# Patient Record
Sex: FEMALE | Race: WHITE | NOT HISPANIC OR LATINO | Employment: OTHER | URBAN - NONMETROPOLITAN AREA
[De-identification: names, ages, dates, MRNs, and addresses within clinical notes are randomized per-mention and may not be internally consistent; named-entity substitution may affect disease eponyms.]

---

## 2018-09-12 ENCOUNTER — TRANSCRIBE ORDERS (OUTPATIENT)
Dept: CARDIOLOGY | Facility: CLINIC | Age: 83
End: 2018-09-12

## 2018-09-12 ENCOUNTER — TRANSCRIBE ORDERS (OUTPATIENT)
Dept: CARDIAC SURGERY | Facility: CLINIC | Age: 83
End: 2018-09-12

## 2018-09-12 DIAGNOSIS — I49.9 CARDIAC ARRHYTHMIA, UNSPECIFIED CARDIAC ARRHYTHMIA TYPE: Primary | ICD-10-CM

## 2018-12-28 ENCOUNTER — APPOINTMENT (OUTPATIENT)
Dept: CT IMAGING | Facility: HOSPITAL | Age: 83
End: 2018-12-28

## 2018-12-28 ENCOUNTER — APPOINTMENT (OUTPATIENT)
Dept: GENERAL RADIOLOGY | Facility: HOSPITAL | Age: 83
End: 2018-12-28

## 2018-12-28 ENCOUNTER — HOSPITAL ENCOUNTER (INPATIENT)
Facility: HOSPITAL | Age: 83
LOS: 6 days | Discharge: NURSING, REHAB OR LONG-TERM CARE | End: 2019-01-03
Attending: EMERGENCY MEDICINE | Admitting: FAMILY MEDICINE

## 2018-12-28 DIAGNOSIS — R13.12 OROPHARYNGEAL DYSPHAGIA: ICD-10-CM

## 2018-12-28 DIAGNOSIS — G93.40 ENCEPHALOPATHY ACUTE: Primary | ICD-10-CM

## 2018-12-28 DIAGNOSIS — D72.829 LEUKOCYTOSIS, UNSPECIFIED TYPE: ICD-10-CM

## 2018-12-28 DIAGNOSIS — E87.1 HYPONATREMIA: ICD-10-CM

## 2018-12-28 DIAGNOSIS — Z74.09 IMPAIRED MOBILITY AND ACTIVITIES OF DAILY LIVING: ICD-10-CM

## 2018-12-28 DIAGNOSIS — Z78.9 IMPAIRED MOBILITY AND ACTIVITIES OF DAILY LIVING: ICD-10-CM

## 2018-12-28 DIAGNOSIS — Z74.09 IMPAIRED PHYSICAL MOBILITY: ICD-10-CM

## 2018-12-28 LAB
ALBUMIN SERPL-MCNC: 3.6 G/DL (ref 3.4–4.8)
ALBUMIN/GLOB SERPL: 1.4 G/DL (ref 1.1–1.8)
ALP SERPL-CCNC: 93 U/L (ref 38–126)
ALT SERPL W P-5'-P-CCNC: 13 U/L (ref 9–52)
ANION GAP SERPL CALCULATED.3IONS-SCNC: 11 MMOL/L (ref 5–15)
AST SERPL-CCNC: 18 U/L (ref 14–36)
BASOPHILS # BLD AUTO: 0.01 10*3/MM3 (ref 0–0.2)
BASOPHILS NFR BLD AUTO: 0.1 % (ref 0–2)
BILIRUB SERPL-MCNC: 0.5 MG/DL (ref 0.2–1.3)
BILIRUB UR QL STRIP: NEGATIVE
BUN BLD-MCNC: 10 MG/DL (ref 7–21)
BUN/CREAT SERPL: 9.3 (ref 7–25)
CALCIUM SPEC-SCNC: 8.8 MG/DL (ref 8.4–10.2)
CHLORIDE SERPL-SCNC: 90 MMOL/L (ref 95–110)
CK SERPL-CCNC: 70 U/L (ref 30–135)
CLARITY UR: CLEAR
CO2 SERPL-SCNC: 23 MMOL/L (ref 22–31)
COLOR UR: YELLOW
CREAT BLD-MCNC: 1.07 MG/DL (ref 0.5–1)
D-LACTATE SERPL-SCNC: 1 MMOL/L (ref 0.5–2)
DEPRECATED RDW RBC AUTO: 39.4 FL (ref 36.4–46.3)
EOSINOPHIL # BLD AUTO: 0 10*3/MM3 (ref 0–0.7)
EOSINOPHIL NFR BLD AUTO: 0 % (ref 0–7)
ERYTHROCYTE [DISTWIDTH] IN BLOOD BY AUTOMATED COUNT: 12.2 % (ref 11.5–14.5)
GFR SERPL CREATININE-BSD FRML MDRD: 49 ML/MIN/1.73 (ref 39–90)
GLOBULIN UR ELPH-MCNC: 2.6 GM/DL (ref 2.3–3.5)
GLUCOSE BLD-MCNC: 140 MG/DL (ref 60–100)
GLUCOSE UR STRIP-MCNC: NEGATIVE MG/DL
HCT VFR BLD AUTO: 30.8 % (ref 35–45)
HGB BLD-MCNC: 11.2 G/DL (ref 12–15.5)
HGB UR QL STRIP.AUTO: NEGATIVE
IMM GRANULOCYTES # BLD AUTO: 0.06 10*3/MM3 (ref 0–0.02)
IMM GRANULOCYTES NFR BLD AUTO: 0.4 % (ref 0–0.5)
KETONES UR QL STRIP: NEGATIVE
LEUKOCYTE ESTERASE UR QL STRIP.AUTO: NEGATIVE
LYMPHOCYTES # BLD AUTO: 0.78 10*3/MM3 (ref 0.6–4.2)
LYMPHOCYTES NFR BLD AUTO: 5.1 % (ref 10–50)
MAGNESIUM SERPL-MCNC: 1.9 MG/DL (ref 1.6–2.3)
MCH RBC QN AUTO: 31.6 PG (ref 26.5–34)
MCHC RBC AUTO-ENTMCNC: 36.4 G/DL (ref 31.4–36)
MCV RBC AUTO: 87 FL (ref 80–98)
MONOCYTES # BLD AUTO: 0.85 10*3/MM3 (ref 0–0.9)
MONOCYTES NFR BLD AUTO: 5.5 % (ref 0–12)
NEUTROPHILS # BLD AUTO: 13.64 10*3/MM3 (ref 2–8.6)
NEUTROPHILS NFR BLD AUTO: 88.9 % (ref 37–80)
NITRITE UR QL STRIP: NEGATIVE
NRBC BLD AUTO-RTO: 0 /100 WBC (ref 0–0)
NT-PROBNP SERPL-MCNC: 1570 PG/ML (ref 0–1800)
PH UR STRIP.AUTO: 7.5 [PH] (ref 5–9)
PLATELET # BLD AUTO: 270 10*3/MM3 (ref 150–450)
PMV BLD AUTO: 8.7 FL (ref 8–12)
POTASSIUM BLD-SCNC: 4.8 MMOL/L (ref 3.5–5.1)
PROT SERPL-MCNC: 6.2 G/DL (ref 6.3–8.6)
PROT UR QL STRIP: NEGATIVE
RBC # BLD AUTO: 3.54 10*6/MM3 (ref 3.77–5.16)
SODIUM BLD-SCNC: 124 MMOL/L (ref 137–145)
SP GR UR STRIP: 1.01 (ref 1–1.03)
TROPONIN I SERPL-MCNC: 0.04 NG/ML
TSH SERPL DL<=0.05 MIU/L-ACNC: 1.9 MIU/ML (ref 0.46–4.68)
UROBILINOGEN UR QL STRIP: NORMAL
WBC NRBC COR # BLD: 15.34 10*3/MM3 (ref 3.2–9.8)

## 2018-12-28 PROCEDURE — G0378 HOSPITAL OBSERVATION PER HR: HCPCS

## 2018-12-28 PROCEDURE — 84484 ASSAY OF TROPONIN QUANT: CPT | Performed by: EMERGENCY MEDICINE

## 2018-12-28 PROCEDURE — 71045 X-RAY EXAM CHEST 1 VIEW: CPT

## 2018-12-28 PROCEDURE — 81003 URINALYSIS AUTO W/O SCOPE: CPT | Performed by: EMERGENCY MEDICINE

## 2018-12-28 PROCEDURE — 72125 CT NECK SPINE W/O DYE: CPT

## 2018-12-28 PROCEDURE — 93010 ELECTROCARDIOGRAM REPORT: CPT | Performed by: INTERNAL MEDICINE

## 2018-12-28 PROCEDURE — 83880 ASSAY OF NATRIURETIC PEPTIDE: CPT | Performed by: EMERGENCY MEDICINE

## 2018-12-28 PROCEDURE — 83735 ASSAY OF MAGNESIUM: CPT | Performed by: EMERGENCY MEDICINE

## 2018-12-28 PROCEDURE — 70450 CT HEAD/BRAIN W/O DYE: CPT

## 2018-12-28 PROCEDURE — 99285 EMERGENCY DEPT VISIT HI MDM: CPT

## 2018-12-28 PROCEDURE — 82746 ASSAY OF FOLIC ACID SERUM: CPT | Performed by: PHYSICIAN ASSISTANT

## 2018-12-28 PROCEDURE — 36415 COLL VENOUS BLD VENIPUNCTURE: CPT | Performed by: EMERGENCY MEDICINE

## 2018-12-28 PROCEDURE — 83540 ASSAY OF IRON: CPT | Performed by: PHYSICIAN ASSISTANT

## 2018-12-28 PROCEDURE — 84443 ASSAY THYROID STIM HORMONE: CPT | Performed by: EMERGENCY MEDICINE

## 2018-12-28 PROCEDURE — 83550 IRON BINDING TEST: CPT | Performed by: PHYSICIAN ASSISTANT

## 2018-12-28 PROCEDURE — 82607 VITAMIN B-12: CPT | Performed by: PHYSICIAN ASSISTANT

## 2018-12-28 PROCEDURE — 87040 BLOOD CULTURE FOR BACTERIA: CPT | Performed by: EMERGENCY MEDICINE

## 2018-12-28 PROCEDURE — 80053 COMPREHEN METABOLIC PANEL: CPT | Performed by: EMERGENCY MEDICINE

## 2018-12-28 PROCEDURE — 93005 ELECTROCARDIOGRAM TRACING: CPT | Performed by: EMERGENCY MEDICINE

## 2018-12-28 PROCEDURE — 83605 ASSAY OF LACTIC ACID: CPT | Performed by: EMERGENCY MEDICINE

## 2018-12-28 PROCEDURE — 25010000002 LORAZEPAM PER 2 MG: Performed by: EMERGENCY MEDICINE

## 2018-12-28 PROCEDURE — 82728 ASSAY OF FERRITIN: CPT | Performed by: PHYSICIAN ASSISTANT

## 2018-12-28 PROCEDURE — 85025 COMPLETE CBC W/AUTO DIFF WBC: CPT | Performed by: EMERGENCY MEDICINE

## 2018-12-28 PROCEDURE — 82550 ASSAY OF CK (CPK): CPT | Performed by: EMERGENCY MEDICINE

## 2018-12-28 RX ORDER — ACETAMINOPHEN 325 MG/1
650 TABLET ORAL EVERY 4 HOURS PRN
Status: DISCONTINUED | OUTPATIENT
Start: 2018-12-28 | End: 2019-01-03 | Stop reason: HOSPADM

## 2018-12-28 RX ORDER — SODIUM CHLORIDE 0.9 % (FLUSH) 0.9 %
3-10 SYRINGE (ML) INJECTION AS NEEDED
Status: DISCONTINUED | OUTPATIENT
Start: 2018-12-28 | End: 2019-01-03 | Stop reason: HOSPADM

## 2018-12-28 RX ORDER — HYDRALAZINE HYDROCHLORIDE 20 MG/ML
20 INJECTION INTRAMUSCULAR; INTRAVENOUS EVERY 6 HOURS PRN
Status: DISCONTINUED | OUTPATIENT
Start: 2018-12-28 | End: 2019-01-03 | Stop reason: HOSPADM

## 2018-12-28 RX ORDER — CHOLECALCIFEROL (VITAMIN D3) 125 MCG
5 CAPSULE ORAL NIGHTLY
COMMUNITY
End: 2023-02-23 | Stop reason: SDUPTHER

## 2018-12-28 RX ORDER — SODIUM CHLORIDE 9 MG/ML
INJECTION, SOLUTION INTRAVENOUS
Status: DISCONTINUED
Start: 2018-12-28 | End: 2019-01-03 | Stop reason: HOSPADM

## 2018-12-28 RX ORDER — SODIUM CHLORIDE 0.9 % (FLUSH) 0.9 %
3 SYRINGE (ML) INJECTION EVERY 12 HOURS SCHEDULED
Status: DISCONTINUED | OUTPATIENT
Start: 2018-12-28 | End: 2019-01-03 | Stop reason: HOSPADM

## 2018-12-28 RX ORDER — HALOPERIDOL 5 MG/ML
2 INJECTION INTRAMUSCULAR EVERY 6 HOURS PRN
Status: DISCONTINUED | OUTPATIENT
Start: 2018-12-28 | End: 2019-01-03 | Stop reason: HOSPADM

## 2018-12-28 RX ORDER — SODIUM CHLORIDE 9 MG/ML
75 INJECTION, SOLUTION INTRAVENOUS CONTINUOUS
Status: DISCONTINUED | OUTPATIENT
Start: 2018-12-28 | End: 2018-12-30

## 2018-12-28 RX ORDER — ALBUTEROL SULFATE 2.5 MG/3ML
2.5 SOLUTION RESPIRATORY (INHALATION) EVERY 4 HOURS PRN
Status: DISCONTINUED | OUTPATIENT
Start: 2018-12-28 | End: 2019-01-03 | Stop reason: HOSPADM

## 2018-12-28 RX ORDER — LORAZEPAM 2 MG/ML
1 INJECTION INTRAMUSCULAR ONCE
Status: COMPLETED | OUTPATIENT
Start: 2018-12-28 | End: 2018-12-28

## 2018-12-28 RX ORDER — SODIUM CHLORIDE 9 MG/ML
125 INJECTION, SOLUTION INTRAVENOUS CONTINUOUS
Status: DISCONTINUED | OUTPATIENT
Start: 2018-12-28 | End: 2018-12-28

## 2018-12-28 RX ADMIN — SODIUM CHLORIDE 125 ML/HR: 900 INJECTION, SOLUTION INTRAVENOUS at 14:21

## 2018-12-28 RX ADMIN — LORAZEPAM 1 MG: 2 INJECTION, SOLUTION INTRAMUSCULAR; INTRAVENOUS at 13:16

## 2018-12-28 RX ADMIN — SODIUM CHLORIDE 500 ML: 9 INJECTION, SOLUTION INTRAVENOUS at 13:13

## 2018-12-29 LAB
ALBUMIN SERPL-MCNC: 2.5 G/DL (ref 3.4–4.8)
ALBUMIN/GLOB SERPL: 1 G/DL (ref 1.1–1.8)
ALP SERPL-CCNC: 70 U/L (ref 38–126)
ALT SERPL W P-5'-P-CCNC: 16 U/L (ref 9–52)
ANION GAP SERPL CALCULATED.3IONS-SCNC: 5 MMOL/L (ref 5–15)
AST SERPL-CCNC: 16 U/L (ref 14–36)
BASOPHILS # BLD AUTO: 0.01 10*3/MM3 (ref 0–0.2)
BASOPHILS NFR BLD AUTO: 0.2 % (ref 0–2)
BILIRUB SERPL-MCNC: 0.6 MG/DL (ref 0.2–1.3)
BUN BLD-MCNC: 9 MG/DL (ref 7–21)
BUN/CREAT SERPL: 8.7 (ref 7–25)
CALCIUM SPEC-SCNC: 8.2 MG/DL (ref 8.4–10.2)
CHLORIDE SERPL-SCNC: 100 MMOL/L (ref 95–110)
CO2 SERPL-SCNC: 23 MMOL/L (ref 22–31)
CREAT BLD-MCNC: 1.03 MG/DL (ref 0.5–1)
DEPRECATED RDW RBC AUTO: 42.3 FL (ref 36.4–46.3)
EOSINOPHIL # BLD AUTO: 0.03 10*3/MM3 (ref 0–0.7)
EOSINOPHIL NFR BLD AUTO: 0.5 % (ref 0–7)
ERYTHROCYTE [DISTWIDTH] IN BLOOD BY AUTOMATED COUNT: 12.8 % (ref 11.5–14.5)
FERRITIN SERPL-MCNC: 162 NG/ML (ref 11.1–264)
FOLATE SERPL-MCNC: 19.5 NG/ML (ref 2.76–21)
GFR SERPL CREATININE-BSD FRML MDRD: 51 ML/MIN/1.73 (ref 39–90)
GLOBULIN UR ELPH-MCNC: 2.5 GM/DL (ref 2.3–3.5)
GLUCOSE BLD-MCNC: 92 MG/DL (ref 60–100)
HCT VFR BLD AUTO: 27.3 % (ref 35–45)
HGB BLD-MCNC: 9.5 G/DL (ref 12–15.5)
IMM GRANULOCYTES # BLD AUTO: 0.02 10*3/MM3 (ref 0–0.02)
IMM GRANULOCYTES NFR BLD AUTO: 0.3 % (ref 0–0.5)
IRON 24H UR-MRATE: 24 MCG/DL (ref 37–170)
IRON SATN MFR SERPL: 9 % (ref 15–50)
LYMPHOCYTES # BLD AUTO: 1.29 10*3/MM3 (ref 0.6–4.2)
LYMPHOCYTES NFR BLD AUTO: 19.5 % (ref 10–50)
MCH RBC QN AUTO: 31.4 PG (ref 26.5–34)
MCHC RBC AUTO-ENTMCNC: 34.8 G/DL (ref 31.4–36)
MCV RBC AUTO: 90.1 FL (ref 80–98)
MONOCYTES # BLD AUTO: 0.51 10*3/MM3 (ref 0–0.9)
MONOCYTES NFR BLD AUTO: 7.7 % (ref 0–12)
NEUTROPHILS # BLD AUTO: 4.75 10*3/MM3 (ref 2–8.6)
NEUTROPHILS NFR BLD AUTO: 71.8 % (ref 37–80)
PLATELET # BLD AUTO: 243 10*3/MM3 (ref 150–450)
PMV BLD AUTO: 8.4 FL (ref 8–12)
POTASSIUM BLD-SCNC: 4 MMOL/L (ref 3.5–5.1)
PROT SERPL-MCNC: 5 G/DL (ref 6.3–8.6)
RBC # BLD AUTO: 3.03 10*6/MM3 (ref 3.77–5.16)
SODIUM BLD-SCNC: 128 MMOL/L (ref 137–145)
TIBC SERPL-MCNC: 263 MCG/DL (ref 265–497)
VIT B12 BLD-MCNC: 679 PG/ML (ref 239–931)
WBC NRBC COR # BLD: 6.61 10*3/MM3 (ref 3.2–9.8)

## 2018-12-29 PROCEDURE — G8996 SWALLOW CURRENT STATUS: HCPCS | Performed by: SPEECH-LANGUAGE PATHOLOGIST

## 2018-12-29 PROCEDURE — G8978 MOBILITY CURRENT STATUS: HCPCS | Performed by: PHYSICAL THERAPIST

## 2018-12-29 PROCEDURE — G8997 SWALLOW GOAL STATUS: HCPCS | Performed by: SPEECH-LANGUAGE PATHOLOGIST

## 2018-12-29 PROCEDURE — 92610 EVALUATE SWALLOWING FUNCTION: CPT | Performed by: SPEECH-LANGUAGE PATHOLOGIST

## 2018-12-29 PROCEDURE — G8979 MOBILITY GOAL STATUS: HCPCS | Performed by: PHYSICAL THERAPIST

## 2018-12-29 PROCEDURE — 97162 PT EVAL MOD COMPLEX 30 MIN: CPT | Performed by: PHYSICAL THERAPIST

## 2018-12-29 PROCEDURE — 80053 COMPREHEN METABOLIC PANEL: CPT | Performed by: PHYSICIAN ASSISTANT

## 2018-12-29 PROCEDURE — G8998 SWALLOW D/C STATUS: HCPCS | Performed by: SPEECH-LANGUAGE PATHOLOGIST

## 2018-12-29 PROCEDURE — 85025 COMPLETE CBC W/AUTO DIFF WBC: CPT | Performed by: PHYSICIAN ASSISTANT

## 2018-12-29 PROCEDURE — 97530 THERAPEUTIC ACTIVITIES: CPT | Performed by: PHYSICAL THERAPIST

## 2018-12-29 PROCEDURE — 99233 SBSQ HOSP IP/OBS HIGH 50: CPT | Performed by: PSYCHIATRY & NEUROLOGY

## 2018-12-29 RX ADMIN — SODIUM CHLORIDE, PRESERVATIVE FREE 10 ML: 5 INJECTION INTRAVENOUS at 08:17

## 2018-12-29 RX ADMIN — SODIUM CHLORIDE 75 ML/HR: 9 INJECTION, SOLUTION INTRAVENOUS at 15:22

## 2018-12-29 RX ADMIN — Medication: at 13:59

## 2018-12-30 LAB
25(OH)D3 SERPL-MCNC: 17.4 NG/ML (ref 30–100)
ANION GAP SERPL CALCULATED.3IONS-SCNC: 8 MMOL/L (ref 5–15)
BASOPHILS # BLD AUTO: 0.02 10*3/MM3 (ref 0–0.2)
BASOPHILS NFR BLD AUTO: 0.4 % (ref 0–2)
BUN BLD-MCNC: 9 MG/DL (ref 7–21)
BUN/CREAT SERPL: 9.2 (ref 7–25)
CALCIUM SPEC-SCNC: 8.2 MG/DL (ref 8.4–10.2)
CHLORIDE SERPL-SCNC: 100 MMOL/L (ref 95–110)
CO2 SERPL-SCNC: 21 MMOL/L (ref 22–31)
CREAT BLD-MCNC: 0.98 MG/DL (ref 0.5–1)
DEPRECATED RDW RBC AUTO: 40.1 FL (ref 36.4–46.3)
EOSINOPHIL # BLD AUTO: 0.16 10*3/MM3 (ref 0–0.7)
EOSINOPHIL NFR BLD AUTO: 3 % (ref 0–7)
ERYTHROCYTE [DISTWIDTH] IN BLOOD BY AUTOMATED COUNT: 12.4 % (ref 11.5–14.5)
GFR SERPL CREATININE-BSD FRML MDRD: 54 ML/MIN/1.73 (ref 39–90)
GLUCOSE BLD-MCNC: 86 MG/DL (ref 60–100)
HCT VFR BLD AUTO: 27.6 % (ref 35–45)
HGB BLD-MCNC: 9.9 G/DL (ref 12–15.5)
IMM GRANULOCYTES # BLD AUTO: 0.01 10*3/MM3 (ref 0–0.02)
IMM GRANULOCYTES NFR BLD AUTO: 0.2 % (ref 0–0.5)
LYMPHOCYTES # BLD AUTO: 1.08 10*3/MM3 (ref 0.6–4.2)
LYMPHOCYTES NFR BLD AUTO: 20.1 % (ref 10–50)
MCH RBC QN AUTO: 31.7 PG (ref 26.5–34)
MCHC RBC AUTO-ENTMCNC: 35.9 G/DL (ref 31.4–36)
MCV RBC AUTO: 88.5 FL (ref 80–98)
MONOCYTES # BLD AUTO: 0.42 10*3/MM3 (ref 0–0.9)
MONOCYTES NFR BLD AUTO: 7.8 % (ref 0–12)
NEUTROPHILS # BLD AUTO: 3.67 10*3/MM3 (ref 2–8.6)
NEUTROPHILS NFR BLD AUTO: 68.5 % (ref 37–80)
PLATELET # BLD AUTO: 253 10*3/MM3 (ref 150–450)
PMV BLD AUTO: 8.9 FL (ref 8–12)
POTASSIUM BLD-SCNC: 3.7 MMOL/L (ref 3.5–5.1)
RBC # BLD AUTO: 3.12 10*6/MM3 (ref 3.77–5.16)
SODIUM BLD-SCNC: 129 MMOL/L (ref 137–145)
WBC NRBC COR # BLD: 5.36 10*3/MM3 (ref 3.2–9.8)

## 2018-12-30 PROCEDURE — 86593 SYPHILIS TEST NON-TREP QUANT: CPT | Performed by: PSYCHIATRY & NEUROLOGY

## 2018-12-30 PROCEDURE — G8987 SELF CARE CURRENT STATUS: HCPCS

## 2018-12-30 PROCEDURE — 83930 ASSAY OF BLOOD OSMOLALITY: CPT | Performed by: PHYSICIAN ASSISTANT

## 2018-12-30 PROCEDURE — 36415 COLL VENOUS BLD VENIPUNCTURE: CPT | Performed by: PSYCHIATRY & NEUROLOGY

## 2018-12-30 PROCEDURE — 99232 SBSQ HOSP IP/OBS MODERATE 35: CPT | Performed by: PSYCHIATRY & NEUROLOGY

## 2018-12-30 PROCEDURE — 82306 VITAMIN D 25 HYDROXY: CPT | Performed by: PSYCHIATRY & NEUROLOGY

## 2018-12-30 PROCEDURE — 85025 COMPLETE CBC W/AUTO DIFF WBC: CPT | Performed by: PHYSICIAN ASSISTANT

## 2018-12-30 PROCEDURE — 80048 BASIC METABOLIC PNL TOTAL CA: CPT | Performed by: PHYSICIAN ASSISTANT

## 2018-12-30 PROCEDURE — G8988 SELF CARE GOAL STATUS: HCPCS

## 2018-12-30 PROCEDURE — 97166 OT EVAL MOD COMPLEX 45 MIN: CPT

## 2018-12-30 PROCEDURE — 86780 TREPONEMA PALLIDUM: CPT | Performed by: PSYCHIATRY & NEUROLOGY

## 2018-12-30 PROCEDURE — 86592 SYPHILIS TEST NON-TREP QUAL: CPT | Performed by: PSYCHIATRY & NEUROLOGY

## 2018-12-30 PROCEDURE — G0432 EIA HIV-1/HIV-2 SCREEN: HCPCS | Performed by: PSYCHIATRY & NEUROLOGY

## 2018-12-30 RX ORDER — FERROUS SULFATE TAB EC 324 MG (65 MG FE EQUIVALENT) 324 (65 FE) MG
324 TABLET DELAYED RESPONSE ORAL 2 TIMES DAILY WITH MEALS
Status: DISCONTINUED | OUTPATIENT
Start: 2018-12-30 | End: 2019-01-03 | Stop reason: HOSPADM

## 2018-12-30 RX ORDER — MELATONIN
5000
Status: DISCONTINUED | OUTPATIENT
Start: 2018-12-31 | End: 2019-01-03 | Stop reason: HOSPADM

## 2018-12-30 RX ADMIN — Medication: at 08:19

## 2018-12-30 RX ADMIN — SODIUM CHLORIDE, PRESERVATIVE FREE 10 ML: 5 INJECTION INTRAVENOUS at 08:19

## 2018-12-30 RX ADMIN — SODIUM CHLORIDE 75 ML/HR: 9 INJECTION, SOLUTION INTRAVENOUS at 04:25

## 2018-12-30 RX ADMIN — SODIUM CHLORIDE, PRESERVATIVE FREE 3 ML: 5 INJECTION INTRAVENOUS at 19:43

## 2018-12-30 RX ADMIN — FERROUS SULFATE TAB EC 324 MG (65 MG FE EQUIVALENT) 324 MG: 324 (65 FE) TABLET DELAYED RESPONSE at 17:14

## 2018-12-31 LAB
ALBUMIN SERPL-MCNC: 2.8 G/DL (ref 3.4–4.8)
ALBUMIN/GLOB SERPL: 1.3 G/DL (ref 1.1–1.8)
ALP SERPL-CCNC: 72 U/L (ref 38–126)
ALT SERPL W P-5'-P-CCNC: 15 U/L (ref 9–52)
ANION GAP SERPL CALCULATED.3IONS-SCNC: 8 MMOL/L (ref 5–15)
AST SERPL-CCNC: 23 U/L (ref 14–36)
BASOPHILS # BLD AUTO: 0.02 10*3/MM3 (ref 0–0.2)
BASOPHILS NFR BLD AUTO: 0.4 % (ref 0–2)
BILIRUB SERPL-MCNC: 0.2 MG/DL (ref 0.2–1.3)
BUN BLD-MCNC: 8 MG/DL (ref 7–21)
BUN/CREAT SERPL: 7.6 (ref 7–25)
CALCIUM SPEC-SCNC: 8.3 MG/DL (ref 8.4–10.2)
CHLORIDE SERPL-SCNC: 96 MMOL/L (ref 95–110)
CO2 SERPL-SCNC: 22 MMOL/L (ref 22–31)
CREAT BLD-MCNC: 1.05 MG/DL (ref 0.5–1)
DEPRECATED RDW RBC AUTO: 40.5 FL (ref 36.4–46.3)
EOSINOPHIL # BLD AUTO: 0.17 10*3/MM3 (ref 0–0.7)
EOSINOPHIL NFR BLD AUTO: 3.3 % (ref 0–7)
ERYTHROCYTE [DISTWIDTH] IN BLOOD BY AUTOMATED COUNT: 12.5 % (ref 11.5–14.5)
GFR SERPL CREATININE-BSD FRML MDRD: 50 ML/MIN/1.73 (ref 39–90)
GLOBULIN UR ELPH-MCNC: 2.2 GM/DL (ref 2.3–3.5)
GLUCOSE BLD-MCNC: 79 MG/DL (ref 60–100)
HCT VFR BLD AUTO: 26.4 % (ref 35–45)
HGB BLD-MCNC: 9.5 G/DL (ref 12–15.5)
IMM GRANULOCYTES # BLD AUTO: 0.02 10*3/MM3 (ref 0–0.02)
IMM GRANULOCYTES NFR BLD AUTO: 0.4 % (ref 0–0.5)
LYMPHOCYTES # BLD AUTO: 1.14 10*3/MM3 (ref 0.6–4.2)
LYMPHOCYTES NFR BLD AUTO: 21.9 % (ref 10–50)
MCH RBC QN AUTO: 31.5 PG (ref 26.5–34)
MCHC RBC AUTO-ENTMCNC: 36 G/DL (ref 31.4–36)
MCV RBC AUTO: 87.4 FL (ref 80–98)
MONOCYTES # BLD AUTO: 0.42 10*3/MM3 (ref 0–0.9)
MONOCYTES NFR BLD AUTO: 8.1 % (ref 0–12)
NEUTROPHILS # BLD AUTO: 3.43 10*3/MM3 (ref 2–8.6)
NEUTROPHILS NFR BLD AUTO: 65.9 % (ref 37–80)
OSMOLALITY SERPL: 271 MOSM/KG (ref 280–290)
OSMOLALITY UR: 190 MOSM/KG (ref 38–1400)
PLATELET # BLD AUTO: 241 10*3/MM3 (ref 150–450)
PMV BLD AUTO: 9 FL (ref 8–12)
POTASSIUM BLD-SCNC: 3.8 MMOL/L (ref 3.5–5.1)
PROT SERPL-MCNC: 5 G/DL (ref 6.3–8.6)
RBC # BLD AUTO: 3.02 10*6/MM3 (ref 3.77–5.16)
SODIUM BLD-SCNC: 122 MMOL/L (ref 137–145)
SODIUM BLD-SCNC: 126 MMOL/L (ref 137–145)
SODIUM UR-SCNC: 62 MMOL/L (ref 30–90)
WBC NRBC COR # BLD: 5.2 10*3/MM3 (ref 3.2–9.8)

## 2018-12-31 PROCEDURE — 97530 THERAPEUTIC ACTIVITIES: CPT

## 2018-12-31 PROCEDURE — 97535 SELF CARE MNGMENT TRAINING: CPT

## 2018-12-31 PROCEDURE — 85025 COMPLETE CBC W/AUTO DIFF WBC: CPT | Performed by: PHYSICIAN ASSISTANT

## 2018-12-31 PROCEDURE — 80053 COMPREHEN METABOLIC PANEL: CPT | Performed by: PHYSICIAN ASSISTANT

## 2018-12-31 PROCEDURE — 84295 ASSAY OF SERUM SODIUM: CPT | Performed by: INTERNAL MEDICINE

## 2018-12-31 PROCEDURE — 83935 ASSAY OF URINE OSMOLALITY: CPT | Performed by: PHYSICIAN ASSISTANT

## 2018-12-31 PROCEDURE — 84300 ASSAY OF URINE SODIUM: CPT | Performed by: PHYSICIAN ASSISTANT

## 2018-12-31 RX ORDER — SODIUM CHLORIDE 1000 MG
2 TABLET, SOLUBLE MISCELLANEOUS
Status: DISCONTINUED | OUTPATIENT
Start: 2018-12-31 | End: 2018-12-31

## 2018-12-31 RX ORDER — QUETIAPINE FUMARATE 25 MG/1
12.5 TABLET, FILM COATED ORAL NIGHTLY
Status: DISCONTINUED | OUTPATIENT
Start: 2018-12-31 | End: 2019-01-03 | Stop reason: HOSPADM

## 2018-12-31 RX ADMIN — Medication 15 MG: at 23:44

## 2018-12-31 RX ADMIN — FERROUS SULFATE TAB EC 324 MG (65 MG FE EQUIVALENT) 324 MG: 324 (65 FE) TABLET DELAYED RESPONSE at 11:18

## 2018-12-31 RX ADMIN — SODIUM CHLORIDE, PRESERVATIVE FREE 3 ML: 5 INJECTION INTRAVENOUS at 11:19

## 2018-12-31 RX ADMIN — Medication 12.5 MG: at 20:36

## 2018-12-31 RX ADMIN — FERROUS SULFATE TAB EC 324 MG (65 MG FE EQUIVALENT) 324 MG: 324 (65 FE) TABLET DELAYED RESPONSE at 17:24

## 2018-12-31 RX ADMIN — VITAMIN D, TAB 1000IU (100/BT) 5000 UNITS: 25 TAB at 17:24

## 2018-12-31 RX ADMIN — SODIUM CHLORIDE TAB 1 GM 2 G: 1 TAB at 17:24

## 2019-01-01 LAB
ALBUMIN SERPL-MCNC: 2.8 G/DL (ref 3.4–4.8)
ALBUMIN/GLOB SERPL: 1.1 G/DL (ref 1.1–1.8)
ALP SERPL-CCNC: 77 U/L (ref 38–126)
ALT SERPL W P-5'-P-CCNC: 19 U/L (ref 9–52)
ANION GAP SERPL CALCULATED.3IONS-SCNC: 9 MMOL/L (ref 5–15)
AST SERPL-CCNC: 21 U/L (ref 14–36)
BASOPHILS # BLD AUTO: 0.02 10*3/MM3 (ref 0–0.2)
BASOPHILS NFR BLD AUTO: 0.5 % (ref 0–2)
BILIRUB SERPL-MCNC: 0.2 MG/DL (ref 0.2–1.3)
BUN BLD-MCNC: 9 MG/DL (ref 7–21)
BUN/CREAT SERPL: 8 (ref 7–25)
CALCIUM SPEC-SCNC: 8.5 MG/DL (ref 8.4–10.2)
CHLORIDE SERPL-SCNC: 101 MMOL/L (ref 95–110)
CO2 SERPL-SCNC: 23 MMOL/L (ref 22–31)
CORTIS AM PEAK SERPL-MCNC: 10.1 MCG/DL (ref 4.46–22.7)
CREAT BLD-MCNC: 1.13 MG/DL (ref 0.5–1)
DEPRECATED RDW RBC AUTO: 40.8 FL (ref 36.4–46.3)
EOSINOPHIL # BLD AUTO: 0.16 10*3/MM3 (ref 0–0.7)
EOSINOPHIL NFR BLD AUTO: 4.1 % (ref 0–7)
ERYTHROCYTE [DISTWIDTH] IN BLOOD BY AUTOMATED COUNT: 12.6 % (ref 11.5–14.5)
GFR SERPL CREATININE-BSD FRML MDRD: 46 ML/MIN/1.73 (ref 39–90)
GLOBULIN UR ELPH-MCNC: 2.5 GM/DL (ref 2.3–3.5)
GLUCOSE BLD-MCNC: 90 MG/DL (ref 60–100)
HCT VFR BLD AUTO: 27.7 % (ref 35–45)
HGB BLD-MCNC: 10 G/DL (ref 12–15.5)
IMM GRANULOCYTES # BLD AUTO: 0.01 10*3/MM3 (ref 0–0.02)
IMM GRANULOCYTES NFR BLD AUTO: 0.3 % (ref 0–0.5)
LYMPHOCYTES # BLD AUTO: 0.99 10*3/MM3 (ref 0.6–4.2)
LYMPHOCYTES NFR BLD AUTO: 25.3 % (ref 10–50)
MCH RBC QN AUTO: 31.9 PG (ref 26.5–34)
MCHC RBC AUTO-ENTMCNC: 36.1 G/DL (ref 31.4–36)
MCV RBC AUTO: 88.5 FL (ref 80–98)
MONOCYTES # BLD AUTO: 0.39 10*3/MM3 (ref 0–0.9)
MONOCYTES NFR BLD AUTO: 9.9 % (ref 0–12)
NEUTROPHILS # BLD AUTO: 2.35 10*3/MM3 (ref 2–8.6)
NEUTROPHILS NFR BLD AUTO: 59.9 % (ref 37–80)
PLATELET # BLD AUTO: 249 10*3/MM3 (ref 150–450)
PMV BLD AUTO: 9 FL (ref 8–12)
POTASSIUM BLD-SCNC: 3.8 MMOL/L (ref 3.5–5.1)
PROT SERPL-MCNC: 5.3 G/DL (ref 6.3–8.6)
RBC # BLD AUTO: 3.13 10*6/MM3 (ref 3.77–5.16)
SODIUM BLD-SCNC: 132 MMOL/L (ref 137–145)
SODIUM BLD-SCNC: 133 MMOL/L (ref 137–145)
URATE SERPL-MCNC: 3.4 MG/DL (ref 2.5–8.5)
WBC NRBC COR # BLD: 3.92 10*3/MM3 (ref 3.2–9.8)

## 2019-01-01 PROCEDURE — 84295 ASSAY OF SERUM SODIUM: CPT | Performed by: INTERNAL MEDICINE

## 2019-01-01 PROCEDURE — 82533 TOTAL CORTISOL: CPT | Performed by: NURSE PRACTITIONER

## 2019-01-01 PROCEDURE — 80053 COMPREHEN METABOLIC PANEL: CPT | Performed by: PHYSICIAN ASSISTANT

## 2019-01-01 PROCEDURE — 85025 COMPLETE CBC W/AUTO DIFF WBC: CPT | Performed by: PHYSICIAN ASSISTANT

## 2019-01-01 PROCEDURE — 84550 ASSAY OF BLOOD/URIC ACID: CPT | Performed by: INTERNAL MEDICINE

## 2019-01-01 PROCEDURE — 97110 THERAPEUTIC EXERCISES: CPT

## 2019-01-01 RX ORDER — LACTULOSE 10 G/15ML
10 SOLUTION ORAL DAILY PRN
Status: DISCONTINUED | OUTPATIENT
Start: 2019-01-01 | End: 2019-01-03 | Stop reason: HOSPADM

## 2019-01-01 RX ORDER — AMLODIPINE BESYLATE 5 MG/1
5 TABLET ORAL
Status: DISCONTINUED | OUTPATIENT
Start: 2019-01-01 | End: 2019-01-03 | Stop reason: HOSPADM

## 2019-01-01 RX ORDER — SODIUM CHLORIDE 1000 MG
2 TABLET, SOLUBLE MISCELLANEOUS
Status: DISCONTINUED | OUTPATIENT
Start: 2019-01-01 | End: 2019-01-03

## 2019-01-01 RX ORDER — DOCUSATE SODIUM 100 MG/1
100 CAPSULE, LIQUID FILLED ORAL 2 TIMES DAILY
Status: DISCONTINUED | OUTPATIENT
Start: 2019-01-01 | End: 2019-01-03 | Stop reason: HOSPADM

## 2019-01-01 RX ADMIN — VITAMIN D, TAB 1000IU (100/BT) 5000 UNITS: 25 TAB at 17:45

## 2019-01-01 RX ADMIN — POLYETHYLENE GLYCOL 3350 17 G: 17 POWDER, FOR SOLUTION ORAL at 10:27

## 2019-01-01 RX ADMIN — FERROUS SULFATE TAB EC 324 MG (65 MG FE EQUIVALENT) 324 MG: 324 (65 FE) TABLET DELAYED RESPONSE at 17:45

## 2019-01-01 RX ADMIN — FERROUS SULFATE TAB EC 324 MG (65 MG FE EQUIVALENT) 324 MG: 324 (65 FE) TABLET DELAYED RESPONSE at 08:24

## 2019-01-01 RX ADMIN — DOCUSATE SODIUM 100 MG: 100 CAPSULE, LIQUID FILLED ORAL at 10:27

## 2019-01-01 RX ADMIN — SODIUM CHLORIDE, PRESERVATIVE FREE 3 ML: 5 INJECTION INTRAVENOUS at 20:59

## 2019-01-01 RX ADMIN — DOCUSATE SODIUM 100 MG: 100 CAPSULE, LIQUID FILLED ORAL at 20:58

## 2019-01-01 RX ADMIN — SODIUM CHLORIDE TAB 1 GM 2 G: 1 TAB at 17:45

## 2019-01-01 RX ADMIN — SODIUM CHLORIDE, PRESERVATIVE FREE 3 ML: 5 INJECTION INTRAVENOUS at 08:25

## 2019-01-01 RX ADMIN — SODIUM CHLORIDE TAB 1 GM 2 G: 1 TAB at 10:27

## 2019-01-01 RX ADMIN — AMLODIPINE BESYLATE 5 MG: 5 TABLET ORAL at 10:27

## 2019-01-01 NOTE — PLAN OF CARE
Problem: Patient Care Overview  Goal: Plan of Care Review  Outcome: Ongoing (interventions implemented as appropriate)   01/01/19 0031   Coping/Psychosocial   Plan of Care Reviewed With patient   Plan of Care Review   Progress no change       Problem: Fall Risk (Adult)  Goal: Absence of Fall  Outcome: Ongoing (interventions implemented as appropriate)   01/01/19 0031   Fall Risk (Adult)   Absence of Fall achieves outcome       Problem: Skin Injury Risk (Adult)  Goal: Skin Health and Integrity  Outcome: Ongoing (interventions implemented as appropriate)   01/01/19 0031   Skin Injury Risk (Adult)   Skin Health and Integrity achieves outcome       Problem: Confusion, Chronic (Adult)  Goal: Cognitive/Functional Impairments Minimized  Outcome: Ongoing (interventions implemented as appropriate)   01/01/19 0031   Confusion, Chronic (Adult)   Cognitive/Functional Impairments Minimized making progress toward outcome     Goal: Free from Harm/Injuries  Outcome: Ongoing (interventions implemented as appropriate)   01/01/19 0031   Confusion, Chronic (Adult)   Free from Harm/Injuries making progress toward outcome

## 2019-01-01 NOTE — PLAN OF CARE
Problem: Patient Care Overview  Goal: Plan of Care Review  Outcome: Ongoing (interventions implemented as appropriate)   01/01/19 2093   Coping/Psychosocial   Plan of Care Reviewed With patient   OTHER   Outcome Summary Pt agreeable to PT tx with max encouragement, but declining getting OOB or EOB. Pt able to complete BLE therex in supine. No goals met this date. Pt would benefit from further PT upon d/c and SNF placement

## 2019-01-01 NOTE — PROGRESS NOTES
"[facility]  [address] [place] [address]. [address]  T - [phone]  F - [phone]     Progress Note          PATIENT  DEMOGRAPHICS   PATIENT NAME: Marlene Horne                      PHYSICIAN: Sagar Perkins MD  : 1931  MRN: [id number]   LOS: 4 days    Patient Care Team:  Provider, No Known as PCP - General  Subjective   SUBJECTIVE   Appears some better per family more alert         Objective   OBJECTIVE   Vital Signs  Temp:  [96.7 °F (35.9 °C)-98 °F (36.7 °C)] 98 °F (36.7 °C)  Heart Rate:  [57-73] 57  Resp:  [18] 18  BP: (132-170)/(65-88) 158/88    Flowsheet Rows      First Filed Value   Admission Height  157.5 cm (62\") Documented at 2018 1730   Admission Weight  68.7 kg (151 lb 7 oz) Documented at 2018 0952           I/O last 3 completed shifts:  In: 120 [P.O.:120]  Out: 1000 [Urine:1000]    PHYSICAL EXAM    Physical Exam   Constitutional: She appears well-developed.   HENT:   Head: Normocephalic.   Eyes: Pupils are equal, round, and reactive to light.   Cardiovascular: Normal rate, regular rhythm and normal heart sounds.   Pulmonary/Chest: Effort normal and breath sounds normal.   Abdominal: Soft. Bowel sounds are normal.   Neurological: She is alert. She exhibits normal muscle tone.       RESULTS   Results Review:    Results from last 7 days   Lab Units  18   2139  18   0556  18   0542  18   0557  18   1245   SODIUM mmol/L  122*  126*  129*  128*  124*   POTASSIUM mmol/L   --   3.8  3.7  4.0  4.8   CHLORIDE mmol/L   --   96  100  100  90*   CO2 mmol/L   --   22.0  21.0*  23.0  23.0   BUN mg/dL   --   8  9  9  10   CREATININE mg/dL   --   1.05*  0.98  1.03*  1.07*   CALCIUM mg/dL   --   8.3*  8.2*  8.2*  8.8   BILIRUBIN mg/dL   --   0.2   --   0.6  0.5   ALK PHOS U/L   --   72   --   70  93   ALT (SGPT) U/L   --   15   --   16  13   AST (SGOT) U/L   --   23   --   16  18   GLUCOSE mg/dL   --   79  86  92  140*       Estimated " Creatinine Clearance: 33.2 mL/min (A) (by C-G formula based on SCr of 1.05 mg/dL (H)).    Results from last 7 days   Lab Units  12/28/18   1245   MAGNESIUM mg/dL  1.9             Results from last 7 days   Lab Units  01/01/19   0820  12/31/18   0556  12/30/18   0542  12/29/18   0557  12/28/18   1245   WBC 10*3/mm3  3.92  5.20  5.36  6.61  15.34*   HEMOGLOBIN g/dL  10.0*  9.5*  9.9*  9.5*  11.2*   PLATELETS 10*3/mm3  249  241  253  243  270               Imaging Results (last 24 hours)     ** No results found for the last 24 hours. **           MEDICATIONS      cholecalciferol 5,000 Units Oral Daily With Dinner   docusate sodium 100 mg Oral BID   ferrous sulfate 324 mg Oral BID With Meals   polyethylene glycol 17 g Oral Daily   QUEtiapine 12.5 mg Oral Nightly   sodium chloride 3 mL Intravenous Q12H          Assessment/Plan   ASSESSMENT / PLAN      Encephalopathy acute    1.  Hyponatremia- originally thought to be hypovolemic type, but then drop with normal saline after initial improvement. Urine na is 62 and urine osmolality not extremely high (?low solute intake in elderly). tsh is normal. Cortisol pending. Check uric acid. cxr no mass. No recent wt loss. Check lab and may need salt tab back (doubt siadh on the basis of urine studies)     2.  Dementia- new diagnosis, plan for skilled nursing facility discharge     3.  CKD3- renal function stable with creatinine at 0.9-1.0    4. High bp add norvasc                This document has been electronically signed by Sagar Perkins MD on January 1, 2019 9:07 AM

## 2019-01-01 NOTE — THERAPY TREATMENT NOTE
Acute Care - Physical Therapy Treatment Note  [facility]     Patient Name: Marlene Horen  : 1931  MRN: [id number]  Today's Date: 2019  Onset of Illness/Injury or Date of Surgery: 18  Date of Referral to PT: 18  Referring Physician: LNO Thorne    Admit Date: 2018    Visit Dx:    ICD-10-CM ICD-9-CM   1. Encephalopathy acute G93.40 348.30   2. Hyponatremia E87.1 276.1   3. Leukocytosis, unspecified type D72.829 288.60   4. Oropharyngeal dysphagia R13.12 787.22   5. Impaired physical mobility Z74.09 781.99   6. Impaired mobility and activities of daily living Z74.09 799.89     Patient Active Problem List   Diagnosis   • Encephalopathy acute       Therapy Treatment    Rehabilitation Treatment Summary     Row Name 19 1343             Treatment Time/Intention    Discipline  physical therapist  -KW      Document Type  therapy note (daily note)  -KW      Subjective Information  complains of;fatigue  -KW      Mode of Treatment  physical therapy;individual therapy  -KW      Patient/Family Observations  daughter present initially but left during tx  -KW      Care Plan Review  care plan/treatment goals reviewed;patient/other agree to care plan  -KW      Patient Effort  adequate  -KW      Comment  Pt requiring max encouragement to participate this date   -KW      Recorded by [KW] Keeley Thakkar, PT 19 1544      Row Name 19 1343             Vital Signs    Pre Systolic BP Rehab  183  -KW      Pre Treatment Diastolic BP  77  -KW      Post Systolic BP Rehab  168  -KW      Post Treatment Diastolic BP  73  -KW      Pretreatment Heart Rate (beats/min)  67  -KW      Posttreatment Heart Rate (beats/min)  70  -KW      Pre SpO2 (%)  97  -KW      O2 Delivery Pre Treatment  room air  -KW      Post SpO2 (%)  96  -KW      O2 Delivery Post Treatment  room air  -KW      Pre Patient Position  Supine  -KW      Intra Patient Position  Supine  -KW      Post Patient Position  Supine   -KW      Recorded by [KW] Keeley Thakkar, PT 01/01/19 1544      Row Name 01/01/19 1343             Cognitive Assessment/Intervention- PT/OT    Affect/Mental Status (Cognitive)  unable/difficult to assess  -KW      Orientation Status (Cognition)  oriented to;person;place  -KW      Follows Commands (Cognition)  follows one step commands;increased processing time needed;delayed response/completion  -KW      Memory Deficit (Cognitive)  mild deficit  -KW      Safety Deficit (Cognitive)  awareness of need for assistance;insight into deficits/self awareness  -KW      Personal Safety Interventions  muscle strengthening facilitated;supervised activity  -KW      Recorded by [KW] Keeley Thakkar, PT 01/01/19 1544      Row Name 01/01/19 1343             Bed Mobility Assessment/Treatment    Comment (Bed Mobility)  Pt declining this date  -KW      Recorded by [KW] Keeley Thakkar, PT 01/01/19 1544      Row Name 01/01/19 1343             Therapeutic Exercise    Lower Extremity (Therapeutic Exercise)  gluteal sets;heel slides, bilateral;quad sets, bilateral;SLR (straight leg raise), bilateral  -KW      Lower Extremity Range of Motion (Therapeutic Exercise)  hip abduction/adduction, bilateral;ankle dorsiflexion/plantar flexion, bilateral  -KW      Exercise Type (Therapeutic Exercise)  AROM (active range of motion)  -KW      Position (Therapeutic Exercise)  supine  -KW      Sets/Reps (Therapeutic Exercise)  1x20  -KW      Expected Outcome (Therapeutic Exercise)  improve functional stability;improve performance, gait skills;improve performance, transfer skills  -KW      Recorded by [KW] Keeley Thakkar, PT 01/01/19 1544      Row Name 01/01/19 1343             Positioning and Restraints    Pre-Treatment Position  in bed  -KW      Post Treatment Position  bed  -KW      In Bed  side lying right;call light within reach;encouraged to call for assist;with family/caregiver;side rails up x2  -KW      Recorded by [KW] Keeley Thakkar, PT 01/01/19 1544       Row Name 01/01/19 1343             Pain Scale: Numbers Pre/Post-Treatment    Pain Scale: Numbers, Pretreatment  0/10 - no pain  -KW      Pain Scale: Numbers, Post-Treatment  0/10 - no pain  -KW      Recorded by [KW] Keeley Thakkar, PT 01/01/19 1544      Row Name 01/01/19 1343             Plan of Care Review    Plan of Care Reviewed With  patient  -KW      Recorded by [KW] Keeley Thakkar, PT 01/01/19 1544      Row Name 01/01/19 1343             Outcome Summary/Treatment Plan (PT)    Daily Summary of Progress (PT)  progress towards functional goals is fair  -KW      Plan for Continued Treatment (PT)  cont PT POC  -KW      Anticipated Discharge Disposition (PT)  skilled nursing facility  -KW      Recorded by [KW] Keeley Thakkar, PT 01/01/19 1544        User Key  (r) = Recorded By, (t) = Taken By, (c) = Cosigned By    Initials Name Effective Dates Discipline    KW Keeley Thakkar, PT 07/23/18 -  PT               Rehab Goal Summary     Row Name 01/01/19 1343             Physical Therapy Goals    Bed Mobility Goal Selection (PT)  bed mobility, PT goal 1  -KW      Transfer Goal Selection (PT)  transfer, PT goal 1  -KW      Gait Training Goal Selection (PT)  gait training, PT goal 1  -KW      Stairs Goal Selection (PT)  stairs, PT goal 1  -KW         Bed Mobility Goal 1 (PT)    Activity/Assistive Device (Bed Mobility Goal 1, PT)  sit to supine;supine to sit HOB down and no rails  -KW      [place] Level/Cues Needed (Bed Mobility Goal 1, PT)  independent  -KW      Time Frame (Bed Mobility Goal 1, PT)  3 days  -KW      Progress/Outcomes (Bed Mobility Goal 1, PT)  goal not met;goal ongoing  -KW         Transfer Goal 1 (PT)    Activity/Assistive Device (Transfer Goal 1, PT)  sit-to-stand/stand-to-sit;bed-to-chair/chair-to-bed;walker, rolling  -KW      [place] Level/Cues Needed (Transfer Goal 1, PT)  conditional independence  -KW      Time Frame (Transfer Goal 1, PT)  2 - 3 days  -KW      Progress/Outcome (Transfer Goal  1, PT)  goal not met;goal ongoing  -KW         Gait Training Goal 1 (PT)    Activity/Assistive Device (Gait Training Goal 1, PT)  gait (walking locomotion);assistive device use;decrease fall risk;improve balance and speed;walker, rolling  -KW      [place] Level (Gait Training Goal 1, PT)  conditional independence  -KW      Distance (Gait Goal 1, PT)  400 feet  -KW      Time Frame (Gait Training Goal 1, PT)  2 - 3 days  -KW      Progress/Outcome (Gait Training Goal 1, PT)  goal not met;goal ongoing  -KW         Stairs Goal 1 (PT)    Activity/Assistive Device (Stairs Goal 1, PT)  ascending stairs;descending stairs;decrease fall risk  -KW      [place] Level/Cues Needed (Stairs Goal 1, PT)  contact guard assist  -KW      Number of Stairs (Stairs Goal 1, PT)  1  -KW      Time Frame (Stairs Goal 1, PT)  3 days  -KW      Progress/Outcome (Stairs Goal 1, PT)  goal not met;goal ongoing  -KW        User Key  (r) = Recorded By, (t) = Taken By, (c) = Cosigned By    Initials Name Provider Type Discipline    Keeley Booker, PT Physical Therapist PT          Physical Therapy Education     Title: PT OT SLP Therapies (In Progress)     Topic: Physical Therapy (In Progress)     Point: Mobility training (In Progress)     Learning Progress Summary           Patient Acceptance, E,D, NR by DANIELLE at 12/29/2018  1:06 PM                   Point: Precautions (In Progress)     Learning Progress Summary           Patient Acceptance, E,D, NR by DANIELLE at 12/29/2018  1:06 PM                               User Key     Initials Effective Dates Name Provider Type Discipline    DANIELLE 04/06/17 -  Sarah Jaramillo, PT Physical Therapist PT                PT Recommendation and Plan  Anticipated Discharge Disposition (PT): skilled nursing facility  Outcome Summary/Treatment Plan (PT)  Daily Summary of Progress (PT): progress towards functional goals is fair  Barriers to Overall Progress (PT): confusion  Plan for Continued Treatment (PT): cont PT  POC  Anticipated Discharge Disposition (PT): skilled nursing facility  Plan of Care Reviewed With: patient  Outcome Summary: Pt agreeable to PT tx with max encouragement, but declining getting  OOB or EOB. Pt able to complete BLE therex in supine. No goals met this date. Pt would benefit from further PT upon d/c and SNF placement   Outcome Measures     Row Name 01/01/19 1343 12/31/18 1051 12/31/18 0840       How much help from another person do you currently need...    Turning from your back to your side while in flat bed without using bedrails?  3  -KW  3  -KW  --    Moving from lying on back to sitting on the side of a flat bed without bedrails?  3  -KW  3  -KW  --    Moving to and from a bed to a chair (including a wheelchair)?  3  -KW  3  -KW  --    Standing up from a chair using your arms (e.g., wheelchair, bedside chair)?  3  -KW  3  -KW  --    Climbing 3-5 steps with a railing?  3  -KW  3  -KW  --    To walk in hospital room?  3  -KW  3  -KW  --    AM-PAC 6 Clicks Score  18  -KW  18  -KW  --       How much help from another is currently needed...    Putting on and taking off regular lower body clothing?  --  --  3  -LW    Bathing (including washing, rinsing, and drying)  --  --  3  -LW    Toileting (which includes using toilet bed pan or urinal)  --  --  3  -LW    Putting on and taking off regular upper body clothing  --  --  3  -LW    Taking care of personal grooming (such as brushing teeth)  --  --  3  -LW    Eating meals  --  --  4  -LW    Score  --  --  19  -LW       Functional Assessment    Outcome Measure Options  AM-PAC 6 Clicks Basic Mobility (PT)  -KW  AM-PAC 6 Clicks Basic Mobility (PT)  -KW  --    Row Name 12/30/18 0833             How much help from another is currently needed...    Putting on and taking off regular lower body clothing?  3  -MH      Bathing (including washing, rinsing, and drying)  3  -MH      Toileting (which includes using toilet bed pan or urinal)  3  -MH      Putting on and  taking off regular upper body clothing  3  -MH      Taking care of personal grooming (such as brushing teeth)  3  -MH      Eating meals  4  -MH      Score  19  -MH         Functional Assessment    Outcome Measure Options  AM-PAC 6 Clicks Daily Activity (OT)  -MH        User Key  (r) = Recorded By, (t) = Taken By, (c) = Cosigned By    Initials Name Provider Type    Thi Patiño, CARROLL/L Occupational Therapy Assistant    Keeley Booker, PT Physical Therapist    Jin Leiva Occupational Therapist         Time Calculation:   PT Charges     Row Name 01/01/19 1546             Time Calculation    Start Time  1343  -KW      Stop Time  1406  -KW      Time Calculation (min)  23 min  -KW      PT Received On  01/01/19  -KW         Time Calculation- PT    Total Timed Code Minutes- PT  23 minute(s)  -KW         Timed Charges    31647 - PT Therapeutic Exercise Minutes  23  -KW        User Key  (r) = Recorded By, (t) = Taken By, (c) = Cosigned By    Initials Name Provider Type    Keeley Booker, PT Physical Therapist        Therapy Suggested Charges     Code   Minutes Charges    98597 (CPT®) Hc Pt Neuromusc Re Education Ea 15 Min      51282 (CPT®) Hc Pt Ther Proc Ea 15 Min 23 2    12508 (CPT®) Hc Gait Training Ea 15 Min      55214 (CPT®) Hc Pt Therapeutic Act Ea 15 Min      84933 (CPT®) Hc Pt Manual Therapy Ea 15 Min      70623 (CPT®) Hc Pt Iontophoresis Ea 15 Min      51750 (CPT®) Hc Pt Elec Stim Ea-Per 15 Min      89455 (CPT®) Hc Pt Ultrasound Ea 15 Min      19752 (CPT®) Hc Pt Self Care/Mgmt/Train Ea 15 Min      88999 (CPT®) Hc Pt Prosthetic (S) Train Initial Encounter, Each 15 Min      74817 (CPT®) Hc Pt Orthotic(S)/Prosthetic(S) Encounter, Each 15 Min      50336 (CPT®) Hc Orthotic(S) Mgmt/Train Initial Encounter, Each 15min      Total  23 2        Therapy Charges for Today     Code Description Service Date Service Provider Modifiers Qty    70903653844 HC PT THERAPEUTIC ACT EA 15 MIN 12/31/2018 Bijan  Keeley, PT GP 2    44266133727 HC PT THER PROC EA 15 MIN 1/1/2019 Keeley Thakkar, PT GP 2          PT G-Codes  Outcome Measure Options: AM-PAC 6 Clicks Basic Mobility (PT)  AM-PAC 6 Clicks Score: 18  Score: 19  Functional Limitation: Mobility: Walking and moving around  Mobility: Walking and Moving Around Current Status (): At least 20 percent but less than 40 percent impaired, limited or restricted  Mobility: Walking and Moving Around Goal Status (): At least 1 percent but less than 20 percent impaired, limited or restricted    Keeley Thakkar, PT  1/1/2019

## 2019-01-01 NOTE — PROGRESS NOTES
"    [facility]  INPATIENT PROGRESS NOTE    Length of Stay: 4  Date of Admission: 12/28/2018  Primary Care Physician: Provider, No Known    Subjective   Please note that all previous progress notes, lab findings, radiograph findings, medication changes, and physical exam findings have been noted and updated as appropriate.    1/1/2019:  Sodium decreased yesterday, patient more confused yesterday.  Nephrology has administered SAMSCA. Patient more alert and back to baseline now.  Sodium much improved.  Nephrology consult much appreciated.    12/31/2018:  Though sodium increased daily with IV fluids, sodium has now decreased with the discontinuation of IV fluids.  To optimize patient condition have asked Dr. Perkins of nephrology to see, consult much appreciated.  Patient awaiting skilled nursing facility placement.  She has no complaints and is in no apparent distress.    12/30/2018: No complaints.  Patient was evaluated by psychiatry who deemed the patient unable to make her own decisions.  He did formally diagnose the patient with dementia.  Sodium continues to improve daily.  Again, sodium is not the source of patient's altered mental status.  No behavioral issues or agitation, no aggression.    Chief Complaint/HPI:  This 87-year-old  female was admitted hospitalist services secondary to altered mental status.  The patient demonstrated an extremely mild acute kidney injury and a mild hyponatremia, which is chronic, this is not felt to be part of the patient's altered mental status.  No urinary tract infection, no pneumonia, no other source of infection.  After 24 hours and gentle fluid resuscitation, her hyponatremia and renal function have improved.  Patient family reports that they suspected dementia and that her symptoms have been worsening over time.  No behavioral issues, however patient remains confused and repeats \"what?\"  Over and over.  Have " asked psychiatry to see, they have agreed, consult much appreciated.  Speech therapy to also see as well as physical therapy and occupational therapy.  Patient family has requested possible skilled nursing facility placement.    Hemoglobin has decreased, however this may be secondary to effect of IV fluids/dilution.  Will perform anemia labs.    Review of Systems   Unable to perform ROS: Dementia      Objective    Temp:  [96.7 °F (35.9 °C)-98 °F (36.7 °C)] 98 °F (36.7 °C)  Heart Rate:  [57-73] 57  Resp:  [18] 18  BP: (132-170)/(65-88) 158/88    Physical Exam   Constitutional: No distress.   HENT:   Head: Normocephalic and atraumatic.   Cardiovascular: Normal rate and regular rhythm.   Pulmonary/Chest: Effort normal and breath sounds normal. No stridor. No respiratory distress.   Abdominal: Soft. Bowel sounds are normal. She exhibits no distension. There is no tenderness.   Musculoskeletal: She exhibits no edema.   Neurological: She is alert.   Skin: Skin is warm and dry. She is not diaphoretic.     Results Review:  I have reviewed the labs, radiology results, and diagnostic studies.    Laboratory Data:   Results from last 7 days   Lab Units  01/01/19   0820  12/31/18   2139  12/31/18   0556  12/30/18   0542  12/29/18   0557   SODIUM mmol/L  133*  122*  126*  129*  128*   POTASSIUM mmol/L  3.8   --   3.8  3.7  4.0   CHLORIDE mmol/L  101   --   96  100  100   CO2 mmol/L  23.0   --   22.0  21.0*  23.0   BUN mg/dL  9   --   8  9  9   CREATININE mg/dL  1.13*   --   1.05*  0.98  1.03*   GLUCOSE mg/dL  90   --   79  86  92   CALCIUM mg/dL  8.5   --   8.3*  8.2*  8.2*   BILIRUBIN mg/dL  0.2   --   0.2   --   0.6   ALK PHOS U/L  77   --   72   --   70   ALT (SGPT) U/L  19   --   15   --   16   AST (SGOT) U/L  21   --   23   --   16   ANION GAP mmol/L  9.0   --   8.0  8.0  5.0     Estimated Creatinine Clearance: 30.8 mL/min (A) (by C-G formula based on SCr of 1.13 mg/dL (H)).  Results from last 7 days   Lab Units  12/28/18    1245   MAGNESIUM mg/dL  1.9     Results from last 7 days   Lab Units  01/01/19   0820   URIC ACID mg/dL  3.4     Results from last 7 days   Lab Units  01/01/19   0820  12/31/18   0556  12/30/18   0542  12/29/18   0557  12/28/18   1245   WBC 10*3/mm3  3.92  5.20  5.36  6.61  15.34*   HEMOGLOBIN g/dL  10.0*  9.5*  9.9*  9.5*  11.2*   HEMATOCRIT %  27.7*  26.4*  27.6*  27.3*  30.8*   PLATELETS 10*3/mm3  249  241  253  243  270           Culture Data:   No results found for: BLOODCX  No results found for: URINECX  No results found for: RESPCX  No results found for: WOUNDCX  No results found for: STOOLCX  No components found for: BODYFLD    Radiology Data:   Imaging Results (last 24 hours)     ** No results found for the last 24 hours. **          I have reviewed the patient's current medications.     Assessment/Plan     Active Hospital Problems    Diagnosis   • Encephalopathy acute   Suspected dementia  Hyponatremia, chronic, improved, not suspected symptomatic  Anemia, likely iron deficiency         Plan:  Nephrology consult appreciated, monitor hemoglobin and hematocrit (now improved), continue PT/OT, SNF placement, bowel regimen to address constipation.                   This document has been electronically signed by LON Nix on January 1, 2019 10:52 AM

## 2019-01-02 LAB
ANION GAP SERPL CALCULATED.3IONS-SCNC: 6 MMOL/L (ref 5–15)
BACTERIA SPEC AEROBE CULT: NORMAL
BACTERIA SPEC AEROBE CULT: NORMAL
BUN BLD-MCNC: 13 MG/DL (ref 7–21)
BUN/CREAT SERPL: 11.9 (ref 7–25)
CALCIUM SPEC-SCNC: 8.3 MG/DL (ref 8.4–10.2)
CHLORIDE SERPL-SCNC: 103 MMOL/L (ref 95–110)
CO2 SERPL-SCNC: 24 MMOL/L (ref 22–31)
CREAT BLD-MCNC: 1.09 MG/DL (ref 0.5–1)
GFR SERPL CREATININE-BSD FRML MDRD: 47 ML/MIN/1.73 (ref 39–90)
GLUCOSE BLD-MCNC: 90 MG/DL (ref 60–100)
HIV1+2 AB SER QL: NEGATIVE
POTASSIUM BLD-SCNC: 3.9 MMOL/L (ref 3.5–5.1)
SODIUM BLD-SCNC: 133 MMOL/L (ref 137–145)

## 2019-01-02 PROCEDURE — 97535 SELF CARE MNGMENT TRAINING: CPT

## 2019-01-02 PROCEDURE — 97110 THERAPEUTIC EXERCISES: CPT

## 2019-01-02 PROCEDURE — 97116 GAIT TRAINING THERAPY: CPT

## 2019-01-02 PROCEDURE — 97530 THERAPEUTIC ACTIVITIES: CPT

## 2019-01-02 PROCEDURE — 80048 BASIC METABOLIC PNL TOTAL CA: CPT | Performed by: INTERNAL MEDICINE

## 2019-01-02 RX ADMIN — SODIUM CHLORIDE, PRESERVATIVE FREE 3 ML: 5 INJECTION INTRAVENOUS at 22:00

## 2019-01-02 RX ADMIN — SODIUM CHLORIDE, PRESERVATIVE FREE 3 ML: 5 INJECTION INTRAVENOUS at 08:03

## 2019-01-02 RX ADMIN — Medication 12.5 MG: at 22:00

## 2019-01-02 RX ADMIN — SODIUM CHLORIDE TAB 1 GM 2 G: 1 TAB at 11:24

## 2019-01-02 RX ADMIN — POLYETHYLENE GLYCOL 3350 17 G: 17 POWDER, FOR SOLUTION ORAL at 08:03

## 2019-01-02 RX ADMIN — FERROUS SULFATE TAB EC 324 MG (65 MG FE EQUIVALENT) 324 MG: 324 (65 FE) TABLET DELAYED RESPONSE at 07:58

## 2019-01-02 RX ADMIN — AMLODIPINE BESYLATE 5 MG: 5 TABLET ORAL at 07:58

## 2019-01-02 RX ADMIN — FERROUS SULFATE TAB EC 324 MG (65 MG FE EQUIVALENT) 324 MG: 324 (65 FE) TABLET DELAYED RESPONSE at 17:24

## 2019-01-02 RX ADMIN — SODIUM CHLORIDE TAB 1 GM 2 G: 1 TAB at 07:58

## 2019-01-02 RX ADMIN — VITAMIN D, TAB 1000IU (100/BT) 5000 UNITS: 25 TAB at 17:24

## 2019-01-02 RX ADMIN — SODIUM CHLORIDE TAB 1 GM 2 G: 1 TAB at 17:24

## 2019-01-02 RX ADMIN — DOCUSATE SODIUM 100 MG: 100 CAPSULE, LIQUID FILLED ORAL at 22:00

## 2019-01-02 RX ADMIN — DOCUSATE SODIUM 100 MG: 100 CAPSULE, LIQUID FILLED ORAL at 08:03

## 2019-01-02 NOTE — PLAN OF CARE
Problem: Patient Care Overview  Goal: Plan of Care Review  Outcome: Ongoing (interventions implemented as appropriate)   01/02/19 1144   Coping/Psychosocial   Plan of Care Reviewed With patient;caregiver;daughter   Plan of Care Review   Progress improving   OTHER   Outcome Summary Pt participated well this date in OT tx completing functional transfer in room from bed to sink with supervision/CGA at most with RW use and gait belt. Pt was able to complete 75 feet of functional mobility with CGA. Pt does demonstrate some impulsivity with transfers and mobility at this time. One goal was met this date however pt will remain appropriate for skilled OT services to address decreased endurance, strength, safety, and independence with ADL/IADL's.

## 2019-01-02 NOTE — CONSULTS
Adult Nutrition  Assessment    Patient Name:  Marlene Horne  YOB: 1931  MRN: [id number]  Admit Date:  12/28/2018    Assessment Date:  1/2/2019    Comments:  Pt eating % at meals and family reports good intake of magic cups as well. No gi distress. Labs noted. Awaiting snf placement. RD will monitor.     Reason for Assessment     Row Name 01/02/19 1334          Reason for Assessment    Reason For Assessment  follow-up protocol         Nutrition/Diet History     Row Name 01/02/19 1334          Nutrition/Diet History    Typical Food/Fluid Intake  Family in room reports pt did not eat as well as she has been at lunch today, but overall good intake and good acceptance of supplement. No prefs/requests.                  Nutrition Prescription Ordered     Row Name 01/02/19 1334          Nutrition Prescription PO    Current PO Diet  Soft Texture     Texture  Ground     Supplement  Magic Cup     Supplement Frequency  2 times a day     Common Modifiers  Fluid Restriction     Fluid Restriction mL per Day  1500 mL         Evaluation of Received Nutrient/Fluid Intake     Row Name 01/02/19 1335          PO Evaluation    Number of Meals  4     % PO Intake  %               Electronically signed by:  Staci Boo RD  01/02/19 1:36 PM

## 2019-01-02 NOTE — THERAPY TREATMENT NOTE
Acute Care - Physical Therapy Treatment Note  [facility]     Patient Name: Marlene Horne  : 1931  MRN: [id number]  Today's Date: 2019  Onset of Illness/Injury or Date of Surgery: 18  Date of Referral to PT: 18  Referring Physician: LON Thorne    Admit Date: 2018    Visit Dx:    ICD-10-CM ICD-9-CM   1. Encephalopathy acute G93.40 348.30   2. Hyponatremia E87.1 276.1   3. Leukocytosis, unspecified type D72.829 288.60   4. Oropharyngeal dysphagia R13.12 787.22   5. Impaired physical mobility Z74.09 781.99   6. Impaired mobility and activities of daily living Z74.09 799.89     Patient Active Problem List   Diagnosis   • Encephalopathy acute       Therapy Treatment    Rehabilitation Treatment Summary     Row Name 19 1437 19 1038          Treatment Time/Intention    Discipline  physical therapy assistant  -CA  occupational therapy assistant  -BL     Document Type  therapy note (daily note)  -CA  therapy note (daily note)  -BL     Subjective Information  no complaints  -CA  no complaints  -BL     Mode of Treatment  individual therapy;physical therapy  -CA  occupational therapy;individual therapy  -BL     Patient/Family Observations  --  Daughter present  -BL     Therapy Frequency (OT Eval)  --  other (see comments) 5-7 x a week  -BL     Patient Effort  --  good  -BL     Comment  [place]  (Significant)   -CA  [place]  (Significant)   -BL     Existing Precautions/Restrictions  fall  -CA  fall  -BL     Recorded by [CA] Alek Suazo, CHRISSY 19 1438 [BL] Rama Weber, CARROLL/L 19 1046     Row Name 19 1437 19 1038          Vital Signs    Pre Systolic BP Rehab  --  144  -BL     Pre Treatment Diastolic BP  --  59  -BL     Pretreatment Heart Rate (beats/min)  --  69  -BL     Posttreatment Heart Rate (beats/min)  --  73  -BL2     Pre SpO2 (%)  --  98  -BL     O2 Delivery Pre Treatment  --  room air  -BL     Post SpO2 (%)  --  97  -BL2     O2 Delivery Post  Treatment  --  room air  -BL2     Pre Patient Position  Supine  -CA  --     Intra Patient Position  Standing  -CA2  --     Post Patient Position  Supine  -CA2  --     Recorded by [CA] Alek Suazo, PTA 01/02/19 1438  [CA2] Alek Suazo, PTA 01/02/19 1546 [BL] Rama Weber HODGES/L 01/02/19 1046  [BL2] Rama Weber HODGES/L 01/02/19 1142     Row Name 01/02/19 1437 01/02/19 1038          Cognitive Assessment/Intervention- PT/OT    Affect/Mental Status (Cognitive)  unable/difficult to assess  -CA  --     Orientation Status (Cognition)  oriented x 3  -CA  oriented x 3  -BL     Follows Commands (Cognition)  follows one step commands  -CA  follows one step commands;75-90% accuracy  -BL     Cognitive Function (Cognitive)  safety deficit;memory deficit  -CA  safety deficit;memory deficit  -BL     Memory Deficit (Cognitive)  mild deficit  -CA  --     Personal Safety Interventions  fall prevention program maintained;gait belt;nonskid shoes/slippers when out of bed  -CA  --     Recorded by [CA] Alek Suazo, PTA 01/02/19 1438 [BL] Rama Weber HODGES/L 01/02/19 1046     Row Name 01/02/19 1038             Safety Issues, Functional Mobility    Safety Issues Affecting Function (Mobility)  at risk behavior observed;awareness of need for assistance;impulsivity;insight into deficits/self awareness;positioning of assistive device;safety precaution awareness;safety precautions follow-through/compliance  -BL      Impairments Affecting Function (Mobility)  balance;endurance/activity tolerance;strength;cognition  -BL      Recorded by [BL] Rama Weber HODGES/L 01/02/19 1142      Row Name 01/02/19 1437 01/02/19 1038          Bed Mobility Assessment/Treatment    Bed Mobility Assessment/Treatment  supine-sit;sit-supine  -CA  supine-sit;sit-supine  -BL     Supine-Sit [place] (Bed Mobility)  conditional independence  -CA  conditional independence  -BL     Sit-Supine [place] (Bed Mobility)  conditional independence  -CA   conditional independence  -BL     Assistive Device (Bed Mobility)  bed rails;head of bed elevated  -CA  bed rails;head of bed elevated  -BL     Recorded by [CA] Alek Suazo, PTA 01/02/19 1546 [BL] Rama Weber COTA/L 01/02/19 1142     Row Name 01/02/19 1038             Functional Mobility    Functional Mobility- Ind. Level  contact guard assist;verbal cues required  -BL      Functional Mobility- Device  rolling walker  -BL      Functional Mobility-Distance (Feet)  75  -BL      Functional Mobility- Comment  Pt very impulsive and does demonstrate fast pace functional mobility speed requiring vc's to slow down.  -BL      Recorded by [BL] Rama Weber COTA/L 01/02/19 1142      Row Name 01/02/19 1437 01/02/19 1038          Transfer Assessment/Treatment    Transfer Assessment/Treatment  sit-stand transfer;stand-sit transfer  -CA  sit-stand transfer;stand-sit transfer  -BL     Recorded by [CA] Alek Suazo, PTA 01/02/19 1546 [BL] Rama Weber HODGES/L 01/02/19 1142     Row Name 01/02/19 1437 01/02/19 1038          Sit-Stand Transfer    Sit-Stand [place] (Transfers)  supervision;conditional independence  -CA  supervision;verbal cues  -BL     Assistive Device (Sit-Stand Transfers)  walker, front-wheeled  -CA  walker, front-wheeled  -BL     Recorded by [CA] Alek Suazo, PTA 01/02/19 1546 [BL] Rama Weber COTA/L 01/02/19 1142     Row Name 01/02/19 1437 01/02/19 1038          Stand-Sit Transfer    Stand-Sit [place] (Transfers)  supervision;conditional independence  -CA  supervision;verbal cues  -BL     Assistive Device (Stand-Sit Transfers)  walker, front-wheeled  -CA  walker, front-wheeled  -BL     Recorded by [CA] Alek Suazo, PTA 01/02/19 1546 [BL] Rama Weber HODGES/L 01/02/19 1142     Row Name 01/02/19 1437             Gait/Stairs Assessment/Training    Gait/Stairs Assessment/Training  gait/ambulation assistive device  -CA      [place] Level (Gait)  stand by assist  -CA      Assistive  Device (Gait)  walker, front-wheeled  -CA      Distance in Feet (Gait)  400'  -CA      Pattern (Gait)  step-through  -CA      Recorded by [CA] Alek Suazo, PTA 01/02/19 1546      Row Name 01/02/19 1038             ADL Assessment/Intervention    BADL Assessment/Intervention  grooming  -BL      Recorded by [BL] Rama Weber COTA/L 01/02/19 1142      Row Name 01/02/19 1038             Grooming Assessment/Training    [place] Level (Grooming)  grooming skills;oral care regimen;supervision;verbal cues;set up  -BL      Grooming Position  sink side;supported standing  -BL      Comment (Grooming)  x5 minutes with no LOB  -BL      Recorded by [BL] Rama Weber COTA/L 01/02/19 1142      Row Name 01/02/19 1038             BADL Safety/Performance    Impairments, BADL Safety/Performance  endurance/activity tolerance;strength;cognition  -BL      Recorded by [BL] Rama Weber COTA/L 01/02/19 1142      Row Name 01/02/19 1038             Motor Skills Assessment/Interventions    Additional Documentation  Balance (Group);Therapeutic Exercise (Group)  -BL      Recorded by [BL] Rama Weber COTA/L 01/02/19 1142      Row Name 01/02/19 1437 01/02/19 1038          Therapeutic Exercise    Therapeutic Exercise  seated, lower extremities  -CA  seated, upper extremities  -BL     Additional Documentation  --  Therapeutic Exercise (Row)  -BL     Recorded by [CA] Alek Suazo, PTA 01/02/19 1546 [BL] Rama Weber HODGES/L 01/02/19 1142     Row Name 01/02/19 1038             Upper Extremity Seated Therapeutic Exercise    Performed, Seated Upper Extremity (Therapeutic Exercise)  shoulder abduction/adduction;shoulder horizontal abduction/adduction;elbow flexion/extension  -BL      Device, Seated Upper Extremity (Therapeutic Exercise)  elastic bands/tubing  -BL      Exercise Type, Seated Upper Extremity (Therapeutic Exercise)  AROM (active range of motion);resistive exercise  -BL      Expected Outcomes, Seated Upper Extremity  (Therapeutic Exercise)  improve functional tolerance, self-care activity;improve performance, BADLs;improve performance, IADLs  -BL      Restrictions, Seated Upper Extremity (Therapeutic Exercise)  [place]   -BL      Sets/Reps Detail, Seated Upper Extremity (Therapeutic Exercise)  3 sets of 10 reps with red theraband this date in seated position  -BL      Recorded by [BL] Rama Weber COTA/L 01/02/19 1142      Row Name 01/02/19 1437             Lower Extremity Seated Therapeutic Exercise    Performed, Seated Lower Extremity (Therapeutic Exercise)  LAQ (long arc quad), knee extension;hip flexion/extension  -CA      Exercise Type, Seated Lower Extremity (Therapeutic Exercise)  AROM (active range of motion)  -CA      Sets/Reps Detail, Seated Lower Extremity (Therapeutic Exercise)  1x20  -CA      Recorded by [CA] Alek Suazo PTA 01/02/19 1546      Row Name 01/02/19 1038             Balance    Balance  dynamic standing balance  -BL      Recorded by [BL] Rama Weber COTA/L 01/02/19 1142      Row Name 01/02/19 1038             Dynamic Standing Balance    Level of [place], Reaches Outside Midline (Standing, Dynamic Balance)  supervision  -BL      Time Able to Maintain Position, Reaches Outside Midline (Standing, Dynamic Balance)  4 to 5 minutes  -BL      Comment, Reaches Outside Midline (Standing, Dynamic Balance)  no LOB this date at sinkside  -BL      Recorded by [BL] Rama Weber COTA/L 01/02/19 1142      Row Name 01/02/19 1437 01/02/19 1038          Positioning and Restraints    Pre-Treatment Position  in bed  -CA  in bed  -BL     Post Treatment Position  bed  -CA  bed  -BL     In Bed  supine;call light within reach;encouraged to call for assist;with family/caregiver  -CA  sitting EOB;call light within reach;encouraged to call for assist;exit alarm on;with family/caregiver;side rails up x2  -BL     Recorded by [CA] Alek Suazo PTA 01/02/19 1546 [BL] Rama Weber COTA/L 01/02/19 1142     Row Name  01/02/19 1038             Pain Assessment    Additional Documentation  Pain Scale: Numbers Pre/Post-Treatment (Group)  -BL      Recorded by [BL] Rama Weber COTA/L 01/02/19 1142      Row Name 01/02/19 1437 01/02/19 1038          Pain Scale: Numbers Pre/Post-Treatment    Pain Scale: Numbers, Pretreatment  0/10 - no pain  -CA  0/10 - no pain  -BL     Pain Scale: Numbers, Post-Treatment  0/10 - no pain  -CA  0/10 - no pain  -BL     Recorded by [CA] Alek Suazo, PTA 01/02/19 1546 [BL] Rama Weber COTA/L 01/02/19 1142     Row Name 01/02/19 1038             Outcome Summary/Treatment Plan (OT)    Daily Summary of Progress (OT)  progress toward functional goals is good  -BL      Plan for Continued Treatment (OT)  cont current POC  -BL      Anticipated Discharge Disposition (OT)  skilled nursing facility  -BL      Recorded by [BL] Rama Weber COTA/L 01/02/19 1142        User Key  (r) = Recorded By, (t) = Taken By, (c) = Cosigned By    Initials Name Effective Dates Discipline    CA Alek Suazo, PTA 03/07/18 -  PT    BL Rama Weber HODGES/L 10/17/16 -  OT               Rehab Goal Summary     Row Name 01/02/19 1437 01/02/19 1038          Physical Therapy Goals    Bed Mobility Goal Selection (PT)  bed mobility, PT goal 1  -CA  --     Transfer Goal Selection (PT)  transfer, PT goal 1  -CA  --     Gait Training Goal Selection (PT)  gait training, PT goal 1  -CA  --     Stairs Goal Selection (PT)  stairs, PT goal 1  -CA  --        Bed Mobility Goal 1 (PT)    Activity/Assistive Device (Bed Mobility Goal 1, PT)  sit to supine;supine to sit HOB down and no rails  -CA  --     [place] Level/Cues Needed (Bed Mobility Goal 1, PT)  independent  -CA  --     Time Frame (Bed Mobility Goal 1, PT)  3 days  -CA  --     Progress/Outcomes (Bed Mobility Goal 1, PT)  goal not met;goal ongoing  -CA  --        Transfer Goal 1 (PT)    Activity/Assistive Device (Transfer Goal 1, PT)   sit-to-stand/stand-to-sit;bed-to-chair/chair-to-bed;walker, rolling  -CA  --     [place] Level/Cues Needed (Transfer Goal 1, PT)  conditional independence  -CA  --     Time Frame (Transfer Goal 1, PT)  2 - 3 days  -CA  --     Progress/Outcome (Transfer Goal 1, PT)  goal not met;goal ongoing  -CA  --        Gait Training Goal 1 (PT)    Activity/Assistive Device (Gait Training Goal 1, PT)  gait (walking locomotion);assistive device use;decrease fall risk;improve balance and speed;walker, rolling  -CA  --     [place] Level (Gait Training Goal 1, PT)  conditional independence  -CA  --     Distance (Gait Goal 1, PT)  400 feet  -CA  --     Time Frame (Gait Training Goal 1, PT)  2 - 3 days  -CA  --     Progress/Outcome (Gait Training Goal 1, PT)  goal not met;goal ongoing  -CA  --        Stairs Goal 1 (PT)    Activity/Assistive Device (Stairs Goal 1, PT)  ascending stairs;descending stairs;decrease fall risk  -CA  --     [place] Level/Cues Needed (Stairs Goal 1, PT)  contact guard assist  -CA  --     Number of Stairs (Stairs Goal 1, PT)  1  -CA  --     Time Frame (Stairs Goal 1, PT)  3 days  -CA  --     Progress/Outcome (Stairs Goal 1, PT)  goal not met;goal ongoing  -CA  --        Occupational Therapy Goals    Transfer Goal Selection (OT)  --  transfer, OT goal 1  -BL     Bathing Goal Selection (OT)  --  bathing, OT goal 1  -BL     Dressing Goal Selection (OT)  --  dressing, OT goal 1  -BL     Toileting Goal Selection (OT)  --  toileting, OT goal 1  -BL     Strength Goal Selection (OT)  --  strength, OT goal 1  -BL        Transfer Goal 1 (OT)    Activity/Assistive Device (Transfer Goal 1, OT)  --  sit-to-stand/stand-to-sit;bed-to-chair/chair-to-bed;toilet  -BL     [place] Level/Cues Needed (Transfer Goal 1, OT)  --  supervision required  -BL     Time Frame (Transfer Goal 1, OT)  --  long term goal (LTG);by discharge  -BL     Progress/Outcome (Transfer Goal 1, OT)  --  goal met  (Significant)   -BL         Bathing Goal 1 (OT)    Activity/Assistive Device (Bathing Goal 1, OT)  --  bathing skills, all  -BL     [place] Level/Cues Needed (Bathing Goal 1, OT)  --  supervision required  -BL     Time Frame (Bathing Goal 1, OT)  --  long term goal (LTG);by discharge  -BL     Progress/Outcomes (Bathing Goal 1, OT)  --  goal not met  -BL        Dressing Goal 1 (OT)    Activity/Assistive Device (Dressing Goal 1, OT)  --  dressing skills, all  -BL     [place]/Cues Needed (Dressing Goal 1, OT)  --  independent  -BL     Time Frame (Dressing Goal 1, OT)  --  long term goal (LTG);by discharge  -BL     Progress/Outcome (Dressing Goal 1, OT)  --  goal not met  -BL        Toileting Goal 1 (OT)    Activity/Device (Toileting Goal 1, OT)  --  toileting skills, all  -BL     [place] Level/Cues Needed (Toileting Goal 1, OT)  --  independent  -BL     Time Frame (Toileting Goal 1, OT)  --  long term goal (LTG);by discharge  -BL     Progress/Outcome (Toileting Goal 1, OT)  --  goal not met  -BL        Strength Goal 1 (OT)    Strength Goal 1 (OT)  --  Pt will increase BUE strength by 1/2 grade in order to increase independence in ADLs and functional transfers   -BL     Time Frame (Strength Goal 1, OT)  --  long term goal (LTG);by discharge  -BL     Progress/Outcome (Strength Goal 1, OT)  --  goal not met  -BL       User Key  (r) = Recorded By, (t) = Taken By, (c) = Cosigned By    Initials Name Provider Type Discipline    Alek Sim, PTA Physical Therapy Assistant PT    BL Rama Weber, HODGES/L Occupational Therapy Assistant OT          Physical Therapy Education     Title: PT OT SLP Therapies (In Progress)     Topic: Physical Therapy (In Progress)     Point: Mobility training (In Progress)     Learning Progress Summary           Patient Acceptance, E,D, NR by CB at 12/29/2018  1:06 PM                   Point: Precautions (In Progress)     Learning Progress Summary           Patient Acceptance, E,D, NR by CB at 12/29/2018   1:06 PM                               User Key     Initials Effective Dates Name Provider Type Discipline     04/06/17 -  Sarah Jaramillo, PT Physical Therapist PT                PT Recommendation and Plan     Plan of Care Reviewed With: patient  Progress: improving  Outcome Summary: pt. continues to show improved mobility. pt. had good bed mobility and ambulated with good balance using RW.  pt. met no new goals this tx. but progressing well   Outcome Measures     Row Name 01/02/19 1437 01/02/19 1038 01/01/19 1343       How much help from another person do you currently need...    Turning from your back to your side while in flat bed without using bedrails?  3  -CA  --  3  -KW    Moving from lying on back to sitting on the side of a flat bed without bedrails?  3  -CA  --  3  -KW    Moving to and from a bed to a chair (including a wheelchair)?  3  -CA  --  3  -KW    Standing up from a chair using your arms (e.g., wheelchair, bedside chair)?  3  -CA  --  3  -KW    Climbing 3-5 steps with a railing?  3  -CA  --  3  -KW    To walk in hospital room?  3  -CA  --  3  -KW    AM-PAC 6 Clicks Score  18  -CA  --  18  -KW       How much help from another is currently needed...    Putting on and taking off regular lower body clothing?  --  3  -BL  --    Bathing (including washing, rinsing, and drying)  --  3  -BL  --    Toileting (which includes using toilet bed pan or urinal)  --  3  -BL  --    Putting on and taking off regular upper body clothing  --  3  -BL  --    Taking care of personal grooming (such as brushing teeth)  --  3  -BL  --    Eating meals  --  4  -BL  --    Score  --  19  -BL  --       Functional Assessment    Outcome Measure Options  AM-PAC 6 Clicks Basic Mobility (PT)  -CA  AM-PAC 6 Clicks Daily Activity (OT)  -BL  AM-PAC 6 Clicks Basic Mobility (PT)  -KW    Row Name 12/31/18 1051 12/31/18 0840          How much help from another person do you currently need...    Turning from your back to your side while  in flat bed without using bedrails?  3  -KW  --     Moving from lying on back to sitting on the side of a flat bed without bedrails?  3  -KW  --     Moving to and from a bed to a chair (including a wheelchair)?  3  -KW  --     Standing up from a chair using your arms (e.g., wheelchair, bedside chair)?  3  -KW  --     Climbing 3-5 steps with a railing?  3  -KW  --     To walk in hospital room?  3  -KW  --     AM-PAC 6 Clicks Score  18  -KW  --        How much help from another is currently needed...    Putting on and taking off regular lower body clothing?  --  3  -LW     Bathing (including washing, rinsing, and drying)  --  3  -LW     Toileting (which includes using toilet bed pan or urinal)  --  3  -LW     Putting on and taking off regular upper body clothing  --  3  -LW     Taking care of personal grooming (such as brushing teeth)  --  3  -LW     Eating meals  --  4  -LW     Score  --  19  -LW        Functional Assessment    Outcome Measure Options  AM-PAC 6 Clicks Basic Mobility (PT)  -KW  --       User Key  (r) = Recorded By, (t) = Taken By, (c) = Cosigned By    Initials Name Provider Type    Alek Sim PTA Physical Therapy Assistant    Rama Cherry, HODGES/L Occupational Therapy Assistant    Thi Patiño, HODGES/L Occupational Therapy Assistant    Keeley Booker, PT Physical Therapist         Time Calculation:   PT Charges     Row Name 01/02/19 1548             Time Calculation    Start Time  1437  -CA      Stop Time  1500  -CA      Time Calculation (min)  23 min  -CA      PT Received On  01/02/19  -CA         Time Calculation- PT    Total Timed Code Minutes- PT  23 minute(s)  -CA        User Key  (r) = Recorded By, (t) = Taken By, (c) = Cosigned By    Initials Name Provider Type    Alek Sim PTA Physical Therapy Assistant        Therapy Suggested Charges     Code   Minutes Charges    69158 (CPT®) Hc Pt Neuromusc Re Education Ea 15 Min      34035 (CPT®) Hc Pt Ther Proc Ea 15 Min 23 2     26056 (CPT®) Hc Gait Training Ea 15 Min      63136 (CPT®) Hc Pt Therapeutic Act Ea 15 Min      64356 (CPT®) Hc Pt Manual Therapy Ea 15 Min      78473 (CPT®) Hc Pt Iontophoresis Ea 15 Min      84376 (CPT®) Hc Pt Elec Stim Ea-Per 15 Min      38485 (CPT®) Hc Pt Ultrasound Ea 15 Min      23433 (CPT®) Hc Pt Self Care/Mgmt/Train Ea 15 Min      09266 (CPT®) Hc Pt Prosthetic (S) Train Initial Encounter, Each 15 Min      38907 (CPT®) Hc Pt Orthotic(S)/Prosthetic(S) Encounter, Each 15 Min      90964 (CPT®) Hc Orthotic(S) Mgmt/Train Initial Encounter, Each 15min      Total  23 2        Therapy Charges for Today     Code Description Service Date Service Provider Modifiers Qty    35590377827 HC PT THERAPEUTIC ACT EA 15 MIN 1/2/2019 Alek Suazo, PTA GP 1    29766138363 HC GAIT TRAINING EA 15 MIN 1/2/2019 Alek Suazo, PTA GP 1          PT G-Codes  Outcome Measure Options: AM-PAC 6 Clicks Basic Mobility (PT)  AM-PAC 6 Clicks Score: 18  Score: 19  Functional Limitation: Mobility: Walking and moving around  Mobility: Walking and Moving Around Current Status (): At least 20 percent but less than 40 percent impaired, limited or restricted  Mobility: Walking and Moving Around Goal Status (): At least 1 percent but less than 20 percent impaired, limited or restricted    Alek Suazo PTA  1/2/2019

## 2019-01-02 NOTE — THERAPY TREATMENT NOTE
Acute Care - Occupational Therapy Treatment Note  [facility]     Patient Name: Marlene Horne  : 1931  MRN: [id number]  Today's Date: 2019  Onset of Illness/Injury or Date of Surgery: 18  Date of Referral to OT: 18  Referring Physician: LON Thorne    Admit Date: 2018       ICD-10-CM ICD-9-CM   1. Encephalopathy acute G93.40 348.30   2. Hyponatremia E87.1 276.1   3. Leukocytosis, unspecified type D72.829 288.60   4. Oropharyngeal dysphagia R13.12 787.22   5. Impaired physical mobility Z74.09 781.99   6. Impaired mobility and activities of daily living Z74.09 799.89     Patient Active Problem List   Diagnosis   • Encephalopathy acute     Past Medical History:   Diagnosis Date   • Dementia    • Stroke (CMS/HCC)      Past Surgical History:   Procedure Laterality Date   • CATARACT EXTRACTION         Therapy Treatment    Rehabilitation Treatment Summary     Row Name 19 1038             Treatment Time/Intention    Discipline  occupational therapy assistant  -BL      Document Type  therapy note (daily note)  -BL      Subjective Information  no complaints  -BL      Mode of Treatment  occupational therapy;individual therapy  -BL      Patient/Family Observations  Daughter present  -BL      Therapy Frequency (OT Eval)  other (see comments) 5-7 x a week  -BL      Patient Effort  good  -BL      Comment  [place]  (Significant)   -BL      Existing Precautions/Restrictions  fall  -BL      Recorded by [BL] Rama Weber COTA/L 19 1046      Row Name 19 1038             Vital Signs    Pre Systolic BP Rehab  144  -BL      Pre Treatment Diastolic BP  59  -BL      Pretreatment Heart Rate (beats/min)  69  -BL      Posttreatment Heart Rate (beats/min)  73  -BL2      Pre SpO2 (%)  98  -BL      O2 Delivery Pre Treatment  room air  -BL      Post SpO2 (%)  97  -BL2      O2 Delivery Post Treatment  room air  -BL2      Recorded by [BL] Rama Weber COTA/L 19 1046  [BL2]  Rama Weber COTA/L 01/02/19 1142      Row Name 01/02/19 1038             Cognitive Assessment/Intervention- PT/OT    Orientation Status (Cognition)  oriented x 3  -BL      Follows Commands (Cognition)  follows one step commands;75-90% accuracy  -BL      Cognitive Function (Cognitive)  safety deficit;memory deficit  -BL      Recorded by [BL] Rama Weber COTA/L 01/02/19 1046      Row Name 01/02/19 1038             Safety Issues, Functional Mobility    Safety Issues Affecting Function (Mobility)  at risk behavior observed;awareness of need for assistance;impulsivity;insight into deficits/self awareness;positioning of assistive device;safety precaution awareness;safety precautions follow-through/compliance  -BL      Impairments Affecting Function (Mobility)  balance;endurance/activity tolerance;strength;cognition  -BL      Recorded by [BL] Rama Weber COTA/L 01/02/19 1142      Row Name 01/02/19 1038             Bed Mobility Assessment/Treatment    Bed Mobility Assessment/Treatment  supine-sit;sit-supine  -BL      Supine-Sit [place] (Bed Mobility)  conditional independence  -BL      Sit-Supine [place] (Bed Mobility)  conditional independence  -BL      Assistive Device (Bed Mobility)  bed rails;head of bed elevated  -BL      Recorded by [BL] Rama Weber COTA/L 01/02/19 1142      Row Name 01/02/19 1038             Functional Mobility    Functional Mobility- Ind. Level  contact guard assist;verbal cues required  -BL      Functional Mobility- Device  rolling walker  -BL      Functional Mobility-Distance (Feet)  75  -BL      Functional Mobility- Comment  Pt very impulsive and does demonstrate fast pace functional mobility speed requiring vc's to slow down.  -BL      Recorded by [BL] Rama Weber COTA/L 01/02/19 1142      Row Name 01/02/19 1038             Transfer Assessment/Treatment    Transfer Assessment/Treatment  sit-stand transfer;stand-sit transfer  -BL      Recorded by [BL] Rama Weber,  HODGES/L 01/02/19 1142      Row Name 01/02/19 1038             Sit-Stand Transfer    Sit-Stand [place] (Transfers)  supervision;verbal cues  -BL      Assistive Device (Sit-Stand Transfers)  walker, front-wheeled  -BL      Recorded by [BL] Rama Weber COTA/L 01/02/19 1142      Row Name 01/02/19 1038             Stand-Sit Transfer    Stand-Sit [place] (Transfers)  supervision;verbal cues  -BL      Assistive Device (Stand-Sit Transfers)  walker, front-wheeled  -BL      Recorded by [BL] Rama Weber COTA/L 01/02/19 1142      Row Name 01/02/19 1038             ADL Assessment/Intervention    BADL Assessment/Intervention  grooming  -BL      Recorded by [BL] Rama Weber COTA/L 01/02/19 1142      Row Name 01/02/19 1038             Grooming Assessment/Training    [place] Level (Grooming)  grooming skills;oral care regimen;supervision;verbal cues;set up  -BL      Grooming Position  sink side;supported standing  -BL      Comment (Grooming)  x5 minutes with no LOB  -BL      Recorded by [BL] Rama Weber COTA/L 01/02/19 1142      Row Name 01/02/19 1038             BADL Safety/Performance    Impairments, BADL Safety/Performance  endurance/activity tolerance;strength;cognition  -BL      Recorded by [BL] Rama Weber COTA/L 01/02/19 1142      Row Name 01/02/19 1038             Motor Skills Assessment/Interventions    Additional Documentation  Balance (Group);Therapeutic Exercise (Group)  -BL      Recorded by [BL] Rama Weber COTA/L 01/02/19 1142      Row Name 01/02/19 1038             Therapeutic Exercise    Therapeutic Exercise  seated, upper extremities  -BL      Additional Documentation  Therapeutic Exercise (Row)  -BL      Recorded by [BL] Rama Weber COTA/L 01/02/19 1142      Row Name 01/02/19 1038             Upper Extremity Seated Therapeutic Exercise    Performed, Seated Upper Extremity (Therapeutic Exercise)  shoulder abduction/adduction;shoulder horizontal abduction/adduction;elbow  flexion/extension  -BL      Device, Seated Upper Extremity (Therapeutic Exercise)  elastic bands/tubing  -BL      Exercise Type, Seated Upper Extremity (Therapeutic Exercise)  AROM (active range of motion);resistive exercise  -BL      Expected Outcomes, Seated Upper Extremity (Therapeutic Exercise)  improve functional tolerance, self-care activity;improve performance, BADLs;improve performance, IADLs  -BL      Restrictions, Seated Upper Extremity (Therapeutic Exercise)  [place]   -BL      Sets/Reps Detail, Seated Upper Extremity (Therapeutic Exercise)  3 sets of 10 reps with red theraband this date in seated position  -BL      Recorded by [BL] Rama Weber COTA/L 01/02/19 1142      Row Name 01/02/19 1038             Balance    Balance  dynamic standing balance  -BL      Recorded by [BL] Rama Weber COTA/L 01/02/19 1142      Row Name 01/02/19 1038             Dynamic Standing Balance    Level of [place], Reaches Outside Midline (Standing, Dynamic Balance)  supervision  -BL      Time Able to Maintain Position, Reaches Outside Midline (Standing, Dynamic Balance)  4 to 5 minutes  -BL      Comment, Reaches Outside Midline (Standing, Dynamic Balance)  no LOB this date at sinkside  -BL      Recorded by [BL] Rama Weber COTA/L 01/02/19 1142      Row Name 01/02/19 1038             Positioning and Restraints    Pre-Treatment Position  in bed  -BL      Post Treatment Position  bed  -BL      In Bed  sitting EOB;call light within reach;encouraged to call for assist;exit alarm on;with family/caregiver;side rails up x2  -BL      Recorded by [BL] Rama Weber COTA/L 01/02/19 1142      Row Name 01/02/19 1038             Pain Assessment    Additional Documentation  Pain Scale: Numbers Pre/Post-Treatment (Group)  -BL      Recorded by [BL] Rama Weber COTA/L 01/02/19 1142      Row Name 01/02/19 1038             Pain Scale: Numbers Pre/Post-Treatment    Pain Scale: Numbers, Pretreatment  0/10 - no pain  -BL      Pain  Scale: Numbers, Post-Treatment  0/10 - no pain  -BL      Recorded by [BL] Rama Weber JUAN ALBERTO HODGES/L 01/02/19 1142      Row Name 01/02/19 1038             Outcome Summary/Treatment Plan (OT)    Daily Summary of Progress (OT)  progress toward functional goals is good  -BL      Plan for Continued Treatment (OT)  cont current POC  -BL      Anticipated Discharge Disposition (OT)  skilled nursing facility  -BL      Recorded by [BL] Rama Weber JUAN ALBERTO HODGES/L 01/02/19 1142        User Key  (r) = Recorded By, (t) = Taken By, (c) = Cosigned By    Initials Name Effective Dates Discipline    BL Rama Weber JUAN ALBERTO HODGES/L 10/17/16 -  OT           Rehab Goal Summary     Row Name 01/02/19 1038             Occupational Therapy Goals    Transfer Goal Selection (OT)  transfer, OT goal 1  -BL      Bathing Goal Selection (OT)  bathing, OT goal 1  -BL      Dressing Goal Selection (OT)  dressing, OT goal 1  -BL      Toileting Goal Selection (OT)  toileting, OT goal 1  -BL      Strength Goal Selection (OT)  strength, OT goal 1  -BL         Transfer Goal 1 (OT)    Activity/Assistive Device (Transfer Goal 1, OT)  sit-to-stand/stand-to-sit;bed-to-chair/chair-to-bed;toilet  -BL      [place] Level/Cues Needed (Transfer Goal 1, OT)  supervision required  -BL      Time Frame (Transfer Goal 1, OT)  long term goal (LTG);by discharge  -BL      Progress/Outcome (Transfer Goal 1, OT)  goal met  (Significant)   -BL         Bathing Goal 1 (OT)    Activity/Assistive Device (Bathing Goal 1, OT)  bathing skills, all  -BL      [place] Level/Cues Needed (Bathing Goal 1, OT)  supervision required  -BL      Time Frame (Bathing Goal 1, OT)  long term goal (LTG);by discharge  -BL      Progress/Outcomes (Bathing Goal 1, OT)  goal not met  -BL         Dressing Goal 1 (OT)    Activity/Assistive Device (Dressing Goal 1, OT)  dressing skills, all  -BL      [place]/Cues Needed (Dressing Goal 1, OT)  independent  -BL      Time Frame (Dressing Goal 1, OT)  long  term goal (LTG);by discharge  -BL      Progress/Outcome (Dressing Goal 1, OT)  goal not met  -BL         Toileting Goal 1 (OT)    Activity/Device (Toileting Goal 1, OT)  toileting skills, all  -BL      [place] Level/Cues Needed (Toileting Goal 1, OT)  independent  -BL      Time Frame (Toileting Goal 1, OT)  long term goal (LTG);by discharge  -BL      Progress/Outcome (Toileting Goal 1, OT)  goal not met  -BL         Strength Goal 1 (OT)    Strength Goal 1 (OT)  Pt will increase BUE strength by 1/2 grade in order to increase independence in ADLs and functional transfers   -BL      Time Frame (Strength Goal 1, OT)  long term goal (LTG);by discharge  -BL      Progress/Outcome (Strength Goal 1, OT)  goal not met  -BL        User Key  (r) = Recorded By, (t) = Taken By, (c) = Cosigned By    Initials Name Provider Type Discipline     Rama Weber COTA/L Occupational Therapy Assistant OT        Occupational Therapy Education     Title: PT OT SLP Therapies (In Progress)     Topic: Occupational Therapy (Done)     Point: ADL training (Done)     Description: Instruct learner(s) on proper safety adaptation and remediation techniques during self care or transfers.   Instruct in proper use of assistive devices.    Learning Progress Summary           Patient Acceptance, E, VU,NR by  at 1/2/2019 11:44 AM    Acceptance, E,TB, VU by  at 12/31/2018  2:05 PM    Acceptance, E, VU,NR by  at 12/30/2018 11:24 AM    Comment:  OT role, OT POC, ADL training   Family Acceptance, E, VU,NR by  at 1/2/2019 11:44 AM    Acceptance, E, VU,NR by  at 12/30/2018 11:24 AM    Comment:  OT role, OT POC, ADL training                   Point: Home exercise program (Done)     Description: Instruct learner(s) on appropriate technique for monitoring, assisting and/or progressing therapeutic exercises/activities.    Learning Progress Summary           Patient Acceptance, E, VU,NR by  at 1/2/2019 11:44 AM    Acceptance, E,TB, VU by  at  12/31/2018  2:05 PM   Family Acceptance, E, VU,NR by  at 1/2/2019 11:44 AM                   Point: Precautions (Done)     Description: Instruct learner(s) on prescribed precautions during self-care and functional transfers.    Learning Progress Summary           Patient Acceptance, E, VU,NR by  at 1/2/2019 11:44 AM    Acceptance, E,TB, VU by  at 12/31/2018  2:05 PM   Family Acceptance, E, VU,NR by  at 1/2/2019 11:44 AM                   Point: Body mechanics (Done)     Description: Instruct learner(s) on proper positioning and spine alignment during self-care, functional mobility activities and/or exercises.    Learning Progress Summary           Patient Acceptance, E, VU,NR by  at 1/2/2019 11:44 AM    Acceptance, E,TB, VU by  at 12/31/2018  2:05 PM   Family Acceptance, E, VU,NR by  at 1/2/2019 11:44 AM                               User Key     Initials Effective Dates Name Provider Type Discipline     10/17/16 -  Rama Weber COTA/L Occupational Therapy Assistant OT     03/07/18 -  Thi Carranza COTA/L Occupational Therapy Assistant OT     10/12/18 -  Jin Corrales Occupational Therapist OT                OT Recommendation and Plan  Outcome Summary/Treatment Plan (OT)  Daily Summary of Progress (OT): progress toward functional goals is good  Plan for Continued Treatment (OT): cont current POC  Anticipated Discharge Disposition (OT): skilled nursing facility  Therapy Frequency (OT Eval): other (see comments)(5-7 x a week)  Daily Summary of Progress (OT): progress toward functional goals is good  Plan of Care Review  Plan of Care Reviewed With: patient, caregiver, daughter  Plan of Care Reviewed With: patient, caregiver, daughter  Outcome Summary: Pt participated well this date in OT tx  completing functional transfer in room from bed to sink with supervision/CGA at most with RW use and gait belt. Pt was able to complete 75 feet of functional mobility with CGA. Pt does demonstrate  some impulsivity with transfers and mobility at this time. One goal was met this date however pt will remain appropriate for skilled OT services to address decreased endurance, strength, safety, and independence with ADL/IADL's.  Outcome Measures     Row Name 01/02/19 1038 01/01/19 1343 12/31/18 1051       How much help from another person do you currently need...    Turning from your back to your side while in flat bed without using bedrails?  --  3  -KW  3  -KW    Moving from lying on back to sitting on the side of a flat bed without bedrails?  --  3  -KW  3  -KW    Moving to and from a bed to a chair (including a wheelchair)?  --  3  -KW  3  -KW    Standing up from a chair using your arms (e.g., wheelchair, bedside chair)?  --  3  -KW  3  -KW    Climbing 3-5 steps with a railing?  --  3  -KW  3  -KW    To walk in hospital room?  --  3  -KW  3  -KW    AM-PAC 6 Clicks Score  --  18  -KW  18  -KW       How much help from another is currently needed...    Putting on and taking off regular lower body clothing?  3  -BL  --  --    Bathing (including washing, rinsing, and drying)  3  -BL  --  --    Toileting (which includes using toilet bed pan or urinal)  3  -BL  --  --    Putting on and taking off regular upper body clothing  3  -BL  --  --    Taking care of personal grooming (such as brushing teeth)  3  -BL  --  --    Eating meals  4  -BL  --  --    Score  19  -BL  --  --       Functional Assessment    Outcome Measure Options  AM-PAC 6 Clicks Daily Activity (OT)  -BL  AM-PAC 6 Clicks Basic Mobility (PT)  -KW  AM-PAC 6 Clicks Basic Mobility (PT)  -KW    Row Name 12/31/18 0840             How much help from another is currently needed...    Putting on and taking off regular lower body clothing?  3  -LW      Bathing (including washing, rinsing, and drying)  3  -LW      Toileting (which includes using toilet bed pan or urinal)  3  -LW      Putting on and taking off regular upper body clothing  3  -LW      Taking care of  personal grooming (such as brushing teeth)  3  -LW      Eating meals  4  -LW      Score  19  -LW        User Key  (r) = Recorded By, (t) = Taken By, (c) = Cosigned By    Initials Name Provider Type     Rama Weber HODGES/L Occupational Therapy Assistant    Thi Patiño HODGES/L Occupational Therapy Assistant    KW Keeley Thakkar, PT Physical Therapist           Time Calculation:   Time Calculation- OT     Row Name 01/02/19 1146             Time Calculation- OT    OT Start Time  1038  -BL      OT Stop Time  1108  -BL      OT Time Calculation (min)  30 min  -BL      Total Timed Code Minutes- OT  30 minute(s)  -BL      OT Received On  01/02/19  -        User Key  (r) = Recorded By, (t) = Taken By, (c) = Cosigned By    Initials Name Provider Type     Rama Weber HODGES/L Occupational Therapy Assistant           Therapy Suggested Charges     Code   Minutes Charges    None           Therapy Charges for Today     Code Description Service Date Service Provider Modifiers Qty    31487826870 HC OT SELF CARE/MGMT/TRAIN EA 15 MIN 1/2/2019 Rama Weber HODGES/L GO 1    62483062911 HC OT THER PROC EA 15 MIN 1/2/2019 Rama Weber HODGES/L GO 1          OT G-codes  OT Professional Judgement Used?: Yes  OT Functional Scales Options: AM-PAC 6 Clicks Daily Activity (OT)  Score: 19  Functional Limitation: Self care  Self Care Current Status (): At least 40 percent but less than 60 percent impaired, limited or restricted  Self Care Goal Status (): At least 20 percent but less than 40 percent impaired, limited or restricted    CARROLL Soria/JUAN ALBERTO  1/2/2019

## 2019-01-02 NOTE — PROGRESS NOTES
[facility] Medicine Services  INPATIENT PROGRESS NOTE    Length of Stay: 5  Date of Admission: 12/28/2018  Primary Care Physician: Provider, No Known    Subjective   Please note that all previous progress notes, lab findings, radiograph findings, medication changes, and physical exam findings have been noted and updated as appropriate.    PLEASE NOTE THAT ALL ALTERED MENTAL STATUS HAS RESOLVED.  PATIENT IS BACK TO BASELINE AND IS COOPERATIVE, PLEASANT, AND WITH NO BEHAVIORAL ISSUES.  ALL POSSIBLE DELIRIUM HAS RESOLVED COMPLETELY AND PATIENT IS BACK TO HER NORMAL BASELINE STATE OF MENTATION.    1/2/2019:  Patient's sodium is 133 today.  She is alert and oriented and pleasant and cooperative.  Still awaiting nursing home placement.  Has had normal bowel movement.  Constipation resolved.     1/1/2019:  Sodium decreased yesterday, patient more confused yesterday.  Nephrology has administered SAMSCA. Patient more alert and back to baseline now.  Sodium much improved.  Nephrology consult much appreciated.    12/31/2018:  Though sodium increased daily with IV fluids, sodium has now decreased with the discontinuation of IV fluids.  To optimize patient condition have asked Dr. Perkins of nephrology to see, consult much appreciated.  Patient awaiting skilled nursing facility placement.  She has no complaints and is in no apparent distress.    12/30/2018: No complaints.  Patient was evaluated by psychiatry who deemed the patient unable to make her own decisions.  He did formally diagnose the patient with dementia.  Sodium continues to improve daily.  Again, sodium is not the source of patient's altered mental status.  No behavioral issues or agitation, no aggression.    Chief Complaint/HPI:  This 87-year-old  female was admitted hospitalist services secondary to altered mental status.  The patient demonstrated an extremely mild acute kidney injury and a mild hyponatremia, which  "is chronic, this is not felt to be part of the patient's altered mental status.  No urinary tract infection, no pneumonia, no other source of infection.  After 24 hours and gentle fluid resuscitation, her hyponatremia and renal function have improved.  Patient family reports that they suspected dementia and that her symptoms have been worsening over time.  No behavioral issues, however patient remains confused and repeats \"what?\"  Over and over.  Have asked psychiatry to see, they have agreed, consult much appreciated.  Speech therapy to also see as well as physical therapy and occupational therapy.  Patient family has requested possible skilled nursing facility placement.    Hemoglobin has decreased, however this may be secondary to effect of IV fluids/dilution.  Will perform anemia labs.    Review of Systems   Unable to perform ROS: Dementia      Objective    Temp:  [97.1 °F (36.2 °C)-98.8 °F (37.1 °C)] 98.1 °F (36.7 °C)  Heart Rate:  [60-78] 75  Resp:  [16-18] 16  BP: (140-154)/(68-82) 140/76    Physical Exam   Constitutional: No distress.   HENT:   Head: Normocephalic and atraumatic.   Cardiovascular: Normal rate and regular rhythm.   Pulmonary/Chest: Effort normal and breath sounds normal. No stridor. No respiratory distress.   Abdominal: Soft. Bowel sounds are normal. She exhibits no distension. There is no tenderness.   Musculoskeletal: She exhibits no edema.   Neurological: She is alert.   Skin: Skin is warm and dry. She is not diaphoretic.     Results Review:  I have reviewed the labs, radiology results, and diagnostic studies.    Laboratory Data:   Results from last 7 days   Lab Units  01/02/19   0559  01/01/19   1554  01/01/19   0820   12/31/18   0556   12/29/18   0557   SODIUM mmol/L  133*  132*  133*   < >  126*   < >  128*   POTASSIUM mmol/L  3.9   --   3.8   --   3.8   < >  4.0   CHLORIDE mmol/L  103   --   101   --   96   < >  100   CO2 mmol/L  24.0   --   23.0   --   22.0   < >  23.0   BUN mg/dL  13 "   --   9   --   8   < >  9   CREATININE mg/dL  1.09*   --   1.13*   --   1.05*   < >  1.03*   GLUCOSE mg/dL  90   --   90   --   79   < >  92   CALCIUM mg/dL  8.3*   --   8.5   --   8.3*   < >  8.2*   BILIRUBIN mg/dL   --    --   0.2   --   0.2   --   0.6   ALK PHOS U/L   --    --   77   --   72   --   70   ALT (SGPT) U/L   --    --   19   --   15   --   16   AST (SGOT) U/L   --    --   21   --   23   --   16   ANION GAP mmol/L  6.0   --   9.0   --   8.0   < >  5.0    < > = values in this interval not displayed.     Estimated Creatinine Clearance: 31.7 mL/min (A) (by C-G formula based on SCr of 1.09 mg/dL (H)).  Results from last 7 days   Lab Units  12/28/18   1245   MAGNESIUM mg/dL  1.9     Results from last 7 days   Lab Units  01/01/19   0820   URIC ACID mg/dL  3.4     Results from last 7 days   Lab Units  01/01/19   0820  12/31/18   0556  12/30/18   0542  12/29/18   0557  12/28/18   1245   WBC 10*3/mm3  3.92  5.20  5.36  6.61  15.34*   HEMOGLOBIN g/dL  10.0*  9.5*  9.9*  9.5*  11.2*   HEMATOCRIT %  27.7*  26.4*  27.6*  27.3*  30.8*   PLATELETS 10*3/mm3  249  241  253  243  270           Culture Data:   No results found for: BLOODCX  No results found for: URINECX  No results found for: RESPCX  No results found for: WOUNDCX  No results found for: STOOLCX  No components found for: BODYFLD    Radiology Data:   Imaging Results (last 24 hours)     ** No results found for the last 24 hours. **          I have reviewed the patient's current medications.     Assessment/Plan     Active Hospital Problems    Diagnosis   • Encephalopathy acute   Suspected dementia  Hyponatremia, chronic, improved, not suspected symptomatic  Anemia, likely iron deficiency         Plan:  Per nephrology, continue SNF placement.              This document has been electronically signed by LON Nix on January 2, 2019 11:11 AM

## 2019-01-02 NOTE — PLAN OF CARE
Problem: Patient Care Overview  Goal: Plan of Care Review  Outcome: Ongoing (interventions implemented as appropriate)   01/02/19 9001   Coping/Psychosocial   Plan of Care Reviewed With patient   Plan of Care Review   Progress improving   OTHER   Outcome Summary pt. continues to show improved mobility. pt. had good bed mobility and ambulated with good balance using RW. pt. met no new goals this tx. but progressing well

## 2019-01-02 NOTE — PROGRESS NOTES
"[facility]  [address] [place] [address]. [id number]  T - [phone]  F - [phone]     Progress Note          PATIENT  DEMOGRAPHICS   PATIENT NAME: Marlene Horne                      PHYSICIAN: Sagar Perkins MD  : 1931  MRN: [id number]   LOS: 5 days    Patient Care Team:  Provider, No Known as PCP - General  Subjective   SUBJECTIVE   Appears some better per family more alert and taking part in therapy         Objective   OBJECTIVE   Vital Signs  Temp:  [97.1 °F (36.2 °C)-98.8 °F (37.1 °C)] 98.1 °F (36.7 °C)  Heart Rate:  [60-78] 75  Resp:  [16-18] 16  BP: (140-154)/(68-82) 140/76    Flowsheet Rows      First Filed Value   Admission Height  157.5 cm (62\") Documented at 2018 1730   Admission Weight  68.7 kg (151 lb 7 oz) Documented at 2018 0952           I/O last 3 completed shifts:  In: 720 [P.O.:720]  Out: 400 [Urine:400]    PHYSICAL EXAM    Physical Exam   Constitutional: She appears well-developed.   HENT:   Head: Normocephalic.   Eyes: Pupils are equal, round, and reactive to light.   Cardiovascular: Normal rate, regular rhythm and normal heart sounds.   Pulmonary/Chest: Effort normal and breath sounds normal.   Abdominal: Soft. Bowel sounds are normal.   Neurological: She is alert. She exhibits normal muscle tone.       RESULTS   Results Review:    Results from last 7 days   Lab Units  19   0559  19   1554  19   0820   18   0556   18   0557   SODIUM mmol/L  133*  132*  133*   < >  126*   < >  128*   POTASSIUM mmol/L  3.9   --   3.8   --   3.8   < >  4.0   CHLORIDE mmol/L  103   --   101   --   96   < >  100   CO2 mmol/L  24.0   --   23.0   --   22.0   < >  23.0   BUN mg/dL  13   --   9   --   8   < >  9   CREATININE mg/dL  1.09*   --   1.13*   --   1.05*   < >  1.03*   CALCIUM mg/dL  8.3*   --   8.5   --   8.3*   < >  8.2*   BILIRUBIN mg/dL   --    --   0.2   --   0.2   --   0.6   ALK PHOS U/L   --    --   77   --   72   --  "  70   ALT (SGPT) U/L   --    --   19   --   15   --   16   AST (SGOT) U/L   --    --   21   --   23   --   16   GLUCOSE mg/dL  90   --   90   --   79   < >  92    < > = values in this interval not displayed.       Estimated Creatinine Clearance: 31.7 mL/min (A) (by C-G formula based on SCr of 1.09 mg/dL (H)).    Results from last 7 days   Lab Units  12/28/18   1245   MAGNESIUM mg/dL  1.9       Results from last 7 days   Lab Units  01/01/19   0820   URIC ACID mg/dL  3.4       Results from last 7 days   Lab Units  01/01/19   0820  12/31/18   0556  12/30/18   0542  12/29/18   0557  12/28/18   1245   WBC 10*3/mm3  3.92  5.20  5.36  6.61  15.34*   HEMOGLOBIN g/dL  10.0*  9.5*  9.9*  9.5*  11.2*   PLATELETS 10*3/mm3  249  241  253  243  270               Imaging Results (last 24 hours)     ** No results found for the last 24 hours. **           MEDICATIONS      amLODIPine 5 mg Oral Q24H   cholecalciferol 5,000 Units Oral Daily With Dinner   docusate sodium 100 mg Oral BID   ferrous sulfate 324 mg Oral BID With Meals   polyethylene glycol 17 g Oral Daily   QUEtiapine 12.5 mg Oral Nightly   sodium chloride 3 mL Intravenous Q12H   sodium chloride 2 g Oral TID With Meals          Assessment/Plan   ASSESSMENT / PLAN      Encephalopathy acute    1.  Hyponatremia- originally thought to be hypovolemic type, but then drop with normal saline after initial improvement. Urine na is 62 and urine osmolality not extremely high (?low solute intake in elderly). tsh is normal. Cortisol normal.uric acid 3.4 (some labs are consistent with siadh low uric acid and drop in na with saline, but urine studies are more consistent with low solute intake)     cxr no mass. No recent wt loss. Keep salt tab 2 gm tid and then wean in out pt setting. Will sign off here pls call for questions.      2.  Dementia- new diagnosis, plan for skilled nursing facility on discharge     3.  CKD3- renal function stable with creatinine at 0.9-1.0    4. High bp better  with norvasc                This document has been electronically signed by Sagar Perkins MD on January 2, 2019 10:50 AM

## 2019-01-03 VITALS
BODY MASS INDEX: 25.28 KG/M2 | HEART RATE: 71 BPM | HEIGHT: 62 IN | DIASTOLIC BLOOD PRESSURE: 70 MMHG | WEIGHT: 137.4 LBS | OXYGEN SATURATION: 99 % | TEMPERATURE: 98.9 F | RESPIRATION RATE: 18 BRPM | SYSTOLIC BLOOD PRESSURE: 112 MMHG

## 2019-01-03 LAB
ALBUMIN SERPL-MCNC: 2.7 G/DL (ref 3.4–4.8)
ALBUMIN/GLOB SERPL: 1.2 G/DL (ref 1.1–1.8)
ALP SERPL-CCNC: 70 U/L (ref 38–126)
ALT SERPL W P-5'-P-CCNC: 18 U/L (ref 9–52)
ANION GAP SERPL CALCULATED.3IONS-SCNC: 5 MMOL/L (ref 5–15)
AST SERPL-CCNC: 17 U/L (ref 14–36)
BASOPHILS # BLD AUTO: 0.03 10*3/MM3 (ref 0–0.2)
BASOPHILS NFR BLD AUTO: 0.7 % (ref 0–2)
BILIRUB SERPL-MCNC: 0.2 MG/DL (ref 0.2–1.3)
BUN BLD-MCNC: 12 MG/DL (ref 7–21)
BUN/CREAT SERPL: 11.7 (ref 7–25)
CALCIUM SPEC-SCNC: 8.5 MG/DL (ref 8.4–10.2)
CHLORIDE SERPL-SCNC: 100 MMOL/L (ref 95–110)
CO2 SERPL-SCNC: 24 MMOL/L (ref 22–31)
CREAT BLD-MCNC: 1.03 MG/DL (ref 0.5–1)
DEPRECATED RDW RBC AUTO: 41.3 FL (ref 36.4–46.3)
EOSINOPHIL # BLD AUTO: 0.15 10*3/MM3 (ref 0–0.7)
EOSINOPHIL NFR BLD AUTO: 3.5 % (ref 0–7)
ERYTHROCYTE [DISTWIDTH] IN BLOOD BY AUTOMATED COUNT: 12.7 % (ref 11.5–14.5)
GFR SERPL CREATININE-BSD FRML MDRD: 51 ML/MIN/1.73 (ref 39–90)
GLOBULIN UR ELPH-MCNC: 2.2 GM/DL (ref 2.3–3.5)
GLUCOSE BLD-MCNC: 83 MG/DL (ref 60–100)
HCT VFR BLD AUTO: 27.1 % (ref 35–45)
HGB BLD-MCNC: 9.6 G/DL (ref 12–15.5)
IMM GRANULOCYTES # BLD AUTO: 0.02 10*3/MM3 (ref 0–0.02)
IMM GRANULOCYTES NFR BLD AUTO: 0.5 % (ref 0–0.5)
LYMPHOCYTES # BLD AUTO: 1.48 10*3/MM3 (ref 0.6–4.2)
LYMPHOCYTES NFR BLD AUTO: 34.1 % (ref 10–50)
MCH RBC QN AUTO: 31.5 PG (ref 26.5–34)
MCHC RBC AUTO-ENTMCNC: 35.4 G/DL (ref 31.4–36)
MCV RBC AUTO: 88.9 FL (ref 80–98)
MONOCYTES # BLD AUTO: 0.34 10*3/MM3 (ref 0–0.9)
MONOCYTES NFR BLD AUTO: 7.8 % (ref 0–12)
NEUTROPHILS # BLD AUTO: 2.32 10*3/MM3 (ref 2–8.6)
NEUTROPHILS NFR BLD AUTO: 53.4 % (ref 37–80)
PLATELET # BLD AUTO: 232 10*3/MM3 (ref 150–450)
PMV BLD AUTO: 8.8 FL (ref 8–12)
POTASSIUM BLD-SCNC: 3.7 MMOL/L (ref 3.5–5.1)
PROT SERPL-MCNC: 4.9 G/DL (ref 6.3–8.6)
RBC # BLD AUTO: 3.05 10*6/MM3 (ref 3.77–5.16)
SODIUM BLD-SCNC: 129 MMOL/L (ref 137–145)
WBC NRBC COR # BLD: 4.34 10*3/MM3 (ref 3.2–9.8)

## 2019-01-03 PROCEDURE — 85025 COMPLETE CBC W/AUTO DIFF WBC: CPT | Performed by: PHYSICIAN ASSISTANT

## 2019-01-03 PROCEDURE — 97535 SELF CARE MNGMENT TRAINING: CPT

## 2019-01-03 PROCEDURE — 80053 COMPREHEN METABOLIC PANEL: CPT | Performed by: PHYSICIAN ASSISTANT

## 2019-01-03 PROCEDURE — 97116 GAIT TRAINING THERAPY: CPT

## 2019-01-03 PROCEDURE — 97110 THERAPEUTIC EXERCISES: CPT

## 2019-01-03 PROCEDURE — 97530 THERAPEUTIC ACTIVITIES: CPT

## 2019-01-03 RX ORDER — QUETIAPINE FUMARATE 25 MG/1
12.5 TABLET, FILM COATED ORAL NIGHTLY
Start: 2019-01-03 | End: 2019-01-07 | Stop reason: SINTOL

## 2019-01-03 RX ORDER — PSEUDOEPHEDRINE HCL 30 MG
100 TABLET ORAL 2 TIMES DAILY
Start: 2019-01-03 | End: 2019-01-07

## 2019-01-03 RX ORDER — SODIUM CHLORIDE 1000 MG
2 TABLET, SOLUBLE MISCELLANEOUS
Status: DISCONTINUED | OUTPATIENT
Start: 2019-01-04 | End: 2019-01-03 | Stop reason: HOSPADM

## 2019-01-03 RX ORDER — AMLODIPINE BESYLATE 5 MG/1
5 TABLET ORAL
Start: 2019-01-04 | End: 2022-02-22

## 2019-01-03 RX ORDER — ACETAMINOPHEN 325 MG/1
650 TABLET ORAL EVERY 4 HOURS PRN
Start: 2019-01-03

## 2019-01-03 RX ORDER — FERROUS SULFATE TAB EC 324 MG (65 MG FE EQUIVALENT) 324 (65 FE) MG
324 TABLET DELAYED RESPONSE ORAL 2 TIMES DAILY WITH MEALS
Qty: 30 TABLET
Start: 2019-01-03 | End: 2019-01-07

## 2019-01-03 RX ORDER — SODIUM CHLORIDE 1000 MG
2 TABLET, SOLUBLE MISCELLANEOUS
Start: 2019-01-04 | End: 2019-02-26

## 2019-01-03 RX ORDER — LACTULOSE 10 G/15ML
10 SOLUTION ORAL DAILY PRN
Start: 2019-01-03 | End: 2019-01-07

## 2019-01-03 RX ORDER — ALBUTEROL SULFATE 2.5 MG/3ML
2.5 SOLUTION RESPIRATORY (INHALATION) EVERY 4 HOURS PRN
Refills: 12
Start: 2019-01-03

## 2019-01-03 RX ADMIN — Medication 15 MG: at 11:07

## 2019-01-03 RX ADMIN — FERROUS SULFATE TAB EC 324 MG (65 MG FE EQUIVALENT) 324 MG: 324 (65 FE) TABLET DELAYED RESPONSE at 11:07

## 2019-01-03 RX ADMIN — AMLODIPINE BESYLATE 5 MG: 5 TABLET ORAL at 11:07

## 2019-01-03 NOTE — THERAPY DISCHARGE NOTE
Acute Care - Occupational Therapy Discharge Summary  [facility]     Patient Name: Marlene Horne  : 1931  MRN: [id number]    Today's Date: 1/3/2019  Onset of Illness/Injury or Date of Surgery: 18    Date of Referral to OT: 18  Referring Physician: LON Thorne      Admit Date: 2018        OT Recommendation and Plan    Visit Dx:    ICD-10-CM ICD-9-CM   1. Encephalopathy acute G93.40 348.30   2. Hyponatremia E87.1 276.1   3. Leukocytosis, unspecified type D72.829 288.60   4. Oropharyngeal dysphagia R13.12 787.22   5. Impaired physical mobility Z74.09 781.99   6. Impaired mobility and activities of daily living Z74.09 799.89         Time Calculation- OT     Row Name 19 1417             Time Calculation- OT    OT Start Time  0930  -LM      OT Stop Time  1000  -LM      OT Time Calculation (min)  30 min  -LM      Total Timed Code Minutes- OT  30 minute(s)  -LM      OT Received On  19  -LM        User Key  (r) = Recorded By, (t) = Taken By, (c) = Cosigned By    Initials Name Provider Type    LM Dominique Shook, CARROLL/L Occupational Therapy Assistant            Rehab Goal Summary     Row Name 19 1600 19 1508 19 1340       Physical Therapy Goals    Bed Mobility Goal Selection (PT)  bed mobility, PT goal 1  -CZ  --  bed mobility, PT goal 1  -CA    Transfer Goal Selection (PT)  transfer, PT goal 1  -CZ  --  transfer, PT goal 1  -CA    Gait Training Goal Selection (PT)  gait training, PT goal 1  -CZ  --  gait training, PT goal 1  -CA    Stairs Goal Selection (PT)  stairs, PT goal 1  -CZ  --  stairs, PT goal 1  -CA       Bed Mobility Goal 1 (PT)    Activity/Assistive Device (Bed Mobility Goal 1, PT)  sit to supine;supine to sit HOB down and no rails  -CZ  --  sit to supine;supine to sit HOB down and no rails  -CA    [place] Level/Cues Needed (Bed Mobility Goal 1, PT)  independent  -CZ  --  independent  -CA    Time Frame (Bed Mobility Goal 1, PT)  3  days  -CZ  --  3 days  -CA    Progress/Outcomes (Bed Mobility Goal 1, PT)  goal not met;goal ongoing  -CZ  --  goal not met;goal ongoing  -CA       Transfer Goal 1 (PT)    Activity/Assistive Device (Transfer Goal 1, PT)  sit-to-stand/stand-to-sit;bed-to-chair/chair-to-bed;walker, rolling  -CZ  --  sit-to-stand/stand-to-sit;bed-to-chair/chair-to-bed;walker, rolling  -CA    [place] Level/Cues Needed (Transfer Goal 1, PT)  conditional independence  -CZ  --  conditional independence  -CA    Time Frame (Transfer Goal 1, PT)  2 - 3 days  -CZ  --  2 - 3 days  -CA    Progress/Outcome (Transfer Goal 1, PT)  goal not met;goal ongoing  -CZ  --  goal not met;goal ongoing  -CA       Gait Training Goal 1 (PT)    Activity/Assistive Device (Gait Training Goal 1, PT)  gait (walking locomotion);assistive device use;decrease fall risk;improve balance and speed;walker, rolling  -CZ  --  gait (walking locomotion);assistive device use;decrease fall risk;improve balance and speed;walker, rolling  -CA    [place] Level (Gait Training Goal 1, PT)  conditional independence  -CZ  --  conditional independence  -CA    Distance (Gait Goal 1, PT)  400 feet  -CZ  --  400 feet  -CA    Time Frame (Gait Training Goal 1, PT)  2 - 3 days  -CZ  --  2 - 3 days  -CA    Progress/Outcome (Gait Training Goal 1, PT)  goal not met;goal ongoing  -CZ  --  goal not met;goal ongoing  -CA       Stairs Goal 1 (PT)    Activity/Assistive Device (Stairs Goal 1, PT)  ascending stairs;descending stairs;decrease fall risk  -CZ  --  ascending stairs;descending stairs;decrease fall risk  -CA    [place] Level/Cues Needed (Stairs Goal 1, PT)  contact guard assist  -CZ  --  contact guard assist  -CA    Number of Stairs (Stairs Goal 1, PT)  1  -CZ  --  1  -CA    Time Frame (Stairs Goal 1, PT)  3 days  -CZ  --  3 days  -CA    Progress/Outcome (Stairs Goal 1, PT)  goal not met;goal ongoing  -CZ  --  goal not met;goal ongoing  -CA       Occupational Therapy Goals     Transfer Goal Selection (OT)  --  transfer, OT goal 1  -LM  --    Bathing Goal Selection (OT)  --  bathing, OT goal 1  -LM  --    Dressing Goal Selection (OT)  --  dressing, OT goal 1  -LM  --    Toileting Goal Selection (OT)  --  toileting, OT goal 1  -LM  --    Strength Goal Selection (OT)  --  strength, OT goal 1  -LM  --       Transfer Goal 1 (OT)    Activity/Assistive Device (Transfer Goal 1, OT)  --  sit-to-stand/stand-to-sit;bed-to-chair/chair-to-bed;toilet  -LM  --    [place] Level/Cues Needed (Transfer Goal 1, OT)  --  supervision required  -LM  --    Time Frame (Transfer Goal 1, OT)  --  long term goal (LTG);by discharge  -LM  --    Progress/Outcome (Transfer Goal 1, OT)  --  goal met  -LM  --       Bathing Goal 1 (OT)    Activity/Assistive Device (Bathing Goal 1, OT)  --  bathing skills, all  -LM  --    [place] Level/Cues Needed (Bathing Goal 1, OT)  --  supervision required  -LM  --    Time Frame (Bathing Goal 1, OT)  --  long term goal (LTG);by discharge  -LM  --    Progress/Outcomes (Bathing Goal 1, OT)  --  goal not met  -LM  --       Dressing Goal 1 (OT)    Activity/Assistive Device (Dressing Goal 1, OT)  --  dressing skills, all  -LM  --    [place]/Cues Needed (Dressing Goal 1, OT)  --  independent  -LM  --    Time Frame (Dressing Goal 1, OT)  --  long term goal (LTG);by discharge  -LM  --    Progress/Outcome (Dressing Goal 1, OT)  --  goal not met  -LM  --       Toileting Goal 1 (OT)    Activity/Device (Toileting Goal 1, OT)  --  toileting skills, all  -LM  --    [place] Level/Cues Needed (Toileting Goal 1, OT)  --  independent  -LM  --    Time Frame (Toileting Goal 1, OT)  --  long term goal (LTG);by discharge  -LM  --    Progress/Outcome (Toileting Goal 1, OT)  --  goal not met  -LM  --       Strength Goal 1 (OT)    Strength Goal 1 (OT)  --  Pt will increase BUE strength by 1/2 grade in order to increase independence in ADLs and functional transfers   -LM  --    Time Frame  (Strength Goal 1, OT)  --  long term goal (LTG);by discharge  -LM  --    Progress/Outcome (Strength Goal 1, OT)  --  goal not met  -LM  --      User Key  (r) = Recorded By, (t) = Taken By, (c) = Cosigned By    Initials Name Provider Type Discipline    CA Alek Suazo, PTA Physical Therapy Assistant PT    LM Dominique Shook, HODGES/L Occupational Therapy Assistant OT    CZ Jw Martinez, PT Physical Therapist PT          Outcome Measures     Row Name 01/03/19 1340 01/02/19 1437 01/02/19 1038       How much help from another person do you currently need...    Turning from your back to your side while in flat bed without using bedrails?  3  -CA  3  -CA  --    Moving from lying on back to sitting on the side of a flat bed without bedrails?  3  -CA  3  -CA  --    Moving to and from a bed to a chair (including a wheelchair)?  3  -CA  3  -CA  --    Standing up from a chair using your arms (e.g., wheelchair, bedside chair)?  3  -CA  3  -CA  --    Climbing 3-5 steps with a railing?  3  -CA  3  -CA  --    To walk in hospital room?  3  -CA  3  -CA  --    AM-PAC 6 Clicks Score  18  -CA  18  -CA  --       How much help from another is currently needed...    Putting on and taking off regular lower body clothing?  --  --  3  -BL    Bathing (including washing, rinsing, and drying)  --  --  3  -BL    Toileting (which includes using toilet bed pan or urinal)  --  --  3  -BL    Putting on and taking off regular upper body clothing  --  --  3  -BL    Taking care of personal grooming (such as brushing teeth)  --  --  3  -BL    Eating meals  --  --  4  -BL    Score  --  --  19  -BL       Functional Assessment    Outcome Measure Options  AM-PAC 6 Clicks Basic Mobility (PT)  -CA  AM-PAC 6 Clicks Basic Mobility (PT)  -CA  AM-PAC 6 Clicks Daily Activity (OT)  -BL    Row Name 01/01/19 1343             How much help from another person do you currently need...    Turning from your back to your side while in flat bed without using  bedrails?  3  -KW      Moving from lying on back to sitting on the side of a flat bed without bedrails?  3  -KW      Moving to and from a bed to a chair (including a wheelchair)?  3  -KW      Standing up from a chair using your arms (e.g., wheelchair, bedside chair)?  3  -KW      Climbing 3-5 steps with a railing?  3  -KW      To walk in hospital room?  3  -KW      AM-PAC 6 Clicks Score  18  -KW         Functional Assessment    Outcome Measure Options  AM-PAC 6 Clicks Basic Mobility (PT)  -KW        User Key  (r) = Recorded By, (t) = Taken By, (c) = Cosigned By    Initials Name Provider Type    CA Alek Suazo, PTA Physical Therapy Assistant    Rama Cherry, CARROLL/L Occupational Therapy Assistant    KW Keeley Thakkar, [place] Physical Therapist          Therapy Suggested Charges     Code   Minutes Charges    None                 OT Discharge Summary  Anticipated Discharge Disposition (OT): skilled nursing facility  Reason for Discharge: Discharge from facility  Outcomes Achieved: Patient able to partially acheive established goals  Discharge Destination: SNF      Pili Avila OTR/L  1/3/2019

## 2019-01-03 NOTE — NURSING NOTE
We attempted to make after hour appointment with Dr. Perkins's office for the pt being discharged. I called the [facility] and let her know that if she does not hear from them she should call them; I provided her with their number. I also Called report to [facility] and spoke with Pili on the east side.

## 2019-01-03 NOTE — PLAN OF CARE
Problem: Patient Care Overview  Goal: Plan of Care Review  Outcome: Ongoing (interventions implemented as appropriate)   01/03/19 1505   Coping/Psychosocial   Plan of Care Reviewed With patient   Plan of Care Review   Progress improving   OTHER   Outcome Summary perf fair with Ot pt family stated pt tf to snf

## 2019-01-03 NOTE — THERAPY TREATMENT NOTE
Acute Care - Physical Therapy Treatment Note  [facility]     Patient Name: Marlene Horne  : 1931  MRN: [id number]  Today's Date: 1/3/2019  Onset of Illness/Injury or Date of Surgery: 18  Date of Referral to PT: 18  Referring Physician: LON Thorne    Admit Date: 2018    Visit Dx:    ICD-10-CM ICD-9-CM   1. Encephalopathy acute G93.40 348.30   2. Hyponatremia E87.1 276.1   3. Leukocytosis, unspecified type D72.829 288.60   4. Oropharyngeal dysphagia R13.12 787.22   5. Impaired physical mobility Z74.09 781.99   6. Impaired mobility and activities of daily living Z74.09 799.89     Patient Active Problem List   Diagnosis   • Encephalopathy acute       Therapy Treatment    Rehabilitation Treatment Summary     Row Name 19 1340 19 0930          Treatment Time/Intention    Discipline  physical therapy assistant  -CA  occupational therapy assistant  (Pended)   -LM     Document Type  therapy note (daily note)  -CA  therapy note (daily note)  (Pended)   -LM     Subjective Information  no complaints  -CA  no complaints  (Pended)   -LM     Mode of Treatment  individual therapy;physical therapy  -CA  individual therapy;occupational therapy  (Pended)   -LM     Care Plan Review, Other Participant(s)  daughter  -CA  --     Patient Effort  good  -CA  --     Comment  [place]   (Significant)   -CA  --     Recorded by [CA] Alek Suazo, PTA 19 1453 [LM] Dominique Shook COTA/L 19 1447     Row Name 19 1340             Vital Signs    Pre Systolic BP Rehab  136  -CA      Pre Treatment Diastolic BP  65  -CA      Pre SpO2 (%)  95  -CA      O2 Delivery Pre Treatment  room air  -CA      Pre Patient Position  Supine  -CA      Intra Patient Position  Standing  -CA      Post Patient Position  Supine  -CA      Recorded by [CA] Alek Suazo, CHRISSY 19 1453      Row Name 19 1340 19 0930          Cognitive Assessment/Intervention- PT/OT    Affect/Mental Status  (Cognitive)  unable/difficult to assess  -CA  --     Orientation Status (Cognition)  oriented x 3  -CA  oriented x 3  (Pended)   -LM     Follows Commands (Cognition)  follows one step commands  -CA  follows one step commands  (Pended)   -LM     Cognitive Function (Cognitive)  safety deficit  -CA  safety deficit  (Pended)   -LM     Memory Deficit (Cognitive)  mild deficit  -CA  --     Safety Deficit (Cognitive)  safety precautions awareness  -CA  --     Personal Safety Interventions  fall prevention program maintained;gait belt;muscle strengthening facilitated;nonskid shoes/slippers when out of bed  -CA  --     Recorded by [CA] Alek Suazo, PTA 01/03/19 1453 [LM] Dominique Shook, HODGES/L 01/03/19 1447     Row Name 01/03/19 1340             Safety Issues, Functional Mobility    Safety Issues Affecting Function (Mobility)  insight into deficits/self awareness;safety precaution awareness  -CA      Recorded by [CA] Alek Suazo, PTA 01/03/19 1453      Row Name 01/03/19 1340             Bed Mobility Assessment/Treatment    Bed Mobility Assessment/Treatment  supine-sit;sit-supine  -CA      Supine-Sit [place] (Bed Mobility)  conditional independence  -CA      Sit-Supine [place] (Bed Mobility)  conditional independence  -CA      Assistive Device (Bed Mobility)  head of bed elevated  -CA      Recorded by [CA] Alek Suazo, PTA 01/03/19 1453      Row Name 01/03/19 1340             Transfer Assessment/Treatment    Transfer Assessment/Treatment  sit-stand transfer;stand-sit transfer  -CA      Recorded by [CA] Alek Suazo, PTA 01/03/19 1453      Row Name 01/03/19 1340 01/03/19 0930          Sit-Stand Transfer    Sit-Stand [place] (Transfers)  supervision  -CA  supervision  (Pended)   -LM     Assistive Device (Sit-Stand Transfers)  walker, front-wheeled  -CA  walker, front-wheeled  (Pended)   -LM     Recorded by [CA] Alek Suazo, PTA 01/03/19 1453 [LM] Dominique Shook HODGES/L 01/03/19 1447     Row Name  01/03/19 1340 01/03/19 0930          Stand-Sit Transfer    Stand-Sit [place] (Transfers)  supervision  -CA  supervision  (Pended)   -LM     Assistive Device (Stand-Sit Transfers)  walker, front-wheeled  -CA  walker, front-wheeled  (Pended)   -LM     Recorded by [CA] Alek Suazo, PTA 01/03/19 1453 [LM] Dominique Shook HODGES/L 01/03/19 1447     Row Name 01/03/19 0930             Toilet Transfer    Type (Toilet Transfer)  sit-stand;stand-sit  (Pended)   -LM      [place] Level (Toilet Transfer)  supervision  (Pended)   -LM      Assistive Device (Toilet Transfer)  walker, front-wheeled  (Pended)   -LM      Recorded by [LM] Dominique Shook COTA/L 01/03/19 1447      Row Name 01/03/19 1340             Gait/Stairs Assessment/Training    Gait/Stairs Assessment/Training  gait/ambulation assistive device  -CA      [place] Level (Gait)  stand by assist  -CA      Assistive Device (Gait)  walker, front-wheeled  -CA      Distance in Feet (Gait)  375 and 150'  -CA      Pattern (Gait)  step-through  -CA      Recorded by [CA] Alek Suazo PTA 01/03/19 3143      Row Name 01/03/19 0930             Therapeutic Exercise    Therapeutic Exercise  --  -CA,LM      Recorded by [CA,LM] Alek Suazo, PTA (r) Dominique Shook HODGES/L (t) 01/03/19 1453      Row Name 01/03/19 1340             Lower Extremity Seated Therapeutic Exercise    Performed, Seated Lower Extremity (Therapeutic Exercise)  hip flexion/extension;LAQ (long arc quad), knee extension;ankle dorsiflexion/plantarflexion  -CA      Exercise Type, Seated Lower Extremity (Therapeutic Exercise)  AROM (active range of motion)  -CA      Sets/Reps Detail, Seated Lower Extremity (Therapeutic Exercise)  1x2-  -CA      Recorded by [CA] Alek Suazo, CHRISSY 01/03/19 1453      Row Name 01/03/19 1340 01/03/19 0930          Positioning and Restraints    Pre-Treatment Position  in bed  -CA  sitting in chair/recliner  (Pended)   -LM     Post Treatment Position  bed  -CA   wheelchair  (Pended)   -LM     In Bed  supine;call light within reach;encouraged to call for assist;with family/caregiver  -CA  --     Recorded by [CA] Alek Suazo, PTA 01/03/19 1453 [LM] Dominique Shook, HODGES/L 01/03/19 1447     Row Name 01/03/19 1340             Pain Scale: Numbers Pre/Post-Treatment    Pain Scale: Numbers, Pretreatment  0/10 - no pain  -CA      Pain Scale: Numbers, Post-Treatment  0/10 - no pain  -CA      Recorded by [CA] Alek Suazo, PTA 01/03/19 1453        User Key  (r) = Recorded By, (t) = Taken By, (c) = Cosigned By    Initials Name Effective Dates Discipline    CA Alek Suazo, PTA 03/07/18 -  PT    LM Dominique Shook, HODGES/L 03/07/18 -  OT               Rehab Goal Summary     Row Name 01/03/19 1342             Physical Therapy Goals    Bed Mobility Goal Selection (PT)  bed mobility, PT goal 1  -CA      Transfer Goal Selection (PT)  transfer, PT goal 1  -CA      Gait Training Goal Selection (PT)  gait training, PT goal 1  -CA      Stairs Goal Selection (PT)  stairs, PT goal 1  -CA         Bed Mobility Goal 1 (PT)    Activity/Assistive Device (Bed Mobility Goal 1, PT)  sit to supine;supine to sit HOB down and no rails  -CA      [place] Level/Cues Needed (Bed Mobility Goal 1, PT)  independent  -CA      Time Frame (Bed Mobility Goal 1, PT)  3 days  -CA      Progress/Outcomes (Bed Mobility Goal 1, PT)  goal not met;goal ongoing  -CA         Transfer Goal 1 (PT)    Activity/Assistive Device (Transfer Goal 1, PT)  sit-to-stand/stand-to-sit;bed-to-chair/chair-to-bed;walker, rolling  -CA      [place] Level/Cues Needed (Transfer Goal 1, PT)  conditional independence  -CA      Time Frame (Transfer Goal 1, PT)  2 - 3 days  -CA      Progress/Outcome (Transfer Goal 1, PT)  goal not met;goal ongoing  -CA         Gait Training Goal 1 (PT)    Activity/Assistive Device (Gait Training Goal 1, PT)  gait (walking locomotion);assistive device use;decrease fall risk;improve balance and  speed;walker, rolling  -CA      [place] Level (Gait Training Goal 1, PT)  conditional independence  -CA      Distance (Gait Goal 1, PT)  400 feet  -CA      Time Frame (Gait Training Goal 1, PT)  2 - 3 days  -CA      Progress/Outcome (Gait Training Goal 1, PT)  goal not met;goal ongoing  -CA         Stairs Goal 1 (PT)    Activity/Assistive Device (Stairs Goal 1, PT)  ascending stairs;descending stairs;decrease fall risk  -CA      [place] Level/Cues Needed (Stairs Goal 1, PT)  contact guard assist  -CA      Number of Stairs (Stairs Goal 1, PT)  1  -CA      Time Frame (Stairs Goal 1, PT)  3 days  -CA      Progress/Outcome (Stairs Goal 1, PT)  goal not met;goal ongoing  -CA        User Key  (r) = Recorded By, (t) = Taken By, (c) = Cosigned By    Initials Name Provider Type Discipline    CA Alek Suazo, PTA Physical Therapy Assistant PT          Physical Therapy Education     Title: PT OT SLP Therapies (In Progress)     Topic: Physical Therapy (In Progress)     Point: Mobility training (In Progress)     Learning Progress Summary           Patient Acceptance, E,D, NR by  at 12/29/2018  1:06 PM                   Point: Precautions (In Progress)     Learning Progress Summary           Patient Acceptance, E,D, NR by  at 12/29/2018  1:06 PM                               User Key     Initials Effective Dates Name Provider Type Discipline     04/06/17 -  Sarah Jaramillo, PT Physical Therapist PT                PT Recommendation and Plan     Plan of Care Reviewed With: patient  Progress: improving  Outcome Summary: pt. continued to show increased mobility and pt. showed good endurance with gait and overall strenght continues to improve  Outcome Measures     Row Name 01/03/19 1340 01/02/19 1437 01/02/19 1038       How much help from another person do you currently need...    Turning from your back to your side while in flat bed without using bedrails?  3  -CA  3  -CA  --    Moving from lying on back to sitting  on the side of a flat bed without bedrails?  3  -CA  3  -CA  --    Moving to and from a bed to a chair (including a wheelchair)?  3  -CA  3  -CA  --    Standing up from a chair using your arms (e.g., wheelchair, bedside chair)?  3  -CA  3  -CA  --    Climbing 3-5 steps with a railing?  3  -CA  3  -CA  --    To walk in hospital room?  3  -CA  3  -CA  --    AM-PAC 6 Clicks Score  18  -CA  18  -CA  --       How much help from another is currently needed...    Putting on and taking off regular lower body clothing?  --  --  3  -BL    Bathing (including washing, rinsing, and drying)  --  --  3  -BL    Toileting (which includes using toilet bed pan or urinal)  --  --  3  -BL    Putting on and taking off regular upper body clothing  --  --  3  -BL    Taking care of personal grooming (such as brushing teeth)  --  --  3  -BL    Eating meals  --  --  4  -BL    Score  --  --  19  -BL       Functional Assessment    Outcome Measure Options  AM-PAC 6 Clicks Basic Mobility (PT)  -CA  AM-PAC 6 Clicks Basic Mobility (PT)  -CA  AM-PAC 6 Clicks Daily Activity (OT)  -BL    Row Name 01/01/19 1343             How much help from another person do you currently need...    Turning from your back to your side while in flat bed without using bedrails?  3  -KW      Moving from lying on back to sitting on the side of a flat bed without bedrails?  3  -KW      Moving to and from a bed to a chair (including a wheelchair)?  3  -KW      Standing up from a chair using your arms (e.g., wheelchair, bedside chair)?  3  -KW      Climbing 3-5 steps with a railing?  3  -KW      To walk in hospital room?  3  -KW      AM-PAC 6 Clicks Score  18  -KW         Functional Assessment    Outcome Measure Options  AM-PAC 6 Clicks Basic Mobility (PT)  -KW        User Key  (r) = Recorded By, (t) = Taken By, (c) = Cosigned By    Initials Name Provider Type    CA Alek Suazo, PTA Physical Therapy Assistant    BL Rama Weber, CARROLL/L Occupational Therapy Assistant     Keeley Booker, PT Physical Therapist         Time Calculation:   PT Charges     Row Name 01/03/19 1455             Time Calculation    Start Time  1340  -CA      Stop Time  1415  -CA      Time Calculation (min)  35 min  -CA      PT Received On  01/03/19  -CA         Time Calculation- PT    Total Timed Code Minutes- PT  35 minute(s)  -CA        User Key  (r) = Recorded By, (t) = Taken By, (c) = Cosigned By    Initials Name Provider Type    CA Alek Suazo, CHRISSY Physical Therapy Assistant        Therapy Suggested Charges     Code   Minutes Charges    06038 (CPT®) Hc Pt Neuromusc Re Education Ea 15 Min      66461 (CPT®) Hc Pt Ther Proc Ea 15 Min 23 2    40318 (CPT®) Hc Gait Training Ea 15 Min      55454 (CPT®) Hc Pt Therapeutic Act Ea 15 Min      36848 (CPT®) Hc Pt Manual Therapy Ea 15 Min      06091 (CPT®) Hc Pt Iontophoresis Ea 15 Min      58441 (CPT®) Hc Pt Elec Stim Ea-Per 15 Min      40913 (CPT®) Hc Pt Ultrasound Ea 15 Min      27211 (CPT®) Hc Pt Self Care/Mgmt/Train Ea 15 Min      43593 (CPT®) Hc Pt Prosthetic (S) Train Initial Encounter, Each 15 Min      30412 (CPT®) Hc Pt Orthotic(S)/Prosthetic(S) Encounter, Each 15 Min      11788 (CPT®) Hc Orthotic(S) Mgmt/Train Initial Encounter, Each 15min      Total  23 2        Therapy Charges for Today     Code Description Service Date Service Provider Modifiers Qty    25781789832 HC PT THERAPEUTIC ACT EA 15 MIN 1/2/2019 Alek Suazo, PTA GP 1    49144246040 HC GAIT TRAINING EA 15 MIN 1/2/2019 Alek Suazo, PTA GP 1    43538221593 HC PT THER PROC EA 15 MIN 1/3/2019 Alek Suazo, PTA GP 1    80101967028 HC GAIT TRAINING EA 15 MIN 1/3/2019 Alek Suazo, PTA GP 1          PT G-Codes  Outcome Measure Options: AM-PAC 6 Clicks Basic Mobility (PT)  AM-PAC 6 Clicks Score: 18  Score: 19  Functional Limitation: Mobility: Walking and moving around  Mobility: Walking and Moving Around Current Status (): At least 20 percent but less than 40 percent impaired, limited or  restricted  Mobility: Walking and Moving Around Goal Status (): At least 1 percent but less than 20 percent impaired, limited or restricted    Alek Suazo, PTA  1/3/2019

## 2019-01-03 NOTE — THERAPY DISCHARGE NOTE
Acute Care - Physical Therapy Discharge Summary  [facility]       Patient Name: Marlene Horne  : 1931  MRN: [id number]    Today's Date: 1/3/2019  Onset of Illness/Injury or Date of Surgery: 18    Date of Referral to PT: 18  Referring Physician: LON Thorne      Admit Date: 2018      PT Recommendation and Plan    Visit Dx:    ICD-10-CM ICD-9-CM   1. Encephalopathy acute G93.40 348.30   2. Hyponatremia E87.1 276.1   3. Leukocytosis, unspecified type D72.829 288.60   4. Oropharyngeal dysphagia R13.12 787.22   5. Impaired physical mobility Z74.09 781.99   6. Impaired mobility and activities of daily living Z74.09 799.89       Outcome Measures     Row Name 19 1340 19 1437 19 1038       How much help from another person do you currently need...    Turning from your back to your side while in flat bed without using bedrails?  3  -CA  3  -CA  --    Moving from lying on back to sitting on the side of a flat bed without bedrails?  3  -CA  3  -CA  --    Moving to and from a bed to a chair (including a wheelchair)?  3  -CA  3  -CA  --    Standing up from a chair using your arms (e.g., wheelchair, bedside chair)?  3  -CA  3  -CA  --    Climbing 3-5 steps with a railing?  3  -CA  3  -CA  --    To walk in hospital room?  3  -CA  3  -CA  --    AM-PAC 6 Clicks Score  18  -CA  18  -CA  --       How much help from another is currently needed...    Putting on and taking off regular lower body clothing?  --  --  3  -BL    Bathing (including washing, rinsing, and drying)  --  --  3  -BL    Toileting (which includes using toilet bed pan or urinal)  --  --  3  -BL    Putting on and taking off regular upper body clothing  --  --  3  -BL    Taking care of personal grooming (such as brushing teeth)  --  --  3  -BL    Eating meals  --  --  4  -BL    Score  --  --  19  -BL       Functional Assessment    Outcome Measure Options  AM-PAC 6 Clicks Basic Mobility (PT)  -CA  AM-PAC 6  Clicks Basic Mobility (PT)  -CA  AM-PAC 6 Clicks Daily Activity (OT)  -    Row Name 01/01/19 1343             How much help from another person do you currently need...    Turning from your back to your side while in flat bed without using bedrails?  3  -KW      Moving from lying on back to sitting on the side of a flat bed without bedrails?  3  -KW      Moving to and from a bed to a chair (including a wheelchair)?  3  -KW      Standing up from a chair using your arms (e.g., wheelchair, bedside chair)?  3  -KW      Climbing 3-5 steps with a railing?  3  -KW      To walk in hospital room?  3  -KW      AM-PAC 6 Clicks Score  18  -KW         Functional Assessment    Outcome Measure Options  AM-PAC 6 Clicks Basic Mobility (PT)  -        User Key  (r) = Recorded By, (t) = Taken By, (c) = Cosigned By    Initials Name Provider Type    Alek Sim, CHRISSY Physical Therapy Assistant     Rama Weber, CARROLL/L Occupational Therapy Assistant    KW Keeley Thakkar, PT Physical Therapist          PT Charges     Row Name 01/03/19 1455             Time Calculation    Start Time  1340  -CA      Stop Time  1415  -CA      Time Calculation (min)  35 min  -CA      PT Received On  01/03/19  -CA         Time Calculation- PT    Total Timed Code Minutes- PT  35 minute(s)  -CA        User Key  (r) = Recorded By, (t) = Taken By, (c) = Cosigned By    Initials Name Provider Type    Alek Sim, CHRISSY Physical Therapy Assistant        Therapy Suggested Charges     Code   Minutes Charges    65359 (CPT®) Hc Pt Neuromusc Re Education Ea 15 Min      40007 (CPT®) Hc Pt Ther Proc Ea 15 Min 23 2    06480 (CPT®) Hc Gait Training Ea 15 Min      88711 (CPT®) Hc Pt Therapeutic Act Ea 15 Min      05051 (CPT®) Hc Pt Manual Therapy Ea 15 Min      26784 (CPT®) Hc Pt Iontophoresis Ea 15 Min      03017 (CPT®) Hc Pt Elec Stim Ea-Per 15 Min      64361 (CPT®) Hc Pt Ultrasound Ea 15 Min      76407 (CPT®) Hc Pt Self Care/Mgmt/Train Ea 15 Min      14044  (CPT®) Hc Pt Prosthetic (S) Train Initial Encounter, Each 15 Min      54223 (CPT®) Hc Pt Orthotic(S)/Prosthetic(S) Encounter, Each 15 Min      56579 (CPT®) Hc Orthotic(S) Mgmt/Train Initial Encounter, Each 15min      Total  23 2          Rehab Goal Summary     Row Name 01/03/19 1600 01/03/19 1508 01/03/19 1340       Physical Therapy Goals    Bed Mobility Goal Selection (PT)  bed mobility, PT goal 1  -CZ  --  bed mobility, PT goal 1  -CA    Transfer Goal Selection (PT)  transfer, PT goal 1  -CZ  --  transfer, PT goal 1  -CA    Gait Training Goal Selection (PT)  gait training, PT goal 1  -CZ  --  gait training, PT goal 1  -CA    Stairs Goal Selection (PT)  stairs, PT goal 1  -CZ  --  stairs, PT goal 1  -CA       Bed Mobility Goal 1 (PT)    Activity/Assistive Device (Bed Mobility Goal 1, PT)  sit to supine;supine to sit HOB down and no rails  -CZ  --  sit to supine;supine to sit HOB down and no rails  -CA    [place] Level/Cues Needed (Bed Mobility Goal 1, PT)  independent  -CZ  --  independent  -CA    Time Frame (Bed Mobility Goal 1, PT)  3 days  -CZ  --  3 days  -CA    Progress/Outcomes (Bed Mobility Goal 1, PT)  goal not met;goal ongoing  -CZ  --  goal not met;goal ongoing  -CA       Transfer Goal 1 (PT)    Activity/Assistive Device (Transfer Goal 1, PT)  sit-to-stand/stand-to-sit;bed-to-chair/chair-to-bed;walker, rolling  -CZ  --  sit-to-stand/stand-to-sit;bed-to-chair/chair-to-bed;walker, rolling  -CA    [place] Level/Cues Needed (Transfer Goal 1, PT)  conditional independence  -CZ  --  conditional independence  -CA    Time Frame (Transfer Goal 1, PT)  2 - 3 days  -CZ  --  2 - 3 days  -CA    Progress/Outcome (Transfer Goal 1, PT)  goal not met;goal ongoing  -CZ  --  goal not met;goal ongoing  -CA       Gait Training Goal 1 (PT)    Activity/Assistive Device (Gait Training Goal 1, PT)  gait (walking locomotion);assistive device use;decrease fall risk;improve balance and speed;walker, rolling  -CZ  --  gait  (walking locomotion);assistive device use;decrease fall risk;improve balance and speed;walker, rolling  -CA    [place] Level (Gait Training Goal 1, PT)  conditional independence  -CZ  --  conditional independence  -CA    Distance (Gait Goal 1, PT)  400 feet  -CZ  --  400 feet  -CA    Time Frame (Gait Training Goal 1, PT)  2 - 3 days  -CZ  --  2 - 3 days  -CA    Progress/Outcome (Gait Training Goal 1, PT)  goal not met;goal ongoing  -CZ  --  goal not met;goal ongoing  -CA       Stairs Goal 1 (PT)    Activity/Assistive Device (Stairs Goal 1, PT)  ascending stairs;descending stairs;decrease fall risk  -CZ  --  ascending stairs;descending stairs;decrease fall risk  -CA    [place] Level/Cues Needed (Stairs Goal 1, PT)  contact guard assist  -CZ  --  contact guard assist  -CA    Number of Stairs (Stairs Goal 1, PT)  1  -CZ  --  1  -CA    Time Frame (Stairs Goal 1, PT)  3 days  -CZ  --  3 days  -CA    Progress/Outcome (Stairs Goal 1, PT)  goal not met;goal ongoing  -CZ  --  goal not met;goal ongoing  -CA       Occupational Therapy Goals    Transfer Goal Selection (OT)  --  transfer, OT goal 1  -LM  --    Bathing Goal Selection (OT)  --  bathing, OT goal 1  -LM  --    Dressing Goal Selection (OT)  --  dressing, OT goal 1  -LM  --    Toileting Goal Selection (OT)  --  toileting, OT goal 1  -LM  --    Strength Goal Selection (OT)  --  strength, OT goal 1  -LM  --       Transfer Goal 1 (OT)    Activity/Assistive Device (Transfer Goal 1, OT)  --  sit-to-stand/stand-to-sit;bed-to-chair/chair-to-bed;toilet  -LM  --    [place] Level/Cues Needed (Transfer Goal 1, OT)  --  supervision required  -LM  --    Time Frame (Transfer Goal 1, OT)  --  long term goal (LTG);by discharge  -LM  --    Progress/Outcome (Transfer Goal 1, OT)  --  goal met  -LM  --       Bathing Goal 1 (OT)    Activity/Assistive Device (Bathing Goal 1, OT)  --  bathing skills, all  -LM  --    [place] Level/Cues Needed (Bathing Goal 1, OT)  --   supervision required  -LM  --    Time Frame (Bathing Goal 1, OT)  --  long term goal (LTG);by discharge  -LM  --    Progress/Outcomes (Bathing Goal 1, OT)  --  goal not met  -LM  --       Dressing Goal 1 (OT)    Activity/Assistive Device (Dressing Goal 1, OT)  --  dressing skills, all  -LM  --    [place]/Cues Needed (Dressing Goal 1, OT)  --  independent  -LM  --    Time Frame (Dressing Goal 1, OT)  --  long term goal (LTG);by discharge  -LM  --    Progress/Outcome (Dressing Goal 1, OT)  --  goal not met  -LM  --       Toileting Goal 1 (OT)    Activity/Device (Toileting Goal 1, OT)  --  toileting skills, all  -LM  --    [place] Level/Cues Needed (Toileting Goal 1, OT)  --  independent  -LM  --    Time Frame (Toileting Goal 1, OT)  --  long term goal (LTG);by discharge  -LM  --    Progress/Outcome (Toileting Goal 1, OT)  --  goal not met  -LM  --       Strength Goal 1 (OT)    Strength Goal 1 (OT)  --  Pt will increase BUE strength by 1/2 grade in order to increase independence in ADLs and functional transfers   -LM  --    Time Frame (Strength Goal 1, OT)  --  long term goal (LTG);by discharge  -LM  --    Progress/Outcome (Strength Goal 1, OT)  --  goal not met  -LM  --      User Key  (r) = Recorded By, (t) = Taken By, (c) = Cosigned By    Initials Name Provider Type Discipline    CA Alek Suazo, PTA Physical Therapy Assistant PT    LM Dominique Shook, HODGES/L Occupational Therapy Assistant OT    CZ Jw Martinez, PT Physical Therapist PT              PT Discharge Summary  Anticipated Discharge Disposition (PT): skilled nursing facility  Reason for Discharge: Per MD order, Discharge from facility  Outcomes Achieved: Unable to make functional progress toward goals at this time  Discharge Destination: SNF      Jw Martinez, PT   1/3/2019

## 2019-01-03 NOTE — PLAN OF CARE
Problem: Patient Care Overview  Goal: Plan of Care Review  Outcome: Ongoing (interventions implemented as appropriate)   01/03/19 3430   Coping/Psychosocial   Plan of Care Reviewed With patient   Plan of Care Review   Progress improving   OTHER   Outcome Summary pt. continued to show increased mobility and pt. showed good endurance with gait and overall strenght continues to improve

## 2019-01-03 NOTE — PLAN OF CARE
Problem: Patient Care Overview  Goal: Plan of Care Review  Outcome: Ongoing (interventions implemented as appropriate)    Goal: Individualization and Mutuality  Outcome: Ongoing (interventions implemented as appropriate)    Goal: Interprofessional Rounds/Family Conf  Outcome: Ongoing (interventions implemented as appropriate)      Problem: Fall Risk (Adult)  Goal: Absence of Fall  Outcome: Ongoing (interventions implemented as appropriate)      Problem: Skin Injury Risk (Adult)  Goal: Skin Health and Integrity  Outcome: Ongoing (interventions implemented as appropriate)      Problem: Confusion, Chronic (Adult)  Goal: Cognitive/Functional Impairments Minimized  Outcome: Ongoing (interventions implemented as appropriate)

## 2019-01-03 NOTE — DISCHARGE SUMMARY
[facility]  DISCHARGE SUMMARY       Date of Admission: 12/28/2018  Date of Discharge:  1/3/2019  Primary Care Physician: Provider, No Known    Presenting Problem/History of Present Illness:  Hyponatremia [E87.1]  Encephalopathy acute [G93.40]  Leukocytosis, unspecified type [D72.829]       Final Discharge Diagnoses:  Active Hospital Problems    Diagnosis   • Encephalopathy acute       Consults:   Consults     Date and Time Order Name Status Description    12/31/2018 1033 Inpatient Nephrology Consult Completed     12/29/2018 1129 Inpatient Psychiatrist Consult Completed             Chief Complaint on Day of Discharge: none    Hospital Course:  The patient is a 87 y.o. female who presented to [facility] admitted to hospitalist services because her family found her confused.  Patient daughter and son-in-law report that the patient was in her normal state of health last night, she went to bed, and when she failed to awake at her normal time, they went to check on her.  They found the patient sitting in the floor with her pants down, seemingly confused.  Patient family reports that they suspect she has dementia, but lacks a primary care provider for appropriate diagnosis.  Patient also has a history of hyponatremia which has also caused increased confusion the past.  Sodium is mildly depressed at 124.  Patient also demonstrates a mild acute kidney injury with a creatinine of 1.07.  GFR is intact at 49.  Patient also demonstrates a mild leukocytosis of 15.34.  Chest x-ray within normal limits, urinalysis within normal limits with no leuk esterase or nitrites, no bacteria.  CT of the head demonstrated no acute intracranial process.  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  This is an incidental finding and not likely related to today's episode.  Emergency  "department reports a nonfocal neuro exam, neuro exam was unable to be performed by hospitalist staff at this time secondary to the fact the patient has received Ativan and is currently sleeping deeply.  Nephrology was consulted for her hyponatremia and she was treated with salt tablet and samsca.  Her hyponatremia slowly improved and her mental status was at baseline.  She was then transferred to NH.      .      Condition on Discharge:  stable    Physical Exam on Discharge:  /70 (BP Location: Left arm, Patient Position: Sitting)   Pulse 71   Temp 98.9 °F (37.2 °C) (Temporal)   Resp 18   Ht 157.5 cm (62\")   Wt 62.3 kg (137 lb 6.4 oz)   SpO2 99%   BMI 25.13 kg/m²   Physical Exam   Constitutional: She appears well-developed and well-nourished. No distress.   HENT:   Head: Normocephalic and atraumatic.   Cardiovascular: Normal rate.   Pulmonary/Chest: Effort normal. No respiratory distress. She has no wheezes.   Abdominal: Soft. She exhibits no distension.   Musculoskeletal: Normal range of motion. She exhibits no edema.   Neurological: She is alert. No cranial nerve deficit.   Skin: Skin is warm and dry. She is not diaphoretic.   Psychiatric: She has a normal mood and affect. Her behavior is normal. Judgment and thought content normal.   Vitals reviewed.        Discharge Disposition:  Skilled Nursing Facility (DC - External)    Discharge Medications:     Discharge Medications      New Medications      Instructions Start Date   acetaminophen 325 MG tablet  Commonly known as:  TYLENOL   650 mg, Oral, Every 4 Hours PRN      albuterol (2.5 MG/3ML) 0.083% nebulizer solution  Commonly known as:  PROVENTIL   2.5 mg, Nebulization, Every 4 Hours PRN      amLODIPine 5 MG tablet  Commonly known as:  NORVASC   5 mg, Oral, Every 24 Hours Scheduled      docusate sodium 100 MG capsule   100 mg, Oral, 2 Times Daily      ferrous sulfate 324 (65 Fe) MG tablet delayed-release EC tablet   324 mg, Oral, 2 Times Daily With " Meals      lactulose 10 GM/15ML solution  Commonly known as:  CHRONULAC   10 g, Oral, Daily PRN      polyethylene glycol pack packet  Commonly known as:  MIRALAX   17 g, Oral, Daily      QUEtiapine 25 MG tablet  Commonly known as:  SEROquel   12.5 mg, Oral, Nightly      sodium chloride 1 g tablet   2 g, Oral, 3 Times Daily With Meals         Continue These Medications      Instructions Start Date   melatonin 5 MG tablet tablet   5 mg, Oral, Nightly             Discharge Diet:   Diet Instructions     Diet: Cardiac      Discharge Diet:  Cardiac          Activity at Discharge:   Activity Instructions     Activity as Tolerated            Discharge Care Plan/Instructions: transfer to nursing home.  Continue with salt tablets    Follow-up Appointments:   No future appointments.    Test Results Pending at Discharge:    Order Current Status    RPR In process          Dimitrios Camara MD  01/03/19  3:41 PM

## 2019-01-03 NOTE — PROGRESS NOTES
"[facility]  [address] [place] [address]. [address]  T - [phone]  F - [phone]     Progress Note          PATIENT  DEMOGRAPHICS   PATIENT NAME: Marlene Horne                      PHYSICIAN: CALEB aT  : 1931  MRN: [id number]   LOS: 6 days    Patient Care Team:  Provider, No Known as PCP - General  Subjective   SUBJECTIVE   Doing better today.       Objective   OBJECTIVE   Vital Signs  Temp:  [96.5 °F (35.8 °C)-98.9 °F (37.2 °C)] 98.9 °F (37.2 °C)  Heart Rate:  [60-76] 71  Resp:  [18] 18  BP: (134-169)/(67-76) 169/76    Flowsheet Rows      First Filed Value   Admission Height  157.5 cm (62\") Documented at 2018 1730   Admission Weight  68.7 kg (151 lb 7 oz) Documented at 2018 0952           I/O last 3 completed shifts:  In: 960 [P.O.:960]  Out: -     PHYSICAL EXAM    Physical Exam   Constitutional: She appears well-developed.   HENT:   Head: Normocephalic.   Eyes: Pupils are equal, round, and reactive to light.   Cardiovascular: Normal rate, regular rhythm and normal heart sounds.   Pulmonary/Chest: Effort normal and breath sounds normal.   Abdominal: Soft. Bowel sounds are normal.   Neurological: She is alert. She exhibits normal muscle tone.       RESULTS   Results Review:    Results from last 7 days   Lab Units  19   0533  19   0559  19   1554  19   0820   18   0556   SODIUM mmol/L  129*  133*  132*  133*   < >  126*   POTASSIUM mmol/L  3.7  3.9   --   3.8   --   3.8   CHLORIDE mmol/L  100  103   --   101   --   96   CO2 mmol/L  24.0  24.0   --   23.0   --   22.0   BUN mg/dL  12  13   --   9   --   8   CREATININE mg/dL  1.03*  1.09*   --   1.13*   --   1.05*   CALCIUM mg/dL  8.5  8.3*   --   8.5   --   8.3*   BILIRUBIN mg/dL  0.2   --    --   0.2   --   0.2   ALK PHOS U/L  70   --    --   77   --   72   ALT (SGPT) U/L  18   --    --   19   --   15   AST (SGOT) U/L  17   --    --   21   --   23   GLUCOSE mg/dL  83  " 90   --   90   --   79    < > = values in this interval not displayed.       Estimated Creatinine Clearance: 33.4 mL/min (A) (by C-G formula based on SCr of 1.03 mg/dL (H)).    Results from last 7 days   Lab Units  12/28/18   1245   MAGNESIUM mg/dL  1.9       Results from last 7 days   Lab Units  01/01/19   0820   URIC ACID mg/dL  3.4       Results from last 7 days   Lab Units  01/03/19   0533  01/01/19   0820  12/31/18   0556  12/30/18   0542  12/29/18   0557   WBC 10*3/mm3  4.34  3.92  5.20  5.36  6.61   HEMOGLOBIN g/dL  9.6*  10.0*  9.5*  9.9*  9.5*   PLATELETS 10*3/mm3  232  249  241  253  243               Imaging Results (last 24 hours)     ** No results found for the last 24 hours. **           MEDICATIONS      amLODIPine 5 mg Oral Q24H   cholecalciferol 5,000 Units Oral Daily With Dinner   docusate sodium 100 mg Oral BID   ferrous sulfate 324 mg Oral BID With Meals   polyethylene glycol 17 g Oral Daily   QUEtiapine 12.5 mg Oral Nightly   sodium chloride 3 mL Intravenous Q12H   sodium chloride 2 g Oral TID With Meals          Assessment/Plan   ASSESSMENT / PLAN      Encephalopathy acute    1.  Hyponatremia- originally thought to be hypovolemic type, but then drop with normal saline after initial improvement. Urine na is 62 and urine osmolality not extremely high (?low solute intake in elderly). tsh is normal. Cortisol normal.uric acid 3.4 (some labs are consistent with siadh low uric acid and drop in na with saline, but urine studies are more consistent with low solute intake)     cxr no mass. No recent wt loss. Keep salt tab 2 gm tid and then wean in out pt setting.     - Na dropped, hold salt tabs today and give tolvaptan x1. Resume salt tabs 2 gm TID tomorrow and will f/u in office.     2.  Dementia- new diagnosis, plan for skilled nursing facility on discharge     3.  CKD3- renal function stable with creatinine at 0.9-1.0    4.  HTN- better with norvasc           This document has been electronically  signed by CALEB Ta on January 3, 2019 9:55 AM

## 2019-01-03 NOTE — THERAPY TREATMENT NOTE
Inpatient Rehabilitation - Occupational Therapy Treatment Note  [facility]     Patient Name: Marlene Horne  : 1931  MRN: [id number]  Today's Date: 1/3/2019  Onset of Illness/Injury or Date of Surgery: 18  Date of Referral to OT: 18  Referring Physician: LON Thorne    Admit Date: 2018       ICD-10-CM ICD-9-CM   1. Encephalopathy acute G93.40 348.30   2. Hyponatremia E87.1 276.1   3. Leukocytosis, unspecified type D72.829 288.60   4. Oropharyngeal dysphagia R13.12 787.22   5. Impaired physical mobility Z74.09 781.99   6. Impaired mobility and activities of daily living Z74.09 799.89     Patient Active Problem List   Diagnosis   • Encephalopathy acute     Past Medical History:   Diagnosis Date   • Dementia    • Stroke (CMS/HCC)      Past Surgical History:   Procedure Laterality Date   • CATARACT EXTRACTION         Therapy Treatment    Rehabilitation Treatment Summary     Row Name 19 1340 19 0930          Treatment Time/Intention    Discipline  physical therapy assistant  -CA  occupational therapy assistant  -LM     Document Type  therapy note (daily note)  -CA  therapy note (daily note)  -LM     Subjective Information  no complaints  -CA  no complaints  -LM     Mode of Treatment  individual therapy;physical therapy  -CA  individual therapy;occupational therapy denied adl stated daughter would help  ed on calling for ass  -LM     Care Plan Review, Other Participant(s)  daughter  -CA  --     Patient Effort  good  -CA  --     Comment  [place]   (Significant)   -CA  --     Recorded by [CA] Alek Suazo, CHRISSY 19 1453 [LM] Dominique Shook, HODGES/L 19 1504     Row Name 19 1340             Vital Signs    Pre Systolic BP Rehab  136  -CA      Pre Treatment Diastolic BP  65  -CA      Pre SpO2 (%)  95  -CA      O2 Delivery Pre Treatment  room air  -CA      Pre Patient Position  Supine  -CA      Intra Patient Position  Standing  -CA      Post Patient Position   Supine  -CA      Recorded by [CA] Alek Suazo, PTA 01/03/19 1453      Row Name 01/03/19 1340 01/03/19 0930          Cognitive Assessment/Intervention- PT/OT    Affect/Mental Status (Cognitive)  unable/difficult to assess  -CA  --     Orientation Status (Cognition)  oriented x 3  -CA  oriented x 3  -LM     Follows Commands (Cognition)  follows one step commands  -CA  follows one step commands  -LM     Cognitive Function (Cognitive)  safety deficit  -CA  safety deficit  -LM     Memory Deficit (Cognitive)  mild deficit  -CA  --     Safety Deficit (Cognitive)  safety precautions awareness  -CA  --     Personal Safety Interventions  fall prevention program maintained;gait belt;muscle strengthening facilitated;nonskid shoes/slippers when out of bed  -CA  --     Recorded by [CA] Alek Suazo, PTA 01/03/19 1453 [LM] Dominique Shook COTA/L 01/03/19 1504     Row Name 01/03/19 1340             Safety Issues, Functional Mobility    Safety Issues Affecting Function (Mobility)  insight into deficits/self awareness;safety precaution awareness  -CA      Recorded by [CA] Alek Suazo, PTA 01/03/19 1453      Row Name 01/03/19 1340             Bed Mobility Assessment/Treatment    Bed Mobility Assessment/Treatment  supine-sit;sit-supine  -CA      Supine-Sit [place] (Bed Mobility)  conditional independence  -CA      Sit-Supine [place] (Bed Mobility)  conditional independence  -CA      Assistive Device (Bed Mobility)  head of bed elevated  -CA      Recorded by [CA] Alek Suazo, PTA 01/03/19 1453      Row Name 01/03/19 1340             Transfer Assessment/Treatment    Transfer Assessment/Treatment  sit-stand transfer;stand-sit transfer  -CA      Recorded by [CA] Alek Suazo, PTA 01/03/19 1453      Row Name 01/03/19 1340 01/03/19 0930          Sit-Stand Transfer    Sit-Stand [place] (Transfers)  supervision  -CA  supervision  -LM     Assistive Device (Sit-Stand Transfers)  walker, front-wheeled  -CA  walker,  front-wheeled  -LM     Recorded by [CA] Alek Suazo, PTA 01/03/19 1453 [LM] Dominique Shook HODGES/L 01/03/19 1504     Row Name 01/03/19 1340 01/03/19 0930          Stand-Sit Transfer    Stand-Sit [place] (Transfers)  supervision  -CA  supervision  -LM     Assistive Device (Stand-Sit Transfers)  walker, front-wheeled  -CA  walker, front-wheeled  -LM     Recorded by [CA] Alek Suazo, PTA 01/03/19 1453 [LM] Dominique Shook HODGES/L 01/03/19 1504     Row Name 01/03/19 0930             Toilet Transfer    Type (Toilet Transfer)  sit-stand;stand-sit  -LM      [place] Level (Toilet Transfer)  supervision  -LM      Assistive Device (Toilet Transfer)  walker, front-wheeled  -LM      Recorded by [LM] Dominique Shook HODGES/L 01/03/19 1504      Row Name 01/03/19 1340             Gait/Stairs Assessment/Training    Gait/Stairs Assessment/Training  gait/ambulation assistive device  -CA      [place] Level (Gait)  stand by assist  -CA      Assistive Device (Gait)  walker, front-wheeled  -CA      Distance in Feet (Gait)  375 and 150'  -CA      Pattern (Gait)  step-through  -CA      Recorded by [CA] Alek Suazo, PTA 01/03/19 1453      Row Name 01/03/19 0930             Grooming Assessment/Training    [place] Level (Grooming)  grooming skills  -LM      Recorded by [LM] Dominique Shook HODGES/L 01/03/19 1504      Row Name 01/03/19 0930             Therapeutic Exercise    Therapeutic Exercise  --  -CA,LM      Recorded by [CA,LM] Alek Suazo, PTA (r) Dominique Shook HODGES/L (t) 01/03/19 1453      Row Name 01/03/19 1340             Lower Extremity Seated Therapeutic Exercise    Performed, Seated Lower Extremity (Therapeutic Exercise)  hip flexion/extension;LAQ (long arc quad), knee extension;ankle dorsiflexion/plantarflexion  -CA      Exercise Type, Seated Lower Extremity (Therapeutic Exercise)  AROM (active range of motion)  -CA      Sets/Reps Detail, Seated Lower Extremity (Therapeutic Exercise)   1x2-  -CA      Recorded by [CA] Alek Suazo, PTA 01/03/19 1453      Row Name 01/03/19 0930             Dynamic Standing Balance    Level of [place], Reaches Outside Midline (Standing, Dynamic Balance)  supervision  -LM      Time Able to Maintain Position, Reaches Outside Midline (Standing, Dynamic Balance)  4 to 5 minutes  -LM      Comment, Reaches Outside Midline (Standing, Dynamic Balance)  pt perf wt shifiting and funct act well  -LM      Recorded by [LM] Dominique Shook COTA/L 01/03/19 1504      Row Name 01/03/19 1340 01/03/19 0930          Positioning and Restraints    Pre-Treatment Position  in bed  -CA  sitting in chair/recliner  -LM     Post Treatment Position  bed  -CA  wheelchair  -LM     In Bed  supine;call light within reach;encouraged to call for assist;with family/caregiver  -CA  --     Recorded by [CA] Alek Suazo, PTA 01/03/19 1453 [LM] Dominique Shook HODGES/L 01/03/19 1504     Row Name 01/03/19 1340 01/03/19 0930          Pain Scale: Numbers Pre/Post-Treatment    Pain Scale: Numbers, Pretreatment  0/10 - no pain  -CA  0/10 - no pain  -LM     Pain Scale: Numbers, Post-Treatment  0/10 - no pain  -CA  0/10 - no pain  -LM     Recorded by [CA] Alek Suazo, PTA 01/03/19 1453 [LM] Dominique Shook HODGES/L 01/03/19 1504     Row Name 01/03/19 0930             Outcome Summary/Treatment Plan (OT)    Daily Summary of Progress (OT)  progress toward functional goals as expected  -LM      Recorded by [LM] Dominique Shook HODGES/L 01/03/19 1504        User Key  (r) = Recorded By, (t) = Taken By, (c) = Cosigned By    Initials Name Effective Dates Discipline    CA Alek Suazo, PTA 03/07/18 -  PT    LM Dominique Shook HODGES/L 03/07/18 -  OT           Rehab Goal Summary     Row Name 01/03/19 1508 01/03/19 1340          Physical Therapy Goals    Bed Mobility Goal Selection (PT)  --  bed mobility, PT goal 1  -CA     Transfer Goal Selection (PT)  --  transfer, PT goal 1  -CA     Gait Training  Goal Selection (PT)  --  gait training, PT goal 1  -CA     Stairs Goal Selection (PT)  --  stairs, PT goal 1  -CA        Bed Mobility Goal 1 (PT)    Activity/Assistive Device (Bed Mobility Goal 1, PT)  --  sit to supine;supine to sit HOB down and no rails  -CA     Falls Level/Cues Needed (Bed Mobility Goal 1, PT)  --  independent  -CA     Time Frame (Bed Mobility Goal 1, PT)  --  3 days  -CA     Progress/Outcomes (Bed Mobility Goal 1, PT)  --  goal not met;goal ongoing  -CA        Transfer Goal 1 (PT)    Activity/Assistive Device (Transfer Goal 1, PT)  --  sit-to-stand/stand-to-sit;bed-to-chair/chair-to-bed;walker, rolling  -CA     [place] Level/Cues Needed (Transfer Goal 1, PT)  --  conditional independence  -CA     Time Frame (Transfer Goal 1, PT)  --  2 - 3 days  -CA     Progress/Outcome (Transfer Goal 1, PT)  --  goal not met;goal ongoing  -CA        Gait Training Goal 1 (PT)    Activity/Assistive Device (Gait Training Goal 1, PT)  --  gait (walking locomotion);assistive device use;decrease fall risk;improve balance and speed;walker, rolling  -CA     Falls Level (Gait Training Goal 1, PT)  --  conditional independence  -CA     Distance (Gait Goal 1, PT)  --  400 feet  -CA     Time Frame (Gait Training Goal 1, PT)  --  2 - 3 days  -CA     Progress/Outcome (Gait Training Goal 1, PT)  --  goal not met;goal ongoing  -CA        Stairs Goal 1 (PT)    Activity/Assistive Device (Stairs Goal 1, PT)  --  ascending stairs;descending stairs;decrease fall risk  -CA     [place] Level/Cues Needed (Stairs Goal 1, PT)  --  contact guard assist  -CA     Number of Stairs (Stairs Goal 1, PT)  --  1  -CA     Time Frame (Stairs Goal 1, PT)  --  3 days  -CA     Progress/Outcome (Stairs Goal 1, PT)  --  goal not met;goal ongoing  -CA        Occupational Therapy Goals    Transfer Goal Selection (OT)  transfer, OT goal 1  -LM  --     Bathing Goal Selection (OT)  bathing, OT goal 1  -LM  --     Dressing Goal  Selection (OT)  dressing, OT goal 1  -LM  --     Toileting Goal Selection (OT)  toileting, OT goal 1  -LM  --     Strength Goal Selection (OT)  strength, OT goal 1  -LM  --        Transfer Goal 1 (OT)    Activity/Assistive Device (Transfer Goal 1, OT)  sit-to-stand/stand-to-sit;bed-to-chair/chair-to-bed;toilet  -LM  --     [place] Level/Cues Needed (Transfer Goal 1, OT)  supervision required  -LM  --     Time Frame (Transfer Goal 1, OT)  long term goal (LTG);by discharge  -LM  --     Progress/Outcome (Transfer Goal 1, OT)  goal met  -LM  --        Bathing Goal 1 (OT)    Activity/Assistive Device (Bathing Goal 1, OT)  bathing skills, all  -LM  --     [place] Level/Cues Needed (Bathing Goal 1, OT)  supervision required  -LM  --     Time Frame (Bathing Goal 1, OT)  long term goal (LTG);by discharge  -LM  --     Progress/Outcomes (Bathing Goal 1, OT)  goal not met  -LM  --        Dressing Goal 1 (OT)    Activity/Assistive Device (Dressing Goal 1, OT)  dressing skills, all  -LM  --     [place]/Cues Needed (Dressing Goal 1, OT)  independent  -LM  --     Time Frame (Dressing Goal 1, OT)  long term goal (LTG);by discharge  -LM  --     Progress/Outcome (Dressing Goal 1, OT)  goal not met  -LM  --        Toileting Goal 1 (OT)    Activity/Device (Toileting Goal 1, OT)  toileting skills, all  -LM  --     [place] Level/Cues Needed (Toileting Goal 1, OT)  independent  -LM  --     Time Frame (Toileting Goal 1, OT)  long term goal (LTG);by discharge  -LM  --     Progress/Outcome (Toileting Goal 1, OT)  goal not met  -LM  --        Strength Goal 1 (OT)    Strength Goal 1 (OT)  Pt will increase BUE strength by 1/2 grade in order to increase independence in ADLs and functional transfers   -LM  --     Time Frame (Strength Goal 1, OT)  long term goal (LTG);by discharge  -LM  --     Progress/Outcome (Strength Goal 1, OT)  goal not met  -LM  --       User Key  (r) = Recorded By, (t) = Taken By, (c) = Cosigned By     Initials Name Provider Type Discipline    CA Alek Suazo, PTA Physical Therapy Assistant PT    Dominique Dawson, HODGES/L Occupational Therapy Assistant OT        Occupational Therapy Education     Title: PT OT SLP Therapies (In Progress)     Topic: Occupational Therapy (Done)     Point: ADL training (Done)     Description: Instruct learner(s) on proper safety adaptation and remediation techniques during self care or transfers.   Instruct in proper use of assistive devices.    Learning Progress Summary           Patient Acceptance, E, VU by  at 1/3/2019  3:10 PM    Acceptance, E, VU,NR by  at 1/2/2019 11:44 AM    Acceptance, E,TB, VU by  at 12/31/2018  2:05 PM    Acceptance, E, VU,NR by  at 12/30/2018 11:24 AM    Comment:  OT role, OT POC, ADL training   Family Acceptance, E, VU,NR by  at 1/2/2019 11:44 AM    Acceptance, E, VU,NR by  at 12/30/2018 11:24 AM    Comment:  OT role, OT POC, ADL training                   Point: Home exercise program (Done)     Description: Instruct learner(s) on appropriate technique for monitoring, assisting and/or progressing therapeutic exercises/activities.    Learning Progress Summary           Patient Acceptance, E, VU by LM at 1/3/2019  3:10 PM    Acceptance, E, VU,NR by  at 1/2/2019 11:44 AM    Acceptance, E,TB, VU by  at 12/31/2018  2:05 PM   Family Acceptance, E, VU,NR by  at 1/2/2019 11:44 AM                   Point: Precautions (Done)     Description: Instruct learner(s) on prescribed precautions during self-care and functional transfers.    Learning Progress Summary           Patient Acceptance, E, VU by LM at 1/3/2019  3:10 PM    Acceptance, E, VU,NR by  at 1/2/2019 11:44 AM    Acceptance, E,TB, VU by  at 12/31/2018  2:05 PM   Family Acceptance, E, VU,NR by  at 1/2/2019 11:44 AM                   Point: Body mechanics (Done)     Description: Instruct learner(s) on proper positioning and spine alignment during self-care, functional mobility  activities and/or exercises.    Learning Progress Summary           Patient Acceptance, E, VU by LM at 1/3/2019  3:10 PM    Acceptance, E, VU,NR by  at 1/2/2019 11:44 AM    Acceptance, E,TB, VU by LW at 12/31/2018  2:05 PM   Family Acceptance, E, VU,NR by  at 1/2/2019 11:44 AM                               User Key     Initials Effective Dates Name Provider Type Discipline     10/17/16 -  Rama Weber COTA/L Occupational Therapy Assistant OT    LM 03/07/18 -  Dominique Shook COTA/L Occupational Therapy Assistant OT    LW 03/07/18 -  Thi Carranza COTA/L Occupational Therapy Assistant OT     10/12/18 -  Jin Corrales Occupational Therapist OT                OT Recommendation and Plan  Outcome Summary/Treatment Plan (OT)  Daily Summary of Progress (OT): progress toward functional goals as expected  Daily Summary of Progress (OT): progress toward functional goals as expected  Plan of Care Review  Plan of Care Reviewed With: patient  Plan of Care Reviewed With: patient  Outcome Summary: perf fair with Ot pt family stated pt tf to snf   Outcome Measures     Row Name 01/03/19 1340 01/02/19 1437 01/02/19 1038       How much help from another person do you currently need...    Turning from your back to your side while in flat bed without using bedrails?  3  -CA  3  -CA  --    Moving from lying on back to sitting on the side of a flat bed without bedrails?  3  -CA  3  -CA  --    Moving to and from a bed to a chair (including a wheelchair)?  3  -CA  3  -CA  --    Standing up from a chair using your arms (e.g., wheelchair, bedside chair)?  3  -CA  3  -CA  --    Climbing 3-5 steps with a railing?  3  -CA  3  -CA  --    To walk in hospital room?  3  -CA  3  -CA  --    AM-PAC 6 Clicks Score  18  -CA  18  -CA  --       How much help from another is currently needed...    Putting on and taking off regular lower body clothing?  --  --  3  -BL    Bathing (including washing, rinsing, and drying)  --  --  3   -BL    Toileting (which includes using toilet bed pan or urinal)  --  --  3  -BL    Putting on and taking off regular upper body clothing  --  --  3  -BL    Taking care of personal grooming (such as brushing teeth)  --  --  3  -BL    Eating meals  --  --  4  -BL    Score  --  --  19  -BL       Functional Assessment    Outcome Measure Options  AM-PAC 6 Clicks Basic Mobility (PT)  -CA  AM-PAC 6 Clicks Basic Mobility (PT)  -CA  AM-PAC 6 Clicks Daily Activity (OT)  -    Row Name 01/01/19 1343             How much help from another person do you currently need...    Turning from your back to your side while in flat bed without using bedrails?  3  -KW      Moving from lying on back to sitting on the side of a flat bed without bedrails?  3  -KW      Moving to and from a bed to a chair (including a wheelchair)?  3  -KW      Standing up from a chair using your arms (e.g., wheelchair, bedside chair)?  3  -KW      Climbing 3-5 steps with a railing?  3  -KW      To walk in hospital room?  3  -KW      AM-PAC 6 Clicks Score  18  -KW         Functional Assessment    Outcome Measure Options  AM-PAC 6 Clicks Basic Mobility (PT)  -KW        User Key  (r) = Recorded By, (t) = Taken By, (c) = Cosigned By    Initials Name Provider Type    CA Alek Suazo, PTA Physical Therapy Assistant    BL Rama Weber, HODGES/L Occupational Therapy Assistant    KW Keeley Thakkar, PT Physical Therapist           Time Calculation:   Time Calculation- OT     Row Name 01/03/19 1417             Time Calculation- OT    OT Start Time  0930  -LM      OT Stop Time  1000  -LM      OT Time Calculation (min)  30 min  -LM      Total Timed Code Minutes- OT  30 minute(s)  -LM      OT Received On  01/03/19  -LM        User Key  (r) = Recorded By, (t) = Taken By, (c) = Cosigned By    Initials Name Provider Type    LM Dominique Shook, HODGES/L Occupational Therapy Assistant           Therapy Suggested Charges     Code   Minutes Charges    None           Therapy  Charges for Today     Code Description Service Date Service Provider Modifiers Qty    95125426647 HC OT SELF CARE/MGMT/TRAIN EA 15 MIN 1/3/2019 Dominique Shook COTA/L GO 1    71418310489 HC OT THERAPEUTIC ACT EA 15 MIN 1/3/2019 Dominique Shook COTA/L GO 1          OT G-codes  OT Professional Judgement Used?: Yes  OT Functional Scales Options: AM-PAC 6 Clicks Daily Activity (OT)  Score: 19  Functional Limitation: Self care  Self Care Current Status (): At least 40 percent but less than 60 percent impaired, limited or restricted  Self Care Goal Status (): At least 20 percent but less than 40 percent impaired, limited or restricted    CARROLL Dukes/JUAN ALBERTO  1/3/2019

## 2019-01-04 ENCOUNTER — NURSING HOME (OUTPATIENT)
Dept: FAMILY MEDICINE CLINIC | Facility: CLINIC | Age: 84
End: 2019-01-04

## 2019-01-04 DIAGNOSIS — E55.9 HYPOVITAMINOSIS D: ICD-10-CM

## 2019-01-04 DIAGNOSIS — E87.1 HYPONATREMIA: ICD-10-CM

## 2019-01-04 DIAGNOSIS — D50.8 IRON DEFICIENCY ANEMIA SECONDARY TO INADEQUATE DIETARY IRON INTAKE: ICD-10-CM

## 2019-01-04 DIAGNOSIS — E07.9 THYROID MASS: ICD-10-CM

## 2019-01-04 DIAGNOSIS — F03.90 MAJOR NEUROCOGNITIVE DISORDER (HCC): Primary | ICD-10-CM

## 2019-01-04 DIAGNOSIS — N18.30 STAGE 3 CHRONIC KIDNEY DISEASE (HCC): ICD-10-CM

## 2019-01-04 DIAGNOSIS — K59.09 OTHER CONSTIPATION: ICD-10-CM

## 2019-01-04 PROCEDURE — 99304 1ST NF CARE SF/LOW MDM 25: CPT | Performed by: STUDENT IN AN ORGANIZED HEALTH CARE EDUCATION/TRAINING PROGRAM

## 2019-01-06 LAB
RPR SER QL: ABNORMAL
RPR SER-TITR: ABNORMAL {TITER}

## 2019-01-07 VITALS
HEART RATE: 70 BPM | RESPIRATION RATE: 18 BRPM | TEMPERATURE: 97.5 F | DIASTOLIC BLOOD PRESSURE: 88 MMHG | BODY MASS INDEX: 23.29 KG/M2 | WEIGHT: 139.8 LBS | SYSTOLIC BLOOD PRESSURE: 134 MMHG | HEIGHT: 65 IN | OXYGEN SATURATION: 96 %

## 2019-01-07 PROBLEM — E55.9 HYPOVITAMINOSIS D: Status: ACTIVE | Noted: 2019-01-07

## 2019-01-07 PROBLEM — N18.30 STAGE 3 CHRONIC KIDNEY DISEASE (HCC): Status: ACTIVE | Noted: 2019-01-07

## 2019-01-07 PROBLEM — D50.8 IRON DEFICIENCY ANEMIA SECONDARY TO INADEQUATE DIETARY IRON INTAKE: Status: ACTIVE | Noted: 2019-01-07

## 2019-01-07 PROBLEM — K59.09 OTHER CONSTIPATION: Status: ACTIVE | Noted: 2019-01-07

## 2019-01-07 PROBLEM — E07.9 THYROID MASS: Status: ACTIVE | Noted: 2019-01-07

## 2019-01-07 PROBLEM — F03.90 DEMENTIA: Status: ACTIVE | Noted: 2019-01-07

## 2019-01-07 PROBLEM — E87.1 HYPONATREMIA: Status: ACTIVE | Noted: 2019-01-07

## 2019-01-07 RX ORDER — ERGOCALCIFEROL 1.25 MG/1
50000 CAPSULE ORAL WEEKLY
Qty: 30 CAPSULE | Refills: 0
Start: 2019-01-07 | End: 2019-03-26

## 2019-01-07 RX ORDER — FERROUS SULFATE TAB EC 324 MG (65 MG FE EQUIVALENT) 324 (65 FE) MG
324 TABLET DELAYED RESPONSE ORAL
Qty: 30 TABLET
Start: 2019-01-07

## 2019-01-07 RX ORDER — SENNA AND DOCUSATE SODIUM 50; 8.6 MG/1; MG/1
1 TABLET, FILM COATED ORAL DAILY PRN
Qty: 30 TABLET | Refills: 0
Start: 2019-01-07 | End: 2019-05-10

## 2019-01-07 NOTE — PROGRESS NOTES
[facility] HOME HISTORY AND PHYSICAL    NAME: Marlene Horne  : 1931  MRN: [id number]    DATE & TIME SEEN: 2018  at  9:45 am    PCP: Chuckie Nguyen MD    CODE STATUS: DNR    NURSING HOME: [facility]    Chief Complaint: Major Neurocognitive Disorder and Hyponatremia    HPI: Marlene Horne is a 87 y.o. [place] female with current medical history of Major Neurocognitive Disorder, Chronic Kidney Disease (CKD) Stage 3 (Cr 0.9 - 1.0), Iron Deficiency Anemia, Hypovitaminosis D, and Hyponatremia, is a new resident at [facility] and seen for her annual H&P.  She is a new resident to the nursing home after a 7 day hospitalization for hyponatremia. Patient was hospitalized after being found by family after in her room sitting down on the floor with her pants down and confused on the morning of admission. She has has moments of acute confusion in the past in the setting of hyponatremia and while the family suspects dementia, she lacks primary care and formal diagnosis.    Hyponatremia has steadily been improving evidence by up trending sodium, last measured by . Major neurocognitive disorder was diagnosed by psychiatry while inpatient and thought to be due to both underlying Alzheimer's Dementia and vascular dementia. Mando Cognitive Assessment (MOCA) score while hospitalized was 4/30 indicating severe dementia. Patient's daughter, who is also her POA - Maureen Hernandez, says she has noticed cognitive decline for the last several years. Her mother moved in with her after a stroke 5 years ago and since then she has noticed cognitive decline that has accelerated in the last 6 months. The patient is able to recognize her daughter, but does not recognize her son who is admittedly around less often in her care. Daughter reports severe shortcomings with regards to short-term memory including not remembering meals. She also has poor appetite and denies hunger requiring  her to be monitored and under scheduled meals. She also has a history of constipation going 1-3 times per week or less. Patients ADLs include being able to feed herself, dress herself, groom herself, and wipe herself after defecating; however, she is unable to bath her self, work, or perform homemaking activities. IADLs include her able to minimally clean by dusting the house and prepare rudimentary meals such as sandwiches; however she is unable to manage finances, move within the community, shop, be responsible for taking medications, or use a telephone. She ambulates with a walker. Hypovitaminosis D and iron deficiency anemia were identified while hospitalized and are currently stable and continue to be treated. CKD is stable from most recent labs.       CONCURRENT MEDICAL HISTORY:  Past Medical History:   Diagnosis Date   • CKD (chronic kidney disease) stage 3, GFR 30-59 ml/min (CMS/HCC)    • Constipation    • Dementia    • Hyponatremia    • Hypovitaminosis D    • Iron deficiency anemia    • Major neurocognitive disorder    • Stroke (CMS/[facility])        PAST SURGICAL HISTORY:  Past Surgical History:   Procedure Laterality Date   • CATARACT EXTRACTION         FAMILY HISTORY:  Family History   Problem Relation Age of Onset   • No Known Problems Mother    • No Known Problems Father    • Cancer Sister    • Cancer Brother         SOCIAL HISTORY:  Social History     Socioeconomic History   • Marital status:      Spouse name: Not on file   • Number of children: 2   • Years of education: Not on file   • Highest education level: Bachelor's degree (e.g., BA, AB, BS)   Social Needs   • Financial resource strain: Not very hard   • Food insecurity - worry: Never true   • Food insecurity - inability: Never true   • Transportation needs - medical: No   • Transportation needs - non-medical: No   Occupational History   • Occupation: retired nurse   Tobacco Use   • Smoking status: Never Smoker   • Smokeless tobacco: Never  Used   Substance and Sexual Activity   • Alcohol use: No     Frequency: Never   • Drug use: No   • Sexual activity: Not Currently   Other Topics Concern   • Not on file   Social History Narrative   • Not on file       CURRENT MEDS:    Current Outpatient Medications:   •  acetaminophen (TYLENOL) 325 MG tablet, Take 2 tablets by mouth Every 4 (Four) Hours As Needed for Mild Pain ., Disp: , Rfl:   •  albuterol (PROVENTIL) (2.5 MG/3ML) 0.083% nebulizer solution, Take 2.5 mg by nebulization Every 4 (Four) Hours As Needed for Shortness of Air., Disp: , Rfl: 12  •  amLODIPine (NORVASC) 5 MG tablet, Take 1 tablet by mouth Daily., Disp: , Rfl:   •  ferrous sulfate 324 (65 Fe) MG tablet delayed-release EC tablet, Take 1 tablet by mouth Daily With Breakfast., Disp: 30 tablet, Rfl:   •  melatonin 5 MG tablet tablet, Take 5 mg by mouth Every Night., Disp: , Rfl:   •  polyethylene glycol (MIRALAX) pack packet, Take up to three times daily as needed for severe diarrhea, Disp: , Rfl:   •  sennosides-docusate sodium (SENOKOT-S) 8.6-50 MG tablet, Take 1 tablet by mouth Daily As Needed for Constipation., Disp: 30 tablet, Rfl: 0  •  sodium chloride 1 g tablet, Take 2 tablets by mouth 3 (Three) Times a Day With Meals., Disp: , Rfl:   •  vitamin D (ERGOCALCIFEROL) 58929 units capsule capsule, Take 1 capsule by mouth 1 (One) Time Per Week for 12 doses., Disp: 30 capsule, Rfl: 0    ALLERGIES:  Patient has no known allergies.    Review of Systems:  Review of Systems   Constitutional: Negative for appetite change, chills, diaphoresis, fatigue and fever.   HENT: Negative for ear pain, postnasal drip, rhinorrhea, sore throat and trouble swallowing.    Eyes: Negative for pain and visual disturbance.   Respiratory: Negative for cough, chest tightness and shortness of breath.    Cardiovascular: Negative for chest pain, palpitations and leg swelling.   Gastrointestinal: Negative for abdominal pain, blood in stool, constipation, diarrhea, nausea  "and vomiting.   Endocrine: Negative for polydipsia, polyphagia and polyuria.   Genitourinary: Negative for dysuria, flank pain, frequency and urgency.   Musculoskeletal: Negative for arthralgias, back pain and myalgias.   Skin: Negative for pallor and rash.   Neurological: Negative for dizziness, syncope, weakness and numbness.   Psychiatric/Behavioral: Negative for confusion and dysphoric mood.       /88   Pulse 70   Temp 97.5 °F (36.4 °C)   Resp 18   Ht 165.1 cm (65\")   Wt 63.4 kg (139 lb 12.8 oz)   SpO2 96%   BMI 23.26 kg/m²   Physical Exam   Constitutional: She appears well-developed and well-nourished. No distress.   HENT:   Head: Normocephalic and atraumatic.   Right Ear: External ear normal.   Left Ear: External ear normal.   Nose: Nose normal.   Mouth/Throat: Oropharynx is clear and moist.   Eyes: Conjunctivae are normal. Pupils are equal, round, and reactive to light.   Neck: Normal range of motion. No thyromegaly present.   Cardiovascular: Normal rate, regular rhythm, normal heart sounds and intact distal pulses.   Pulmonary/Chest: Effort normal and breath sounds normal. She has no wheezes.   Abdominal: Soft. Bowel sounds are normal. She exhibits no distension. There is no tenderness. There is no guarding.   Musculoskeletal: Normal range of motion. She exhibits no edema or tenderness.   Lymphadenopathy:     She has no cervical adenopathy.   Neurological: She is alert.   Oreinted to Self. Not oriented to time, date, year, city, place   Skin: Skin is warm and dry. Capillary refill takes less than 2 seconds. She is not diaphoretic.   Redness overlying kyphosis of thoracic spine - blanchable        DIAGNOSTIC DATA:     Comprehensive Metabolic Profile:  Sodium 137 mmol/L   Potassium 4.0 mmol.L   Chloride 102 mmol/L   Carbon Dioxide 26.7 mmol/L   Anion Gap 12 mmol/L   Osmolality-calculated 266 mosm/kg   Calcium 8.9 mg/dL   Calcium/Albumin ratio 2.70    Glucose 90 mg/dL   BUN 16 mg/dL   Creatinine " 1.2 mg/dL   BUN/Creatinine 13    Non-African American eGFR 42 mL/min/1.73m2   Protein, Total 5.5 g/dL   Albumin, Serum 3.3 g/dL   Globulin 2.2 g/dL   Albumin/Globulin ratio 1.5    Alkaline Phosphatase 94 U/L   ALT 10 U/L   AST 14 U/L   Bilirubin Total 0.3 mg/dL     Complete Blood Count/Auto Differential  White Blood Cells 5.9 K/UL   Neutrophils, ABS 3.20 K/UL   Neutrophils, % 53.5 %   Lymphocytes, Abs 2.10 K/UL   Lymphocytes, % 36.1 %   Monocytes, Abs 0.40 k/UL   Monocytes, % 6.3 %   Eosinophils, Abs 0.20 K/UL   Eosinophils, % 3.0 %   Basophils, Abs 0.10 K/UL   Basophils, % 1.1 %   Red Blood Cells 3.36 M/UL   Hemoglobin 10.8 g/dl   Hematocrit 31.5 %   MCV 93.7 FL   MCH 32.2 PG   MCHC 34.3 g/dl   RDW 13.3 %   Platelet count 277 K/UL   Differential Type AUTO      Iron Profile  Iron 24 mcg/dl   TIBC 263 mcg/dl   Iron Saturation 9 %     Ferritin 162 ng/mL    Vitamin D, 25 (OH), Total 15.4 ng/mL    Vitamin B12 330 pg/mL    Folate 13.51 ng/mL     CT cervical spine without contrast  IMPRESSION:  CONCLUSION:  No cervical fracture.  Multilevel degenerative disc disease, facet arthropathy and  spondylotic changes.  2.7 cm mass arising posteriorly from the right lobe of the  thyroid.  Consider nonemergent thyroid ultrasound    CT Head without Contrast  IMPRESSION:  CONCLUSION:  No intracranial injury or acute intracranial process.  Minimal cerebral atrophy.  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  Minimal to moderate small vessel disease.    ASSESSMENT/PLAN: 87 y.o. female with:  Problem List Items Addressed This Visit     Major neurocognitive disorder - Primary    Iron deficiency anemia secondary to inadequate dietary iron intake    Relevant Medications    ferrous sulfate 324 (65 Fe) MG tablet delayed-release EC tablet    Hyponatremia    Stage 3 chronic kidney disease (CMS/HCC)    Hypovitaminosis D    Other constipation    Thyroid mass        1. Major Neurocognitive Disorder: Stable. Components of Alzheimer's  Dementia and vascular dementia as found by hospital head CT. New MOCA 6/30 marginally improved from hospitalization and resolution of hyponatremia. Offered daughter/POA Aricept 5 mg nightly as recommended by psychiatrist during hospitalization, however this was refused due to concerns of side effects. Will perform SLUMS in 6-12 weeks to monitor for changes and/or trends per psychiatry recommendations  2. Iron Deficiency Anemia: Supplement iron once daily  3. Hyponatremia: Improved and stable. Continue salt tablets three times daily and follow up with Nephrology outpatient appointment 01/21/2019  4. CKD stage 3: stable.  5. Hypovitaminosis D: supplement with 50,000 units of vitamin D weekly for 12 weeks.  6. MIralaax three times daily for severe constipation and Docusate with sennosides daily for constipation as needed.  7. Ultrasound of thyroid      Dr. Bell  is the attending on record for this patient, She is aware of the patient's status and agrees with the above.      Chuckie Nguyen M.D. PGY1  [facility]  [address] [place] [address]  Office: [phone]    This document has been electronically signed by Chuckie Nguyen MD on January 7, 2019 8:29 PM

## 2019-01-08 ENCOUNTER — DOCUMENTATION (OUTPATIENT)
Dept: PSYCHIATRY | Facility: HOSPITAL | Age: 84
End: 2019-01-08

## 2019-01-08 NOTE — PROGRESS NOTES
NURSING HOME HISTORY AND PHYSICAL    NAME: Marlene Horne  : 1931  MRN: [id number]    DATE & TIME SEEN: 2019 11:00  PCP: Chuckie Nguyen MD    CODE STATUS: DNR    NURSING HOME: [facility]    Chief Complaint: Initial NH H and P    HPI: Marlene Horne is a 87 y.o. female who is being admitted for Major Neurocognitive disorder. She was recently hospitalized for hyponatremia which has corrected with salt tabs and samsca while in the hospital.  Her POA, Maureen Hernandez, is at bedside providing most of the history. She has been showing a steady decline in her cognition over the last few months to the point where she is unable to care for her at home without assistance.  She reports she is adjusting well to the new environment. She is currently feeding herself lunch.      CONCURRENT MEDICAL HISTORY:  Past Medical History:   Diagnosis Date   • CKD (chronic kidney disease) stage 3, GFR 30-59 ml/min (CMS/HCC)    • Constipation    • Dementia    • Hyponatremia    • Hypovitaminosis D    • Iron deficiency anemia    • Major neurocognitive disorder    • Stroke (CMS/HCC)        PAST SURGICAL HISTORY:  Past Surgical History:   Procedure Laterality Date   • CATARACT EXTRACTION         FAMILY HISTORY:  Family History   Problem Relation Age of Onset   • No Known Problems Mother    • No Known Problems Father    • Cancer Sister    • Cancer Brother         SOCIAL HISTORY:  Social History     Socioeconomic History   • Marital status:      Spouse name: Not on file   • Number of children: 2   • Years of education: Not on file   • Highest education level: Bachelor's degree (e.g., BA, AB, BS)   Social Needs   • Financial resource strain: Not very hard   • Food insecurity - worry: Never true   • Food insecurity - inability: Never true   • Transportation needs - medical: No   • Transportation needs - non-medical: No   Occupational History   • Occupation: retired nurse   Tobacco Use   • Smoking status:  Never Smoker   • Smokeless tobacco: Never Used   Substance and Sexual Activity   • Alcohol use: No     Frequency: Never   • Drug use: No   • Sexual activity: Not Currently   Other Topics Concern   • Not on file   Social History Narrative   • Not on file       CURRENT MEDS:    Current Outpatient Medications:   •  acetaminophen (TYLENOL) 325 MG tablet, Take 2 tablets by mouth Every 4 (Four) Hours As Needed for Mild Pain ., Disp: , Rfl:   •  albuterol (PROVENTIL) (2.5 MG/3ML) 0.083% nebulizer solution, Take 2.5 mg by nebulization Every 4 (Four) Hours As Needed for Shortness of Air., Disp: , Rfl: 12  •  amLODIPine (NORVASC) 5 MG tablet, Take 1 tablet by mouth Daily., Disp: , Rfl:   •  ferrous sulfate 324 (65 Fe) MG tablet delayed-release EC tablet, Take 1 tablet by mouth Daily With Breakfast., Disp: 30 tablet, Rfl:   •  melatonin 5 MG tablet tablet, Take 5 mg by mouth Every Night., Disp: , Rfl:   •  polyethylene glycol (MIRALAX) pack packet, Take up to three times daily as needed for severe diarrhea, Disp: , Rfl:   •  sennosides-docusate sodium (SENOKOT-S) 8.6-50 MG tablet, Take 1 tablet by mouth Daily As Needed for Constipation., Disp: 30 tablet, Rfl: 0  •  sodium chloride 1 g tablet, Take 2 tablets by mouth 3 (Three) Times a Day With Meals., Disp: , Rfl:   •  vitamin D (ERGOCALCIFEROL) 69820 units capsule capsule, Take 1 capsule by mouth 1 (One) Time Per Week for 12 doses., Disp: 30 capsule, Rfl: 0    ALLERGIES:  Patient has no known allergies.    Review of Systems:  Review of Systems   Constitutional: Negative for activity change, appetite change, fatigue and fever.   HENT: Negative for ear pain and sore throat.    Eyes: Negative for pain and visual disturbance.   Respiratory: Negative for cough and shortness of breath.    Cardiovascular: Negative for chest pain and palpitations.   Gastrointestinal: Negative for abdominal pain and nausea.   Endocrine: Negative for cold intolerance and heat intolerance.  "  Genitourinary: Negative for difficulty urinating and dysuria.   Musculoskeletal: Positive for gait problem (ambulates with a walker). Negative for arthralgias.   Skin: Negative for color change and rash.   Neurological: Positive for weakness. Negative for dizziness and headaches.   Hematological: Negative for adenopathy. Does not bruise/bleed easily.   Psychiatric/Behavioral: Positive for confusion. Negative for agitation and sleep disturbance.       /88   Pulse 70   Temp 97.5 °F (36.4 °C)   Resp 18   Ht 165.1 cm (65\")   Wt 63.4 kg (139 lb 12.8 oz)   SpO2 96%   BMI 23.26 kg/m²   Physical Exam   Constitutional: She appears well-developed and well-nourished.   HENT:   Head: Normocephalic and atraumatic.   Right Ear: External ear normal.   Left Ear: External ear normal.   Nose: Nose normal.   Mouth/Throat: Oropharynx is clear and moist.   Eyes: Conjunctivae and EOM are normal.   Neck: Normal range of motion. Neck supple.   Cardiovascular: Normal rate, regular rhythm and normal heart sounds.   Pulmonary/Chest: Effort normal and breath sounds normal.   Abdominal: Soft. Bowel sounds are normal. She exhibits no distension. There is no tenderness.   Musculoskeletal: Normal range of motion.   Neurological: She is alert.   Skin: Skin is warm and dry.   Psychiatric: She has a normal mood and affect. Her speech is normal and behavior is normal. Cognition and memory are normal.              ASSESSMENT/PLAN: 87 y.o. female with:  Diagnoses and all orders for this visit:    Major neurocognitive disorder    Hyponatremia    Stage 3 chronic kidney disease (CMS/HCC)    Hypovitaminosis D    Iron deficiency anemia secondary to inadequate dietary iron intake    Other constipation    Thyroid mass    Other orders  -     polyethylene glycol (MIRALAX) pack packet; Take up to three times daily as needed for severe diarrhea  -     ferrous sulfate 324 (65 Fe) MG tablet delayed-release EC tablet; Take 1 tablet by mouth Daily With " Breakfast.  -     sennosides-docusate sodium (SENOKOT-S) 8.6-50 MG tablet; Take 1 tablet by mouth Daily As Needed for Constipation.  -     vitamin D (ERGOCALCIFEROL) 65511 units capsule capsule; Take 1 capsule by mouth 1 (One) Time Per Week for 12 doses.            This document has been electronically signed by Maricarmen Bell MD on January 7, 2019 9:14 PM

## 2019-01-08 NOTE — PROGRESS NOTES
I have reviewed the notes, assessments, and/or procedures performed by Dr. Nguyen, I concur with her/his documentation of Marlene Horne.       This document has been electronically signed by Maricarmen Bell MD on January 7, 2019 9:23 PM

## 2019-01-08 NOTE — PROGRESS NOTES
Spoke w/ pt's PCP, Dr. Nguyen, re: results.  Discussed care, including further RPR testing, given above.  He will plan to f/u with pt & testing.     Much appreciate his time in Ms. Horne's care.      Smith Mcnulty II, MD  01/08/19  4:31 PM

## 2019-01-11 LAB — REF LAB TEST METHOD: NONREACTIVE

## 2019-02-26 ENCOUNTER — NURSING HOME (OUTPATIENT)
Dept: FAMILY MEDICINE CLINIC | Facility: CLINIC | Age: 84
End: 2019-02-26

## 2019-02-26 DIAGNOSIS — F03.90 MAJOR NEUROCOGNITIVE DISORDER (HCC): ICD-10-CM

## 2019-02-26 DIAGNOSIS — E07.9 THYROID MASS: Primary | ICD-10-CM

## 2019-02-26 DIAGNOSIS — E87.1 HYPONATREMIA: ICD-10-CM

## 2019-02-26 DIAGNOSIS — E55.9 HYPOVITAMINOSIS D: ICD-10-CM

## 2019-02-26 DIAGNOSIS — D50.8 IRON DEFICIENCY ANEMIA SECONDARY TO INADEQUATE DIETARY IRON INTAKE: ICD-10-CM

## 2019-02-26 DIAGNOSIS — N18.30 STAGE 3 CHRONIC KIDNEY DISEASE (HCC): ICD-10-CM

## 2019-02-26 DIAGNOSIS — K59.09 OTHER CONSTIPATION: ICD-10-CM

## 2019-02-26 PROCEDURE — 99310 SBSQ NF CARE HIGH MDM 45: CPT | Performed by: STUDENT IN AN ORGANIZED HEALTH CARE EDUCATION/TRAINING PROGRAM

## 2019-02-26 PROCEDURE — 99308 SBSQ NF CARE LOW MDM 20: CPT | Performed by: STUDENT IN AN ORGANIZED HEALTH CARE EDUCATION/TRAINING PROGRAM

## 2019-02-26 RX ORDER — SODIUM CHLORIDE 1000 MG
1 TABLET, SOLUBLE MISCELLANEOUS
Start: 2019-02-26 | End: 2019-03-03 | Stop reason: HOSPADM

## 2019-02-26 NOTE — PROGRESS NOTES
MONTHLY NURSING HOME VISIT    NAME: Marlene Horne  : 1931  MRN: [id number]    DATE & TIME SEEN: 19 12:40 pm    PCP: Chuckie Nguyen MD    NURSING HOME: [facility]    Monthly Nursing Home Visit for Marlene Horne    Chief Complaint:     Subjective:     Marlene Horne is a 87 y.o. female with current medical history of major neurocognitive disorder, hyponatremia, CKD stage 3, Iron Deficiency Anemia, Hypovitaminosis D, constipation, and incidentally found mass in thyroid. She has been well and has no complaints. She has been seen and is being followed by nephrology for CKD stage 3 and hyponatremia. She was found to have a mass in the thyroid gland on cervical spine CT during her hospitalization for altered mental status 2018 - 1/3/2019. Repeat imaging with ultrasound outpatient confirmed a 2.6 cm mass in the right lobe of the thyroid. Subsequent thyroid function tests have been benign. A discuession regarding goals of care with her daughter/POA will occur currently.      Current Meds:    Current Outpatient Medications:   •  acetaminophen (TYLENOL) 325 MG tablet, Take 2 tablets by mouth Every 4 (Four) Hours As Needed for Mild Pain ., Disp: , Rfl:   •  albuterol (PROVENTIL) (2.5 MG/3ML) 0.083% nebulizer solution, Take 2.5 mg by nebulization Every 4 (Four) Hours As Needed for Shortness of Air., Disp: , Rfl: 12  •  amLODIPine (NORVASC) 5 MG tablet, Take 1 tablet by mouth Daily., Disp: , Rfl:   •  ferrous sulfate 324 (65 Fe) MG tablet delayed-release EC tablet, Take 1 tablet by mouth Daily With Breakfast., Disp: 30 tablet, Rfl:   •  melatonin 5 MG tablet tablet, Take 5 mg by mouth Every Night., Disp: , Rfl:   •  polyethylene glycol (MIRALAX) pack packet, Take up to three times daily as needed for severe constipation, Disp: , Rfl:   •  sennosides-docusate sodium (SENOKOT-S) 8.6-50 MG tablet, Take 1 tablet by mouth Daily As Needed for Constipation., Disp: 30 tablet, Rfl: 0  •  sodium  chloride 1 g tablet, Take 1 tablet by mouth 3 (Three) Times a Day With Meals., Disp: , Rfl:   •  vitamin D (ERGOCALCIFEROL) 22453 units capsule capsule, Take 1 capsule by mouth 1 (One) Time Per Week for 12 doses., Disp: 30 capsule, Rfl: 0    Allergies:  Patient has no known allergies.    Review of Systems:  Review of Systems   Constitutional: Negative for appetite change, chills, diaphoresis, fatigue and fever.   HENT: Negative for congestion, ear pain, postnasal drip, rhinorrhea, sore throat and trouble swallowing.    Eyes: Negative for pain and visual disturbance.   Respiratory: Negative for cough, chest tightness and shortness of breath.    Cardiovascular: Negative for chest pain, palpitations and leg swelling.   Gastrointestinal: Negative for abdominal pain, constipation, diarrhea, nausea and vomiting.   Endocrine: Negative for cold intolerance, heat intolerance, polydipsia, polyphagia and polyuria.   Genitourinary: Negative for dysuria, flank pain, frequency and urgency.   Musculoskeletal: Negative for arthralgias, back pain and myalgias.   Skin: Negative for pallor and rash.   Neurological: Negative for dizziness, syncope, weakness, numbness and headaches.       Objective:     There were no vitals taken for this visit.  Physical Exam   Constitutional: She is oriented to person, place, and time. She appears well-developed and well-nourished. No distress.   HENT:   Head: Normocephalic and atraumatic.   Nose: Nose normal.   Mouth/Throat: Oropharynx is clear and moist.   Eyes: Conjunctivae and EOM are normal. Pupils are equal, round, and reactive to light.   Neck: Normal range of motion. Neck supple. No thyromegaly present.   Cardiovascular: Normal rate, regular rhythm, normal heart sounds and intact distal pulses.   Pulmonary/Chest: Effort normal and breath sounds normal. She has no wheezes.   Abdominal: Soft. Bowel sounds are normal. She exhibits no distension. There is no tenderness. There is no rebound and no  guarding.   Musculoskeletal: Normal range of motion. She exhibits no edema or tenderness.   Lymphadenopathy:     She has no cervical adenopathy.   Neurological: She is alert and oriented to person, place, and time. She displays normal reflexes. No sensory deficit. She exhibits normal muscle tone. Coordination normal.   Skin: Skin is warm and dry. Capillary refill takes less than 2 seconds.       Diagnostic Data:     Basic Metabolic Panel - Collected 2/4/19  Sodium 131 mmol/L   Potassium 3.9 mmol/L   Chloride 100 mmol/L   Carbon Dioxide 22.3 mmol/L   Anion Gap 13 mmol/L   Osmolality-calculated 253 mosm/kg   Calcium 8.7 mg/dL   glucose 117  mg/dL   BUN 8 mg/dL   Creatinine 1.1 mg/dL   BUN/Cr 7    GFR est, non African American 47 mL/min/1.73m2     Thyroid Studies - Collected 1/7/19  TSH 4.092 miu/ml   fT4 1.11 ng/dL   T3 49.9 %     Ultrasound Thyroid - Performed 1/8/19  Conclusion: Dominant, solid 2.6 cm right-sided nodule. This lesion would be amendable to percutaneous biopsy under ultrasound guidance.     Syphilis IgG AB Index   Result <0.1   Negative    B12/Folate Panel  Vitamin B12 330 pg/mL   Folic Acid 13.51 ng/mL       Assessment/Plan:      87 y.o. female with:  Problem List Items Addressed This Visit        Digestive    Hypovitaminosis D    Other constipation       Genitourinary    Stage 3 chronic kidney disease (CMS/HCC)       Hematopoietic and Hemostatic    Iron deficiency anemia secondary to inadequate dietary iron intake       Other    Major neurocognitive disorder    Hyponatremia    Thyroid mass - Primary        1. Thyroid mass: Called and discussed with daughter/POA Maureen Hernandez ([phone]) findings. Discussed goals of care: due to patients age, major neurocognitive disorder, and desire to avoid potentially frightening medical procedures we will continue to observe this mass without intervention. She understands that this may be malignant and could lead to physiologic decompensation in the future. She  asserts that she and her mom have talked in the past about such issues and for now wants to take the least aggressive approach.  2. Major Neurocognitive Disroder: Etiology secondary to treponemal disease exluded. B12/Folate within normal limits, however in geriatric population B12 is supplemented below threshold of 400 pg/mL and as such will be supplemented. Etiology still most likely Alzheimer's Dementia. Perform SLUMS at next visit.   3. CKD 3: stable, followed by nephrology  4. Hyponatremia: stable, followed by nephrology. Continue salt tablets 1g three times daily  5. Iron deficiency anemia: stable. Continue iron supplementation daily  6. Hypovitaminosis D: continue supplementation  7. Constipation: improved, continue scheduled Senokot-S with Miralaax as needed for constipation    CODE STATUS: DNR and DNI    Dr. Bell  is the attending on record for this patient, She is aware of the patient's status and agrees with the above.      Chuckie Nguyen M.D. PGY1  [facility]  [address] [place] [address]  Office: [phone]    This document has been electronically signed by Chuckie Nguyen MD on February 26, 2019 4:28 PM

## 2019-02-27 ENCOUNTER — APPOINTMENT (OUTPATIENT)
Dept: CT IMAGING | Facility: HOSPITAL | Age: 84
End: 2019-02-27

## 2019-02-27 ENCOUNTER — APPOINTMENT (OUTPATIENT)
Dept: GENERAL RADIOLOGY | Facility: HOSPITAL | Age: 84
End: 2019-02-27

## 2019-02-27 ENCOUNTER — HOSPITAL ENCOUNTER (INPATIENT)
Facility: HOSPITAL | Age: 84
LOS: 2 days | Discharge: NURSING, REHAB OR LONG-TERM CARE | End: 2019-03-03
Attending: EMERGENCY MEDICINE | Admitting: FAMILY MEDICINE

## 2019-02-27 ENCOUNTER — APPOINTMENT (OUTPATIENT)
Dept: MRI IMAGING | Facility: HOSPITAL | Age: 84
End: 2019-02-27

## 2019-02-27 DIAGNOSIS — R41.82 ALTERED MENTAL STATUS, UNSPECIFIED ALTERED MENTAL STATUS TYPE: Primary | ICD-10-CM

## 2019-02-27 DIAGNOSIS — E87.1 HYPONATREMIA: ICD-10-CM

## 2019-02-27 DIAGNOSIS — R77.8 ELEVATED TROPONIN I LEVEL: ICD-10-CM

## 2019-02-27 PROBLEM — F06.1 CATATONIA: Status: ACTIVE | Noted: 2019-02-27

## 2019-02-27 LAB
ALBUMIN SERPL-MCNC: 3.5 G/DL (ref 3.4–4.8)
ALBUMIN/GLOB SERPL: 1.3 G/DL (ref 1.1–1.8)
ALP SERPL-CCNC: 93 U/L (ref 38–126)
ALT SERPL W P-5'-P-CCNC: 28 U/L (ref 9–52)
AMPHET+METHAMPHET UR QL: NEGATIVE
ANION GAP SERPL CALCULATED.3IONS-SCNC: 8 MMOL/L (ref 5–15)
ARTERIAL PATENCY WRIST A: ABNORMAL
AST SERPL-CCNC: 26 U/L (ref 14–36)
ATMOSPHERIC PRESS: 751 MMHG
BARBITURATES UR QL SCN: NEGATIVE
BASE EXCESS BLDA CALC-SCNC: -1.4 MMOL/L (ref 0–2)
BASOPHILS # BLD AUTO: 0.03 10*3/MM3 (ref 0–0.2)
BASOPHILS NFR BLD AUTO: 0.2 % (ref 0–1.5)
BDY SITE: ABNORMAL
BENZODIAZ UR QL SCN: NEGATIVE
BILIRUB SERPL-MCNC: 0.4 MG/DL (ref 0.2–1.3)
BILIRUB UR QL STRIP: NEGATIVE
BUN BLD-MCNC: 9 MG/DL (ref 7–21)
BUN/CREAT SERPL: 8.7 (ref 7–25)
CALCIUM SPEC-SCNC: 8.8 MG/DL (ref 8.4–10.2)
CANNABINOIDS SERPL QL: NEGATIVE
CHLORIDE SERPL-SCNC: 94 MMOL/L (ref 95–110)
CLARITY UR: CLEAR
CO2 SERPL-SCNC: 25 MMOL/L (ref 22–31)
COCAINE UR QL: NEGATIVE
COLOR UR: YELLOW
CRE SCREEN PCR: NOT DETECTED
CREAT BLD-MCNC: 1.04 MG/DL (ref 0.5–1)
D-LACTATE SERPL-SCNC: 1 MMOL/L (ref 0.5–2)
D-LACTATE SERPL-SCNC: 2.2 MMOL/L (ref 0.5–2)
DEPRECATED RDW RBC AUTO: 40.8 FL (ref 37–54)
EOSINOPHIL # BLD AUTO: 0 10*3/MM3 (ref 0–0.4)
EOSINOPHIL NFR BLD AUTO: 0 % (ref 0.3–6.2)
ERYTHROCYTE [DISTWIDTH] IN BLOOD BY AUTOMATED COUNT: 12.4 % (ref 12.3–15.4)
ETHANOL BLD-MCNC: <10 MG/DL (ref 0–10)
ETHANOL UR QL: <0.01 %
GFR SERPL CREATININE-BSD FRML MDRD: 50 ML/MIN/1.73 (ref 39–90)
GLOBULIN UR ELPH-MCNC: 2.8 GM/DL (ref 2.3–3.5)
GLUCOSE BLD-MCNC: 146 MG/DL (ref 60–100)
GLUCOSE UR STRIP-MCNC: NEGATIVE MG/DL
HCO3 BLDA-SCNC: 23.1 MMOL/L (ref 20–26)
HCT VFR BLD AUTO: 32.8 % (ref 34–46.6)
HGB BLD-MCNC: 11.5 G/DL (ref 12–15.9)
HGB UR QL STRIP.AUTO: NEGATIVE
HOLD SPECIMEN: NORMAL
HOLD SPECIMEN: NORMAL
IMM GRANULOCYTES # BLD AUTO: 0.03 10*3/MM3 (ref 0–0.05)
IMM GRANULOCYTES NFR BLD AUTO: 0.2 % (ref 0–0.5)
IMP STRAIN: NOT DETECTED
INR PPP: 1.01 (ref 0.8–1.2)
KETONES UR QL STRIP: NEGATIVE
KPC STRAIN: NOT DETECTED
LEUKOCYTE ESTERASE UR QL STRIP.AUTO: NEGATIVE
LYMPHOCYTES # BLD AUTO: 0.72 10*3/MM3 (ref 0.7–3.1)
LYMPHOCYTES NFR BLD AUTO: 5.9 % (ref 19.6–45.3)
Lab: ABNORMAL
MCH RBC QN AUTO: 31.6 PG (ref 26.6–33)
MCHC RBC AUTO-ENTMCNC: 35.1 G/DL (ref 31.5–35.7)
MCV RBC AUTO: 90.1 FL (ref 79–97)
METHADONE UR QL SCN: NEGATIVE
MODALITY: ABNORMAL
MONOCYTES # BLD AUTO: 0.39 10*3/MM3 (ref 0.1–0.9)
MONOCYTES NFR BLD AUTO: 3.2 % (ref 5–12)
NDM STRAIN: NOT DETECTED
NEUTROPHILS # BLD AUTO: 11.08 10*3/MM3 (ref 1.4–7)
NEUTROPHILS NFR BLD AUTO: 90.5 % (ref 42.7–76)
NITRITE UR QL STRIP: NEGATIVE
NRBC BLD AUTO-RTO: 0 /100 WBC (ref 0–0)
OPIATES UR QL: NEGATIVE
OXA 48 STRAIN: NOT DETECTED
OXYCODONE UR QL SCN: NEGATIVE
PCO2 BLDA: 37.1 MM HG (ref 35–45)
PH BLDA: 7.4 PH UNITS (ref 7.35–7.45)
PH UR STRIP.AUTO: 5.5 [PH] (ref 5–9)
PLATELET # BLD AUTO: 271 10*3/MM3 (ref 140–450)
PMV BLD AUTO: 8.7 FL (ref 6–12)
PO2 BLDA: 137 MM HG (ref 83–108)
POTASSIUM BLD-SCNC: 4.4 MMOL/L (ref 3.5–5.1)
PROT SERPL-MCNC: 6.3 G/DL (ref 6.3–8.6)
PROT UR QL STRIP: NEGATIVE
PROTHROMBIN TIME: 13.1 SECONDS (ref 11.1–15.3)
RBC # BLD AUTO: 3.64 10*6/MM3 (ref 3.77–5.28)
SAO2 % BLDCOA: 99.6 % (ref 94–99)
SODIUM BLD-SCNC: 127 MMOL/L (ref 137–145)
SP GR UR STRIP: 1.01 (ref 1–1.03)
TROPONIN I SERPL-MCNC: 0.04 NG/ML
UROBILINOGEN UR QL STRIP: NORMAL
VENTILATOR MODE: ABNORMAL
VIM STRAIN: NOT DETECTED
WBC NRBC COR # BLD: 12.25 10*3/MM3 (ref 3.4–10.8)
WHOLE BLOOD HOLD SPECIMEN: NORMAL

## 2019-02-27 PROCEDURE — 80307 DRUG TEST PRSMV CHEM ANLYZR: CPT | Performed by: EMERGENCY MEDICINE

## 2019-02-27 PROCEDURE — 84484 ASSAY OF TROPONIN QUANT: CPT | Performed by: EMERGENCY MEDICINE

## 2019-02-27 PROCEDURE — 25010000002 MORPHINE PER 10 MG: Performed by: STUDENT IN AN ORGANIZED HEALTH CARE EDUCATION/TRAINING PROGRAM

## 2019-02-27 PROCEDURE — 99225 PR SBSQ OBSERVATION CARE/DAY 25 MINUTES: CPT | Performed by: STUDENT IN AN ORGANIZED HEALTH CARE EDUCATION/TRAINING PROGRAM

## 2019-02-27 PROCEDURE — 80053 COMPREHEN METABOLIC PANEL: CPT | Performed by: EMERGENCY MEDICINE

## 2019-02-27 PROCEDURE — 82803 BLOOD GASES ANY COMBINATION: CPT

## 2019-02-27 PROCEDURE — G0378 HOSPITAL OBSERVATION PER HR: HCPCS

## 2019-02-27 PROCEDURE — 99285 EMERGENCY DEPT VISIT HI MDM: CPT

## 2019-02-27 PROCEDURE — 70450 CT HEAD/BRAIN W/O DYE: CPT

## 2019-02-27 PROCEDURE — 25010000002 LORAZEPAM PER 2 MG: Performed by: FAMILY MEDICINE

## 2019-02-27 PROCEDURE — 85610 PROTHROMBIN TIME: CPT | Performed by: EMERGENCY MEDICINE

## 2019-02-27 PROCEDURE — 93010 ELECTROCARDIOGRAM REPORT: CPT | Performed by: INTERNAL MEDICINE

## 2019-02-27 PROCEDURE — 85025 COMPLETE CBC W/AUTO DIFF WBC: CPT | Performed by: EMERGENCY MEDICINE

## 2019-02-27 PROCEDURE — 36415 COLL VENOUS BLD VENIPUNCTURE: CPT | Performed by: EMERGENCY MEDICINE

## 2019-02-27 PROCEDURE — 70551 MRI BRAIN STEM W/O DYE: CPT

## 2019-02-27 PROCEDURE — 93005 ELECTROCARDIOGRAM TRACING: CPT | Performed by: EMERGENCY MEDICINE

## 2019-02-27 PROCEDURE — 81003 URINALYSIS AUTO W/O SCOPE: CPT | Performed by: EMERGENCY MEDICINE

## 2019-02-27 PROCEDURE — 83605 ASSAY OF LACTIC ACID: CPT | Performed by: EMERGENCY MEDICINE

## 2019-02-27 PROCEDURE — 87081 CULTURE SCREEN ONLY: CPT | Performed by: FAMILY MEDICINE

## 2019-02-27 PROCEDURE — 71045 X-RAY EXAM CHEST 1 VIEW: CPT

## 2019-02-27 RX ORDER — LORAZEPAM 2 MG/ML
1 INJECTION INTRAMUSCULAR ONCE
Status: COMPLETED | OUTPATIENT
Start: 2019-02-27 | End: 2019-02-27

## 2019-02-27 RX ORDER — MORPHINE SULFATE 2 MG/ML
1 INJECTION, SOLUTION INTRAMUSCULAR; INTRAVENOUS EVERY 4 HOURS PRN
Status: DISCONTINUED | OUTPATIENT
Start: 2019-02-27 | End: 2019-02-28

## 2019-02-27 RX ORDER — SODIUM CHLORIDE 0.9 % (FLUSH) 0.9 %
3 SYRINGE (ML) INJECTION EVERY 12 HOURS SCHEDULED
Status: DISCONTINUED | OUTPATIENT
Start: 2019-02-27 | End: 2019-03-03 | Stop reason: HOSPADM

## 2019-02-27 RX ORDER — SODIUM CHLORIDE 0.9 % (FLUSH) 0.9 %
10 SYRINGE (ML) INJECTION AS NEEDED
Status: DISCONTINUED | OUTPATIENT
Start: 2019-02-27 | End: 2019-02-27

## 2019-02-27 RX ORDER — LORAZEPAM 2 MG/ML
1 INJECTION INTRAMUSCULAR
Status: DISCONTINUED | OUTPATIENT
Start: 2019-02-27 | End: 2019-02-28

## 2019-02-27 RX ORDER — ONDANSETRON 2 MG/ML
4 INJECTION INTRAMUSCULAR; INTRAVENOUS EVERY 6 HOURS PRN
Status: DISCONTINUED | OUTPATIENT
Start: 2019-02-27 | End: 2019-03-03 | Stop reason: HOSPADM

## 2019-02-27 RX ORDER — SODIUM CHLORIDE 0.9 % (FLUSH) 0.9 %
3-10 SYRINGE (ML) INJECTION AS NEEDED
Status: DISCONTINUED | OUTPATIENT
Start: 2019-02-27 | End: 2019-03-03 | Stop reason: HOSPADM

## 2019-02-27 RX ORDER — SODIUM CHLORIDE 9 MG/ML
125 INJECTION, SOLUTION INTRAVENOUS CONTINUOUS
Status: DISCONTINUED | OUTPATIENT
Start: 2019-02-27 | End: 2019-02-27

## 2019-02-27 RX ORDER — NALOXONE HCL 0.4 MG/ML
0.4 VIAL (ML) INJECTION
Status: DISCONTINUED | OUTPATIENT
Start: 2019-02-27 | End: 2019-02-28

## 2019-02-27 RX ORDER — HALOPERIDOL 5 MG/ML
2 INJECTION INTRAMUSCULAR ONCE
Status: DISCONTINUED | OUTPATIENT
Start: 2019-02-27 | End: 2019-02-27

## 2019-02-27 RX ORDER — SCOLOPAMINE TRANSDERMAL SYSTEM 1 MG/1
1 PATCH, EXTENDED RELEASE TRANSDERMAL
Status: DISCONTINUED | OUTPATIENT
Start: 2019-02-27 | End: 2019-02-28

## 2019-02-27 RX ADMIN — SODIUM CHLORIDE 125 ML/HR: 900 INJECTION, SOLUTION INTRAVENOUS at 09:30

## 2019-02-27 RX ADMIN — MORPHINE SULFATE 1 MG: 2 INJECTION, SOLUTION INTRAMUSCULAR; INTRAVENOUS at 19:47

## 2019-02-27 RX ADMIN — SODIUM CHLORIDE, PRESERVATIVE FREE 3 ML: 5 INJECTION INTRAVENOUS at 19:47

## 2019-02-27 RX ADMIN — SCOPALAMINE 1 PATCH: 1 PATCH, EXTENDED RELEASE TRANSDERMAL at 17:46

## 2019-02-27 RX ADMIN — LORAZEPAM 1 MG: 2 INJECTION, SOLUTION INTRAMUSCULAR; INTRAVENOUS at 16:46

## 2019-02-27 RX ADMIN — SODIUM CHLORIDE, PRESERVATIVE FREE 3 ML: 5 INJECTION INTRAVENOUS at 22:11

## 2019-02-28 PROBLEM — F06.1 CATATONIA: Status: RESOLVED | Noted: 2019-02-27 | Resolved: 2019-02-28

## 2019-02-28 PROBLEM — R40.4 TRANSIENT ALTERATION OF AWARENESS: Status: ACTIVE | Noted: 2019-02-28

## 2019-02-28 LAB
ANION GAP SERPL CALCULATED.3IONS-SCNC: 4 MMOL/L (ref 5–15)
ANION GAP SERPL CALCULATED.3IONS-SCNC: 7 MMOL/L (ref 5–15)
ANISOCYTOSIS BLD QL: NORMAL
BUN BLD-MCNC: 11 MG/DL (ref 7–21)
BUN BLD-MCNC: 15 MG/DL (ref 7–21)
BUN/CREAT SERPL: 10.8 (ref 7–25)
BUN/CREAT SERPL: 12.8 (ref 7–25)
CALCIUM SPEC-SCNC: 8.1 MG/DL (ref 8.4–10.2)
CALCIUM SPEC-SCNC: 8.6 MG/DL (ref 8.4–10.2)
CHLORIDE SERPL-SCNC: 96 MMOL/L (ref 95–110)
CHLORIDE SERPL-SCNC: 98 MMOL/L (ref 95–110)
CO2 SERPL-SCNC: 23 MMOL/L (ref 22–31)
CO2 SERPL-SCNC: 24 MMOL/L (ref 22–31)
CREAT BLD-MCNC: 1.02 MG/DL (ref 0.5–1)
CREAT BLD-MCNC: 1.17 MG/DL (ref 0.5–1)
DEPRECATED RDW RBC AUTO: 42.2 FL (ref 37–54)
DEPRECATED RDW RBC AUTO: 43.3 FL (ref 37–54)
ERYTHROCYTE [DISTWIDTH] IN BLOOD BY AUTOMATED COUNT: 12.6 % (ref 12.3–15.4)
ERYTHROCYTE [DISTWIDTH] IN BLOOD BY AUTOMATED COUNT: 12.6 % (ref 12.3–15.4)
GFR SERPL CREATININE-BSD FRML MDRD: 44 ML/MIN/1.73 (ref 39–90)
GFR SERPL CREATININE-BSD FRML MDRD: 51 ML/MIN/1.73 (ref 39–90)
GLUCOSE BLD-MCNC: 103 MG/DL (ref 60–100)
GLUCOSE BLD-MCNC: 95 MG/DL (ref 60–100)
HCT VFR BLD AUTO: 30.1 % (ref 34–46.6)
HCT VFR BLD AUTO: 31 % (ref 34–46.6)
HGB BLD-MCNC: 10.2 G/DL (ref 12–15.9)
HGB BLD-MCNC: 10.4 G/DL (ref 12–15.9)
LYMPHOCYTES # BLD MANUAL: 1.64 10*3/MM3 (ref 0.7–3.1)
LYMPHOCYTES NFR BLD MANUAL: 22 % (ref 19.6–45.3)
LYMPHOCYTES NFR BLD MANUAL: 6 % (ref 5–12)
MCH RBC QN AUTO: 31 PG (ref 26.6–33)
MCH RBC QN AUTO: 31.3 PG (ref 26.6–33)
MCHC RBC AUTO-ENTMCNC: 33.5 G/DL (ref 31.5–35.7)
MCHC RBC AUTO-ENTMCNC: 33.9 G/DL (ref 31.5–35.7)
MCV RBC AUTO: 91.5 FL (ref 79–97)
MCV RBC AUTO: 93.4 FL (ref 79–97)
MONOCYTES # BLD AUTO: 0.45 10*3/MM3 (ref 0.1–0.9)
NEUTROPHILS # BLD AUTO: 5.23 10*3/MM3 (ref 1.4–7)
NEUTROPHILS NFR BLD MANUAL: 69 % (ref 42.7–76)
NEUTS BAND NFR BLD MANUAL: 1 % (ref 0–5)
PLATELET # BLD AUTO: 226 10*3/MM3 (ref 140–450)
PLATELET # BLD AUTO: 242 10*3/MM3 (ref 140–450)
PMV BLD AUTO: 8.9 FL (ref 6–12)
PMV BLD AUTO: 9.4 FL (ref 6–12)
POTASSIUM BLD-SCNC: 4.1 MMOL/L (ref 3.5–5.1)
POTASSIUM BLD-SCNC: 4.8 MMOL/L (ref 3.5–5.1)
RBC # BLD AUTO: 3.29 10*6/MM3 (ref 3.77–5.28)
RBC # BLD AUTO: 3.32 10*6/MM3 (ref 3.77–5.28)
SMALL PLATELETS BLD QL SMEAR: ADEQUATE
SODIUM BLD-SCNC: 125 MMOL/L (ref 137–145)
SODIUM BLD-SCNC: 127 MMOL/L (ref 137–145)
VARIANT LYMPHS NFR BLD MANUAL: 2 % (ref 0–5)
WBC MORPH BLD: NORMAL
WBC NRBC COR # BLD: 7.47 10*3/MM3 (ref 3.4–10.8)
WBC NRBC COR # BLD: 8.49 10*3/MM3 (ref 3.4–10.8)

## 2019-02-28 PROCEDURE — 85027 COMPLETE CBC AUTOMATED: CPT | Performed by: STUDENT IN AN ORGANIZED HEALTH CARE EDUCATION/TRAINING PROGRAM

## 2019-02-28 PROCEDURE — 80048 BASIC METABOLIC PNL TOTAL CA: CPT | Performed by: STUDENT IN AN ORGANIZED HEALTH CARE EDUCATION/TRAINING PROGRAM

## 2019-02-28 PROCEDURE — G0378 HOSPITAL OBSERVATION PER HR: HCPCS

## 2019-02-28 PROCEDURE — 99225 PR SBSQ OBSERVATION CARE/DAY 25 MINUTES: CPT | Performed by: STUDENT IN AN ORGANIZED HEALTH CARE EDUCATION/TRAINING PROGRAM

## 2019-02-28 PROCEDURE — 85007 BL SMEAR W/DIFF WBC COUNT: CPT | Performed by: STUDENT IN AN ORGANIZED HEALTH CARE EDUCATION/TRAINING PROGRAM

## 2019-02-28 RX ORDER — AMLODIPINE BESYLATE 5 MG/1
5 TABLET ORAL
Status: DISCONTINUED | OUTPATIENT
Start: 2019-02-28 | End: 2019-03-03 | Stop reason: HOSPADM

## 2019-02-28 RX ORDER — SENNA AND DOCUSATE SODIUM 50; 8.6 MG/1; MG/1
2 TABLET, FILM COATED ORAL NIGHTLY
Status: DISCONTINUED | OUTPATIENT
Start: 2019-02-28 | End: 2019-03-03 | Stop reason: HOSPADM

## 2019-02-28 RX ORDER — SODIUM CHLORIDE 9 MG/ML
75 INJECTION, SOLUTION INTRAVENOUS CONTINUOUS
Status: DISCONTINUED | OUTPATIENT
Start: 2019-02-28 | End: 2019-03-01

## 2019-02-28 RX ORDER — SODIUM CHLORIDE 1000 MG
1 TABLET, SOLUBLE MISCELLANEOUS
Status: DISCONTINUED | OUTPATIENT
Start: 2019-02-28 | End: 2019-03-01

## 2019-02-28 RX ORDER — ACETAMINOPHEN 325 MG/1
325 TABLET ORAL EVERY 6 HOURS PRN
Status: DISCONTINUED | OUTPATIENT
Start: 2019-02-28 | End: 2019-03-03 | Stop reason: HOSPADM

## 2019-02-28 RX ORDER — FERROUS SULFATE TAB EC 324 MG (65 MG FE EQUIVALENT) 324 (65 FE) MG
324 TABLET DELAYED RESPONSE ORAL
Status: DISCONTINUED | OUTPATIENT
Start: 2019-02-28 | End: 2019-03-03 | Stop reason: HOSPADM

## 2019-02-28 RX ORDER — LANOLIN ALCOHOL/MO/W.PET/CERES
5 CREAM (GRAM) TOPICAL NIGHTLY
Status: DISCONTINUED | OUTPATIENT
Start: 2019-02-28 | End: 2019-03-03 | Stop reason: HOSPADM

## 2019-02-28 RX ADMIN — SENNOSIDES AND DOCUSATE SODIUM 2 TABLET: 8.6; 5 TABLET ORAL at 20:35

## 2019-02-28 RX ADMIN — SODIUM CHLORIDE, PRESERVATIVE FREE 3 ML: 5 INJECTION INTRAVENOUS at 08:29

## 2019-02-28 RX ADMIN — MELATONIN 5.25 MG: 3 TAB ORAL at 20:34

## 2019-02-28 RX ADMIN — SODIUM CHLORIDE TAB 1 GM 1 G: 1 TAB at 16:41

## 2019-02-28 RX ADMIN — SODIUM CHLORIDE TAB 1 GM 1 G: 1 TAB at 12:00

## 2019-02-28 RX ADMIN — AMLODIPINE BESYLATE 5 MG: 5 TABLET ORAL at 08:29

## 2019-02-28 RX ADMIN — SODIUM CHLORIDE TAB 1 GM 1 G: 1 TAB at 08:28

## 2019-02-28 RX ADMIN — SODIUM CHLORIDE 75 ML/HR: 9 INJECTION, SOLUTION INTRAVENOUS at 21:30

## 2019-02-28 RX ADMIN — FERROUS SULFATE TAB EC 324 MG (65 MG FE EQUIVALENT) 324 MG: 324 (65 FE) TABLET DELAYED RESPONSE at 08:29

## 2019-02-28 RX ADMIN — SODIUM CHLORIDE 75 ML/HR: 9 INJECTION, SOLUTION INTRAVENOUS at 08:37

## 2019-03-01 PROBLEM — D64.9 ACUTE ANEMIA: Status: ACTIVE | Noted: 2019-03-01

## 2019-03-01 PROBLEM — F06.1 CATATONIA: Status: ACTIVE | Noted: 2019-03-01

## 2019-03-01 LAB
ANION GAP SERPL CALCULATED.3IONS-SCNC: 5 MMOL/L (ref 5–15)
ANION GAP SERPL CALCULATED.3IONS-SCNC: 6 MMOL/L (ref 5–15)
BASOPHILS # BLD AUTO: 0.02 10*3/MM3 (ref 0–0.2)
BASOPHILS NFR BLD AUTO: 0.3 % (ref 0–1.5)
BUN BLD-MCNC: 12 MG/DL (ref 7–21)
BUN BLD-MCNC: 14 MG/DL (ref 7–21)
BUN/CREAT SERPL: 13.5 (ref 7–25)
BUN/CREAT SERPL: 14.1 (ref 7–25)
CALCIUM SPEC-SCNC: 8 MG/DL (ref 8.4–10.2)
CALCIUM SPEC-SCNC: 8.4 MG/DL (ref 8.4–10.2)
CHLORIDE SERPL-SCNC: 95 MMOL/L (ref 95–110)
CHLORIDE SERPL-SCNC: 97 MMOL/L (ref 95–110)
CO2 SERPL-SCNC: 23 MMOL/L (ref 22–31)
CO2 SERPL-SCNC: 23 MMOL/L (ref 22–31)
CREAT BLD-MCNC: 0.89 MG/DL (ref 0.5–1)
CREAT BLD-MCNC: 0.99 MG/DL (ref 0.5–1)
CREAT UR-MCNC: 37.3 MG/DL
DEPRECATED RDW RBC AUTO: 39.7 FL (ref 37–54)
DEPRECATED RDW RBC AUTO: 39.7 FL (ref 37–54)
EOSINOPHIL # BLD AUTO: 0.05 10*3/MM3 (ref 0–0.4)
EOSINOPHIL NFR BLD AUTO: 0.8 % (ref 0.3–6.2)
ERYTHROCYTE [DISTWIDTH] IN BLOOD BY AUTOMATED COUNT: 12.2 % (ref 12.3–15.4)
ERYTHROCYTE [DISTWIDTH] IN BLOOD BY AUTOMATED COUNT: 12.2 % (ref 12.3–15.4)
GFR SERPL CREATININE-BSD FRML MDRD: 53 ML/MIN/1.73 (ref 39–90)
GFR SERPL CREATININE-BSD FRML MDRD: 60 ML/MIN/1.73 (ref 39–90)
GLUCOSE BLD-MCNC: 100 MG/DL (ref 60–100)
GLUCOSE BLD-MCNC: 97 MG/DL (ref 60–100)
HCT VFR BLD AUTO: 27.6 % (ref 34–46.6)
HCT VFR BLD AUTO: 27.7 % (ref 34–46.6)
HEMOCCULT STL QL: NEGATIVE
HGB BLD-MCNC: 9.4 G/DL (ref 12–15.9)
HGB BLD-MCNC: 9.7 G/DL (ref 12–15.9)
HOLD SPECIMEN: NORMAL
IMM GRANULOCYTES # BLD AUTO: 0.02 10*3/MM3 (ref 0–0.05)
IMM GRANULOCYTES NFR BLD AUTO: 0.3 % (ref 0–0.5)
LYMPHOCYTES # BLD AUTO: 1.02 10*3/MM3 (ref 0.7–3.1)
LYMPHOCYTES NFR BLD AUTO: 15.8 % (ref 19.6–45.3)
MCH RBC QN AUTO: 30.2 PG (ref 26.6–33)
MCH RBC QN AUTO: 31.1 PG (ref 26.6–33)
MCHC RBC AUTO-ENTMCNC: 34.1 G/DL (ref 31.5–35.7)
MCHC RBC AUTO-ENTMCNC: 35 G/DL (ref 31.5–35.7)
MCV RBC AUTO: 88.7 FL (ref 79–97)
MCV RBC AUTO: 88.8 FL (ref 79–97)
MONOCYTES # BLD AUTO: 0.55 10*3/MM3 (ref 0.1–0.9)
MONOCYTES NFR BLD AUTO: 8.5 % (ref 5–12)
NEUTROPHILS # BLD AUTO: 4.78 10*3/MM3 (ref 1.4–7)
NEUTROPHILS NFR BLD AUTO: 74.3 % (ref 42.7–76)
NRBC BLD AUTO-RTO: 0 /100 WBC (ref 0–0)
OSMOLALITY UR: 229 MOSM/KG (ref 38–1400)
PLATELET # BLD AUTO: 223 10*3/MM3 (ref 140–450)
PLATELET # BLD AUTO: 227 10*3/MM3 (ref 140–450)
PMV BLD AUTO: 8.8 FL (ref 6–12)
PMV BLD AUTO: 9 FL (ref 6–12)
POTASSIUM BLD-SCNC: 3.5 MMOL/L (ref 3.5–5.1)
POTASSIUM BLD-SCNC: 3.8 MMOL/L (ref 3.5–5.1)
POTASSIUM UR-SCNC: 11.4 MMOL/L
RBC # BLD AUTO: 3.11 10*6/MM3 (ref 3.77–5.28)
RBC # BLD AUTO: 3.12 10*6/MM3 (ref 3.77–5.28)
SODIUM BLD-SCNC: 123 MMOL/L (ref 137–145)
SODIUM BLD-SCNC: 126 MMOL/L (ref 137–145)
SODIUM BLD-SCNC: 126 MMOL/L (ref 137–145)
SODIUM UR-SCNC: 50 MMOL/L (ref 30–90)
TSH SERPL DL<=0.05 MIU/L-ACNC: 3.07 MIU/ML (ref 0.46–4.68)
WBC NRBC COR # BLD: 6.44 10*3/MM3 (ref 3.4–10.8)
WBC NRBC COR # BLD: 7.11 10*3/MM3 (ref 3.4–10.8)

## 2019-03-01 PROCEDURE — 83935 ASSAY OF URINE OSMOLALITY: CPT | Performed by: STUDENT IN AN ORGANIZED HEALTH CARE EDUCATION/TRAINING PROGRAM

## 2019-03-01 PROCEDURE — 85027 COMPLETE CBC AUTOMATED: CPT | Performed by: FAMILY MEDICINE

## 2019-03-01 PROCEDURE — 80048 BASIC METABOLIC PNL TOTAL CA: CPT | Performed by: FAMILY MEDICINE

## 2019-03-01 PROCEDURE — 82570 ASSAY OF URINE CREATININE: CPT | Performed by: STUDENT IN AN ORGANIZED HEALTH CARE EDUCATION/TRAINING PROGRAM

## 2019-03-01 PROCEDURE — 84443 ASSAY THYROID STIM HORMONE: CPT | Performed by: FAMILY MEDICINE

## 2019-03-01 PROCEDURE — 97162 PT EVAL MOD COMPLEX 30 MIN: CPT | Performed by: PHYSICAL THERAPIST

## 2019-03-01 PROCEDURE — 85025 COMPLETE CBC W/AUTO DIFF WBC: CPT | Performed by: STUDENT IN AN ORGANIZED HEALTH CARE EDUCATION/TRAINING PROGRAM

## 2019-03-01 PROCEDURE — 84295 ASSAY OF SERUM SODIUM: CPT | Performed by: INTERNAL MEDICINE

## 2019-03-01 PROCEDURE — 84133 ASSAY OF URINE POTASSIUM: CPT | Performed by: STUDENT IN AN ORGANIZED HEALTH CARE EDUCATION/TRAINING PROGRAM

## 2019-03-01 PROCEDURE — 82272 OCCULT BLD FECES 1-3 TESTS: CPT | Performed by: STUDENT IN AN ORGANIZED HEALTH CARE EDUCATION/TRAINING PROGRAM

## 2019-03-01 PROCEDURE — 84300 ASSAY OF URINE SODIUM: CPT | Performed by: STUDENT IN AN ORGANIZED HEALTH CARE EDUCATION/TRAINING PROGRAM

## 2019-03-01 PROCEDURE — 25010000002 HALOPERIDOL LACTATE PER 5 MG: Performed by: FAMILY MEDICINE

## 2019-03-01 PROCEDURE — 80048 BASIC METABOLIC PNL TOTAL CA: CPT | Performed by: STUDENT IN AN ORGANIZED HEALTH CARE EDUCATION/TRAINING PROGRAM

## 2019-03-01 PROCEDURE — 99232 SBSQ HOSP IP/OBS MODERATE 35: CPT | Performed by: STUDENT IN AN ORGANIZED HEALTH CARE EDUCATION/TRAINING PROGRAM

## 2019-03-01 RX ORDER — SODIUM CHLORIDE 1000 MG
1 TABLET, SOLUBLE MISCELLANEOUS ONCE
Status: COMPLETED | OUTPATIENT
Start: 2019-03-01 | End: 2019-03-01

## 2019-03-01 RX ORDER — HALOPERIDOL 5 MG/ML
0.5 INJECTION INTRAMUSCULAR
Status: DISCONTINUED | OUTPATIENT
Start: 2019-03-01 | End: 2019-03-03 | Stop reason: HOSPADM

## 2019-03-01 RX ORDER — SODIUM CHLORIDE 1000 MG
2 TABLET, SOLUBLE MISCELLANEOUS
Status: DISCONTINUED | OUTPATIENT
Start: 2019-03-01 | End: 2019-03-01

## 2019-03-01 RX ORDER — HALOPERIDOL 1 MG/1
0.5 TABLET ORAL
Status: DISCONTINUED | OUTPATIENT
Start: 2019-03-01 | End: 2019-03-01

## 2019-03-01 RX ADMIN — SODIUM CHLORIDE TAB 1 GM 2 G: 1 TAB at 18:00

## 2019-03-01 RX ADMIN — FERROUS SULFATE TAB EC 324 MG (65 MG FE EQUIVALENT) 324 MG: 324 (65 FE) TABLET DELAYED RESPONSE at 08:37

## 2019-03-01 RX ADMIN — SODIUM CHLORIDE, PRESERVATIVE FREE 3 ML: 5 INJECTION INTRAVENOUS at 20:44

## 2019-03-01 RX ADMIN — SENNOSIDES AND DOCUSATE SODIUM 2 TABLET: 8.6; 5 TABLET ORAL at 20:43

## 2019-03-01 RX ADMIN — HALOPERIDOL LACTATE 0.5 MG: 5 INJECTION, SOLUTION INTRAMUSCULAR at 23:01

## 2019-03-01 RX ADMIN — SODIUM CHLORIDE TAB 1 GM 1 G: 1 TAB at 14:57

## 2019-03-01 RX ADMIN — SODIUM CHLORIDE TAB 1 GM 1 G: 1 TAB at 08:37

## 2019-03-01 RX ADMIN — Medication 15 MG: at 23:01

## 2019-03-01 RX ADMIN — SODIUM CHLORIDE TAB 1 GM 1 G: 1 TAB at 11:13

## 2019-03-01 RX ADMIN — MELATONIN 5.25 MG: 3 TAB ORAL at 20:43

## 2019-03-01 RX ADMIN — AMLODIPINE BESYLATE 5 MG: 5 TABLET ORAL at 08:38

## 2019-03-01 NOTE — PLAN OF CARE
Problem: Patient Care Overview  Goal: Plan of Care Review  Outcome: Outcome(s) achieved Date Met: 03/01/19 03/01/19 1426   Coping/Psychosocial   Plan of Care Reviewed With daughter;son;patient   OTHER   Outcome Summary PT eval completed, was able to come to sit EOB with supervision, able to ambulate with rolling walker and supervison 400 feet with no LOB VSS, per family patient is at baseline for gait since being in SNF, no need for skilled PT at this time     Goal: Discharge Needs Assessment  Outcome: Outcome(s) achieved Date Met: 03/01/19 03/01/19 1626   Discharge Needs Assessment   Concerns to be Addressed no discharge needs identified   Patient/Family Anticipates Transition to long term care facility   Transportation Concerns other (see comments)   Transportation Anticipated other (see comments)  (EMS)   Anticipated Changes Related to Illness none   Equipment Needed After Discharge none  (SNF will provide)   Discharge Facility/Level of Care Needs nursing facility, skilled;nursing facility, intermediate   Current Discharge Risk cognitively impaired   Disability   Equipment Currently Used at Home hospital bed;walker, rolling

## 2019-03-01 NOTE — CONSULTS
[facility]  [address] [place] [address]. [address]   - [phone]  F  [phone]     Consultation         PATIENT  DEMOGRAPHICS   PATIENT NAME: Marlene Horne                      PHYSICIAN: Sagar Perkins MD  : 1931  MRN: [id number]    Subjective   SUBJECTIVE   Referring Provider: Dr Garcia  Reason for Consultation: hyponatremia  History of present illness:      Ms. Horne is a 87-year-old lady with a history of chronic kidney disease stage III dementia chronic hyponatremia history of stroke came in from nursing home after she was found to be confused and unresponsive with foaming in the mouth.  She also has clenched hands and unresponsiveness and therefore referred to the ER    While here patient has been worked up with a CT scan head or for any infection.  Everything turned out to be negative.  She then recovered and currently stable.  Her sodium has dropped to 123 despite on IV fluid and salt tablet.  We have seen her back in December with low sodium.  Her urine studies are consistent with low solute intake but she did not respond with IV fluid.  We have kept her on 2 g of salt tablet 3 times a day.  In the office visit her sodium was 137 and we have reduce it down to 1 g 3 times a day. She was taking salt tablets prior to this admission.  Her TSH and cortisol are all within the normal limits.  Her chest x-ray was also negative for any mass    Past Medical History:   Diagnosis Date   • CKD (chronic kidney disease) stage 3, GFR 30-59 ml/min (CMS/HCC)    • Constipation    • Dementia    • Hyponatremia    • Hypovitaminosis D    • Iron deficiency anemia    • Major neurocognitive disorder    • Stroke (CMS/HCC)      Past Surgical History:   Procedure Laterality Date   • CATARACT EXTRACTION       Family History   Problem Relation Age of Onset   • No Known Problems Mother    • No Known Problems Father    • Cancer Sister    • Cancer Brother      Social History     Tobacco Use  "  • Smoking status: Never Smoker   • Smokeless tobacco: Never Used   Substance Use Topics   • Alcohol use: No     Frequency: Never   • Drug use: No     Allergies:  Patient has no known allergies.     REVIEW OF SYSTEMS    Review of Systems   Unable to perform ROS: Dementia       Objective   OBJECTIVE   Vital Signs  Temp:  [97.5 °F (36.4 °C)-97.7 °F (36.5 °C)] 97.7 °F (36.5 °C)  Heart Rate:  [73-91] 91  Resp:  [18] 18  BP: (136-163)/(58-72) 139/62    Flowsheet Rows      First Filed Value   Admission Height  167.6 cm (66\") Documented at 02/27/2019 0817   Admission Weight  63.1 kg (139 lb 1.6 oz) Documented at 02/27/2019 0817           I/O last 3 completed shifts:  In: 1940 [P.O.:940; I.V.:1000]  Out: -     PHYSICAL EXAM    Physical Exam   Constitutional: She appears well-developed.   HENT:   Head: Normocephalic.   Eyes: Pupils are equal, round, and reactive to light.   Cardiovascular: Normal rate, regular rhythm and normal heart sounds.   Pulmonary/Chest: Effort normal and breath sounds normal.   Abdominal: Soft. Bowel sounds are normal.   Musculoskeletal: She exhibits no edema.   Neurological: She is alert. She exhibits normal muscle tone.       RESULTS   Results Review:    Results from last 7 days   Lab Units 03/01/19  1302 03/01/19  0625 02/28/19  1252  02/27/19  0939   SODIUM mmol/L 126* 123* 127*   < > 127*   POTASSIUM mmol/L 3.5 3.8 4.1   < > 4.4   CHLORIDE mmol/L 97 95 96   < > 94*   CO2 mmol/L 23.0 23.0 24.0   < > 25.0   BUN mg/dL 12 14 15   < > 9   CREATININE mg/dL 0.89 0.99 1.17*   < > 1.04*   CALCIUM mg/dL 8.0* 8.4 8.1*   < > 8.8   BILIRUBIN mg/dL  --   --   --   --  0.4   ALK PHOS U/L  --   --   --   --  93   ALT (SGPT) U/L  --   --   --   --  28   AST (SGOT) U/L  --   --   --   --  26   GLUCOSE mg/dL 97 100 103*   < > 146*    < > = values in this interval not displayed.       Estimated Creatinine Clearance: 43.8 mL/min (by C-G formula based on SCr of 0.89 mg/dL).                Results from last 7 days "   Lab Units 03/01/19  1303 03/01/19  0625 02/28/19  1252 02/28/19  0743 02/27/19  0822   WBC 10*3/mm3 6.44 7.11 8.49 7.47 12.25*   HEMOGLOBIN g/dL 9.7* 9.4* 10.4* 10.2* 11.5*   PLATELETS 10*3/mm3 227 223 226 242 271       Results from last 7 days   Lab Units 02/27/19  0939   INR  1.01        MEDICATIONS      amLODIPine 5 mg Oral Q24H   ferrous sulfate 324 mg Oral Daily With Breakfast   melatonin 5.25 mg Oral Nightly   sennosides-docusate sodium 2 tablet Oral Nightly   sodium chloride 3 mL Intravenous Q12H   sodium chloride 2 g Oral TID With Meals        Medications Prior to Admission   Medication Sig Dispense Refill Last Dose   • acetaminophen (TYLENOL) 325 MG tablet Take 2 tablets by mouth Every 4 (Four) Hours As Needed for Mild Pain .   Unknown at Unknown time   • albuterol (PROVENTIL) (2.5 MG/3ML) 0.083% nebulizer solution Take 2.5 mg by nebulization Every 4 (Four) Hours As Needed for Shortness of Air.  12 Unknown at Unknown time   • amLODIPine (NORVASC) 5 MG tablet Take 1 tablet by mouth Daily.   Unknown at Unknown time   • ferrous sulfate 324 (65 Fe) MG tablet delayed-release EC tablet Take 1 tablet by mouth Daily With Breakfast. 30 tablet  Unknown at Unknown time   • melatonin 5 MG tablet tablet Take 5 mg by mouth Every Night.   Unknown at Unknown time   • polyethylene glycol (MIRALAX) pack packet Take up to three times daily as needed for severe constipation   Unknown at Unknown time   • sennosides-docusate sodium (SENOKOT-S) 8.6-50 MG tablet Take 1 tablet by mouth Daily As Needed for Constipation. 30 tablet 0 Unknown at Unknown time   • sodium chloride 1 g tablet Take 1 tablet by mouth 3 (Three) Times a Day With Meals.   Unknown at Unknown time   • vitamin D (ERGOCALCIFEROL) 97967 units capsule capsule Take 1 capsule by mouth 1 (One) Time Per Week for 12 doses. 30 capsule 0 Unknown at Unknown time     Assessment/Plan   ASSESSMENT / PLAN      Hyponatremia    Major neurocognitive disorder    Iron deficiency  anemia secondary to inadequate dietary iron intake    Stage 3 chronic kidney disease (CMS/HCC)    Transient alteration of awareness    Acute anemia    Catatonia    1.hyponatremia.  Patient urine studies are consistent with low solute intake seen in the older patients.  She has a similar presentation back in December.  She did not respond with IV fluid and we had to give large dose of salt tablets 2 g 3 times a day. TSH and cortisol are normal at that time.  Her chest x-ray was also negative for any mass.    I will stop the normal saline due to risk of rebound hyponatremia if there is a background of SIADH.  I will keep the salt tablet and increase it to 2 g 3 times a day will check the sodium later today and may need tolvaptan.    2.catatonia ?.  This is now resolved    3.chronic kidney disease stage III currently stable    4.history of dementia patient is currently at nursing home    Thank you for the referral we will continue to follow her in the hospital setting         I discussed the patients findings and my recommendations with family and nursing staff         This document has been electronically signed by Sagar Perkins MD on March 1, 2019 2:29 PM

## 2019-03-01 NOTE — THERAPY DISCHARGE NOTE
Acute Care - Physical Therapy Initial Eval/Discharge  [facility]     Patient Name: Marlene Horne  : 1931  MRN: [id number]  Today's Date: 3/1/2019   Onset of Illness/Injury or Date of Surgery: 19  Date of Referral to PT: 19  Referring Physician: Dr Panda      Admit Date: 2019    Visit Dx:    ICD-10-CM ICD-9-CM   1. Altered mental status, unspecified altered mental status type R41.82 780.97   2. Elevated troponin I level R74.8 790.6   3. Hyponatremia E87.1 276.1     Patient Active Problem List   Diagnosis   • Encephalopathy acute   • Major neurocognitive disorder   • Iron deficiency anemia secondary to inadequate dietary iron intake   • Hyponatremia   • Stage 3 chronic kidney disease (CMS/HCC)   • Hypovitaminosis D   • Other constipation   • Thyroid mass   • Transient alteration of awareness   • Acute anemia   • Catatonia     Past Medical History:   Diagnosis Date   • CKD (chronic kidney disease) stage 3, GFR 30-59 ml/min (CMS/HCC)    • Constipation    • Dementia    • Hyponatremia    • Hypovitaminosis D    • Iron deficiency anemia    • Major neurocognitive disorder    • Stroke (CMS/HCC)      Past Surgical History:   Procedure Laterality Date   • CATARACT EXTRACTION            PT ASSESSMENT (last 12 hours)      Physical Therapy Evaluation     Row Name 19 1426          PT Evaluation Time/Intention    Subjective Information  no complaints  -CB     Document Type  evaluation  -CB     Mode of Treatment  individual therapy;physical therapy  -CB     Total Evaluation Minutes, Physical Therapy  29  -CB     Patient Effort  good  -CB     Symptoms Noted During/After Treatment  none  -CB     Row Name 19 1426          General Information    Patient Profile Reviewed?  yes  -CB     Onset of Illness/Injury or Date of Surgery  19  -CB     Referring Physician  Dr Panda  -CB     Patient Observations  alert;cooperative;agree to therapy  -CB     Patient/Family Observations   daughter and son present thru out and confirmed PLOF  -CB     General Observations of Patient  laying in bed with IV in place  -CB     Prior Level of Function  min assist:;gait;ADL's  -CB     Equipment Currently Used at Home  hospital bed;walker, rolling  -CB     Pertinent History of Current Functional Problem  AMS  -CB     Existing Precautions/Restrictions  fall  -CB     Limitations/Impairments  hearing  -CB     Risks Reviewed  patient and family:;dizziness;increased discomfort  -CB     Benefits Reviewed  patient and family:;increase independence;decrease risk of DVT  -CB     Row Name 19 1426          Relationship/Environment    Lives With  facility resident  -CB     Row Name 19 142          Resource/Environmental Concerns    Current Living Arrangements  residential facility  -CB     Resource/Environmental Concerns  none  -CB     Row Name 19 142          Cognitive Assessment/Interventions    Additional Documentation  Cognitive Assessment/Intervention (Group)  -CB     Row Name 19 142          Cognitive Assessment/Intervention- PT/OT    Affect/Mental Status (Cognitive)  confused  -CB     Orientation Status (Cognition)  oriented to;person;place;disoriented to;time   -CB     Follows Commands (Cognition)  follows one step commands  -CB     Cognitive Function (Cognitive)  memory deficit  -CB     Memory Deficit (Cognitive)  mild deficit  -CB     Safety Deficit (Cognitive)  mild deficit  -CB     Personal Safety Interventions  fall prevention program maintained;gait belt;nonskid shoes/slippers when out of bed  -CB     Row Name 19 142          Safety Issues, Functional Mobility    Safety Issues Affecting Function (Mobility)  judgment  -CB     Row Name 19 142          Bed Mobility Assessment/Treatment    Bed Mobility Assessment/Treatment  supine-sit;sit-supine  -CB     Supine-Sit [place] (Bed Mobility)  independent  -CB     Sit-Supine [place] (Bed Mobility)  independent   -CB     Assistive Device (Bed Mobility)  head of bed elevated  -CB     Row Name 03/01/19 1426          Transfer Assessment/Treatment    Transfer Assessment/Treatment  sit-stand transfer;stand-sit transfer  -CB     Stand-Sit [place] (Transfers)  supervision  -CB     Row Name 03/01/19 1426          Sit-Stand Transfer    Assistive Device (Sit-Stand Transfers)  walker, front-wheeled  -CB     Row Name 03/01/19 1426          Gait/Stairs Assessment/Training    Gait/Stairs Assessment/Training  gait/ambulation independence;gait/ambulation assistive device;distance ambulated  -CB     [place] Level (Gait)  supervision  -CB     Assistive Device (Gait)  walker, front-wheeled  -CB     Distance in Feet (Gait)  400 feet  -CB     Row Name 03/01/19 1426          General ROM    GENERAL ROM COMMENTS  AROM WFL BLE  -CB     Row Name 03/01/19 1426          MMT (Manual Muscle Testing)    General MMT Comments  Gross[place] BLE 4/5  -CB     Row Name 03/01/19 1426          Sensory Assessment/Intervention    Sensory General Assessment  no sensation deficits identified  -CB     Row Name 03/01/19 1426          Vision Assessment/Intervention    Visual Impairment/Limitations  corrective lenses for reading  -CB     Row Name 03/01/19 1426          Pain Assessment    Additional Documentation  Pain Scale: Numbers Pre/Post-Treatment (Group)  -CB     Row Name 03/01/19 1426          Pain Scale: Numbers Pre/Post-Treatment    Pain Scale: Numbers, Pretreatment  0/10 - no pain  -CB     Pain Scale: Numbers, Post-Treatment  0/10 - no pain  -CB     Row Name 03/01/19 1426          Physical Therapy Clinical Impression    Date of Referral to PT  03/02/19  -CB     Criteria for Skilled Interventions Met (PT Clinical Impression)  treatment indicated;no  -CB     Patient/Family Concerns, Anticipated Discharge Disposition (PT)  SNF  -CB     Row Name 03/01/19 1426          Vital Signs    Pre Systolic BP Rehab  142  -CB     Pre Treatment Diastolic BP  70  -CB      Post Systolic BP Rehab  148  -CB     Post Treatment Diastolic BP  58  -CB     Pretreatment Heart Rate (beats/min)  91  -CB     Posttreatment Heart Rate (beats/min)  102  -CB     Pre SpO2 (%)  98  -CB     O2 Delivery Pre Treatment  room air  -CB     Post SpO2 (%)  99  -CB     O2 Delivery Post Treatment  room air  -CB     Pre Patient Position  Supine  -CB     Intra Patient Position  Standing  -CB     Post Patient Position  Supine  -CB     Row Name 03/01/19 1426          Positioning and Restraints    Pre-Treatment Position  in bed  -CB     Post Treatment Position  bed  -CB     In Bed  with family/caregiver  -CB     Row Name 03/01/19 1426          Living Environment    Home Accessibility  wheelchair accessible  -CB       User Key  (r) = Recorded By, (t) = Taken By, (c) = Cosigned By    Initials Name Provider Type    Sarah Desouza, PT Physical Therapist              PT Recommendation and Plan  Anticipated Discharge Disposition (PT): skilled nursing facility  Outcome Summary/Treatment Plan (PT)  Anticipated Discharge Disposition (PT): skilled nursing facility  Patient/Family Concerns, Anticipated Discharge Disposition (PT): SNF  Plan of Care Reviewed With: daughter, son, patient  Outcome Summary: PT eval completed, was able to come to sit EOB with supervision, able to ambulate with rolling walker and supervison 400 feet with no LOB VSS, per family patient is at baseline for gait since being in SNF, no need for skilled PT at this time    Outcome Measures     Row Name 03/01/19 1426             How much help from another person do you currently need...    Turning from your back to your side while in flat bed without using bedrails?  4  -CB      Moving from lying on back to sitting on the side of a flat bed without bedrails?  4  -CB      Moving to and from a bed to a chair (including a wheelchair)?  3  -CB      Standing up from a chair using your arms (e.g., wheelchair, bedside chair)?  3  -CB      Climbing 3-5 steps  with a railing?  3  -CB      To walk in hospital room?  3  -CB      AM-PAC 6 Clicks Score  20  -CB         Functional Assessment    Outcome Measure Options  AM-PAC 6 Clicks Basic Mobility (PT)  -CB        User Key  (r) = Recorded By, (t) = Taken By, (c) = Cosigned By    Initials Name Provider Type    Sarah Desouza, PT Physical Therapist           Time Calculation:   PT Charges     Row Name 03/01/19 1632             Time Calculation    Start Time  1426  -CB      Stop Time  1455  -CB      Time Calculation (min)  29 min  -CB      PT Received On  03/01/19  -CB        User Key  (r) = Recorded By, (t) = Taken By, (c) = Cosigned By    Initials Name Provider Type    Sarah Desouza, PT Physical Therapist        Therapy Suggested Charges     Code   Minutes Charges    None           Therapy Charges for Today     Code Description Service Date Service Provider Modifiers Qty    94792799965 HC PT EVAL MOD COMPLEXITY 2 3/1/2019 Sarah Jaramillo, PT GP 1          PT G-Codes  Outcome Measure Options: AM-PAC 6 Clicks Basic Mobility (PT)  AM-PAC 6 Clicks Score: 20    PT Discharge Summary  Anticipated Discharge Disposition (PT): skilled nursing facility    Sarah Jaramillo, PT  3/1/2019

## 2019-03-01 NOTE — PROGRESS NOTES
FAMILY MEDICINE DAILY PROGRESS NOTE  NAME: Marlene Horne  : 1931  MRN: [id number]     LOS: 0 days     PROVIDER OF SERVICE: Srinivasa Chairez III, MD    Chief Complaint: Hyponatremia    Subjective:     Interval History:  History taken from: patient chart family  No acute overnight events.  Patient resumed normal mentation and activity.  Family is eager to return to nursing home due to financial constraints, however patient requires further workup and counseled about completion of care prior to transition back to nursing home.  Patient reports having firm    Review of Systems:   Review of Systems   Constitutional: Negative for activity change, appetite change, chills, diaphoresis and fever.   HENT: Negative for congestion, rhinorrhea, sinus pressure, sinus pain and sore throat.    Eyes: Negative for visual disturbance.   Respiratory: Negative for apnea, cough, choking, chest tightness, shortness of breath and wheezing.    Cardiovascular: Negative for chest pain, palpitations and leg swelling.   Gastrointestinal: Negative for abdominal distention, abdominal pain, blood in stool, constipation, diarrhea, nausea and vomiting.   Genitourinary: Negative for difficulty urinating, dysuria, frequency, hematuria and urgency.   Musculoskeletal: Negative for arthralgias, back pain, joint swelling, myalgias and neck pain.   Skin: Negative for color change, pallor, rash and wound.   Neurological: Negative for dizziness, weakness, numbness and headaches.   Psychiatric/Behavioral: Negative for agitation and behavioral problems.       Objective:     Vital Signs  Temp:  [97.5 °F (36.4 °C)-97.7 °F (36.5 °C)] 97.7 °F (36.5 °C)  Heart Rate:  [73-87] 87  Resp:  [18] 18  BP: (136-163)/(58-72) 136/58    Physical Exam  Physical Exam   Constitutional: She appears well-developed and well-nourished. No distress.   HENT:   Head: Normocephalic and atraumatic.   Right Ear: External ear normal.   Left Ear: External ear normal.    Nose: Nose normal.   Eyes: Conjunctivae and EOM are normal. Pupils are equal, round, and reactive to light. Right eye exhibits no discharge. Left eye exhibits no discharge. No scleral icterus.   Neck: Normal range of motion. Neck supple. No thyromegaly present.   Cardiovascular: Normal rate, regular rhythm, normal heart sounds and intact distal pulses. Exam reveals no gallop and no friction rub.   No murmur heard.  Pulmonary/Chest: Effort normal and breath sounds normal. No respiratory distress. She has no wheezes. She has no rales. She exhibits no tenderness.   Abdominal: Soft. Bowel sounds are normal. She exhibits no distension and no mass. There is tenderness (Under to palpation over suprapubic area). There is no guarding.   Musculoskeletal: Normal range of motion. She exhibits no edema, tenderness or deformity.   Lymphadenopathy:     She has no cervical adenopathy.   Neurological: She is alert. No cranial nerve deficit.   Not oriented to place   Skin: Skin is warm and dry. Capillary refill takes 2 to 3 seconds. She is not diaphoretic.   Psychiatric: She has a normal mood and affect. Her behavior is normal. Judgment and thought content normal.       Medication Review    Current Facility-Administered Medications:   •  acetaminophen (TYLENOL) tablet 325 mg, 325 mg, Oral, Q6H PRN, Chuckie Nguyen MD  •  amLODIPine (NORVASC) tablet 5 mg, 5 mg, Oral, Q24H, Chuckie Nguyen MD, 5 mg at 02/28/19 0829  •  ferrous sulfate EC tablet 324 mg, 324 mg, Oral, Daily With Breakfast, Chuckie Nguyen MD, 324 mg at 02/28/19 0829  •  melatonin tablet 5.25 mg, 5.25 mg, Oral, Nightly, Chuckie Nguyen MD, 5.25 mg at 02/28/19 2034  •  ondansetron (ZOFRAN) injection 4 mg, 4 mg, Intravenous, Q6H PRN, Lino Lopez MD  •  sennosides-docusate sodium (SENOKOT-S) 8.6-50 MG tablet 2 tablet, 2 tablet, Oral, Nightly, Chuckie Nguyen MD, 2 tablet at 02/28/19 2035  •  sodium chloride 0.9 % flush 3 mL, 3 mL,  Intravenous, Q12H, Lino Lopez MD, 3 mL at 02/28/19 0829  •  sodium chloride 0.9 % flush 3-10 mL, 3-10 mL, Intravenous, PRN, Lino Lopez MD  •  sodium chloride 0.9 % infusion, 75 mL/hr, Intravenous, Continuous, Chuckie Nguyen MD, Last Rate: 75 mL/hr at 03/01/19 0808, 75 mL/hr at 03/01/19 0808  •  sodium chloride tablet 1 g, 1 g, Oral, TID With Meals, Chuckie Nguyen MD, 1 g at 02/28/19 1641     Diagnostic Data    Lab Results (last 24 hours)     Procedure Component Value Units Date/Time    CBC (No Diff) [[id number]]  (Abnormal) Collected:  03/01/19 0625    Specimen:  Blood Updated:  03/01/19 0704     WBC 7.11 10*3/mm3      RBC 3.11 10*6/mm3      Hemoglobin 9.4 g/dL      Hematocrit 27.6 %      MCV 88.7 fL      MCH 30.2 pg      MCHC 34.1 g/dL      RDW 12.2 %      RDW-SD 39.7 fl      MPV 9.0 fL      Platelets 223 10*3/mm3     Basic Metabolic Panel [[id number]]  (Abnormal) Collected:  03/01/19 0625    Specimen:  Blood Updated:  03/01/19 0702     Glucose 100 mg/dL      BUN 14 mg/dL      Creatinine 0.99 mg/dL      Sodium 123 mmol/L      Potassium 3.8 mmol/L      Chloride 95 mmol/L      CO2 23.0 mmol/L      Calcium 8.4 mg/dL      eGFR Non African Amer 53 mL/min/1.73      BUN/Creatinine Ratio 14.1     Anion Gap 5.0 mmol/L     Narrative:       The MDRD GFR formula is only valid for adults with stable renal function between ages 18 and 70.    Basic Metabolic Panel [[id number]]  (Abnormal) Collected:  02/28/19 1252    Specimen:  Blood Updated:  02/28/19 1318     Glucose 103 mg/dL      BUN 15 mg/dL      Creatinine 1.17 mg/dL      Sodium 127 mmol/L      Potassium 4.1 mmol/L      Chloride 96 mmol/L      CO2 24.0 mmol/L      Calcium 8.1 mg/dL      eGFR Non African Amer 44 mL/min/1.73      BUN/Creatinine Ratio 12.8     Anion Gap 7.0 mmol/L     Narrative:       The MDRD GFR formula is only valid for adults with stable renal function between ages 18 and 70.    CBC (No Diff) [[id number]]  (Abnormal)  Collected:  02/28/19 1252    Specimen:  Blood Updated:  02/28/19 1308     WBC 8.49 10*3/mm3      RBC 3.32 10*6/mm3      Hemoglobin 10.4 g/dL      Hematocrit 31.0 %      MCV 93.4 fL      MCH 31.3 pg      MCHC 33.5 g/dL      RDW 12.6 %      RDW-SD 43.3 fl      MPV 8.9 fL      Platelets 226 10*3/mm3     CBC & Differential [[id number]] Collected:  02/28/19 0743    Specimen:  Blood Updated:  02/28/19 0843    Narrative:       The following orders were created for panel order CBC & Differential.  Procedure                               Abnormality         Status                     ---------                               -----------         ------                     Manual Differential[[id number]]                              Final result               CBC Auto Differential[[id number]]        Abnormal            Final result                 Please view results for these tests on the individual orders.    Manual Differential [[id number]] Collected:  02/28/19 0743    Specimen:  Blood Updated:  02/28/19 0843     Neutrophil % 69.0 %      Lymphocyte % 22.0 %      Monocyte % 6.0 %      Bands %  1.0 %      Atypical Lymphocyte % 2.0 %      Neutrophils Absolute 5.23 10*3/mm3      Lymphocytes Absolute 1.64 10*3/mm3      Monocytes Absolute 0.45 10*3/mm3      Anisocytosis Slight/1+     WBC Morphology Normal     Platelet Estimate Adequate            I reviewed the patient's new clinical results.    Assessment/Plan:     Active Hospital Problems    Diagnosis Date Noted   • **Hyponatremia [E87.1] 01/07/2019     Continued drop of sodium from 127 down to 123.  Will obtain urine studies and consult nephrology regarding hyponatremia and altered mental status on admission.  -Salt tablet 1g three times daily  -Normal saline at 75 mL/h  -Urine sodium, urine creatinine, urine osmolality, serum osmolality  -Nephrology consult Dr. Perkins, recommendations appreciated-     • Acute anemia [D64.9] 03/01/2019     Drop of 1 mg/dL without hemodilution with  "platelets.  Patient had normal sized bowel movement yesterday.  No other signs of acute bleed.  Will obtain Hemoccult and recheck afternoon H&H.  -Hemoccult stool on bowel movement  -H&H at 1230 hrs.     • Transient alteration of awareness [R40.4] 02/28/2019     Patient has improved mentation since seen yesterday. She is able to converse today but takes time to answer questions. POA has rescinded \"comfort measures only status.\" Imaging and lab work has not revealed the source of this transient decompensation of awareness but if repeat lab work is within normal limits and mentation is near or at baseline, she will be safe to return to nursing home.      • Iron deficiency anemia secondary to inadequate dietary iron intake [D50.8] 01/07/2019     Daily iron supplementation with breakfast     • Stage 3 chronic kidney disease (CMS/HCC) [N18.3] 01/07/2019     Stable. Maintain adequate hydration     • Major neurocognitive disorder [F01.50] 01/07/2019     Alzheimer's Dementia with Vascular Dementia  Patient POA has previously refused treatment with Aricept 5 mg daily.           DVT prophylaxis: SCDs  Code Status and Medical Interventions:   Ordered at: 02/28/19 0726     Level Of Support Discussed With:    Health Care Surrogate     Code Status:    No CPR     Medical Interventions (Level of Support Prior to Arrest):    Full       Plan for disposition:Anticipated discharge back to nursing home in 1-2 days.        This document has been electronically signed by Srinivasa Chairez III, MD on March 1, 2019 8:21 AM            "

## 2019-03-01 NOTE — PLAN OF CARE
Problem: Fall Risk (Adult)  Goal: Absence of Fall  Outcome: Ongoing (interventions implemented as appropriate)      Problem: Skin Injury Risk (Adult)  Goal: Skin Health and Integrity  Outcome: Ongoing (interventions implemented as appropriate)      Problem: Patient Care Overview  Goal: Plan of Care Review  Outcome: Ongoing (interventions implemented as appropriate)   02/28/19 1529 03/01/19 0509   Coping/Psychosocial   Plan of Care Reviewed With --  patient   OTHER   Outcome Summary VSS; will continue to monitor labs --      Goal: Individualization and Mutuality  Outcome: Ongoing (interventions implemented as appropriate)    Goal: Discharge Needs Assessment  Outcome: Ongoing (interventions implemented as appropriate)    Goal: Interprofessional Rounds/Family Conf  Outcome: Ongoing (interventions implemented as appropriate)      Problem: Fluid Volume Deficit (Adult)  Goal: Optimal Fluid Balance  Outcome: Ongoing (interventions implemented as appropriate)

## 2019-03-02 PROBLEM — R40.4 TRANSIENT ALTERATION OF AWARENESS: Status: RESOLVED | Noted: 2019-02-28 | Resolved: 2019-03-02

## 2019-03-02 PROBLEM — F06.1 CATATONIA: Status: RESOLVED | Noted: 2019-03-01 | Resolved: 2019-03-02

## 2019-03-02 PROBLEM — E87.6 HYPOKALEMIA: Status: ACTIVE | Noted: 2019-03-02

## 2019-03-02 LAB
ANION GAP SERPL CALCULATED.3IONS-SCNC: 7 MMOL/L (ref 5–15)
BUN BLD-MCNC: 10 MG/DL (ref 7–21)
BUN/CREAT SERPL: 11 (ref 7–25)
CALCIUM SPEC-SCNC: 8.6 MG/DL (ref 8.4–10.2)
CHLORIDE SERPL-SCNC: 102 MMOL/L (ref 95–110)
CO2 SERPL-SCNC: 21 MMOL/L (ref 22–31)
CREAT BLD-MCNC: 0.91 MG/DL (ref 0.5–1)
DEPRECATED RDW RBC AUTO: 41.1 FL (ref 37–54)
ERYTHROCYTE [DISTWIDTH] IN BLOOD BY AUTOMATED COUNT: 12.5 % (ref 12.3–15.4)
GFR SERPL CREATININE-BSD FRML MDRD: 58 ML/MIN/1.73 (ref 39–90)
GLUCOSE BLD-MCNC: 105 MG/DL (ref 60–100)
HCT VFR BLD AUTO: 29.2 % (ref 34–46.6)
HGB BLD-MCNC: 10.3 G/DL (ref 12–15.9)
MCH RBC QN AUTO: 32 PG (ref 26.6–33)
MCHC RBC AUTO-ENTMCNC: 35.3 G/DL (ref 31.5–35.7)
MCV RBC AUTO: 90.7 FL (ref 79–97)
PLATELET # BLD AUTO: 239 10*3/MM3 (ref 140–450)
PMV BLD AUTO: 9 FL (ref 6–12)
POTASSIUM BLD-SCNC: 3.3 MMOL/L (ref 3.5–5.1)
RBC # BLD AUTO: 3.22 10*6/MM3 (ref 3.77–5.28)
SODIUM BLD-SCNC: 129 MMOL/L (ref 137–145)
SODIUM BLD-SCNC: 130 MMOL/L (ref 137–145)
URATE SERPL-MCNC: 4 MG/DL (ref 2.5–8.5)
WBC NRBC COR # BLD: 5.52 10*3/MM3 (ref 3.4–10.8)

## 2019-03-02 PROCEDURE — 84295 ASSAY OF SERUM SODIUM: CPT | Performed by: INTERNAL MEDICINE

## 2019-03-02 PROCEDURE — 99232 SBSQ HOSP IP/OBS MODERATE 35: CPT | Performed by: FAMILY MEDICINE

## 2019-03-02 PROCEDURE — 84550 ASSAY OF BLOOD/URIC ACID: CPT | Performed by: FAMILY MEDICINE

## 2019-03-02 PROCEDURE — 80048 BASIC METABOLIC PNL TOTAL CA: CPT | Performed by: INTERNAL MEDICINE

## 2019-03-02 PROCEDURE — 25010000002 HALOPERIDOL LACTATE PER 5 MG: Performed by: FAMILY MEDICINE

## 2019-03-02 PROCEDURE — 85027 COMPLETE CBC AUTOMATED: CPT | Performed by: FAMILY MEDICINE

## 2019-03-02 RX ORDER — POTASSIUM CHLORIDE 750 MG/1
20 CAPSULE, EXTENDED RELEASE ORAL ONCE
Status: COMPLETED | OUTPATIENT
Start: 2019-03-02 | End: 2019-03-02

## 2019-03-02 RX ORDER — SODIUM CHLORIDE 1000 MG
2 TABLET, SOLUBLE MISCELLANEOUS
Status: DISCONTINUED | OUTPATIENT
Start: 2019-03-02 | End: 2019-03-03 | Stop reason: HOSPADM

## 2019-03-02 RX ADMIN — HALOPERIDOL LACTATE 0.5 MG: 5 INJECTION, SOLUTION INTRAMUSCULAR at 02:11

## 2019-03-02 RX ADMIN — FERROUS SULFATE TAB EC 324 MG (65 MG FE EQUIVALENT) 324 MG: 324 (65 FE) TABLET DELAYED RESPONSE at 08:51

## 2019-03-02 RX ADMIN — AMLODIPINE BESYLATE 5 MG: 5 TABLET ORAL at 08:51

## 2019-03-02 RX ADMIN — SODIUM CHLORIDE TAB 1 GM 2 G: 1 TAB at 17:16

## 2019-03-02 RX ADMIN — MELATONIN 5.25 MG: 3 TAB ORAL at 20:19

## 2019-03-02 RX ADMIN — Medication 15 MG: at 08:51

## 2019-03-02 RX ADMIN — POTASSIUM CHLORIDE 20 MEQ: 10 CAPSULE, COATED, EXTENDED RELEASE ORAL at 08:52

## 2019-03-02 RX ADMIN — SENNOSIDES AND DOCUSATE SODIUM 2 TABLET: 8.6; 5 TABLET ORAL at 20:19

## 2019-03-02 NOTE — PLAN OF CARE
Problem: Fall Risk (Adult)  Goal: Absence of Fall  Outcome: Ongoing (interventions implemented as appropriate)      Problem: Skin Injury Risk (Adult)  Goal: Skin Health and Integrity  Outcome: Ongoing (interventions implemented as appropriate)      Problem: Fluid Volume Deficit (Adult)  Goal: Optimal Fluid Balance  Outcome: Ongoing (interventions implemented as appropriate)

## 2019-03-02 NOTE — PROGRESS NOTES
FAMILY MEDICINE DAILY PROGRESS NOTE  NAME: Marlene Horne  : 1931  MRN: [id number]     LOS: 1 day     PROVIDER OF SERVICE: Autumn Panda MD    Chief Complaint: Hyponatremia    Subjective:     Interval History:  History taken from: patient chart family Nurse states patient tried to crawl out of bed several times last night and did not sleep well until haldol given.    Patient did not sleep well last night and required dose of haldol.  Currently she is resting comfortably.  Family is bedside who state she continues to be at baseline.  They are concerned about when patient can return to nursing home.  They were informed as patient is now being followed by Dr. Perkins for hyponatremia she will be discharged when he deems her medically stable from that stand point.  They voiced understanding.      Review of Systems:   Review of Systems   Constitutional: Negative for activity change, appetite change, chills, diaphoresis and fever.   HENT: Negative for congestion, rhinorrhea, sinus pressure, sinus pain and sore throat.    Eyes: Negative for visual disturbance.   Respiratory: Negative for apnea, cough, choking, chest tightness, shortness of breath and wheezing.    Cardiovascular: Negative for chest pain, palpitations and leg swelling.   Gastrointestinal: Negative for abdominal distention, abdominal pain, blood in stool, constipation, diarrhea, nausea and vomiting.   Genitourinary: Negative for difficulty urinating, dysuria, frequency, hematuria and urgency.   Musculoskeletal: Negative for arthralgias, back pain, joint swelling, myalgias and neck pain.   Skin: Negative for color change, pallor, rash and wound.   Neurological: Negative for dizziness, weakness, numbness and headaches.   Psychiatric/Behavioral: Negative for agitation and behavioral problems.       Objective:     Vital Signs  Temp:  [97.5 °F (36.4 °C)-98.7 °F (37.1 °C)] 97.8 °F (36.6 °C)  Heart Rate:  [81-99] 81  Resp:  [18] 18  BP:  (139-169)/(62-77) 152/71    Physical Exam  Physical Exam   Constitutional: She appears well-developed and well-nourished. She is sleeping. No distress.   HENT:   Head: Normocephalic and atraumatic.   Right Ear: External ear normal.   Left Ear: External ear normal.   Nose: Nose normal.   Eyes: Conjunctivae and EOM are normal. Pupils are equal, round, and reactive to light. Right eye exhibits no discharge. Left eye exhibits no discharge. No scleral icterus.   Neck: Normal range of motion. Neck supple. No thyromegaly present.   Cardiovascular: Normal rate, regular rhythm, normal heart sounds and intact distal pulses. Exam reveals no gallop and no friction rub.   No murmur heard.  Pulmonary/Chest: Effort normal and breath sounds normal. No respiratory distress. She has no wheezes. She has no rales. She exhibits no tenderness.   Abdominal: Soft. Bowel sounds are normal. She exhibits no distension and no mass. There is no tenderness. There is no guarding.   Musculoskeletal: Normal range of motion. She exhibits no edema, tenderness or deformity.   Lymphadenopathy:     She has no cervical adenopathy.   Neurological: She is alert. No cranial nerve deficit.   Not oriented to place   Skin: Skin is warm and dry. She is not diaphoretic.   Psychiatric: She has a normal mood and affect. Her behavior is normal. Judgment and thought content normal.       Medication Review    Current Facility-Administered Medications:   •  acetaminophen (TYLENOL) tablet 325 mg, 325 mg, Oral, Q6H PRN, Chuckie Nguyen MD  •  amLODIPine (NORVASC) tablet 5 mg, 5 mg, Oral, Q24H, Chuckie Nguyen MD, 5 mg at 03/01/19 0838  •  ferrous sulfate EC tablet 324 mg, 324 mg, Oral, Daily With Breakfast, Chuckie Nguyen MD, 324 mg at 03/01/19 0837  •  haloperidol lactate (HALDOL) injection 0.5 mg, 0.5 mg, Intravenous, Q2H PRN, Malu Love MD, 0.5 mg at 03/02/19 0211  •  melatonin tablet 5.25 mg, 5.25 mg, Oral, Nightly, Patrick  Chuckie WALKER MD, 5.25 mg at 03/01/19 2043  •  ondansetron (ZOFRAN) injection 4 mg, 4 mg, Intravenous, Q6H PRN, Lino Lopez MD  •  potassium chloride (MICRO-K) CR capsule 20 mEq, 20 mEq, Oral, Once, Autumn Panda MD  •  sennosides-docusate sodium (SENOKOT-S) 8.6-50 MG tablet 2 tablet, 2 tablet, Oral, Nightly, Chuckie Nguyen MD, 2 tablet at 03/01/19 2043  •  sodium chloride 0.9 % flush 3 mL, 3 mL, Intravenous, Q12H, Lino Lopez MD, 3 mL at 03/01/19 2044  •  sodium chloride 0.9 % flush 3-10 mL, 3-10 mL, Intravenous, PRN, Lino Lopez MD     Diagnostic Data    Lab Results (last 24 hours)     Procedure Component Value Units Date/Time    Uric Acid [[id number]]  (Normal) Collected:  03/02/19 0640    Specimen:  Blood Updated:  03/02/19 0716     Uric Acid 4.0 mg/dL     Basic Metabolic Panel [[id number]]  (Abnormal) Collected:  03/02/19 0640    Specimen:  Blood Updated:  03/02/19 0716     Glucose 105 mg/dL      BUN 10 mg/dL      Creatinine 0.91 mg/dL      Sodium 130 mmol/L      Potassium 3.3 mmol/L      Chloride 102 mmol/L      CO2 21.0 mmol/L      Calcium 8.6 mg/dL      eGFR Non African Amer 58 mL/min/1.73      BUN/Creatinine Ratio 11.0     Anion Gap 7.0 mmol/L     Narrative:       The MDRD GFR formula is only valid for adults with stable renal function between ages 18 and 70.    CBC (No Diff) [[id number]]  (Abnormal) Collected:  03/02/19 0640    Specimen:  Blood Updated:  03/02/19 0712     WBC 5.52 10*3/mm3      RBC 3.22 10*6/mm3      Hemoglobin 10.3 g/dL      Hematocrit 29.2 %      MCV 90.7 fL      MCH 32.0 pg      MCHC 35.3 g/dL      RDW 12.5 %      RDW-SD 41.1 fl      MPV 9.0 fL      Platelets 239 10*3/mm3     Extra Tubes [[id number]] Collected:  03/01/19 1957    Specimen:  Blood, Venous Line Updated:  03/01/19 2100    Narrative:       The following orders were created for panel order Extra Tubes.  Procedure                               Abnormality         Status                      ---------                               -----------         ------                     Gold Top - SST[[id number]]                                   Final result                 Please view results for these tests on the individual orders.    Gold Top - SST [[id number]] Collected:  03/01/19 1957    Specimen:  Blood Updated:  03/01/19 2100     Extra Tube Hold for add-ons.     Comment: Auto resulted.       Sodium [[id number]]  (Abnormal) Collected:  03/01/19 1957    Specimen:  Blood Updated:  03/01/19 2051     Sodium 126 mmol/L     Basic Metabolic Panel [[id number]]  (Abnormal) Collected:  03/01/19 1302    Specimen:  Blood Updated:  03/01/19 1343     Glucose 97 mg/dL      BUN 12 mg/dL      Creatinine 0.89 mg/dL      Sodium 126 mmol/L      Potassium 3.5 mmol/L      Chloride 97 mmol/L      CO2 23.0 mmol/L      Calcium 8.0 mg/dL      eGFR Non African Amer 60 mL/min/1.73      BUN/Creatinine Ratio 13.5     Anion Gap 6.0 mmol/L     Narrative:       The MDRD GFR formula is only valid for adults with stable renal function between ages 18 and 70.    Occult Blood X 1, Stool - Stool, Per Rectum [[id number]]  (Normal) Collected:  03/01/19 1256    Specimen:  Stool from Per Rectum Updated:  03/01/19 1336     Fecal Occult Blood Negative    CBC & Differential [[id number]] Collected:  03/01/19 1303    Specimen:  Blood Updated:  03/01/19 1311    Narrative:       The following orders were created for panel order CBC & Differential.  Procedure                               Abnormality         Status                     ---------                               -----------         ------                     CBC Auto Differential[[id number]]        Abnormal            Final result                 Please view results for these tests on the individual orders.    CBC Auto Differential [[id number]]  (Abnormal) Collected:  03/01/19 1303    Specimen:  Blood Updated:  03/01/19 1311     WBC 6.44 10*3/mm3      RBC 3.12 10*6/mm3      Hemoglobin 9.7  g/dL      Hematocrit 27.7 %      MCV 88.8 fL      MCH 31.1 pg      MCHC 35.0 g/dL      RDW 12.2 %      RDW-SD 39.7 fl      MPV 8.8 fL      Platelets 227 10*3/mm3      Neutrophil % 74.3 %      Lymphocyte % 15.8 %      Monocyte % 8.5 %      Eosinophil % 0.8 %      Basophil % 0.3 %      Immature Grans % 0.3 %      Neutrophils, Absolute 4.78 10*3/mm3      Lymphocytes, Absolute 1.02 10*3/mm3      Monocytes, Absolute 0.55 10*3/mm3      Eosinophils, Absolute 0.05 10*3/mm3      Basophils, Absolute 0.02 10*3/mm3      Immature Grans, Absolute 0.02 10*3/mm3      nRBC 0.0 /100 WBC     TSH [[id number]]  (Normal) Collected:  03/01/19 0625    Specimen:  Blood Updated:  03/01/19 1227     TSH 3.070 mIU/mL     Osmolality, Urine - Urine, Clean Catch [[id number]]  (Normal) Collected:  03/01/19 1045    Specimen:  Urine, Clean Catch Updated:  03/01/19 1112     Osmolality, Urine 229 mOsm/kg     Creatinine, Urine, Random - Urine, Clean Catch [[id number]] Collected:  03/01/19 1045    Specimen:  Urine, Clean Catch Updated:  03/01/19 1110     Creatinine, Urine 37.3 mg/dL     Sodium, Urine, Random - Urine, Clean Catch [[id number]]  (Normal) Collected:  03/01/19 1045    Specimen:  Urine, Clean Catch Updated:  03/01/19 1110     Sodium, Urine 50 mmol/L     Potassium, Urine, Random - Urine, Clean Catch [[id number]] Collected:  03/01/19 1045    Specimen:  Urine, Clean Catch Updated:  03/01/19 1110     Potassium, Urine 11.4 mmol/L             I reviewed the patient's new clinical results.    Assessment/Plan:     Active Hospital Problems    Diagnosis Date Noted   • **Hyponatremia [E87.1] 01/07/2019     Continued drop of sodium from 127 down to 123.  Will obtain urine studies and consult nephrology regarding hyponatremia and altered mental status on admission.  -FeNa: 1.1%   -Nephrology consult Dr. Perkins, dulce appreciated- samsca given to patient on 3/1 which improved sodium up to 130 today     • Hypokalemia [E87.6] 03/02/2019     Potassium 3.3  today, will replace PO.  If continues to be low will check magnesium.       • Acute anemia [D64.9] 03/01/2019     Drop of 1 mg/dL without hemodilution with platelets.  Patient had normal sized bowel movement yesterday.  No other signs of acute bleed.  Will obtain Hemoccult and recheck afternoon H&H.  -Hemoccult stool on bowel movement  -H&H at 1230 hrs.     • Iron deficiency anemia secondary to inadequate dietary iron intake [D50.8] 01/07/2019     Daily iron supplementation with breakfast     • Stage 3 chronic kidney disease (CMS/HCC) [N18.3] 01/07/2019     Stable. Maintain adequate hydration     • Major neurocognitive disorder [F01.50] 01/07/2019     Alzheimer's Dementia with Vascular Dementia  Patient POA has previously refused treatment with Aricept 5 mg daily.           DVT prophylaxis: SCDs  Code Status and Medical Interventions:   Ordered at: 02/28/19 0726     Level Of Support Discussed With:    Health Care Surrogate     Code Status:    No CPR     Medical Interventions (Level of Support Prior to Arrest):    Full       Plan for disposition:Anticipated discharge back to nursing home in 1-2 days. once sodium is stable and discharge approved by Dr. Perkins.           This document has been electronically signed by Autumn Panda MD on March 2, 2019 8:21 AM

## 2019-03-02 NOTE — PLAN OF CARE
Problem: Fall Risk (Adult)  Goal: Absence of Fall  Outcome: Ongoing (interventions implemented as appropriate)   03/02/19 1631   Fall Risk (Adult)   Absence of Fall making progress toward outcome       Problem: Skin Injury Risk (Adult)  Goal: Skin Health and Integrity  Outcome: Ongoing (interventions implemented as appropriate)      Problem: Patient Care Overview  Goal: Interprofessional Rounds/Family Conf  Outcome: Ongoing (interventions implemented as appropriate)      Problem: Fluid Volume Deficit (Adult)  Goal: Optimal Fluid Balance  Outcome: Ongoing (interventions implemented as appropriate)

## 2019-03-02 NOTE — PROGRESS NOTES
"[facility]  [address] [place] [address]. [address]  T - [phone]  F - [phone]     Progress Note          PATIENT  DEMOGRAPHICS   PATIENT NAME: Marlene Horne                      PHYSICIAN: Sagar Perkins MD  : 1931  MRN: [id number]   LOS: 1 day    Patient Care Team:  Chuckie Nguyen MD as PCP - General (Family Medicine)  Srinivasa Chairez III, MD as Resident (Family Medicine)  Charlie Herr MD as Resident (Family Medicine)  Riley Conde MD as Resident (Family Medicine)  Margot Perez MD (Family Medicine)  Mike Junior MD as Resident (Family Medicine)  Subjective   SUBJECTIVE   Doing well no marked soa         Objective   OBJECTIVE   Vital Signs  Temp:  [97.5 °F (36.4 °C)-98.7 °F (37.1 °C)] 97.8 °F (36.6 °C)  Heart Rate:  [81-99] 81  Resp:  [18] 18  BP: (139-169)/(62-77) 152/71    Flowsheet Rows      First Filed Value   Admission Height  167.6 cm (66\") Documented at 2019 0817   Admission Weight  63.1 kg (139 lb 1.6 oz) Documented at 2019 0817           I/O last 3 completed shifts:  In: 2180 [P.O.:1180; I.V.:1000]  Out: 500 [Urine:500]    PHYSICAL EXAM    Physical Exam   Constitutional: She is oriented to person, place, and time. She appears well-developed.   HENT:   Head: Normocephalic.   Eyes: Pupils are equal, round, and reactive to light.   Cardiovascular: Normal rate, regular rhythm and normal heart sounds.   Pulmonary/Chest: Effort normal and breath sounds normal.   Abdominal: Soft. Bowel sounds are normal.   Musculoskeletal: She exhibits no edema.   Neurological: She is alert and oriented to person, place, and time.       RESULTS   Results Review:    Results from last 7 days   Lab Units 19  0640 19  1957 19  1302 19  0625  19  0939   SODIUM mmol/L 130* 126* 126* 123*   < > 127*   POTASSIUM mmol/L 3.3*  --  3.5 3.8   < > 4.4   CHLORIDE mmol/L 102  --  97 95   < > 94*   CO2 mmol/L " 21.0*  --  23.0 23.0   < > 25.0   BUN mg/dL 10  --  12 14   < > 9   CREATININE mg/dL 0.91  --  0.89 0.99   < > 1.04*   CALCIUM mg/dL 8.6  --  8.0* 8.4   < > 8.8   BILIRUBIN mg/dL  --   --   --   --   --  0.4   ALK PHOS U/L  --   --   --   --   --  93   ALT (SGPT) U/L  --   --   --   --   --  28   AST (SGOT) U/L  --   --   --   --   --  26   GLUCOSE mg/dL 105*  --  97 100   < > 146*    < > = values in this interval not displayed.       Estimated Creatinine Clearance: 42.4 mL/min (by C-G formula based on SCr of 0.91 mg/dL).          Results from last 7 days   Lab Units 03/02/19  0640   URIC ACID mg/dL 4.0       Results from last 7 days   Lab Units 03/02/19  0640 03/01/19  1303 03/01/19  0625 02/28/19  1252 02/28/19  0743   WBC 10*3/mm3 5.52 6.44 7.11 8.49 7.47   HEMOGLOBIN g/dL 10.3* 9.7* 9.4* 10.4* 10.2*   PLATELETS 10*3/mm3 239 227 223 226 242       Results from last 7 days   Lab Units 02/27/19  0939   INR  1.01         Imaging Results (last 24 hours)     ** No results found for the last 24 hours. **           MEDICATIONS      amLODIPine 5 mg Oral Q24H   ferrous sulfate 324 mg Oral Daily With Breakfast   melatonin 5.25 mg Oral Nightly   potassium chloride 20 mEq Oral Once   sennosides-docusate sodium 2 tablet Oral Nightly   sodium chloride 3 mL Intravenous Q12H          Assessment/Plan   ASSESSMENT / PLAN      Hyponatremia    Major neurocognitive disorder    Iron deficiency anemia secondary to inadequate dietary iron intake    Stage 3 chronic kidney disease (CMS/HCC)    Acute anemia    1.hyponatremia.  Patient urine studies are consistent with low solute intake seen in the older patients.  also background siadh (na drop with NS). She has a similar presentation back in December.  She did not respond with IV fluid and we had to give large dose of salt tablets 2 g 3 times a day. TSH and cortisol are normal at that time.  Her chest x-ray was also negative for any mass.     Add tolvaptan again, salt tab from tonight.  Check na at 1800     2.catatonia ?.  This is now resolved     3.chronic kidney disease stage III currently stable     4.history of dementia patient is currently at nursing home    5. dispo possible tomorrow if na stays stable                This document has been electronically signed by Sagar Perkins MD on March 2, 2019 8:19 AM

## 2019-03-03 VITALS
DIASTOLIC BLOOD PRESSURE: 63 MMHG | HEIGHT: 66 IN | WEIGHT: 135.2 LBS | RESPIRATION RATE: 18 BRPM | BODY MASS INDEX: 21.73 KG/M2 | TEMPERATURE: 96.3 F | HEART RATE: 86 BPM | SYSTOLIC BLOOD PRESSURE: 134 MMHG | OXYGEN SATURATION: 96 %

## 2019-03-03 LAB
ANION GAP SERPL CALCULATED.3IONS-SCNC: 4 MMOL/L (ref 5–15)
BUN BLD-MCNC: 11 MG/DL (ref 7–21)
BUN/CREAT SERPL: 10.6 (ref 7–25)
CALCIUM SPEC-SCNC: 8.2 MG/DL (ref 8.4–10.2)
CHLORIDE SERPL-SCNC: 99 MMOL/L (ref 95–110)
CO2 SERPL-SCNC: 26 MMOL/L (ref 22–31)
CREAT BLD-MCNC: 1.04 MG/DL (ref 0.5–1)
DEPRECATED RDW RBC AUTO: 40.3 FL (ref 37–54)
ERYTHROCYTE [DISTWIDTH] IN BLOOD BY AUTOMATED COUNT: 12.4 % (ref 12.3–15.4)
GFR SERPL CREATININE-BSD FRML MDRD: 50 ML/MIN/1.73 (ref 39–90)
GLUCOSE BLD-MCNC: 104 MG/DL (ref 60–100)
HCT VFR BLD AUTO: 27.7 % (ref 34–46.6)
HGB BLD-MCNC: 9.8 G/DL (ref 12–15.9)
MCH RBC QN AUTO: 31.2 PG (ref 26.6–33)
MCHC RBC AUTO-ENTMCNC: 35.4 G/DL (ref 31.5–35.7)
MCV RBC AUTO: 88.2 FL (ref 79–97)
PLATELET # BLD AUTO: 241 10*3/MM3 (ref 140–450)
PMV BLD AUTO: 8.8 FL (ref 6–12)
POTASSIUM BLD-SCNC: 3.5 MMOL/L (ref 3.5–5.1)
RBC # BLD AUTO: 3.14 10*6/MM3 (ref 3.77–5.28)
SODIUM BLD-SCNC: 129 MMOL/L (ref 137–145)
WBC NRBC COR # BLD: 4.85 10*3/MM3 (ref 3.4–10.8)

## 2019-03-03 PROCEDURE — 80048 BASIC METABOLIC PNL TOTAL CA: CPT | Performed by: FAMILY MEDICINE

## 2019-03-03 PROCEDURE — 85027 COMPLETE CBC AUTOMATED: CPT | Performed by: FAMILY MEDICINE

## 2019-03-03 PROCEDURE — 99239 HOSP IP/OBS DSCHRG MGMT >30: CPT | Performed by: FAMILY MEDICINE

## 2019-03-03 RX ORDER — SODIUM CHLORIDE 1000 MG
2 TABLET, SOLUBLE MISCELLANEOUS
Qty: 180 TABLET | Refills: 0 | Status: SHIPPED | OUTPATIENT
Start: 2019-03-03 | End: 2020-04-24

## 2019-03-03 RX ADMIN — FERROUS SULFATE TAB EC 324 MG (65 MG FE EQUIVALENT) 324 MG: 324 (65 FE) TABLET DELAYED RESPONSE at 08:38

## 2019-03-03 RX ADMIN — AMLODIPINE BESYLATE 5 MG: 5 TABLET ORAL at 08:38

## 2019-03-03 RX ADMIN — SODIUM CHLORIDE TAB 1 GM 2 G: 1 TAB at 08:40

## 2019-03-03 NOTE — PROGRESS NOTES
FAMILY MEDICINE DAILY PROGRESS NOTE  NAME: Marlene Horne  : 1931  MRN: [id number]     LOS: 2 days     PROVIDER OF SERVICE: Autumn Panda MD    Chief Complaint: Hyponatremia    Subjective:     Interval History:  History taken from: patient chart family Nurse states patient did well over night.    Daughter bedside denies any complaints or problems over night.  Patient continues to be pleasantly confused and denies any complaints either.  Plan of care discussed with daughter bedside.  If sodium stable and Dr. Perkins agrees patient can be sent back to nursing home today.        Review of Systems:   Review of Systems   Constitutional: Negative for activity change, appetite change, chills, diaphoresis and fever.   HENT: Negative for congestion, rhinorrhea, sinus pressure, sinus pain and sore throat.    Eyes: Negative for visual disturbance.   Respiratory: Negative for apnea, cough, choking, chest tightness, shortness of breath and wheezing.    Cardiovascular: Negative for chest pain, palpitations and leg swelling.   Gastrointestinal: Negative for abdominal distention, abdominal pain, blood in stool, constipation, diarrhea, nausea and vomiting.   Genitourinary: Negative for difficulty urinating, dysuria, frequency, hematuria and urgency.   Musculoskeletal: Negative for arthralgias, back pain, joint swelling, myalgias and neck pain.   Skin: Negative for color change, pallor, rash and wound.   Neurological: Negative for dizziness, weakness, numbness and headaches.   Psychiatric/Behavioral: Negative for agitation and behavioral problems.       Objective:     Vital Signs  Temp:  [97.7 °F (36.5 °C)-99.3 °F (37.4 °C)] 99.3 °F (37.4 °C)  Heart Rate:  [78-91] 88  Resp:  [16-18] 18  BP: (132-154)/(63-79) 136/65    Physical Exam  Physical Exam   Constitutional: She appears well-developed and well-nourished. She is sleeping. No distress.   HENT:   Head: Normocephalic and atraumatic.   Right Ear: External ear  normal.   Left Ear: External ear normal.   Nose: Nose normal.   Eyes: Conjunctivae and EOM are normal. Pupils are equal, round, and reactive to light. Right eye exhibits no discharge. Left eye exhibits no discharge. No scleral icterus.   Neck: Normal range of motion. Neck supple. No thyromegaly present.   Cardiovascular: Normal rate, regular rhythm, normal heart sounds and intact distal pulses. Exam reveals no gallop and no friction rub.   No murmur heard.  Pulmonary/Chest: Effort normal and breath sounds normal. No respiratory distress. She has no wheezes. She has no rales. She exhibits no tenderness.   Abdominal: Soft. Bowel sounds are normal. She exhibits no distension and no mass. There is no tenderness. There is no guarding.   Musculoskeletal: Normal range of motion. She exhibits no edema, tenderness or deformity.   Lymphadenopathy:     She has no cervical adenopathy.   Neurological: She is alert. No cranial nerve deficit.   Not oriented to place   Skin: Skin is warm and dry. She is not diaphoretic.   Psychiatric: She has a normal mood and affect. Her behavior is normal. Judgment and thought content normal.       Medication Review    Current Facility-Administered Medications:   •  acetaminophen (TYLENOL) tablet 325 mg, 325 mg, Oral, Q6H PRN, Chuckie Nguyen MD  •  amLODIPine (NORVASC) tablet 5 mg, 5 mg, Oral, Q24H, Chuckie Nguyen MD, 5 mg at 03/02/19 0851  •  ferrous sulfate EC tablet 324 mg, 324 mg, Oral, Daily With Breakfast, Chuckie Nguyen MD, 324 mg at 03/02/19 0851  •  haloperidol lactate (HALDOL) injection 0.5 mg, 0.5 mg, Intravenous, Q2H PRN, Malu Love MD, 0.5 mg at 03/02/19 0211  •  melatonin tablet 5.25 mg, 5.25 mg, Oral, Nightly, Chuckie Nguyen MD, 5.25 mg at 03/02/19 2019  •  ondansetron (ZOFRAN) injection 4 mg, 4 mg, Intravenous, Q6H PRN, Lino Lopez MD  •  sennosides-docusate sodium (SENOKOT-S) 8.6-50 MG tablet 2 tablet, 2 tablet, Oral,  Nightly, Chuckie Nguyen MD, 2 tablet at 03/02/19 2019  •  sodium chloride 0.9 % flush 3 mL, 3 mL, Intravenous, Q12H, Lino Lopez MD, 3 mL at 03/01/19 2044  •  sodium chloride 0.9 % flush 3-10 mL, 3-10 mL, Intravenous, PRN, Lino Lopez MD  •  sodium chloride tablet 2 g, 2 g, Oral, TID With Meals, Sagar Perkins MD, 2 g at 03/02/19 1716     Diagnostic Data    Lab Results (last 24 hours)     Procedure Component Value Units Date/Time    CBC (No Diff) [[id number]]  (Abnormal) Collected:  03/03/19 0606    Specimen:  Blood Updated:  03/03/19 0620     WBC 4.85 10*3/mm3      RBC 3.14 10*6/mm3      Hemoglobin 9.8 g/dL      Hematocrit 27.7 %      MCV 88.2 fL      MCH 31.2 pg      MCHC 35.4 g/dL      RDW 12.4 %      RDW-SD 40.3 fl      MPV 8.8 fL      Platelets 241 10*3/mm3     Sodium [[id number]]  (Abnormal) Collected:  03/02/19 1707    Specimen:  Blood Updated:  03/02/19 1725     Sodium 129 mmol/L             I reviewed the patient's new clinical results.    Assessment/Plan:     Active Hospital Problems    Diagnosis Date Noted   • **Hyponatremia [E87.1] 01/07/2019     -FeNa: 1.1%   -Nephrology consult Dr. Perkins, dulce [facility]- samsca given to patient on 3/1 and 3/2 awaiting sodium levels today        • Hypokalemia [E87.6] 03/02/2019     Potassium 3.3 today, will replace PO.  If continues to be low will check magnesium.       • Acute anemia [D64.9] 03/01/2019     Drop of 1 mg/dL without hemodilution with platelets.  Patient had normal sized bowel movement yesterday.  No other signs of acute bleed.  Will obtain Hemoccult and recheck afternoon H&H.  -Hemoccult stool on bowel movement  -H&H at 1230 hrs.     • Iron deficiency anemia secondary to inadequate dietary iron intake [D50.8] 01/07/2019     Daily iron supplementation with breakfast     • Stage 3 chronic kidney disease (CMS/HCC) [N18.3] 01/07/2019     Stable. Maintain adequate hydration     • Major neurocognitive disorder [F01.50]  01/07/2019     Alzheimer's Dementia with Vascular Dementia  Patient POA has previously refused treatment with Aricept 5 mg daily.           DVT prophylaxis: SCDs  Code Status and Medical Interventions:   Ordered at: 02/28/19 0726     Level Of Support Discussed With:    Health Care Surrogate     Code Status:    No CPR     Medical Interventions (Level of Support Prior to Arrest):    Full       Plan for disposition:Where: SNF and When:  today possibly if sodium is stable and discharge approved by Dr. Perkins.           This document has been electronically signed by Autumn Panda MD on March 3, 2019 8:13 AM

## 2019-03-03 NOTE — PROGRESS NOTES
"[facility]  [address] [place] [address]. [id number]  T - [phone]  F - [phone]     Progress Note          PATIENT  DEMOGRAPHICS   PATIENT NAME: Marlene Horne                      PHYSICIAN: Sagar Pekrins MD  : 1931  MRN: [id number]   LOS: 2 days    Patient Care Team:  Chuckie Nguyen MD as PCP - General (Family Medicine)  Srinivasa Chairez III, MD as Resident (Family Medicine)  Charlie Herr MD as Resident (Family Medicine)  Riley Conde MD as Resident (Family Medicine)  Margot Perez MD (Family Medicine)  Mike Junior MD as Resident (Family Medicine)  Subjective   SUBJECTIVE   Doing well appetite fair         Objective   OBJECTIVE   Vital Signs  Temp:  [97.7 °F (36.5 °C)-99.3 °F (37.4 °C)] 99.3 °F (37.4 °C)  Heart Rate:  [78-91] 88  Resp:  [16-18] 18  BP: (132-154)/(63-79) 136/65    Flowsheet Rows      First Filed Value   Admission Height  167.6 cm (66\") Documented at 2019 0817   Admission Weight  63.1 kg (139 lb 1.6 oz) Documented at 2019 0817           I/O last 3 completed shifts:  In: 360 [P.O.:360]  Out: -     PHYSICAL EXAM    Physical Exam   Constitutional: She is oriented to person, place, and time. She appears well-developed.   HENT:   Head: Normocephalic.   Eyes: Pupils are equal, round, and reactive to light.   Cardiovascular: Normal rate, regular rhythm and normal heart sounds.   Pulmonary/Chest: Effort normal and breath sounds normal.   Abdominal: Soft. Bowel sounds are normal.   Musculoskeletal: She exhibits no edema.   Neurological: She is alert and oriented to person, place, and time.       RESULTS   Results Review:    Results from last 7 days   Lab Units 19  1707 19  0640 19  1957 19  1302 19  0625  19  0939   SODIUM mmol/L 129* 130* 126* 126* 123*   < > 127*   POTASSIUM mmol/L  --  3.3*  --  3.5 3.8   < > 4.4   CHLORIDE mmol/L  --  102  --  97 95   < > 94*   CO2 " mmol/L  --  21.0*  --  23.0 23.0   < > 25.0   BUN mg/dL  --  10  --  12 14   < > 9   CREATININE mg/dL  --  0.91  --  0.89 0.99   < > 1.04*   CALCIUM mg/dL  --  8.6  --  8.0* 8.4   < > 8.8   BILIRUBIN mg/dL  --   --   --   --   --   --  0.4   ALK PHOS U/L  --   --   --   --   --   --  93   ALT (SGPT) U/L  --   --   --   --   --   --  28   AST (SGOT) U/L  --   --   --   --   --   --  26   GLUCOSE mg/dL  --  105*  --  97 100   < > 146*    < > = values in this interval not displayed.       Estimated Creatinine Clearance: 42.1 mL/min (by C-G formula based on SCr of 0.91 mg/dL).          Results from last 7 days   Lab Units 03/02/19  0640   URIC ACID mg/dL 4.0       Results from last 7 days   Lab Units 03/03/19  0606 03/02/19  0640 03/01/19  1303 03/01/19  0625 02/28/19  1252   WBC 10*3/mm3 4.85 5.52 6.44 7.11 8.49   HEMOGLOBIN g/dL 9.8* 10.3* 9.7* 9.4* 10.4*   PLATELETS 10*3/mm3 241 239 227 223 226       Results from last 7 days   Lab Units 02/27/19  0939   INR  1.01         Imaging Results (last 24 hours)     ** No results found for the last 24 hours. **           MEDICATIONS      amLODIPine 5 mg Oral Q24H   ferrous sulfate 324 mg Oral Daily With Breakfast   melatonin 5.25 mg Oral Nightly   sennosides-docusate sodium 2 tablet Oral Nightly   sodium chloride 3 mL Intravenous Q12H   sodium chloride 2 g Oral TID With Meals          Assessment/Plan   ASSESSMENT / PLAN      Hyponatremia    Major neurocognitive disorder    Iron deficiency anemia secondary to inadequate dietary iron intake    Stage 3 chronic kidney disease (CMS/HCC)    Acute anemia    Hypokalemia    1.hyponatremia.  Patient urine studies are consistent with low solute intake seen in the older patients.  also background siadh (na drop with NS). She has a similar presentation back in December.  She did not respond with IV fluid and we had to give large dose of salt tablets 2 g 3 times a day. TSH and cortisol are normal at that time.  Her chest x-ray was also  negative for any mass.  Na 129 last night todays lab is pending. If >130 she can be discharged on  salt tab 2gm tid. F/u with us in a week     2.catatonia ?.  This is now resolved     3.chronic kidney disease stage III currently stable     4.history of dementia patient is currently at nursing home                This document has been electronically signed by Sagar Perkins MD on March 3, 2019 10:15 AM

## 2019-03-03 NOTE — DISCHARGE SUMMARY
DISCHARGE SUMMARY    PATIENT NAME: Marlene Horne       PHYSICIAN: Autumn Panda MD  : [id number]  MRN: [id number]    ADMITTED: 2019     DISCHARGED: 3/3/19    ADMISSION DIAGNOSES:  Active Hospital Problems    Diagnosis Date Noted   • **Hyponatremia [E87.1] 2019   • Hypokalemia [E87.6] 2019   • Acute anemia [D64.9] 2019   • Iron deficiency anemia secondary to inadequate dietary iron intake [D50.8] 2019   • Stage 3 chronic kidney disease (CMS/HCC) [N18.3] 2019   • Major neurocognitive disorder [F01.50] 2019      Resolved Hospital Problems    Diagnosis Date Noted Date Resolved   • Catatonia [F06.1] 2019   • Transient alteration of awareness [R40.4] 2019   • Catatonia [F06.1] 2019     DISCHARGE DIAGNOSES:   Active Hospital Problems    Diagnosis Date Noted   • **Hyponatremia [E87.1] 2019   • Hypokalemia [E87.6] 2019   • Acute anemia [D64.9] 2019   • Iron deficiency anemia secondary to inadequate dietary iron intake [D50.8] 2019   • Stage 3 chronic kidney disease (CMS/HCC) [N18.3] 2019   • Major neurocognitive disorder [F01.50] 2019      Resolved Hospital Problems    Diagnosis Date Noted Date Resolved   • Catatonia [F06.1] 2019   • Transient alteration of awareness [R40.4] 2019   • Catatonia [F06.1] 2019       SERVICE: Family Medicine.  Attending: Dr. Mandujano  Resident: Autumn Panda MD    CONSULTS:   Consult Orders (all) (From admission, onward)    Start     Ordered    19 0821  Inpatient Nephrology Consult  Once     Specialty:  Nephrology  Provider:  Sagar Perkins MD    19 0821    19 1626  Inpatient Hospice Consult  Once     Specialty:  Hospice and Palliative Medicine  Provider:  (Not yet assigned)    19 1625    19 1626  Case Management  Inpatient Consult  Once,    Status:  Canceled     Provider:  (Not yet assigned)    02/27/19 1625          PROCEDURES:   Ct Head Without Contrast    Result Date: 2/27/2019  PROCEDURE: CT HEAD WO CONTRAST HISTORY: Confusion/delirium, altered LOC, unexplained Indication: Same as above Comparison: 12/28/2018 Technique: CT of the head was done without intravenous contrast was done in the orthogonal planes. This exam was performed according to our departmental dose-optimization program, which includes automated exposure control, adjustment of the mA and/or KV according to the patient's size and/or use of iterative reconstruction technique. FINDINGS: There is no intracranial hemorrhage, midline shift mass effect or acute focal infarct. 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 There is periventricular and deep white matter low attenuation, most likely related to small vessel white matter ischemic disease. Intracranial atherosclerotic vascular wall calcifications are seen. There is no hydrocephalus. The mastoid air cells are unremarkable . The paranasal sinuses are unremarkable . There is no visualization of acute fractures involving the calvarium or the skull base.     There is no acute intracranial abnormality. 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 Electronically signed by:  Rangel Kumar MD  2/27/2019 8:58 AM CST Workstation: [employer]SPARE-    Mri Brain Without Contrast    Result Date: 2/27/2019  PROCEDURE: MRI BRAIN WO CONTRAST Clinical History: AMS Indications: Same as above Comparison: CT of the head done on 2/27/2019  Technique:  Noncontrast MRI of the brain was done in a multiplanar and multi-sequence format. Findings: There is no evidence of acute focal infarcts, midline shift, mass effect or intracranial hemorrhage. Encephalomalacic change as a result of old infarct is seen in the right posterior medial occipital region. Age appropriate bilateral cerebral and cerebellar atrophy is noted. Chronic small vessel white matter ischemic change is noted in the bilateral cerebral hemispheres, predominantly in a periventricular distribution There are no extra-axial fluid collections. The  meninges appear unremarkable, given the limitation of lack of intravenous contrast There is good gray/white matter differentiation. There is normal flow-void in the intracranial vasculature. The ventricular system is normal. The craniovertebral junction appears grossly unremarkable. The mastoid air cells are unremarkable . The paranasal sinuses are unremarkable . Limited evaluation of the bilateral orbits, pituitary fossa region and the posterior fossa region including the cerebellopontine angles do not show any gross abnormality.     Impression:  There are no acute intracranial findings. Age related senescent changes are seen .Encephalomalacic change as a result of old infarct is seen in the right posterior medial occipital region. Age appropriate bilateral cerebral and cerebellar atrophy is noted. Chronic small vessel white matter ischemic change is noted in the bilateral cerebral hemispheres, predominantly in a periventricular distribution Electronically signed by:  Rangel Kumar MD  2/27/2019 12:10 PM CST Workstation: [employer]-    Xr Chest 1 View    Result Date: 2/27/2019  PROCEDURE: XR CHEST 1 VIEW HISTORY: Altered mental status COMPARISON: 12/28/2018 TECHNIQUE: Single projection of the chest was done. FINDINGS: Benign calcified lymph node in the left suprahilar region is again noted. The patient is slightly rotated to the left side . There  are no discrete airspace infiltrates, pneumothoraces or pleural effusions. The pulmonary vascularity is normal. The cardiomediastinal silhouette is stable.     There is no acute pleural-parenchymal process seen in the imaged lung fields. Location of Interpretation: Teleradiology Electronically signed by:  Rangel Kumar MD  2/27/2019 8:33 AM CST Workstation: [employer]-    HISTORY OF PRESENT ILLNESS:   Per H&P By Dr. Lopez on 2/27/2019:  Marlene Horne is an 87-year-old lady with history of dementia, chronic kidney disease, who presented to the ER because of decreased responsiveness.  She has a baseline dementia, however today at the nursing home she was not responding at all and had some foam coming from the mouth.  She came to ER where she continued to be minimally responsive to staff.  She is lying in bed with eyes closed mouth clenched shut and arms folded across the chest.  Lab work revealing lactic acidosis to 2.2, mild hyponatremia at 127.  CT and MRI with no acute changes of the brain.    DIAGNOSTIC DATA:   Lab Results (all)     Procedure Component Value Units Date/Time    Basic Metabolic Panel [[id number]]  (Abnormal) Collected:  03/03/19 1024    Specimen:  Blood Updated:  03/03/19 1108     Glucose 104 mg/dL      BUN 11 mg/dL      Creatinine 1.04 mg/dL      Sodium 129 mmol/L      Potassium 3.5 mmol/L      Chloride 99 mmol/L      CO2 26.0 mmol/L      Calcium 8.2 mg/dL      eGFR Non African Amer 50 mL/min/1.73      BUN/Creatinine Ratio 10.6     Anion Gap 4.0 mmol/L     Narrative:       The MDRD GFR formula is only valid for adults with stable renal function between ages 18 and 70.    CBC (No Diff) [[id number]]  (Abnormal) Collected:  03/03/19 0606    Specimen:  Blood Updated:  03/03/19 0620     WBC 4.85 10*3/mm3      RBC 3.14 10*6/mm3      Hemoglobin 9.8 g/dL      Hematocrit 27.7 %      MCV 88.2 fL      MCH 31.2 pg      MCHC 35.4 g/dL      RDW 12.4 %      RDW-SD 40.3 fl      MPV 8.8 fL      Platelets 241  10*3/mm3     Sodium [[id number]]  (Abnormal) Collected:  03/02/19 1707    Specimen:  Blood Updated:  03/02/19 1725     Sodium 129 mmol/L     Uric Acid [[id number]]  (Normal) Collected:  03/02/19 0640    Specimen:  Blood Updated:  03/02/19 0716     Uric Acid 4.0 mg/dL     Basic Metabolic Panel [[id number]]  (Abnormal) Collected:  03/02/19 0640    Specimen:  Blood Updated:  03/02/19 0716     Glucose 105 mg/dL      BUN 10 mg/dL      Creatinine 0.91 mg/dL      Sodium 130 mmol/L      Potassium 3.3 mmol/L      Chloride 102 mmol/L      CO2 21.0 mmol/L      Calcium 8.6 mg/dL      eGFR Non African Amer 58 mL/min/1.73      BUN/Creatinine Ratio 11.0     Anion Gap 7.0 mmol/L     Narrative:       The MDRD GFR formula is only valid for adults with stable renal function between ages 18 and 70.    CBC (No Diff) [[id number]]  (Abnormal) Collected:  03/02/19 0640    Specimen:  Blood Updated:  03/02/19 0712     WBC 5.52 10*3/mm3      RBC 3.22 10*6/mm3      Hemoglobin 10.3 g/dL      Hematocrit 29.2 %      MCV 90.7 fL      MCH 32.0 pg      MCHC 35.3 g/dL      RDW 12.5 %      RDW-SD 41.1 fl      MPV 9.0 fL      Platelets 239 10*3/mm3     Extra Tubes [[id number]] Collected:  03/01/19 1957    Specimen:  Blood, Venous Line Updated:  03/01/19 2100    Narrative:       The following orders were created for panel order Extra Tubes.  Procedure                               Abnormality         Status                     ---------                               -----------         ------                     Gold Top - SST[[id number]]                                   Final result                 Please view results for these tests on the individual orders.    Gold Top - SST [[id number]] Collected:  03/01/19 1957    Specimen:  Blood Updated:  03/01/19 2100     Extra Tube Hold for add-ons.     Comment: Auto resulted.       Sodium [[id number]]  (Abnormal) Collected:  03/01/19 1957    Specimen:  Blood Updated:  03/01/19 2051     Sodium 126 mmol/L     Basic  Metabolic Panel [[id number]]  (Abnormal) Collected:  03/01/19 1302    Specimen:  Blood Updated:  03/01/19 1343     Glucose 97 mg/dL      BUN 12 mg/dL      Creatinine 0.89 mg/dL      Sodium 126 mmol/L      Potassium 3.5 mmol/L      Chloride 97 mmol/L      CO2 23.0 mmol/L      Calcium 8.0 mg/dL      eGFR Non African Amer 60 mL/min/1.73      BUN/Creatinine Ratio 13.5     Anion Gap 6.0 mmol/L     Narrative:       The MDRD GFR formula is only valid for adults with stable renal function between ages 18 and 70.    Occult Blood X 1, Stool - Stool, Per Rectum [[id number]]  (Normal) Collected:  03/01/19 1256    Specimen:  Stool from Per Rectum Updated:  03/01/19 1336     Fecal Occult Blood Negative    CBC & Differential [[id number]] Collected:  03/01/19 1303    Specimen:  Blood Updated:  03/01/19 1311    Narrative:       The following orders were created for panel order CBC & Differential.  Procedure                               Abnormality         Status                     ---------                               -----------         ------                     CBC Auto Differential[[id number]]        Abnormal            Final result                 Please view results for these tests on the individual orders.    CBC Auto Differential [[id number]]  (Abnormal) Collected:  03/01/19 1303    Specimen:  Blood Updated:  03/01/19 1311     WBC 6.44 10*3/mm3      RBC 3.12 10*6/mm3      Hemoglobin 9.7 g/dL      Hematocrit 27.7 %      MCV 88.8 fL      MCH 31.1 pg      MCHC 35.0 g/dL      RDW 12.2 %      RDW-SD 39.7 fl      MPV 8.8 fL      Platelets 227 10*3/mm3      Neutrophil % 74.3 %      Lymphocyte % 15.8 %      Monocyte % 8.5 %      Eosinophil % 0.8 %      Basophil % 0.3 %      Immature Grans % 0.3 %      Neutrophils, Absolute 4.78 10*3/mm3      Lymphocytes, Absolute 1.02 10*3/mm3      Monocytes, Absolute 0.55 10*3/mm3      Eosinophils, Absolute 0.05 10*3/mm3      Basophils, Absolute 0.02 10*3/mm3      Immature Grans, Absolute 0.02  10*3/mm3      nRBC 0.0 /100 WBC     TSH [[id number]]  (Normal) Collected:  03/01/19 0625    Specimen:  Blood Updated:  03/01/19 1227     TSH 3.070 mIU/mL     Osmolality, Urine - Urine, Clean Catch [[id number]]  (Normal) Collected:  03/01/19 1045    Specimen:  Urine, Clean Catch Updated:  03/01/19 1112     Osmolality, Urine 229 mOsm/kg     Creatinine, Urine, Random - Urine, Clean Catch [[id number]] Collected:  03/01/19 1045    Specimen:  Urine, Clean Catch Updated:  03/01/19 1110     Creatinine, Urine 37.3 mg/dL     Sodium, Urine, Random - Urine, Clean Catch [[id number]]  (Normal) Collected:  03/01/19 1045    Specimen:  Urine, Clean Catch Updated:  03/01/19 1110     Sodium, Urine 50 mmol/L     Potassium, Urine, Random - Urine, Clean Catch [[id number]] Collected:  03/01/19 1045    Specimen:  Urine, Clean Catch Updated:  03/01/19 1110     Potassium, Urine 11.4 mmol/L     CBC (No Diff) [[id number]]  (Abnormal) Collected:  03/01/19 0625    Specimen:  Blood Updated:  03/01/19 0704     WBC 7.11 10*3/mm3      RBC 3.11 10*6/mm3      Hemoglobin 9.4 g/dL      Hematocrit 27.6 %      MCV 88.7 fL      MCH 30.2 pg      MCHC 34.1 g/dL      RDW 12.2 %      RDW-SD 39.7 fl      MPV 9.0 fL      Platelets 223 10*3/mm3     Basic Metabolic Panel [[id number]]  (Abnormal) Collected:  03/01/19 0625    Specimen:  Blood Updated:  03/01/19 0702     Glucose 100 mg/dL      BUN 14 mg/dL      Creatinine 0.99 mg/dL      Sodium 123 mmol/L      Potassium 3.8 mmol/L      Chloride 95 mmol/L      CO2 23.0 mmol/L      Calcium 8.4 mg/dL      eGFR Non African Amer 53 mL/min/1.73      BUN/Creatinine Ratio 14.1     Anion Gap 5.0 mmol/L     Narrative:       The MDRD GFR formula is only valid for adults with stable renal function between ages 18 and 70.    Basic Metabolic Panel [[id number]]  (Abnormal) Collected:  02/28/19 1252    Specimen:  Blood Updated:  02/28/19 1318     Glucose 103 mg/dL      BUN 15 mg/dL      Creatinine 1.17 mg/dL      Sodium 127 mmol/L       Potassium 4.1 mmol/L      Chloride 96 mmol/L      CO2 24.0 mmol/L      Calcium 8.1 mg/dL      eGFR Non African Amer 44 mL/min/1.73      BUN/Creatinine Ratio 12.8     Anion Gap 7.0 mmol/L     Narrative:       The MDRD GFR formula is only valid for adults with stable renal function between ages 18 and 70.    CBC (No Diff) [[id number]]  (Abnormal) Collected:  02/28/19 1252    Specimen:  Blood Updated:  02/28/19 1308     WBC 8.49 10*3/mm3      RBC 3.32 10*6/mm3      Hemoglobin 10.4 g/dL      Hematocrit 31.0 %      MCV 93.4 fL      MCH 31.3 pg      MCHC 33.5 g/dL      RDW 12.6 %      RDW-SD 43.3 fl      MPV 8.9 fL      Platelets 226 10*3/mm3     CBC & Differential [[id number]] Collected:  02/28/19 0743    Specimen:  Blood Updated:  02/28/19 0843    Narrative:       The following orders were created for panel order CBC & Differential.  Procedure                               Abnormality         Status                     ---------                               -----------         ------                     Manual Differential[[id number]]                              Final result               CBC Auto Differential[[id number]]        Abnormal            Final result                 Please view results for these tests on the individual orders.    Manual Differential [[id number]] Collected:  02/28/19 0743    Specimen:  Blood Updated:  02/28/19 0843     Neutrophil % 69.0 %      Lymphocyte % 22.0 %      Monocyte % 6.0 %      Bands %  1.0 %      Atypical Lymphocyte % 2.0 %      Neutrophils Absolute 5.23 10*3/mm3      Lymphocytes Absolute 1.64 10*3/mm3      Monocytes Absolute 0.45 10*3/mm3      Anisocytosis Slight/1+     WBC Morphology Normal     Platelet Estimate Adequate    Basic Metabolic Panel [[id number]]  (Abnormal) Collected:  02/28/19 0743    Specimen:  Blood Updated:  02/28/19 0809     Glucose 95 mg/dL      BUN 11 mg/dL      Creatinine 1.02 mg/dL      Sodium 125 mmol/L      Potassium 4.8 mmol/L      Chloride 98 mmol/L       CO2 23.0 mmol/L      Calcium 8.6 mg/dL      eGFR Non African Amer 51 mL/min/1.73      BUN/Creatinine Ratio 10.8     Anion Gap 4.0 mmol/L     Narrative:       The MDRD GFR formula is only valid for adults with stable renal function between ages 18 and 70.    CBC Auto Differential [[id number]]  (Abnormal) Collected:  02/28/19 0743    Specimen:  Blood Updated:  02/28/19 0759     WBC 7.47 10*3/mm3      RBC 3.29 10*6/mm3      Hemoglobin 10.2 g/dL      Hematocrit 30.1 %      MCV 91.5 fL      MCH 31.0 pg      MCHC 33.9 g/dL      RDW 12.6 %      RDW-SD 42.2 fl      MPV 9.4 fL      Platelets 242 10*3/mm3     CRE Screen by PCR - Swab, Large Intestine, Rectum [[id number]] Collected:  02/27/19 1639    Specimen:  Swab from Large Intestine, Rectum Updated:  02/27/19 1750     CRE SCREEN Not Detected     Comment: Test performed by real-time polymerase chain reaction (qPCR).        OXA 48 Strain Not Detected     IMP STRAIN Not Detected     VIM STRAIN Not Detected     NDM Strain Not Detected     KPC Strain Not Detected    [place] Draw [[id number]] Collected:  02/27/19 0822    Specimen:  Blood Updated:  02/27/19 1627    Narrative:       The following orders were created for panel order [place] Draw.  Procedure                               Abnormality         Status                     ---------                               -----------         ------                     Light Blue Top[[id number]]                                   Final result               Green Top (Gel)[[id number]]                                                             Lavender Top[[id number]]                                     Final result               Gold Top - SST[[id number]]                                   Final result                 Please view results for these tests on the individual orders.    Lactic Acid, Reflex [[id number]]  (Normal) Collected:  02/27/19 1445    Specimen:  Blood Updated:  02/27/19 1504     Lactate 1.0 mmol/L     Lactic Acid, Reflex  Timer (This will reflex a repeat order 3-3:15 hours after ordered.) [[id number]] Collected:  02/27/19 1033    Specimen:  Blood Updated:  02/27/19 1345     Extra Tube Hold for add-ons.     Comment: Auto resulted.       Urinalysis With Culture If Indicated - Urine, Clean Catch [[id number]]  (Normal) Collected:  02/27/19 0938    Specimen:  Urine, Clean Catch Updated:  02/27/19 1053     Color, UA Yellow     Appearance, UA Clear     pH, UA 5.5     Specific Gravity, UA 1.014     Glucose, UA Negative     Ketones, UA Negative     Bilirubin, UA Negative     Blood, UA Negative     Protein, UA Negative     Leuk Esterase, UA Negative     Nitrite, UA Negative     Urobilinogen, UA 0.2 E.U./dL    Narrative:       Urine microscopic not indicated.    Extra Tubes [[id number]] Collected:  02/27/19 0939    Specimen:  Blood, Venous Line Updated:  02/27/19 1045    Narrative:       The following orders were created for panel order Extra Tubes.  Procedure                               Abnormality         Status                     ---------                               -----------         ------                     Lavender Top[[id number]]                                     Final result                 Please view results for these tests on the individual orders.    Lavender Top [[id number]] Collected:  02/27/19 0939    Specimen:  Blood Updated:  02/27/19 1045     Extra Tube hold for add-on     Comment: Auto resulted       Lactic Acid, Plasma [[id number]]  (Abnormal) Collected:  02/27/19 1033    Specimen:  Blood Updated:  02/27/19 1044     Lactate 2.2 mmol/L     Urine Drug Screen - Urine, Clean Catch [[id number]]  (Normal) Collected:  02/27/19 0939    Specimen:  Urine, Clean Catch Updated:  02/27/19 1040     Amphetamine Screen, Urine Negative     Barbiturates Screen, Urine Negative     Benzodiazepine Screen, Urine Negative     Cocaine Screen, Urine Negative     Methadone Screen, Urine Negative     Opiate Screen Negative     Oxycodone Screen,  Urine Negative     THC, Screen, Urine Negative    Narrative:       Negative Thresholds For Drugs Screened in Urine:     Amphetamines          500 ng/ml  Barbiturates          200 ng/ml  Benzodiazepines       200 ng/ml  Cocaine               150 ng/ml  Methadone             300 ng/mL  Opiates               300 ng/mL  Oxycodone             100 ng/mL  THC                   20 ng/mL    The normal value for all drugs tested is negative. This report includes final unconfirmed screening results.  A positive result by this assay can be, at your request, sent to the Reference Lab for confirmation by gas chromatography. Unconfirmed results must not be used for non-medical purposes, such as employment or legal testing. Clinical consideration should be applied to any drug of abuse test result, particularly when unconfirmed results are used.    Troponin [[id number]]  (Abnormal) Collected:  02/27/19 0939    Specimen:  Blood Updated:  02/27/19 1005     Troponin I 0.042 ng/mL     Protime-INR [[id number]]  (Normal) Collected:  02/27/19 0939    Specimen:  Blood Updated:  02/27/19 1004     Protime 13.1 Seconds      INR 1.01    Narrative:       Therapeutic range for most indications is 2.0-3.0 INR,  or 2.5-3.5 for mechanical heart valves.    Ethanol [[id number]] Collected:  02/27/19 0939    Specimen:  Blood Updated:  02/27/19 0956     Ethanol <10 mg/dL      Ethanol % <0.010 %     Comprehensive Metabolic Panel [[id number]]  (Abnormal) Collected:  02/27/19 0939    Specimen:  Blood Updated:  02/27/19 0954     Glucose 146 mg/dL      BUN 9 mg/dL      Creatinine 1.04 mg/dL      Sodium 127 mmol/L      Potassium 4.4 mmol/L      Chloride 94 mmol/L      CO2 25.0 mmol/L      Calcium 8.8 mg/dL      Total Protein 6.3 g/dL      Albumin 3.50 g/dL      ALT (SGPT) 28 U/L      AST (SGOT) 26 U/L      Alkaline Phosphatase 93 U/L      Total Bilirubin 0.4 mg/dL      eGFR Non African Amer 50 mL/min/1.73      Globulin 2.8 gm/dL      A/G Ratio 1.3 g/dL       BUN/Creatinine Ratio 8.7     Anion Gap 8.0 mmol/L     Narrative:       The MDRD GFR formula is only valid for adults with stable renal function between ages 18 and 70.    Light Blue Top [[id number]] Collected:  02/27/19 0822    Specimen:  Blood Updated:  02/27/19 0930     Extra Tube hold for add-on     Comment: Auto resulted       Lavender Top [[id number]] Collected:  02/27/19 0822    Specimen:  Blood Updated:  02/27/19 0930     Extra Tube hold for add-on     Comment: Auto resulted       Gold Top - SST [[id number]] Collected:  02/27/19 0822    Specimen:  Blood Updated:  02/27/19 0930     Extra Tube Hold for add-ons.     Comment: Auto resulted.       Blood Gas, Arterial [[id number]]  (Abnormal) Collected:  02/27/19 0845    Specimen:  Arterial Blood Updated:  02/27/19 0905     Site Arterial Line     Johnny's Test N/A     pH, Arterial 7.402 pH units      pCO2, Arterial 37.1 mm Hg      pO2, Arterial 137.0 mm Hg      Comment: 83 Value above reference range        HCO3, Arterial 23.1 mmol/L      Base Excess, Arterial -1.4 mmol/L      Comment: 84 Value below reference range        O2 Saturation, Arterial 99.6 %      Comment: 83 Value above reference range        Barometric Pressure for Blood Gas 751 mmHg      Modality Room Air     Ventilator Mode NA     Collected by D.E     Comment: Meter: [id number]     :  [id number]       CBC & Differential [[id number]] Collected:  02/27/19 0822    Specimen:  Blood Updated:  02/27/19 0842    Narrative:       The following orders were created for panel order CBC & Differential.  Procedure                               Abnormality         Status                     ---------                               -----------         ------                     CBC Auto Differential[[id number]]        Abnormal            Final result                 Please view results for these tests on the individual orders.    CBC Auto Differential [[id number]]  (Abnormal) Collected:  02/27/19 0822     Specimen:  Blood Updated:  02/27/19 0842     WBC 12.25 10*3/mm3      RBC 3.64 10*6/mm3      Hemoglobin 11.5 g/dL      Hematocrit 32.8 %      MCV 90.1 fL      MCH 31.6 pg      MCHC 35.1 g/dL      RDW 12.4 %      RDW-SD 40.8 fl      MPV 8.7 fL      Platelets 271 10*3/mm3      Neutrophil % 90.5 %      Lymphocyte % 5.9 %      Monocyte % 3.2 %      Eosinophil % 0.0 %      Basophil % 0.2 %      Immature Grans % 0.2 %      Neutrophils, Absolute 11.08 10*3/mm3      Lymphocytes, Absolute 0.72 10*3/mm3      Monocytes, Absolute 0.39 10*3/mm3      Eosinophils, Absolute 0.00 10*3/mm3      Basophils, Absolute 0.03 10*3/mm3      Immature Grans, Absolute 0.03 10*3/mm3      nRBC 0.0 /100 WBC          HOSPITAL COURSE:  Patient was sent to ED from nursing home due to decreased responsiveness/catatonia.  CT head and MRI of the brain both showed no acute intracranial abnormality.  Patient was found to have hyponatremia down to 127 on admission.  Catatonia improved on day 2 of admission, for hyponatremia patient was given IVF normal saline and salt tablets 1 g TID.  This did not improve hyponatremia and the sodium dropped down to 123.  FeNa calculated to be 1.1%.  Dr. Perkins was consulted and 2 doses of samsca were given.  Sodium stabilized at 129 and patient was cleared for discharge by Dr. Perkins with 2 g TID salt tablets with close follow up in 1 week at his clinic with BMP.  BMP to be collected at nursing home weekly x 1 month then bimonthly per his recommendations.  Plan of care with discussed with daughter, she agreed.  Patient was discharged back to nursing home where she will continue to be follow by family medicine residency.      DISCHARGE CONDITION:   Stable.      DISPOSITION:  Skilled Nursing Facility (DC - External)    DISCHARGE MEDICATIONS     Discharge Medications      Changes to Medications      Instructions Start Date   sodium chloride 1 g tablet  What changed:  how much to take   2 g, Oral, 3 Times Daily With Meals          Continue These Medications      Instructions Start Date   acetaminophen 325 MG tablet  Commonly known as:  TYLENOL   650 mg, Oral, Every 4 Hours PRN      albuterol (2.5 MG/3ML) 0.083% nebulizer solution  Commonly known as:  PROVENTIL   2.5 mg, Nebulization, Every 4 Hours PRN      amLODIPine 5 MG tablet  Commonly known as:  NORVASC   5 mg, Oral, Every 24 Hours Scheduled      ferrous sulfate 324 (65 Fe) MG tablet delayed-release EC tablet   324 mg, Oral, Daily With Breakfast      melatonin 5 MG tablet tablet   5 mg, Oral, Nightly      polyethylene glycol pack packet  Commonly known as:  MIRALAX   Take up to three times daily as needed for severe constipation      sennosides-docusate sodium 8.6-50 MG tablet  Commonly known as:  SENOKOT-S   1 tablet, Oral, Daily PRN      vitamin D 89168 units capsule capsule  Commonly known as:  ERGOCALCIFEROL   50,000 Units, Oral, Weekly             INSTRUCTIONS:  Activity:   Activity Instructions     Activity as Tolerated          Diet:   Diet Instructions     Diet: Regular      Discharge Diet:  Regular        Special instructions: Patient instructed to call MD or return to ED with worsening shortness of breath, chest pain, fever greater than 100.4 degrees F or any other medical concerns..    FOLLOW UP:   Additional Instructions for the Follow-ups that You Need to Schedule     Call MD With Problems / Concerns   As directed      Instructions: If sodium < 130    Order Comments:  Instructions: If sodium < 130          Discharge Follow-up with Specified Provider: Dr. Perkins; 1 Week   As directed      To:  Dr. Perkins    Follow Up:  1 Week    Follow Up Details:  check BMP prior to visit         Basic metabolic panel    Mar 06, 2019 (Approximate)      Nursing home to collect    Order Comments:  Nursing home to collect            Follow-up Information     Ash Cruz APRN Follow up in 1 week(s).    Specialty:  Nephrology  Why:  office will call you with appointment date and  time  Contact information:  [address] [facility] [address]  [phone]             Chuckie Nguyen MD Follow up.    Specialty:  Family Medicine  Why:  office will call you with appointment date and time  Contact information:  [address]   [place] [address]  [phone]                   PENDING TEST RESULTS AT DISCHARGE      Time: Discharge 35 min    Dr. Mandujano is the attending at time of discharge, He is aware of the patient's status and agrees with the above discharge summary.          This document has been electronically signed by Autumn Panda MD on March 3, 2019 11:43 AM

## 2019-03-03 NOTE — THERAPY DISCHARGE NOTE
Acute Care - Physical Therapy Initial Eval/Discharge  [facility]     Patient Name: Marlene Horne  : 1931  MRN: [id number]  Today's Date: 3/3/2019   Onset of Illness/Injury or Date of Surgery: 19  Date of Referral to PT: 19  Referring Physician: Dr Panda      Admit Date: 2019    Visit Dx:    ICD-10-CM ICD-9-CM   1. Altered mental status, unspecified altered mental status type R41.82 780.97   2. Elevated troponin I level R74.8 790.6   3. Hyponatremia E87.1 276.1     Patient Active Problem List   Diagnosis   • Encephalopathy acute   • Major neurocognitive disorder   • Iron deficiency anemia secondary to inadequate dietary iron intake   • Hyponatremia   • Stage 3 chronic kidney disease (CMS/HCC)   • Hypovitaminosis D   • Other constipation   • Thyroid mass   • Acute anemia   • Hypokalemia     Past Medical History:   Diagnosis Date   • CKD (chronic kidney disease) stage 3, GFR 30-59 ml/min (CMS/HCC)    • Constipation    • Dementia    • Hyponatremia    • Hypovitaminosis D    • Iron deficiency anemia    • Major neurocognitive disorder    • Stroke (CMS/HCC)      Past Surgical History:   Procedure Laterality Date   • CATARACT EXTRACTION            PT ASSESSMENT (last 12 hours)      Physical Therapy Evaluation    No documentation.             PT Recommendation and Plan  Anticipated Discharge Disposition (PT): skilled nursing facility  Outcome Summary/Treatment Plan (PT)  Anticipated Discharge Disposition (PT): skilled nursing facility    Outcome Measures     Row Name 19 1426             How much help from another person do you currently need...    Turning from your back to your side while in flat bed without using bedrails?  4  -CB      Moving from lying on back to sitting on the side of a flat bed without bedrails?  4  -CB      Moving to and from a bed to a chair (including a wheelchair)?  3  -CB      Standing up from a chair using your arms (e.g., wheelchair, bedside chair)?  3   -CB      Climbing 3-5 steps with a railing?  3  -CB      To walk in hospital room?  3  -CB      AM-PAC 6 Clicks Score  20  -CB         Functional Assessment    Outcome Measure Options  AM-PAC 6 Clicks Basic Mobility (PT)  -CB        User Key  (r) = Recorded By, (t) = Taken By, (c) = Cosigned By    Initials Name Provider Type    CB Sarah Jaramillo, PT Physical Therapist           Time Calculation:     Therapy Suggested Charges     Code   Minutes Charges    None               PT G-Codes  Outcome Measure Options: AM-PAC 6 Clicks Basic Mobility (PT)  AM-PAC 6 Clicks Score: 20    PT Discharge Summary  Anticipated Discharge Disposition (PT): skilled nursing facility  Reason for Discharge: Discharge from facility, Per MD order  Outcomes Achieved: Refer to plan of care for updates on goals achieved  Discharge Destination: SNF    Keeley Thakkar, PT  3/3/2019

## 2019-03-03 NOTE — DISCHARGE PLACEMENT REQUEST
"Pam Horne (87 y.o. Female)     Date of Birth Social Security Number Address Home Phone MRN    11/28/1931  [address] [facility] [address] [phone] [id number]    [employer] Marital Status          [place]        Admission Date Admission Type Admitting Provider Attending Provider Department, Room/Bed    2/27/19 Emergency Philip Batista MD O'Hearn, William S III, MD [address], [address]/1    Discharge Date Discharge Disposition Discharge Destination         Skilled Nursing Facility (DC - External)              Attending Provider:  Srinivasa Chairez III, MD    Allergies:  No Known Allergies    Isolation:  None   Infection:  None   Code Status:  No CPR    Ht:  167.6 cm (66\")   Wt:  61.3 kg (135 lb 3.2 oz)    Admission Cmt:  None   Principal Problem:  Hyponatremia [E87.1] More...                 Active Insurance as of 2/27/2019     Primary Coverage     Payor Plan Insurance Group Employer/Plan Group    MEDICARE MEDICARE A & B      Payor Plan Address Payor Plan Phone Number Payor Plan Fax Number Effective Dates    [address] [phone]  11/1/1996 - None Entered    [facility] [address]       Subscriber Name Subscriber Birth Date Member ID       PAM HORNE 11/28/1931 [id number]           Secondary Coverage     Payor Plan Insurance Group Employer/Plan Group    [place]      Payor Plan Address Payor Plan Phone Number Payor Plan Fax Number Effective Dates    [address] [phone]  9/12/2018 - None Entered    [place] [address]       Subscriber Name Subscriber Birth Date Member ID       PAM HORNE 11/28/1931 [id number]                 Emergency Contacts      (Rel.) Home Phone Work Phone Mobile Phone    Maureen Hernandez (Power of ) [phone] -- [phone]    simentalnicole (Son) -- -- [phone]            Insurance Information                MEDICARE/MEDICARE A & B Phone: [phone]    Subscriber: " Marlene Horne Subscriber#: [id number]    Group#:  Precert#:         [place] Phone: [phone]    Subscriber: Marlene Horne Subscriber#: [id number]    Group#:  Precert#:              History & Physical      Lino Lopez MD at 2019  2:35 PM     Attestation signed by Philip Batista MD at 2019  9:32 PM    I have seen and evaluated the patient along with the resident .  I have discussed the case with the resident. I have reviewed the notes, assessment and plan, and/or procedures performed by the resident. I concur with the resident’s documentation with my addendum     Philip Batista MD  2019  9:32 PM                  FAMILY MEDICINE DAILY PROGRESS NOTE  NAME: Marlene Horne  : 1931  MRN: [id number]     LOS: 0 days     PROVIDER OF SERVICE: Lino Lopez MD    Chief Complaint: Catatonia    Subjective:     Interval History: History taken from chart, ER physician, daughter  Marlene Horne is an 87-year-old lady with history of dementia, chronic kidney disease, who presented to the ER because of decreased responsiveness.  She has a baseline dementia, however today at the nursing home she was not responding at all and had some foam coming from the mouth.  She came to ER where she continued to be minimally responsive to staff.  She is lying in bed with eyes closed mouth clenched shut and arms folded across the chest.  Lab work revealing lactic acidosis to 2.2, mild hyponatremia at 127.  CT and MRI with no acute changes of the brain.      Review of Systems:   Review of Systems   Unable to perform ROS: Patient unresponsive       Objective:     Vital Signs  Temp:  [98.2 °F (36.8 °C)] 98.2 °F (36.8 °C)  Heart Rate:  [] 88  Resp:  [16-20] 18  BP: (118-155)/(54-95) 144/62    Physical Exam  Physical Exam   Constitutional: She appears well-developed and well-nourished.   HENT:   Head: Normocephalic and atraumatic.   Eyes:   Pupils  pinpoint, unreactive, patient trying hard to close lids   Neck: Neck supple.   Cardiovascular: Normal rate, regular rhythm and normal heart sounds.   Pulmonary/Chest: Effort normal and breath sounds normal.   Abdominal: Soft. Bowel sounds are normal. She exhibits no distension.   Neurological:   Not alert, unable to obtain orientation  Arms folded across chest, resisting movement, however when not being examined, patient would occasionally move her arms to scratch her head  Patellar reflexes 2+  Babinski equivocal   Skin: Skin is warm and dry.   Psychiatric:   Unable to assess   Nursing note and vitals reviewed.      Medication Review    Current Facility-Administered Medications:   •  LORazepam (ATIVAN) injection 1 mg, 1 mg, Intravenous, Once, Philip Batista MD  •  sodium chloride 0.9 % flush 10 mL, 10 mL, Intravenous, PRN, Albino Mahan MD  •  sodium chloride 0.9 % infusion, 125 mL/hr, Intravenous, Continuous, Albino Mahan MD, Last Rate: 125 mL/hr at 02/27/19 0930, 125 mL/hr at 02/27/19 0930    Current Outpatient Medications:   •  acetaminophen (TYLENOL) 325 MG tablet, Take 2 tablets by mouth Every 4 (Four) Hours As Needed for Mild Pain ., Disp: , Rfl:   •  albuterol (PROVENTIL) (2.5 MG/3ML) 0.083% nebulizer solution, Take 2.5 mg by nebulization Every 4 (Four) Hours As Needed for Shortness of Air., Disp: , Rfl: 12  •  amLODIPine (NORVASC) 5 MG tablet, Take 1 tablet by mouth Daily., Disp: , Rfl:   •  ferrous sulfate 324 (65 Fe) MG tablet delayed-release EC tablet, Take 1 tablet by mouth Daily With Breakfast., Disp: 30 tablet, Rfl:   •  melatonin 5 MG tablet tablet, Take 5 mg by mouth Every Night., Disp: , Rfl:   •  polyethylene glycol (MIRALAX) pack packet, Take up to three times daily as needed for severe constipation, Disp: , Rfl:   •  sennosides-docusate sodium (SENOKOT-S) 8.6-50 MG tablet, Take 1 tablet by mouth Daily As Needed for Constipation., Disp: 30 tablet, Rfl: 0  •  sodium chloride 1 g tablet,  Take 1 tablet by mouth 3 (Three) Times a Day With Meals., Disp: , Rfl:   •  vitamin D (ERGOCALCIFEROL) 18987 units capsule capsule, Take 1 capsule by mouth 1 (One) Time Per Week for 12 doses., Disp: 30 capsule, Rfl: 0     Diagnostic Data    Lab Results (last 24 hours)     Procedure Component Value Units Date/Time    Lactic Acid, Reflex Timer (This will reflex a repeat order 3-3:15 hours after ordered.) [[id number]] Collected:  02/27/19 1033    Specimen:  Blood Updated:  02/27/19 1345     Extra Tube Hold for add-ons.     Comment: Auto resulted.       Urinalysis With Culture If Indicated - Urine, Clean Catch [[id number]]  (Normal) Collected:  02/27/19 0938    Specimen:  Urine, Clean Catch Updated:  02/27/19 1053     Color, UA Yellow     Appearance, UA Clear     pH, UA 5.5     Specific Gravity, UA 1.014     Glucose, UA Negative     Ketones, UA Negative     Bilirubin, UA Negative     Blood, UA Negative     Protein, UA Negative     Leuk Esterase, UA Negative     Nitrite, UA Negative     Urobilinogen, UA 0.2 E.U./dL    Narrative:       Urine microscopic not indicated.    Extra Tubes [[id number]] Collected:  02/27/19 0939    Specimen:  Blood, Venous Line Updated:  02/27/19 1045    Narrative:       The following orders were created for panel order Extra Tubes.  Procedure                               Abnormality         Status                     ---------                               -----------         ------                     Lavender Top[[id number]]                                     Final result                 Please view results for these tests on the individual orders.    Lavender Top [[id number]] Collected:  02/27/19 0939    Specimen:  Blood Updated:  02/27/19 1045     Extra Tube hold for add-on     Comment: Auto resulted       Lactic Acid, Plasma [[id number]]  (Abnormal) Collected:  02/27/19 1033    Specimen:  Blood Updated:  02/27/19 1044     Lactate 2.2 mmol/L     Urine Drug Screen - Urine, Clean Catch [[id number]]   (Normal) Collected:  02/27/19 0939    Specimen:  Urine, Clean Catch Updated:  02/27/19 1040     Amphetamine Screen, Urine Negative     Barbiturates Screen, Urine Negative     Benzodiazepine Screen, Urine Negative     Cocaine Screen, Urine Negative     Methadone Screen, Urine Negative     Opiate Screen Negative     Oxycodone Screen, Urine Negative     THC, Screen, Urine Negative    Narrative:       Negative Thresholds For Drugs Screened in Urine:     Amphetamines          500 ng/ml  Barbiturates          200 ng/ml  Benzodiazepines       200 ng/ml  Cocaine               150 ng/ml  Methadone             300 ng/mL  Opiates               300 ng/mL  Oxycodone             100 ng/mL  THC                   20 ng/mL    The normal value for all drugs tested is negative. This report includes final unconfirmed screening results.  A positive result by this assay can be, at your request, sent to the Reference Lab for confirmation by gas chromatography. Unconfirmed results must not be used for non-medical purposes, such as employment or legal testing. Clinical consideration should be applied to any drug of abuse test result, particularly when unconfirmed results are used.    Troponin [[id number]]  (Abnormal) Collected:  02/27/19 0939    Specimen:  Blood Updated:  02/27/19 1005     Troponin I 0.042 ng/mL     Protime-INR [[id number]]  (Normal) Collected:  02/27/19 0939    Specimen:  Blood Updated:  02/27/19 1004     Protime 13.1 Seconds      INR 1.01    Narrative:       Therapeutic range for most indications is 2.0-3.0 INR,  or 2.5-3.5 for mechanical heart valves.    Ethanol [[id number]] Collected:  02/27/19 0939    Specimen:  Blood Updated:  02/27/19 0956     Ethanol <10 mg/dL      Ethanol % <0.010 %     Comprehensive Metabolic Panel [[id number]]  (Abnormal) Collected:  02/27/19 0939    Specimen:  Blood Updated:  02/27/19 0954     Glucose 146 mg/dL      BUN 9 mg/dL      Creatinine 1.04 mg/dL      Sodium 127 mmol/L      Potassium 4.4  mmol/L      Chloride 94 mmol/L      CO2 25.0 mmol/L      Calcium 8.8 mg/dL      Total Protein 6.3 g/dL      Albumin 3.50 g/dL      ALT (SGPT) 28 U/L      AST (SGOT) 26 U/L      Alkaline Phosphatase 93 U/L      Total Bilirubin 0.4 mg/dL      eGFR Non African Amer 50 mL/min/1.73      Globulin 2.8 gm/dL      A/G Ratio 1.3 g/dL      BUN/Creatinine Ratio 8.7     Anion Gap 8.0 mmol/L     Narrative:       The MDRD GFR formula is only valid for adults with stable renal function between ages 18 and 70.    [place] Draw [[id number]] Collected:  02/27/19 0822    Specimen:  Blood Updated:  02/27/19 0930    Narrative:       The following orders were created for panel order [place] Draw.  Procedure                               Abnormality         Status                     ---------                               -----------         ------                     Light Blue Top[[id number]]                                   Final result               Green Top (Gel)[[id number]]                                                             Lavender Top[[id number]]                                     Final result               Gold Top - SST[[id number]]                                   Final result                 Please view results for these tests on the individual orders.    Light Blue Top [[id number]] Collected:  02/27/19 0822    Specimen:  Blood Updated:  02/27/19 0930     Extra Tube hold for add-on     Comment: Auto resulted       Lavender Top [[id number]] Collected:  02/27/19 0822    Specimen:  Blood Updated:  02/27/19 0930     Extra Tube hold for add-on     Comment: Auto resulted       Gold Top - SST [[id number]] Collected:  02/27/19 0822    Specimen:  Blood Updated:  02/27/19 0930     Extra Tube Hold for add-ons.     Comment: Auto resulted.       Blood Gas, Arterial [[id number]]  (Abnormal) Collected:  02/27/19 0845    Specimen:  Arterial Blood Updated:  02/27/19 0905     Site Arterial Line     Johnny's Test N/A     pH, Arterial 7.402 pH  units      pCO2, Arterial 37.1 mm Hg      pO2, Arterial 137.0 mm Hg      Comment: 83 Value above reference range        HCO3, Arterial 23.1 mmol/L      Base Excess, Arterial -1.4 mmol/L      Comment: 84 Value below reference range        O2 Saturation, Arterial 99.6 %      Comment: 83 Value above reference range        Barometric Pressure for Blood Gas 751 mmHg      Modality Room Air     Ventilator Mode NA     Collected by D.E     Comment: Meter: [id number]     :  [id number]       Green Top (Gel) [[id number]] Updated:  02/27/19 0847    Specimen:  Blood     CBC & Differential [[id number]] Collected:  02/27/19 0822    Specimen:  Blood Updated:  02/27/19 0842    Narrative:       The following orders were created for panel order CBC & Differential.  Procedure                               Abnormality         Status                     ---------                               -----------         ------                     CBC Auto Differential[[id number]]        Abnormal            Final result                 Please view results for these tests on the individual orders.    CBC Auto Differential [[id number]]  (Abnormal) Collected:  02/27/19 0822    Specimen:  Blood Updated:  02/27/19 0842     WBC 12.25 10*3/mm3      RBC 3.64 10*6/mm3      Hemoglobin 11.5 g/dL      Hematocrit 32.8 %      MCV 90.1 fL      MCH 31.6 pg      MCHC 35.1 g/dL      RDW 12.4 %      RDW-SD 40.8 fl      MPV 8.7 fL      Platelets 271 10*3/mm3      Neutrophil % 90.5 %      Lymphocyte % 5.9 %      Monocyte % 3.2 %      Eosinophil % 0.0 %      Basophil % 0.2 %      Immature Grans % 0.2 %      Neutrophils, Absolute 11.08 10*3/mm3      Lymphocytes, Absolute 0.72 10*3/mm3      Monocytes, Absolute 0.39 10*3/mm3      Eosinophils, Absolute 0.00 10*3/mm3      Basophils, Absolute 0.03 10*3/mm3      Immature Grans, Absolute 0.03 10*3/mm3      nRBC 0.0 /100 WBC             I reviewed the patient's new clinical results.    Assessment/Plan:     Active  Hospital Problems    Diagnosis Date Noted   • **Catatonia [F06.1] 02/27/2019     Just discussed with patient's daughter, POA, and the emergency room.  Patient was currently comfort care only.  However they had never discussed hospice.  We discussed options of returning to the nursing home with hospice versus admitting and having hospice consult here.  We would keep her comfortable in the meantime.  Daughter would like to stay in the hospital be made comfortable, and consult hospice here.  Ativan as needed  Scopolamine patch  Morphine as needed     • Iron deficiency anemia secondary to inadequate dietary iron intake [D50.8] 01/07/2019   • Hyponatremia [E87.1] 01/07/2019   • Stage 3 chronic kidney disease (CMS/HCC) [N18.3] 01/07/2019   • Major neurocognitive disorder [F01.50] 01/07/2019         DVT prophylaxis: no  Code status is   DNR/DNI  Comfort care    Plan for disposition:nursing home vs home with hospice          This document has been electronically signed by Lino Lopez MD on February 27, 2019 2:36 PM       Electronically signed by Philip Batista MD at 2/27/2019  9:32 PM       Hospital Medications (active)       Dose Frequency Start End    acetaminophen (TYLENOL) tablet 325 mg 325 mg Every 6 Hours PRN 2/28/2019     Sig - Route: Take 1 tablet by mouth Every 6 (Six) Hours As Needed for Mild Pain . - Oral    Cosign for Ordering: Accepted by Lino Lopez MD on 3/1/2019  6:35 PM    amLODIPine (NORVASC) tablet 5 mg 5 mg Every 24 Hours Scheduled 2/28/2019     Sig - Route: Take 1 tablet by mouth Daily. - Oral    Cosign for Ordering: Accepted by Lino Lopez MD on 3/1/2019  6:35 PM    ferrous sulfate EC tablet 324 mg 324 mg Daily With Breakfast 2/28/2019     Sig - Route: Take 1 tablet by mouth Daily With Breakfast. - Oral    Cosign for Ordering: Accepted by Lino Lopez MD on 3/1/2019  6:35 PM    haloperidol lactate (HALDOL) injection 0.5 mg 0.5 mg Every 2 Hours PRN 3/1/2019      Sig - Route: Infuse 0.1 mL into a venous catheter Every 2 (Two) Hours As Needed for Agitation. - Intravenous    melatonin tablet 5.25 mg 5.25 mg Nightly 2/28/2019     Sig - Route: Take 1.75 tablets by mouth Every Night. - Oral    Cosign for Ordering: Accepted by Lino Lopez MD on 3/1/2019  6:35 PM    ondansetron (ZOFRAN) injection 4 mg 4 mg Every 6 Hours PRN 2/27/2019     Sig - Route: Infuse 2 mL into a venous catheter Every 6 (Six) Hours As Needed for Nausea or Vomiting. - Intravenous    sennosides-docusate sodium (SENOKOT-S) 8.6-50 MG tablet 2 tablet 2 tablet Nightly 2/28/2019     Sig - Route: Take 2 tablets by mouth Every Night. - Oral    Cosign for Ordering: Accepted by Lino Lopez MD on 3/1/2019  6:35 PM    sodium chloride 0.9 % flush 3 mL 3 mL Every 12 Hours Scheduled 2/27/2019     Sig - Route: Infuse 3 mL into a venous catheter Every 12 (Twelve) Hours. - Intravenous    sodium chloride 0.9 % flush 3-10 mL 3-10 mL As Needed 2/27/2019     Sig - Route: Infuse 3-10 mL into a venous catheter As Needed for Line Care. - Intravenous    sodium chloride tablet 2 g 2 g 3 Times Daily With Meals 3/2/2019     Sig - Route: Take 2 tablets by mouth 3 (Three) Times a Day With Meals. - Oral            Lab Results (last 24 hours)     Procedure Component Value Units Date/Time    Basic Metabolic Panel [[id number]]  (Abnormal) Collected:  03/03/19 1024    Specimen:  Blood Updated:  03/03/19 1108     Glucose 104 mg/dL      BUN 11 mg/dL      Creatinine 1.04 mg/dL      Sodium 129 mmol/L      Potassium 3.5 mmol/L      Chloride 99 mmol/L      CO2 26.0 mmol/L      Calcium 8.2 mg/dL      eGFR Non African Amer 50 mL/min/1.73      BUN/Creatinine Ratio 10.6     Anion Gap 4.0 mmol/L     Narrative:       The MDRD GFR formula is only valid for adults with stable renal function between ages 18 and 70.    CBC (No Diff) [[id number]]  (Abnormal) Collected:  03/03/19 0606    Specimen:  Blood Updated:  03/03/19 0620     WBC  "4.85 10*3/mm3      RBC 3.14 10*6/mm3      Hemoglobin 9.8 g/dL      Hematocrit 27.7 %      MCV 88.2 fL      MCH 31.2 pg      MCHC 35.4 g/dL      RDW 12.4 %      RDW-SD 40.3 fl      MPV 8.8 fL      Platelets 241 10*3/mm3     Sodium [[id number]]  (Abnormal) Collected:  19 170    Specimen:  Blood Updated:  19     Sodium 129 mmol/L            Physician Progress Notes (last 24 hours) (Notes from 3/2/2019 12:19 PM through 3/3/2019 12:19 PM)      Sagar Perkins MD at 3/3/2019 10:15 AM          [facility]  [address] [place] [address]. [address]  T - [phone]  F - [phone]     Progress Note          PATIENT  DEMOGRAPHICS   PATIENT NAME: Marlene Horne                      PHYSICIAN: Sagar Perkins MD  : 1931  MRN: [id number]   LOS: 2 days    Patient Care Team:  Chuckie Nguyen MD as PCP - General (Family Medicine)  Srinivasa Chairez III, MD as Resident (Family Medicine)  Charlie Herr MD as Resident (Family Medicine)  Riley Conde MD as Resident (Family Medicine)  Margot Perez MD (Family Medicine)  Mike Junior MD as Resident (Family Medicine)  Subjective   SUBJECTIVE   Doing well appetite fair         Objective   OBJECTIVE   Vital Signs  Temp:  [97.7 °F (36.5 °C)-99.3 °F (37.4 °C)] 99.3 °F (37.4 °C)  Heart Rate:  [78-91] 88  Resp:  [16-18] 18  BP: (132-154)/(63-79) 136/65    Flowsheet Rows      First Filed Value   Admission Height  167.6 cm (66\") Documented at 2019 08   Admission Weight  63.1 kg (139 lb 1.6 oz) Documented at 2019 0817           I/O last 3 completed shifts:  In: 360 [P.O.:360]  Out: -     PHYSICAL EXAM    Physical Exam   Constitutional: She is oriented to person, place, and time. She appears well-developed.   HENT:   Head: Normocephalic.   Eyes: Pupils are equal, round, and reactive to light.   Cardiovascular: Normal rate, regular rhythm and normal heart sounds.   Pulmonary/Chest: Effort " normal and breath sounds normal.   Abdominal: Soft. Bowel sounds are normal.   Musculoskeletal: She exhibits no edema.   Neurological: She is alert and oriented to person, place, and time.       RESULTS   Results Review:    Results from last 7 days   Lab Units 03/02/19  1707 03/02/19 0640 03/01/19 1957 03/01/19  1302 03/01/19 0625  02/27/19  0939   SODIUM mmol/L 129* 130* 126* 126* 123*   < > 127*   POTASSIUM mmol/L  --  3.3*  --  3.5 3.8   < > 4.4   CHLORIDE mmol/L  --  102  --  97 95   < > 94*   CO2 mmol/L  --  21.0*  --  23.0 23.0   < > 25.0   BUN mg/dL  --  10  --  12 14   < > 9   CREATININE mg/dL  --  0.91  --  0.89 0.99   < > 1.04*   CALCIUM mg/dL  --  8.6  --  8.0* 8.4   < > 8.8   BILIRUBIN mg/dL  --   --   --   --   --   --  0.4   ALK PHOS U/L  --   --   --   --   --   --  93   ALT (SGPT) U/L  --   --   --   --   --   --  28   AST (SGOT) U/L  --   --   --   --   --   --  26   GLUCOSE mg/dL  --  105*  --  97 100   < > 146*    < > = values in this interval not displayed.       Estimated Creatinine Clearance: 42.1 mL/min (by C-G formula based on SCr of 0.91 mg/dL).          Results from last 7 days   Lab Units 03/02/19  0640   URIC ACID mg/dL 4.0       Results from last 7 days   Lab Units 03/03/19  0606 03/02/19  0640 03/01/19  1303 03/01/19  0625 02/28/19  1252   WBC 10*3/mm3 4.85 5.52 6.44 7.11 8.49   HEMOGLOBIN g/dL 9.8* 10.3* 9.7* 9.4* 10.4*   PLATELETS 10*3/mm3 241 239 227 223 226       Results from last 7 days   Lab Units 02/27/19  0939   INR  1.01         Imaging Results (last 24 hours)     ** No results found for the last 24 hours. **           MEDICATIONS      amLODIPine 5 mg Oral Q24H   ferrous sulfate 324 mg Oral Daily With Breakfast   melatonin 5.25 mg Oral Nightly   sennosides-docusate sodium 2 tablet Oral Nightly   sodium chloride 3 mL Intravenous Q12H   sodium chloride 2 g Oral TID With Meals          Assessment/Plan   ASSESSMENT / PLAN      Hyponatremia    Major neurocognitive disorder     Iron deficiency anemia secondary to inadequate dietary iron intake    Stage 3 chronic kidney disease (CMS/HCC)    Acute anemia    Hypokalemia    1.hyponatremia.  Patient urine studies are consistent with low solute intake seen in the older patients.  also background siadh (na drop with NS). She has a similar presentation back in December.  She did not respond with IV fluid and we had to give large dose of salt tablets 2 g 3 times a day. TSH and cortisol are normal at that time.  Her chest x-ray was also negative for any mass.  Na 129 last night todays lab is pending. If >130 she can be discharged on  salt tab 2gm tid. F/u with us in a week     2.catatonia ?.  This is now resolved     3.chronic kidney disease stage III currently stable     4.history of dementia patient is currently at nursing home                This document has been electronically signed by Sagar Perkins MD on March 3, 2019 10:15 AM           Electronically signed by Sagar Perkins MD at 3/3/2019 10:16 AM     Autumn Panda MD at 3/3/2019  8:13 AM     Attestation signed by Anderson Mandujano MD at 3/3/2019 11:40 AM    I have seen and evaluated the patient.  I have discussed the case with the resident. I have reviewed the notes, assessment and plan, and/or procedures performed by the resident. I concur with the resident’s documentation.       No overnight events. Resting well. At baseline for mental status. Sodium remaining stable.     Physical Exam:  General: NAD   CV: S1 and S2 normal. RRR  Pulmonary: Lungs clear to auscultation bilaterally   Abdomen: Bowel sounds present and normal.  Abdomen is soft, and non tender   Extremities: No lower extremity edema.      Plan: Discharge back to [facility] today.       This document has been electronically signed by Anderson Mandujano MD on March 3, 2019 11:40 AM                        FAMILY MEDICINE DAILY PROGRESS NOTE  NAME: Marlene Horne  : 1931  MRN: [id number]     LOS: 2 days      PROVIDER OF SERVICE: Autumn Panda MD    Chief Complaint: Hyponatremia    Subjective:     Interval History:  History taken from: patient chart family Nurse states patient did well over night.    Daughter bedside denies any complaints or problems over night.  Patient continues to be pleasantly confused and denies any complaints either.  Plan of care discussed with daughter bedside.  If sodium stable and Dr. Perkins agrees patient can be sent back to nursing home today.        Review of Systems:   Review of Systems   Constitutional: Negative for activity change, appetite change, chills, diaphoresis and fever.   HENT: Negative for congestion, rhinorrhea, sinus pressure, sinus pain and sore throat.    Eyes: Negative for visual disturbance.   Respiratory: Negative for apnea, cough, choking, chest tightness, shortness of breath and wheezing.    Cardiovascular: Negative for chest pain, palpitations and leg swelling.   Gastrointestinal: Negative for abdominal distention, abdominal pain, blood in stool, constipation, diarrhea, nausea and vomiting.   Genitourinary: Negative for difficulty urinating, dysuria, frequency, hematuria and urgency.   Musculoskeletal: Negative for arthralgias, back pain, joint swelling, myalgias and neck pain.   Skin: Negative for color change, pallor, rash and wound.   Neurological: Negative for dizziness, weakness, numbness and headaches.   Psychiatric/Behavioral: Negative for agitation and behavioral problems.       Objective:     Vital Signs  Temp:  [97.7 °F (36.5 °C)-99.3 °F (37.4 °C)] 99.3 °F (37.4 °C)  Heart Rate:  [78-91] 88  Resp:  [16-18] 18  BP: (132-154)/(63-79) 136/65    Physical Exam  Physical Exam   Constitutional: She appears well-developed and well-nourished. She is sleeping. No distress.   HENT:   Head: Normocephalic and atraumatic.   Right Ear: External ear normal.   Left Ear: External ear normal.   Nose: Nose normal.   Eyes: Conjunctivae and EOM are normal. Pupils are  equal, round, and reactive to light. Right eye exhibits no discharge. Left eye exhibits no discharge. No scleral icterus.   Neck: Normal range of motion. Neck supple. No thyromegaly present.   Cardiovascular: Normal rate, regular rhythm, normal heart sounds and intact distal pulses. Exam reveals no gallop and no friction rub.   No murmur heard.  Pulmonary/Chest: Effort normal and breath sounds normal. No respiratory distress. She has no wheezes. She has no rales. She exhibits no tenderness.   Abdominal: Soft. Bowel sounds are normal. She exhibits no distension and no mass. There is no tenderness. There is no guarding.   Musculoskeletal: Normal range of motion. She exhibits no edema, tenderness or deformity.   Lymphadenopathy:     She has no cervical adenopathy.   Neurological: She is alert. No cranial nerve deficit.   Not oriented to place   Skin: Skin is warm and dry. She is not diaphoretic.   Psychiatric: She has a normal mood and affect. Her behavior is normal. Judgment and thought content normal.       Medication Review    Current Facility-Administered Medications:   •  acetaminophen (TYLENOL) tablet 325 mg, 325 mg, Oral, Q6H PRN, Chuckie Nguyen MD  •  amLODIPine (NORVASC) tablet 5 mg, 5 mg, Oral, Q24H, Chuckie Nguyen MD, 5 mg at 03/02/19 0851  •  ferrous sulfate EC tablet 324 mg, 324 mg, Oral, Daily With Breakfast, Chuckie Nguyen MD, 324 mg at 03/02/19 0851  •  haloperidol lactate (HALDOL) injection 0.5 mg, 0.5 mg, Intravenous, Q2H PRN, Malu Love MD, 0.5 mg at 03/02/19 0211  •  melatonin tablet 5.25 mg, 5.25 mg, Oral, Nightly, Chuckie Nguyen MD, 5.25 mg at 03/02/19 2019  •  ondansetron (ZOFRAN) injection 4 mg, 4 mg, Intravenous, Q6H PRN, Lino Lopez MD  •  sennosides-docusate sodium (SENOKOT-S) 8.6-50 MG tablet 2 tablet, 2 tablet, Oral, Nightly, Chuckie Nguyen MD, 2 tablet at 03/02/19 2019  •  sodium chloride 0.9 % flush 3 mL, 3 mL, Intravenous,  Q12H, Lino Lopez MD, 3 mL at 03/01/19 2044  •  sodium chloride 0.9 % flush 3-10 mL, 3-10 mL, Intravenous, PRN, Lino Lopez MD  •  sodium chloride tablet 2 g, 2 g, Oral, TID With Meals, Sagar Perkins MD, 2 g at 03/02/19 1716     Diagnostic Data    Lab Results (last 24 hours)     Procedure Component Value Units Date/Time    CBC (No Diff) [[id number]]  (Abnormal) Collected:  03/03/19 0606    Specimen:  Blood Updated:  03/03/19 0620     WBC 4.85 10*3/mm3      RBC 3.14 10*6/mm3      Hemoglobin 9.8 g/dL      Hematocrit 27.7 %      MCV 88.2 fL      MCH 31.2 pg      MCHC 35.4 g/dL      RDW 12.4 %      RDW-SD 40.3 fl      MPV 8.8 fL      Platelets 241 10*3/mm3     Sodium [[id number]]  (Abnormal) Collected:  03/02/19 1707    Specimen:  Blood Updated:  03/02/19 1725     Sodium 129 mmol/L             I reviewed the patient's new clinical results.    Assessment/Plan:     Active Hospital Problems    Diagnosis Date Noted   • **Hyponatremia [E87.1] 01/07/2019     -FeNa: 1.1%   -Nephrology consult Dr. Perkins, d[facility]- samsca given to patient on 3/1 and 3/2 awaiting sodium levels today        • Hypokalemia [E87.6] 03/02/2019     Potassium 3.3 today, will replace PO.  If continues to be low will check magnesium.       • Acute anemia [D64.9] 03/01/2019     Drop of 1 mg/dL without hemodilution with platelets.  Patient had normal sized bowel movement yesterday.  No other signs of acute bleed.  Will obtain Hemoccult and recheck afternoon H&H.  -Hemoccult stool on bowel movement  -H&H at 1230 hrs.     • Iron deficiency anemia secondary to inadequate dietary iron intake [D50.8] 01/07/2019     Daily iron supplementation with breakfast     • Stage 3 chronic kidney disease (CMS/HCC) [N18.3] 01/07/2019     Stable. Maintain adequate hydration     • Major neurocognitive disorder [F01.50] 01/07/2019     Alzheimer's Dementia with Vascular Dementia  Patient POA has previously refused treatment with Aricept  5 mg daily.           DVT prophylaxis: SCDs  Code Status and Medical Interventions:   Ordered at: 02/28/19 0726     Level Of Support Discussed With:    Health Care Surrogate     Code Status:    No CPR     Medical Interventions (Level of Support Prior to Arrest):    Full       Plan for disposition:Where: SNF and When:  today possibly if sodium is stable and discharge approved by Dr. Perkins.           This document has been electronically signed by Autumn Panda MD on March 3, 2019 8:13 AM                Electronically signed by Anderson Mandujano MD at 3/3/2019 11:40 AM       Consult Notes (last 24 hours) (Notes from 3/2/2019 12:19 PM through 3/3/2019 12:19 PM)     No notes of this type exist for this encounter.

## 2019-03-04 ENCOUNTER — TELEPHONE (OUTPATIENT)
Dept: FAMILY MEDICINE CLINIC | Facility: CLINIC | Age: 84
End: 2019-03-04

## 2019-03-04 ENCOUNTER — NURSING HOME (OUTPATIENT)
Dept: FAMILY MEDICINE CLINIC | Facility: CLINIC | Age: 84
End: 2019-03-04

## 2019-03-04 DIAGNOSIS — E87.1 HYPONATREMIA: Primary | ICD-10-CM

## 2019-03-04 DIAGNOSIS — N18.30 STAGE 3 CHRONIC KIDNEY DISEASE (HCC): ICD-10-CM

## 2019-03-04 DIAGNOSIS — F51.01 PRIMARY INSOMNIA: ICD-10-CM

## 2019-03-04 DIAGNOSIS — D50.8 IRON DEFICIENCY ANEMIA SECONDARY TO INADEQUATE DIETARY IRON INTAKE: ICD-10-CM

## 2019-03-04 DIAGNOSIS — E55.9 HYPOVITAMINOSIS D: ICD-10-CM

## 2019-03-04 DIAGNOSIS — K59.03 DRUG-INDUCED CONSTIPATION: ICD-10-CM

## 2019-03-04 PROCEDURE — 99308 SBSQ NF CARE LOW MDM 20: CPT | Performed by: STUDENT IN AN ORGANIZED HEALTH CARE EDUCATION/TRAINING PROGRAM

## 2019-03-04 NOTE — TELEPHONE ENCOUNTER
Received an Epic message from Ella Pineda RN at the hospital that this patient needed a hospital follow up with Dr. Nguyen.     This is a patient in the nursing home, she would have gone back to the nursing home after discharge and would not be coming to the office for an appointment.     Will send a message to Dr. Nguyen's partner to see if he wants to follow up with the patient in the nursing home.

## 2019-03-05 NOTE — PROGRESS NOTES
NURSING HOME HISTORY AND PHYSICAL    NAME: Marlene Horne  : 1931  MRN: [id number]    DATE & TIME SEEN:  3-4-19 @ 1258 Hours    PCP: Chuckie Nguyen MD    CODE STATUS: AND    NURSING HOME: [facility]    Chief Complaint: Hyponatremia    HPI: Marlene Horne is a 87 y.o. female with a concurrent medical history of persistent hyponatremia, hypertension, profound neurocognitive disorder not otherwise specified returned to the nursing home after a brief hospitalization for altered mental status.  Patient initially was altered with flexion contractures of upper extremities with no other focal neurological deficits.  MRIs were negative.  Patient's family members and power of  had at that time reduced CODE STATUS to comfort measures.  Patient rebounded under comfort measures.  Comfort measures was rescinded.  Patient regained motor posturing and decision was made at that time to institute full treatment with primary complaint of hyponatremia.  Patient resumed oral salt tablets return to the guidance of nephrology service need to be elevated to twice daily and vasopressin analogues were administered x2 during the hospital stay.  Labs reviewed by the time of documentation of this note showed hyponatremia resolved with a sodium of 134.  With follow-up BMPs weekly.  Patient comfortably in nursing home fully aware of status cognition location and oriented x3.  She is participatory in ADLs and IADLs of sponge bathing, dressing, and feeding herself.  Patient still has assist with transfers and walking status post few day of inpatient hospital stay.    CONCURRENT MEDICAL HISTORY:  Past Medical History:   Diagnosis Date   • CKD (chronic kidney disease) stage 3, GFR 30-59 ml/min (CMS/[facility])    • Constipation    • Dementia    • Hyponatremia    • Hypovitaminosis D    • Iron deficiency anemia    • Major neurocognitive disorder    • Stroke (CMS/[facility])        PAST SURGICAL HISTORY:  Past Surgical  History:   Procedure Laterality Date   • CATARACT EXTRACTION         FAMILY HISTORY:  Family History   Problem Relation Age of Onset   • No Known Problems Mother    • No Known Problems Father    • Cancer Sister    • Cancer Brother         SOCIAL HISTORY:  Social History     Socioeconomic History   • Marital status:      Spouse name: Not on file   • Number of children: 2   • Years of education: Not on file   • Highest education level: Bachelor's degree (e.g., BA, AB, BS)   Social Needs   • Financial resource strain: Not very hard   • Food insecurity - worry: Never true   • Food insecurity - inability: Never true   • Transportation needs - medical: No   • Transportation needs - non-medical: No   Occupational History   • Occupation: retired nurse   Tobacco Use   • Smoking status: Never Smoker   • Smokeless tobacco: Never Used   Substance and Sexual Activity   • Alcohol use: No     Frequency: Never   • Drug use: No   • Sexual activity: Not Currently   Other Topics Concern   • Not on file   Social History Narrative   • Not on file       CURRENT MEDS:    Current Outpatient Medications:   •  acetaminophen (TYLENOL) 325 MG tablet, Take 2 tablets by mouth Every 4 (Four) Hours As Needed for Mild Pain ., Disp: , Rfl:   •  albuterol (PROVENTIL) (2.5 MG/3ML) 0.083% nebulizer solution, Take 2.5 mg by nebulization Every 4 (Four) Hours As Needed for Shortness of Air., Disp: , Rfl: 12  •  amLODIPine (NORVASC) 5 MG tablet, Take 1 tablet by mouth Daily., Disp: , Rfl:   •  ferrous sulfate 324 (65 Fe) MG tablet delayed-release EC tablet, Take 1 tablet by mouth Daily With Breakfast., Disp: 30 tablet, Rfl:   •  melatonin 5 MG tablet tablet, Take 5 mg by mouth Every Night., Disp: , Rfl:   •  polyethylene glycol (MIRALAX) pack packet, Take up to three times daily as needed for severe constipation, Disp: , Rfl:   •  sennosides-docusate sodium (SENOKOT-S) 8.6-50 MG tablet, Take 1 tablet by mouth Daily As Needed for Constipation.,  Disp: 30 tablet, Rfl: 0  •  sodium chloride 1 g tablet, Take 2 tablets by mouth 3 (Three) Times a Day With Meals., Disp: 180 tablet, Rfl: 0  •  vitamin D (ERGOCALCIFEROL) 17044 units capsule capsule, Take 1 capsule by mouth 1 (One) Time Per Week for 12 doses., Disp: 30 capsule, Rfl: 0    ALLERGIES:  Patient has no known allergies.    Review of Systems:  Review of Systems   Constitutional: Negative for activity change, appetite change, chills, diaphoresis and fever.   HENT: Negative for congestion, rhinorrhea, sinus pressure, sinus pain and sore throat.    Eyes: Negative for visual disturbance.   Respiratory: Negative for apnea, cough, choking, chest tightness, shortness of breath and wheezing.    Cardiovascular: Negative for chest pain, palpitations and leg swelling.   Gastrointestinal: Negative for abdominal distention, abdominal pain, blood in stool, constipation, diarrhea, nausea and vomiting.   Genitourinary: Negative for difficulty urinating, dysuria, frequency, hematuria and urgency.   Musculoskeletal: Negative for arthralgias, back pain, joint swelling, myalgias and neck pain.   Skin: Negative for color change, pallor, rash and wound.   Neurological: Negative for dizziness, weakness, numbness and headaches.   Psychiatric/Behavioral: Negative for agitation and behavioral problems.       /86   Pulse 64   Temp 97.8 °F (36.6 °C)   Resp 20   SpO2 90%   Physical Exam   Constitutional: She is oriented to person, place, and time. She appears well-developed and well-nourished. No distress.   Thin elderly  female lying comfortably in bed in no acute cardiopulmonary distress   HENT:   Head: Normocephalic and atraumatic.   Right Ear: External ear normal.   Left Ear: External ear normal.   Nose: Nose normal.   Eyes: Conjunctivae and EOM are normal. Pupils are equal, round, and reactive to light. Right eye exhibits no discharge. Left eye exhibits no discharge. No scleral icterus.   Neck: Normal range of  motion. Neck supple. No thyromegaly present.   Cardiovascular: Normal rate, regular rhythm, normal heart sounds and intact distal pulses. Exam reveals no gallop and no friction rub.   No murmur heard.  Pulmonary/Chest: Effort normal and breath sounds normal. No respiratory distress. She has no wheezes. She has no rales. She exhibits no tenderness.   Abdominal: Soft. Bowel sounds are normal. She exhibits no distension and no mass. There is no tenderness. There is no guarding.   Musculoskeletal: Normal range of motion. She exhibits no edema, tenderness or deformity.   Lymphadenopathy:     She has no cervical adenopathy.   Neurological: She is alert and oriented to person, place, and time. No cranial nerve deficit.   Skin: Skin is warm and dry. Capillary refill takes 2 to 3 seconds. She is not diaphoretic.   Psychiatric: She has a normal mood and affect. Her behavior is normal. Judgment and thought content normal.       DIAGNOSTIC DATA: Sodium 134 with BMP within normal limits       ASSESSMENT/PLAN: 87 y.o. female with:  Diagnoses and all orders for this visit:    Hyponatremia   Patient responding well to elevated salt tablets therapy.  We will continue to follow with nephrology.  - Sodium chloride 1 g tablet, take 2 tablets 3 times daily with meals  -Ambulatory follow-up with nephrology, Dr. Perkins  -Weekly BMP    Hypovitaminosis D   Resuming vitamin D replacement therapy.  - •  vitamin D (ERGOCALCIFEROL) 33932 units capsule capsule, Take 1 capsule by mouth 1 (One) Time Per Week for 12 doses., Disp: 30 capsule, Rfl: 0    Iron deficiency anemia secondary to inadequate dietary iron intake   H&H stabilized prior to hospital discharge.  Will resume iron supplementation therapy.  -•  ferrous sulfate 324 (65 Fe) MG tablet delayed-release EC tablet, Take 1 tablet by mouth Daily With Breakfast., Disp: 30 tablet, Rfl:     Stage 3 chronic kidney disease (CMS/HCC)   Encourage urine measuring and strict intake of oral fluids.   Close follow-up with nephrology service  -Nephrology ambulatory follow-up, Dr. Perkins    Essential hypertension   Vital signs currently stable nursing home.  We will continue routine monitoring.  -•  amLODIPine (NORVASC) 5 MG tablet, Take 1 tablet by mouth Daily., Disp: , Rfl:     Constipation, medication induced from ferrous sulfate   Patient with bowel movement since return from hospital.  Will continue bowel regiment.  •  polyethylene glycol (MIRALAX) pack packet, Take up to three times daily as needed for severe constipation, Disp: , Rfl:   •  sennosides-docusate sodium (SENOKOT-S) 8.6-50 MG tablet, Take 1 tablet by mouth Daily As Needed for Constipation., Disp: 30 tablet, Rfl: 0    Insomnia, primary   Patient sleeping well per nursing home staff.  -•  melatonin 5 MG tablet tablet, Take 5 mg by mouth Every Night., Disp: , Rfl:         CODE STATUS:    Dr. Mandujano is the attending on record for this patient, He is aware of the patient's status and agrees with the above.        This document has been electronically signed by Srinivasa Chairez III, MD on March 6, 2019 12:57 PM

## 2019-03-06 VITALS
HEART RATE: 64 BPM | TEMPERATURE: 97.8 F | SYSTOLIC BLOOD PRESSURE: 128 MMHG | DIASTOLIC BLOOD PRESSURE: 86 MMHG | RESPIRATION RATE: 20 BRPM | OXYGEN SATURATION: 90 %

## 2019-03-06 PROBLEM — F51.01 PRIMARY INSOMNIA: Status: ACTIVE | Noted: 2019-03-06

## 2019-03-06 PROBLEM — K59.03 DRUG-INDUCED CONSTIPATION: Status: ACTIVE | Noted: 2019-01-07

## 2019-03-29 ENCOUNTER — NURSING HOME (OUTPATIENT)
Dept: FAMILY MEDICINE CLINIC | Facility: CLINIC | Age: 84
End: 2019-03-29

## 2019-03-29 VITALS
RESPIRATION RATE: 20 BRPM | SYSTOLIC BLOOD PRESSURE: 152 MMHG | HEART RATE: 77 BPM | OXYGEN SATURATION: 97 % | TEMPERATURE: 97.3 F | DIASTOLIC BLOOD PRESSURE: 72 MMHG

## 2019-03-29 DIAGNOSIS — E87.1 HYPONATREMIA: Primary | ICD-10-CM

## 2019-03-29 DIAGNOSIS — F03.90 MAJOR NEUROCOGNITIVE DISORDER (HCC): ICD-10-CM

## 2019-03-29 PROCEDURE — 99308 SBSQ NF CARE LOW MDM 20: CPT | Performed by: STUDENT IN AN ORGANIZED HEALTH CARE EDUCATION/TRAINING PROGRAM

## 2019-03-29 NOTE — PROGRESS NOTES
MONTHLY NURSING HOME VISIT    NAME: Marlene Horne  : 1931  MRN: [id number]    DATE & TIME SEEN: 1420 hours on 3-29-19    PCP: Chuckie Nguyen MD    NURSING HOME: [facility]    Chief Complaint: Monthly Nursing Home Visit for [unfilled]     Subjective:     Marlene Horne is a 87 y.o. female with no active complaints with a concurrent medical history of hyponatremia neurocognitive deficit not otherwise specified, and hearing impairment.  Patient reports no issues and is resting comfortably and has had no other issues with sensory or neurological deficits with history of treatment for hyponatremia.  Patient was very responsive to questions and was able to assist during physical examination.  Patient continues to participate in ADLs and IADLs.    Current Meds:    Current Outpatient Medications:   •  acetaminophen (TYLENOL) 325 MG tablet, Take 2 tablets by mouth Every 4 (Four) Hours As Needed for Mild Pain ., Disp: , Rfl:   •  albuterol (PROVENTIL) (2.5 MG/3ML) 0.083% nebulizer solution, Take 2.5 mg by nebulization Every 4 (Four) Hours As Needed for Shortness of Air., Disp: , Rfl: 12  •  amLODIPine (NORVASC) 5 MG tablet, Take 1 tablet by mouth Daily., Disp: , Rfl:   •  ferrous sulfate 324 (65 Fe) MG tablet delayed-release EC tablet, Take 1 tablet by mouth Daily With Breakfast., Disp: 30 tablet, Rfl:   •  melatonin 5 MG tablet tablet, Take 5 mg by mouth Every Night., Disp: , Rfl:   •  polyethylene glycol (MIRALAX) pack packet, Take up to three times daily as needed for severe constipation, Disp: , Rfl:   •  sennosides-docusate sodium (SENOKOT-S) 8.6-50 MG tablet, Take 1 tablet by mouth Daily As Needed for Constipation., Disp: 30 tablet, Rfl: 0  •  sodium chloride 1 g tablet, Take 2 tablets by mouth 3 (Three) Times a Day With Meals., Disp: 180 tablet, Rfl: 0    Allergies:  Patient has no known allergies.    Review of Systems:  Review of Systems   Constitutional: Negative for  activity change, appetite change, chills, diaphoresis and fever.   HENT: Negative for congestion, rhinorrhea, sinus pressure, sinus pain and sore throat.    Eyes: Negative for visual disturbance.   Respiratory: Negative for apnea, cough, choking, chest tightness, shortness of breath and wheezing.    Cardiovascular: Negative for chest pain, palpitations and leg swelling.   Gastrointestinal: Negative for abdominal distention, abdominal pain, blood in stool, constipation, diarrhea, nausea and vomiting.   Genitourinary: Negative for difficulty urinating, dysuria, frequency, hematuria and urgency.   Musculoskeletal: Negative for arthralgias, back pain, joint swelling, myalgias and neck pain.   Skin: Negative for color change, pallor, rash and wound.   Neurological: Negative for dizziness, weakness, numbness and headaches.   Psychiatric/Behavioral: Negative for agitation and behavioral problems.       Objective:     /72   Pulse 77   Temp 97.3 °F (36.3 °C)   Resp 20   SpO2 97%   Physical Exam   Constitutional: No distress.   Elderly  female resting comfortably in bed in no acute cardiopulmonary distress   HENT:   Head: Normocephalic and atraumatic.   Nose: Nose normal.   Eyes: Conjunctivae and EOM are normal. Right eye exhibits no discharge. Left eye exhibits no discharge. No scleral icterus.   Neck: Normal range of motion. Neck supple. No thyromegaly present.   Cardiovascular: Normal rate, regular rhythm, normal heart sounds and intact distal pulses. Exam reveals no gallop and no friction rub.   No murmur heard.  Pulmonary/Chest: Effort normal and breath sounds normal. No respiratory distress. She has no wheezes. She has no rales. She exhibits no tenderness.   Abdominal: Soft. Bowel sounds are normal. She exhibits no distension and no mass. There is no tenderness. There is no guarding.   Musculoskeletal: Normal range of motion. She exhibits no edema, tenderness or deformity.   Lymphadenopathy:     She  has no cervical adenopathy.   Neurological: She is alert. No cranial nerve deficit.   Skin: Skin is warm and dry. Capillary refill takes 2 to 3 seconds. She is not diaphoretic.   Psychiatric: She has a normal mood and affect. Her behavior is normal. Judgment and thought content normal.       Diagnostic Data:     Nephrology note with sodium at 137 mEq, creatinine at 1.18     Assessment/Plan:      87 y.o. female with:  Diagnoses and all orders for this visit:    Hyponatremia   Patient continuing to take salt supplementation with effect and close following by nephrology services.  Patient with no neurocognitive deficits or worsening of her mental status prior evidence was present during previous examinations.  We will continue to present management and trend.   -•  sodium chloride 1 g tablet, Take 2 tablets by mouth 3 (Three) Times a Day With Meals., Disp: 180 tablet, Rfl: 0   -BMP every 3 months  Major neurocognitive disorder   Patient well adjusted back to nursing home from recent hospital discharge and follow-up.  Patient engaging socially with a roommate and ambulatory around facility with her walker.  We will continue to monitor.        Current Outpatient Medications:   •  acetaminophen (TYLENOL) 325 MG tablet, Take 2 tablets by mouth Every 4 (Four) Hours As Needed for Mild Pain ., Disp: , Rfl:   •  albuterol (PROVENTIL) (2.5 MG/3ML) 0.083% nebulizer solution, Take 2.5 mg by nebulization Every 4 (Four) Hours As Needed for Shortness of Air., Disp: , Rfl: 12  •  amLODIPine (NORVASC) 5 MG tablet, Take 1 tablet by mouth Daily., Disp: , Rfl:   •  ferrous sulfate 324 (65 Fe) MG tablet delayed-release EC tablet, Take 1 tablet by mouth Daily With Breakfast., Disp: 30 tablet, Rfl:   •  melatonin 5 MG tablet tablet, Take 5 mg by mouth Every Night., Disp: , Rfl:   •  polyethylene glycol (MIRALAX) pack packet, Take up to three times daily as needed for severe constipation, Disp: , Rfl:   •  sennosides-docusate sodium  (SENOKOT-S) 8.6-50 MG tablet, Take 1 tablet by mouth Daily As Needed for Constipation., Disp: 30 tablet, Rfl: 0        CODE STATUS: DNR/DNI    Dr. Bell is the attending on record for this patient, she is aware of the patient's status and agrees with the above.

## 2019-03-30 NOTE — PROGRESS NOTES
MONTHLY NURSING HOME VISIT    NAME: Marlene Horne  : 1931  MRN: [id number]    DATE & TIME SEEN: 19     PCP: Chuckie Nguyen MD    NURSING HOME: [facility]    Monthly Nursing Home Visit for 2019    Chief Complaint: Monthly visit    Subjective:     Marlene Horne is a 87 y.o. female who states she is doing well today.  She cannot remember anything about seeing the kidney doctor.  Her labs reveal that she is keeping her sodium at a normal level.  She is taking salt tablets daily.    Current Meds:    Current Outpatient Medications:   •  acetaminophen (TYLENOL) 325 MG tablet, Take 2 tablets by mouth Every 4 (Four) Hours As Needed for Mild Pain ., Disp: , Rfl:   •  albuterol (PROVENTIL) (2.5 MG/3ML) 0.083% nebulizer solution, Take 2.5 mg by nebulization Every 4 (Four) Hours As Needed for Shortness of Air., Disp: , Rfl: 12  •  amLODIPine (NORVASC) 5 MG tablet, Take 1 tablet by mouth Daily., Disp: , Rfl:   •  ferrous sulfate 324 (65 Fe) MG tablet delayed-release EC tablet, Take 1 tablet by mouth Daily With Breakfast., Disp: 30 tablet, Rfl:   •  melatonin 5 MG tablet tablet, Take 5 mg by mouth Every Night., Disp: , Rfl:   •  polyethylene glycol (MIRALAX) pack packet, Take up to three times daily as needed for severe constipation, Disp: , Rfl:   •  sennosides-docusate sodium (SENOKOT-S) 8.6-50 MG tablet, Take 1 tablet by mouth Daily As Needed for Constipation., Disp: 30 tablet, Rfl: 0  •  sodium chloride 1 g tablet, Take 2 tablets by mouth 3 (Three) Times a Day With Meals., Disp: 180 tablet, Rfl: 0    Allergies:  Patient has no known allergies.    Review of Systems:  Review of Systems   Constitutional: Negative for activity change, appetite change, fatigue and fever.   HENT: Negative for ear pain and sore throat.    Eyes: Negative for pain and visual disturbance.   Respiratory: Negative for cough and shortness of breath.    Cardiovascular: Negative for chest pain and palpitations.    Gastrointestinal: Negative for abdominal pain and nausea.   Endocrine: Negative for cold intolerance and heat intolerance.   Genitourinary: Negative for difficulty urinating and dysuria.   Musculoskeletal: Negative for arthralgias and gait problem.   Skin: Negative for color change and rash.   Neurological: Negative for dizziness, weakness and headaches.   Hematological: Negative for adenopathy. Does not bruise/bleed easily.   Psychiatric/Behavioral: Negative for agitation, confusion and sleep disturbance.       Objective:     /72   Pulse 77   Temp 97.3 °F (36.3 °C)   Resp 20   SpO2 97%   Physical Exam   Constitutional: She appears well-developed and well-nourished.   HENT:   Head: Normocephalic and atraumatic.   Right Ear: External ear normal.   Left Ear: External ear normal.   Nose: Nose normal.   Mouth/Throat: Oropharynx is clear and moist.   Eyes: Conjunctivae and EOM are normal.   Neck: Normal range of motion. Neck supple.   Cardiovascular: Normal rate, regular rhythm and normal heart sounds.   Pulmonary/Chest: Effort normal and breath sounds normal.   Abdominal: Soft. Bowel sounds are normal. She exhibits no distension. There is no tenderness.   Musculoskeletal: Normal range of motion.   Neurological: She is alert.   Skin: Skin is warm and dry.   Psychiatric: She has a normal mood and affect. Her speech is normal and behavior is normal. Judgment and thought content normal. Cognition and memory are normal.            Assessment/Plan:      87 y.o. female with:  Problem List Items Addressed This Visit        Other    Major neurocognitive disorder    Overview     Alzheimer's Dementia with Vascular Dementia  Patient POA has previously refused treatment with Aricept 5 mg daily.         Hyponatremia - Primary    Overview     -FeNa: 1.1%   -Nephrology consult Dr. Perkins, recommendations appreciated- samsca given to patient on 3/1 and 3/2 awaiting sodium levels today                  I was present with the  resident during the entire visit. .  I have reviewed the notes, assessments, and/or procedures performed by Dr. Chairez, I concur with her/his documentation and assessment and plan for Marlene Horne.            This document has been electronically signed by Maricarmen Bell MD on March 29, 2019 7:43 PM

## 2019-04-05 ENCOUNTER — TELEPHONE (OUTPATIENT)
Dept: FAMILY MEDICINE CLINIC | Facility: CLINIC | Age: 84
End: 2019-04-05

## 2019-04-05 ENCOUNTER — DOCUMENTATION (OUTPATIENT)
Dept: FAMILY MEDICINE CLINIC | Facility: CLINIC | Age: 84
End: 2019-04-05

## 2019-04-05 NOTE — PROGRESS NOTES
Nursing home called overnight about patient falling on the way to the bathroom. It was unwitnessed. She did not lose consciousness. She was somewhat drowsy but she takes melatonin to sleep. Nurse examined her. No bruising or injuries. Will continue with scheduled neuro checks for two days. They will call if she gets worse. Will let PCP know.        This document has been electronically signed by Giuliana Joseph MD on April 5, 2019 10:24 AM

## 2019-04-05 NOTE — TELEPHONE ENCOUNTER
Sarah from [facility] called and they are needing lab orders for this patient. If any questions she said that Dr. Nguyen can call her back.      Thank you,      Samantha

## 2019-04-05 NOTE — TELEPHONE ENCOUNTER
Received a message from [facility] regarding lab request for Ms. Horne. Earlier this morning she had a fall with no head trauma. Patient has been eating less lately however this can be expected in the later stages of Alzheimer's Dementia. BMP was approved. Nurse reports that POA did not want any invasive studies as she is DNR/DNI and wants to avoid invasive studies to emphasize comfort. This is consistent with conversations I've had with POA/daughter.       Chuckie Nguyen M.D. PGY1  [facility]  [address] [place] [address]  Office: [phone]    This document has been electronically signed by Chuckie Nguyen MD on April 5, 2019 6:35 PM

## 2019-04-06 ENCOUNTER — LAB REQUISITION (OUTPATIENT)
Dept: LAB | Facility: HOSPITAL | Age: 84
End: 2019-04-06

## 2019-04-06 DIAGNOSIS — R79.89 OTHER SPECIFIED ABNORMAL FINDINGS OF BLOOD CHEMISTRY: ICD-10-CM

## 2019-04-06 LAB
ANION GAP SERPL CALCULATED.3IONS-SCNC: 11 MMOL/L
BUN BLD-MCNC: 10 MG/DL (ref 8–23)
BUN/CREAT SERPL: 8.7 (ref 7–25)
CALCIUM SPEC-SCNC: 8.8 MG/DL (ref 8.6–10.5)
CHLORIDE SERPL-SCNC: 101 MMOL/L (ref 98–107)
CO2 SERPL-SCNC: 24 MMOL/L (ref 22–29)
CREAT BLD-MCNC: 1.15 MG/DL (ref 0.57–1)
GFR SERPL CREATININE-BSD FRML MDRD: 45 ML/MIN/1.73
GLUCOSE BLD-MCNC: 101 MG/DL (ref 65–99)
POTASSIUM BLD-SCNC: 4 MMOL/L (ref 3.5–5.2)
SODIUM BLD-SCNC: 136 MMOL/L (ref 136–145)

## 2019-04-06 PROCEDURE — 80048 BASIC METABOLIC PNL TOTAL CA: CPT | Performed by: STUDENT IN AN ORGANIZED HEALTH CARE EDUCATION/TRAINING PROGRAM

## 2019-04-23 ENCOUNTER — NURSING HOME (OUTPATIENT)
Dept: FAMILY MEDICINE CLINIC | Facility: CLINIC | Age: 84
End: 2019-04-23

## 2019-04-23 DIAGNOSIS — K59.00 CONSTIPATION, UNSPECIFIED CONSTIPATION TYPE: Primary | ICD-10-CM

## 2019-04-23 DIAGNOSIS — E77.8 HYPOPROTEINEMIA (HCC): ICD-10-CM

## 2019-04-23 DIAGNOSIS — F03.90 MAJOR NEUROCOGNITIVE DISORDER (HCC): ICD-10-CM

## 2019-04-23 DIAGNOSIS — E87.1 HYPONATREMIA: ICD-10-CM

## 2019-04-23 PROCEDURE — 99309 SBSQ NF CARE MODERATE MDM 30: CPT | Performed by: STUDENT IN AN ORGANIZED HEALTH CARE EDUCATION/TRAINING PROGRAM

## 2019-04-24 ENCOUNTER — TELEPHONE (OUTPATIENT)
Dept: FAMILY MEDICINE CLINIC | Facility: CLINIC | Age: 84
End: 2019-04-24

## 2019-04-24 PROBLEM — E77.8 HYPOPROTEINEMIA (HCC): Status: ACTIVE | Noted: 2019-04-24

## 2019-04-24 PROBLEM — K59.00 CONSTIPATION: Status: ACTIVE | Noted: 2019-04-24

## 2019-04-24 NOTE — TELEPHONE ENCOUNTER
[facility] called to let us know that they have a lot of lab results for patient and they are not going through on the fax machine. It is saying the destination machine is not responding. I verified they have to correct fax number.  They are hoping to get a call back about this.    Contact them at [phone]    Thanks, Maricarmen

## 2019-04-24 NOTE — PROGRESS NOTES
MONTHLY NURSING HOME VISIT    NAME: Marlene Horne  : 1931  MRN: [id number]    DATE & TIME SEEN: 19 1345 hours    PCP: Chuckie Nguyen MD    NURSING HOME: [facility]    Monthly senior living Visit    Chief Complaint:     Subjective:     Marlene Horne is a 87 y.o. female with past medical history significant for neurocognitive dementia and hyponatremia, complains today of constipation.  She is otherwise in good spirits and recognizes my face where she has previously not.    Current Meds:    Current Outpatient Medications:   •  acetaminophen (TYLENOL) 325 MG tablet, Take 2 tablets by mouth Every 4 (Four) Hours As Needed for Mild Pain ., Disp: , Rfl:   •  albuterol (PROVENTIL) (2.5 MG/3ML) 0.083% nebulizer solution, Take 2.5 mg by nebulization Every 4 (Four) Hours As Needed for Shortness of Air., Disp: , Rfl: 12  •  amLODIPine (NORVASC) 5 MG tablet, Take 1 tablet by mouth Daily., Disp: , Rfl:   •  ferrous sulfate 324 (65 Fe) MG tablet delayed-release EC tablet, Take 1 tablet by mouth Daily With Breakfast., Disp: 30 tablet, Rfl:   •  melatonin 5 MG tablet tablet, Take 5 mg by mouth Every Night., Disp: , Rfl:   •  polyethylene glycol (MIRALAX) pack packet, Take up to three times daily as needed for severe constipation, Disp: , Rfl:   •  sennosides-docusate sodium (SENOKOT-S) 8.6-50 MG tablet, Take 1 tablet by mouth Daily As Needed for Constipation., Disp: 30 tablet, Rfl: 0  •  sodium chloride 1 g tablet, Take 2 tablets by mouth 3 (Three) Times a Day With Meals., Disp: 180 tablet, Rfl: 0    Allergies:  Patient has no known allergies.    Review of Systems:  Review of Systems   Constitutional: Negative for appetite change, chills, diaphoresis, fatigue and fever.   HENT: Negative for congestion, ear pain, postnasal drip, rhinorrhea and sore throat.    Eyes: Negative for pain and visual disturbance.   Respiratory: Negative for cough, chest tightness and shortness of breath.     Cardiovascular: Negative for chest pain, palpitations and leg swelling.   Gastrointestinal: Positive for constipation. Negative for abdominal pain, diarrhea, nausea and vomiting.   Endocrine: Negative for polyphagia and polyuria.   Genitourinary: Negative for dysuria, flank pain, frequency and urgency.   Musculoskeletal: Negative for arthralgias, back pain and myalgias.   Skin: Negative for pallor and rash.   Neurological: Negative for dizziness, syncope, weakness and numbness.       Objective:     /69   Pulse 82   Temp 97.8 °F (36.6 °C)   Resp 18   SpO2 97%      Physical Exam   Constitutional: She appears well-developed and well-nourished. No distress.   HENT:   Head: Normocephalic and atraumatic.   Right Ear: External ear normal.   Left Ear: External ear normal.   Mouth/Throat: Oropharynx is clear and moist.   Eyes: EOM are normal. Pupils are equal, round, and reactive to light.   Neck: Normal range of motion. Neck supple.   Cardiovascular: Normal rate, regular rhythm, normal heart sounds and intact distal pulses.   Pulmonary/Chest: Effort normal and breath sounds normal.   Abdominal: Soft. Bowel sounds are normal. She exhibits no distension. There is no tenderness. There is no guarding.   Musculoskeletal: Normal range of motion. She exhibits no edema or tenderness.   Lymphadenopathy:     She has no cervical adenopathy.   Neurological: She is alert. She displays normal reflexes.   Oriented to self and place but not time   Skin: Skin is warm. Capillary refill takes 2 to 3 seconds.       Diagnostic Data:   Abnormal labs show the following (for complete labs refer to chart at [facility]):    Labs drawn 3/4  Total protein 4.7    Labs drawn 4/3  Sodium 134, GFR 47, calcium 8.4    Labs drawn 4/15  Sodium 133, GFR 42       Assessment/Plan:      87 y.o. female with:  Problem List Items Addressed This Visit        Digestive    Constipation - Primary    Overview     Docusate with senna 2 tablets nightly.   If no bowel movement in 2 days MiraLAX 3 times daily until bowel movement, then stop            Other    Major neurocognitive disorder    Overview     Alzheimer's Dementia with Vascular Dementia  Patient POA has previously refused treatment with Aricept 5 mg daily.         Hyponatremia    Overview     Patient currently also being followed by nephrology, appreciate recommendations.  Currently taking 2 g sodium tablets 3 times daily.  Monthly BMPs.           Hypoproteinemia (CMS/HCC)    Overview     High-protein high-calorie diet.  Magic cups 3 times daily.  Dietary consult, follow recommendations.               CODE STATUS: DNR and DNI    Dr. Bell  is the attending on record for this patient, She is aware of the patient's status and agrees with the above.      Chuckie Nguyen M.D. PGY1  [facility]  [address] [place] [address]  Office: [phone]    This document has been electronically signed by Chuckie Nguyen MD on April 25, 2019 1:08 PM

## 2019-04-25 VITALS
RESPIRATION RATE: 18 BRPM | OXYGEN SATURATION: 97 % | DIASTOLIC BLOOD PRESSURE: 69 MMHG | TEMPERATURE: 97.8 F | SYSTOLIC BLOOD PRESSURE: 146 MMHG | HEART RATE: 82 BPM

## 2019-04-25 NOTE — PROGRESS NOTES
MONTHLY NURSING HOME VISIT    NAME: Marlene Horne  : 1931  MRN: [id number]    DATE & TIME SEEN: 19 13:45    PCP: Chuckie Nguyen MD    NURSING HOME: [facility]    Monthly Nursing Home Visit for 2019    Chief Complaint: constipation    Subjective:     Marlene Horne is a 87 y.o. female who has known dementia. Feels she is doing fairly well.  She is complaining of several days duration of not having a bowel movement. She is quite concerned by this.      Current Meds:    Current Outpatient Medications:   •  acetaminophen (TYLENOL) 325 MG tablet, Take 2 tablets by mouth Every 4 (Four) Hours As Needed for Mild Pain ., Disp: , Rfl:   •  albuterol (PROVENTIL) (2.5 MG/3ML) 0.083% nebulizer solution, Take 2.5 mg by nebulization Every 4 (Four) Hours As Needed for Shortness of Air., Disp: , Rfl: 12  •  amLODIPine (NORVASC) 5 MG tablet, Take 1 tablet by mouth Daily., Disp: , Rfl:   •  ferrous sulfate 324 (65 Fe) MG tablet delayed-release EC tablet, Take 1 tablet by mouth Daily With Breakfast., Disp: 30 tablet, Rfl:   •  melatonin 5 MG tablet tablet, Take 5 mg by mouth Every Night., Disp: , Rfl:   •  polyethylene glycol (MIRALAX) pack packet, Take up to three times daily as needed for severe constipation, Disp: , Rfl:   •  sennosides-docusate sodium (SENOKOT-S) 8.6-50 MG tablet, Take 1 tablet by mouth Daily As Needed for Constipation., Disp: 30 tablet, Rfl: 0  •  sodium chloride 1 g tablet, Take 2 tablets by mouth 3 (Three) Times a Day With Meals., Disp: 180 tablet, Rfl: 0    Allergies:  Patient has no known allergies.    Review of Systems:  Review of Systems   Constitutional: Negative for activity change, appetite change, fatigue and fever.   HENT: Negative for ear pain and sore throat.    Eyes: Negative for pain and visual disturbance.   Respiratory: Negative for cough and shortness of breath.    Cardiovascular: Negative for chest pain and palpitations.   Gastrointestinal: Positive  for constipation. Negative for abdominal pain and nausea.   Endocrine: Negative for cold intolerance and heat intolerance.   Genitourinary: Negative for difficulty urinating and dysuria.   Musculoskeletal: Negative for arthralgias and gait problem.   Skin: Negative for color change and rash.   Neurological: Negative for dizziness, weakness and headaches.   Hematological: Negative for adenopathy. Does not bruise/bleed easily.   Psychiatric/Behavioral: Negative for agitation, confusion and sleep disturbance.       Objective:     /69   Pulse 82   Temp 97.8 °F (36.6 °C)   Resp 18   SpO2 97%   Physical Exam   Constitutional: She appears well-developed and well-nourished.   HENT:   Head: Normocephalic and atraumatic.   Right Ear: External ear normal.   Left Ear: External ear normal.   Nose: Nose normal.   Mouth/Throat: Oropharynx is clear and moist.   Eyes: Conjunctivae and EOM are normal.   Neck: Normal range of motion. Neck supple.   Cardiovascular: Normal rate, regular rhythm and normal heart sounds.   Pulmonary/Chest: Effort normal and breath sounds normal.   Abdominal: Soft. Bowel sounds are normal. She exhibits no distension. There is no tenderness.   Musculoskeletal: Normal range of motion.   Neurological: She is alert.   Skin: Skin is warm and dry.   Psychiatric: She has a normal mood and affect. Her speech is normal and behavior is normal. Cognition and memory are normal.         Assessment/Plan:      87 y.o. female with:  Problem List Items Addressed This Visit        Digestive    Constipation - Primary    Overview     Docusate with senna 2 tablets nightly.  If no bowel movement in 2 days MiraLAX 3 times daily until bowel movement, then stop            Other    Major neurocognitive disorder    Overview     Alzheimer's Dementia with Vascular Dementia  Patient POA has previously refused treatment with Aricept 5 mg daily.         Hyponatremia    Overview     Patient currently also being followed by  nephrology, appreciate recommendations.  Currently taking 2 g sodium tablets 3 times daily.  Monthly BMPs.           Hypoproteinemia (CMS/HCC)    Overview     High-protein high-calorie diet.  Magic cups 3 times daily.  Dietary consult, follow recommendations.               I have seen the patient with the resident.   I have reviewed the notes, assessments, and/or procedures performed by Dr. Nguyen, I concur with her/his documentation and assessment and plan for Marlene Horne.            This document has been electronically signed by Maricarmen Bell MD on April 25, 2019 2:59 PM

## 2019-05-10 ENCOUNTER — NURSING HOME (OUTPATIENT)
Dept: FAMILY MEDICINE CLINIC | Facility: CLINIC | Age: 84
End: 2019-05-10

## 2019-05-10 VITALS
HEART RATE: 84 BPM | TEMPERATURE: 97.5 F | SYSTOLIC BLOOD PRESSURE: 134 MMHG | OXYGEN SATURATION: 98 % | HEIGHT: 65 IN | WEIGHT: 143.2 LBS | DIASTOLIC BLOOD PRESSURE: 66 MMHG | RESPIRATION RATE: 20 BRPM | BODY MASS INDEX: 23.86 KG/M2

## 2019-05-10 DIAGNOSIS — K59.00 CONSTIPATION, UNSPECIFIED CONSTIPATION TYPE: Primary | ICD-10-CM

## 2019-05-10 DIAGNOSIS — E87.1 HYPONATREMIA: ICD-10-CM

## 2019-05-10 PROCEDURE — 99308 SBSQ NF CARE LOW MDM 20: CPT | Performed by: STUDENT IN AN ORGANIZED HEALTH CARE EDUCATION/TRAINING PROGRAM

## 2019-05-10 RX ORDER — SENNA AND DOCUSATE SODIUM 50; 8.6 MG/1; MG/1
2 TABLET, FILM COATED ORAL NIGHTLY
Qty: 30 TABLET | Refills: 0
Start: 2019-05-10

## 2019-05-10 NOTE — PROGRESS NOTES
MONTHLY NURSING HOME VISIT    NAME: Marlene Horne  : 1931  MRN: [id number]    DATE & TIME SEEN: 05/10/19 1325    PCP: Chuckie Nguyen MD    NURSING HOME: [facility]    Monthly Nursing Home Visit: May  2019    Chief Complaint:     Subjective:     Marlene Horne is a 87 y.o. female, past medical history significant for neurocognitive dementia and hyponatremia, complains of constipation today. She recognizes my face but does not know why or that I've been caring for her since January.     Current Meds:    Current Outpatient Medications:   •  acetaminophen (TYLENOL) 325 MG tablet, Take 2 tablets by mouth Every 4 (Four) Hours As Needed for Mild Pain ., Disp: , Rfl:   •  albuterol (PROVENTIL) (2.5 MG/3ML) 0.083% nebulizer solution, Take 2.5 mg by nebulization Every 4 (Four) Hours As Needed for Shortness of Air., Disp: , Rfl: 12  •  amLODIPine (NORVASC) 5 MG tablet, Take 1 tablet by mouth Daily., Disp: , Rfl:   •  ferrous sulfate 324 (65 Fe) MG tablet delayed-release EC tablet, Take 1 tablet by mouth Daily With Breakfast., Disp: 30 tablet, Rfl:   •  melatonin 5 MG tablet tablet, Take 5 mg by mouth Every Night., Disp: , Rfl:   •  polyethylene glycol (MIRALAX) pack packet, Take 17 g by mouth Daily., Disp: , Rfl:   •  sennosides-docusate sodium (SENOKOT-S) 8.6-50 MG tablet, Take 2 tablets by mouth Every Night., Disp: 30 tablet, Rfl: 0  •  sodium chloride 1 g tablet, Take 2 tablets by mouth 3 (Three) Times a Day With Meals., Disp: 180 tablet, Rfl: 0    Allergies:  Patient has no known allergies.    Review of Systems:  Review of Systems   Constitutional: Negative for appetite change, chills, diaphoresis, fatigue and fever.   HENT: Negative for congestion, ear pain, postnasal drip, rhinorrhea, sore throat and trouble swallowing.    Eyes: Negative for pain and visual disturbance.   Respiratory: Negative for cough, chest tightness and shortness of breath.    Cardiovascular: Negative for chest pain,  "palpitations and leg swelling.   Gastrointestinal: Positive for constipation. Negative for abdominal distention, abdominal pain, diarrhea, nausea and vomiting.   Genitourinary: Negative for difficulty urinating, dysuria, flank pain, frequency and urgency.   Musculoskeletal: Negative for arthralgias, back pain and myalgias.   Skin: Negative for pallor and rash.   Neurological: Negative for dizziness, syncope, weakness and numbness.   Psychiatric/Behavioral: Negative for sleep disturbance. The patient is not nervous/anxious.        Objective:     /66   Pulse 84   Temp 97.5 °F (36.4 °C)   Resp 20   Ht 164.1 cm (64.61\")   Wt 65 kg (143 lb 3.2 oz)   SpO2 98%   BMI 24.12 kg/m²      Physical Exam   Constitutional: She is oriented to person, place, and time. She appears well-developed and well-nourished.   HENT:   Head: Normocephalic and atraumatic.   Right Ear: External ear normal.   Left Ear: External ear normal.   Nose: Nose normal.   Mouth/Throat: Oropharynx is clear and moist.   Eyes: Conjunctivae and EOM are normal. Pupils are equal, round, and reactive to light.   Neck: Normal range of motion. Neck supple. No tracheal deviation present. No thyromegaly present.   Cardiovascular: Normal rate, regular rhythm, normal heart sounds and intact distal pulses.   Pulmonary/Chest: Effort normal and breath sounds normal. No respiratory distress. She has no wheezes.   Abdominal: Soft. Bowel sounds are normal. She exhibits no distension. There is no tenderness. There is no rebound and no guarding.   Musculoskeletal: Normal range of motion.   Neurological: She is alert and oriented to person, place, and time. She displays normal reflexes.   Skin: Skin is warm and dry. Capillary refill takes less than 2 seconds.   Skin intact in feet   Psychiatric: She has a normal mood and affect. Her behavior is normal.       Diagnostic Data:     BMP pertinent labs obtained 4/29/2019:  Na 135  Osmolality 261  GFR 42    Complete labs " available at [facility]     Assessment/Plan:      87 y.o. female with:  Problem List Items Addressed This Visit        Digestive    Constipation - Primary    Overview     Senokot 2 pills at night scheduled. Miralaax 17 grams daily scheduled.             Other    Hyponatremia    Overview     Patient currently also being followed by nephrology, appreciate recommendations.  Currently taking 2 g sodium tablets 3 times daily.  Monthly BMPs.                 CODE STATUS: DNR    Dr. Bell  is the attending on record for this patient, She is aware of the patient's status and agrees with the above.      Chuckie Nguyen M.D. PGY1  [facility]  [address] [place] [address]  Office: [phone]    This document has been electronically signed by Chuckie Nguyen MD on May 10, 2019 2:27 PM

## 2019-05-14 ENCOUNTER — TELEPHONE (OUTPATIENT)
Dept: FAMILY MEDICINE CLINIC | Facility: CLINIC | Age: 84
End: 2019-05-14

## 2019-05-14 NOTE — TELEPHONE ENCOUNTER
[facility] called to let us know that patient had some test results come back that they tried to fax over and wasn't able to. They said they tried for tow days and they were unable to get a successful fax. [facility] requested a call back.    Provider paged.

## 2019-05-23 NOTE — PROGRESS NOTES
MONTHLY NURSING HOME VISIT    NAME: Marlene Horne  : 1931  MRN: [id number]    DATE & TIME SEEN: 05/10/2019 1325    PCP: Chuckie Nguyen MD    NURSING HOME: [facility]    Monthly Nursing Home Visit for May 2019    Chief Complaint: constipation    Subjective:     Marlene Horne is a 87 y.o. female seen today for monthly nursing home visit.  She is endorsing constipation.  She is having hard stool every 3 to 4 days.  She does have dementia and recognizes Dr. Nguyen today but does not know why he is here.    Current Meds:    Current Outpatient Medications:   •  acetaminophen (TYLENOL) 325 MG tablet, Take 2 tablets by mouth Every 4 (Four) Hours As Needed for Mild Pain ., Disp: , Rfl:   •  albuterol (PROVENTIL) (2.5 MG/3ML) 0.083% nebulizer solution, Take 2.5 mg by nebulization Every 4 (Four) Hours As Needed for Shortness of Air., Disp: , Rfl: 12  •  amLODIPine (NORVASC) 5 MG tablet, Take 1 tablet by mouth Daily., Disp: , Rfl:   •  ferrous sulfate 324 (65 Fe) MG tablet delayed-release EC tablet, Take 1 tablet by mouth Daily With Breakfast., Disp: 30 tablet, Rfl:   •  melatonin 5 MG tablet tablet, Take 5 mg by mouth Every Night., Disp: , Rfl:   •  polyethylene glycol (MIRALAX) pack packet, Take 17 g by mouth Daily., Disp: , Rfl:   •  sennosides-docusate sodium (SENOKOT-S) 8.6-50 MG tablet, Take 2 tablets by mouth Every Night., Disp: 30 tablet, Rfl: 0  •  sodium chloride 1 g tablet, Take 2 tablets by mouth 3 (Three) Times a Day With Meals., Disp: 180 tablet, Rfl: 0    Allergies:  Patient has no known allergies.    Review of Systems:  Review of Systems   Constitutional: Negative for activity change, appetite change, fatigue and fever.   HENT: Negative for ear pain and sore throat.    Eyes: Negative for pain and visual disturbance.   Respiratory: Negative for cough and shortness of breath.    Cardiovascular: Negative for chest pain and palpitations.   Gastrointestinal: Positive for  "constipation. Negative for abdominal pain and nausea.   Endocrine: Negative for cold intolerance and heat intolerance.   Genitourinary: Negative for difficulty urinating and dysuria.   Musculoskeletal: Negative for arthralgias and gait problem.   Skin: Negative for color change and rash.   Neurological: Negative for dizziness, weakness and headaches.   Hematological: Negative for adenopathy. Does not bruise/bleed easily.   Psychiatric/Behavioral: Positive for confusion. Negative for agitation and sleep disturbance.       Objective:     /66   Pulse 84   Temp 97.5 °F (36.4 °C)   Resp 20   Ht 164.1 cm (64.61\")   Wt 65 kg (143 lb 3.2 oz)   SpO2 98%   BMI 24.12 kg/m²   Physical Exam   Constitutional: She appears well-developed and well-nourished.   HENT:   Head: Normocephalic and atraumatic.   Right Ear: External ear normal.   Left Ear: External ear normal.   Nose: Nose normal.   Mouth/Throat: Oropharynx is clear and moist.   Eyes: Conjunctivae and EOM are normal.   Neck: Normal range of motion. Neck supple.   Cardiovascular: Normal rate, regular rhythm and normal heart sounds.   Pulmonary/Chest: Effort normal and breath sounds normal.   Abdominal: Soft. Bowel sounds are normal. She exhibits no distension. There is no tenderness.   Musculoskeletal: Normal range of motion.   Neurological: She is alert.   Skin: Skin is warm and dry.   Psychiatric: She has a normal mood and affect. Her speech is normal and behavior is normal. She exhibits abnormal recent memory.            Assessment/Plan:      87 y.o. female with:  Problem List Items Addressed This Visit        Digestive    Constipation - Primary    Overview     Senokot 2 pills at night scheduled. Miralaax 17 grams daily scheduled.             Other    Hyponatremia    Overview     Patient currently also being followed by nephrology, appreciate recommendations.  Currently taking 2 g sodium tablets 3 times daily.  Monthly BMPs.                 I have seen the " patient with the resident. I was present for the visit.  I have reviewed the notes, assessments, and/or procedures performed by Chuckie Nguyen MD  during the  visit.  I concur with her/his documentation and assessment and plan for Marlene Horne.        This document has been electronically signed by Maricarmen Bell MD on May 23, 2019 4:35 PM

## 2019-05-30 ENCOUNTER — HOSPITAL ENCOUNTER (OUTPATIENT)
Facility: HOSPITAL | Age: 84
Setting detail: OBSERVATION
Discharge: NURSING, REHAB OR LONG-TERM CARE | End: 2019-05-31
Attending: EMERGENCY MEDICINE | Admitting: HOSPITALIST

## 2019-05-30 DIAGNOSIS — R41.82 ALTERED MENTAL STATUS, UNSPECIFIED ALTERED MENTAL STATUS TYPE: Primary | ICD-10-CM

## 2019-05-30 PROCEDURE — P9612 CATHETERIZE FOR URINE SPEC: HCPCS

## 2019-05-30 PROCEDURE — 99285 EMERGENCY DEPT VISIT HI MDM: CPT

## 2019-05-31 ENCOUNTER — APPOINTMENT (OUTPATIENT)
Dept: CT IMAGING | Facility: HOSPITAL | Age: 84
End: 2019-05-31

## 2019-05-31 ENCOUNTER — APPOINTMENT (OUTPATIENT)
Dept: GENERAL RADIOLOGY | Facility: HOSPITAL | Age: 84
End: 2019-05-31

## 2019-05-31 ENCOUNTER — APPOINTMENT (OUTPATIENT)
Dept: MRI IMAGING | Facility: HOSPITAL | Age: 84
End: 2019-05-31

## 2019-05-31 ENCOUNTER — APPOINTMENT (OUTPATIENT)
Dept: CARDIOLOGY | Facility: HOSPITAL | Age: 84
End: 2019-05-31

## 2019-05-31 VITALS
BODY MASS INDEX: 29.39 KG/M2 | DIASTOLIC BLOOD PRESSURE: 64 MMHG | HEART RATE: 70 BPM | HEIGHT: 63 IN | SYSTOLIC BLOOD PRESSURE: 126 MMHG | RESPIRATION RATE: 18 BRPM | TEMPERATURE: 99.5 F | OXYGEN SATURATION: 98 % | WEIGHT: 165.9 LBS

## 2019-05-31 PROBLEM — R41.82 ALTERED MENTAL STATUS: Status: ACTIVE | Noted: 2019-05-31

## 2019-05-31 LAB
ALBUMIN SERPL-MCNC: 3.3 G/DL (ref 3.5–5.2)
ALBUMIN SERPL-MCNC: 3.5 G/DL (ref 3.5–5.2)
ALBUMIN/GLOB SERPL: 1.1 G/DL
ALBUMIN/GLOB SERPL: 1.2 G/DL
ALP SERPL-CCNC: 94 U/L (ref 39–117)
ALP SERPL-CCNC: 96 U/L (ref 39–117)
ALT SERPL W P-5'-P-CCNC: 16 U/L (ref 1–33)
ALT SERPL W P-5'-P-CCNC: 16 U/L (ref 1–33)
ANION GAP SERPL CALCULATED.3IONS-SCNC: 11 MMOL/L
ANION GAP SERPL CALCULATED.3IONS-SCNC: 9 MMOL/L
AST SERPL-CCNC: 18 U/L (ref 1–32)
AST SERPL-CCNC: 19 U/L (ref 1–32)
BASOPHILS # BLD AUTO: 0.03 10*3/MM3 (ref 0–0.2)
BASOPHILS # BLD AUTO: 0.05 10*3/MM3 (ref 0–0.2)
BASOPHILS NFR BLD AUTO: 0.3 % (ref 0–1.5)
BASOPHILS NFR BLD AUTO: 0.6 % (ref 0–1.5)
BH CV ECHO MEAS - ACS: 1.6 CM
BH CV ECHO MEAS - AO ROOT AREA (BSA CORRECTED): 1.3
BH CV ECHO MEAS - AO ROOT AREA: 4.5 CM^2
BH CV ECHO MEAS - AO ROOT DIAM: 2.4 CM
BH CV ECHO MEAS - BSA(HAYCOCK): 1.8 M^2
BH CV ECHO MEAS - BSA: 1.8 M^2
BH CV ECHO MEAS - BZI_BMI: 29.2 KILOGRAMS/M^2
BH CV ECHO MEAS - BZI_METRIC_HEIGHT: 160 CM
BH CV ECHO MEAS - BZI_METRIC_WEIGHT: 74.8 KG
BH CV ECHO MEAS - EDV(CUBED): 8.5 ML
BH CV ECHO MEAS - EDV(TEICH): 13.4 ML
BH CV ECHO MEAS - EF(CUBED): 69 %
BH CV ECHO MEAS - EF(TEICH): 63.6 %
BH CV ECHO MEAS - ESV(CUBED): 2.6 ML
BH CV ECHO MEAS - ESV(TEICH): 4.9 ML
BH CV ECHO MEAS - FS: 32.4 %
BH CV ECHO MEAS - IVS/LVPW: 1.1
BH CV ECHO MEAS - IVSD: 1.4 CM
BH CV ECHO MEAS - LA DIMENSION: 2.6 CM
BH CV ECHO MEAS - LA/AO: 1.1
BH CV ECHO MEAS - LV MASS(C)D: 84.4 GRAMS
BH CV ECHO MEAS - LV MASS(C)DI: 47.4 GRAMS/M^2
BH CV ECHO MEAS - LVIDD: 2 CM
BH CV ECHO MEAS - LVIDS: 1.4 CM
BH CV ECHO MEAS - LVOT AREA (M): 2.8 CM^2
BH CV ECHO MEAS - LVOT AREA: 2.8 CM^2
BH CV ECHO MEAS - LVOT DIAM: 1.9 CM
BH CV ECHO MEAS - LVPWD: 1.3 CM
BH CV ECHO MEAS - PA MAX PG (FULL): 0.33 MMHG
BH CV ECHO MEAS - PA MAX PG: 4 MMHG
BH CV ECHO MEAS - PA V2 MAX: 99.5 CM/SEC
BH CV ECHO MEAS - RV MAX PG: 3.6 MMHG
BH CV ECHO MEAS - RV MEAN PG: 2 MMHG
BH CV ECHO MEAS - RV V1 MAX: 95.3 CM/SEC
BH CV ECHO MEAS - RV V1 MEAN: 68.6 CM/SEC
BH CV ECHO MEAS - RV V1 VTI: 15.1 CM
BH CV ECHO MEAS - RVDD: 2.5 CM
BH CV ECHO MEAS - SI(CUBED): 3.3 ML/M^2
BH CV ECHO MEAS - SI(TEICH): 4.8 ML/M^2
BH CV ECHO MEAS - SV(CUBED): 5.9 ML
BH CV ECHO MEAS - SV(TEICH): 8.5 ML
BILIRUB SERPL-MCNC: 0.2 MG/DL (ref 0.2–1.2)
BILIRUB SERPL-MCNC: 0.3 MG/DL (ref 0.2–1.2)
BILIRUB UR QL STRIP: NEGATIVE
BUN BLD-MCNC: 14 MG/DL (ref 8–23)
BUN BLD-MCNC: 15 MG/DL (ref 8–23)
BUN/CREAT SERPL: 11.7 (ref 7–25)
BUN/CREAT SERPL: 12 (ref 7–25)
CALCIUM SPEC-SCNC: 8.9 MG/DL (ref 8.6–10.5)
CALCIUM SPEC-SCNC: 9 MG/DL (ref 8.6–10.5)
CHLORIDE SERPL-SCNC: 96 MMOL/L (ref 98–107)
CHLORIDE SERPL-SCNC: 96 MMOL/L (ref 98–107)
CLARITY UR: ABNORMAL
CO2 SERPL-SCNC: 23 MMOL/L (ref 22–29)
CO2 SERPL-SCNC: 26 MMOL/L (ref 22–29)
COLOR UR: YELLOW
CRE SCREEN PCR: NOT DETECTED
CREAT BLD-MCNC: 1.2 MG/DL (ref 0.57–1)
CREAT BLD-MCNC: 1.25 MG/DL (ref 0.57–1)
DEPRECATED RDW RBC AUTO: 42 FL (ref 37–54)
DEPRECATED RDW RBC AUTO: 42.5 FL (ref 37–54)
EOSINOPHIL # BLD AUTO: 0 10*3/MM3 (ref 0–0.4)
EOSINOPHIL # BLD AUTO: 0.09 10*3/MM3 (ref 0–0.4)
EOSINOPHIL NFR BLD AUTO: 0 % (ref 0.3–6.2)
EOSINOPHIL NFR BLD AUTO: 1.1 % (ref 0.3–6.2)
ERYTHROCYTE [DISTWIDTH] IN BLOOD BY AUTOMATED COUNT: 12.8 % (ref 12.3–15.4)
ERYTHROCYTE [DISTWIDTH] IN BLOOD BY AUTOMATED COUNT: 12.8 % (ref 12.3–15.4)
GFR SERPL CREATININE-BSD FRML MDRD: 41 ML/MIN/1.73
GFR SERPL CREATININE-BSD FRML MDRD: 42 ML/MIN/1.73
GLOBULIN UR ELPH-MCNC: 3 GM/DL
GLOBULIN UR ELPH-MCNC: 3.1 GM/DL
GLUCOSE BLD-MCNC: 128 MG/DL (ref 65–99)
GLUCOSE BLD-MCNC: 156 MG/DL (ref 65–99)
GLUCOSE BLDC GLUCOMTR-MCNC: 125 MG/DL (ref 70–130)
GLUCOSE UR STRIP-MCNC: NEGATIVE MG/DL
HCT VFR BLD AUTO: 34.2 % (ref 34–46.6)
HCT VFR BLD AUTO: 36.2 % (ref 34–46.6)
HGB BLD-MCNC: 11.7 G/DL (ref 12–15.9)
HGB BLD-MCNC: 12 G/DL (ref 12–15.9)
HGB UR QL STRIP.AUTO: NEGATIVE
IMM GRANULOCYTES # BLD AUTO: 0.06 10*3/MM3 (ref 0–0.05)
IMM GRANULOCYTES # BLD AUTO: 0.06 10*3/MM3 (ref 0–0.05)
IMM GRANULOCYTES NFR BLD AUTO: 0.5 % (ref 0–0.5)
IMM GRANULOCYTES NFR BLD AUTO: 0.7 % (ref 0–0.5)
IMP STRAIN: NOT DETECTED
KETONES UR QL STRIP: NEGATIVE
KPC STRAIN: NOT DETECTED
LEUKOCYTE ESTERASE UR QL STRIP.AUTO: NEGATIVE
LYMPHOCYTES # BLD AUTO: 0.97 10*3/MM3 (ref 0.7–3.1)
LYMPHOCYTES # BLD AUTO: 1.37 10*3/MM3 (ref 0.7–3.1)
LYMPHOCYTES NFR BLD AUTO: 16.1 % (ref 19.6–45.3)
LYMPHOCYTES NFR BLD AUTO: 8.6 % (ref 19.6–45.3)
MCH RBC QN AUTO: 30.1 PG (ref 26.6–33)
MCH RBC QN AUTO: 30.7 PG (ref 26.6–33)
MCHC RBC AUTO-ENTMCNC: 33.1 G/DL (ref 31.5–35.7)
MCHC RBC AUTO-ENTMCNC: 34.2 G/DL (ref 31.5–35.7)
MCV RBC AUTO: 89.8 FL (ref 79–97)
MCV RBC AUTO: 90.7 FL (ref 79–97)
MONOCYTES # BLD AUTO: 0.43 10*3/MM3 (ref 0.1–0.9)
MONOCYTES # BLD AUTO: 0.47 10*3/MM3 (ref 0.1–0.9)
MONOCYTES NFR BLD AUTO: 3.8 % (ref 5–12)
MONOCYTES NFR BLD AUTO: 5.5 % (ref 5–12)
NDM STRAIN: NOT DETECTED
NEUTROPHILS # BLD AUTO: 6.48 10*3/MM3 (ref 1.7–7)
NEUTROPHILS # BLD AUTO: 9.84 10*3/MM3 (ref 1.7–7)
NEUTROPHILS NFR BLD AUTO: 76 % (ref 42.7–76)
NEUTROPHILS NFR BLD AUTO: 86.8 % (ref 42.7–76)
NITRITE UR QL STRIP: NEGATIVE
NRBC BLD AUTO-RTO: 0 /100 WBC (ref 0–0.2)
NRBC BLD AUTO-RTO: 0 /100 WBC (ref 0–0.2)
OXA 48 STRAIN: NOT DETECTED
PH UR STRIP.AUTO: 6 [PH] (ref 5–9)
PLATELET # BLD AUTO: 264 10*3/MM3 (ref 140–450)
PLATELET # BLD AUTO: 282 10*3/MM3 (ref 140–450)
PMV BLD AUTO: 8.6 FL (ref 6–12)
PMV BLD AUTO: 8.9 FL (ref 6–12)
POTASSIUM BLD-SCNC: 4 MMOL/L (ref 3.5–5.2)
POTASSIUM BLD-SCNC: 4.6 MMOL/L (ref 3.5–5.2)
PROT SERPL-MCNC: 6.4 G/DL (ref 6–8.5)
PROT SERPL-MCNC: 6.5 G/DL (ref 6–8.5)
PROT UR QL STRIP: NEGATIVE
RBC # BLD AUTO: 3.81 10*6/MM3 (ref 3.77–5.28)
RBC # BLD AUTO: 3.99 10*6/MM3 (ref 3.77–5.28)
SODIUM BLD-SCNC: 130 MMOL/L (ref 136–145)
SODIUM BLD-SCNC: 131 MMOL/L (ref 136–145)
SP GR UR STRIP: 1.01 (ref 1–1.03)
TROPONIN T SERPL-MCNC: <0.01 NG/ML (ref 0–0.03)
UROBILINOGEN UR QL STRIP: ABNORMAL
VIM STRAIN: NOT DETECTED
WBC NRBC COR # BLD: 11.33 10*3/MM3 (ref 3.4–10.8)
WBC NRBC COR # BLD: 8.52 10*3/MM3 (ref 3.4–10.8)

## 2019-05-31 PROCEDURE — 93306 TTE W/DOPPLER COMPLETE: CPT

## 2019-05-31 PROCEDURE — 71045 X-RAY EXAM CHEST 1 VIEW: CPT

## 2019-05-31 PROCEDURE — 80053 COMPREHEN METABOLIC PANEL: CPT | Performed by: STUDENT IN AN ORGANIZED HEALTH CARE EDUCATION/TRAINING PROGRAM

## 2019-05-31 PROCEDURE — G0378 HOSPITAL OBSERVATION PER HR: HCPCS

## 2019-05-31 PROCEDURE — 80053 COMPREHEN METABOLIC PANEL: CPT | Performed by: EMERGENCY MEDICINE

## 2019-05-31 PROCEDURE — 72125 CT NECK SPINE W/O DYE: CPT

## 2019-05-31 PROCEDURE — 70450 CT HEAD/BRAIN W/O DYE: CPT

## 2019-05-31 PROCEDURE — 93306 TTE W/DOPPLER COMPLETE: CPT | Performed by: INTERNAL MEDICINE

## 2019-05-31 PROCEDURE — 85025 COMPLETE CBC W/AUTO DIFF WBC: CPT | Performed by: EMERGENCY MEDICINE

## 2019-05-31 PROCEDURE — 93010 ELECTROCARDIOGRAM REPORT: CPT | Performed by: INTERNAL MEDICINE

## 2019-05-31 PROCEDURE — 85025 COMPLETE CBC W/AUTO DIFF WBC: CPT | Performed by: STUDENT IN AN ORGANIZED HEALTH CARE EDUCATION/TRAINING PROGRAM

## 2019-05-31 PROCEDURE — 87081 CULTURE SCREEN ONLY: CPT | Performed by: FAMILY MEDICINE

## 2019-05-31 PROCEDURE — 96361 HYDRATE IV INFUSION ADD-ON: CPT

## 2019-05-31 PROCEDURE — 70551 MRI BRAIN STEM W/O DYE: CPT

## 2019-05-31 PROCEDURE — 82962 GLUCOSE BLOOD TEST: CPT

## 2019-05-31 PROCEDURE — 96360 HYDRATION IV INFUSION INIT: CPT

## 2019-05-31 PROCEDURE — 84484 ASSAY OF TROPONIN QUANT: CPT | Performed by: EMERGENCY MEDICINE

## 2019-05-31 PROCEDURE — 81003 URINALYSIS AUTO W/O SCOPE: CPT | Performed by: EMERGENCY MEDICINE

## 2019-05-31 PROCEDURE — 93005 ELECTROCARDIOGRAM TRACING: CPT | Performed by: EMERGENCY MEDICINE

## 2019-05-31 RX ORDER — SODIUM CHLORIDE 9 MG/ML
75 INJECTION, SOLUTION INTRAVENOUS CONTINUOUS
Status: DISCONTINUED | OUTPATIENT
Start: 2019-05-31 | End: 2019-05-31 | Stop reason: HOSPADM

## 2019-05-31 RX ORDER — ASPIRIN 325 MG
325 TABLET ORAL DAILY
Status: DISCONTINUED | OUTPATIENT
Start: 2019-05-31 | End: 2019-05-31 | Stop reason: HOSPADM

## 2019-05-31 RX ORDER — ROSUVASTATIN CALCIUM 5 MG/1
5 TABLET, COATED ORAL DAILY
Qty: 30 TABLET | Refills: 0 | Status: SHIPPED | OUTPATIENT
Start: 2019-05-31 | End: 2022-09-12

## 2019-05-31 RX ORDER — SODIUM CHLORIDE 0.9 % (FLUSH) 0.9 %
10 SYRINGE (ML) INJECTION AS NEEDED
Status: DISCONTINUED | OUTPATIENT
Start: 2019-05-31 | End: 2019-05-31 | Stop reason: HOSPADM

## 2019-05-31 RX ORDER — SODIUM CHLORIDE 9 MG/ML
75 INJECTION, SOLUTION INTRAVENOUS CONTINUOUS
Status: DISCONTINUED | OUTPATIENT
Start: 2019-05-31 | End: 2019-05-31

## 2019-05-31 RX ORDER — SODIUM CHLORIDE 1000 MG
2 TABLET, SOLUBLE MISCELLANEOUS
Status: DISCONTINUED | OUTPATIENT
Start: 2019-05-31 | End: 2019-05-31 | Stop reason: HOSPADM

## 2019-05-31 RX ORDER — FERROUS SULFATE TAB EC 324 MG (65 MG FE EQUIVALENT) 324 (65 FE) MG
324 TABLET DELAYED RESPONSE ORAL
Status: DISCONTINUED | OUTPATIENT
Start: 2019-05-31 | End: 2019-05-31 | Stop reason: HOSPADM

## 2019-05-31 RX ORDER — ALBUTEROL SULFATE 2.5 MG/3ML
2.5 SOLUTION RESPIRATORY (INHALATION) EVERY 4 HOURS PRN
Status: DISCONTINUED | OUTPATIENT
Start: 2019-05-31 | End: 2019-05-31 | Stop reason: HOSPADM

## 2019-05-31 RX ORDER — ONDANSETRON 2 MG/ML
4 INJECTION INTRAMUSCULAR; INTRAVENOUS EVERY 6 HOURS PRN
Status: DISCONTINUED | OUTPATIENT
Start: 2019-05-31 | End: 2019-05-31 | Stop reason: HOSPADM

## 2019-05-31 RX ORDER — AMLODIPINE BESYLATE 5 MG/1
5 TABLET ORAL
Status: DISCONTINUED | OUTPATIENT
Start: 2019-05-31 | End: 2019-05-31 | Stop reason: HOSPADM

## 2019-05-31 RX ORDER — ATORVASTATIN CALCIUM 40 MG/1
80 TABLET, FILM COATED ORAL NIGHTLY
Status: DISCONTINUED | OUTPATIENT
Start: 2019-05-31 | End: 2019-05-31

## 2019-05-31 RX ORDER — ATORVASTATIN CALCIUM 10 MG/1
10 TABLET, FILM COATED ORAL NIGHTLY
Status: DISCONTINUED | OUTPATIENT
Start: 2019-05-31 | End: 2019-05-31 | Stop reason: HOSPADM

## 2019-05-31 RX ORDER — SODIUM CHLORIDE 0.9 % (FLUSH) 0.9 %
5 SYRINGE (ML) INJECTION AS NEEDED
Status: DISCONTINUED | OUTPATIENT
Start: 2019-05-31 | End: 2019-05-31 | Stop reason: HOSPADM

## 2019-05-31 RX ORDER — SODIUM CHLORIDE 0.9 % (FLUSH) 0.9 %
3 SYRINGE (ML) INJECTION EVERY 12 HOURS SCHEDULED
Status: DISCONTINUED | OUTPATIENT
Start: 2019-05-31 | End: 2019-05-31 | Stop reason: HOSPADM

## 2019-05-31 RX ORDER — SODIUM CHLORIDE 0.9 % (FLUSH) 0.9 %
3-10 SYRINGE (ML) INJECTION AS NEEDED
Status: DISCONTINUED | OUTPATIENT
Start: 2019-05-31 | End: 2019-05-31 | Stop reason: HOSPADM

## 2019-05-31 RX ORDER — ACETAMINOPHEN 325 MG/1
650 TABLET ORAL EVERY 4 HOURS PRN
Status: DISCONTINUED | OUTPATIENT
Start: 2019-05-31 | End: 2019-05-31 | Stop reason: HOSPADM

## 2019-05-31 RX ORDER — LANOLIN ALCOHOL/MO/W.PET/CERES
5 CREAM (GRAM) TOPICAL NIGHTLY
Status: DISCONTINUED | OUTPATIENT
Start: 2019-05-31 | End: 2019-05-31 | Stop reason: HOSPADM

## 2019-05-31 RX ORDER — SODIUM CHLORIDE 9 MG/ML
75 INJECTION, SOLUTION INTRAVENOUS ONCE
Qty: 1000 ML | Refills: 0 | Status: SHIPPED | OUTPATIENT
Start: 2019-05-31 | End: 2019-05-31

## 2019-05-31 RX ADMIN — SODIUM CHLORIDE TAB 1 GM 2 G: 1 TAB at 17:16

## 2019-05-31 RX ADMIN — AMLODIPINE BESYLATE 5 MG: 5 TABLET ORAL at 09:56

## 2019-05-31 RX ADMIN — SODIUM CHLORIDE, PRESERVATIVE FREE 3 ML: 5 INJECTION INTRAVENOUS at 10:29

## 2019-05-31 RX ADMIN — SODIUM CHLORIDE 75 ML/HR: 9 INJECTION, SOLUTION INTRAVENOUS at 12:16

## 2019-05-31 RX ADMIN — SODIUM CHLORIDE TAB 1 GM 2 G: 1 TAB at 12:16

## 2019-05-31 RX ADMIN — SODIUM CHLORIDE TAB 1 GM 2 G: 1 TAB at 09:56

## 2019-05-31 RX ADMIN — POLYETHYLENE GLYCOL 3350 17 G: 17 POWDER, FOR SOLUTION ORAL at 09:56

## 2019-05-31 RX ADMIN — SODIUM CHLORIDE 75 ML/HR: 9 INJECTION, SOLUTION INTRAVENOUS at 04:42

## 2019-05-31 RX ADMIN — FERROUS SULFATE TAB EC 324 MG (65 MG FE EQUIVALENT) 324 MG: 324 (65 FE) TABLET DELAYED RESPONSE at 10:01

## 2019-05-31 RX ADMIN — ASPIRIN 325 MG: 325 TABLET, COATED ORAL at 09:56

## 2019-06-01 NOTE — DISCHARGE SUMMARY
"    DISCHARGE SUMMARY    PATIENT NAME: Marlene Horne       PHYSICIAN: Srinivasa Chairez III, MD  : 1931  MRN: [id number]    ADMITTED: 2019     DISCHARGED: 19    ADMISSION DIAGNOSES:  Active Hospital Problems    Diagnosis  POA   • **Altered mental status [R41.82]  Yes   • Major neurocognitive disorder [F01.50]  Yes   • Iron deficiency anemia secondary to inadequate dietary iron intake [D50.8]  Yes   • Hyponatremia [E87.1]  Yes   • Stage 3 chronic kidney disease (CMS/HCC) [N18.3]  Yes      Resolved Hospital Problems   No resolved problems to display.     DISCHARGE DIAGNOSES:   Active Hospital Problems    Diagnosis  POA   • **Altered mental status [R41.82]  Yes   • Major neurocognitive disorder [F01.50]  Yes   • Iron deficiency anemia secondary to inadequate dietary iron intake [D50.8]  Yes   • Hyponatremia [E87.1]  Yes   • Stage 3 chronic kidney disease (CMS/HCC) [N18.3]  Yes      Resolved Hospital Problems   No resolved problems to display.       SERVICE: Family Medicine.  Attending: Dr. Meier  Resident: Srinivasa Chairez III, MD    CONSULTS:   Consult Orders (all) (From admission, onward)    None          PROCEDURES:   MRI, echo    HISTORY OF PRESENT ILLNESS:   Patient is a 87 y.o. female presented with Marlene Horne is a 87 y.o. female with a CMH of dementia, ALANA,hyponatremia, CKD III, HTN who presents after being found unconscious in NH after unwitnessed fall.   Brought in by EMS after unwitnessed fall at the NH. Patient was found by nursing staff face down near her bed and bathroom. Spoke with nursing staff at [facility] for history. Patient has had no issues prior to the event. Had to be shaken to regain consciousness. Per nursing staff patient did seem \"loopy\" and not at her normal baseline. The NH plan was for patient to be sent to [facility] for possible need of neuro coverage. POA refused and wanted her brought to [facility]. Patient present in room today with daughter who is " the POA.   In the ED patient had CT head/lumbar which showed no active disease process. Patient was responding to name but could not recall the events. Patient has a history of dementia and according to daughter was acting at her baseline. She is able to tell me her name but not the location or time. All other labs were within normal limits.     Per Dr. Charlie Herr H&P 5-31-19    DIAGNOSTIC DATA:   Lab Results (all)     Procedure Component Value Units Date/Time    CRE Screen by PCR - Swab, Large Intestine, Rectum [[id number]] Collected:  05/31/19 0640    Specimen:  Swab from Large Intestine, Rectum Updated:  05/31/19 0825     CRE SCREEN Not Detected     Comment: Test performed by real-time polymerase chain reaction (qPCR).        OXA 48 Strain Not Detected     IMP STRAIN Not Detected     VIM STRAIN Not Detected     NDM Strain Not Detected     KPC Strain Not Detected    Comprehensive Metabolic Panel [[id number]]  (Abnormal) Collected:  05/31/19 0655    Specimen:  Blood Updated:  05/31/19 0751     Glucose 128 mg/dL      BUN 14 mg/dL      Creatinine 1.20 mg/dL      Sodium 131 mmol/L      Potassium 4.6 mmol/L      Chloride 96 mmol/L      CO2 26.0 mmol/L      Calcium 8.9 mg/dL      Total Protein 6.5 g/dL      Albumin 3.50 g/dL      ALT (SGPT) 16 U/L      AST (SGOT) 18 U/L      Alkaline Phosphatase 96 U/L      Total Bilirubin 0.3 mg/dL      eGFR Non African Amer 42 mL/min/1.73      Globulin 3.0 gm/dL      A/G Ratio 1.2 g/dL      BUN/Creatinine Ratio 11.7     Anion Gap 9.0 mmol/L     Narrative:       GFR Normal >60  Chronic Kidney Disease <60  Kidney Failure <15    CBC & Differential [[id number]] Collected:  05/31/19 0655    Specimen:  Blood Updated:  05/31/19 0728    Narrative:       The following orders were created for panel order CBC & Differential.  Procedure                               Abnormality         Status                     ---------                               -----------         ------                      CBC Auto Differential[[id number]]        Abnormal            Final result                 Please view results for these tests on the individual orders.    CBC Auto Differential [[id number]]  (Abnormal) Collected:  05/31/19 0655    Specimen:  Blood Updated:  05/31/19 0728     WBC 11.33 10*3/mm3      RBC 3.99 10*6/mm3      Hemoglobin 12.0 g/dL      Hematocrit 36.2 %      MCV 90.7 fL      MCH 30.1 pg      MCHC 33.1 g/dL      RDW 12.8 %      RDW-SD 42.5 fl      MPV 8.9 fL      Platelets 282 10*3/mm3      Neutrophil % 86.8 %      Lymphocyte % 8.6 %      Monocyte % 3.8 %      Eosinophil % 0.0 %      Basophil % 0.3 %      Immature Grans % 0.5 %      Neutrophils, Absolute 9.84 10*3/mm3      Lymphocytes, Absolute 0.97 10*3/mm3      Monocytes, Absolute 0.43 10*3/mm3      Eosinophils, Absolute 0.00 10*3/mm3      Basophils, Absolute 0.03 10*3/mm3      Immature Grans, Absolute 0.06 10*3/mm3      nRBC 0.0 /100 WBC     Comprehensive Metabolic Panel [[id number]]  (Abnormal) Collected:  05/31/19 0116    Specimen:  Blood from Arm, Right Updated:  05/31/19 0139     Glucose 156 mg/dL      BUN 15 mg/dL      Creatinine 1.25 mg/dL      Sodium 130 mmol/L      Potassium 4.0 mmol/L      Chloride 96 mmol/L      CO2 23.0 mmol/L      Calcium 9.0 mg/dL      Total Protein 6.4 g/dL      Albumin 3.30 g/dL      ALT (SGPT) 16 U/L      AST (SGOT) 19 U/L      Alkaline Phosphatase 94 U/L      Total Bilirubin 0.2 mg/dL      eGFR Non African Amer 41 mL/min/1.73      Globulin 3.1 gm/dL      A/G Ratio 1.1 g/dL      BUN/Creatinine Ratio 12.0     Anion Gap 11.0 mmol/L     Narrative:       GFR Normal >60  Chronic Kidney Disease <60  Kidney Failure <15    Troponin [[id number]]  (Normal) Collected:  05/31/19 0116    Specimen:  Blood from Arm, Right Updated:  05/31/19 0139     Troponin T <0.010 ng/mL     Narrative:       Troponin T Reference Range:  <= 0.03 ng/mL-   Negative for AMI  >0.03 ng/mL-     Abnormal for myocardial necrosis.  Clinicians would have to  utilize clinical acumen, EKG, Troponin and serial changes to determine if it is an Acute Myocardial Infarction or myocardial injury due to an underlying chronic condition.     CBC & Differential [[id number]] Collected:  05/31/19 0116    Specimen:  Blood Updated:  05/31/19 0122    Narrative:       The following orders were created for panel order CBC & Differential.  Procedure                               Abnormality         Status                     ---------                               -----------         ------                     CBC Auto Differential[[id number]]        Abnormal            Final result                 Please view results for these tests on the individual orders.    CBC Auto Differential [[id number]]  (Abnormal) Collected:  05/31/19 0116    Specimen:  Blood from Arm, Right Updated:  05/31/19 0122     WBC 8.52 10*3/mm3      RBC 3.81 10*6/mm3      Hemoglobin 11.7 g/dL      Hematocrit 34.2 %      MCV 89.8 fL      MCH 30.7 pg      MCHC 34.2 g/dL      RDW 12.8 %      RDW-SD 42.0 fl      MPV 8.6 fL      Platelets 264 10*3/mm3      Neutrophil % 76.0 %      Lymphocyte % 16.1 %      Monocyte % 5.5 %      Eosinophil % 1.1 %      Basophil % 0.6 %      Immature Grans % 0.7 %      Neutrophils, Absolute 6.48 10*3/mm3      Lymphocytes, Absolute 1.37 10*3/mm3      Monocytes, Absolute 0.47 10*3/mm3      Eosinophils, Absolute 0.09 10*3/mm3      Basophils, Absolute 0.05 10*3/mm3      Immature Grans, Absolute 0.06 10*3/mm3      nRBC 0.0 /100 WBC     Urinalysis With Microscopic If Indicated (No Culture) - Urine, Catheter [[id number]]  (Abnormal) Collected:  05/31/19 0102    Specimen:  Urine, Catheter Updated:  05/31/19 0117     Color, UA Yellow     Appearance, UA Cloudy     pH, UA 6.0     Specific Gravity, UA 1.007     Glucose, UA Negative     Ketones, UA Negative     Bilirubin, UA Negative     Blood, UA Negative     Protein, UA Negative     Leuk Esterase, UA Negative     Nitrite, UA Negative     Urobilinogen, UA 0.2  E.U./dL    Narrative:       Urine microscopic not indicated.    POC Glucose Once [[id number]]  (Normal) Collected:  05/31/19 0003    Specimen:  Blood Updated:  05/31/19 0047     Glucose 125 mg/dL      Comment: RN NotifiedOperator: [id number] ANU CRAWFORDMeter ID: [id number]           Imaging Results (all)     Procedure Component Value Units Date/Time    MRI Brain Without Contrast [[id number]] Collected:  05/31/19 1121     Updated:  05/31/19 1339    Narrative:       MRI brain without contrast.     CLINICAL INDICATION: Evaluate for recurrent stroke.          COMPARISON: CT brain May 31, 2019. MRI brain February 27, 2019.     PROCEDURE/TECHNIQUE:    MRI of the brain was performed utilizing the standard protocol  without the administration of gadolinium.    FINDINGS: Diffusion-weighted images demonstrate no restricted  diffusion i.e. no evidence of acute stroke.    There are involutional, atrophic changes.    Foci of increased signal intensity, chronic small vessel ischemic  changes. 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    The gyri and sulci are otherwise unremarkable, bilaterally  symmetric consistent with patient's age.  No mass lesions,  hydrocephalus, midline shift, intracranial hemorrhage, or  abnormal intra- or extra-axial fluid collections are identified.   The brainstem and sellar and parasellar structures are without  abnormalities.  The orbits, sinuses and mastoid air cells are  unremarkable.  The visualized intracranial vessels show normal  signal void.      Impression:       CONCLUSION: Involutional, atrophic changes. Periventricular foci  of increased signal intensity, chronic small vessel ischemic  changes. 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    Otherwise unremarkable MRI of the brain without contrast.     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  Recommend ultrasound if no previous studies available for  comparison..    Electronically signed by:  Hayes German MD  5/31/2019 3:04 AM  CDT Workstation: [id number]    Signed:  05/31/19 0304 by Hayes German MD    Narrative:       Exam: CT cervical spine without contrast.    INDICATION: Status post fall. Confusion state    TECHNIQUE: Routine CT cervical spine without contrast. Sagittal  coronal reconstructions were obtained. This exam was performed  according to our departmental dose-optimization program, which  includes automated exposure control, adjustment of the mA and/or  kV according to patient size and/or use of iterative  reconstruction technique.    FINDINGS: No prevertebral soft tissue swelling. The cervical  vertebral bodies are normal in height. There is mild to moderate  disc space narrowing present throughout the cervical spine. No  subluxation. No acute fracture. Mild facet arthropathy is  present.    C2-C3: No central stenosis. Moderate to severe left foraminal  narrowing.    C3-C4: No central stenosis. Moderate to severe left foraminal  stenosis and moderate right neural foraminal stenosis    C4-C5: No spinal stenosis. Moderate left foraminal stenosis    C5-C6: Mild central stenosis due to posterior disc ridging.  Moderate to severe left foraminal stenosis and mild-to-moderate  right neural foraminal narrowing    C6-C7: Mild canal narrowing due to posterior disc ridging. There  is moderate to severe left foraminal narrowing    C7-T1: No central stenosis mild to moderate bilateral neural  foraminal narrowing.      Impression:       1. Multilevel degenerative changes as described  2. No acute bony abnormality    Electronically signed by:  Hayes German MD  5/31/2019 1:22 AM  CDT Workstation: [id number]    XR Chest 1 View [[id number]] Collected:  05/31/19 0049     Updated:  05/31/19 0126     Narrative:       Exam: AP portable chest.    INDICATION: Altered mental status    COMPARISON: 7/27/2019    FINDINGS: AP portable chest. The bony structures are intact. The  cardiomediastinal LAT is unremarkable. Lungs are mildly  hyperinflated. No acute infiltrate, pneumothorax or pleural  effusion.      Impression:       No acute cardiopulmonary abnormality.    Electronically signed by:  Hayes German MD  5/31/2019 1:25 AM  CDT Workstation: [id number]    CT Head Without Contrast [[id number]] Collected:  05/31/19 0028     Updated:  05/31/19 0107    Narrative:       Exam: Head CT without contrast    INDICATION: Altered mental status    TECHNIQUE: Routine head CT without contrast. Sagittal coronal  reconstructions were obtained. This exam was performed according  to our departmental dose-optimization program, which includes  automated exposure control, adjustment of the mA and/or kV  according to patient size and/or use of iterative reconstruction  technique.    COMPARISON: 7/27/2019    FINDINGS: The bony calvarium is intact. The imaged paranasal  sinuses and mastoid air cells are clear. Mild plaque is present  in the intracranial arteries. No extra-axial fluid collection.  Enlargement of the ventricles and sulci compatible with  age-related atrophy. Gray-white congestion is maintained. Minimal  chronic ischemic change. Mild encephalomalacia is present in the  medial right occipital lobe compatible prior infarct. No obvious  sign of acute infarction. No intraparenchymal hemorrhage, mass or  midline shift        Impression:       No obvious acute intracranial abnormality. Recommend  follow-up as clinically warranted    Electronically signed by:  Hayes German MD  5/31/2019 1:06 AM  CDT Workstation: [id number][facility] COURSE:  Marlene Horne 87-year-old female admitted with altered mental status, not much separate from her baseline.  Patient with profound history of hyponatremia which had come  down to 130 despite 6 g of salt tablets taken 3 times a day 2 g at a time.  Patient received IV fluids, MRI scan after CT head are all negative for acute neurological process.  Patient is due to 1.5 L fluid restriction and should be continue to nursing home setting this will aid with her hyper kalemia and hyponatremia difficult to be able to tolerate a to achieve urinary tract area with profound enteral sodium repletion.  Echocardiogram also showed no significant cardiovascular disease.  Patient with a history of dementia and continue to persist at her neurologic baseline during the course of her evaluation and was sent back to [facility] for further resumption of her medical care and her 1.5 L fluid restriction.      DISCHARGE CONDITION:   Good    DISPOSITION:  Skilled Nursing Facility (DC - External)    DISCHARGE MEDICATIONS     Discharge Medications      New Medications      Instructions Start Date   aspirin 81 MG tablet   81 mg, Oral, Daily   Start Date:  6/1/2019     rosuvastatin 5 MG tablet  Commonly known as:  CRESTOR   5 mg, Oral, Daily         Continue These Medications      Instructions Start Date   acetaminophen 325 MG tablet  Commonly known as:  TYLENOL   650 mg, Oral, Every 4 Hours PRN      albuterol (2.5 MG/3ML) 0.083% nebulizer solution  Commonly known as:  PROVENTIL   2.5 mg, Nebulization, Every 4 Hours PRN      amLODIPine 5 MG tablet  Commonly known as:  NORVASC   5 mg, Oral, Every 24 Hours Scheduled      ferrous sulfate 324 (65 Fe) MG tablet delayed-release EC tablet   324 mg, Oral, Daily With Breakfast      melatonin 5 MG tablet tablet   5 mg, Oral, Nightly      polyethylene glycol pack packet  Commonly known as:  MIRALAX   17 g, Oral, Daily      sennosides-docusate sodium 8.6-50 MG tablet  Commonly known as:  SENOKOT-S   2 tablets, Oral, Nightly      sodium chloride 1 g tablet   2 g, Oral, 3 Times Daily With Meals             INSTRUCTIONS:  Activity:   Activity Instructions      Activity as Tolerated          Diet:   Diet Instructions     Diet: Regular      Discharge Diet:  Regular    Fluid Restriction per day:  1500 mL Fluid        Special instructions: Patient instructed to call MD or return to ED with worsening shortness of breath, chest pain, fever greater than 100.4 degrees F or any other medical concerns..    FOLLOW UP:   Additional Instructions for the Follow-ups that You Need to Schedule     Call MD With Problems / Concerns   As directed      Instructions: decreased altered mental status from baseline, fever, shortness of breath, chest discomfort    Order Comments:  Instructions: decreased altered mental status from baseline, fever, shortness of breath, chest discomfort          Discharge Follow-up with PCP   As directed       Currently Documented PCP:    Chuckie Nguyen MD    PCP Phone Number:    [phone]     Follow Up Details:  1 week           Follow-up Information     Chuckie Nguyen MD Follow up.    Specialty:  Family Medicine  Why:  1 week  Contact information:  [address] [place] [address]  [phone]                   PENDING TEST RESULTS AT DISCHARGE      Time: greater than 30 minutes were spent in the planning of this discharge.    Dr. Meier is the attending at time of discharge, He is aware of the patient's status and agrees with the above discharge summary.        This document has been electronically signed by Srinivasa Chairez III, MD on May 31, 2019 10:43 PM

## 2019-06-07 ENCOUNTER — NURSING HOME (OUTPATIENT)
Dept: FAMILY MEDICINE CLINIC | Facility: CLINIC | Age: 84
End: 2019-06-07

## 2019-06-07 VITALS
HEIGHT: 65 IN | TEMPERATURE: 98.6 F | OXYGEN SATURATION: 97 % | WEIGHT: 145.4 LBS | RESPIRATION RATE: 16 BRPM | BODY MASS INDEX: 24.22 KG/M2 | SYSTOLIC BLOOD PRESSURE: 123 MMHG | HEART RATE: 72 BPM | DIASTOLIC BLOOD PRESSURE: 59 MMHG

## 2019-06-07 DIAGNOSIS — F03.90 MAJOR NEUROCOGNITIVE DISORDER (HCC): Primary | ICD-10-CM

## 2019-06-07 DIAGNOSIS — K59.00 CONSTIPATION, UNSPECIFIED CONSTIPATION TYPE: ICD-10-CM

## 2019-06-07 DIAGNOSIS — E87.1 HYPONATREMIA: ICD-10-CM

## 2019-06-07 PROCEDURE — 99307 SBSQ NF CARE SF MDM 10: CPT | Performed by: STUDENT IN AN ORGANIZED HEALTH CARE EDUCATION/TRAINING PROGRAM

## 2019-06-07 NOTE — PROGRESS NOTES
MONTHLY NURSING HOME VISIT    NAME: Marlene Horne  : 1931  MRN: [id number]    DATE & TIME SEEN: 19 0800 hours    PCP: Chuckie Nguyen MD    NURSING HOME: [facility]    Monthly residential Visit    Chief Complaint: Monthly visit    Subjective:     Marlene Horne is a 87 y.o. female with CMH significant for neurocognitive dementia, hyponatremia, and constipation is seen today at [facility] for her monthly check up. She is a resident of [facility] due to her dementia. She reports that her constipation has improved to normal bowel movements and she has no complaints. She was recently hospitalized after being found face down in an unwitnessed fall and hospital work up did not find an etiology. POA had refused transfer to [place] for neuro work up.      Current Meds:    Current Outpatient Medications:   •  acetaminophen (TYLENOL) 325 MG tablet, Take 2 tablets by mouth Every 4 (Four) Hours As Needed for Mild Pain ., Disp: , Rfl:   •  albuterol (PROVENTIL) (2.5 MG/3ML) 0.083% nebulizer solution, Take 2.5 mg by nebulization Every 4 (Four) Hours As Needed for Shortness of Air., Disp: , Rfl: 12  •  amLODIPine (NORVASC) 5 MG tablet, Take 1 tablet by mouth Daily., Disp: , Rfl:   •  aspirin 81 MG tablet, Take 1 tablet by mouth Daily., Disp: 90 tablet, Rfl: 2  •  ferrous sulfate 324 (65 Fe) MG tablet delayed-release EC tablet, Take 1 tablet by mouth Daily With Breakfast., Disp: 30 tablet, Rfl:   •  melatonin 5 MG tablet tablet, Take 5 mg by mouth Every Night., Disp: , Rfl:   •  polyethylene glycol (MIRALAX) pack packet, Take 17 g by mouth Daily., Disp: , Rfl:   •  rosuvastatin (CRESTOR) 5 MG tablet, Take 1 tablet by mouth Daily., Disp: 30 tablet, Rfl: 0  •  sennosides-docusate sodium (SENOKOT-S) 8.6-50 MG tablet, Take 2 tablets by mouth Every Night., Disp: 30 tablet, Rfl: 0  •  sodium chloride 1 g tablet, Take 2 tablets by mouth 3 (Three) Times a Day With Meals., Disp: 180  "tablet, Rfl: 0    Allergies:  Patient has no known allergies.    Review of Systems:  Review of Systems   Constitutional: Negative for appetite change, chills, diaphoresis, fatigue and fever.   HENT: Negative for ear pain, postnasal drip, rhinorrhea and sore throat.    Eyes: Negative for pain and visual disturbance.   Respiratory: Negative for cough, chest tightness and shortness of breath.    Cardiovascular: Negative for chest pain, palpitations and leg swelling.   Gastrointestinal: Negative for abdominal pain, constipation, diarrhea, nausea and vomiting.   Endocrine: Negative for polydipsia and polyphagia.   Genitourinary: Negative for dysuria, flank pain, frequency and urgency.   Musculoskeletal: Negative for arthralgias, back pain and myalgias.   Skin: Negative for pallor and rash.   Neurological: Negative for dizziness, seizures, syncope, weakness and numbness.       Objective:     /59   Pulse 72   Temp 98.6 °F (37 °C)   Resp 16   Ht 164.1 cm (64.61\")   Wt 66 kg (145 lb 6.4 oz)   SpO2 97%   BMI 24.49 kg/m²   Physical Exam   Constitutional: She is oriented to person, place, and time. She appears well-developed and well-nourished. No distress.   HENT:   Head: Normocephalic and atraumatic.   Right Ear: External ear normal.   Left Ear: External ear normal.   Nose: Nose normal.   Eyes: Conjunctivae and EOM are normal. Pupils are equal, round, and reactive to light.   Neck: Normal range of motion. Neck supple. No thyromegaly present.   Cardiovascular: Normal rate, regular rhythm, normal heart sounds and intact distal pulses.   Pulmonary/Chest: Effort normal and breath sounds normal. No respiratory distress. She has no wheezes.   Abdominal: Soft. Bowel sounds are normal. She exhibits no distension. There is no tenderness.   Musculoskeletal: Normal range of motion. She exhibits no edema or tenderness.   Lymphadenopathy:     She has no cervical adenopathy.   Neurological: She is alert and oriented to person, " place, and time.   Skin: Skin is warm and dry. Capillary refill takes less than 2 seconds.   Psychiatric: She has a normal mood and affect. Her behavior is normal.       Diagnostic Data:     Please refer to chart at [facility] for recent labwork. All analytes were within acceptable limits.      Assessment/Plan:      87 y.o. female with:  Problem List Items Addressed This Visit        Digestive    Constipation    Overview     Senokot 2 pills at night scheduled. Miralaax 17 grams daily scheduled.             Other    Major neurocognitive disorder - Primary    Overview     Alzheimer's Dementia with Vascular Dementia  Patient POA (daughter) has previously refused treatment with Aricept 5 mg daily.    - CT head: negative for acute disease           Hyponatremia    Overview     Continue home  2 g sodium tablets 3 times daily.                     CODE STATUS: DNR    Dr. Bell  is the attending on record for this patient, She is aware of the patient's status and agrees with the above.      Chuckie Nguyen M.D. PGY1  [facility]  [address] [place] [address]  Office: [phone]    This document has been electronically signed by Chuckie Nguyen MD on June 7, 2019 3:09 PM

## 2019-06-16 NOTE — PROGRESS NOTES
MONTHLY NURSING HOME VISIT    NAME: Marlene Horne  : 1931  MRN: [id number]    DATE & TIME SEEN: 2019  PCP: Chuckie Nguyen MD    NURSING HOME: [facility]    Monthly Nursing Home Visit for 2019    Chief Complaint:dementia      Subjective:     Marlene Horne is a 87 y.o. female who has known dementia.  Sustained a fall that was unwitnessed in the previous month.  She was transported to the hospital but the POA did not want her to have any further neurological work-up at an outside hospital.  She has been doing well back in her environment.  She does endorse her constipation has resolved at this point  Current Meds:    Current Outpatient Medications:   •  acetaminophen (TYLENOL) 325 MG tablet, Take 2 tablets by mouth Every 4 (Four) Hours As Needed for Mild Pain ., Disp: , Rfl:   •  albuterol (PROVENTIL) (2.5 MG/3ML) 0.083% nebulizer solution, Take 2.5 mg by nebulization Every 4 (Four) Hours As Needed for Shortness of Air., Disp: , Rfl: 12  •  amLODIPine (NORVASC) 5 MG tablet, Take 1 tablet by mouth Daily., Disp: , Rfl:   •  aspirin 81 MG tablet, Take 1 tablet by mouth Daily., Disp: 90 tablet, Rfl: 2  •  ferrous sulfate 324 (65 Fe) MG tablet delayed-release EC tablet, Take 1 tablet by mouth Daily With Breakfast., Disp: 30 tablet, Rfl:   •  melatonin 5 MG tablet tablet, Take 5 mg by mouth Every Night., Disp: , Rfl:   •  polyethylene glycol (MIRALAX) pack packet, Take 17 g by mouth Daily., Disp: , Rfl:   •  rosuvastatin (CRESTOR) 5 MG tablet, Take 1 tablet by mouth Daily., Disp: 30 tablet, Rfl: 0  •  sennosides-docusate sodium (SENOKOT-S) 8.6-50 MG tablet, Take 2 tablets by mouth Every Night., Disp: 30 tablet, Rfl: 0  •  sodium chloride 1 g tablet, Take 2 tablets by mouth 3 (Three) Times a Day With Meals., Disp: 180 tablet, Rfl: 0    Allergies:  Patient has no known allergies.    Review of Systems:  Review of Systems   Constitutional: Negative for activity change, appetite  "change, fatigue and fever.   HENT: Negative for ear pain and sore throat.    Eyes: Negative for pain and visual disturbance.   Respiratory: Negative for cough and shortness of breath.    Cardiovascular: Negative for chest pain and palpitations.   Gastrointestinal: Negative for abdominal pain and nausea.   Endocrine: Negative for cold intolerance and heat intolerance.   Genitourinary: Negative for difficulty urinating and dysuria.   Musculoskeletal: Negative for arthralgias and gait problem.   Skin: Negative for color change and rash.   Neurological: Negative for dizziness, weakness and headaches.   Hematological: Negative for adenopathy. Does not bruise/bleed easily.   Psychiatric/Behavioral: Positive for confusion. Negative for agitation and sleep disturbance.       Objective:     /59   Pulse 72   Temp 98.6 °F (37 °C)   Resp 16   Ht 164.1 cm (64.61\")   Wt 66 kg (145 lb 6.4 oz)   SpO2 97%   BMI 24.49 kg/m²   Physical Exam   Constitutional: She appears well-developed and well-nourished.   HENT:   Head: Normocephalic and atraumatic.   Right Ear: External ear normal.   Left Ear: External ear normal.   Nose: Nose normal.   Mouth/Throat: Oropharynx is clear and moist.   Eyes: Conjunctivae and EOM are normal.   Neck: Normal range of motion. Neck supple.   Cardiovascular: Normal rate, regular rhythm and normal heart sounds.   Pulmonary/Chest: Effort normal and breath sounds normal.   Abdominal: Soft. Bowel sounds are normal. She exhibits no distension. There is no tenderness.   Musculoskeletal: Normal range of motion.   Neurological: She is alert.   Skin: Skin is warm and dry.   Psychiatric: She has a normal mood and affect. Her speech is normal and behavior is normal.         Assessment/Plan:      87 y.o. female with:  Problem List Items Addressed This Visit        Digestive    Constipation    Overview     Senokot 2 pills at night scheduled. Miralaax 17 grams daily scheduled.             Other    Major " neurocognitive disorder - Primary    Overview     Alzheimer's Dementia with Vascular Dementia  Patient POA (daughter) has previously refused treatment with Aricept 5 mg daily.    - CT head: negative for acute disease           Hyponatremia    Overview     Continue home  2 g sodium tablets 3 times daily.                     I have seen and examined the patient with the resident.  I was present with the resident during the entire exam.   I have reviewed the notes, assessments, and/or procedures performed by Dr. Nguyen, I concur with her/his documentation and assessment and plan for Marlene Horne.            This document has been electronically signed by Maricarmen Bell MD on June 16, 2019 3:03 PM

## 2019-07-19 ENCOUNTER — NURSING HOME (OUTPATIENT)
Dept: FAMILY MEDICINE CLINIC | Facility: CLINIC | Age: 84
End: 2019-07-19

## 2019-07-19 DIAGNOSIS — K59.00 CONSTIPATION, UNSPECIFIED CONSTIPATION TYPE: ICD-10-CM

## 2019-07-19 DIAGNOSIS — F03.90 MAJOR NEUROCOGNITIVE DISORDER (HCC): Primary | ICD-10-CM

## 2019-07-19 DIAGNOSIS — E87.1 HYPONATREMIA: ICD-10-CM

## 2019-07-19 PROCEDURE — 99308 SBSQ NF CARE LOW MDM 20: CPT | Performed by: STUDENT IN AN ORGANIZED HEALTH CARE EDUCATION/TRAINING PROGRAM

## 2019-07-31 VITALS
TEMPERATURE: 98.3 F | BODY MASS INDEX: 24.96 KG/M2 | SYSTOLIC BLOOD PRESSURE: 134 MMHG | RESPIRATION RATE: 18 BRPM | OXYGEN SATURATION: 97 % | HEIGHT: 65 IN | HEART RATE: 68 BPM | DIASTOLIC BLOOD PRESSURE: 68 MMHG | WEIGHT: 149.8 LBS

## 2019-07-31 NOTE — PROGRESS NOTES
MONTHLY NURSING HOME VISIT    NAME: Marlene Horne  : 1931  MRN: [id number]    DATE & TIME SEEN: 2019 1517 hours    PCP: Chuckie Nguyen MD    NURSING HOME: [facility]    Monthly detention Visit    Chief Complaint: Monthly visit    Subjective:     Marlene Horne is a 87 y.o. female with CMH significant for neurocognitive dementia, hyponatremia, and constipation is seen today at [facility] for her monthly check up. She is a resident of [facility] due to her dementia. She is comfortable and well with no complaints. Reports bowel movements every other day. Nephrology following monthly    Current Meds:    Current Outpatient Medications:   •  acetaminophen (TYLENOL) 325 MG tablet, Take 2 tablets by mouth Every 4 (Four) Hours As Needed for Mild Pain ., Disp: , Rfl:   •  albuterol (PROVENTIL) (2.5 MG/3ML) 0.083% nebulizer solution, Take 2.5 mg by nebulization Every 4 (Four) Hours As Needed for Shortness of Air., Disp: , Rfl: 12  •  amLODIPine (NORVASC) 5 MG tablet, Take 1 tablet by mouth Daily., Disp: , Rfl:   •  aspirin 81 MG tablet, Take 1 tablet by mouth Daily., Disp: 90 tablet, Rfl: 2  •  ferrous sulfate 324 (65 Fe) MG tablet delayed-release EC tablet, Take 1 tablet by mouth Daily With Breakfast., Disp: 30 tablet, Rfl:   •  melatonin 5 MG tablet tablet, Take 5 mg by mouth Every Night., Disp: , Rfl:   •  polyethylene glycol (MIRALAX) pack packet, Take 17 g by mouth Daily., Disp: , Rfl:   •  rosuvastatin (CRESTOR) 5 MG tablet, Take 1 tablet by mouth Daily., Disp: 30 tablet, Rfl: 0  •  sennosides-docusate sodium (SENOKOT-S) 8.6-50 MG tablet, Take 2 tablets by mouth Every Night., Disp: 30 tablet, Rfl: 0  •  sodium chloride 1 g tablet, Take 2 tablets by mouth 3 (Three) Times a Day With Meals., Disp: 180 tablet, Rfl: 0    Allergies:  Patient has no known allergies.    Review of Systems:  Review of Systems   Constitutional: Negative for appetite change, chills,  "diaphoresis, fatigue and fever.   HENT: Negative for ear pain, postnasal drip, rhinorrhea and sore throat.    Eyes: Negative for pain and visual disturbance.   Respiratory: Negative for cough, chest tightness and shortness of breath.    Cardiovascular: Negative for chest pain, palpitations and leg swelling.   Gastrointestinal: Negative for abdominal pain, constipation, diarrhea, nausea and vomiting.   Endocrine: Negative for polydipsia and polyphagia.   Genitourinary: Negative for dysuria, flank pain, frequency and urgency.   Musculoskeletal: Negative for arthralgias, back pain and myalgias.   Skin: Negative for pallor and rash.   Neurological: Negative for dizziness, syncope, weakness and numbness.       Objective:     /68   Pulse 68   Temp 98.3 °F (36.8 °C)   Resp 18   Ht 164.1 cm (64.61\")   Wt 67.9 kg (149 lb 12.8 oz)   SpO2 97% Comment: on RA  BMI 25.23 kg/m²      Physical Exam   Constitutional: She appears well-developed and well-nourished.   HENT:   Head: Normocephalic and atraumatic.   Nose: Nose normal.   Eyes: Conjunctivae and EOM are normal. Pupils are equal, round, and reactive to light.   Neck: Normal range of motion. Neck supple. No thyromegaly present.   Cardiovascular: Normal rate, regular rhythm, normal heart sounds and intact distal pulses.   Pulmonary/Chest: Effort normal and breath sounds normal. No respiratory distress. She has no wheezes.   Abdominal: Soft. Bowel sounds are normal. She exhibits no distension. There is no tenderness.   Musculoskeletal: Normal range of motion. She exhibits no edema or tenderness.   Lymphadenopathy:     She has no cervical adenopathy.   Neurological: She has normal strength. She is disoriented (Oriented only to self and place but not time). GCS eye subscore is 4. GCS verbal subscore is 5. GCS motor subscore is 6.   Skin: Skin is warm and dry. Capillary refill takes less than 2 seconds.   Psychiatric: She has a normal mood and affect. Her behavior is " normal.       Diagnostic Data:     Please refer to chart at [facility] for recent labwork. All analytes were within acceptable limits.      Assessment/Plan:      87 y.o. female with:  Problem List Items Addressed This Visit        Digestive    Constipation    Overview     Senokot 2 pills at night scheduled. Miralaax 17 grams daily scheduled.             Other    Major neurocognitive disorder - Primary    Overview     Alzheimer's Dementia with Vascular Dementia  Patient POA (daughter) has previously refused treatment with Aricept 5 mg daily.  - CT head: negative for acute disease  - Redirect patient if confused or distressed           Hyponatremia    Overview     Continue home  1 g sodium tablets 3 times daily.                     CODE STATUS: DNR    Dr. Bell  is the attending on record for this patient, She is aware of the patient's status and agrees with the above.      Chuckie Nguyen M.D. PGY1  [facility]  [address] [place], [place] [id number]  Office: [phone]    This document has been electronically signed by Chuckie Nguyen MD on July 31, 2019 10:11 AM

## 2019-08-09 ENCOUNTER — TELEPHONE (OUTPATIENT)
Dept: FAMILY MEDICINE CLINIC | Facility: CLINIC | Age: 84
End: 2019-08-09

## 2019-08-09 ENCOUNTER — DOCUMENTATION (OUTPATIENT)
Dept: FAMILY MEDICINE CLINIC | Facility: CLINIC | Age: 84
End: 2019-08-09

## 2019-08-09 NOTE — TELEPHONE ENCOUNTER
Garrick from [facility] called and wanted to let Dr. Nguyen know that her BMp labs were normal and her GFR.        Thank you,        Samantha Mccauley at [facility]  Phone: [phone]

## 2019-08-09 NOTE — TELEPHONE ENCOUNTER
Garrick from [facility] called and wanted to let Dr. Nguyen know that her BMp labs were normal and her GFR.      Thank you,      Samantha

## 2019-08-09 NOTE — PROGRESS NOTES
"Received call from Ana at [facility] that patient was minimally alert and \"unresponsive\" for about 10-15 minutes. She did respond to sternal rub. Per Ana, the patient has done this before. At that time, she was sent to the hospital and was found to have hyponatremia, although the etiology was uncertain. Today, the [facility] staff called the patient's daughter, who does not want the patient sent to the hospital. She is requesting if a BMP can be ordered. Ana was permitted to obtain a BMP. This provider requested that a CT scan be obtained, but she again states that \"the daughter does not want her to be sent out.\"      Riley Conde M.D. PGY3  [facility]  [address] [place] [address]  Office: [phone]      This document has been electronically signed by Riley Conde MD on August 9, 2019 5:54 AM  "

## 2019-08-14 ENCOUNTER — TELEPHONE (OUTPATIENT)
Dept: FAMILY MEDICINE CLINIC | Facility: CLINIC | Age: 84
End: 2019-08-14

## 2019-08-14 NOTE — TELEPHONE ENCOUNTER
Garrick from [facility] called on pt and wanted to give Dr. Nguyen an update:     Pt refused her UA three times and RWT spoke to Maureen, who is her POA; she said that's fine that the patient refused, she doesn't want to stress her out more than they have to. She said to continue to monitor her and they can readdress the issue if there are any changes.     Garrick said if Dr. Nguyen has any questions, he can call [facility] at [phone]    Thank you.

## 2019-08-20 ENCOUNTER — NURSING HOME (OUTPATIENT)
Dept: FAMILY MEDICINE CLINIC | Facility: CLINIC | Age: 84
End: 2019-08-20

## 2019-08-20 VITALS
RESPIRATION RATE: 16 BRPM | HEART RATE: 75 BPM | WEIGHT: 149.8 LBS | DIASTOLIC BLOOD PRESSURE: 52 MMHG | SYSTOLIC BLOOD PRESSURE: 101 MMHG | BODY MASS INDEX: 24.96 KG/M2 | HEIGHT: 65 IN | OXYGEN SATURATION: 95 % | TEMPERATURE: 98.2 F

## 2019-08-20 DIAGNOSIS — K59.00 CONSTIPATION, UNSPECIFIED CONSTIPATION TYPE: ICD-10-CM

## 2019-08-20 DIAGNOSIS — E87.1 HYPONATREMIA: ICD-10-CM

## 2019-08-20 DIAGNOSIS — F03.90 MAJOR NEUROCOGNITIVE DISORDER (HCC): Primary | ICD-10-CM

## 2019-08-20 PROCEDURE — 99308 SBSQ NF CARE LOW MDM 20: CPT | Performed by: STUDENT IN AN ORGANIZED HEALTH CARE EDUCATION/TRAINING PROGRAM

## 2019-08-20 NOTE — PROGRESS NOTES
MONTHLY NURSING HOME VISIT    NAME: Marlene Horne  : 1931  MRN: [id number]    DATE & TIME SEEN: 19 1530    PCP: Chuckie Nguyen MD    NURSING HOME: [facility]    Monthly care home Visit    Chief Complaint: Dementia, Hyponatremia, Constipation    Subjective:     Marlene Horne is a 87 y.o. female with CMH of Neurocognitive Dementia, Hyponatremia, and constipation. She reports feeling well and is happily eating candy at time of interview and exam. Late last week RWT contacted me for BMP and UA in the setting of worsening confusion. Orders were approved, however patient refused and POA was contacted and chose to allow patient to refuse. Mrs. Horne currently has no complaints.       Current Meds:    Current Outpatient Medications:   •  acetaminophen (TYLENOL) 325 MG tablet, Take 2 tablets by mouth Every 4 (Four) Hours As Needed for Mild Pain ., Disp: , Rfl:   •  albuterol (PROVENTIL) (2.5 MG/3ML) 0.083% nebulizer solution, Take 2.5 mg by nebulization Every 4 (Four) Hours As Needed for Shortness of Air., Disp: , Rfl: 12  •  amLODIPine (NORVASC) 5 MG tablet, Take 1 tablet by mouth Daily., Disp: , Rfl:   •  aspirin 81 MG tablet, Take 1 tablet by mouth Daily., Disp: 90 tablet, Rfl: 2  •  ferrous sulfate 324 (65 Fe) MG tablet delayed-release EC tablet, Take 1 tablet by mouth Daily With Breakfast., Disp: 30 tablet, Rfl:   •  melatonin 5 MG tablet tablet, Take 5 mg by mouth Every Night., Disp: , Rfl:   •  polyethylene glycol (MIRALAX) pack packet, Take 17 g by mouth Daily., Disp: , Rfl:   •  rosuvastatin (CRESTOR) 5 MG tablet, Take 1 tablet by mouth Daily., Disp: 30 tablet, Rfl: 0  •  sennosides-docusate sodium (SENOKOT-S) 8.6-50 MG tablet, Take 2 tablets by mouth Every Night., Disp: 30 tablet, Rfl: 0  •  sodium chloride 1 g tablet, Take 2 tablets by mouth 3 (Three) Times a Day With Meals., Disp: 180 tablet, Rfl: 0    Allergies:  Patient has no known allergies.    Review of  Systems:  Review of Systems   Constitutional: Negative for activity change and appetite change.   HENT: Negative for congestion, ear pain, postnasal drip, rhinorrhea and sore throat.    Eyes: Negative for pain and visual disturbance.   Respiratory: Negative for cough, chest tightness and shortness of breath.    Cardiovascular: Negative for chest pain, palpitations and leg swelling.   Gastrointestinal: Negative for abdominal pain, constipation, diarrhea, nausea and vomiting.   Genitourinary: Negative for dysuria and flank pain.   Musculoskeletal: Negative for arthralgias, back pain and myalgias.   Skin: Negative for pallor and rash.   Neurological: Negative for dizziness, seizures, syncope, weakness and numbness.       Objective:     /52   Pulse 75   Temp 98.2 °F (36.8 °C)   Wt 67.9 kg (149 lb 12.8 oz)   BMI 25.23 kg/m²      Physical Exam   Constitutional: She appears well-developed and well-nourished. No distress.   HENT:   Head: Normocephalic and atraumatic.   Nose: Nose normal.   Eyes: Conjunctivae and EOM are normal. Pupils are equal, round, and reactive to light.   Neck: Normal range of motion. Neck supple.   Cardiovascular: Normal rate, normal heart sounds and intact distal pulses.   Pulmonary/Chest: Effort normal and breath sounds normal. She has no wheezes.   Abdominal: Soft. Bowel sounds are normal. She exhibits no distension. There is no tenderness. There is no rebound and no guarding.   Musculoskeletal: Normal range of motion. She exhibits no edema or tenderness.   Neurological: She is alert.   Oriented to self and city, not time or place   Skin: Skin is warm and dry. Capillary refill takes less than 2 seconds.       Diagnostic Data:     Monthly BMP within normal limits. Please refer to chart for latest labs.     Assessment/Plan:      87 y.o. female with:  Problem List Items Addressed This Visit        Digestive    Constipation    Overview     Senokot 2 pills at night scheduled. Miralaax 17 grams  daily scheduled.             Other    Major neurocognitive disorder - Primary    Overview     Alzheimer's Dementia with Vascular Dementia  Patient POA (daughter) has previously refused treatment with Aricept 5 mg daily.  - CT head: negative for acute disease  - Redirect patient if confused or distressed           Hyponatremia    Overview     Continue home  1 g sodium tablets 3 times daily.                     CODE STATUS: DNR and DNI    Dr. Bell  is the attending on record for this patient, She is aware of the patient's status and agrees with the above.      Chuckie Nguyen M.D. PGY2  [facility]  [address] [place] [address]  Office: [phone]    This document has been electronically signed by Chuckie Nguyen MD on August 20, 2019 5:20 PM

## 2019-08-21 NOTE — PROGRESS NOTES
I have seen and evaluated the patient with the resident on 8/20/2019.  I have discussed the case with the resident. I have reviewed the notes, assessment and plan, and/or procedures performed by the resident. I concur with the resident’s documentation.  Patient is resting comfortably in her bed eating candy.  She was more confused last week but that has since resolved.  She did refuse lab work.    Physical exam: No acute distress, cardia vascular S1-S2 regular rate and rhythm, lungs clear to auscultation bilaterally, abdomen is soft nontender nondistended, extremities are warm and dry.  Next    Plan: Continue present treatment.      This document has been electronically signed by Maricarmen Bell MD on August 21, 2019 4:19 PM

## 2019-09-27 ENCOUNTER — NURSING HOME (OUTPATIENT)
Dept: FAMILY MEDICINE CLINIC | Facility: CLINIC | Age: 84
End: 2019-09-27

## 2019-09-27 VITALS
HEART RATE: 64 BPM | DIASTOLIC BLOOD PRESSURE: 68 MMHG | SYSTOLIC BLOOD PRESSURE: 136 MMHG | OXYGEN SATURATION: 98 % | HEIGHT: 65 IN | RESPIRATION RATE: 18 BRPM | TEMPERATURE: 97.9 F | BODY MASS INDEX: 25.06 KG/M2 | WEIGHT: 150.4 LBS

## 2019-09-27 DIAGNOSIS — E87.1 HYPONATREMIA: ICD-10-CM

## 2019-09-27 DIAGNOSIS — N18.30 STAGE 3 CHRONIC KIDNEY DISEASE (HCC): ICD-10-CM

## 2019-09-27 DIAGNOSIS — K08.9 DENTAL DISEASE: ICD-10-CM

## 2019-09-27 DIAGNOSIS — F03.90 MAJOR NEUROCOGNITIVE DISORDER (HCC): Primary | ICD-10-CM

## 2019-09-27 DIAGNOSIS — K59.00 CONSTIPATION, UNSPECIFIED CONSTIPATION TYPE: ICD-10-CM

## 2019-09-27 PROCEDURE — 99308 SBSQ NF CARE LOW MDM 20: CPT | Performed by: STUDENT IN AN ORGANIZED HEALTH CARE EDUCATION/TRAINING PROGRAM

## 2019-09-27 RX ORDER — CHLORHEXIDINE
20 LIQUID (ML) MISCELLANEOUS 3 TIMES DAILY
Qty: 500 ML | Refills: 0
Start: 2019-09-27 | End: 2022-09-12

## 2019-09-27 NOTE — PROGRESS NOTES
MONTHLY NURSING HOME VISIT    NAME: Marlene Horne  : 1931  MRN: [id number]    DATE & TIME SEEN: 19 1332    PCP: Chuckie Nguyen MD    NURSING HOME: [facility]    Monthly [facility] Visit    Chief Complaint: Dementia, constipation, hyponatremia    Subjective:     Marlene Horne is a 87 y.o. female, with CMH of major neurocognitive disorder likely Alzheimer's Dementia, Constipation, and hyponatremia is seen for monthly nursing home visit. She reports needed laxative once in the past month but otherwise has been having regular bowel movements. No other complaints.    Current Meds:    Current Outpatient Medications:   •  acetaminophen (TYLENOL) 325 MG tablet, Take 2 tablets by mouth Every 4 (Four) Hours As Needed for Mild Pain ., Disp: , Rfl:   •  albuterol (PROVENTIL) (2.5 MG/3ML) 0.083% nebulizer solution, Take 2.5 mg by nebulization Every 4 (Four) Hours As Needed for Shortness of Air., Disp: , Rfl: 12  •  amLODIPine (NORVASC) 5 MG tablet, Take 1 tablet by mouth Daily., Disp: , Rfl:   •  aspirin 81 MG tablet, Take 1 tablet by mouth Daily., Disp: 90 tablet, Rfl: 2  •  ferrous sulfate 324 (65 Fe) MG tablet delayed-release EC tablet, Take 1 tablet by mouth Daily With Breakfast., Disp: 30 tablet, Rfl:   •  Flavoring Agent (FLAVOR CONC-CHLORHEXIDINE) concentration, Take 20 mL by mouth 3 (Three) Times a Day., Disp: 500 mL, Rfl: 0  •  melatonin 5 MG tablet tablet, Take 5 mg by mouth Every Night., Disp: , Rfl:   •  polyethylene glycol (MIRALAX) pack packet, Take 17 g by mouth Daily., Disp: , Rfl:   •  rosuvastatin (CRESTOR) 5 MG tablet, Take 1 tablet by mouth Daily., Disp: 30 tablet, Rfl: 0  •  sennosides-docusate sodium (SENOKOT-S) 8.6-50 MG tablet, Take 2 tablets by mouth Every Night., Disp: 30 tablet, Rfl: 0  •  sodium chloride 1 g tablet, Take 2 tablets by mouth 3 (Three) Times a Day With Meals., Disp: 180 tablet, Rfl: 0    Allergies:  Patient has no known allergies.    Review of  "Systems:  Review of Systems   Constitutional: Negative for activity change and appetite change.   HENT: Positive for dental problem. Negative for trouble swallowing.    Eyes: Negative for pain and visual disturbance.   Respiratory: Negative for cough and shortness of breath.    Cardiovascular: Negative for chest pain and palpitations.   Gastrointestinal: Negative for abdominal pain and constipation.   Genitourinary: Negative for difficulty urinating and flank pain.   Musculoskeletal: Negative for back pain and myalgias.   Skin: Negative for pallor and rash.   Neurological: Negative for dizziness and headaches.       Objective:     /68   Pulse 64   Temp 97.9 °F (36.6 °C)   Resp 18   Ht 164.1 cm (64.61\")   Wt 68.2 kg (150 lb 6.4 oz)   SpO2 98% Comment: on RA  BMI 25.33 kg/m²   Physical Exam   Constitutional: She appears well-developed and well-nourished. No distress.   HENT:   Head: Normocephalic and atraumatic.   Eyes: EOM are normal. Pupils are equal, round, and reactive to light.   Neck: Normal range of motion. Neck supple.   Cardiovascular: Normal rate, regular rhythm, normal heart sounds and intact distal pulses.   Pulmonary/Chest: Effort normal and breath sounds normal.   Abdominal: Soft. Bowel sounds are normal. There is no tenderness.   Musculoskeletal: Normal range of motion. She exhibits no edema.   Neurological: She is alert. She is disoriented (Oriented to self, not date, year, or place).   Skin: Skin is warm. Capillary refill takes less than 2 seconds.   Psychiatric: She has a normal mood and affect. Her behavior is normal.       Diagnostic Data:     Selected abnormal labs. Refer to [facility] paper chart for full laboratory values.    Osmolality-calculated:  267 mOsm/kg  Calcium 8.5 md/mL  GFR 47 mL/min/1.72m^2       Assessment/Plan:      87 y.o. female with:  Problem List Items Addressed This Visit        Digestive    Constipation    Overview     Senokot 2 pills at night scheduled. " Miralaax 17 grams daily scheduled.             Nervous and Auditory    Major neurocognitive disorder - Primary    Overview     Alzheimer's Dementia with Vascular Dementia  Patient POA (daughter) has previously refused treatment with Aricept 5 mg daily.  - CT head: negative for acute disease  - Redirect patient if confused or distressed              Genitourinary    Stage 3 chronic kidney disease (CMS/HCC)    Overview     Stable. Maintain adequate hydration              Other    Hyponatremia    Overview     Continue home  1 g sodium tablets 3 times daily.               Dental disease    Overview     Seen by Dentist and offered dentures, however refused/being considered by daughter/POA.  Continue fluoride rinse per Dentist  Add/Start Chlorohexadine swish (monitor use and ensure no swallowing due to Dementia)               CODE STATUS: DNR and DNI    Dr. Bell  is the attending on record for this patient, She is aware of the patient's status and agrees with the above.      Chuckie Nguyen M.D. PGY2  [facility]  [address] [place] [address]  Office: [phone]    This document has been electronically signed by Chuckie Nguyen MD on September 27, 2019 2:11 PM

## 2019-09-30 NOTE — PROGRESS NOTES
I have seen and evaluated the patient with the resident.  I have discussed the case with the resident. I have reviewed the notes, assessment and plan, and/or procedures performed by the resident. I concur with the resident’s documentation.  Patient is doing about the same.  She endorses constipation still.      Physical exam: No acute distress, cardia vascular S1-S2 regular rate and rhythm, lungs clear to auscultation bilaterally, abdomen soft nontender nondistended, extremities warm and dry.  She is oriented to person and place.      Plan: Continue present treatment.      This document has been electronically signed by Maricarmen Bell MD on September 29, 2019 7:10 PM

## 2019-10-15 ENCOUNTER — LAB REQUISITION (OUTPATIENT)
Dept: LAB | Facility: HOSPITAL | Age: 84
End: 2019-10-15

## 2019-10-15 ENCOUNTER — TELEPHONE (OUTPATIENT)
Dept: FAMILY MEDICINE CLINIC | Facility: CLINIC | Age: 84
End: 2019-10-15

## 2019-10-15 DIAGNOSIS — N39.0 URINARY TRACT INFECTION, SITE NOT SPECIFIED: ICD-10-CM

## 2019-10-15 DIAGNOSIS — E87.1 HYPO-OSMOLALITY AND HYPONATREMIA: ICD-10-CM

## 2019-10-15 LAB
ANION GAP SERPL CALCULATED.3IONS-SCNC: 8 MMOL/L (ref 5–15)
BILIRUB UR QL STRIP: NEGATIVE
BUN BLD-MCNC: 11 MG/DL (ref 8–23)
BUN/CREAT SERPL: 9.2 (ref 7–25)
CALCIUM SPEC-SCNC: 8.6 MG/DL (ref 8.6–10.5)
CHLORIDE SERPL-SCNC: 104 MMOL/L (ref 98–107)
CLARITY UR: ABNORMAL
CO2 SERPL-SCNC: 24 MMOL/L (ref 22–29)
COLOR UR: YELLOW
CREAT BLD-MCNC: 1.19 MG/DL (ref 0.57–1)
GFR SERPL CREATININE-BSD FRML MDRD: 43 ML/MIN/1.73
GLUCOSE BLD-MCNC: 111 MG/DL (ref 65–99)
GLUCOSE UR STRIP-MCNC: NEGATIVE MG/DL
HGB UR QL STRIP.AUTO: NEGATIVE
KETONES UR QL STRIP: NEGATIVE
LEUKOCYTE ESTERASE UR QL STRIP.AUTO: ABNORMAL
NITRITE UR QL STRIP: POSITIVE
PH UR STRIP.AUTO: 7.5 [PH] (ref 5–9)
POTASSIUM BLD-SCNC: 4 MMOL/L (ref 3.5–5.2)
PROT UR QL STRIP: NEGATIVE
SODIUM BLD-SCNC: 136 MMOL/L (ref 136–145)
SP GR UR STRIP: 1.01 (ref 1–1.03)
UROBILINOGEN UR QL STRIP: ABNORMAL

## 2019-10-15 PROCEDURE — 87086 URINE CULTURE/COLONY COUNT: CPT | Performed by: STUDENT IN AN ORGANIZED HEALTH CARE EDUCATION/TRAINING PROGRAM

## 2019-10-15 PROCEDURE — 87186 SC STD MICRODIL/AGAR DIL: CPT | Performed by: STUDENT IN AN ORGANIZED HEALTH CARE EDUCATION/TRAINING PROGRAM

## 2019-10-15 PROCEDURE — 81003 URINALYSIS AUTO W/O SCOPE: CPT | Performed by: STUDENT IN AN ORGANIZED HEALTH CARE EDUCATION/TRAINING PROGRAM

## 2019-10-15 PROCEDURE — 80048 BASIC METABOLIC PNL TOTAL CA: CPT | Performed by: STUDENT IN AN ORGANIZED HEALTH CARE EDUCATION/TRAINING PROGRAM

## 2019-10-15 PROCEDURE — 87077 CULTURE AEROBIC IDENTIFY: CPT | Performed by: STUDENT IN AN ORGANIZED HEALTH CARE EDUCATION/TRAINING PROGRAM

## 2019-10-15 NOTE — TELEPHONE ENCOUNTER
"Was called by nursing staff Ange at [facility] about this patient. Ange said patient had fallen earlier and had not hit her head or any other part of her body that nursing staff was aware of. Patient was moving all extremities spontaneously after the fall. Patient appeared to be confused as well. Will obtain u/a, BMP, and EKG. Patient is afebrile and has a heart rate of 110. Other vitals could not be obtained at time of call due to patient not cooperating and \"fighting\" her nurses. Will follow up on labs.        Marlee Nur MD PGY3  [facility]  This document has been electronically signed by Marlee Nur MD on October 15, 2019 7:45 AM    "

## 2019-10-17 ENCOUNTER — DOCUMENTATION (OUTPATIENT)
Dept: FAMILY MEDICINE CLINIC | Facility: CLINIC | Age: 84
End: 2019-10-17

## 2019-10-17 LAB — BACTERIA SPEC AEROBE CULT: ABNORMAL

## 2019-10-17 NOTE — PROGRESS NOTES
Received call from nurse at [facility].  She reported that the patient had a urinary tract infection.  She reported urine cultures grew positive for Klebsiella and that the sensitivities were returned.  She stated the patient had no known drug allergies.  She was advised to provide the patient nitrofurantoin based on the sensitivities.  She is advised to give the patient nitrofurantoin 100 mg twice daily for 5 days.  She endorses understanding.      Riley Conde M.D. PGY3  [facility]  [address] [place] [address]  Office: [phone]      This document has been electronically signed by Riley Conde MD on October 17, 2019 1:57 PM

## 2019-10-24 ENCOUNTER — NURSING HOME (OUTPATIENT)
Dept: FAMILY MEDICINE CLINIC | Facility: CLINIC | Age: 84
End: 2019-10-24

## 2019-10-24 DIAGNOSIS — E87.1 HYPONATREMIA: ICD-10-CM

## 2019-10-24 DIAGNOSIS — K59.00 CONSTIPATION, UNSPECIFIED CONSTIPATION TYPE: ICD-10-CM

## 2019-10-24 DIAGNOSIS — F03.90 MAJOR NEUROCOGNITIVE DISORDER (HCC): Primary | ICD-10-CM

## 2019-10-24 PROCEDURE — 99308 SBSQ NF CARE LOW MDM 20: CPT | Performed by: STUDENT IN AN ORGANIZED HEALTH CARE EDUCATION/TRAINING PROGRAM

## 2019-11-22 ENCOUNTER — NURSING HOME (OUTPATIENT)
Dept: FAMILY MEDICINE CLINIC | Facility: CLINIC | Age: 84
End: 2019-11-22

## 2019-11-22 VITALS
HEART RATE: 72 BPM | WEIGHT: 149.8 LBS | BODY MASS INDEX: 24.96 KG/M2 | TEMPERATURE: 98.2 F | DIASTOLIC BLOOD PRESSURE: 68 MMHG | OXYGEN SATURATION: 97 % | HEIGHT: 65 IN | RESPIRATION RATE: 18 BRPM | SYSTOLIC BLOOD PRESSURE: 130 MMHG

## 2019-11-22 DIAGNOSIS — E87.1 HYPONATREMIA: ICD-10-CM

## 2019-11-22 DIAGNOSIS — K59.00 CONSTIPATION, UNSPECIFIED CONSTIPATION TYPE: ICD-10-CM

## 2019-11-22 DIAGNOSIS — F03.90 MAJOR NEUROCOGNITIVE DISORDER (HCC): Primary | ICD-10-CM

## 2019-11-22 DIAGNOSIS — I10 BENIGN ESSENTIAL HTN: ICD-10-CM

## 2019-11-22 PROCEDURE — 99308 SBSQ NF CARE LOW MDM 20: CPT | Performed by: STUDENT IN AN ORGANIZED HEALTH CARE EDUCATION/TRAINING PROGRAM

## 2019-11-22 NOTE — PROGRESS NOTES
MONTHLY NURSING HOME VISIT    NAME: Marlene Horne  : 1931  MRN: [id number]    DATE & TIME SEEN: 19 9344    PCP: Chuckie Nguyen MD    NURSING HOME: [facility]    Monthly correction Visit     Chief Complaint: Neurocognitive Dementia, HTN, constipation, hyponatremia    Subjective:     Marlene Horne is a 87 y.o. female with CMH of major neurocognitive disorder - likely Alzheimer's Dementia, constipation, hypertension, and hyponatremia. Hyponatremia is managed by nephrology who follow monthly. She has no currently complaints.     Current Meds:    Current Outpatient Medications:   •  acetaminophen (TYLENOL) 325 MG tablet, Take 2 tablets by mouth Every 4 (Four) Hours As Needed for Mild Pain ., Disp: , Rfl:   •  albuterol (PROVENTIL) (2.5 MG/3ML) 0.083% nebulizer solution, Take 2.5 mg by nebulization Every 4 (Four) Hours As Needed for Shortness of Air., Disp: , Rfl: 12  •  amLODIPine (NORVASC) 5 MG tablet, Take 1 tablet by mouth Daily., Disp: , Rfl:   •  aspirin 81 MG tablet, Take 1 tablet by mouth Daily., Disp: 90 tablet, Rfl: 2  •  ferrous sulfate 324 (65 Fe) MG tablet delayed-release EC tablet, Take 1 tablet by mouth Daily With Breakfast., Disp: 30 tablet, Rfl:   •  Flavoring Agent (FLAVOR CONC-CHLORHEXIDINE) concentration, Take 20 mL by mouth 3 (Three) Times a Day., Disp: 500 mL, Rfl: 0  •  melatonin 5 MG tablet tablet, Take 5 mg by mouth Every Night., Disp: , Rfl:   •  polyethylene glycol (MIRALAX) pack packet, Take 17 g by mouth Daily., Disp: , Rfl:   •  rosuvastatin (CRESTOR) 5 MG tablet, Take 1 tablet by mouth Daily., Disp: 30 tablet, Rfl: 0  •  sennosides-docusate sodium (SENOKOT-S) 8.6-50 MG tablet, Take 2 tablets by mouth Every Night., Disp: 30 tablet, Rfl: 0  •  sodium chloride 1 g tablet, Take 2 tablets by mouth 3 (Three) Times a Day With Meals., Disp: 180 tablet, Rfl: 0    Allergies:  Patient has no known allergies.    Review of Systems:  Review of Systems   Unable to  "perform ROS: Dementia       Objective:     /68   Pulse 72   Temp 98.2 °F (36.8 °C)   Resp 18   Ht 165.1 cm (65\")   Wt 67.9 kg (149 lb 12.8 oz)   SpO2 97% Comment: on room air  BMI 24.93 kg/m²      Physical Exam   Constitutional: She appears well-developed and well-nourished. No distress.   HENT:   Head: Normocephalic and atraumatic.   Eyes: EOM are normal. Pupils are equal, round, and reactive to light.   Neck: Normal range of motion. Neck supple.   Cardiovascular: Normal rate, regular rhythm and normal heart sounds.   Pulmonary/Chest: Effort normal and breath sounds normal. She has no wheezes.   Abdominal: Soft. Bowel sounds are normal. There is no tenderness.   Musculoskeletal: Normal range of motion. She exhibits no edema or tenderness.   Neurological: She displays normal reflexes.   oriented to self only   Skin: Skin is warm. Capillary refill takes less than 2 seconds.       Diagnostic Data:     BMP wnl - refer to labs in paper chart     Assessment/Plan:      87 y.o. female with:    Problem List Items Addressed This Visit        Cardiovascular and Mediastinum    Benign essential HTN    Overview     Amlodipine 5 mg daily            Digestive    Constipation    Overview     Senokot 2 pills at night scheduled. Miralaax 17 grams daily scheduled.             Nervous and Auditory    Major neurocognitive disorder (CMS/HCC) - Primary    Overview     Alzheimer's Dementia with Vascular Dementia  Patient POA (daughter) has previously refused treatment with Aricept 5 mg daily.  - CT head: negative for acute disease  - Redirect patient if confused or distressed              Other    Hyponatremia    Overview     Managed by nephrology  Continue 2 g sodium tablets 3 times daily.                     CODE STATUS: DNR and DNI    Dr. Bell  is the attending on record for this patient, She is aware of the patient's status and agrees with the above.      Chuckie Nguyen M.D. PGY2  [facility]  [address] [place] [address]  Office: [phone]    This document has been electronically signed by Chuckie Nguyen MD on November 22, 2019 3:39 PM

## 2019-11-25 NOTE — PROGRESS NOTES
MONTHLY NURSING HOME VISIT    NAME: Marlene Horne  : 1931  MRN: [id number]    DATE & TIME SEEN: 2019 at 1359    PCP: Chuckie Nguyen MD    NURSING HOME: [facility]    Monthly Nursing Home Visit for 2019    Chief Complaint: dementia    Subjective:     Marlene Horne is a 87 y.o. female who has chronic dementia. She has completed a course of PT which she seems to be getting along better now. Constipation is well controlled at this point. She feels well.     Current Meds:    Current Outpatient Medications:   •  acetaminophen (TYLENOL) 325 MG tablet, Take 2 tablets by mouth Every 4 (Four) Hours As Needed for Mild Pain ., Disp: , Rfl:   •  albuterol (PROVENTIL) (2.5 MG/3ML) 0.083% nebulizer solution, Take 2.5 mg by nebulization Every 4 (Four) Hours As Needed for Shortness of Air., Disp: , Rfl: 12  •  amLODIPine (NORVASC) 5 MG tablet, Take 1 tablet by mouth Daily., Disp: , Rfl:   •  aspirin 81 MG tablet, Take 1 tablet by mouth Daily., Disp: 90 tablet, Rfl: 2  •  ferrous sulfate 324 (65 Fe) MG tablet delayed-release EC tablet, Take 1 tablet by mouth Daily With Breakfast., Disp: 30 tablet, Rfl:   •  Flavoring Agent (FLAVOR CONC-CHLORHEXIDINE) concentration, Take 20 mL by mouth 3 (Three) Times a Day., Disp: 500 mL, Rfl: 0  •  melatonin 5 MG tablet tablet, Take 5 mg by mouth Every Night., Disp: , Rfl:   •  polyethylene glycol (MIRALAX) pack packet, Take 17 g by mouth Daily., Disp: , Rfl:   •  rosuvastatin (CRESTOR) 5 MG tablet, Take 1 tablet by mouth Daily., Disp: 30 tablet, Rfl: 0  •  sennosides-docusate sodium (SENOKOT-S) 8.6-50 MG tablet, Take 2 tablets by mouth Every Night., Disp: 30 tablet, Rfl: 0  •  sodium chloride 1 g tablet, Take 2 tablets by mouth 3 (Three) Times a Day With Meals., Disp: 180 tablet, Rfl: 0    Allergies:  Patient has no known allergies.    Review of Systems:  Review of Systems   Unable to perform ROS: Dementia   Gastrointestinal: Negative for  "constipation.   Neurological: Negative for weakness.       Objective:     /68   Pulse 72   Temp 98.2 °F (36.8 °C)   Resp 18   Ht 165.1 cm (65\")   Wt 67.9 kg (149 lb 12.8 oz)   SpO2 97% Comment: on room air  BMI 24.93 kg/m²   Physical Exam   Constitutional: She appears well-developed and well-nourished.   HENT:   Head: Normocephalic and atraumatic.   Right Ear: External ear normal.   Left Ear: External ear normal.   Nose: Nose normal.   Mouth/Throat: Oropharynx is clear and moist.   Eyes: Conjunctivae and EOM are normal.   Neck: Normal range of motion. Neck supple.   Cardiovascular: Normal rate, regular rhythm and normal heart sounds.   Pulmonary/Chest: Effort normal and breath sounds normal.   Abdominal: Soft. Bowel sounds are normal. She exhibits no distension. There is no tenderness.   Musculoskeletal: Normal range of motion.   Neurological: She is alert.   Skin: Skin is warm and dry.   Psychiatric: She has a normal mood and affect. Her speech is normal and behavior is normal. She exhibits abnormal recent memory.            Assessment/Plan:      87 y.o. female with:  Problem List Items Addressed This Visit        Cardiovascular and Mediastinum    Benign essential HTN    Overview     Amlodipine 5 mg daily            Digestive    Constipation    Overview     Senokot 2 pills at night scheduled. Miralaax 17 grams daily scheduled.             Nervous and Auditory    Major neurocognitive disorder (CMS/HCC) - Primary    Overview     Alzheimer's Dementia with Vascular Dementia  Patient POA (daughter) has previously refused treatment with Aricept 5 mg daily.  - CT head: negative for acute disease  - Redirect patient if confused or distressed              Other    Hyponatremia    Overview     Managed by nephrology  Continue 2 g sodium tablets 3 times daily.                     I have seen the patient with the resident.  I have reviewed the notes, assessments, and/or procedures performed by Chuckie Nguyen, " MD I concur with her/his documentation and assessment and plan for Marlene Horne.            This document has been electronically signed by Maricarmen Bell MD on November 24, 2019 6:59 PM

## 2019-12-29 ENCOUNTER — DOCUMENTATION (OUTPATIENT)
Dept: FAMILY MEDICINE CLINIC | Facility: CLINIC | Age: 84
End: 2019-12-29

## 2019-12-29 ENCOUNTER — LAB REQUISITION (OUTPATIENT)
Dept: LAB | Facility: HOSPITAL | Age: 84
End: 2019-12-29

## 2019-12-29 DIAGNOSIS — R30.0 DYSURIA: ICD-10-CM

## 2019-12-29 DIAGNOSIS — R30.9 PAINFUL MICTURITION, UNSPECIFIED: ICD-10-CM

## 2019-12-29 LAB
BACTERIA UR QL AUTO: ABNORMAL /HPF
BILIRUB UR QL STRIP: NEGATIVE
CLARITY UR: ABNORMAL
COLOR UR: YELLOW
GLUCOSE UR STRIP-MCNC: NEGATIVE MG/DL
HGB UR QL STRIP.AUTO: ABNORMAL
HYALINE CASTS UR QL AUTO: ABNORMAL /LPF
KETONES UR QL STRIP: NEGATIVE
LEUKOCYTE ESTERASE UR QL STRIP.AUTO: ABNORMAL
NITRITE UR QL STRIP: NEGATIVE
PH UR STRIP.AUTO: 7 [PH] (ref 5–9)
PROT UR QL STRIP: NEGATIVE
RBC # UR: ABNORMAL /HPF
REF LAB TEST METHOD: ABNORMAL
SP GR UR STRIP: 1.01 (ref 1–1.03)
SQUAMOUS #/AREA URNS HPF: ABNORMAL /HPF
UROBILINOGEN UR QL STRIP: ABNORMAL
WBC UR QL AUTO: ABNORMAL /HPF

## 2019-12-29 PROCEDURE — 87186 SC STD MICRODIL/AGAR DIL: CPT | Performed by: FAMILY MEDICINE

## 2019-12-29 PROCEDURE — 81001 URINALYSIS AUTO W/SCOPE: CPT | Performed by: FAMILY MEDICINE

## 2019-12-29 PROCEDURE — 87086 URINE CULTURE/COLONY COUNT: CPT | Performed by: FAMILY MEDICINE

## 2019-12-29 NOTE — PROGRESS NOTES
Was called by nursing staff and Kranthi Rodríguez at 8:54 AM on 12-  -Patient developed some altered mental status, nursing asked for order for UA and urine culture.  -Nursing staff also asked for order for Mucinex as patient has some congestion.    Signed,   Abdirizak Lopez MD  Family Medicine Resident PGY2  [facility]        This document has been electronically signed by Abdirizak Lopez MD on December 29, 2019 8:54 AM

## 2019-12-29 NOTE — PROGRESS NOTES
UA was positive for 1+ blood, 3+ leukoesterase, nitrite negative  -Urine culture is pending  -We will treat empirically for acute cystitis  -Order for Bactrim 800-160 mg twice daily for 7 days was given   Signed,   Abdirizak Lopez MD  Family Medicine Resident PGY2  [facility]        This document has been electronically signed by Abdirizak Lopez MD on December 29, 2019 3:10 PM      .

## 2019-12-31 ENCOUNTER — NURSING HOME (OUTPATIENT)
Dept: FAMILY MEDICINE CLINIC | Facility: CLINIC | Age: 84
End: 2019-12-31

## 2019-12-31 VITALS
WEIGHT: 150.4 LBS | BODY MASS INDEX: 25.06 KG/M2 | SYSTOLIC BLOOD PRESSURE: 143 MMHG | HEIGHT: 65 IN | DIASTOLIC BLOOD PRESSURE: 73 MMHG | HEART RATE: 77 BPM | TEMPERATURE: 98.8 F | RESPIRATION RATE: 16 BRPM | OXYGEN SATURATION: 95 %

## 2019-12-31 VITALS
RESPIRATION RATE: 20 BRPM | SYSTOLIC BLOOD PRESSURE: 125 MMHG | HEIGHT: 65 IN | OXYGEN SATURATION: 98 % | DIASTOLIC BLOOD PRESSURE: 64 MMHG | WEIGHT: 149.4 LBS | BODY MASS INDEX: 24.89 KG/M2 | HEART RATE: 76 BPM | TEMPERATURE: 99 F

## 2019-12-31 DIAGNOSIS — F03.90 MAJOR NEUROCOGNITIVE DISORDER (HCC): ICD-10-CM

## 2019-12-31 DIAGNOSIS — N18.30 CKD (CHRONIC KIDNEY DISEASE) STAGE 3, GFR 30-59 ML/MIN (HCC): ICD-10-CM

## 2019-12-31 DIAGNOSIS — E87.1 HYPONATREMIA: ICD-10-CM

## 2019-12-31 DIAGNOSIS — N30.01 ACUTE CYSTITIS WITH HEMATURIA: Primary | ICD-10-CM

## 2019-12-31 DIAGNOSIS — K59.00 CONSTIPATION, UNSPECIFIED CONSTIPATION TYPE: ICD-10-CM

## 2019-12-31 LAB — BACTERIA SPEC AEROBE CULT: ABNORMAL

## 2019-12-31 PROCEDURE — 99308 SBSQ NF CARE LOW MDM 20: CPT | Performed by: STUDENT IN AN ORGANIZED HEALTH CARE EDUCATION/TRAINING PROGRAM

## 2019-12-31 NOTE — PROGRESS NOTES
MONTHLY NURSING HOME VISIT    NAME: Marlene Horne  : 1931  MRN: [id number]    DATE & TIME SEEN: 19 1351    PCP: Chuckie Nguyen MD    NURSING HOME: [facility]    Monthly custodial Visit    Chief Complaint: Acute Cystitis    Subjective:     Marlene Horne is a 88 y.o. female, with CMHx significant for major neurocognitive dementia, hyponatremia, and constipation was recently found to have a urinary tract infection. UA was concerning for infection with elevated nitirites and leukocyte esterase so patient was empirically started on Bactrim DS. Cultures identified >100,000 CFU's of Klebsiella pneumoniae resistant only to Ampicillin. She currently has no symptoms and denies suprapubic pain, dysuria, flank pain, fever, or chills.     Current Meds:    Current Outpatient Medications:   •  acetaminophen (TYLENOL) 325 MG tablet, Take 2 tablets by mouth Every 4 (Four) Hours As Needed for Mild Pain ., Disp: , Rfl:   •  albuterol (PROVENTIL) (2.5 MG/3ML) 0.083% nebulizer solution, Take 2.5 mg by nebulization Every 4 (Four) Hours As Needed for Shortness of Air., Disp: , Rfl: 12  •  amLODIPine (NORVASC) 5 MG tablet, Take 1 tablet by mouth Daily., Disp: , Rfl:   •  aspirin 81 MG tablet, Take 1 tablet by mouth Daily., Disp: 90 tablet, Rfl: 2  •  ferrous sulfate 324 (65 Fe) MG tablet delayed-release EC tablet, Take 1 tablet by mouth Daily With Breakfast., Disp: 30 tablet, Rfl:   •  Flavoring Agent (FLAVOR CONC-CHLORHEXIDINE) concentration, Take 20 mL by mouth 3 (Three) Times a Day., Disp: 500 mL, Rfl: 0  •  melatonin 5 MG tablet tablet, Take 5 mg by mouth Every Night., Disp: , Rfl:   •  polyethylene glycol (MIRALAX) pack packet, Take 17 g by mouth Daily., Disp: , Rfl:   •  rosuvastatin (CRESTOR) 5 MG tablet, Take 1 tablet by mouth Daily., Disp: 30 tablet, Rfl: 0  •  sennosides-docusate sodium (SENOKOT-S) 8.6-50 MG tablet, Take 2 tablets by mouth Every Night., Disp: 30 tablet, Rfl: 0  •   "sodium chloride 1 g tablet, Take 2 tablets by mouth 3 (Three) Times a Day With Meals., Disp: 180 tablet, Rfl: 0    Allergies:  Patient has no known allergies.    Review of Systems:  Review of Systems   Constitutional: Negative for appetite change, diaphoresis, fatigue and fever.   HENT: Negative for ear pain, postnasal drip, rhinorrhea and sore throat.    Eyes: Negative for pain and visual disturbance.   Respiratory: Negative for cough, chest tightness and shortness of breath.    Cardiovascular: Negative for chest pain, palpitations and leg swelling.   Gastrointestinal: Negative for abdominal pain, constipation, diarrhea, nausea and vomiting.   Genitourinary: Negative for dysuria, flank pain, frequency and urgency.   Musculoskeletal: Negative for arthralgias, back pain and myalgias.   Skin: Negative for pallor and rash.   Neurological: Negative for dizziness, syncope, weakness and numbness.   Psychiatric/Behavioral: Negative for agitation, confusion, decreased concentration and dysphoric mood.       Objective:     /64   Pulse 76   Temp 99 °F (37.2 °C)   Resp 20   Ht 165.1 cm (65\")   Wt 67.8 kg (149 lb 6.4 oz)   SpO2 98% Comment: on RA  BMI 24.86 kg/m²      Physical Exam   Constitutional: She is oriented to person, place, and time. She appears well-developed and well-nourished.   HENT:   Head: Normocephalic and atraumatic.   Nose: Nose normal.   Mouth/Throat: Oropharynx is clear and moist.   Eyes: Pupils are equal, round, and reactive to light. Conjunctivae and EOM are normal.   Neck: Normal range of motion. Neck supple.   Cardiovascular: Normal rate, regular rhythm, normal heart sounds and intact distal pulses.   Pulmonary/Chest: Effort normal and breath sounds normal. No respiratory distress. She has no wheezes.   Abdominal: Soft. Bowel sounds are normal. She exhibits no distension. There is no tenderness.   Musculoskeletal: Normal range of motion. She exhibits no edema or tenderness.   Neurological: " She is alert and oriented to person, place, and time. She displays normal reflexes.   Skin: Skin is warm and dry. Capillary refill takes less than 2 seconds.   Psychiatric: She has a normal mood and affect. Her behavior is normal.       Diagnostic Data:     Please refer to paper chart at [facility]. UA significant for elevated nitrites and leukocyte esterase. Urine culture growing >100,000 CFU's of klebsiella pneumoniae that is only resistant to Ampicillin.      Assessment/Plan:      88 y.o. female with:  Problem List Items Addressed This Visit        Digestive    Constipation    Overview     Senokot 2 pills at night scheduled. Miralaax 17 grams daily scheduled.             Nervous and Auditory    Major neurocognitive disorder (CMS/HCC)    Overview     Alzheimer's Dementia with Vascular Dementia  Patient POA (daughter) has previously refused treatment with Aricept 5 mg daily.  - CT head: negative for acute disease  - Redirect patient if confused or distressed              Genitourinary    CKD (chronic kidney disease) stage 3, GFR 30-59 ml/min (CMS/[facility])    Overview     Stable. Maintain adequate hydration           Acute cystitis with hematuria - Primary    Overview     Patient was empirically started on Bactrim DS based off UA and urine microscopy with cultures sent, however she has CKD stage 3 and calculated creatinine clearance of 29-35 mL/min. Since patient does not have fever, chills, or flank pain she most likely has acute cystitis. Will discontinue Bactrim DS and change antibiotic to Cefdinir 300mg PO every 12 hours for 5 days #10.             Other    Hyponatremia    Overview     Managed by nephrology  Continue 2 g sodium tablets 3 times daily.                     CODE STATUS: DNR and DNI    Dr. Bell  is the attending on record for this patient, She is aware of the patient's status and agrees with the above.      Chuckie Nguyen M.D. PGY2  [facility]  [address]  [place] [address]  Office: [phone]    This document has been electronically signed by Chuckie Nguyen MD on December 31, 2019 2:49 PM

## 2019-12-31 NOTE — PROGRESS NOTES
MONTHLY NURSING HOME VISIT    NAME: Marlene Horne  : 1931  MRN: [id number]    DATE & TIME SEEN: 19  1620    PCP: Chuckie Nguyen MD    NURSING HOME: [facility]    Monthly care home Visit     Chief Complaint: Routine Exam    Subjective:     Marlene Horne is a 88 y.o. female, with CMHx of major neurocognitive dementia and hyponatremia, is a resident of [facility]. She is in her usual state and has no complaints currently.       Current Meds:    Current Outpatient Medications:   •  acetaminophen (TYLENOL) 325 MG tablet, Take 2 tablets by mouth Every 4 (Four) Hours As Needed for Mild Pain ., Disp: , Rfl:   •  albuterol (PROVENTIL) (2.5 MG/3ML) 0.083% nebulizer solution, Take 2.5 mg by nebulization Every 4 (Four) Hours As Needed for Shortness of Air., Disp: , Rfl: 12  •  amLODIPine (NORVASC) 5 MG tablet, Take 1 tablet by mouth Daily., Disp: , Rfl:   •  aspirin 81 MG tablet, Take 1 tablet by mouth Daily., Disp: 90 tablet, Rfl: 2  •  ferrous sulfate 324 (65 Fe) MG tablet delayed-release EC tablet, Take 1 tablet by mouth Daily With Breakfast., Disp: 30 tablet, Rfl:   •  Flavoring Agent (FLAVOR CONC-CHLORHEXIDINE) concentration, Take 20 mL by mouth 3 (Three) Times a Day., Disp: 500 mL, Rfl: 0  •  melatonin 5 MG tablet tablet, Take 5 mg by mouth Every Night., Disp: , Rfl:   •  polyethylene glycol (MIRALAX) pack packet, Take 17 g by mouth Daily., Disp: , Rfl:   •  rosuvastatin (CRESTOR) 5 MG tablet, Take 1 tablet by mouth Daily., Disp: 30 tablet, Rfl: 0  •  sennosides-docusate sodium (SENOKOT-S) 8.6-50 MG tablet, Take 2 tablets by mouth Every Night., Disp: 30 tablet, Rfl: 0  •  sodium chloride 1 g tablet, Take 2 tablets by mouth 3 (Three) Times a Day With Meals., Disp: 180 tablet, Rfl: 0    Allergies:  Patient has no known allergies.    Review of Systems:  Review of Systems   Constitutional: Negative for appetite change, diaphoresis, fatigue and fever.   HENT: Negative for ear  "pain, postnasal drip, rhinorrhea and sore throat.    Eyes: Negative for pain and visual disturbance.   Respiratory: Negative for cough, chest tightness and shortness of breath.    Cardiovascular: Negative for chest pain, palpitations and leg swelling.   Gastrointestinal: Negative for abdominal pain, constipation, diarrhea, nausea and vomiting.   Genitourinary: Negative for dysuria, flank pain, frequency and urgency.   Musculoskeletal: Negative for arthralgias, back pain and myalgias.   Skin: Negative for pallor and rash.   Neurological: Negative for dizziness, syncope, weakness and numbness.   Psychiatric/Behavioral: Negative for agitation, confusion, decreased concentration and dysphoric mood.       Objective:     /73   Pulse 77   Temp 98.8 °F (37.1 °C)   Resp 16   Ht 165.1 cm (65\")   Wt 68.2 kg (150 lb 6.4 oz)   SpO2 95% Comment: RA  BMI 25.03 kg/m²      Physical Exam   Constitutional: She is oriented to person, place, and time. She appears well-developed and well-nourished.   HENT:   Head: Normocephalic and atraumatic.   Nose: Nose normal.   Mouth/Throat: Oropharynx is clear and moist.   Eyes: Pupils are equal, round, and reactive to light. Conjunctivae and EOM are normal.   Neck: Normal range of motion. Neck supple.   Cardiovascular: Normal rate, regular rhythm, normal heart sounds and intact distal pulses.   Pulmonary/Chest: Effort normal and breath sounds normal. No respiratory distress. She has no wheezes.   Abdominal: Soft. Bowel sounds are normal. She exhibits no distension. There is no tenderness.   Musculoskeletal: Normal range of motion. She exhibits no tenderness.   Lymphadenopathy:     She has no cervical adenopathy.   Neurological: She is alert and oriented to person, place, and time. She displays normal reflexes.   Skin: Skin is warm and dry. Capillary refill takes less than 2 seconds.   Psychiatric: She has a normal mood and affect. Her behavior is normal.       Diagnostic Data: "     Please refer to paper chart at [facility] for available labs.      Assessment/Plan:      88 y.o. female with:  Problem List Items Addressed This Visit        Digestive    Constipation    Overview     Senokot 2 pills at night scheduled. Miralaax 17 grams daily scheduled.             Nervous and Auditory    Major neurocognitive disorder (CMS/HCC) - Primary    Overview     Alzheimer's Dementia with Vascular Dementia  Patient POA (daughter) has previously refused treatment with Aricept 5 mg daily.  - CT head: negative for acute disease  - Redirect patient if confused or distressed              Other    Hyponatremia    Overview     Managed by nephrology  Continue 2 g sodium tablets 3 times daily.                     CODE STATUS: DNR and DNI    Dr. Bell  is the attending on record for this patient, She is aware of the patient's status and agrees with the above.      Chuckie Nguyen M.D. PGY2  [facility]  [address] [place] [address]  Office: [phone]    This document has been electronically signed by Chuckie Nguyen MD on December 31, 2019 2:56 PM

## 2020-01-06 ENCOUNTER — DOCUMENTATION (OUTPATIENT)
Dept: FAMILY MEDICINE CLINIC | Facility: CLINIC | Age: 85
End: 2020-01-06

## 2020-01-06 ENCOUNTER — TELEPHONE (OUTPATIENT)
Dept: FAMILY MEDICINE CLINIC | Facility: CLINIC | Age: 85
End: 2020-01-06

## 2020-01-06 ENCOUNTER — LAB REQUISITION (OUTPATIENT)
Dept: LAB | Facility: HOSPITAL | Age: 85
End: 2020-01-06

## 2020-01-06 DIAGNOSIS — N18.30 CHRONIC KIDNEY DISEASE, STAGE 3 (MODERATE): ICD-10-CM

## 2020-01-06 LAB
ALBUMIN SERPL-MCNC: 3.2 G/DL (ref 3.5–5.2)
ALBUMIN/GLOB SERPL: 1 G/DL
ALP SERPL-CCNC: 108 U/L (ref 39–117)
ALT SERPL W P-5'-P-CCNC: 14 U/L (ref 1–33)
ANION GAP SERPL CALCULATED.3IONS-SCNC: 12 MMOL/L (ref 5–15)
AST SERPL-CCNC: 19 U/L (ref 1–32)
BASOPHILS # BLD AUTO: 0.03 10*3/MM3 (ref 0–0.2)
BASOPHILS NFR BLD AUTO: 0.2 % (ref 0–1.5)
BILIRUB SERPL-MCNC: 0.3 MG/DL (ref 0.2–1.2)
BUN BLD-MCNC: 15 MG/DL (ref 8–23)
BUN/CREAT SERPL: 12.6 (ref 7–25)
CALCIUM SPEC-SCNC: 9.1 MG/DL (ref 8.6–10.5)
CHLORIDE SERPL-SCNC: 102 MMOL/L (ref 98–107)
CO2 SERPL-SCNC: 24 MMOL/L (ref 22–29)
CREAT BLD-MCNC: 1.19 MG/DL (ref 0.57–1)
DEPRECATED RDW RBC AUTO: 42.1 FL (ref 37–54)
EOSINOPHIL # BLD AUTO: 0 10*3/MM3 (ref 0–0.4)
EOSINOPHIL NFR BLD AUTO: 0 % (ref 0.3–6.2)
ERYTHROCYTE [DISTWIDTH] IN BLOOD BY AUTOMATED COUNT: 12.9 % (ref 12.3–15.4)
GFR SERPL CREATININE-BSD FRML MDRD: 43 ML/MIN/1.73
GLOBULIN UR ELPH-MCNC: 3.1 GM/DL
GLUCOSE BLD-MCNC: 137 MG/DL (ref 65–99)
HCT VFR BLD AUTO: 35.2 % (ref 34–46.6)
HGB BLD-MCNC: 12 G/DL (ref 12–15.9)
IMM GRANULOCYTES # BLD AUTO: 0.1 10*3/MM3 (ref 0–0.05)
IMM GRANULOCYTES NFR BLD AUTO: 0.8 % (ref 0–0.5)
LYMPHOCYTES # BLD AUTO: 0.82 10*3/MM3 (ref 0.7–3.1)
LYMPHOCYTES NFR BLD AUTO: 6.7 % (ref 19.6–45.3)
MCH RBC QN AUTO: 30.6 PG (ref 26.6–33)
MCHC RBC AUTO-ENTMCNC: 34.1 G/DL (ref 31.5–35.7)
MCV RBC AUTO: 89.8 FL (ref 79–97)
MONOCYTES # BLD AUTO: 0.52 10*3/MM3 (ref 0.1–0.9)
MONOCYTES NFR BLD AUTO: 4.2 % (ref 5–12)
NEUTROPHILS # BLD AUTO: 10.83 10*3/MM3 (ref 1.7–7)
NEUTROPHILS NFR BLD AUTO: 88.1 % (ref 42.7–76)
NRBC BLD AUTO-RTO: 0 /100 WBC (ref 0–0.2)
PLATELET # BLD AUTO: 351 10*3/MM3 (ref 140–450)
PMV BLD AUTO: 8.8 FL (ref 6–12)
POTASSIUM BLD-SCNC: 4 MMOL/L (ref 3.5–5.2)
PROT SERPL-MCNC: 6.3 G/DL (ref 6–8.5)
RBC # BLD AUTO: 3.92 10*6/MM3 (ref 3.77–5.28)
SODIUM BLD-SCNC: 138 MMOL/L (ref 136–145)
WBC NRBC COR # BLD: 12.3 10*3/MM3 (ref 3.4–10.8)

## 2020-01-06 PROCEDURE — 80053 COMPREHEN METABOLIC PANEL: CPT | Performed by: STUDENT IN AN ORGANIZED HEALTH CARE EDUCATION/TRAINING PROGRAM

## 2020-01-06 PROCEDURE — 85025 COMPLETE CBC W/AUTO DIFF WBC: CPT | Performed by: STUDENT IN AN ORGANIZED HEALTH CARE EDUCATION/TRAINING PROGRAM

## 2020-01-06 NOTE — PROGRESS NOTES
Received phone call from Garrick at [facility]. Mrs. Horne had an unwitnessed fall. There does not appear to be a fracture as she is not complaining of pain. The POA (farrukh has been notified). Will obtain CBC and CMP and consider XR at hospital should she develop progressively worsening pain. Asked to be kept updated on her situation.      Chuckie Nguyen M.D. PGY2  [facility]  [address] [place] [address]  Office: [phone]    This document has been electronically signed by Chuckie Nguyen MD on January 6, 2020 9:25 AM

## 2020-01-08 ENCOUNTER — TELEPHONE (OUTPATIENT)
Dept: FAMILY MEDICINE CLINIC | Facility: CLINIC | Age: 85
End: 2020-01-08

## 2020-01-08 NOTE — TELEPHONE ENCOUNTER
[facility] called and said they just faxed over some lab results and that if you would like to look over them and give them a call if you want to make any changes.     [phone]    Thank you.

## 2020-01-16 ENCOUNTER — DOCUMENTATION (OUTPATIENT)
Dept: FAMILY MEDICINE CLINIC | Facility: CLINIC | Age: 85
End: 2020-01-16

## 2020-01-16 ENCOUNTER — TELEPHONE (OUTPATIENT)
Dept: FAMILY MEDICINE CLINIC | Facility: CLINIC | Age: 85
End: 2020-01-16

## 2020-01-16 NOTE — TELEPHONE ENCOUNTER
[facility] called inquiring about labs sent on 01/08/2020 (scanned into Media)      They want you to review those results and call the nursing [place].     Ask for Orin Noel the [place] Nurse    [phone]     Thank you

## 2020-01-16 NOTE — PROGRESS NOTES
Returned phone call to [facility]. [facility] Reviewed CMP and UA. Patient recovering from acute cystitis and has completed antibiotics (Bactrim 3 days transitioned to Omnicef for 5 days). Asymtomatic and afebrile. No confusion beyond baseline. No new orders; resume usual care.      Chuckie Nguyen M.D. PGY2  [facility]  [address] [place] [address]  Office: [phone]    This document has been electronically signed by Chuckie Nguyen MD on January 16, 2020 1:35 PM

## 2020-01-24 ENCOUNTER — NURSING HOME (OUTPATIENT)
Dept: FAMILY MEDICINE CLINIC | Facility: CLINIC | Age: 85
End: 2020-01-24

## 2020-01-24 DIAGNOSIS — F03.90 MAJOR NEUROCOGNITIVE DISORDER (HCC): Primary | ICD-10-CM

## 2020-01-24 DIAGNOSIS — K59.00 CONSTIPATION, UNSPECIFIED CONSTIPATION TYPE: ICD-10-CM

## 2020-01-24 DIAGNOSIS — E87.1 HYPONATREMIA: ICD-10-CM

## 2020-01-24 PROCEDURE — 99304 1ST NF CARE SF/LOW MDM 25: CPT | Performed by: STUDENT IN AN ORGANIZED HEALTH CARE EDUCATION/TRAINING PROGRAM

## 2020-02-03 VITALS
RESPIRATION RATE: 18 BRPM | BODY MASS INDEX: 24.93 KG/M2 | DIASTOLIC BLOOD PRESSURE: 68 MMHG | HEIGHT: 65 IN | SYSTOLIC BLOOD PRESSURE: 138 MMHG | TEMPERATURE: 98.2 F | WEIGHT: 149.6 LBS | HEART RATE: 70 BPM | OXYGEN SATURATION: 98 %

## 2020-02-03 NOTE — PROGRESS NOTES
MONTHLY NURSING HOME VISIT    NAME: Marlene Horne  : 1931  MRN: [id number]    DATE & TIME SEEN: 2020  At  1351    PCP: Chuckie Nguyen MD    NURSING HOME: [facility]    Monthly custodial Visit    Chief Complaint: Routine Visit    Subjective:     Marlene Horne is a 88 y.o. female, with CMHx significant for major neurocognitive dementia, hyponatremia, and constipation. Recently finished course of antibiotics for acute cystitis. She feels well has no complaints. No confusion above baseline.  Denies dysuria, flank pain, suprapubic pain, fever, chills, or constipation.    Current Meds:    Current Outpatient Medications:   •  acetaminophen (TYLENOL) 325 MG tablet, Take 2 tablets by mouth Every 4 (Four) Hours As Needed for Mild Pain ., Disp: , Rfl:   •  albuterol (PROVENTIL) (2.5 MG/3ML) 0.083% nebulizer solution, Take 2.5 mg by nebulization Every 4 (Four) Hours As Needed for Shortness of Air., Disp: , Rfl: 12  •  amLODIPine (NORVASC) 5 MG tablet, Take 1 tablet by mouth Daily., Disp: , Rfl:   •  aspirin 81 MG tablet, Take 1 tablet by mouth Daily., Disp: 90 tablet, Rfl: 2  •  ferrous sulfate 324 (65 Fe) MG tablet delayed-release EC tablet, Take 1 tablet by mouth Daily With Breakfast., Disp: 30 tablet, Rfl:   •  Flavoring Agent (FLAVOR CONC-CHLORHEXIDINE) concentration, Take 20 mL by mouth 3 (Three) Times a Day., Disp: 500 mL, Rfl: 0  •  melatonin 5 MG tablet tablet, Take 5 mg by mouth Every Night., Disp: , Rfl:   •  polyethylene glycol (MIRALAX) pack packet, Take 17 g by mouth Daily., Disp: , Rfl:   •  rosuvastatin (CRESTOR) 5 MG tablet, Take 1 tablet by mouth Daily., Disp: 30 tablet, Rfl: 0  •  sennosides-docusate sodium (SENOKOT-S) 8.6-50 MG tablet, Take 2 tablets by mouth Every Night., Disp: 30 tablet, Rfl: 0  •  sodium chloride 1 g tablet, Take 2 tablets by mouth 3 (Three) Times a Day With Meals., Disp: 180 tablet, Rfl: 0    Allergies:  Patient has no known allergies.    Review  "of Systems:  Review of Systems   Constitutional: Negative for appetite change, diaphoresis, fatigue and fever.   HENT: Negative for ear pain, postnasal drip, rhinorrhea and sore throat.    Eyes: Negative for pain and visual disturbance.   Respiratory: Negative for cough, chest tightness and shortness of breath.    Cardiovascular: Negative for chest pain, palpitations and leg swelling.   Gastrointestinal: Negative for abdominal pain, constipation, diarrhea, nausea and vomiting.   Genitourinary: Negative for dysuria, flank pain, frequency and urgency.   Musculoskeletal: Negative for arthralgias, back pain and myalgias.   Skin: Negative for pallor and rash.   Neurological: Negative for dizziness, syncope, weakness and numbness.   Psychiatric/Behavioral: Negative for agitation, confusion, decreased concentration and dysphoric mood.       Objective:     /68   Pulse 70   Temp 98.2 °F (36.8 °C)   Resp 18   Ht 165.1 cm (65\")   Wt 67.9 kg (149 lb 9.6 oz)   SpO2 98% Comment: Room air  BMI 24.89 kg/m²      Physical Exam   Constitutional: She is oriented to person, place, and time. She appears well-developed and well-nourished.   HENT:   Head: Normocephalic and atraumatic.   Nose: Nose normal.   Mouth/Throat: Oropharynx is clear and moist.   Eyes: Pupils are equal, round, and reactive to light. Conjunctivae and EOM are normal.   Neck: Normal range of motion. Neck supple.   Cardiovascular: Normal rate, regular rhythm, normal heart sounds and intact distal pulses.   Pulmonary/Chest: Effort normal and breath sounds normal. No respiratory distress. She has no wheezes.   Abdominal: Soft. Bowel sounds are normal. She exhibits no distension. There is no tenderness.   Musculoskeletal: Normal range of motion. She exhibits no edema or tenderness.   Neurological: She is alert and oriented to person, place, and time. She displays normal reflexes.   Skin: Skin is warm and dry. Capillary refill takes less than 2 seconds. "   Psychiatric: She has a normal mood and affect. Her behavior is normal.       Diagnostic Data:     Please refer to paper chart at [facility]     Assessment/Plan:      88 y.o. female with:  Problem List Items Addressed This Visit        Digestive    Constipation    Overview     Senokot 2 pills at night scheduled. Miralaax 17 grams daily scheduled.             Nervous and Auditory    Major neurocognitive disorder (CMS/HCC) - Primary    Overview     Alzheimer's Dementia with Vascular Dementia  Patient POA (daughter) has previously refused treatment with Aricept 5 mg daily.  - CT head: negative for acute disease  - Redirect patient if confused or distressed              Other    Hyponatremia    Overview     Managed by nephrology  Continue 2 g sodium tablets 3 times daily.                     CODE STATUS: DNR and DNI    Dr. Bell  is the attending on record for this patient, She is aware of the patient's status and agrees with the above.      Chuckie Nguyen M.D. PGY2  [facility]  [address] [place], [place] [address]  Office: [phone]    This document has been electronically signed by Chuckie Nguyen MD on February 3, 2020 2:16 PM

## 2020-02-06 NOTE — PROGRESS NOTES
I was present with the resident during the entire visit.    I have reviewed the notes, assessments, and/or procedures performed by Chuckie Nguyen MD , I concur with her/his documentation and assessment and plan for Marlene Horne.  Patient had acute cystitis which is now resolved.  States she is doing well.    Physical exam: No acute distress, cardiovascular S1-S2 regular rate and rhythm, lungs clear to auscultation bilaterally, abdomen soft nontender nondistended, extremities warm dry.    Plan: Continue present treatment.          This document has been electronically signed by Maricarmen Bell MD on February 6, 2020 1:10 AM

## 2020-02-19 ENCOUNTER — NURSING HOME (OUTPATIENT)
Dept: FAMILY MEDICINE CLINIC | Facility: CLINIC | Age: 85
End: 2020-02-19

## 2020-02-19 DIAGNOSIS — E77.8 HYPOPROTEINEMIA (HCC): Primary | ICD-10-CM

## 2020-02-19 DIAGNOSIS — F03.90 MAJOR NEUROCOGNITIVE DISORDER (HCC): ICD-10-CM

## 2020-02-19 DIAGNOSIS — K59.00 CONSTIPATION, UNSPECIFIED CONSTIPATION TYPE: ICD-10-CM

## 2020-02-19 PROCEDURE — 99308 SBSQ NF CARE LOW MDM 20: CPT | Performed by: STUDENT IN AN ORGANIZED HEALTH CARE EDUCATION/TRAINING PROGRAM

## 2020-02-20 VITALS
TEMPERATURE: 98.2 F | OXYGEN SATURATION: 98 % | WEIGHT: 149.69 LBS | HEIGHT: 65 IN | DIASTOLIC BLOOD PRESSURE: 66 MMHG | SYSTOLIC BLOOD PRESSURE: 127 MMHG | RESPIRATION RATE: 18 BRPM | BODY MASS INDEX: 24.94 KG/M2 | HEART RATE: 92 BPM

## 2020-02-20 NOTE — PROGRESS NOTES
MONTHLY NURSING HOME VISIT    NAME: Marlene Horne  : 1931  MRN: [id number]    DATE & TIME SEEN: 2020  At  1320    PCP: Chuckie Nguyen MD    NURSING HOME: [facility]    Monthly senior care Visit    Chief Complaint: Routine Visit    Subjective:     Marlene Horne is a 88 y.o. female, with CMHx significant for major neurocognitive dementia, hyponatremia, and constipation. She has baseline confusion. She is in her usual state and has no complaints today.  Denies dysuria, flank pain, suprapubic pain, fever, chills, or constipation.    Current Meds:    Current Outpatient Medications:   •  acetaminophen (TYLENOL) 325 MG tablet, Take 2 tablets by mouth Every 4 (Four) Hours As Needed for Mild Pain ., Disp: , Rfl:   •  albuterol (PROVENTIL) (2.5 MG/3ML) 0.083% nebulizer solution, Take 2.5 mg by nebulization Every 4 (Four) Hours As Needed for Shortness of Air., Disp: , Rfl: 12  •  amLODIPine (NORVASC) 5 MG tablet, Take 1 tablet by mouth Daily., Disp: , Rfl:   •  aspirin 81 MG tablet, Take 1 tablet by mouth Daily., Disp: 90 tablet, Rfl: 2  •  ferrous sulfate 324 (65 Fe) MG tablet delayed-release EC tablet, Take 1 tablet by mouth Daily With Breakfast., Disp: 30 tablet, Rfl:   •  Flavoring Agent (FLAVOR CONC-CHLORHEXIDINE) concentration, Take 20 mL by mouth 3 (Three) Times a Day., Disp: 500 mL, Rfl: 0  •  melatonin 5 MG tablet tablet, Take 5 mg by mouth Every Night., Disp: , Rfl:   •  polyethylene glycol (MIRALAX) pack packet, Take 17 g by mouth Daily., Disp: , Rfl:   •  rosuvastatin (CRESTOR) 5 MG tablet, Take 1 tablet by mouth Daily., Disp: 30 tablet, Rfl: 0  •  sennosides-docusate sodium (SENOKOT-S) 8.6-50 MG tablet, Take 2 tablets by mouth Every Night., Disp: 30 tablet, Rfl: 0  •  sodium chloride 1 g tablet, Take 2 tablets by mouth 3 (Three) Times a Day With Meals., Disp: 180 tablet, Rfl: 0    Allergies:  Patient has no known allergies.    Review of Systems:  Review of Systems  "  Constitutional: Negative for appetite change, diaphoresis, fatigue and fever.   HENT: Negative for ear pain, postnasal drip, rhinorrhea and sore throat.    Eyes: Negative for pain and visual disturbance.   Respiratory: Negative for cough, chest tightness and shortness of breath.    Cardiovascular: Negative for chest pain, palpitations and leg swelling.   Gastrointestinal: Negative for abdominal pain, constipation, diarrhea, nausea and vomiting.   Genitourinary: Negative for dysuria, flank pain, frequency and urgency.   Musculoskeletal: Negative for arthralgias, back pain and myalgias.   Skin: Negative for pallor and rash.   Neurological: Negative for dizziness, syncope, weakness and numbness.   Psychiatric/Behavioral: Negative for agitation, confusion, decreased concentration and dysphoric mood.       Objective:     /66   Pulse 92   Temp 98.2 °F (36.8 °C)   Resp 18   Ht 165.1 cm (65\")   Wt 67.9 kg (149 lb 11.1 oz)   SpO2 98% Comment: room air  BMI 24.91 kg/m²      Physical Exam   Constitutional: She is oriented to person, place, and time. She appears well-developed and well-nourished.   HENT:   Head: Normocephalic and atraumatic.   Nose: Nose normal.   Mouth/Throat: Oropharynx is clear and moist.   Eyes: Pupils are equal, round, and reactive to light. Conjunctivae and EOM are normal.   Neck: Normal range of motion. Neck supple.   Cardiovascular: Normal rate, regular rhythm, normal heart sounds and intact distal pulses.   Pulmonary/Chest: Effort normal and breath sounds normal. No respiratory distress. She has no wheezes.   Abdominal: Soft. Bowel sounds are normal. She exhibits no distension. There is no tenderness.   Musculoskeletal: Normal range of motion. She exhibits no edema or tenderness.   Neurological: She is alert and oriented to person, place, and time. She displays normal reflexes.   Skin: Skin is warm and dry. Capillary refill takes less than 2 seconds.   Psychiatric: She has a normal mood " and affect. Her behavior is normal.       Diagnostic Data:     Please refer to paper chart at [facility]     Assessment/Plan:      88 y.o. female with:  Problem List Items Addressed This Visit        Digestive    Constipation    Overview     Senokot 2 pills at night scheduled. Miralaax 17 grams daily scheduled.             Nervous and Auditory    Major neurocognitive disorder (CMS/HCC)    Overview     Alzheimer's Dementia with Vascular Dementia  Patient POA (daughter) has previously refused treatment with Aricept 5 mg daily.  - CT head: negative for acute disease  - Redirect patient if confused or distressed              Other    Hypoproteinemia (CMS/HCC) - Primary    Overview     High-protein high-calorie diet.  Magic cups 3 times daily.  Dietary consult, follow recommendations.               CODE STATUS: DNR and DNI    Dr. Bell  is the attending on record for this patient, She is aware of the patient's status and agrees with the above.      Chuckie Nguyen M.D. PGY2  [facility]  [address] [place] [address]  Office: [phone]    This document has been electronically signed by Chuckie Nguyen MD on February 20, 2020 3:33 PM

## 2020-02-21 NOTE — PROGRESS NOTES
I have reviewed the notes, assessments, and/or procedures performed by Chuckie Nguyen MD , I concur with her/his documentation of Marelne Horne.  Patient is doing well today.  She was just ambulating with her walker.  She has no complaints today.    Physical exam: No acute distress, cardiovascular S1 S2 regular rate and rhythm, lungs clear to auscultation bilaterally, abdomen is soft nontender nondistended, remedies warm dry.    Plan: Continue current treatment.      This document has been electronically signed by Maricarmen Bell MD on February 20, 2020 6:06 PM

## 2020-03-27 ENCOUNTER — TELEPHONE (OUTPATIENT)
Dept: FAMILY MEDICINE CLINIC | Facility: CLINIC | Age: 85
End: 2020-03-27

## 2020-03-27 NOTE — TELEPHONE ENCOUNTER
NURSING HOME PHONE VISIT    NAME: Marlene Horne  : 1931  MRN: [id number]    DATE: 3/25/2020  At  1520    PCP: Chuckie Nguyen MD    NURSING HOME: [facility]    Monthly USP Visit    Chief Complaint: Routine Visit (Call due to Coronavirus pandemic)    Subjective:     Marlene Horne is a 88 y.o. female, with CMHx significant for major neurocognitive dementia, hyponatremia, and constipation. She has baseline confusion. She is in her usual state and nurse Sarah reports no patient complains or concerns from staff at [facility]. This visit is completed through telephone due to ongoing coronavirus pandemic to protect nursing home residents from unnecessary potential exposure.     Current Meds:    Current Outpatient Medications:   •  acetaminophen (TYLENOL) 325 MG tablet, Take 2 tablets by mouth Every 4 (Four) Hours As Needed for Mild Pain ., Disp: , Rfl:   •  albuterol (PROVENTIL) (2.5 MG/3ML) 0.083% nebulizer solution, Take 2.5 mg by nebulization Every 4 (Four) Hours As Needed for Shortness of Air., Disp: , Rfl: 12  •  amLODIPine (NORVASC) 5 MG tablet, Take 1 tablet by mouth Daily., Disp: , Rfl:   •  aspirin 81 MG tablet, Take 1 tablet by mouth Daily., Disp: 90 tablet, Rfl: 2  •  ferrous sulfate 324 (65 Fe) MG tablet delayed-release EC tablet, Take 1 tablet by mouth Daily With Breakfast., Disp: 30 tablet, Rfl:   •  Flavoring Agent (FLAVOR CONC-CHLORHEXIDINE) concentration, Take 20 mL by mouth 3 (Three) Times a Day., Disp: 500 mL, Rfl: 0  •  melatonin 5 MG tablet tablet, Take 5 mg by mouth Every Night., Disp: , Rfl:   •  polyethylene glycol (MIRALAX) pack packet, Take 17 g by mouth Daily., Disp: , Rfl:   •  rosuvastatin (CRESTOR) 5 MG tablet, Take 1 tablet by mouth Daily., Disp: 30 tablet, Rfl: 0  •  sennosides-docusate sodium (SENOKOT-S) 8.6-50 MG tablet, Take 2 tablets by mouth Every Night., Disp: 30 tablet, Rfl: 0  •  sodium chloride 1 g tablet, Take 2 tablets by mouth 3 (Three) Times a  "Day With Meals., Disp: 180 tablet, Rfl: 0    Allergies:  Patient has no known allergies.    Review of Systems:  Unable to perform - phone visit; dementia      Objective:     /72   Pulse 81   Temp 97.3 °F (36.3 °C)   Resp 16   Ht 165.1 cm (65\")   Wt 67.04 kg (147 lb 12.8 oz)   SpO2 92% Comment: room air  BMI 24.6 kg/m²      Physical Exam   Unable to complete - phone visit    Diagnostic Data:     Please refer to paper chart at [facility]     Assessment/Plan:      88 y.o. female with:    Major Neurocognitive Disorder: POA has refused treatment. Monitor and redirect as needed.    Constipation: Stable, well-controlled. Continue present management with scheduled senokot 2 pills at night and scheduled daily miralaax 17 grams.    Hyponatremia: Managed by nephrology. Oupatient appontment has been postponed during coronavirus pandemic. Continue 2 g sodium tablets 3 times daily.      CODE STATUS: DNR and DNI    Dr. Bell  is the attending on record for this patient, She is aware of the patient's status and agrees with the above. Dr. Bell was present throughout the phone call encounter. A total of 10 minutes was spent during this phone call.        Chuckie Nguyen M.D. PGY2  [facility]  [address] [place] [address]  Office: [phone]    This document has been electronically signed by Chuckie Nguyen MD on March 27, 2020 10:04          "

## 2020-03-30 NOTE — TELEPHONE ENCOUNTER
I was present with Dr. Nguyen during the entire phone encounter. I have reviewed and agree with documentation.      This document has been electronically signed by Maricarmen Bell MD on March 30, 2020 12:46

## 2020-04-12 ENCOUNTER — TELEPHONE (OUTPATIENT)
Dept: FAMILY MEDICINE CLINIC | Facility: CLINIC | Age: 85
End: 2020-04-12

## 2020-04-12 NOTE — TELEPHONE ENCOUNTER
Received call for increased worsening altered mental status.  Urinalysis with culture if indicated was ordered.        This document has been electronically signed by Srinivasa Chairez III, MD on April 12, 2020 14:11      Dragon disclaimer: Parts of this note were transcribed using dragon dictation software.

## 2020-04-14 ENCOUNTER — LAB REQUISITION (OUTPATIENT)
Dept: LAB | Facility: HOSPITAL | Age: 85
End: 2020-04-14

## 2020-04-14 DIAGNOSIS — N39.0 URINARY TRACT INFECTION, SITE NOT SPECIFIED: ICD-10-CM

## 2020-04-14 LAB
BACTERIA UR QL AUTO: ABNORMAL /HPF
BILIRUB UR QL STRIP: ABNORMAL
CLARITY UR: ABNORMAL
COLOR UR: ABNORMAL
GLUCOSE UR STRIP-MCNC: NEGATIVE MG/DL
HGB UR QL STRIP.AUTO: NEGATIVE
HYALINE CASTS UR QL AUTO: ABNORMAL /LPF
KETONES UR QL STRIP: ABNORMAL
LEUKOCYTE ESTERASE UR QL STRIP.AUTO: ABNORMAL
MUCOUS THREADS URNS QL MICRO: ABNORMAL /HPF
NITRITE UR QL STRIP: NEGATIVE
PH UR STRIP.AUTO: 5.5 [PH] (ref 5–9)
PROT UR QL STRIP: ABNORMAL
RBC # UR: ABNORMAL /HPF
REF LAB TEST METHOD: ABNORMAL
SP GR UR STRIP: 1.03 (ref 1–1.03)
SQUAMOUS #/AREA URNS HPF: ABNORMAL /HPF
UROBILINOGEN UR QL STRIP: ABNORMAL
WBC UR QL AUTO: ABNORMAL /HPF

## 2020-04-14 PROCEDURE — 81001 URINALYSIS AUTO W/SCOPE: CPT | Performed by: STUDENT IN AN ORGANIZED HEALTH CARE EDUCATION/TRAINING PROGRAM

## 2020-04-14 PROCEDURE — 87086 URINE CULTURE/COLONY COUNT: CPT | Performed by: STUDENT IN AN ORGANIZED HEALTH CARE EDUCATION/TRAINING PROGRAM

## 2020-04-15 LAB — BACTERIA SPEC AEROBE CULT: ABNORMAL

## 2020-04-17 ENCOUNTER — LAB REQUISITION (OUTPATIENT)
Dept: LAB | Facility: HOSPITAL | Age: 85
End: 2020-04-17

## 2020-04-17 DIAGNOSIS — Z20.828 CONTACT WITH AND (SUSPECTED) EXPOSURE TO OTHER VIRAL COMMUNICABLE DISEASES: ICD-10-CM

## 2020-04-17 PROCEDURE — 87635 SARS-COV-2 COVID-19 AMP PRB: CPT | Performed by: FAMILY MEDICINE

## 2020-04-18 LAB — SARS-COV-2 RNA RESP QL NAA+PROBE: DETECTED

## 2020-04-21 ENCOUNTER — TELEMEDICINE (OUTPATIENT)
Dept: FAMILY MEDICINE CLINIC | Facility: CLINIC | Age: 85
End: 2020-04-21

## 2020-04-21 VITALS
HEIGHT: 65 IN | SYSTOLIC BLOOD PRESSURE: 121 MMHG | HEART RATE: 95 BPM | TEMPERATURE: 96.8 F | OXYGEN SATURATION: 96 % | WEIGHT: 149.69 LBS | RESPIRATION RATE: 18 BRPM | DIASTOLIC BLOOD PRESSURE: 87 MMHG | BODY MASS INDEX: 24.94 KG/M2

## 2020-04-21 DIAGNOSIS — K59.00 CONSTIPATION, UNSPECIFIED CONSTIPATION TYPE: ICD-10-CM

## 2020-04-21 DIAGNOSIS — E86.0 DEHYDRATION: ICD-10-CM

## 2020-04-21 DIAGNOSIS — E87.1 HYPONATREMIA: ICD-10-CM

## 2020-04-21 DIAGNOSIS — U07.1 COVID-19 VIRUS DETECTED: ICD-10-CM

## 2020-04-21 DIAGNOSIS — N30.01 ACUTE CYSTITIS WITH HEMATURIA: ICD-10-CM

## 2020-04-21 DIAGNOSIS — N18.30 CKD (CHRONIC KIDNEY DISEASE) STAGE 3, GFR 30-59 ML/MIN (HCC): ICD-10-CM

## 2020-04-21 DIAGNOSIS — F03.90 MAJOR NEUROCOGNITIVE DISORDER (HCC): Primary | ICD-10-CM

## 2020-04-21 PROCEDURE — 99309 SBSQ NF CARE MODERATE MDM 30: CPT | Performed by: STUDENT IN AN ORGANIZED HEALTH CARE EDUCATION/TRAINING PROGRAM

## 2020-04-21 NOTE — PROGRESS NOTES
I was present with Dr. Nguyen during the entire visit. I have reviewed the notes, assessments, and/or procedures performed by Chuckie Nguyen MD , I concur with her/his documentation of Marlene Horne.       This document has been electronically signed by Maricarmen Bell MD on April 21, 2020 14:40

## 2020-04-21 NOTE — PROGRESS NOTES
MONTHLY NURSING HOME VISIT    NAME: Marlene Horne  : 1931  MRN: [id number]    DATE & TIME SEEN: 2020  At  1300 through video visit via nurse Sr    PCP: Chuckie Nguyen MD    NURSING HOME: [facility]    Monthly retirement Visit    Chief Complaint: Routine Visit    Subjective:     VIDEO VISIT  You have chosen to receive care through a telehealth visit.  Do you consent to use a video/audio connection for your medical care today? Yes    Marlene Horne is a 88 y.o. female, with CMHx significant for major neurocognitive dementia, hyponatremia, and constipation. She has baseline confusion. She is in her usual state and has no complaints. Nurse Sr reports she has been moved to [facility] after testing positive; she is asymptomatic currently. She was recently started on Omnicef for empiric treatment of acute cystitis after urine clean catch was equivocal for confusion above baseline. Nurse has no concerns. Patient has no new concerns.     Current Meds:    Current Outpatient Medications:   •  acetaminophen (TYLENOL) 325 MG tablet, Take 2 tablets by mouth Every 4 (Four) Hours As Needed for Mild Pain ., Disp: , Rfl:   •  albuterol (PROVENTIL) (2.5 MG/3ML) 0.083% nebulizer solution, Take 2.5 mg by nebulization Every 4 (Four) Hours As Needed for Shortness of Air., Disp: , Rfl: 12  •  amLODIPine (NORVASC) 5 MG tablet, Take 1 tablet by mouth Daily., Disp: , Rfl:   •  aspirin 81 MG tablet, Take 1 tablet by mouth Daily., Disp: 90 tablet, Rfl: 2  •  ferrous sulfate 324 (65 Fe) MG tablet delayed-release EC tablet, Take 1 tablet by mouth Daily With Breakfast., Disp: 30 tablet, Rfl:   •  Flavoring Agent (FLAVOR CONC-CHLORHEXIDINE) concentration, Take 20 mL by mouth 3 (Three) Times a Day., Disp: 500 mL, Rfl: 0  •  melatonin 5 MG tablet tablet, Take 5 mg by mouth Every Night., Disp: , Rfl:   •  polyethylene glycol (MIRALAX) pack packet, Take 17 g by mouth Daily., Disp: , Rfl:   •   "rosuvastatin (CRESTOR) 5 MG tablet, Take 1 tablet by mouth Daily., Disp: 30 tablet, Rfl: 0  •  sennosides-docusate sodium (SENOKOT-S) 8.6-50 MG tablet, Take 2 tablets by mouth Every Night., Disp: 30 tablet, Rfl: 0  •  sodium chloride 1 g tablet, Take 2 tablets by mouth 3 (Three) Times a Day With Meals., Disp: 180 tablet, Rfl: 0    Allergies:  Patient has no known allergies.    Review of Systems:  Review of Systems   Constitutional: Negative for appetite change and fever.   HENT: Negative for ear pain and sore throat.    Eyes: Negative for pain and visual disturbance.   Respiratory: Negative for cough and shortness of breath.    Cardiovascular: Negative for chest pain and palpitations.   Gastrointestinal: Negative for abdominal pain and constipation.   Genitourinary: Negative for dysuria and flank pain.   Musculoskeletal: Negative for arthralgias and back pain.   Skin: Negative for pallor and rash.   Neurological: Negative for dizziness and syncope.   Psychiatric/Behavioral: Positive for confusion. Negative for decreased concentration.       Objective:     /87   Pulse 95   Temp 96.8 °F (36 °C)   Resp 18   Ht 165.1 cm (65\")   Wt 67.9 kg (149 lb 11.1 oz)   SpO2 96% Comment: Room air  BMI 24.91 kg/m²    Pulse 104 and 95 on repeat 5 minutes later    Physical Exam   Constitutional: She appears well-developed and well-nourished.   HENT:   Head: Normocephalic and atraumatic.   Mouth/Throat: Mucous membranes are dry.   Eyes: Conjunctivae and EOM are normal.   Neck: Normal range of motion.   Cardiovascular: Tachycardia present.   Pulmonary/Chest:   No adventitious sounds as per Nurse Orin   Neurological: She is alert.       Diagnostic Data:     Please refer to paper chart at [facility]     Assessment/Plan:      88 y.o. female with:  Problem List Items Addressed This Visit        Digestive    Constipation    Overview     Senokot 2 pills at night scheduled. Miralaax 17 grams daily scheduled.             " Nervous and Auditory    Major neurocognitive disorder (CMS/HCC) - Primary    Overview     Alzheimer's Dementia with Vascular Dementia  Patient POA (daughter) has previously refused treatment with Aricept 5 mg daily.  - CT head: negative for acute disease  - Redirect patient if confused or distressed              Genitourinary    CKD (chronic kidney disease) stage 3, GFR 30-59 ml/min (CMS/HCC)    Overview     Stable. Maintain adequate hydration.              Other    Hyponatremia    Overview     Managed by nephrology  Continue 2 g sodium tablets 3 times daily.    Increase fluid restriction from 1500mL daily to 2000mL daily.  Repeat BMP on Friday 4/24/2020             Other Visit Diagnoses     COVID-19 virus detected         Overview         Currently asymptomatic, however cannot assess loss of taste or smell due to baseline dementia.               Continue supportive care and isolation. Contact provider for acute clinical changes. Consider               hospitalization for respiratory distress.    Dehydration          Overview           Physical exam reveals oropharynx dry. Will increase fluid restriction from 1500mL to 2000mL daily.         Acute Cystitis with hematuria           Overview                 Complete empiric antibiotic course to completion.        CODE STATUS: DNR and DNI    Dr. Bell  is the attending on record for this patient, She is aware of the patient's status and agrees with the above.      Chuckie Nguyen M.D. PGY2  [facility]  [address] [place] [address]  Office: [phone]    This document has been electronically signed by Chucike Nguyen MD on April 21, 2020 14:01      TIME: 15 minutes    Of note: called and spoke with patient daughter/POA (Maureen Hernandez [phone]) to update on status and SARS-Cov-2 positive status. She calls [facility] daily for updates and is thankful to be called. She would like to keep patient DNR/DNI as these  were patients wishes prior to dementia.

## 2020-04-24 ENCOUNTER — TELEPHONE (OUTPATIENT)
Dept: FAMILY MEDICINE CLINIC | Facility: CLINIC | Age: 85
End: 2020-04-24

## 2020-04-24 RX ORDER — SODIUM CHLORIDE 1000 MG
1 TABLET, SOLUBLE MISCELLANEOUS
Qty: 180 TABLET | Refills: 0 | Status: SHIPPED | OUTPATIENT
Start: 2020-04-24

## 2020-04-24 NOTE — TELEPHONE ENCOUNTER
[facility] called about a BMP of sodium 151 from 141, BUN and creatinine stable.  Patient with mild alteration in cognition over baseline.  Patient recently relaxed fluid restriction from 1.5 L to 2 L.  Patient on 2 g sodium chloride tablets 3 times daily.  We will decrease her oral sodium replacement down to 1 g 3 times daily and recheck BMP in 7 days.

## 2020-04-29 RX ORDER — NYSTATIN 100000 [USP'U]/G
POWDER TOPICAL AS NEEDED
Qty: 30 G | Refills: 2
Start: 2020-04-29 | End: 2022-09-12

## 2020-04-29 RX ORDER — I-VITE, TAB 1000-60-2MG (60/BT) 300MCG-200
1 TAB ORAL DAILY
Qty: 90 TABLET | Refills: 2
Start: 2020-04-29

## 2020-05-01 ENCOUNTER — LAB REQUISITION (OUTPATIENT)
Dept: LAB | Facility: HOSPITAL | Age: 85
End: 2020-05-01

## 2020-05-01 ENCOUNTER — DOCUMENTATION (OUTPATIENT)
Dept: FAMILY MEDICINE CLINIC | Facility: CLINIC | Age: 85
End: 2020-05-01

## 2020-05-01 ENCOUNTER — TELEPHONE (OUTPATIENT)
Dept: FAMILY MEDICINE CLINIC | Facility: CLINIC | Age: 85
End: 2020-05-01

## 2020-05-01 DIAGNOSIS — U07.1 INFECTION DUE TO 2019-NCOV: ICD-10-CM

## 2020-05-01 LAB
ANION GAP SERPL CALCULATED.3IONS-SCNC: 12 MMOL/L (ref 5–15)
BUN BLD-MCNC: 24 MG/DL (ref 8–23)
BUN/CREAT SERPL: 25 (ref 7–25)
CALCIUM SPEC-SCNC: 8.9 MG/DL (ref 8.6–10.5)
CHLORIDE SERPL-SCNC: 109 MMOL/L (ref 98–107)
CO2 SERPL-SCNC: 22 MMOL/L (ref 22–29)
CREAT BLD-MCNC: 0.96 MG/DL (ref 0.57–1)
GFR SERPL CREATININE-BSD FRML MDRD: 55 ML/MIN/1.73
GLUCOSE BLD-MCNC: 230 MG/DL (ref 65–99)
POTASSIUM BLD-SCNC: 3.8 MMOL/L (ref 3.5–5.2)
SODIUM BLD-SCNC: 143 MMOL/L (ref 136–145)

## 2020-05-01 PROCEDURE — U0003 INFECTIOUS AGENT DETECTION BY NUCLEIC ACID (DNA OR RNA); SEVERE ACUTE RESPIRATORY SYNDROME CORONAVIRUS 2 (SARS-COV-2) (CORONAVIRUS DISEASE [COVID-19]), AMPLIFIED PROBE TECHNIQUE, MAKING USE OF HIGH THROUGHPUT TECHNOLOGIES AS DESCRIBED BY CMS-2020-01-R: HCPCS | Performed by: FAMILY MEDICINE

## 2020-05-01 PROCEDURE — 80048 BASIC METABOLIC PNL TOTAL CA: CPT | Performed by: FAMILY MEDICINE

## 2020-05-01 NOTE — PROGRESS NOTES
Returned phone call to T.    Spoke with nurse Orin.    Patient has not been eating. They think this is related to COVID status and loss of gustatory sensation.    Patient also has oral thrush.    Patient has been taken up by PT for increased assist during her COVID infection    She additionally had a BMP  Performed today showing hyperglycemia 230. No history of DM.    Gave verbal orders for mirtazapine 7.5 mg one tab qhs, Nystatin swish and swallow QID, and BID accucheck with results called or faxed to office. Asked they call if she has hyperglycemia equal to or greater than 300.      Chuckie Nguyen M.D. PGY2  [facility]  [address] [place] [address]  Office: [phone]    This document has been electronically signed by Chuckie Nguyen MD on May 1, 2020 13:15

## 2020-05-01 NOTE — TELEPHONE ENCOUNTER
Orin nguyen/MARICRUZ called advising patient has Thrush in her mouth and needs medication     Also patient is not eating and needs medication for this also     They need Dr Nguyen to call Kranthi @ [phone]    Thank you

## 2020-05-05 LAB — SARS-COV-2 RNA RESP QL NAA+PROBE: NOT DETECTED

## 2020-05-06 ENCOUNTER — TELEPHONE (OUTPATIENT)
Dept: FAMILY MEDICINE CLINIC | Facility: CLINIC | Age: 85
End: 2020-05-06

## 2020-05-06 ENCOUNTER — APPOINTMENT (OUTPATIENT)
Dept: CT IMAGING | Facility: HOSPITAL | Age: 85
End: 2020-05-06

## 2020-05-06 ENCOUNTER — DOCUMENTATION (OUTPATIENT)
Dept: FAMILY MEDICINE CLINIC | Facility: CLINIC | Age: 85
End: 2020-05-06

## 2020-05-06 ENCOUNTER — APPOINTMENT (OUTPATIENT)
Dept: GENERAL RADIOLOGY | Facility: HOSPITAL | Age: 85
End: 2020-05-06

## 2020-05-06 ENCOUNTER — HOSPITAL ENCOUNTER (EMERGENCY)
Facility: HOSPITAL | Age: 85
Discharge: NURSING, REHAB OR LONG-TERM CARE | End: 2020-05-06
Attending: EMERGENCY MEDICINE | Admitting: EMERGENCY MEDICINE

## 2020-05-06 VITALS
DIASTOLIC BLOOD PRESSURE: 65 MMHG | OXYGEN SATURATION: 95 % | RESPIRATION RATE: 20 BRPM | BODY MASS INDEX: 23.99 KG/M2 | SYSTOLIC BLOOD PRESSURE: 141 MMHG | HEIGHT: 65 IN | HEART RATE: 110 BPM | WEIGHT: 144 LBS | TEMPERATURE: 98.4 F

## 2020-05-06 DIAGNOSIS — N30.01 ACUTE CYSTITIS WITH HEMATURIA: Primary | ICD-10-CM

## 2020-05-06 DIAGNOSIS — K09.1: ICD-10-CM

## 2020-05-06 LAB
ALBUMIN SERPL-MCNC: 2.6 G/DL (ref 3.5–5.2)
ALBUMIN/GLOB SERPL: 0.7 G/DL
ALP SERPL-CCNC: 114 U/L (ref 39–117)
ALT SERPL W P-5'-P-CCNC: 54 U/L (ref 1–33)
ANION GAP SERPL CALCULATED.3IONS-SCNC: 11 MMOL/L (ref 5–15)
AST SERPL-CCNC: 49 U/L (ref 1–32)
BACTERIA UR QL AUTO: ABNORMAL /HPF
BASOPHILS # BLD AUTO: 0.03 10*3/MM3 (ref 0–0.2)
BASOPHILS NFR BLD AUTO: 0.5 % (ref 0–1.5)
BILIRUB SERPL-MCNC: 0.3 MG/DL (ref 0.2–1.2)
BILIRUB UR QL STRIP: NEGATIVE
BUN BLD-MCNC: 19 MG/DL (ref 8–23)
BUN/CREAT SERPL: 19.6 (ref 7–25)
CALCIUM SPEC-SCNC: 9.1 MG/DL (ref 8.6–10.5)
CHLORIDE SERPL-SCNC: 103 MMOL/L (ref 98–107)
CLARITY UR: ABNORMAL
CO2 SERPL-SCNC: 25 MMOL/L (ref 22–29)
COLOR UR: YELLOW
CREAT BLD-MCNC: 0.97 MG/DL (ref 0.57–1)
DEPRECATED RDW RBC AUTO: 39.8 FL (ref 37–54)
EOSINOPHIL # BLD AUTO: 0.09 10*3/MM3 (ref 0–0.4)
EOSINOPHIL NFR BLD AUTO: 1.4 % (ref 0.3–6.2)
ERYTHROCYTE [DISTWIDTH] IN BLOOD BY AUTOMATED COUNT: 12.4 % (ref 12.3–15.4)
GFR SERPL CREATININE-BSD FRML MDRD: 54 ML/MIN/1.73
GLOBULIN UR ELPH-MCNC: 3.7 GM/DL
GLUCOSE BLD-MCNC: 139 MG/DL (ref 65–99)
GLUCOSE UR STRIP-MCNC: NEGATIVE MG/DL
HCT VFR BLD AUTO: 32.2 % (ref 34–46.6)
HGB BLD-MCNC: 10.8 G/DL (ref 12–15.9)
HGB UR QL STRIP.AUTO: NEGATIVE
HOLD SPECIMEN: NORMAL
HYALINE CASTS UR QL AUTO: ABNORMAL /LPF
IMM GRANULOCYTES # BLD AUTO: 0.09 10*3/MM3 (ref 0–0.05)
IMM GRANULOCYTES NFR BLD AUTO: 1.4 % (ref 0–0.5)
KETONES UR QL STRIP: NEGATIVE
LEUKOCYTE ESTERASE UR QL STRIP.AUTO: ABNORMAL
LYMPHOCYTES # BLD AUTO: 1.21 10*3/MM3 (ref 0.7–3.1)
LYMPHOCYTES NFR BLD AUTO: 18.4 % (ref 19.6–45.3)
MAGNESIUM SERPL-MCNC: 1.8 MG/DL (ref 1.6–2.4)
MCH RBC QN AUTO: 29.6 PG (ref 26.6–33)
MCHC RBC AUTO-ENTMCNC: 33.5 G/DL (ref 31.5–35.7)
MCV RBC AUTO: 88.2 FL (ref 79–97)
MONOCYTES # BLD AUTO: 0.43 10*3/MM3 (ref 0.1–0.9)
MONOCYTES NFR BLD AUTO: 6.5 % (ref 5–12)
NEUTROPHILS # BLD AUTO: 4.72 10*3/MM3 (ref 1.7–7)
NEUTROPHILS NFR BLD AUTO: 71.8 % (ref 42.7–76)
NITRITE UR QL STRIP: POSITIVE
NRBC BLD AUTO-RTO: 0 /100 WBC (ref 0–0.2)
PH UR STRIP.AUTO: 6 [PH] (ref 5–9)
PLATELET # BLD AUTO: 387 10*3/MM3 (ref 140–450)
PMV BLD AUTO: 9.4 FL (ref 6–12)
POTASSIUM BLD-SCNC: 3.7 MMOL/L (ref 3.5–5.2)
PROT SERPL-MCNC: 6.3 G/DL (ref 6–8.5)
PROT UR QL STRIP: ABNORMAL
RBC # BLD AUTO: 3.65 10*6/MM3 (ref 3.77–5.28)
RBC # UR: ABNORMAL /HPF
REF LAB TEST METHOD: ABNORMAL
SODIUM BLD-SCNC: 139 MMOL/L (ref 136–145)
SP GR UR STRIP: 1.01 (ref 1–1.03)
SQUAMOUS #/AREA URNS HPF: ABNORMAL /HPF
UROBILINOGEN UR QL STRIP: ABNORMAL
WBC NRBC COR # BLD: 6.57 10*3/MM3 (ref 3.4–10.8)
WBC UR QL AUTO: ABNORMAL /HPF
WHOLE BLOOD HOLD SPECIMEN: NORMAL

## 2020-05-06 PROCEDURE — 99285 EMERGENCY DEPT VISIT HI MDM: CPT

## 2020-05-06 PROCEDURE — 71045 X-RAY EXAM CHEST 1 VIEW: CPT

## 2020-05-06 PROCEDURE — 93010 ELECTROCARDIOGRAM REPORT: CPT | Performed by: INTERNAL MEDICINE

## 2020-05-06 PROCEDURE — P9612 CATHETERIZE FOR URINE SPEC: HCPCS

## 2020-05-06 PROCEDURE — 81001 URINALYSIS AUTO W/SCOPE: CPT | Performed by: EMERGENCY MEDICINE

## 2020-05-06 PROCEDURE — 87186 SC STD MICRODIL/AGAR DIL: CPT | Performed by: EMERGENCY MEDICINE

## 2020-05-06 PROCEDURE — 80053 COMPREHEN METABOLIC PANEL: CPT | Performed by: EMERGENCY MEDICINE

## 2020-05-06 PROCEDURE — 87086 URINE CULTURE/COLONY COUNT: CPT | Performed by: EMERGENCY MEDICINE

## 2020-05-06 PROCEDURE — 85025 COMPLETE CBC W/AUTO DIFF WBC: CPT | Performed by: EMERGENCY MEDICINE

## 2020-05-06 PROCEDURE — 96365 THER/PROPH/DIAG IV INF INIT: CPT

## 2020-05-06 PROCEDURE — 87077 CULTURE AEROBIC IDENTIFY: CPT | Performed by: EMERGENCY MEDICINE

## 2020-05-06 PROCEDURE — 93005 ELECTROCARDIOGRAM TRACING: CPT | Performed by: EMERGENCY MEDICINE

## 2020-05-06 PROCEDURE — 70450 CT HEAD/BRAIN W/O DYE: CPT

## 2020-05-06 PROCEDURE — 25010000002 CEFTRIAXONE: Performed by: EMERGENCY MEDICINE

## 2020-05-06 PROCEDURE — 83735 ASSAY OF MAGNESIUM: CPT | Performed by: EMERGENCY MEDICINE

## 2020-05-06 RX ORDER — SODIUM CHLORIDE 0.9 % (FLUSH) 0.9 %
10 SYRINGE (ML) INJECTION AS NEEDED
Status: DISCONTINUED | OUTPATIENT
Start: 2020-05-06 | End: 2020-05-06 | Stop reason: HOSPADM

## 2020-05-06 RX ORDER — CEPHALEXIN 500 MG/1
500 CAPSULE ORAL 2 TIMES DAILY
Qty: 14 CAPSULE | Refills: 0 | Status: SHIPPED | OUTPATIENT
Start: 2020-05-06 | End: 2020-05-13

## 2020-05-06 RX ADMIN — CEFTRIAXONE 1 G: 1 INJECTION, POWDER, FOR SOLUTION INTRAMUSCULAR; INTRAVENOUS at 21:00

## 2020-05-06 NOTE — ED PROVIDER NOTES
Subjective   88-year-old female sent to the emergency department from her nursing facility with concern for altered mental status.  Patient has baseline dementia and is reportedly pleasantly confused at baseline.  In review of her medical chart there is evidence of major neurocognitive disorder, stroke, and dementia.  Patient reportedly has recently been quarantined due to a positive coronavirus test but reportedly was negative yesterday and was discharged back out of the unit to her primary floor this morning.  Reportedly this afternoon they checked on her and found her to be altered.  No report of any vital sign abnormalities or fever.  Patient cannot contribute to history or review of systems.          History provided by:  Medical records and nursing home   used: No        Review of Systems   Unable to perform ROS: Mental status change       Past Medical History:   Diagnosis Date   • Benign essential HTN 11/22/2019   • CKD (chronic kidney disease) stage 3, GFR 30-59 ml/min (CMS/HCC)    • Constipation    • Dementia (CMS/HCC)    • Hyponatremia    • Hypovitaminosis D    • Iron deficiency anemia    • Major neurocognitive disorder (CMS/HCC)    • Stroke (CMS/HCC)        No Known Allergies    Past Surgical History:   Procedure Laterality Date   • CATARACT EXTRACTION         Family History   Problem Relation Age of Onset   • No Known Problems Mother    • No Known Problems Father    • Cancer Sister    • Cancer Brother        Social History     Socioeconomic History   • Marital status:      Spouse name: Not on file   • Number of children: 2   • Years of education: Not on file   • Highest education level: Bachelor's degree (e.g., BA, AB, BS)   Occupational History   • Occupation: retired nurse   Social Needs   • Financial resource strain: Not very hard   • Food insecurity:     Worry: Never true     Inability: Never true   • Transportation needs:     Medical: No     Non-medical: No   Tobacco Use    • Smoking status: Never Smoker   • Smokeless tobacco: Never Used   Substance and Sexual Activity   • Alcohol use: No     Frequency: Never   • Drug use: No   • Sexual activity: Not Currently   Lifestyle   • Physical activity:     Days per week: 0 days     Minutes per session: 0 min   • Stress: Not at all   Relationships   • Social connections:     Talks on phone: Not on file     Gets together: Not on file     Attends [employer] service: Not on file     Active member of club or organization: No     Attends meetings of clubs or organizations: Never     Relationship status:            Objective   Physical Exam   Constitutional: She appears well-developed.  Non-toxic appearance. She does not appear ill. No distress.   HENT:   Head: Normocephalic and atraumatic.   Mouth/Throat: Mucous membranes are dry.   Eyes: Pupils are equal, round, and reactive to light.   Neck: Normal range of motion.   Cardiovascular: Regular rhythm and intact distal pulses.  No extrasystoles are present. Tachycardia present.   No murmur heard.  Pulmonary/Chest: Effort normal and breath sounds normal. No respiratory distress.   Abdominal: Soft. Bowel sounds are normal. There is no tenderness.   Neurological: She is alert. She has normal strength. GCS eye subscore is 3. GCS verbal subscore is 4. GCS motor subscore is 5.   Skin: Skin is warm and dry. Capillary refill takes less than 2 seconds. There is pallor.   Nursing note and vitals reviewed.      Procedures  none         ED Course      Labs Reviewed   COMPREHENSIVE METABOLIC PANEL - Abnormal; Notable for the following components:       Result Value    Glucose 139 (*)     Albumin 2.60 (*)     ALT (SGPT) 54 (*)     AST (SGOT) 49 (*)     eGFR Non  Amer 54 (*)     All other components within normal limits    Narrative:     GFR Normal >60  Chronic Kidney Disease <60  Kidney Failure <15     URINALYSIS W/ CULTURE IF INDICATED - Abnormal; Notable for the following components:     Appearance, UA Cloudy (*)     Protein, UA Trace (*)     Leuk Esterase, UA Moderate (2+) (*)     Nitrite, UA Positive (*)     All other components within normal limits   CBC WITH AUTO DIFFERENTIAL - Abnormal; Notable for the following components:    RBC 3.65 (*)     Hemoglobin 10.8 (*)     Hematocrit 32.2 (*)     Lymphocyte % 18.4 (*)     Immature Grans % 1.4 (*)     Immature Grans, Absolute 0.09 (*)     All other components within normal limits   URINALYSIS, MICROSCOPIC ONLY - Abnormal; Notable for the following components:    WBC, UA 21-30 (*)     Bacteria, UA 3+ (*)     Squamous Epithelial Cells, UA 3-5 (*)     All other components within normal limits   MAGNESIUM - Normal   URINE CULTURE   CBC AND DIFFERENTIAL    Narrative:     The following orders were created for panel order CBC & Differential.  Procedure                               Abnormality         Status                     ---------                               -----------         ------                     CBC Auto Differential[[id number]]        Abnormal            Final result                 Please view results for these tests on the individual orders.   EXTRA TUBES    Narrative:     The following orders were created for panel order Extra Tubes.  Procedure                               Abnormality         Status                     ---------                               -----------         ------                     Light Blue Top[[id number]]                                   Final result               Gold Top - SST[[id number]]                                   Final result                 Please view results for these tests on the individual orders.   LIGHT BLUE TOP   GOLD TOP - SST     Ct Head Without Contrast    Result Date: 5/6/2020  Narrative: EXAM DESCRIPTION: CT HEAD WO CONTRAST CLINICAL HISTORY:  ams   COMPARISON: 5/31/2019 DOSE LENGTH PRODUCT: 939.7 CONTRAST: None TECHNIQUE:  Axial imaging is performed from the skull base to the vertex  without utilization of intravenous contrast. Coronal and sagittal reformatted images are provided. This exam was performed according to our departmental dose optimization program, which includes automated exposure control, adjustment of the mA and/or KV according to patient size and/or use of iterative reconstruction technique. FINDINGS: There are limitations of the study related to patient motion. No Chiari malformation. Extra-axial spaces: Unremarkable for age.  Intracranial hemorrhage: No CT evidence of intracranial hemorrhage or suspicious extra-axial fluid collection. Ventricular system: No hydrocephalus. Basal cisterns: Unremarkable. Cerebral parenchyma: Redemonstrated right occipital lobe encephalomalacia relating to prior insult. Redemonstration of suspected remote lacunar infarct within the right thalamus. No CT evidence of an acute large vessel transcortical infarct.  Midline shift: None.  Cerebellum: No acute CT findings. Brainstem :Unremarkable CT appearance. Calvarium: No acute or suspicious calvarial lesion identified.  Vascular system: Atherosclerotic calcification, otherwise unremarkable noncontrast appearance.  Paranasal sinuses and mastoid air cells: The included paranasal sinuses are clear. The mastoid air cells are clear.  Visualized orbits: No acute CT findings identified.  Visualized upper cervical spine: Degenerative changes. Sella: Unremarkable CT appearance.  Skull base: Unremarkable. ADDITIONAL FINDINGS: There is an approximate 1.4 x 2.0 cm cystic expansion of the right maxillary alveolus incorporating the roots of the right central incisor, canine and first premolar.     Impression: Allowing for the limitations of this exam degraded by motion artifact, no CT evidence of intracranial hemorrhage, mass effect, or an acute large vessel distribution infarct. 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 There is mild  volume loss. Incidental finding of 2 cm cystic expansion of the right maxillary alveolus incorporating the roots of the right central incisor, canine and first premolar. Electronically signed by:  Eldon Stanford MD  5/6/2020 5:50 PM CDT Workstation: [id number]    Xr Chest 1 View    Result Date: 5/6/2020  Narrative: PROCEDURE: XR CHEST 1 VW VIEWS:Single INDICATION: Altered mental status COMPARISON: CXR: 5/31/2019 FINDINGS:   - lines/tubes: None   - cardiac: Size within normal limits.   - mediastinum: Contour within normal limits.   - lungs: No evidence of a focal air space process. There is chronic appearing interstitial prominence and areas of probable mild fibrotic change. Lungs are hyperinflated   - pleura: No evidence of  fluid.    - osseous: Unremarkable for age.       Impression: No acute abnormality identified. Stable chronic changes as above Electronically signed by:  Marine Briscoe MD  5/6/2020 5:18 PM CDT Workstation: [id number]    EKG my 6/20/2020 at 1657 reveals sinus tachycardia at 118 bpm.  Occasional PVC.  No evidence of acute ischemia.          MDM  Number of Diagnoses or Management Options  Acute cystitis with hematuria:   Naso-alveolar cyst:      Amount and/or Complexity of Data Reviewed  Clinical lab tests: reviewed  Tests in the radiology section of CPT®: reviewed  Tests in the medicine section of CPT®: reviewed    Patient Progress  Patient progress: stable    Patient is mildly intermittently tachycardic but no signs of overt dehydration.  She does have urinary tract infection.  Remainder of altered mental status evaluation is unremarkable.  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  Documentation sent to nursing home regarding antibiotic therapy and follow-up regarding findings.    Final diagnoses:   Acute cystitis with hematuria   Naso-alveolar cyst            Zachariah Toledo,   05/06/20 0476

## 2020-05-06 NOTE — TELEPHONE ENCOUNTER
PATIENT HAS COMPLETED HER ISOLATION FROM COVID AND THEY RETESTED HER AND SHE RETESTED NEGATIVE. THEY WANTED TO LET PHYSICIAN KNOW THAT SHE WOULD BE RETURNING TO HER REGULAR ROOM.    THANKS,  YONIS

## 2020-05-06 NOTE — PROGRESS NOTES
Received call from [facility] regarding review night.  She was unresponsive with normal vital signs.  Vital signs of 98.6 temperature, heart rate of 101, blood pressure 104/67 with a pulse ox of 98% on room air.  Nursing reports pupils were responsive.  Her GCS is significantly limited not responding to sound, sternal rub causes 1 of her arms to elevate.  Patient was COVID + 2 weeks ago and was recently screened as COVID negative here in the last 48 hours.  It was advised for her to go to the emergency department and reviewed her CODE STATUS and transfer orders is only a DNR, with no do not transfer order and no DO NOT INTUBATE literature in her chart for review of RN onsite.        This document has been electronically signed by Srinivasa Chairez III, MD on May 6, 2020 15:21      Dragon disclaimer: Parts of this note were transcribed using dragon dictation software.

## 2020-05-06 NOTE — TELEPHONE ENCOUNTER
PATIENT HAS COMPLETED HER ISOLATION FROM [facility] AND THEY RETESTED HER AND SHE RETESTED NEGATIVE. THEY WANTED TO LET PHYSICIAN KNOW THAT SHE WOULD BE RETURNING TO HER REGULAR ROOM.     THANKS,  YONIS Nguyen patient        RUTH Lovett MA.    5/6/20

## 2020-05-07 NOTE — DISCHARGE INSTRUCTIONS
Please return with new or worsening symptoms.  Take antibiotic for the full course.  Follow-up with primary care and ENT as discussed.  ENT referral is for follow-up of alveolar cyst.

## 2020-05-08 ENCOUNTER — NURSING HOME (OUTPATIENT)
Dept: FAMILY MEDICINE CLINIC | Facility: CLINIC | Age: 85
End: 2020-05-08

## 2020-05-08 VITALS
DIASTOLIC BLOOD PRESSURE: 48 MMHG | OXYGEN SATURATION: 97 % | HEART RATE: 90 BPM | HEIGHT: 65 IN | WEIGHT: 144 LBS | TEMPERATURE: 97.9 F | RESPIRATION RATE: 18 BRPM | SYSTOLIC BLOOD PRESSURE: 98 MMHG | BODY MASS INDEX: 23.99 KG/M2

## 2020-05-08 DIAGNOSIS — K59.00 CONSTIPATION, UNSPECIFIED CONSTIPATION TYPE: ICD-10-CM

## 2020-05-08 DIAGNOSIS — N30.00 ACUTE CYSTITIS WITHOUT HEMATURIA: ICD-10-CM

## 2020-05-08 DIAGNOSIS — I10 BENIGN ESSENTIAL HTN: ICD-10-CM

## 2020-05-08 DIAGNOSIS — E87.1 HYPONATREMIA: ICD-10-CM

## 2020-05-08 DIAGNOSIS — F03.90 MAJOR NEUROCOGNITIVE DISORDER (HCC): Primary | ICD-10-CM

## 2020-05-08 LAB — BACTERIA SPEC AEROBE CULT: ABNORMAL

## 2020-05-08 PROCEDURE — 99308 SBSQ NF CARE LOW MDM 20: CPT | Performed by: STUDENT IN AN ORGANIZED HEALTH CARE EDUCATION/TRAINING PROGRAM

## 2020-05-08 NOTE — PROGRESS NOTES
"MONTHLY NURSING HOME VISIT    NAME: Marlene Horne  : 1931  MRN: [id number]    DATE & TIME SEEN: 2020  At  1106 through video visit via nurse Riggs  PCP: Chuckie Nguyen MD    NURSING HOME: [facility]    Monthly jail Visit    Chief Complaint: Routine Visit    Subjective:     VIDEO VISIT  You have chosen to receive care through a telehealth visit.  Do you consent to use a video/audio connection for your medical care today? Yes    Marlene Horne is a 88 y.o. female, with CMHx significant for major neurocognitive dementia, hyponatremia, and constipation. She has baseline confusion. She recently was SARS-Cov-2 positive but has since retested negative. Day two of transfer back to [facility] wing she became \"altered\" and was sent to ED. Seen in ED  by Dr. Toledo who identified an acute cystitis, prescribed keflex 500 mg bid for 7 days and sent back to [facility]. Currently no concerns from nursing or patient.     Current Meds:    Current Outpatient Medications:   •  acetaminophen (TYLENOL) 325 MG tablet, Take 2 tablets by mouth Every 4 (Four) Hours As Needed for Mild Pain ., Disp: , Rfl:   •  albuterol (PROVENTIL) (2.5 MG/3ML) 0.083% nebulizer solution, Take 2.5 mg by nebulization Every 4 (Four) Hours As Needed for Shortness of Air., Disp: , Rfl: 12  •  amLODIPine (NORVASC) 5 MG tablet, Take 1 tablet by mouth Daily., Disp: , Rfl:   •  aspirin 81 MG tablet, Take 1 tablet by mouth Daily., Disp: 90 tablet, Rfl: 2  •  cephalexin (KEFLEX) 500 MG capsule, Take 1 capsule by mouth 2 (Two) Times a Day for 7 days., Disp: 14 capsule, Rfl: 0  •  Cholecalciferol 25 MCG (1000 UT) tablet, Take 1 tablet by mouth Daily., Disp: 30 tablet, Rfl: 11  •  ferrous sulfate 324 (65 Fe) MG tablet delayed-release EC tablet, Take 1 tablet by mouth Daily With Breakfast., Disp: 30 tablet, Rfl:   •  Flavoring Agent (FLAVOR CONC-CHLORHEXIDINE) concentration, Take 20 mL by mouth 3 (Three) Times a Day., " "Disp: 500 mL, Rfl: 0  •  melatonin 5 MG tablet tablet, Take 5 mg by mouth Every Night., Disp: , Rfl:   •  Multiple Vitamins-Minerals (I-HERNESTO) tablet tablet, Take 1 tablet by mouth Daily., Disp: 90 tablet, Rfl: 2  •  nystatin (MYCOSTATIN) 634430 UNIT/GM powder, Apply  topically to the appropriate area as directed As Needed (skin lesions)., Disp: 30 g, Rfl: 2  •  polyethylene glycol (MIRALAX) pack packet, Take 17 g by mouth Daily., Disp: , Rfl:   •  rosuvastatin (CRESTOR) 5 MG tablet, Take 1 tablet by mouth Daily., Disp: 30 tablet, Rfl: 0  •  sennosides-docusate sodium (SENOKOT-S) 8.6-50 MG tablet, Take 2 tablets by mouth Every Night., Disp: 30 tablet, Rfl: 0  •  sodium chloride 1 g tablet, Take 1 tablet by mouth 3 (Three) Times a Day With Meals., Disp: 180 tablet, Rfl: 0    Allergies:  Patient has no known allergies.    Review of Systems:  Review of Systems   Constitutional: Negative for appetite change and fever.   HENT: Negative for ear pain and sore throat.    Eyes: Negative for pain and visual disturbance.   Respiratory: Negative for cough and shortness of breath.    Cardiovascular: Negative for chest pain and palpitations.   Gastrointestinal: Negative for abdominal pain and constipation.   Genitourinary: Negative for dysuria and flank pain.   Musculoskeletal: Negative for arthralgias and back pain.   Skin: Negative for pallor and rash.   Neurological: Negative for dizziness and syncope.   Psychiatric/Behavioral: Positive for confusion. Negative for decreased concentration.       Objective:     BP 98/48   Pulse 90   Temp 97.9 °F (36.6 °C)   Resp 18   Ht 165.1 cm (65\")   Wt 65.3 kg (144 lb)   SpO2 97%   BMI 23.96 kg/m²      Physical Exam   Constitutional: She appears well-developed and well-nourished.   HENT:   Head: Normocephalic and atraumatic.   Mouth/Throat: Oropharynx is clear and moist. Mucous membranes are not dry.   Eyes: Conjunctivae and EOM are normal.   Neck: Normal range of motion. "   Pulmonary/Chest:   No adventitious sounds as per Nurse Keely   Neurological: She is alert.       Diagnostic Data:     Please refer to paper chart at [facility]     Assessment/Plan:      88 y.o. female with:  Problem List Items Addressed This Visit        Cardiovascular and Mediastinum    Benign essential HTN    Overview     Amlodipine 5 mg daily            Digestive    Constipation    Overview     Senokot 2 pills at night scheduled. Miralaax 17 grams daily scheduled.             Nervous and Auditory    Major neurocognitive disorder (CMS/HCC) - Primary    Overview     Alzheimer's Dementia with Vascular Dementia  Patient POA (daughter) has previously refused treatment with Aricept 5 mg daily.  - CT head: negative for acute disease  - Redirect patient if confused or distressed              Other    Hyponatremia    Overview     Managed by nephrology  Continue 2 g sodium tablets 3 times daily.    Increase fluid restriction from 1500mL daily to 2000mL daily.  Repeat BMP on Friday 4/24/2020               Other Visit Diagnoses     Acute cystitis without hematuria                    Continue keflex 500 mg po bid to complete 7 day course         CODE STATUS: DNR and DNI    Dr. Acuña is the attending on record for this patient, She is aware of the patient's status and agrees with the above.      Chuckie Nguyen M.D. PGY2  [facility]  [address] [place], [place] [id number]  Office: [phone]    This document has been electronically signed by Chuckie Nguyen MD on May 8, 2020 11:55      TIME: 5 minutes

## 2020-05-08 NOTE — PROGRESS NOTES
I have seen the patient.  I have reviewed the notes, assessments, and/or procedures performed by Chuckie Nguyen MD, I concur with her/his documentation and assessment and plan for Marlene Maxi Coleen.               This document has been electronically signed by Kavin Acuña MD on May 8, 2020 13:55

## 2020-05-29 ENCOUNTER — NURSING HOME (OUTPATIENT)
Dept: FAMILY MEDICINE CLINIC | Facility: CLINIC | Age: 85
End: 2020-05-29

## 2020-05-29 VITALS
SYSTOLIC BLOOD PRESSURE: 132 MMHG | OXYGEN SATURATION: 96 % | TEMPERATURE: 98.6 F | DIASTOLIC BLOOD PRESSURE: 59 MMHG | WEIGHT: 144 LBS | RESPIRATION RATE: 16 BRPM | HEIGHT: 65 IN | HEART RATE: 72 BPM | BODY MASS INDEX: 23.99 KG/M2

## 2020-05-29 DIAGNOSIS — E87.1 HYPONATREMIA: ICD-10-CM

## 2020-05-29 DIAGNOSIS — K59.00 CONSTIPATION, UNSPECIFIED CONSTIPATION TYPE: ICD-10-CM

## 2020-05-29 DIAGNOSIS — F03.90 MAJOR NEUROCOGNITIVE DISORDER (HCC): ICD-10-CM

## 2020-05-29 DIAGNOSIS — I10 BENIGN ESSENTIAL HTN: Primary | ICD-10-CM

## 2020-05-29 PROCEDURE — 99307 SBSQ NF CARE SF MDM 10: CPT | Performed by: STUDENT IN AN ORGANIZED HEALTH CARE EDUCATION/TRAINING PROGRAM

## 2020-05-29 NOTE — PROGRESS NOTES
I was present with the resident during the entire video visit. Patient seems more distant and confused than her previous baseline.    I have reviewed the notes, assessments, and/or procedures performed by Chuckie Nguyen MD , I concur with her/his documentation of Marlene Horne.     Plan: Continue present treatment. Hopeful with return of her previous roommate will help with confusion.      This document has been electronically signed by Maricarmen Bell MD on May 29, 2020 13:28

## 2020-05-29 NOTE — PROGRESS NOTES
MONTHLY NURSING HOME VISIT    NAME: Marlene Horne  : 1931  MRN: [id number]    DATE & TIME SEEN: 2020  At  0915 through video visit via nurse Riggs  PCP: Chuckie Nguyen MD    NURSING HOME: [facility]    Monthly [facility] Visit    Chief Complaint: Routine Visit    Subjective:     VIDEO VISIT  You have chosen to receive care through a telehealth visit.  Do you consent to use a video/audio connection for your medical care today? Yes    Marlene Horne is a 88 y.o. female, with CMHx significant for major neurocognitive dementia, hyponatremia, and constipation. She has baseline confusion. She recently was SARS-Cov-2 positive but has since retested negative. She has recently completed antibiotics for acute cystitis diagnosed at  ED. Began having increased confusion when  from roommate due to COVID. They have been reunited today. No concerns from nurse Riggs.       Current Meds:    Current Outpatient Medications:   •  acetaminophen (TYLENOL) 325 MG tablet, Take 2 tablets by mouth Every 4 (Four) Hours As Needed for Mild Pain ., Disp: , Rfl:   •  albuterol (PROVENTIL) (2.5 MG/3ML) 0.083% nebulizer solution, Take 2.5 mg by nebulization Every 4 (Four) Hours As Needed for Shortness of Air., Disp: , Rfl: 12  •  amLODIPine (NORVASC) 5 MG tablet, Take 1 tablet by mouth Daily., Disp: , Rfl:   •  aspirin 81 MG tablet, Take 1 tablet by mouth Daily., Disp: 90 tablet, Rfl: 2  •  Cholecalciferol 25 MCG (1000 UT) tablet, Take 1 tablet by mouth Daily., Disp: 30 tablet, Rfl: 11  •  ferrous sulfate 324 (65 Fe) MG tablet delayed-release EC tablet, Take 1 tablet by mouth Daily With Breakfast., Disp: 30 tablet, Rfl:   •  Flavoring Agent (FLAVOR CONC-CHLORHEXIDINE) concentration, Take 20 mL by mouth 3 (Three) Times a Day., Disp: 500 mL, Rfl: 0  •  melatonin 5 MG tablet tablet, Take 5 mg by mouth Every Night., Disp: , Rfl:   •  Multiple Vitamins-Minerals (I-HERNESTO) tablet tablet, Take 1 tablet  "by mouth Daily., Disp: 90 tablet, Rfl: 2  •  nystatin (MYCOSTATIN) 285485 UNIT/GM powder, Apply  topically to the appropriate area as directed As Needed (skin lesions)., Disp: 30 g, Rfl: 2  •  polyethylene glycol (MIRALAX) pack packet, Take 17 g by mouth Daily., Disp: , Rfl:   •  rosuvastatin (CRESTOR) 5 MG tablet, Take 1 tablet by mouth Daily., Disp: 30 tablet, Rfl: 0  •  sennosides-docusate sodium (SENOKOT-S) 8.6-50 MG tablet, Take 2 tablets by mouth Every Night., Disp: 30 tablet, Rfl: 0  •  sodium chloride 1 g tablet, Take 1 tablet by mouth 3 (Three) Times a Day With Meals., Disp: 180 tablet, Rfl: 0    Allergies:  Patient has no known allergies.    Review of Systems:  Review of Systems   Constitutional: Negative for appetite change and fever.   HENT: Negative for ear pain and sore throat.    Eyes: Negative for pain and visual disturbance.   Respiratory: Negative for cough and shortness of breath.    Cardiovascular: Negative for chest pain and palpitations.   Gastrointestinal: Negative for abdominal pain and constipation.   Genitourinary: Negative for dysuria and flank pain.   Musculoskeletal: Negative for arthralgias and back pain.   Skin: Negative for pallor and rash.   Neurological: Negative for dizziness and syncope.   Psychiatric/Behavioral: Positive for confusion. Negative for decreased concentration.       Objective:     /59   Pulse 72   Temp 98.6 °F (37 °C)   Resp 16   Ht 165.1 cm (65\")   Wt 65.3 kg (144 lb)   SpO2 96% Comment: on room air  BMI 23.96 kg/m²      Physical Exam    Diagnostic Data:     Please refer to paper chart at [facility]     Assessment/Plan:      88 y.o. female with:  Problem List Items Addressed This Visit        Cardiovascular and Mediastinum    Benign essential HTN    Overview     Amlodipine 5 mg daily            Digestive    Constipation    Overview     Senokot 2 pills at night scheduled. Miralaax 17 grams daily scheduled.             Nervous and Auditory    Major " neurocognitive disorder (CMS/HCC) - Primary    Overview     Alzheimer's Dementia with Vascular Dementia  Patient POA (daughter) has previously refused treatment with Aricept 5 mg daily.  - CT head: negative for acute disease  - Redirect patient if confused or distressed  - expect her exacerbation in confusion will improve as she has been reunited with her roomate              Other    Hyponatremia    Overview     Managed by nephrology  Continue 2 g sodium tablets 3 times daily.    Increase fluid restriction from 1500mL daily to 2000mL daily.                 CODE STATUS: DNR and DNI    Dr. Bell  is the attending on record for this patient, She is aware of the patient's status and agrees with the above.      Chuckie Nguyen M.D. PGY2  [facility]  [address] [place] [address]  Office: [phone]    This document has been electronically signed by Chuckie Nguyen MD on May 29, 2020 09:28      TIME: 10 minutes

## 2020-06-25 ENCOUNTER — NURSING HOME (OUTPATIENT)
Dept: FAMILY MEDICINE CLINIC | Facility: CLINIC | Age: 85
End: 2020-06-25

## 2020-06-25 DIAGNOSIS — F03.90 MAJOR NEUROCOGNITIVE DISORDER (HCC): Primary | ICD-10-CM

## 2020-06-25 DIAGNOSIS — E87.1 HYPONATREMIA: ICD-10-CM

## 2020-06-25 DIAGNOSIS — N18.30 CKD (CHRONIC KIDNEY DISEASE) STAGE 3, GFR 30-59 ML/MIN (HCC): ICD-10-CM

## 2020-06-25 DIAGNOSIS — K59.00 CONSTIPATION, UNSPECIFIED CONSTIPATION TYPE: ICD-10-CM

## 2020-06-25 PROCEDURE — 99307 SBSQ NF CARE SF MDM 10: CPT | Performed by: STUDENT IN AN ORGANIZED HEALTH CARE EDUCATION/TRAINING PROGRAM

## 2020-06-29 VITALS
TEMPERATURE: 96.8 F | SYSTOLIC BLOOD PRESSURE: 136 MMHG | OXYGEN SATURATION: 97 % | BODY MASS INDEX: 23.86 KG/M2 | HEART RATE: 68 BPM | DIASTOLIC BLOOD PRESSURE: 68 MMHG | HEIGHT: 65 IN | WEIGHT: 143.2 LBS

## 2020-06-29 NOTE — PROGRESS NOTES
MONTHLY NURSING HOME VISIT    NAME: Marlene Horne  : 1931  MRN: [id number]    DATE & TIME SEEN: 2020  At  1315 through video visit via nurse Chase  PCP: Chuckie Nguyen MD    NURSING HOME: [facility]    Monthly penitentiary Visit    Chief Complaint: Routine Visit    Subjective:     VIDEO VISIT  You have chosen to receive care through a telehealth visit.  Do you consent to use a video/audio connection for your medical care today? Yes    Marlene Horne is a 88 y.o. female, with CMHx significant for major neurocognitive dementia, hyponatremia, and constipation. She has baseline confusion. Doing well with no complaints from patient or concerns from nursing.     Current Meds:    Current Outpatient Medications:   •  acetaminophen (TYLENOL) 325 MG tablet, Take 2 tablets by mouth Every 4 (Four) Hours As Needed for Mild Pain ., Disp: , Rfl:   •  albuterol (PROVENTIL) (2.5 MG/3ML) 0.083% nebulizer solution, Take 2.5 mg by nebulization Every 4 (Four) Hours As Needed for Shortness of Air., Disp: , Rfl: 12  •  amLODIPine (NORVASC) 5 MG tablet, Take 1 tablet by mouth Daily., Disp: , Rfl:   •  aspirin 81 MG tablet, Take 1 tablet by mouth Daily., Disp: 90 tablet, Rfl: 2  •  Cholecalciferol 25 MCG (1000 UT) tablet, Take 1 tablet by mouth Daily., Disp: 30 tablet, Rfl: 11  •  ferrous sulfate 324 (65 Fe) MG tablet delayed-release EC tablet, Take 1 tablet by mouth Daily With Breakfast., Disp: 30 tablet, Rfl:   •  Flavoring Agent (FLAVOR CONC-CHLORHEXIDINE) concentration, Take 20 mL by mouth 3 (Three) Times a Day., Disp: 500 mL, Rfl: 0  •  melatonin 5 MG tablet tablet, Take 5 mg by mouth Every Night., Disp: , Rfl:   •  Multiple Vitamins-Minerals (I-HERNESTO) tablet tablet, Take 1 tablet by mouth Daily., Disp: 90 tablet, Rfl: 2  •  nystatin (MYCOSTATIN) 001887 UNIT/GM powder, Apply  topically to the appropriate area as directed As Needed (skin lesions)., Disp: 30 g, Rfl: 2  •  polyethylene glycol  "(MIRALAX) pack packet, Take 17 g by mouth Daily., Disp: , Rfl:   •  rosuvastatin (CRESTOR) 5 MG tablet, Take 1 tablet by mouth Daily., Disp: 30 tablet, Rfl: 0  •  sennosides-docusate sodium (SENOKOT-S) 8.6-50 MG tablet, Take 2 tablets by mouth Every Night., Disp: 30 tablet, Rfl: 0  •  sodium chloride 1 g tablet, Take 1 tablet by mouth 3 (Three) Times a Day With Meals., Disp: 180 tablet, Rfl: 0    Allergies:  Patient has no known allergies.    Review of Systems:  Review of Systems   Constitutional: Negative for appetite change and fever.   HENT: Negative for ear pain and sore throat.    Eyes: Negative for pain and visual disturbance.   Respiratory: Negative for cough and shortness of breath.    Cardiovascular: Negative for chest pain and palpitations.   Gastrointestinal: Negative for abdominal pain and constipation.   Genitourinary: Negative for dysuria and flank pain.   Musculoskeletal: Negative for arthralgias and back pain.   Skin: Negative for pallor and rash.   Neurological: Negative for dizziness and syncope.   Psychiatric/Behavioral: Positive for confusion. Negative for decreased concentration.       Objective:     /68   Pulse 68   Temp 96.8 °F (36 °C)   Ht 165.1 cm (65\")   Wt 65 kg (143 lb 3.2 oz)   SpO2 97%   BMI 23.83 kg/m²      Physical Exam   Constitutional: She appears well-developed and well-nourished. No distress.   HENT:   Head: Normocephalic and atraumatic.   Eyes: Pupils are equal, round, and reactive to light. EOM are normal.   Neck: Normal range of motion. Neck supple.   Neurological: She is alert.       Diagnostic Data:     Please refer to paper chart at [facility]     Assessment/Plan:      88 y.o. female with:  Problem List Items Addressed This Visit        Cardiovascular and Mediastinum    Benign essential HTN    Overview     Amlodipine 5 mg daily            Digestive    Constipation    Overview     Senokot 2 pills at night scheduled. Miralaax 17 grams daily scheduled.       "       Nervous and Auditory    Major neurocognitive disorder (CMS/HCC) - Primary    Overview     Alzheimer's Dementia with Vascular Dementia  Patient POA (daughter) has previously refused treatment with Aricept 5 mg daily.  - CT head: negative for acute disease  - Redirect patient if confused or distressed  - expect her exacerbation in confusion will improve as she has been reunited with her roomate              Other    Hyponatremia    Overview     Managed by nephrology  Continue 2 g sodium tablets 3 times daily.    Increase fluid restriction from 1500mL daily to 2000mL daily.               CODE STATUS: DNR and DNI    Dr. Bell  is the attending on record for this patient, She is aware of the patient's status and agrees with the above.      Chuckie Nguyen M.D. PGY2  [facility]  [address] [place] [address]  Office: [phone]    This document has been electronically signed by Chuckie Nguyen MD on June 29, 2020 10:19      TIME: 10 minutes

## 2020-07-27 ENCOUNTER — NURSING HOME (OUTPATIENT)
Dept: FAMILY MEDICINE CLINIC | Facility: CLINIC | Age: 85
End: 2020-07-27

## 2020-07-27 DIAGNOSIS — R21 RASH AND NONSPECIFIC SKIN ERUPTION: Primary | ICD-10-CM

## 2020-07-27 DIAGNOSIS — I10 BENIGN ESSENTIAL HTN: ICD-10-CM

## 2020-07-27 DIAGNOSIS — F03.90 MAJOR NEUROCOGNITIVE DISORDER (HCC): ICD-10-CM

## 2020-07-27 DIAGNOSIS — E87.1 HYPONATREMIA: ICD-10-CM

## 2020-07-27 DIAGNOSIS — K59.00 CONSTIPATION, UNSPECIFIED CONSTIPATION TYPE: ICD-10-CM

## 2020-07-27 PROCEDURE — 99308 SBSQ NF CARE LOW MDM 20: CPT | Performed by: STUDENT IN AN ORGANIZED HEALTH CARE EDUCATION/TRAINING PROGRAM

## 2020-07-29 ENCOUNTER — LAB REQUISITION (OUTPATIENT)
Dept: LAB | Facility: HOSPITAL | Age: 85
End: 2020-07-29

## 2020-07-29 ENCOUNTER — TELEPHONE (OUTPATIENT)
Dept: FAMILY MEDICINE CLINIC | Facility: CLINIC | Age: 85
End: 2020-07-29

## 2020-07-29 VITALS
BODY MASS INDEX: 24.16 KG/M2 | RESPIRATION RATE: 20 BRPM | HEART RATE: 73 BPM | TEMPERATURE: 96.6 F | HEIGHT: 65 IN | SYSTOLIC BLOOD PRESSURE: 106 MMHG | OXYGEN SATURATION: 96 % | WEIGHT: 145 LBS | DIASTOLIC BLOOD PRESSURE: 59 MMHG

## 2020-07-29 DIAGNOSIS — R82.90 UNSPECIFIED ABNORMAL FINDINGS IN URINE: ICD-10-CM

## 2020-07-29 LAB
BILIRUB UR QL STRIP: NEGATIVE
CLARITY UR: CLEAR
COLOR UR: YELLOW
GLUCOSE UR STRIP-MCNC: NEGATIVE MG/DL
HGB UR QL STRIP.AUTO: NEGATIVE
KETONES UR QL STRIP: NEGATIVE
LEUKOCYTE ESTERASE UR QL STRIP.AUTO: ABNORMAL
NITRITE UR QL STRIP: NEGATIVE
PH UR STRIP.AUTO: 6.5 [PH] (ref 5–9)
PROT UR QL STRIP: NEGATIVE
SP GR UR STRIP: 1.02 (ref 1–1.03)
UROBILINOGEN UR QL STRIP: ABNORMAL

## 2020-07-29 PROCEDURE — 87086 URINE CULTURE/COLONY COUNT: CPT | Performed by: FAMILY MEDICINE

## 2020-07-29 PROCEDURE — 81003 URINALYSIS AUTO W/O SCOPE: CPT | Performed by: FAMILY MEDICINE

## 2020-07-29 NOTE — PROGRESS NOTES
MONTHLY NURSING HOME VISIT    NAME: Marlene Horne  : 1931  MRN: [id number]    DATE & TIME SEEN: 2020  At  1611 through video visit via nurse Walker  PCP: Chuckie Nguyen MD    NURSING HOME: [facility]    Monthly jail Visit    Chief Complaint: Routine Visit    Subjective:     VIDEO VISIT  You have chosen to receive care through a telehealth visit.  Do you consent to use a video/audio connection for your medical care today? Yes    Marlene Horne is a 88 y.o. female, with CMHx significant for major neurocognitive dementia, hyponatremia, and constipation. She has baseline confusion. She has developed a generalized rash that triamcinolone cream was prescribed by covering physician. No other complaints from patient or nursing.     Current Meds:    Current Outpatient Medications:   •  acetaminophen (TYLENOL) 325 MG tablet, Take 2 tablets by mouth Every 4 (Four) Hours As Needed for Mild Pain ., Disp: , Rfl:   •  albuterol (PROVENTIL) (2.5 MG/3ML) 0.083% nebulizer solution, Take 2.5 mg by nebulization Every 4 (Four) Hours As Needed for Shortness of Air., Disp: , Rfl: 12  •  amLODIPine (NORVASC) 5 MG tablet, Take 1 tablet by mouth Daily., Disp: , Rfl:   •  aspirin 81 MG tablet, Take 1 tablet by mouth Daily., Disp: 90 tablet, Rfl: 2  •  Cholecalciferol 25 MCG (1000 UT) tablet, Take 1 tablet by mouth Daily., Disp: 30 tablet, Rfl: 11  •  ferrous sulfate 324 (65 Fe) MG tablet delayed-release EC tablet, Take 1 tablet by mouth Daily With Breakfast., Disp: 30 tablet, Rfl:   •  Flavoring Agent (FLAVOR CONC-CHLORHEXIDINE) concentration, Take 20 mL by mouth 3 (Three) Times a Day., Disp: 500 mL, Rfl: 0  •  melatonin 5 MG tablet tablet, Take 5 mg by mouth Every Night., Disp: , Rfl:   •  Multiple Vitamins-Minerals (I-HERNESTO) tablet tablet, Take 1 tablet by mouth Daily., Disp: 90 tablet, Rfl: 2  •  nystatin (MYCOSTATIN) 449157 UNIT/GM powder, Apply  topically to the appropriate area as directed  "As Needed (skin lesions)., Disp: 30 g, Rfl: 2  •  polyethylene glycol (MIRALAX) pack packet, Take 17 g by mouth Daily., Disp: , Rfl:   •  rosuvastatin (CRESTOR) 5 MG tablet, Take 1 tablet by mouth Daily., Disp: 30 tablet, Rfl: 0  •  sennosides-docusate sodium (SENOKOT-S) 8.6-50 MG tablet, Take 2 tablets by mouth Every Night., Disp: 30 tablet, Rfl: 0  •  sodium chloride 1 g tablet, Take 1 tablet by mouth 3 (Three) Times a Day With Meals., Disp: 180 tablet, Rfl: 0    Allergies:  Patient has no known allergies.    Review of Systems:  Review of Systems   Constitutional: Negative for appetite change and fever.   HENT: Negative for ear pain and sore throat.    Eyes: Negative for pain and visual disturbance.   Respiratory: Negative for cough and shortness of breath.    Cardiovascular: Negative for chest pain and palpitations.   Gastrointestinal: Negative for abdominal pain and constipation.   Genitourinary: Negative for dysuria and flank pain.   Musculoskeletal: Negative for arthralgias and back pain.   Skin: Negative for pallor and rash.   Neurological: Negative for dizziness and syncope.   Psychiatric/Behavioral: Positive for confusion. Negative for decreased concentration.       Objective:     /59   Pulse 73   Temp 96.6 °F (35.9 °C)   Resp 20   Ht 165.1 cm (65\")   Wt 65.8 kg (145 lb)   SpO2 96%   BMI 24.13 kg/m²      Physical Exam   Constitutional: She appears well-developed and well-nourished. No distress.   HENT:   Head: Normocephalic and atraumatic.   Eyes: Pupils are equal, round, and reactive to light. EOM are normal.   Neck: Normal range of motion. Neck supple.   Neurological: She is alert.   Skin: Rash (generalized to thorax, arms, legs. Evidence of excoriation. No warmth noted by nurse. ) noted.       Diagnostic Data:     Please refer to paper chart at [facility]     Assessment/Plan:      88 y.o. female with:    Problem List Items Addressed This Visit        Cardiovascular and Mediastinum    " Benign essential HTN    Overview     Amlodipine 5 mg daily            Digestive    Constipation    Overview     Senokot 2 pills at night scheduled. Miralaax 17 grams daily scheduled.             Nervous and Auditory    Major neurocognitive disorder (CMS/HCC)    Overview     Alzheimer's Dementia with Vascular Dementia  Patient POA (daughter) has previously refused treatment with Aricept 5 mg daily.  - CT head: negative for acute disease  - Redirect patient if confused or distressed            Other    Hyponatremia    Overview     Managed by nephrology  Continue 2 g sodium tablets 3 times daily.    Increase fluid restriction from 1500mL daily to 2000mL daily.  Repeat BMP on Friday 4/24/2020               Other Visit Diagnoses     Rash and nonspecific skin eruption    -  Primary             Overview                  S/p triamcinolone cream with no improvement. Order 80 mg Kenalog IM x1. Advised nursing to call in 2 days if no improvement or worsening.          CODE STATUS: DNR and DNI    Dr. Bell  is the attending on record for this patient, She is aware of the patient's status and agrees with the above.      Chuckie Nguyen M.D. PGY2  [facility]  [address] [place] [address]  Office: [phone]    This document has been electronically signed by Chuckie Nguyen MD on July 29, 2020 09:24      TIME: 8 minutes

## 2020-07-29 NOTE — TELEPHONE ENCOUNTER
Spoke with Ange. No follow up for UA necessary and patient does not endorse any dysuria, frequency, incontinence, suprapubic pain. Rash stable. Asked to be notified Friday if rash does not improve and/or worsens.

## 2020-07-29 NOTE — TELEPHONE ENCOUNTER
LAWRENCE FROM [facility] CALLED AND FAXED OVER [facility] ON THIS PATIENT AND IS NEEDING A CALL BACK ON THIS [phone].      THANK YOU,      NOAH

## 2020-07-30 LAB — BACTERIA SPEC AEROBE CULT: NORMAL

## 2020-08-12 ENCOUNTER — TELEPHONE (OUTPATIENT)
Dept: FAMILY MEDICINE CLINIC | Facility: CLINIC | Age: 85
End: 2020-08-12

## 2020-08-12 NOTE — TELEPHONE ENCOUNTER
[facility] CALLED TO SPEAK WITH PCP REGARDING PATIENT. PATIENT STILL HAS A RASH THAT IS COVERING HER CHEST AND HER BODY.     CALL BACK NUMBER THEM [phone].    THANKS,  YONIS

## 2020-08-12 NOTE — TELEPHONE ENCOUNTER
RWT CALLED TO SPEAK WITH PCP REGARDING PATIENT. PATIENT STILL HAS A RASH THAT IS COVERING HER CHEST AND HER BODY.      CALL BACK NUMBER THEM [phone].     THANKS,  YONIS

## 2020-08-12 NOTE — TELEPHONE ENCOUNTER
Ange called again from [facility] about pt's rash. I told her the physician has the message and will call her back as soon as he can. I asked her if this was a STAT message and she said no, so I explained that he has 24/48 hours to read and respond, but that I would put another note in that she called.     Ange can be reached at [phone]    Thank you.

## 2020-08-13 NOTE — TELEPHONE ENCOUNTER
Called [facility] and gave verbal order to nurse Denise for prednisone 40 mg qd for 7 days to treat rash. Asked for call back on update of skin rash once steroid course complete.       Chuckie Nguyen M.D. PGY3  [facility]  [address] [place] [address]  Office: [phone]    This document has been electronically signed by Chuckie Nguyen MD on August 13, 2020 17:09

## 2020-08-24 ENCOUNTER — TELEPHONE (OUTPATIENT)
Dept: FAMILY MEDICINE CLINIC | Facility: CLINIC | Age: 85
End: 2020-08-24

## 2020-08-24 NOTE — TELEPHONE ENCOUNTER
ADRYAN FROM [facility] CALLED AND WANTED TO LET DR RODRÍGUEZ KNOW ABOUT HOW THIS PATIENT WAS DOING WITH THE PREDISONE AND DR RODRÍGUEZ IS NOT IN, THE RASH IS NOT GETTING BETTER AND MAY NEED TO TRY SOMETHING ELSE. HER NUMBER TO CALL BACK [phone].      THANK  YOU    NOAH

## 2020-08-24 NOTE — TELEPHONE ENCOUNTER
ADRYAN FROM [facility] CALLED AND WANTED TO LET DR NGUYEN KNOW ABOUT HOW THIS PATIENT WAS DOING WITH THE PREDISONE AND DR NGUYEN IS NOT IN, THE RASH IS NOT GETTING BETTER AND MAY NEED TO TRY SOMETHING ELSE. HER NUMBER TO CALL BACK [phone].        THANK  YOU     NOAH Nguyen patient

## 2020-08-26 ENCOUNTER — TELEPHONE (OUTPATIENT)
Dept: FAMILY MEDICINE CLINIC | Facility: CLINIC | Age: 85
End: 2020-08-26

## 2020-08-26 RX ORDER — PREDNISONE 10 MG/1
10 TABLET ORAL DAILY
Qty: 30 TABLET | Refills: 2 | Status: SHIPPED | OUTPATIENT
Start: 2020-08-26 | End: 2020-12-05

## 2020-08-26 NOTE — PROGRESS NOTES
Received call from [facility] regarding whole-body rash Jose returns after 7 days of 20 mg of prednisone.  Detergent was also changed in the nursing facility related Covid-19.  We instructed to have patient's laundry cleaned in detergent that is different as it is a whole-body rash without any other clinical symptomatology.  We will continue on low-dose prednisone of 10 every day determine if rash will resolve.        This document has been electronically signed by Srinivasa Chairez III, MD on August 26, 2020 14:52      Dragon disclaimer: Parts of this note were transcribed using dragon dictation software.

## 2020-08-26 NOTE — TELEPHONE ENCOUNTER
Orin from [facility] called and asked if they could arrange a dermatology appointment for the rash she continues to happen.     I said I would send a message to the provider and see what we could do.     Orin from [facility] can be reached at [phone]    Thank you.

## 2020-08-27 ENCOUNTER — TELEPHONE (OUTPATIENT)
Dept: FAMILY MEDICINE CLINIC | Facility: CLINIC | Age: 85
End: 2020-08-27

## 2020-08-27 NOTE — TELEPHONE ENCOUNTER
[facility] CALLED AND THEY WERE WONDERING IF DR RODRÍGUEZ WOULD DO A REFERRAL TO A DERMATOLOGIST FOR HER RASH. THERE NUMBER TO CALL BACK [phone].        THANK YOU,        NOAH

## 2020-08-27 NOTE — TELEPHONE ENCOUNTER
Orin from [facility] called and asked if they could arrange a dermatology appointment for the rash she continues to happen.      I said I would send a message to the provider and see what we could do.      Orin from [facility] can be reached at [phone]     Thank you        Nguyen patient

## 2020-08-27 NOTE — TELEPHONE ENCOUNTER
[facility] CALLED AND THEY WERE WONDERING IF DR RODRÍGUEZ WOULD DO A REFERRAL TO A DERMATOLOGIST FOR HER RASH. THERE NUMBER TO CALL BACK [phone].           THANK YOU,           NOAH

## 2020-08-31 ENCOUNTER — NURSING HOME (OUTPATIENT)
Dept: FAMILY MEDICINE CLINIC | Facility: CLINIC | Age: 85
End: 2020-08-31

## 2020-08-31 DIAGNOSIS — I10 BENIGN ESSENTIAL HTN: ICD-10-CM

## 2020-08-31 DIAGNOSIS — K59.00 CONSTIPATION, UNSPECIFIED CONSTIPATION TYPE: ICD-10-CM

## 2020-08-31 DIAGNOSIS — R21 RASH AND NONSPECIFIC SKIN ERUPTION: ICD-10-CM

## 2020-08-31 DIAGNOSIS — E87.1 HYPONATREMIA: ICD-10-CM

## 2020-08-31 DIAGNOSIS — F03.90 MAJOR NEUROCOGNITIVE DISORDER (HCC): Primary | ICD-10-CM

## 2020-08-31 PROCEDURE — 99308 SBSQ NF CARE LOW MDM 20: CPT | Performed by: STUDENT IN AN ORGANIZED HEALTH CARE EDUCATION/TRAINING PROGRAM

## 2020-09-11 ENCOUNTER — TELEPHONE (OUTPATIENT)
Dept: FAMILY MEDICINE CLINIC | Facility: CLINIC | Age: 85
End: 2020-09-11

## 2020-09-11 NOTE — TELEPHONE ENCOUNTER
Denise from [facility] called and said she faxed over some lab results for Dr. Nguyen to look over. She said none were critical, so she was fine with a message being sent over.     Denise said if there are any orders, she can be reached at [phone]    Thank you.

## 2020-09-17 VITALS
DIASTOLIC BLOOD PRESSURE: 74 MMHG | SYSTOLIC BLOOD PRESSURE: 148 MMHG | OXYGEN SATURATION: 99 % | RESPIRATION RATE: 18 BRPM | HEART RATE: 82 BPM | HEIGHT: 65 IN | WEIGHT: 145 LBS | BODY MASS INDEX: 24.16 KG/M2 | TEMPERATURE: 97.5 F

## 2020-09-17 NOTE — PROGRESS NOTES
MONTHLY NURSING HOME VISIT    NAME: Marlene Horne  : 1931  MRN: [id number]    DATE & TIME SEEN: 2020  At  1325 through video visit via nurse Walker  PCP: Chuckie Nguyen MD    NURSING HOME: [facility]    Monthly care home Visit    Chief Complaint: Routine Visit    Subjective:     VIDEO VISIT  You have chosen to receive care through a telehealth visit.  Do you consent to use a video/audio connection for your medical care today? Yes    Marlene Horne is a 88 y.o. female, with CMHx significant for major neurocognitive dementia, hyponatremia, and constipation. She has baseline confusion. Generalized rash persists. There are other patient in NH with similar rash, yet room mate does not have this rash. No new detergents or changes per NH.     Current Meds:    Current Outpatient Medications:   •  acetaminophen (TYLENOL) 325 MG tablet, Take 2 tablets by mouth Every 4 (Four) Hours As Needed for Mild Pain ., Disp: , Rfl:   •  albuterol (PROVENTIL) (2.5 MG/3ML) 0.083% nebulizer solution, Take 2.5 mg by nebulization Every 4 (Four) Hours As Needed for Shortness of Air., Disp: , Rfl: 12  •  amLODIPine (NORVASC) 5 MG tablet, Take 1 tablet by mouth Daily., Disp: , Rfl:   •  aspirin 81 MG tablet, Take 1 tablet by mouth Daily., Disp: 90 tablet, Rfl: 2  •  Cholecalciferol 25 MCG (1000 UT) tablet, Take 1 tablet by mouth Daily., Disp: 30 tablet, Rfl: 11  •  ferrous sulfate 324 (65 Fe) MG tablet delayed-release EC tablet, Take 1 tablet by mouth Daily With Breakfast., Disp: 30 tablet, Rfl:   •  Flavoring Agent (FLAVOR CONC-CHLORHEXIDINE) concentration, Take 20 mL by mouth 3 (Three) Times a Day., Disp: 500 mL, Rfl: 0  •  melatonin 5 MG tablet tablet, Take 5 mg by mouth Every Night., Disp: , Rfl:   •  Multiple Vitamins-Minerals (I-HERNESTO) tablet tablet, Take 1 tablet by mouth Daily., Disp: 90 tablet, Rfl: 2  •  nystatin (MYCOSTATIN) 565059 UNIT/GM powder, Apply  topically to the appropriate area as  "directed As Needed (skin lesions)., Disp: 30 g, Rfl: 2  •  polyethylene glycol (MIRALAX) pack packet, Take 17 g by mouth Daily., Disp: , Rfl:   •  predniSONE (DELTASONE) 10 MG tablet, Take 1 tablet by mouth Daily., Disp: 30 tablet, Rfl: 2  •  rosuvastatin (CRESTOR) 5 MG tablet, Take 1 tablet by mouth Daily., Disp: 30 tablet, Rfl: 0  •  sennosides-docusate sodium (SENOKOT-S) 8.6-50 MG tablet, Take 2 tablets by mouth Every Night., Disp: 30 tablet, Rfl: 0  •  sodium chloride 1 g tablet, Take 1 tablet by mouth 3 (Three) Times a Day With Meals., Disp: 180 tablet, Rfl: 0    Allergies:  Patient has no known allergies.    Review of Systems:  Review of Systems   Constitutional: Negative for appetite change and fever.   HENT: Negative for ear pain and sore throat.    Eyes: Negative for pain and visual disturbance.   Respiratory: Negative for cough and shortness of breath.    Cardiovascular: Negative for chest pain and palpitations.   Gastrointestinal: Negative for abdominal pain and constipation.   Genitourinary: Negative for dysuria and flank pain.   Musculoskeletal: Negative for arthralgias and back pain.   Skin: Negative for pallor and rash.   Neurological: Negative for dizziness and syncope.   Psychiatric/Behavioral: Positive for confusion. Negative for decreased concentration.       Objective:     /74   Pulse 82   Temp 97.5 °F (36.4 °C)   Resp 18   Ht 165.1 cm (65\")   Wt 65.8 kg (145 lb)   SpO2 99%   BMI 24.13 kg/m²      Physical Exam   Constitutional: She appears well-developed. No distress.   HENT:   Head: Normocephalic and atraumatic.   Eyes: Pupils are equal, round, and reactive to light.   Neck: Normal range of motion. Neck supple.   Neurological: She is alert.   Skin: Rash (generalized to thorax, arms, legs. Evidence of excoriation. No warmth noted by nurse. ) noted.       Diagnostic Data:     Please refer to paper chart at [facility]     Assessment/Plan:      88 y.o. female with:    Problem " List Items Addressed This Visit        Cardiovascular and Mediastinum    Benign essential HTN    Overview     Amlodipine 5 mg daily            Digestive    Constipation    Overview     Senokot 2 pills at night scheduled. Miralaax 17 grams daily scheduled.             Nervous and Auditory    Major neurocognitive disorder (CMS/HCC)    Overview     Alzheimer's Dementia with Vascular Dementia  Patient POA (daughter) has previously refused treatment with Aricept 5 mg daily.  - CT head: negative for acute disease  - Redirect patient if confused or distressed            Other    Hyponatremia    Overview     Managed by nephrology  Continue 2 g sodium tablets 3 times daily.    Increase fluid restriction from 1500mL daily to 2000mL daily.  Repeat BMP on Friday 4/24/2020               Other Visit Diagnoses     Rash and nonspecific skin eruption    -  Primary             Overview             Another NH patient has been referred to dermatology for biopsy and diagnosis. As this appears to be a               community rash, will await results. If inconclusive will send patient for biopsy herself.          CODE STATUS: DNR and DNI    Dr. Bell  is the attending on record for this patient, She is aware of the patient's status and agrees with the above.      Chuckie Nguyen M.D. PGY2  [facility]  [address] [place] [address]  Office: [phone]    This document has been electronically signed by Chuckie Nguyen MD on September 17, 2020 13:23 CDT      TIME: 8 minutes

## 2020-09-18 RX ORDER — IVERMECTIN 3 MG/1
3 TABLET ORAL ONCE
Qty: 1 TABLET | Refills: 0 | Status: CANCELLED | OUTPATIENT
Start: 2020-09-18 | End: 2020-09-18

## 2020-09-18 NOTE — PROGRESS NOTES
Contacted by Kranthi Rodríguez regarding patient having rash for 1 month.  She went to dermatology and was diagnosed with scabies.  She was given ivermectin 3 mg since she did not tolerate permethrin cream.  Dose of ivermectin calculated due to weight was 13.2 mg so therefore gave nursing home instructions to give 9 extra milligrams or 10.5 mg if able to cut the tablet in half.  Can then repeat a dose in 7 to 14 days if continuing to have symptoms.          This document has been electronically signed by Jose Morales MD on September 18, 2020 13:36 CDT

## 2020-09-20 NOTE — PROGRESS NOTES
I was present during the entire visit.  I have reviewed the notes, assessments, and/or procedures performed by Chuckie Nguyen MD  , I concur with her/his documentation of Marlene Horne.       This document has been electronically signed by Maricarmen Bell MD on September 20, 2020 12:22 CDT

## 2020-09-30 ENCOUNTER — NURSING HOME (OUTPATIENT)
Dept: FAMILY MEDICINE CLINIC | Facility: CLINIC | Age: 85
End: 2020-09-30

## 2020-09-30 DIAGNOSIS — B86 SCABIES: Primary | ICD-10-CM

## 2020-09-30 DIAGNOSIS — F03.90 MAJOR NEUROCOGNITIVE DISORDER (HCC): ICD-10-CM

## 2020-09-30 DIAGNOSIS — K59.00 CONSTIPATION, UNSPECIFIED CONSTIPATION TYPE: ICD-10-CM

## 2020-09-30 DIAGNOSIS — I10 BENIGN ESSENTIAL HTN: ICD-10-CM

## 2020-09-30 DIAGNOSIS — E87.1 HYPONATREMIA: ICD-10-CM

## 2020-09-30 PROCEDURE — 99308 SBSQ NF CARE LOW MDM 20: CPT | Performed by: STUDENT IN AN ORGANIZED HEALTH CARE EDUCATION/TRAINING PROGRAM

## 2020-10-07 VITALS
HEART RATE: 72 BPM | RESPIRATION RATE: 18 BRPM | HEIGHT: 65 IN | OXYGEN SATURATION: 97 % | SYSTOLIC BLOOD PRESSURE: 110 MMHG | BODY MASS INDEX: 24.16 KG/M2 | WEIGHT: 145 LBS | TEMPERATURE: 97.8 F | DIASTOLIC BLOOD PRESSURE: 67 MMHG

## 2020-10-07 NOTE — PROGRESS NOTES
MONTHLY NURSING HOME VISIT    NAME: Marlene Horne  : 1931  MRN: [id number]    DATE & TIME SEEN: 2020  At  1615 through video visit via nurse Riggs  PCP: Chuckie Nguyen MD    NURSING HOME: [facility]    Monthly [facility] Visit    Chief Complaint: Routine Visit    Subjective:     VIDEO VISIT  You have chosen to receive care through a telehealth visit.  Do you consent to use a video/audio connection for your medical care today? Yes    Marlene Horne is a 88 y.o. female, with CMHx significant for major neurocognitive dementia, hyponatremia, and constipation. She has baseline confusion. Generalized rash persists - recently diagnosed with scabies by dermatologist, pruritis persists. Given permethrin cream by derm. Also given verbal order by Dr. Morales for ivermectin. No other complaints from patient or staff.     Current Meds:    Current Outpatient Medications:   •  acetaminophen (TYLENOL) 325 MG tablet, Take 2 tablets by mouth Every 4 (Four) Hours As Needed for Mild Pain ., Disp: , Rfl:   •  albuterol (PROVENTIL) (2.5 MG/3ML) 0.083% nebulizer solution, Take 2.5 mg by nebulization Every 4 (Four) Hours As Needed for Shortness of Air., Disp: , Rfl: 12  •  amLODIPine (NORVASC) 5 MG tablet, Take 1 tablet by mouth Daily., Disp: , Rfl:   •  aspirin 81 MG tablet, Take 1 tablet by mouth Daily., Disp: 90 tablet, Rfl: 2  •  Cholecalciferol 25 MCG (1000 UT) tablet, Take 1 tablet by mouth Daily., Disp: 30 tablet, Rfl: 11  •  ferrous sulfate 324 (65 Fe) MG tablet delayed-release EC tablet, Take 1 tablet by mouth Daily With Breakfast., Disp: 30 tablet, Rfl:   •  Flavoring Agent (FLAVOR CONC-CHLORHEXIDINE) concentration, Take 20 mL by mouth 3 (Three) Times a Day., Disp: 500 mL, Rfl: 0  •  melatonin 5 MG tablet tablet, Take 5 mg by mouth Every Night., Disp: , Rfl:   •  Multiple Vitamins-Minerals (I-HERNESTO) tablet tablet, Take 1 tablet by mouth Daily., Disp: 90 tablet, Rfl: 2  •  nystatin  (MYCOSTATIN) 895545 UNIT/GM powder, Apply  topically to the appropriate area as directed As Needed (skin lesions)., Disp: 30 g, Rfl: 2  •  polyethylene glycol (MIRALAX) pack packet, Take 17 g by mouth Daily., Disp: , Rfl:   •  predniSONE (DELTASONE) 10 MG tablet, Take 1 tablet by mouth Daily., Disp: 30 tablet, Rfl: 2  •  rosuvastatin (CRESTOR) 5 MG tablet, Take 1 tablet by mouth Daily., Disp: 30 tablet, Rfl: 0  •  sennosides-docusate sodium (SENOKOT-S) 8.6-50 MG tablet, Take 2 tablets by mouth Every Night., Disp: 30 tablet, Rfl: 0  •  sodium chloride 1 g tablet, Take 1 tablet by mouth 3 (Three) Times a Day With Meals., Disp: 180 tablet, Rfl: 0    Allergies:  Patient has no known allergies.    Review of Systems:  Review of Systems   Constitutional: Negative for appetite change and fever.   HENT: Negative for ear pain and sore throat.    Eyes: Negative for pain and visual disturbance.   Respiratory: Negative for cough and shortness of breath.    Cardiovascular: Negative for chest pain and palpitations.   Gastrointestinal: Negative for abdominal pain and constipation.   Genitourinary: Negative for dysuria and flank pain.   Musculoskeletal: Negative for arthralgias and back pain.   Skin: Positive for rash. Negative for pallor.        Pruritis arms, chest, abdomen   Neurological: Negative for dizziness and syncope.   Psychiatric/Behavioral: Positive for confusion. Negative for decreased concentration.       Objective:     There were no vitals taken for this visit.  Blood pressure 110/67 temp 97.8 respirations 18 pulse 72 O2 sat 97% on room air    Physical Exam   Constitutional: She appears well-developed. No distress.   HENT:   Head: Normocephalic and atraumatic.   Eyes: Pupils are equal, round, and reactive to light.   Neck: Normal range of motion. Neck supple.   Neurological: She is alert.   Skin: Rash (generalized to thorax, arms, legs. Evidence of excoriation. No warmth noted by nurse. ) noted.       Diagnostic Data:      Please refer to paper chart at [facility]     Assessment/Plan:      88 y.o. female with:    Problem List Items Addressed This Visit        Cardiovascular and Mediastinum    Benign essential HTN    Overview     Amlodipine 5 mg daily            Digestive    Constipation    Overview     Senokot 2 pills at night scheduled. Miralaax 17 grams daily scheduled.             Nervous and Auditory    Major neurocognitive disorder (CMS/HCC)    Overview     Alzheimer's Dementia with Vascular Dementia  Patient POA (daughter) has previously refused treatment with Aricept 5 mg daily.  - CT head: negative for acute disease  - Redirect patient if confused or distressed              Other    Hyponatremia    Overview     Managed by nephrology  Continue 2 g sodium tablets 3 times daily.    Increase fluid restriction from 1500mL daily to 2000mL daily.  Repeat BMP on Friday 4/24/2020               Other Visit Diagnoses     Scabies    -  Primary            - Diagnosed by dermatologist and prescribed topical permethrin cream. Dr. Morales was also called off hours and prescribed ivermectin. Complete course of each.          CODE STATUS: DNR and DNI    Dr. Acuña is the attending on record for this patient, He is aware of the patient's status and agrees with the above.      Chuckie Nguyen M.D. PGY2  [facility]  [address] [place] [address]  Office: [phone]    This document has been electronically signed by Chuckie Nguyen MD on October 7, 2020 08:11 CDT      TIME: 7 minutes

## 2020-10-13 NOTE — PROGRESS NOTES
I was present throughout the encounter and discussed the patient's plan of care with Dr. Nguyen as well as with the patient's nurse.  I have seen the patient.  I have reviewed the notes, assessments, and/or procedures performed by Chuckie Nguyen MD, I concur with her/his documentation and assessment and plan for Marlene German Horne.               This document has been electronically signed by Kavin Acuña MD on October 13, 2020 11:01 CDT

## 2020-11-23 ENCOUNTER — TELEPHONE (OUTPATIENT)
Dept: FAMILY MEDICINE CLINIC | Facility: CLINIC | Age: 85
End: 2020-11-23

## 2020-11-23 NOTE — TELEPHONE ENCOUNTER
Tuesday FROM [facility] CALLED REGARDING PATIENT. THEY STATED THAT THE LAST APT WITH OUR OFFICE WAS ON SEPT 30TH. SHE JUST WANTED TO REMIND OUR OFFICE THAT PATIENT IS NEEDING A TELEHEALTH VISIT.    CALL BACK NUMBER FOR HER [phone].    THANKS,  YONIS

## 2020-11-24 ENCOUNTER — NURSING HOME (OUTPATIENT)
Dept: FAMILY MEDICINE CLINIC | Facility: CLINIC | Age: 85
End: 2020-11-24

## 2020-11-24 VITALS
DIASTOLIC BLOOD PRESSURE: 83 MMHG | BODY MASS INDEX: 25.39 KG/M2 | SYSTOLIC BLOOD PRESSURE: 140 MMHG | HEIGHT: 65 IN | HEART RATE: 63 BPM | WEIGHT: 152.4 LBS | OXYGEN SATURATION: 99 % | RESPIRATION RATE: 18 BRPM | TEMPERATURE: 98.2 F

## 2020-11-24 DIAGNOSIS — K59.00 CONSTIPATION, UNSPECIFIED CONSTIPATION TYPE: ICD-10-CM

## 2020-11-24 DIAGNOSIS — I10 BENIGN ESSENTIAL HTN: ICD-10-CM

## 2020-11-24 DIAGNOSIS — F03.90 MAJOR NEUROCOGNITIVE DISORDER (HCC): Primary | ICD-10-CM

## 2020-11-24 PROCEDURE — 99307 SBSQ NF CARE SF MDM 10: CPT | Performed by: STUDENT IN AN ORGANIZED HEALTH CARE EDUCATION/TRAINING PROGRAM

## 2020-11-24 NOTE — PROGRESS NOTES
MONTHLY NURSING HOME VISIT    NAME: Marlene Horne  : 1931  MRN: [id number]    DATE & TIME SEEN: 2020  At  1722 hrough video visit via nurse Cassidy  PCP: Chuckie Nguyen MD    NURSING HOME: [facility]    Monthly senior living Visit    Chief Complaint: Routine Visit    Subjective:     VIDEO VISIT  You have chosen to receive care through a telehealth visit.  Do you consent to use a video/audio connection for your medical care today? Yes    Marlene Horne is a 88 y.o. female, with CMHx significant for major neurocognitive dementia, hyponatremia, and constipation. She has baseline confusion. Generalized rash previously diagnosed by dermatologist as scabies and treated with permethrin lotion - resolved. No pain or other current complaints     Current Meds:    Current Outpatient Medications:   •  acetaminophen (TYLENOL) 325 MG tablet, Take 2 tablets by mouth Every 4 (Four) Hours As Needed for Mild Pain ., Disp: , Rfl:   •  albuterol (PROVENTIL) (2.5 MG/3ML) 0.083% nebulizer solution, Take 2.5 mg by nebulization Every 4 (Four) Hours As Needed for Shortness of Air., Disp: , Rfl: 12  •  amLODIPine (NORVASC) 5 MG tablet, Take 1 tablet by mouth Daily., Disp: , Rfl:   •  aspirin 81 MG tablet, Take 1 tablet by mouth Daily., Disp: 90 tablet, Rfl: 2  •  Cholecalciferol 25 MCG (1000 UT) tablet, Take 1 tablet by mouth Daily., Disp: 30 tablet, Rfl: 11  •  ferrous sulfate 324 (65 Fe) MG tablet delayed-release EC tablet, Take 1 tablet by mouth Daily With Breakfast., Disp: 30 tablet, Rfl:   •  Flavoring Agent (FLAVOR CONC-CHLORHEXIDINE) concentration, Take 20 mL by mouth 3 (Three) Times a Day., Disp: 500 mL, Rfl: 0  •  melatonin 5 MG tablet tablet, Take 5 mg by mouth Every Night., Disp: , Rfl:   •  Multiple Vitamins-Minerals (I-HERNESTO) tablet tablet, Take 1 tablet by mouth Daily., Disp: 90 tablet, Rfl: 2  •  nystatin (MYCOSTATIN) 705861 UNIT/GM powder, Apply  topically to the appropriate area as directed  "As Needed (skin lesions)., Disp: 30 g, Rfl: 2  •  polyethylene glycol (MIRALAX) pack packet, Take 17 g by mouth Daily., Disp: , Rfl:   •  predniSONE (DELTASONE) 10 MG tablet, Take 1 tablet by mouth Daily., Disp: 30 tablet, Rfl: 2  •  rosuvastatin (CRESTOR) 5 MG tablet, Take 1 tablet by mouth Daily., Disp: 30 tablet, Rfl: 0  •  sennosides-docusate sodium (SENOKOT-S) 8.6-50 MG tablet, Take 2 tablets by mouth Every Night., Disp: 30 tablet, Rfl: 0  •  sodium chloride 1 g tablet, Take 1 tablet by mouth 3 (Three) Times a Day With Meals., Disp: 180 tablet, Rfl: 0    Allergies:  Patient has no known allergies.    Review of Systems:  Review of Systems   Constitutional: Negative for appetite change and fever.   HENT: Negative for ear pain and sore throat.    Eyes: Negative for pain and visual disturbance.   Respiratory: Negative for cough and shortness of breath.    Cardiovascular: Negative for chest pain and palpitations.   Gastrointestinal: Negative for abdominal pain and constipation.   Genitourinary: Negative for dysuria and flank pain.   Musculoskeletal: Negative for arthralgias and back pain.   Skin: Negative for pallor and rash.   Neurological: Negative for dizziness and syncope.   Psychiatric/Behavioral: Positive for confusion. Negative for decreased concentration.       Objective:     /83   Pulse 63   Temp 98.2 °F (36.8 °C)   Resp 18   Ht 165.1 cm (65\")   Wt 69.1 kg (152 lb 6.4 oz)   SpO2 99% Comment: Room air  BMI 25.36 kg/m²       Physical Exam   Constitutional: She appears well-developed. No distress.   HENT:   Head: Normocephalic and atraumatic.   Nose: Nose normal.   Eyes: Pupils are equal, round, and reactive to light.   Neck: Normal range of motion. Neck supple.   Neurological: She is alert.   Skin: No rash noted.       Diagnostic Data:     Please refer to paper chart at [facility]     Assessment/Plan:      88 y.o. female with:    Problem List Items Addressed This Visit        " Cardiovascular and Mediastinum    Benign essential HTN    Overview     Stable, controlled - continue Amlodipine 5 mg daily            Digestive    Constipation    Overview     Stable, controlled - continue Senokot 2 pills at night scheduled. Miralaax 17 grams daily scheduled.             Nervous and Auditory    Major neurocognitive disorder (CMS/HCC) - Primary    Overview     Alzheimer's Dementia with Vascular Dementia  Patient POA (daughter) has previously refused treatment with Aricept 5 mg daily.  - CT head: negative for acute disease  - Redirect patient if confused or distressed                 CODE STATUS: DNR and DNI    Dr. Acuña is the attending on record for this patient, He is aware of the patient's status and agrees with the above.      Chuckie Nguyen M.D. PGY2  [facility]  [address] [place] [address]  Office: [phone]    This document has been electronically signed by Chuckie Nguyen MD on November 24, 2020 17:48 CST      TIME: 5 minutes

## 2020-12-05 ENCOUNTER — APPOINTMENT (OUTPATIENT)
Dept: GENERAL RADIOLOGY | Facility: HOSPITAL | Age: 85
End: 2020-12-05

## 2020-12-05 ENCOUNTER — APPOINTMENT (OUTPATIENT)
Dept: CT IMAGING | Facility: HOSPITAL | Age: 85
End: 2020-12-05

## 2020-12-05 ENCOUNTER — HOSPITAL ENCOUNTER (INPATIENT)
Facility: HOSPITAL | Age: 85
LOS: 3 days | Discharge: NURSING, REHAB OR LONG-TERM CARE | End: 2020-12-08
Attending: STUDENT IN AN ORGANIZED HEALTH CARE EDUCATION/TRAINING PROGRAM | Admitting: FAMILY MEDICINE

## 2020-12-05 DIAGNOSIS — Z74.09 IMPAIRED PHYSICAL MOBILITY: ICD-10-CM

## 2020-12-05 DIAGNOSIS — S52.501A CLOSED FRACTURE OF DISTAL END OF RIGHT RADIUS, UNSPECIFIED FRACTURE MORPHOLOGY, INITIAL ENCOUNTER: ICD-10-CM
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
## 2020-12-05 DIAGNOSIS — S72.144A CLOSED NONDISPLACED INTERTROCHANTERIC FRACTURE OF RIGHT FEMUR, INITIAL ENCOUNTER (HCC): Primary | ICD-10-CM

## 2020-12-05 DIAGNOSIS — S42.211A CLOSED DISPLACED FRACTURE OF SURGICAL NECK OF RIGHT HUMERUS, UNSPECIFIED FRACTURE MORPHOLOGY, INITIAL ENCOUNTER: ICD-10-CM

## 2020-12-05 DIAGNOSIS — Z78.9 IMPAIRED MOBILITY AND ACTIVITIES OF DAILY LIVING: ICD-10-CM

## 2020-12-05 DIAGNOSIS — Z74.09 IMPAIRED MOBILITY AND ACTIVITIES OF DAILY LIVING: ICD-10-CM

## 2020-12-05 PROBLEM — S42.201A CLOSED FRACTURE OF PROXIMAL END OF RIGHT HUMERUS: Status: RESOLVED | Noted: 2020-12-05 | Resolved: 2020-12-05

## 2020-12-05 PROBLEM — S62.101A CLOSED FRACTURE OF RIGHT WRIST: Status: ACTIVE | Noted: 2020-12-05

## 2020-12-05 PROBLEM — S42.201A CLOSED FRACTURE OF PROXIMAL END OF RIGHT HUMERUS: Status: ACTIVE | Noted: 2020-12-05

## 2020-12-05 PROBLEM — N17.9 AKI (ACUTE KIDNEY INJURY) (HCC): Status: ACTIVE | Noted: 2020-12-05

## 2020-12-05 PROBLEM — D72.829 LEUKOCYTOSIS: Status: ACTIVE | Noted: 2020-12-05

## 2020-12-05 LAB
ANION GAP SERPL CALCULATED.3IONS-SCNC: 10 MMOL/L (ref 5–15)
BASOPHILS # BLD AUTO: 0.06 10*3/MM3 (ref 0–0.2)
BASOPHILS NFR BLD AUTO: 0.5 % (ref 0–1.5)
BUN SERPL-MCNC: 27 MG/DL (ref 8–23)
BUN/CREAT SERPL: 19.3 (ref 7–25)
CALCIUM SPEC-SCNC: 8.6 MG/DL (ref 8.6–10.5)
CHLORIDE SERPL-SCNC: 104 MMOL/L (ref 98–107)
CO2 SERPL-SCNC: 25 MMOL/L (ref 22–29)
CREAT SERPL-MCNC: 1.4 MG/DL (ref 0.57–1)
DEPRECATED RDW RBC AUTO: 48.4 FL (ref 37–54)
EOSINOPHIL # BLD AUTO: 0.2 10*3/MM3 (ref 0–0.4)
EOSINOPHIL NFR BLD AUTO: 1.6 % (ref 0.3–6.2)
ERYTHROCYTE [DISTWIDTH] IN BLOOD BY AUTOMATED COUNT: 13.6 % (ref 12.3–15.4)
GFR SERPL CREATININE-BSD FRML MDRD: 35 ML/MIN/1.73
GLUCOSE SERPL-MCNC: 114 MG/DL (ref 65–99)
HCT VFR BLD AUTO: 32.3 % (ref 34–46.6)
HGB BLD-MCNC: 10.5 G/DL (ref 12–15.9)
HOLD SPECIMEN: NORMAL
IMM GRANULOCYTES # BLD AUTO: 0.31 10*3/MM3 (ref 0–0.05)
IMM GRANULOCYTES NFR BLD AUTO: 2.6 % (ref 0–0.5)
INR PPP: 0.95 (ref 0.8–1.2)
LYMPHOCYTES # BLD AUTO: 3.51 10*3/MM3 (ref 0.7–3.1)
LYMPHOCYTES NFR BLD AUTO: 28.9 % (ref 19.6–45.3)
MCH RBC QN AUTO: 31.8 PG (ref 26.6–33)
MCHC RBC AUTO-ENTMCNC: 32.5 G/DL (ref 31.5–35.7)
MCV RBC AUTO: 97.9 FL (ref 79–97)
MONOCYTES # BLD AUTO: 0.72 10*3/MM3 (ref 0.1–0.9)
MONOCYTES NFR BLD AUTO: 5.9 % (ref 5–12)
NEUTROPHILS NFR BLD AUTO: 60.5 % (ref 42.7–76)
NEUTROPHILS NFR BLD AUTO: 7.35 10*3/MM3 (ref 1.7–7)
NRBC BLD AUTO-RTO: 0 /100 WBC (ref 0–0.2)
PLATELET # BLD AUTO: 225 10*3/MM3 (ref 140–450)
PMV BLD AUTO: 9.9 FL (ref 6–12)
POTASSIUM SERPL-SCNC: 4 MMOL/L (ref 3.5–5.2)
PROTHROMBIN TIME: 13 SECONDS (ref 11.1–15.3)
RBC # BLD AUTO: 3.3 10*6/MM3 (ref 3.77–5.28)
SARS-COV-2 N GENE RESP QL NAA+PROBE: NOT DETECTED
SODIUM SERPL-SCNC: 139 MMOL/L (ref 136–145)
WBC # BLD AUTO: 12.15 10*3/MM3 (ref 3.4–10.8)
WHOLE BLOOD HOLD SPECIMEN: NORMAL

## 2020-12-05 PROCEDURE — 25010000002 HEPARIN (PORCINE) PER 1000 UNITS: Performed by: STUDENT IN AN ORGANIZED HEALTH CARE EDUCATION/TRAINING PROGRAM

## 2020-12-05 PROCEDURE — 73030 X-RAY EXAM OF SHOULDER: CPT

## 2020-12-05 PROCEDURE — 85610 PROTHROMBIN TIME: CPT | Performed by: STUDENT IN AN ORGANIZED HEALTH CARE EDUCATION/TRAINING PROGRAM

## 2020-12-05 PROCEDURE — 73552 X-RAY EXAM OF FEMUR 2/>: CPT

## 2020-12-05 PROCEDURE — 73060 X-RAY EXAM OF HUMERUS: CPT

## 2020-12-05 PROCEDURE — 72192 CT PELVIS W/O DYE: CPT

## 2020-12-05 PROCEDURE — 73110 X-RAY EXAM OF WRIST: CPT

## 2020-12-05 PROCEDURE — 99219 PR INITIAL OBSERVATION CARE/DAY 50 MINUTES: CPT | Performed by: STUDENT IN AN ORGANIZED HEALTH CARE EDUCATION/TRAINING PROGRAM

## 2020-12-05 PROCEDURE — 82607 VITAMIN B-12: CPT | Performed by: STUDENT IN AN ORGANIZED HEALTH CARE EDUCATION/TRAINING PROGRAM

## 2020-12-05 PROCEDURE — 73502 X-RAY EXAM HIP UNI 2-3 VIEWS: CPT

## 2020-12-05 PROCEDURE — 86901 BLOOD TYPING SEROLOGIC RH(D): CPT

## 2020-12-05 PROCEDURE — 87635 SARS-COV-2 COVID-19 AMP PRB: CPT | Performed by: STUDENT IN AN ORGANIZED HEALTH CARE EDUCATION/TRAINING PROGRAM

## 2020-12-05 PROCEDURE — 73090 X-RAY EXAM OF FOREARM: CPT

## 2020-12-05 PROCEDURE — 70450 CT HEAD/BRAIN W/O DYE: CPT

## 2020-12-05 PROCEDURE — 72125 CT NECK SPINE W/O DYE: CPT

## 2020-12-05 PROCEDURE — 82746 ASSAY OF FOLIC ACID SERUM: CPT | Performed by: STUDENT IN AN ORGANIZED HEALTH CARE EDUCATION/TRAINING PROGRAM

## 2020-12-05 PROCEDURE — 71045 X-RAY EXAM CHEST 1 VIEW: CPT

## 2020-12-05 PROCEDURE — 86900 BLOOD TYPING SEROLOGIC ABO: CPT

## 2020-12-05 PROCEDURE — 80048 BASIC METABOLIC PNL TOTAL CA: CPT | Performed by: STUDENT IN AN ORGANIZED HEALTH CARE EDUCATION/TRAINING PROGRAM

## 2020-12-05 PROCEDURE — 85025 COMPLETE CBC W/AUTO DIFF WBC: CPT | Performed by: STUDENT IN AN ORGANIZED HEALTH CARE EDUCATION/TRAINING PROGRAM

## 2020-12-05 PROCEDURE — 25010000002 MORPHINE PER 10 MG: Performed by: STUDENT IN AN ORGANIZED HEALTH CARE EDUCATION/TRAINING PROGRAM

## 2020-12-05 PROCEDURE — 83540 ASSAY OF IRON: CPT | Performed by: STUDENT IN AN ORGANIZED HEALTH CARE EDUCATION/TRAINING PROGRAM

## 2020-12-05 PROCEDURE — 84466 ASSAY OF TRANSFERRIN: CPT | Performed by: STUDENT IN AN ORGANIZED HEALTH CARE EDUCATION/TRAINING PROGRAM

## 2020-12-05 PROCEDURE — 99285 EMERGENCY DEPT VISIT HI MDM: CPT

## 2020-12-05 RX ORDER — MIRTAZAPINE 15 MG/1
TABLET, FILM COATED ORAL NIGHTLY
COMMUNITY
End: 2023-02-23

## 2020-12-05 RX ORDER — ACETAMINOPHEN 325 MG/1
650 TABLET ORAL EVERY 4 HOURS PRN
Status: DISCONTINUED | OUTPATIENT
Start: 2020-12-05 | End: 2020-12-06 | Stop reason: SDUPTHER

## 2020-12-05 RX ORDER — MORPHINE SULFATE 2 MG/ML
1 INJECTION, SOLUTION INTRAMUSCULAR; INTRAVENOUS EVERY 4 HOURS PRN
Status: DISCONTINUED | OUTPATIENT
Start: 2020-12-05 | End: 2020-12-09 | Stop reason: HOSPADM

## 2020-12-05 RX ORDER — SODIUM CHLORIDE 0.9 % (FLUSH) 0.9 %
10 SYRINGE (ML) INJECTION AS NEEDED
Status: DISCONTINUED | OUTPATIENT
Start: 2020-12-05 | End: 2020-12-09 | Stop reason: HOSPADM

## 2020-12-05 RX ORDER — ASPIRIN 81 MG/1
81 TABLET ORAL DAILY
COMMUNITY
End: 2023-02-23

## 2020-12-05 RX ORDER — HEPARIN SODIUM 5000 [USP'U]/ML
5000 INJECTION, SOLUTION INTRAVENOUS; SUBCUTANEOUS EVERY 12 HOURS SCHEDULED
Status: DISCONTINUED | OUTPATIENT
Start: 2020-12-05 | End: 2020-12-06

## 2020-12-05 RX ORDER — NALOXONE HCL 0.4 MG/ML
0.4 VIAL (ML) INJECTION
Status: DISCONTINUED | OUTPATIENT
Start: 2020-12-05 | End: 2020-12-09 | Stop reason: HOSPADM

## 2020-12-05 RX ORDER — VITAMIN B COMPLEX
1000 TABLET ORAL DAILY
Status: DISCONTINUED | OUTPATIENT
Start: 2020-12-06 | End: 2020-12-09 | Stop reason: HOSPADM

## 2020-12-05 RX ORDER — ACETAMINOPHEN 650 MG/1
650 SUPPOSITORY RECTAL EVERY 4 HOURS PRN
Status: DISCONTINUED | OUTPATIENT
Start: 2020-12-05 | End: 2020-12-09 | Stop reason: HOSPADM

## 2020-12-05 RX ORDER — NYSTATIN 100000 [USP'U]/G
POWDER TOPICAL AS NEEDED
Status: DISCONTINUED | OUTPATIENT
Start: 2020-12-05 | End: 2020-12-09 | Stop reason: HOSPADM

## 2020-12-05 RX ORDER — LANOLIN ALCOHOL/MO/W.PET/CERES
3 CREAM (GRAM) TOPICAL NIGHTLY
Status: DISCONTINUED | OUTPATIENT
Start: 2020-12-05 | End: 2020-12-09 | Stop reason: HOSPADM

## 2020-12-05 RX ORDER — AMLODIPINE BESYLATE 5 MG/1
5 TABLET ORAL
Status: DISCONTINUED | OUTPATIENT
Start: 2020-12-06 | End: 2020-12-09 | Stop reason: HOSPADM

## 2020-12-05 RX ORDER — SODIUM CHLORIDE 9 MG/ML
125 INJECTION, SOLUTION INTRAVENOUS CONTINUOUS
Status: DISCONTINUED | OUTPATIENT
Start: 2020-12-05 | End: 2020-12-09 | Stop reason: HOSPADM

## 2020-12-05 RX ORDER — ONDANSETRON 4 MG/1
4 TABLET, FILM COATED ORAL EVERY 6 HOURS PRN
Status: DISCONTINUED | OUTPATIENT
Start: 2020-12-05 | End: 2020-12-09 | Stop reason: HOSPADM

## 2020-12-05 RX ORDER — MULTIPLE VITAMINS W/ MINERALS TAB 9MG-400MCG
1 TAB ORAL DAILY
Status: DISCONTINUED | OUTPATIENT
Start: 2020-12-06 | End: 2020-12-09 | Stop reason: HOSPADM

## 2020-12-05 RX ORDER — ANORECTAL OINTMENT 15.7; .44; 24; 20.6 G/100G; G/100G; G/100G; G/100G
OINTMENT TOPICAL 2 TIMES DAILY
COMMUNITY
End: 2021-01-05 | Stop reason: ALTCHOICE

## 2020-12-05 RX ORDER — ASPIRIN 81 MG/1
81 TABLET ORAL DAILY
Status: DISCONTINUED | OUTPATIENT
Start: 2020-12-06 | End: 2020-12-09 | Stop reason: HOSPADM

## 2020-12-05 RX ORDER — MIRTAZAPINE 15 MG/1
15 TABLET, FILM COATED ORAL NIGHTLY
Status: DISCONTINUED | OUTPATIENT
Start: 2020-12-05 | End: 2020-12-09 | Stop reason: HOSPADM

## 2020-12-05 RX ORDER — FERROUS SULFATE TAB EC 324 MG (65 MG FE EQUIVALENT) 324 (65 FE) MG
324 TABLET DELAYED RESPONSE ORAL
Status: DISCONTINUED | OUTPATIENT
Start: 2020-12-06 | End: 2020-12-09 | Stop reason: HOSPADM

## 2020-12-05 RX ORDER — ACETAMINOPHEN 160 MG/5ML
650 SOLUTION ORAL EVERY 4 HOURS PRN
Status: DISCONTINUED | OUTPATIENT
Start: 2020-12-05 | End: 2020-12-05 | Stop reason: SDUPTHER

## 2020-12-05 RX ORDER — ALBUTEROL SULFATE 2.5 MG/3ML
2.5 SOLUTION RESPIRATORY (INHALATION) EVERY 4 HOURS PRN
Status: DISCONTINUED | OUTPATIENT
Start: 2020-12-05 | End: 2020-12-09 | Stop reason: HOSPADM

## 2020-12-05 RX ORDER — AMOXICILLIN 250 MG
2 CAPSULE ORAL NIGHTLY
Status: DISCONTINUED | OUTPATIENT
Start: 2020-12-05 | End: 2020-12-09 | Stop reason: HOSPADM

## 2020-12-05 RX ORDER — SODIUM CHLORIDE 1000 MG
1 TABLET, SOLUBLE MISCELLANEOUS
Status: DISCONTINUED | OUTPATIENT
Start: 2020-12-06 | End: 2020-12-09 | Stop reason: HOSPADM

## 2020-12-05 RX ORDER — ROSUVASTATIN CALCIUM 5 MG/1
5 TABLET, COATED ORAL DAILY
Status: DISCONTINUED | OUTPATIENT
Start: 2020-12-06 | End: 2020-12-09 | Stop reason: HOSPADM

## 2020-12-05 RX ORDER — SODIUM CHLORIDE 0.9 % (FLUSH) 0.9 %
10 SYRINGE (ML) INJECTION EVERY 12 HOURS SCHEDULED
Status: DISCONTINUED | OUTPATIENT
Start: 2020-12-05 | End: 2020-12-09 | Stop reason: HOSPADM

## 2020-12-05 RX ORDER — POLYETHYLENE GLYCOL 3350 17 G/17G
17 POWDER, FOR SOLUTION ORAL DAILY
Status: DISCONTINUED | OUTPATIENT
Start: 2020-12-06 | End: 2020-12-09 | Stop reason: HOSPADM

## 2020-12-05 RX ORDER — ONDANSETRON 2 MG/ML
4 INJECTION INTRAMUSCULAR; INTRAVENOUS EVERY 6 HOURS PRN
Status: DISCONTINUED | OUTPATIENT
Start: 2020-12-05 | End: 2020-12-09 | Stop reason: HOSPADM

## 2020-12-05 RX ORDER — CHLORHEXIDINE GLUCONATE 20 %
SOLUTION, NON-ORAL MISCELLANEOUS
COMMUNITY
End: 2022-09-12

## 2020-12-05 RX ADMIN — SODIUM CHLORIDE, POTASSIUM CHLORIDE, SODIUM LACTATE AND CALCIUM CHLORIDE 1000 ML: 600; 310; 30; 20 INJECTION, SOLUTION INTRAVENOUS at 17:22

## 2020-12-05 RX ADMIN — MELATONIN 3 MG: at 21:22

## 2020-12-05 RX ADMIN — HEPARIN SODIUM 5000 UNITS: 5000 INJECTION, SOLUTION INTRAVENOUS; SUBCUTANEOUS at 21:22

## 2020-12-05 RX ADMIN — MIRTAZAPINE 15 MG: 15 TABLET, FILM COATED ORAL at 21:25

## 2020-12-05 RX ADMIN — SODIUM CHLORIDE 50 ML/HR: 9 INJECTION, SOLUTION INTRAVENOUS at 21:22

## 2020-12-05 RX ADMIN — MORPHINE SULFATE 4 MG: 4 INJECTION INTRAVENOUS at 18:39

## 2020-12-05 RX ADMIN — MORPHINE SULFATE 4 MG: 4 INJECTION, SOLUTION INTRAMUSCULAR; INTRAVENOUS at 17:06

## 2020-12-05 RX ADMIN — DOCUSATE SODIUM 50 MG AND SENNOSIDES 8.6 MG 2 TABLET: 8.6; 5 TABLET, FILM COATED ORAL at 21:22

## 2020-12-05 NOTE — ED PROVIDER NOTES
Subjective   89-year-old demented patient comes to the ER from the nursing facility chief complaint of fall earlier today and complaint of right shoulder, arm, hip pain.  They do not think the patient hit her head.      History provided by:  Nursing home and EMS personnel  History limited by:  Dementia   used: No        Review of Systems   Unable to perform ROS: Dementia       Past Medical History:   Diagnosis Date   • Benign essential HTN 11/22/2019   • CKD (chronic kidney disease) stage 3, GFR 30-59 ml/min    • Constipation    • Dementia (CMS/HCC)    • Hyponatremia    • Hypovitaminosis D    • Iron deficiency anemia    • Major neurocognitive disorder (CMS/HCC)    • Stroke (CMS/HCC)        No Known Allergies    Past Surgical History:   Procedure Laterality Date   • CATARACT EXTRACTION         Family History   Problem Relation Age of Onset   • No Known Problems Mother    • No Known Problems Father    • Cancer Sister    • Cancer Brother        Social History     Socioeconomic History   • Marital status:      Spouse name: Not on file   • Number of children: 2   • Years of education: Not on file   • Highest education level: Bachelor's degree (e.g., BA, AB, BS)   Occupational History   • Occupation: retired nurse   Social Needs   • Financial resource strain: Not very hard   • Food insecurity     Worry: Never true     Inability: Never true   • Transportation needs     Medical: No     Non-medical: No   Tobacco Use   • Smoking status: Never Smoker   • Smokeless tobacco: Never Used   Substance and Sexual Activity   • Alcohol use: No     Frequency: Never   • Drug use: No   • Sexual activity: Not Currently   Lifestyle   • Physical activity     Days per week: 0 days     Minutes per session: 0 min   • Stress: Not at all   Relationships   • Social connections     Talks on phone: Not on file     Gets together: Not on file     Attends Scientologist service: Not on file     Active member of club or  organization: No     Attends meetings of clubs or organizations: Never     Relationship status:            Objective    Vitals:    12/05/20 1436 12/05/20 1519 12/05/20 1642 12/05/20 1708   BP: 162/67 170/77 155/70 (!) 182/82   BP Location: Left arm Left arm  Left arm   Patient Position: Lying Lying  Lying   Pulse: 81 65 82 86   Resp: 18 18 18 16   Temp:       TempSrc:       SpO2: 97% 96% 96% 96%     Physical Exam  Vitals signs and nursing note reviewed.   Constitutional:       General: She is not in acute distress.     Appearance: She is well-developed. She is not ill-appearing or diaphoretic.   HENT:      Head: Normocephalic.      Right Ear: External ear normal.      Left Ear: External ear normal.   Eyes:      Conjunctiva/sclera: Conjunctivae normal.   Neck:      Trachea: Trachea normal.   Pulmonary:      Effort: Pulmonary effort is normal. No accessory muscle usage or respiratory distress.   Musculoskeletal:      Right shoulder: She exhibits decreased range of motion and tenderness. She exhibits no deformity.      Right wrist: She exhibits decreased range of motion and tenderness.      Right hip: She exhibits decreased range of motion and tenderness.      Cervical back: Normal.   Skin:     General: Skin is warm and dry.      Capillary Refill: Capillary refill takes less than 2 seconds.   Neurological:      General: No focal deficit present.      Mental Status: She is alert. Mental status is at baseline. She is disoriented and confused.         Procedures           ED Course      Results for orders placed or performed during the hospital encounter of 12/05/20   Basic Metabolic Panel    Specimen: Blood   Result Value Ref Range    Glucose 114 (H) 65 - 99 mg/dL    BUN 27 (H) 8 - 23 mg/dL    Creatinine 1.40 (H) 0.57 - 1.00 mg/dL    Sodium 139 136 - 145 mmol/L    Potassium 4.0 3.5 - 5.2 mmol/L    Chloride 104 98 - 107 mmol/L    CO2 25.0 22.0 - 29.0 mmol/L    Calcium 8.6 8.6 - 10.5 mg/dL    eGFR Non African Amer  35 (L) >60 mL/min/1.73    BUN/Creatinine Ratio 19.3 7.0 - 25.0    Anion Gap 10.0 5.0 - 15.0 mmol/L   CBC Auto Differential    Specimen: Blood   Result Value Ref Range    WBC 12.15 (H) 3.40 - 10.80 10*3/mm3    RBC 3.30 (L) 3.77 - 5.28 10*6/mm3    Hemoglobin 10.5 (L) 12.0 - 15.9 g/dL    Hematocrit 32.3 (L) 34.0 - 46.6 %    MCV 97.9 (H) 79.0 - 97.0 fL    MCH 31.8 26.6 - 33.0 pg    MCHC 32.5 31.5 - 35.7 g/dL    RDW 13.6 12.3 - 15.4 %    RDW-SD 48.4 37.0 - 54.0 fl    MPV 9.9 6.0 - 12.0 fL    Platelets 225 140 - 450 10*3/mm3    Neutrophil % 60.5 42.7 - 76.0 %    Lymphocyte % 28.9 19.6 - 45.3 %    Monocyte % 5.9 5.0 - 12.0 %    Eosinophil % 1.6 0.3 - 6.2 %    Basophil % 0.5 0.0 - 1.5 %    Immature Grans % 2.6 (H) 0.0 - 0.5 %    Neutrophils, Absolute 7.35 (H) 1.70 - 7.00 10*3/mm3    Lymphocytes, Absolute 3.51 (H) 0.70 - 3.10 10*3/mm3    Monocytes, Absolute 0.72 0.10 - 0.90 10*3/mm3    Eosinophils, Absolute 0.20 0.00 - 0.40 10*3/mm3    Basophils, Absolute 0.06 0.00 - 0.20 10*3/mm3    Immature Grans, Absolute 0.31 (H) 0.00 - 0.05 10*3/mm3    nRBC 0.0 0.0 - 0.2 /100 WBC   Protime-INR    Specimen: Blood   Result Value Ref Range    Protime 13.0 11.1 - 15.3 Seconds    INR 0.95 0.80 - 1.20   Light Blue Top   Result Value Ref Range    Extra Tube hold for add-on    Gold Top - SST   Result Value Ref Range    Extra Tube Hold for add-ons.      CT Pelvis Without Contrast   Final Result   Comminuted, intertrochanteric fracture of the right   femur. Please see full description above, as well as nonemergent   findings.      Electronically signed by:  Marine Briscoe MD  12/5/2020 6:10 PM CST   Workstation: [id number]      XR Femur 2 View Right   Final Result   Comminuted intertrochanteric fracture of the right   femur      Electronically signed by:  Marine Briscoe MD  12/5/2020 6:06 PM CST   Workstation: [id number]      XR Wrist 3+ View Right   Final Result      1.Fracture of the distal radius with apex palmar angulation and   extension into  the radiocarpal joints.   2. There may be a tiny nondisplaced fracture of the tip of the   ulnar styloid      Electronically signed by:  Marine Briscoe MD  12/5/2020 4:41 PM CST   Workstation: [id number]      XR Forearm 2 View Right   Final Result   Distal radial and suspected ulnar styloid tip   fractures as above      Electronically signed by:  Marine Briscoe MD  12/5/2020 4:54 PM CST   Workstation: [id number]      XR Humerus Right   Final Result   Mildly displaced fracture of the humeral neck as   above      Electronically signed by:  Marine Briscoe MD  12/5/2020 4:52 PM CST   Workstation: [id number]      XR Hip With or Without Pelvis 2 - 3 View Right   Final Result   Comminuted trochanteric fracture of the proximal   right femur.      Note:  if pain or symptoms persist beyond reasonable expectations      and follow-up imaging is anticipated,  cross sectional imaging    (CT and/or MRI) is suggested, as is deemed clinically    appropriate.      Electronically signed by:  Marine Briscoe MD  12/5/2020 5:01 PM CST   Workstation: [id number]      XR Shoulder 2+ View Right   Final Result   Mildly displaced humeral neck fracture with some   impaction fracture site      Electronically signed by:  Marine Briscoe MD  12/5/2020 4:55 PM CST   Workstation: [id number]      XR Chest 1 View   Final Result   No acute abnormality of the thorax. Proximal right humeral   fracture is incompletely visualized      Electronically signed by:  Marine Briscoe MD  12/5/2020 4:56 PM CST   Workstation: [id number]      CT Head Without Contrast   Final Result   1. No acute intracranial abnormality.   2. Old area of encephalomalacia in the right medial occipital   lobe and lacunar infarct in the right thalamus.   3. Expansile lesion in the right maxilla, similar to the previous   study      If pain or symptoms persist beyond reasonable expectations, an   MRI examination is suggested as is deemed clinically appropriate.      Electronically signed by:   Marine Briscoe MD  12/5/2020 3:48 PM CST   Workstation: [id number]      CT Cervical Spine Without Contrast   Final Result   No evidence of acute traumatic osseous injury.   Degenerative changes as above.         If pain or symptoms persist beyond reasonable expectations, a   follow up radiographic and/or MRI examination is suggested, as is   deemed clinically indicated.      Electronically signed by:  Marine Briscoe MD  12/5/2020 3:53 PM CST   Workstation: [id number]              MDM  Number of Diagnoses or Management Options  Closed displaced fracture of surgical neck of right humerus, unspecified fracture morphology, initial encounter: new and requires workup  Closed fracture of distal end of right radius, unspecified fracture morphology, initial encounter: new and requires workup  Closed nondisplaced intertrochanteric fracture of right femur, initial encounter (CMS/formerly [facility]): new and requires workup  Fall, initial encounter: new and requires workup  Diagnosis management comments: Vital signs are stable, afebrile.  Labs unremarkable.  Patient has right humeral neck fracture, distal radius fracture, and an intertrochanteric fracture on the right.  CT head and cervical spine negative for acute findings.  Splint was applied.  Neurovascular intact.  Spoke with orthopedic surgery recommended admission.  Spoke with the on-call resident who agreed to admit.       Amount and/or Complexity of Data Reviewed  Clinical lab tests: ordered and reviewed  Tests in the radiology section of CPT®: ordered and reviewed  Tests in the medicine section of CPT®: ordered and reviewed  Decide to obtain previous medical records or to obtain history from someone other than the patient: yes  Obtain history from someone other than the patient: yes  Review and summarize past medical records: yes  Discuss the patient with other providers: yes  Independent visualization of images, tracings, or specimens: yes    Patient Progress  Patient progress:  stable      Final diagnoses:   Closed nondisplaced intertrochanteric fracture of right femur, initial encounter (CMS/[facility])   Closed fracture of distal end of right radius, unspecified fracture morphology, initial encounter   Closed displaced fracture of surgical neck of right humerus, unspecified fracture morphology, initial encounter   Fall, initial encounter            Mike Newby MD  12/05/20 [id number]

## 2020-12-06 ENCOUNTER — APPOINTMENT (OUTPATIENT)
Dept: GENERAL RADIOLOGY | Facility: HOSPITAL | Age: 85
End: 2020-12-06

## 2020-12-06 ENCOUNTER — ANESTHESIA (OUTPATIENT)
Dept: PERIOP | Facility: HOSPITAL | Age: 85
End: 2020-12-06

## 2020-12-06 ENCOUNTER — ANESTHESIA EVENT (OUTPATIENT)
Dept: PERIOP | Facility: HOSPITAL | Age: 85
End: 2020-12-06
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
## 2020-12-06 LAB
ABO GROUP BLD: NORMAL
ANION GAP SERPL CALCULATED.3IONS-SCNC: 10 MMOL/L (ref 5–15)
BASOPHILS # BLD AUTO: 0.08 10*3/MM3 (ref 0–0.2)
BASOPHILS NFR BLD AUTO: 0.5 % (ref 0–1.5)
BLD GP AB SCN SERPL QL: NEGATIVE
BUN SERPL-MCNC: 29 MG/DL (ref 8–23)
BUN/CREAT SERPL: 22 (ref 7–25)
CALCIUM SPEC-SCNC: 7.9 MG/DL (ref 8.6–10.5)
CHLORIDE SERPL-SCNC: 104 MMOL/L (ref 98–107)
CO2 SERPL-SCNC: 22 MMOL/L (ref 22–29)
CREAT SERPL-MCNC: 1.32 MG/DL (ref 0.57–1)
DEPRECATED RDW RBC AUTO: 48.2 FL (ref 37–54)
EOSINOPHIL # BLD AUTO: 0.1 10*3/MM3 (ref 0–0.4)
EOSINOPHIL NFR BLD AUTO: 0.6 % (ref 0.3–6.2)
ERYTHROCYTE [DISTWIDTH] IN BLOOD BY AUTOMATED COUNT: 13.6 % (ref 12.3–15.4)
FERRITIN SERPL-MCNC: 173.2 NG/ML (ref 13–150)
GFR SERPL CREATININE-BSD FRML MDRD: 38 ML/MIN/1.73
GLUCOSE SERPL-MCNC: 160 MG/DL (ref 65–99)
HCT VFR BLD AUTO: 27.6 % (ref 34–46.6)
HGB BLD-MCNC: 9 G/DL (ref 12–15.9)
IMM GRANULOCYTES # BLD AUTO: 0.37 10*3/MM3 (ref 0–0.05)
IMM GRANULOCYTES NFR BLD AUTO: 2.3 % (ref 0–0.5)
IRON 24H UR-MRATE: 65 MCG/DL (ref 37–145)
IRON SATN MFR SERPL: 23 % (ref 20–50)
LYMPHOCYTES # BLD AUTO: 1.93 10*3/MM3 (ref 0.7–3.1)
LYMPHOCYTES NFR BLD AUTO: 12.2 % (ref 19.6–45.3)
Lab: NORMAL
MCH RBC QN AUTO: 31.5 PG (ref 26.6–33)
MCHC RBC AUTO-ENTMCNC: 32.6 G/DL (ref 31.5–35.7)
MCV RBC AUTO: 96.5 FL (ref 79–97)
MONOCYTES # BLD AUTO: 0.9 10*3/MM3 (ref 0.1–0.9)
MONOCYTES NFR BLD AUTO: 5.7 % (ref 5–12)
NEUTROPHILS NFR BLD AUTO: 12.42 10*3/MM3 (ref 1.7–7)
NEUTROPHILS NFR BLD AUTO: 78.7 % (ref 42.7–76)
NRBC BLD AUTO-RTO: 0 /100 WBC (ref 0–0.2)
PLATELET # BLD AUTO: 175 10*3/MM3 (ref 140–450)
PMV BLD AUTO: 10.7 FL (ref 6–12)
POTASSIUM SERPL-SCNC: 5.6 MMOL/L (ref 3.5–5.2)
RBC # BLD AUTO: 2.86 10*6/MM3 (ref 3.77–5.28)
RH BLD: NEGATIVE
SODIUM SERPL-SCNC: 136 MMOL/L (ref 136–145)
T&S EXPIRATION DATE: NORMAL
TIBC SERPL-MCNC: 280 MCG/DL (ref 298–536)
TRANSFERRIN SERPL-MCNC: 188 MG/DL (ref 200–360)
WBC # BLD AUTO: 15.8 10*3/MM3 (ref 3.4–10.8)

## 2020-12-06 PROCEDURE — C1713 ANCHOR/SCREW BN/BN,TIS/BN: HCPCS | Performed by: ORTHOPAEDIC SURGERY

## 2020-12-06 PROCEDURE — 76000 FLUOROSCOPY <1 HR PHYS/QHP: CPT

## 2020-12-06 PROCEDURE — 25010000002 CEFAZOLIN PER 500 MG: Performed by: ORTHOPAEDIC SURGERY

## 2020-12-06 PROCEDURE — C1769 GUIDE WIRE: HCPCS | Performed by: ORTHOPAEDIC SURGERY

## 2020-12-06 PROCEDURE — 0PSHXZZ REPOSITION RIGHT RADIUS, EXTERNAL APPROACH: ICD-10-PCS | Performed by: ORTHOPAEDIC SURGERY

## 2020-12-06 PROCEDURE — 86901 BLOOD TYPING SEROLOGIC RH(D): CPT | Performed by: STUDENT IN AN ORGANIZED HEALTH CARE EDUCATION/TRAINING PROGRAM

## 2020-12-06 PROCEDURE — 27245 TREAT THIGH FRACTURE: CPT | Performed by: ORTHOPAEDIC SURGERY

## 2020-12-06 PROCEDURE — 86900 BLOOD TYPING SEROLOGIC ABO: CPT | Performed by: STUDENT IN AN ORGANIZED HEALTH CARE EDUCATION/TRAINING PROGRAM

## 2020-12-06 PROCEDURE — 86923 COMPATIBILITY TEST ELECTRIC: CPT

## 2020-12-06 PROCEDURE — 25010000002 PHENYLEPHRINE PER 1 ML: Performed by: NURSE ANESTHETIST, CERTIFIED REGISTERED

## 2020-12-06 PROCEDURE — 25010000002 MORPHINE PER 10 MG: Performed by: STUDENT IN AN ORGANIZED HEALTH CARE EDUCATION/TRAINING PROGRAM

## 2020-12-06 PROCEDURE — 0QS636Z REPOSITION RIGHT UPPER FEMUR WITH INTRAMEDULLARY INTERNAL FIXATION DEVICE, PERCUTANEOUS APPROACH: ICD-10-PCS | Performed by: ORTHOPAEDIC SURGERY

## 2020-12-06 PROCEDURE — 80048 BASIC METABOLIC PNL TOTAL CA: CPT | Performed by: STUDENT IN AN ORGANIZED HEALTH CARE EDUCATION/TRAINING PROGRAM

## 2020-12-06 PROCEDURE — 25010000002 PROPOFOL 10 MG/ML EMULSION: Performed by: NURSE ANESTHETIST, CERTIFIED REGISTERED

## 2020-12-06 PROCEDURE — 25010000002 FENTANYL CITRATE (PF) 100 MCG/2ML SOLUTION: Performed by: NURSE ANESTHETIST, CERTIFIED REGISTERED

## 2020-12-06 PROCEDURE — 25600 CLTX DST RDL FX/EPHYS SEP WO: CPT | Performed by: ORTHOPAEDIC SURGERY

## 2020-12-06 PROCEDURE — 25010000002 HYDROMORPHONE 1 MG/ML SOLUTION: Performed by: NURSE ANESTHETIST, CERTIFIED REGISTERED

## 2020-12-06 PROCEDURE — 94799 UNLISTED PULMONARY SVC/PX: CPT

## 2020-12-06 PROCEDURE — 86850 RBC ANTIBODY SCREEN: CPT | Performed by: STUDENT IN AN ORGANIZED HEALTH CARE EDUCATION/TRAINING PROGRAM

## 2020-12-06 PROCEDURE — 85025 COMPLETE CBC W/AUTO DIFF WBC: CPT | Performed by: STUDENT IN AN ORGANIZED HEALTH CARE EDUCATION/TRAINING PROGRAM

## 2020-12-06 PROCEDURE — 99221 1ST HOSP IP/OBS SF/LOW 40: CPT | Performed by: ORTHOPAEDIC SURGERY

## 2020-12-06 PROCEDURE — 25010000002 MORPHINE PER 10 MG: Performed by: ORTHOPAEDIC SURGERY

## 2020-12-06 PROCEDURE — 82728 ASSAY OF FERRITIN: CPT | Performed by: STUDENT IN AN ORGANIZED HEALTH CARE EDUCATION/TRAINING PROGRAM

## 2020-12-06 DEVICE — LOCKING SCREW, FULLY THREADED: Type: IMPLANTABLE DEVICE | Site: HIP | Status: FUNCTIONAL

## 2020-12-06 DEVICE — LAG SCREW, TI
Type: IMPLANTABLE DEVICE | Site: HIP | Status: FUNCTIONAL
Brand: GAMMA

## 2020-12-06 DEVICE — TROCHANTERIC NAIL KIT, TI
Type: IMPLANTABLE DEVICE | Site: HIP | Status: FUNCTIONAL
Brand: GAMMA

## 2020-12-06 RX ORDER — FLUMAZENIL 0.1 MG/ML
0.2 INJECTION INTRAVENOUS AS NEEDED
Status: DISCONTINUED | OUTPATIENT
Start: 2020-12-06 | End: 2020-12-06 | Stop reason: HOSPADM

## 2020-12-06 RX ORDER — HEPARIN SODIUM 5000 [USP'U]/ML
5000 INJECTION, SOLUTION INTRAVENOUS; SUBCUTANEOUS EVERY 12 HOURS SCHEDULED
Status: DISCONTINUED | OUTPATIENT
Start: 2020-12-07 | End: 2020-12-07

## 2020-12-06 RX ORDER — BUPIVACAINE HCL/0.9 % NACL/PF 0.1 %
2 PLASTIC BAG, INJECTION (ML) EPIDURAL EVERY 8 HOURS
Status: COMPLETED | OUTPATIENT
Start: 2020-12-06 | End: 2020-12-06

## 2020-12-06 RX ORDER — ACETAMINOPHEN 325 MG/1
650 TABLET ORAL ONCE AS NEEDED
Status: DISCONTINUED | OUTPATIENT
Start: 2020-12-06 | End: 2020-12-06 | Stop reason: HOSPADM

## 2020-12-06 RX ORDER — PROMETHAZINE HYDROCHLORIDE 25 MG/1
25 TABLET ORAL ONCE AS NEEDED
Status: DISCONTINUED | OUTPATIENT
Start: 2020-12-06 | End: 2020-12-06 | Stop reason: HOSPADM

## 2020-12-06 RX ORDER — ONDANSETRON 2 MG/ML
4 INJECTION INTRAMUSCULAR; INTRAVENOUS ONCE AS NEEDED
Status: DISCONTINUED | OUTPATIENT
Start: 2020-12-06 | End: 2020-12-06 | Stop reason: HOSPADM

## 2020-12-06 RX ORDER — KETAMINE HYDROCHLORIDE 100 MG/ML
INJECTION INTRAMUSCULAR; INTRAVENOUS AS NEEDED
Status: DISCONTINUED | OUTPATIENT
Start: 2020-12-06 | End: 2020-12-06 | Stop reason: SURG

## 2020-12-06 RX ORDER — PROMETHAZINE HYDROCHLORIDE 25 MG/1
25 SUPPOSITORY RECTAL ONCE AS NEEDED
Status: DISCONTINUED | OUTPATIENT
Start: 2020-12-06 | End: 2020-12-06 | Stop reason: HOSPADM

## 2020-12-06 RX ORDER — LIDOCAINE HYDROCHLORIDE 20 MG/ML
INJECTION, SOLUTION INFILTRATION; PERINEURAL AS NEEDED
Status: DISCONTINUED | OUTPATIENT
Start: 2020-12-06 | End: 2020-12-06 | Stop reason: SURG

## 2020-12-06 RX ORDER — BUPIVACAINE HCL/0.9 % NACL/PF 0.1 %
2 PLASTIC BAG, INJECTION (ML) EPIDURAL ONCE
Status: DISCONTINUED | OUTPATIENT
Start: 2020-12-06 | End: 2020-12-06 | Stop reason: HOSPADM

## 2020-12-06 RX ORDER — EPHEDRINE SULFATE 50 MG/ML
5 INJECTION, SOLUTION INTRAVENOUS ONCE AS NEEDED
Status: DISCONTINUED | OUTPATIENT
Start: 2020-12-06 | End: 2020-12-06 | Stop reason: HOSPADM

## 2020-12-06 RX ORDER — ACETAMINOPHEN 500 MG
500 TABLET ORAL 4 TIMES DAILY PRN
Status: DISCONTINUED | OUTPATIENT
Start: 2020-12-06 | End: 2020-12-09 | Stop reason: HOSPADM

## 2020-12-06 RX ORDER — DIPHENHYDRAMINE HYDROCHLORIDE 50 MG/ML
12.5 INJECTION INTRAMUSCULAR; INTRAVENOUS
Status: DISCONTINUED | OUTPATIENT
Start: 2020-12-06 | End: 2020-12-06 | Stop reason: HOSPADM

## 2020-12-06 RX ORDER — SODIUM CHLORIDE 9 MG/ML
INJECTION, SOLUTION INTRAVENOUS CONTINUOUS PRN
Status: DISCONTINUED | OUTPATIENT
Start: 2020-12-06 | End: 2020-12-06 | Stop reason: SURG

## 2020-12-06 RX ORDER — ACETAMINOPHEN 650 MG/1
650 SUPPOSITORY RECTAL ONCE AS NEEDED
Status: DISCONTINUED | OUTPATIENT
Start: 2020-12-06 | End: 2020-12-06 | Stop reason: HOSPADM

## 2020-12-06 RX ORDER — NALOXONE HCL 0.4 MG/ML
0.4 VIAL (ML) INJECTION AS NEEDED
Status: DISCONTINUED | OUTPATIENT
Start: 2020-12-06 | End: 2020-12-06 | Stop reason: HOSPADM

## 2020-12-06 RX ORDER — TRAMADOL HYDROCHLORIDE 50 MG/1
25 TABLET ORAL EVERY 6 HOURS PRN
Status: DISCONTINUED | OUTPATIENT
Start: 2020-12-06 | End: 2020-12-09 | Stop reason: HOSPADM

## 2020-12-06 RX ORDER — FENTANYL CITRATE 50 UG/ML
INJECTION, SOLUTION INTRAMUSCULAR; INTRAVENOUS AS NEEDED
Status: DISCONTINUED | OUTPATIENT
Start: 2020-12-06 | End: 2020-12-06 | Stop reason: SURG

## 2020-12-06 RX ORDER — PROPOFOL 10 MG/ML
VIAL (ML) INTRAVENOUS AS NEEDED
Status: DISCONTINUED | OUTPATIENT
Start: 2020-12-06 | End: 2020-12-06 | Stop reason: SURG

## 2020-12-06 RX ADMIN — PROPOFOL 20 MG: 10 INJECTION, EMULSION INTRAVENOUS at 07:24

## 2020-12-06 RX ADMIN — Medication 1 G: at 18:14

## 2020-12-06 RX ADMIN — MORPHINE SULFATE 1 MG: 2 INJECTION, SOLUTION INTRAMUSCULAR; INTRAVENOUS at 19:45

## 2020-12-06 RX ADMIN — MELATONIN 3 MG: at 19:46

## 2020-12-06 RX ADMIN — CEFAZOLIN SODIUM 2 G: 10 INJECTION, POWDER, FOR SOLUTION INTRAVENOUS at 12:29

## 2020-12-06 RX ADMIN — PHENYLEPHRINE HYDROCHLORIDE 100 MCG: 10 INJECTION INTRAVENOUS at 07:46

## 2020-12-06 RX ADMIN — PHENYLEPHRINE HYDROCHLORIDE 100 MCG: 10 INJECTION INTRAVENOUS at 07:54

## 2020-12-06 RX ADMIN — MIRTAZAPINE 15 MG: 15 TABLET, FILM COATED ORAL at 19:45

## 2020-12-06 RX ADMIN — CEFAZOLIN SODIUM 2 G: 10 INJECTION, POWDER, FOR SOLUTION INTRAVENOUS at 19:45

## 2020-12-06 RX ADMIN — FENTANYL CITRATE 25 MCG: 50 INJECTION, SOLUTION INTRAMUSCULAR; INTRAVENOUS at 07:24

## 2020-12-06 RX ADMIN — PHENYLEPHRINE HYDROCHLORIDE 100 MCG: 10 INJECTION INTRAVENOUS at 07:43

## 2020-12-06 RX ADMIN — AMLODIPINE BESYLATE 5 MG: 5 TABLET ORAL at 10:29

## 2020-12-06 RX ADMIN — Medication 1 G: at 12:01

## 2020-12-06 RX ADMIN — PHENYLEPHRINE HYDROCHLORIDE 100 MCG: 10 INJECTION INTRAVENOUS at 08:17

## 2020-12-06 RX ADMIN — DOCUSATE SODIUM 50 MG AND SENNOSIDES 8.6 MG 2 TABLET: 8.6; 5 TABLET, FILM COATED ORAL at 19:46

## 2020-12-06 RX ADMIN — PHENYLEPHRINE HYDROCHLORIDE 100 MCG: 10 INJECTION INTRAVENOUS at 07:57

## 2020-12-06 RX ADMIN — KETAMINE HYDROCHLORIDE 20 MG: 100 INJECTION INTRAMUSCULAR; INTRAVENOUS at 07:53

## 2020-12-06 RX ADMIN — FENTANYL CITRATE 25 MCG: 50 INJECTION, SOLUTION INTRAMUSCULAR; INTRAVENOUS at 08:05

## 2020-12-06 RX ADMIN — MORPHINE SULFATE 1 MG: 2 INJECTION, SOLUTION INTRAMUSCULAR; INTRAVENOUS at 03:41

## 2020-12-06 RX ADMIN — SODIUM CHLORIDE: 900 INJECTION, SOLUTION INTRAVENOUS at 07:08

## 2020-12-06 RX ADMIN — PROPOFOL 50 MG: 10 INJECTION, EMULSION INTRAVENOUS at 07:13

## 2020-12-06 RX ADMIN — HYDROMORPHONE HYDROCHLORIDE 0.5 MG: 1 INJECTION, SOLUTION INTRAMUSCULAR; INTRAVENOUS; SUBCUTANEOUS at 08:58

## 2020-12-06 RX ADMIN — TRAMADOL HYDROCHLORIDE 25 MG: 50 TABLET, FILM COATED ORAL at 16:21

## 2020-12-06 RX ADMIN — PHENYLEPHRINE HYDROCHLORIDE 100 MCG: 10 INJECTION INTRAVENOUS at 08:13

## 2020-12-06 RX ADMIN — PHENYLEPHRINE HYDROCHLORIDE 100 MCG: 10 INJECTION INTRAVENOUS at 07:36

## 2020-12-06 RX ADMIN — LIDOCAINE HYDROCHLORIDE 50 MG: 20 INJECTION, SOLUTION INFILTRATION; PERINEURAL at 07:13

## 2020-12-06 RX ADMIN — PHENYLEPHRINE HYDROCHLORIDE 100 MCG: 10 INJECTION INTRAVENOUS at 07:40

## 2020-12-06 NOTE — SIGNIFICANT NOTE
12/06/20 1404   OTHER   Discipline physical therapist;occupational therapist   Rehab Time/Intention   Session Not Performed other (see comments)   Recommendation   PT - Next Appointment 12/07/20   Recommendation   OT - Next Appointment 12/07/20     Initial PT/OT evaluations attempted.  Patient initially lethargic, would not open eyes or participate.  Once awake patient became agitated, does not follow commands, will not allow ROM or transfers. Patient then fell back to sleep.  Nurse aware.  Will attempt evaluations again tomorrow.

## 2020-12-06 NOTE — CONSULTS
ORTHOPAEDIC CONSULT     Patient Name:  Marlene Horne  Admit Date:  12/5/2020  Consult Date:  12/6/2020      Referring Provider: Dr. Morales  Reason for Consultation: Right hip fracture    Patient Care Team:  Chuckie Nguyen MD as PCP - General (Family Medicine)  Srinivasa Chairez III, MD as Resident (Family Medicine)  Charlie Herr MD (Inactive) as Resident (Family Medicine)  Riley Conde MD (Inactive) as Resident (Family Medicine)  Margot Perez MD (Family Medicine)  Mike Junior MD (Inactive) as Resident (Family Medicine)    Chief complaint: Hip pain    Subjective .     History of present illness: 89-year-old female who has a history of dementia living in a nursing home but is a household ambulator the nursing home takes herself to dining etc.  I got most of this history from her daughter who I spoke to on the phone.  Unfortunately she fell yesterday injuring her right shoulder right wrist and right hip.  She was brought to emergency room noted to have fractures of all 3 regions.    Review of Systems:  Review of Systems   Unable to perform ROS: Dementia   Constitutional: Positive for activity change. Negative for chills and fever.   HENT: Negative for facial swelling.    Respiratory: Negative for apnea and shortness of breath.    Cardiovascular: Negative for chest pain and leg swelling.   Gastrointestinal: Negative for abdominal pain, nausea and vomiting.   Genitourinary: Negative for dysuria.   Musculoskeletal: Positive for arthralgias and gait problem.   Skin: Negative for color change.   Neurological: Negative for seizures and syncope.   Hematological: Negative for adenopathy.   Psychiatric/Behavioral: Negative for dysphoric mood.      Otherwise complete ROS is negative except as mentioned above.    History:  No Known Allergies    Prior to Admission medications    Medication Sig Start Date End Date Taking? Authorizing Provider   acetaminophen (TYLENOL) 325 MG tablet  Take 2 tablets by mouth Every 4 (Four) Hours As Needed for Mild Pain . 1/3/19  Yes Guanakito Cmaara MD   albuterol (PROVENTIL) (2.5 MG/3ML) 0.083% nebulizer solution Take 2.5 mg by nebulization Every 4 (Four) Hours As Needed for Shortness of Air. 1/3/19  Yes Guanakito Camara MD   amLODIPine (NORVASC) 5 MG tablet Take 1 tablet by mouth Daily. 1/4/19  Yes Guanakito Camara MD   aspirin 81 MG EC tablet Take 81 mg by mouth Daily.   Yes Pratima Mcmahon MD   Chlorhexidine Gluconate solution    Yes Pratima Mcmahon MD   Cholecalciferol 25 MCG (1000 UT) tablet Take 1 tablet by mouth Daily. 4/29/20 4/29/21 Yes Srinivasa Chairez III, MD   ferrous sulfate 324 (65 Fe) MG tablet delayed-release EC tablet Take 1 tablet by mouth Daily With Breakfast. 1/7/19  Yes Maricarmen Bell MD   melatonin 5 MG tablet tablet Take 5 mg by mouth Every Night.   Yes Pratima Mcmahon MD   Menthol-Zinc Oxide (Calmoseptine) 0.44-20.6 % ointment Apply  topically to the appropriate area as directed 2 (Two) Times a Day.   Yes Pratima Mcmahon MD   mirtazapine (REMERON) 7.5 MG half tablet Take 15 mg by mouth Every Night.   Yes Pratima Mcmahon MD   Multiple Vitamins-Minerals (I-HERNESTO) tablet tablet Take 1 tablet by mouth Daily. 4/29/20  Yes Srinivasa Chairez III, MD   nystatin (MYCOSTATIN) 496654 UNIT/GM powder Apply  topically to the appropriate area as directed As Needed (skin lesions). 4/29/20  Yes Srinivasa Chairez III, MD   polyethylene glycol (MIRALAX) pack packet Take 17 g by mouth Daily. 5/10/19  Yes Maricarmen Bell MD   rosuvastatin (CRESTOR) 5 MG tablet Take 1 tablet by mouth Daily. 5/31/19  Yes Margot Perez MD   sennosides-docusate sodium (SENOKOT-S) 8.6-50 MG tablet Take 2 tablets by mouth Every Night. 5/10/19  Yes Maricarmen Bell MD   sodium chloride 1 g tablet Take 1 tablet by mouth 3 (Three) Times a Day With Meals. 4/24/20  Yes Srinivasa Chairez III, MD   Flavoring Agent (FLAVOR  CONC-CHLORHEXIDINE) concentration Take 20 mL by mouth 3 (Three) Times a Day. 9/27/19   Chuckie Nguyen MD       Past Medical History:   Diagnosis Date   • Benign essential HTN 11/22/2019   • CKD (chronic kidney disease) stage 3, GFR 30-59 ml/min    • Constipation    • Dementia (CMS/HCC)    • Hyponatremia    • Hypovitaminosis D    • Iron deficiency anemia    • Major neurocognitive disorder (CMS/HCC)    • Stroke (CMS/HCC)        Past Surgical History:   Procedure Laterality Date   • CATARACT EXTRACTION         Social History     Socioeconomic History   • Marital status:      Spouse name: Not on file   • Number of children: 2   • Years of education: Not on file   • Highest education level: Bachelor's degree (e.g., BA, AB, BS)   Occupational History   • Occupation: retired nurse   Social Needs   • Financial resource strain: Not very hard   • Food insecurity     Worry: Never true     Inability: Never true   • Transportation needs     Medical: No     Non-medical: No   Tobacco Use   • Smoking status: Never Smoker   • Smokeless tobacco: Never Used   Substance and Sexual Activity   • Alcohol use: No     Frequency: Never   • Drug use: No   • Sexual activity: Not Currently   Lifestyle   • Physical activity     Days per week: 0 days     Minutes per session: 0 min   • Stress: Not at all   Relationships   • Social connections     Talks on phone: Not on file     Gets together: Not on file     Attends Restorationism service: Not on file     Active member of club or organization: No     Attends meetings of clubs or organizations: Never     Relationship status:        Family History   Problem Relation Age of Onset   • No Known Problems Mother    • No Known Problems Father    • Cancer Sister    • Cancer Brother        Objective     Vital Signs   Temp:  [97.9 °F (36.6 °C)-98.3 °F (36.8 °C)] 98.2 °F (36.8 °C)  Heart Rate:  [] 109  Resp:  [16-18] 18  BP: (107-182)/(60-82) 107/60    Physical Exam:  Physical  Exam  Vitals signs reviewed.   Constitutional:       Appearance: She is well-developed. She is not ill-appearing.   HENT:      Head: Normocephalic and atraumatic.   Eyes:      Pupils: Pupils are equal, round, and reactive to light.   Neck:      Musculoskeletal: Neck supple.   Pulmonary:      Effort: Pulmonary effort is normal.   Musculoskeletal:         General: Swelling, tenderness and deformity present.   Skin:     General: Skin is warm and dry.   Neurological:      Mental Status: She is disoriented.   Psychiatric:      Comments: History of dementia     Leg is shortened externally rotated.  Skin is intact.    Results Review:    Imaging Results (Last 24 Hours)     Procedure Component Value Units Date/Time    CT Pelvis Without Contrast [[id number]] Collected: 12/05/20 1729     Updated: 12/05/20 1811    Narrative:      PROCEDURE: CT PELVIS WO CONTRAST    CLINICAL INDICATION: Right hip fracture    COMPARISON STUDY: X-ray right hip dated 12/5/2020    TECHNIQUE: 3.0 mm axial images of the pelvis and right hip were  obtained without use of intravenous contrast. Sagittal and  coronal reformatted images were also provided.    Images acquired utilizing the principles of ALARA, dose as low as  reasonably achievable    DLP is 443.1    FINDINGS: Degenerative changes of the sacroiliac joints are  present with narrowing and sclerosis. A comminuted  intertrochanteric fracture of the right proximal femur is present  with mild overriding of fracture fragments and mild apex lateral  and superior angulation at fracture site. The lesser trochanter  appears to represent a separate fracture fragment. The femoral  head remains well-seated in the acetabulum. No other fracture or  malalignment is identified. Degenerative changes of visualized  portion of the lumbar spine are present with disc space narrowing  and facet arthropathy. There is minimal, grade 1 anterolisthesis  of L5 on S1 with vacuum disc phenomenon at this  level.    Atherosclerotic vascular calcification is present. Colonic  diverticulosis is noted. The uterus is atrophic. Moderate amount  stool seen in the rectum.      Impression:      Comminuted, intertrochanteric fracture of the right  femur. Please see full description above, as well as nonemergent  findings.    Electronically signed by:  Marine Briscoe MD  12/5/2020 6:10 PM CST  Workstation: [id number]    XR Femur 2 View Right [[id number]] Collected: 12/05/20 1713     Updated: 12/05/20 1807    Narrative:      PROCEDURE: XR FEMUR 2 VW RIGHT    VIEWS: 4    INDICATION: Pain    COMPARISON: None    FINDINGS:     - fracture: Comminuted intertrochanteric right femoral fracture    - alignment: Mild apex superior angulation at fracture site and  mild overriding of fracture fragments. The right femoral head  remains well-seated in the acetabulum    - misc: Age-related degenerative changes of hip. Subcutaneous  stranding is probably secondary to trauma, overlying the greater  trochanter of the hip          Impression:      Comminuted intertrochanteric fracture of the right  femur    Electronically signed by:  Marine Briscoe MD  12/5/2020 6:06 PM CST  Workstation: [id number]    XR Hip With or Without Pelvis 2 - 3 View Right [[id number]] Collected: 12/05/20 1620     Updated: 12/05/20 1702    Narrative:      PROCEDURE: XR HIP W OR WO PELVIS 2-3 VIEW RIGHT    VIEWS: 3    INDICATION: Pain    COMPARISON: None    FINDINGS:     - fracture: Comminuted intertrochanteric fracture of the  proximal right femur    - alignment: Mild impaction at fracture site and mild apex  lateral angulation. The right humeral head remains well-seated in  the acetabulum    - misc: Age-related degenerative changes of hips, sacroiliac  joints, and visualized portion of lumbar spine          Impression:      Comminuted trochanteric fracture of the proximal  right femur.    Note:  if pain or symptoms persist beyond reasonable expectations    and follow-up  imaging is anticipated,  cross sectional imaging   (CT and/or MRI) is suggested, as is deemed clinically   appropriate.    Electronically signed by:  Marine Briscoe MD  12/5/2020 5:01 PM CST  Workstation: [id number]    XR Chest 1 View [[id number]] Collected: 12/05/20 1621     Updated: 12/05/20 1658    Narrative:      PROCEDURE: XR CHEST 1 VW    VIEWS:Single    INDICATION: Fall, right arm pain    COMPARISON: CXR: 5/6/2020    FINDINGS:       - lines/tubes: None    - cardiac: Borderline size.    - mediastinum: Contour within normal limits.     - lungs: No evidence of a focal air space process. Mild chronic  appearing interstitial prominence. A calcified granuloma in the  left mediastinum is present, unchanged    - pleura: No evidence of  fluid.      - osseous: Known right proximal humeral fracture is  incompletely visualized      Impression:      No acute abnormality of the thorax. Proximal right humeral  fracture is incompletely visualized    Electronically signed by:  Marine Briscoe MD  12/5/2020 4:56 PM CST  Workstation: [id number]    XR Shoulder 2+ View Right [[id number]] Collected: 12/05/20 1620     Updated: 12/05/20 1656    Narrative:      PROCEDURE: XR SHOULDER 2+ VW RIGHT    VIEWS:   2     INDICATION: Pain    COMPARISON: None    FINDINGS:     -Deformity of the humeral neck consistent with fracture, with  apex superior and lateral angulation, and some impaction at the  fracture site. The humeral head remains well-seated in the  glenoid. Degenerative changes of the acromioclavicular joint are  present which is normally aligned          Impression:      Mildly displaced humeral neck fracture with some  impaction fracture site    Electronically signed by:  Marine Briscoe MD  12/5/2020 4:55 PM CST  Workstation: [id number]    XR Forearm 2 View Right [[id number]] Collected: 12/05/20 1620     Updated: 12/05/20 1655    Narrative:      PROCEDURE: XR FOREARM 2 VW RIGHT    VIEWS: 2    INDICATION: Pain fall    COMPARISON:  None    FINDINGS:     - fracture: Tiny fracture of the distal radius with  intra-articular extension. There is also a small fracture  fragment at the tip of the ulnar styloid    - alignment: Keota palmar angulation at fracture site    - misc: Soft tissue swelling overlies the fractures          Impression:      Distal radial and suspected ulnar styloid tip  fractures as above    Electronically signed by:  Marine Briscoe MD  12/5/2020 4:54 PM CST  Workstation: [id number]    XR Humerus Right [[id number]] Collected: 12/05/20 1621     Updated: 12/05/20 1654    Narrative:      PROCEDURE: XR HUMERUS RIGHT    VIEWS: 2    INDICATION: Pain    COMPARISON: None    FINDINGS:     - fracture: Mildly comminuted fracture of the humeral neck    - alignment: Keota lateral angulation at fracture site    - misc: No significant soft tissue abnormality identified. The  acromioclavicular joint is normally aligned.      Impression:      Mildly displaced fracture of the humeral neck as  above    Electronically signed by:  Marine Briscoe MD  12/5/2020 4:52 PM CST  Workstation: [id number]    XR Wrist 3+ View Right [[id number]] Collected: 12/05/20 1621     Updated: 12/05/20 1643    Narrative:      PROCEDURE: XR WRIST 3+ VW RIGHT    VIEWS: 3    INDICATION: Pain, fall    COMPARISON: None    FINDINGS:     - fracture: Comminuted fracture of the distal radius with  intra-articular extension. There may also be a fracture of the  tip of the ulnar styloid.    - alignment: Keota palmar angulation at the radial fracture  site. Minimal displacement of the presumed ulnar styloid fracture    - misc: Soft tissue swelling overlies the fracture          Impression:        1.Fracture of the distal radius with apex palmar angulation and  extension into the radiocarpal joints.  2. There may be a tiny nondisplaced fracture of the tip of the  ulnar styloid    Electronically signed by:  Marine Briscoe MD  12/5/2020 4:41 PM CST  Workstation: [id number]    CT Cervical Spine  Without Contrast [[id number]] Collected: 12/05/20 1503     Updated: 12/05/20 1554    Narrative:      PROCEDURE: CT CERVICAL SPINE WO CONTRAST    INDICATION:  Trauma/pain    HISTORY:  Fall    COMPARISON:  5/31/2019    TECHNIQUE:    - reconstructions: axial, coronal, sagittal    - contrast:  oral:  no                       intravenous:none     This exam was performed according to our departmental  dose-optimization program, which includes automated exposure  control, adjustment of the mA and/or kV according to patient size  and/or use of iterative reconstruction technique.  DLP is 245.0    COMMENTS:    Fracture:  No evidence of fracture  Alignment:   normal spinous alignment  Canal caliber:  Unremarkable  Focal bone lesion(s):  None visualized  Soft tissue:   Unremarkable  Misc:  Age related degenerative changes with diffuse facet  arthropathy.  Sclerotic lesion in the C2 vertebral body is similar previous  study, probably represents a benign enostosis        Impression:      No evidence of acute traumatic osseous injury.  Degenerative changes as above.      If pain or symptoms persist beyond reasonable expectations, a  follow up radiographic and/or MRI examination is suggested, as is  deemed clinically indicated.    Electronically signed by:  Marine Briscoe MD  12/5/2020 3:53 PM CST  Workstation: [id number]    CT Head Without Contrast [[id number]] Collected: 12/05/20 1503     Updated: 12/05/20 1549    Narrative:      PROCEDURE: CT HEAD WO CONTRAST    INDICATION:  Fall    COMPARISON:  5/6/2020    TECHNIQUE:  CT head, without iv contrast.       This exam was performed according to our departmental  dose-optimization program, which includes automated exposure  control, adjustment of the mA and/or kV according to patient size  and/or use of iterative reconstruction technique.  DLP is 949.6    FINDINGS:    The degree of cerebral atrophy is within normal limits for age.    There is preservation of the gray-white matter  differentiation.     Acute or infarct in the right thalamus. There is also an area of  encephalomalacia which is stable in the right medial occipital  lobe.    There are age related degenerative cortical and deep white matter  changes.    There is no evidence of a hemorrhage or intracranial mass.    There is no midline shift.    The ventricles are normal in size and configuration.    The brain stem, cerebellum, globes, and nasal sinuses are within  normal limits. 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 This is unchanged from prior study      Impression:      1. No acute intracranial abnormality.  2. Old area of encephalomalacia in the right medial occipital  lobe and lacunar infarct in the right thalamus.  3. Expansile lesion in the right maxilla, similar to the previous  study    If pain or symptoms persist beyond reasonable expectations, an  MRI examination is suggested as is deemed clinically appropriate.    Electronically signed by:  Marine Briscoe MD  12/5/2020 3:48 PM CST  Workstation: [id number]          Lab Results (last 24 hours)     Procedure Component Value Units Date/Time    Basic Metabolic Panel [[id number]]  (Abnormal) Collected: 12/06/20 0452    Specimen: Blood Updated: 12/06/20 0541     Glucose 160 mg/dL      BUN 29 mg/dL      Creatinine 1.32 mg/dL      Sodium 136 mmol/L      Potassium 5.6 mmol/L      Comment: Specimen hemolyzed.  Results may be affected.        Chloride 104 mmol/L      CO2 22.0 mmol/L      Calcium 7.9 mg/dL      eGFR Non African Amer 38 mL/min/1.73      BUN/Creatinine Ratio 22.0     Anion Gap 10.0 mmol/L     Narrative:      GFR Normal >60  Chronic Kidney Disease <60  Kidney Failure <15      Ferritin [[id number]]  (Abnormal) Collected: 12/06/20 0452    Specimen: Blood Updated: 12/06/20 0540     Ferritin 173.20 ng/mL     Narrative:      Results may be falsely decreased if patient taking Biotin.      CBC & Differential [[id number]]  (Abnormal) Collected: 12/06/20 0452     Specimen: Blood Updated: 12/06/20 0509    Narrative:      The following orders were created for panel order CBC & Differential.  Procedure                               Abnormality         Status                     ---------                               -----------         ------                     Scan Slide[[id number]]                                                                  CBC Auto Differential[[id number]]        Abnormal            Final result                 Please view results for these tests on the individual orders.    CBC Auto Differential [[id number]]  (Abnormal) Collected: 12/06/20 0452    Specimen: Blood Updated: 12/06/20 0508     WBC 15.80 10*3/mm3      RBC 2.86 10*6/mm3      Hemoglobin 9.0 g/dL      Hematocrit 27.6 %      MCV 96.5 fL      MCH 31.5 pg      MCHC 32.6 g/dL      RDW 13.6 %      RDW-SD 48.2 fl      MPV 10.7 fL      Platelets 175 10*3/mm3      Neutrophil % 78.7 %      Lymphocyte % 12.2 %      Monocyte % 5.7 %      Eosinophil % 0.6 %      Basophil % 0.5 %      Immature Grans % 2.3 %      Neutrophils, Absolute 12.42 10*3/mm3      Lymphocytes, Absolute 1.93 10*3/mm3      Monocytes, Absolute 0.90 10*3/mm3      Eosinophils, Absolute 0.10 10*3/mm3      Basophils, Absolute 0.08 10*3/mm3      Immature Grans, Absolute 0.37 10*3/mm3      nRBC 0.0 /100 WBC     Iron Profile [[id number]]  (Abnormal) Collected: 12/05/20 1423    Specimen: Blood Updated: 12/06/20 0043     Iron 65 mcg/dL      Iron Saturation 23 %      Transferrin 188 mg/dL      TIBC 280 mcg/dL     COVID PRE-OP / PRE-PROCEDURE SCREENING ORDER (NO ISOLATION) - Swab, Nasopharynx [[id number]]  (Normal) Collected: 12/05/20 1847    Specimen: Swab from Nasopharynx Updated: 12/05/20 1512    Narrative:      The following orders were created for panel order COVID PRE-OP / PRE-PROCEDURE SCREENING ORDER (NO ISOLATION) - Swab, Nasopharynx.  Procedure                               Abnormality         Status                     ---------                                -----------         ------                     COVID-19, [facility]...[[id number]]  Normal              Final result                 Please view results for these tests on the individual orders.    COVID-19, [facility] IN-HOUSE, NP SWAB IN TRANSPORT MEDIA 8-10 HR TAT - Swab, Nasopharynx [[id number]]  (Normal) Collected: 12/05/20 1847    Specimen: Swab from Nasopharynx Updated: 12/05/20 2142     COVID19 Not Detected    Narrative:      Testing performed by Real Time RT-PCR  This test has not been approved by the [facility] but is authorized under the Emergency Use Act (EUA)    Fact sheet for providers: https://www.fda.gov/media/942679/download    Fact sheet for patients: https://www.fda.gov/media/352238/download        Protime-INR [[id number]]  (Normal) Collected: 12/05/20 1423    Specimen: Blood Updated: 12/05/20 1712     Protime 13.0 Seconds      INR 0.95    Narrative:      Therapeutic range for most indications is 2.0-3.0 INR,  or 2.5-3.5 for mechanical heart valves.    Extra Tubes [[id number]] Collected: 12/05/20 1423    Specimen: Blood Updated: 12/05/20 1530    Narrative:      The following orders were created for panel order Extra Tubes.  Procedure                               Abnormality         Status                     ---------                               -----------         ------                     Light Blue Top[[id number]]                                   Final result               Gold Top - SST[[id number]]                                   Final result                 Please view results for these tests on the individual orders.    Light Blue Top [[id number]] Collected: 12/05/20 1423    Specimen: Blood Updated: 12/05/20 1530     Extra Tube hold for add-on     Comment: Auto resulted       Gold Top - SST [[id number]] Collected: 12/05/20 1423    Specimen: Blood Updated: 12/05/20 1530     Extra Tube Hold for add-ons.     Comment: Auto resulted.       Basic Metabolic Panel [[id number]]   (Abnormal) Collected: 12/05/20 1422    Specimen: Blood Updated: 12/05/20 1451     Glucose 114 mg/dL      BUN 27 mg/dL      Creatinine 1.40 mg/dL      Sodium 139 mmol/L      Potassium 4.0 mmol/L      Chloride 104 mmol/L      CO2 25.0 mmol/L      Calcium 8.6 mg/dL      eGFR Non African Amer 35 mL/min/1.73      BUN/Creatinine Ratio 19.3     Anion Gap 10.0 mmol/L     Narrative:      GFR Normal >60  Chronic Kidney Disease <60  Kidney Failure <15      CBC & Differential [[id number]]  (Abnormal) Collected: 12/05/20 1422    Specimen: Blood Updated: 12/05/20 1426    Narrative:      The following orders were created for panel order CBC & Differential.  Procedure                               Abnormality         Status                     ---------                               -----------         ------                     CBC Auto Differential[[id number]]        Abnormal            Final result                 Please view results for these tests on the individual orders.    CBC Auto Differential [[id number]]  (Abnormal) Collected: 12/05/20 1422    Specimen: Blood Updated: 12/05/20 1426     WBC 12.15 10*3/mm3      RBC 3.30 10*6/mm3      Hemoglobin 10.5 g/dL      Hematocrit 32.3 %      MCV 97.9 fL      MCH 31.8 pg      MCHC 32.5 g/dL      RDW 13.6 %      RDW-SD 48.4 fl      MPV 9.9 fL      Platelets 225 10*3/mm3      Neutrophil % 60.5 %      Lymphocyte % 28.9 %      Monocyte % 5.9 %      Eosinophil % 1.6 %      Basophil % 0.5 %      Immature Grans % 2.6 %      Neutrophils, Absolute 7.35 10*3/mm3      Lymphocytes, Absolute 3.51 10*3/mm3      Monocytes, Absolute 0.72 10*3/mm3      Eosinophils, Absolute 0.20 10*3/mm3      Basophils, Absolute 0.06 10*3/mm3      Immature Grans, Absolute 0.31 10*3/mm3      nRBC 0.0 /100 WBC            I reviewed the patient's new clinical results.      Closed nondisplaced intertrochanteric fracture of right femur (CMS/HCC)    CKD (chronic kidney disease) stage 3, GFR 30-59 ml/min    Anemia     Constipation    Benign essential HTN    Closed displaced fracture of surgical neck of right humerus    Closed fracture of right wrist    DOUGLAS (acute kidney injury) (CMS/HCC)    Leukocytosis      Assessment:  Assessment/Plan       Plan:  At this point I talked to the daughter I think the humeral fracture we treated conservatively.  We will reduce close reduction the right wrist and placed in a splint.  Gamma nail for the right hip.  We discussed with the risk benefits of surgery including but not limited to bleeding, blood clot, infection, hardware failure, need for further surgery, infection, neurovascular injury, fracture, malunion, nonunion, medical anesthetic complications, etc.  She understands willing proceed as planned after detailed informed consent is given.    I discussed the patients findings and my recommendations with: Nursing staff and the patient's daughter who is her power of     Baudilio Brooks MD  12/06/20  06:37 CST    Time: 45 minutes

## 2020-12-06 NOTE — PROGRESS NOTES
My understanding is that patient is demented but an ambulator. Right IT fx will fix tomorrow with closed reduction of wrist. Humerus will be no operative.  Will perform full consult in am, and discuss with family.

## 2020-12-06 NOTE — ANESTHESIA POSTPROCEDURE EVALUATION
Patient: Marlene Horne    Procedure Summary     Date: 12/06/20 Room / Location: [facility] OR  / [facility] OR    Anesthesia Start: 0708 Anesthesia Stop: 0846    Procedures:       INTERMEDULLARY NAILING OF RIGHT FEMUR USING GAMMA NAIL AND FLUOROSCOPIC ASSITANCE (Right Hip)      CLOSED REDUCTION AND SPLINTING OF RIGHT WRIST WITH FLUOROSCOPIC ASSISTANCE (Right Wrist) Diagnosis:       Closed nondisplaced intertrochanteric fracture of right femur, initial encounter (CMS/[facility])      Closed displaced fracture of surgical neck of right humerus, unspecified fracture morphology, initial encounter      (Closed nondisplaced intertrochanteric fracture of right femur, initial encounter (CMS/[facility]) [S72.144A])      (Closed displaced fracture of surgical neck of right humerus, unspecified fracture morphology, initial encounter [S42.211A])    Surgeon: Baudilio Brooks MD Provider: Damián Reynolds MD    Anesthesia Type: general ASA Status: 3          Anesthesia Type: general    Vitals  Vitals Value Taken Time   /56 12/06/20 0904   Temp 98.2 °F (36.8 °C) 12/06/20 0834   Pulse 95 12/06/20 0904   Resp 16 12/06/20 0904   SpO2 96 % 12/06/20 0904           Post Anesthesia Care and Evaluation    Patient location during evaluation: PACU  Patient participation: complete - patient cannot participate  Level of consciousness: awake and awake and alert  Pain score: 3  Pain management: adequate  Airway patency: patent  Anesthetic complications: No anesthetic complications  PONV Status: none  Cardiovascular status: acceptable and stable  Respiratory status: acceptable, spontaneous ventilation and nasal cannula  Hydration status: acceptable

## 2020-12-06 NOTE — H&P
HISTORY AND PHYSICAL  NAME: Marlene Horne  : 1931  MRN: [id number]    DATE OF ADMISSION:  2020     DATE & TIME SEEN: 20 at 1835    PCP: Chuckie Nguyen MD    CODE STATUS: DNR and DNI    CHIEF COMPLAINT:  Fall    HPI:  Marlene Horne is a 89 y.o. female with a CMH of major neurocognitive disorder, iron deficiency anemia, chronic kidney disease stage III, and hypertension who was walking down hallway at [facility] where she is admitted without her walker and witnesses say she got weak, subsequently collapsing on her right side without loss of consciousness or head trauma. She was complaining of right wrist and hip pain so was transferred to [facility] ED.    In the ED   Vital signs are significant for hypertension of 182/82, labs showed creatinine elevated 1.4 went to her usual baseline of around 1, white count of 12.15, hemoglobin of 10.5.  Chest x-ray unremarkable, XR shoulder and humerus showed mildly displaced humeral neck fracture with some impaction at fracture site, x-ray hip and CT pelvis showed comminuted intertrochanteric fracture of the proximal right femur, XR wrist showed distal radial and suspected ulnar styloid tip fracture, CT head showed no acute intracranial abnormality CT cervical spine showed no evidence of acute traumatic osseous injury.  Orthopedics was consulted, right wrist plate was placed, and will see tomorrow morning    CONCURRENT MEDICAL HISTORY:  Past Medical History:   Diagnosis Date   • Benign essential HTN 2019   • CKD (chronic kidney disease) stage 3, GFR 30-59 ml/min    • Constipation    • Dementia (CMS/HCC)    • Hyponatremia    • Hypovitaminosis D    • Iron deficiency anemia    • Major neurocognitive disorder (CMS/HCC)    • Stroke (CMS/HCC)        PAST SURGICAL HISTORY:  Past Surgical History:   Procedure Laterality Date   • CATARACT EXTRACTION         FAMILY HISTORY:  Family History   Problem Relation Age of Onset   • No  Known Problems Mother    • No Known Problems Father    • Cancer Sister    • Cancer Brother         SOCIAL HISTORY:  Social History     Socioeconomic History   • Marital status:      Spouse name: Not on file   • Number of children: 2   • Years of education: Not on file   • Highest education level: Bachelor's degree (e.g., BA, AB, BS)   Occupational History   • Occupation: retired nurse   Social Needs   • Financial resource strain: Not very hard   • Food insecurity     Worry: Never true     Inability: Never true   • Transportation needs     Medical: No     Non-medical: No   Tobacco Use   • Smoking status: Never Smoker   • Smokeless tobacco: Never Used   Substance and Sexual Activity   • Alcohol use: No     Frequency: Never   • Drug use: No   • Sexual activity: Not Currently   Lifestyle   • Physical activity     Days per week: 0 days     Minutes per session: 0 min   • Stress: Not at all   Relationships   • Social connections     Talks on phone: Not on file     Gets together: Not on file     Attends Protestant service: Not on file     Active member of club or organization: No     Attends meetings of clubs or organizations: Never     Relationship status:        HOME MEDICATIONS:  Prior to Admission medications    Medication Sig Start Date End Date Taking? Authorizing Provider   acetaminophen (TYLENOL) 325 MG tablet Take 2 tablets by mouth Every 4 (Four) Hours As Needed for Mild Pain . 1/3/19  Yes Guanakito Camara MD   albuterol (PROVENTIL) (2.5 MG/3ML) 0.083% nebulizer solution Take 2.5 mg by nebulization Every 4 (Four) Hours As Needed for Shortness of Air. 1/3/19  Yes Guanakito Camara MD   amLODIPine (NORVASC) 5 MG tablet Take 1 tablet by mouth Daily. 1/4/19  Yes Guanakito Camara MD   aspirin 81 MG EC tablet Take 81 mg by mouth Daily.   Yes ProviderPratima MD   Chlorhexidine Gluconate solution    Yes Provider, MD Pratima   Cholecalciferol 25 MCG (1000 UT) tablet Take 1 tablet by mouth  Daily. 4/29/20 4/29/21 Yes Srinivasa Chairez III, MD   ferrous sulfate 324 (65 Fe) MG tablet delayed-release EC tablet Take 1 tablet by mouth Daily With Breakfast. 1/7/19  Yes Maricarmen Bell MD   melatonin 5 MG tablet tablet Take 5 mg by mouth Every Night.   Yes Pratima Mcmahon MD   Menthol-Zinc Oxide (Calmoseptine) 0.44-20.6 % ointment Apply  topically to the appropriate area as directed 2 (Two) Times a Day.   Yes Pratima Mcmahon MD   mirtazapine (REMERON) 7.5 MG half tablet Take 15 mg by mouth Every Night.   Yes Pratima Mcmahon MD   Multiple Vitamins-Minerals (I-HERNESTO) tablet tablet Take 1 tablet by mouth Daily. 4/29/20  Yes Srinivasa Chairez III, MD   nystatin (MYCOSTATIN) 441792 UNIT/GM powder Apply  topically to the appropriate area as directed As Needed (skin lesions). 4/29/20  Yes Srinivasa Chairez III, MD   polyethylene glycol (MIRALAX) pack packet Take 17 g by mouth Daily. 5/10/19  Yes Maricarmen Bell MD   rosuvastatin (CRESTOR) 5 MG tablet Take 1 tablet by mouth Daily. 5/31/19  Yes Margot Perez MD   sennosides-docusate sodium (SENOKOT-S) 8.6-50 MG tablet Take 2 tablets by mouth Every Night. 5/10/19  Yes Maricarmen Bell MD   sodium chloride 1 g tablet Take 1 tablet by mouth 3 (Three) Times a Day With Meals. 4/24/20  Yes Srinivasa Chairez III, MD   Flavoring Agent (FLAVOR CONC-CHLORHEXIDINE) concentration Take 20 mL by mouth 3 (Three) Times a Day. 9/27/19   Chuckie Nguyen MD   aspirin 81 MG tablet Take 1 tablet by mouth Daily. 6/1/19 12/5/20  Malu Love MD   predniSONE (DELTASONE) 10 MG tablet Take 1 tablet by mouth Daily. 8/26/20 12/5/20  Srinivasa Chairez III, MD       ALLERGIES:  Patient has no known allergies.    REVIEW OF SYSTEMS  Review of Systems   Reason unable to perform ROS: difficult as patients at baseline is mildly altered    Constitutional: Negative for diaphoresis and fever.   HENT: Negative for congestion, rhinorrhea and sore  throat.    Respiratory: Negative for cough and shortness of breath.    Cardiovascular: Negative for chest pain and leg swelling.   Gastrointestinal: Negative for abdominal pain and nausea.   Musculoskeletal: Positive for arthralgias and myalgias.        Pain in right wrist   Skin: Negative for color change and pallor.   Neurological: Positive for weakness. Negative for dizziness.   Psychiatric/Behavioral: Negative for agitation and behavioral problems.       PHYSICAL EXAM:  Temp:  [97.9 °F (36.6 °C)-98.3 °F (36.8 °C)] 97.9 °F (36.6 °C)  Heart Rate:  [65-92] 92  Resp:  [16-18] 18  BP: (136-182)/(65-82) 160/77  Body mass index is 28.82 kg/m².  Physical Exam  Constitutional:       General: She is not in acute distress.     Appearance: She is well-developed.   HENT:      Head: Normocephalic and atraumatic.      Right Ear: External ear normal.      Left Ear: External ear normal.      Nose: Nose normal.   Eyes:      Extraocular Movements: Extraocular movements intact.      Pupils: Pupils are equal, round, and reactive to light.   Neck:      Musculoskeletal: Normal range of motion and neck supple.   Cardiovascular:      Rate and Rhythm: Normal rate. Rhythm irregularly irregular.      Pulses:           Radial pulses are 1+ on the right side and 1+ on the left side.        Dorsalis pedis pulses are 1+ on the right side and 1+ on the left side.      Heart sounds: Normal heart sounds. No murmur. No friction rub. No gallop.    Pulmonary:      Effort: Pulmonary effort is normal. No respiratory distress.      Breath sounds: Normal breath sounds. No wheezing or rales.   Abdominal:      General: Bowel sounds are normal. There is no distension.      Palpations: Abdomen is soft.      Tenderness: There is no abdominal tenderness.   Musculoskeletal:      Right lower leg: No edema.      Left lower leg: No edema.      Comments: Patient non-compliant but able to move fingers of right hand and toes of right foot with preserved sensation in  both sites    Right foot shortened and externally rotated as compared to left   Skin:     General: Skin is warm.      Findings: No erythema.   Neurological:      Mental Status: She is alert and oriented to person, place, and time. Mental status is at baseline.   Psychiatric:         Mood and Affect: Mood normal.         Behavior: Behavior normal.         DIAGNOSTIC DATA:   Lab Results (last 24 hours)     Procedure Component Value Units Date/Time    COVID PRE-OP / PRE-PROCEDURE SCREENING ORDER (NO ISOLATION) - Swab, Nasopharynx [[id number]]  (Normal) Collected: 12/05/20 1847    Specimen: Swab from Nasopharynx Updated: 12/05/20 2142    Narrative:      The following orders were created for panel order COVID PRE-OP / PRE-PROCEDURE SCREENING ORDER (NO ISOLATION) - Swab, Nasopharynx.  Procedure                               Abnormality         Status                     ---------                               -----------         ------                     COVID-19, [facility] IN-HOUS...[[id number]]  Normal              Final result                 Please view results for these tests on the individual orders.    COVID-19, [facility] IN-HOUSE, NP SWAB IN TRANSPORT MEDIA 8-10 HR TAT - Swab, Nasopharynx [[id number]]  (Normal) Collected: 12/05/20 1847    Specimen: Swab from Nasopharynx Updated: 12/05/20 2142     COVID19 Not Detected    Narrative:      Testing performed by Real Time RT-PCR  This test has not been approved by the [facility] but is authorized under the Emergency Use Act (EUA)    Fact sheet for providers: https://www.fda.gov/media/767133/download    Fact sheet for patients: https://www.fda.gov/media/200437/download        Protime-INR [[id number]]  (Normal) Collected: 12/05/20 1423    Specimen: Blood Updated: 12/05/20 1712     Protime 13.0 Seconds      INR 0.95    Narrative:      Therapeutic range for most indications is 2.0-3.0 INR,  or 2.5-3.5 for mechanical heart valves.    Extra Tubes [[id number]] Collected: 12/05/20 1423     Specimen: Blood Updated: 12/05/20 1530    Narrative:      The following orders were created for panel order Extra Tubes.  Procedure                               Abnormality         Status                     ---------                               -----------         ------                     Light Blue Top[[id number]]                                   Final result               Gold Top - SST[[id number]]                                   Final result                 Please view results for these tests on the individual orders.    Light Blue Top [[id number]] Collected: 12/05/20 1423    Specimen: Blood Updated: 12/05/20 1530     Extra Tube hold for add-on     Comment: Auto resulted       Gold Top - SST [[id number]] Collected: 12/05/20 1423    Specimen: Blood Updated: 12/05/20 1530     Extra Tube Hold for add-ons.     Comment: Auto resulted.       Basic Metabolic Panel [[id number]]  (Abnormal) Collected: 12/05/20 1422    Specimen: Blood Updated: 12/05/20 1451     Glucose 114 mg/dL      BUN 27 mg/dL      Creatinine 1.40 mg/dL      Sodium 139 mmol/L      Potassium 4.0 mmol/L      Chloride 104 mmol/L      CO2 25.0 mmol/L      Calcium 8.6 mg/dL      eGFR Non African Amer 35 mL/min/1.73      BUN/Creatinine Ratio 19.3     Anion Gap 10.0 mmol/L     Narrative:      GFR Normal >60  Chronic Kidney Disease <60  Kidney Failure <15      CBC & Differential [[id number]]  (Abnormal) Collected: 12/05/20 1422    Specimen: Blood Updated: 12/05/20 1426    Narrative:      The following orders were created for panel order CBC & Differential.  Procedure                               Abnormality         Status                     ---------                               -----------         ------                     CBC Auto Differential[[id number]]        Abnormal            Final result                 Please view results for these tests on the individual orders.    CBC Auto Differential [[id number]]  (Abnormal) Collected: 12/05/20 1422     Specimen: Blood Updated: 12/05/20 1426     WBC 12.15 10*3/mm3      RBC 3.30 10*6/mm3      Hemoglobin 10.5 g/dL      Hematocrit 32.3 %      MCV 97.9 fL      MCH 31.8 pg      MCHC 32.5 g/dL      RDW 13.6 %      RDW-SD 48.4 fl      MPV 9.9 fL      Platelets 225 10*3/mm3      Neutrophil % 60.5 %      Lymphocyte % 28.9 %      Monocyte % 5.9 %      Eosinophil % 1.6 %      Basophil % 0.5 %      Immature Grans % 2.6 %      Neutrophils, Absolute 7.35 10*3/mm3      Lymphocytes, Absolute 3.51 10*3/mm3      Monocytes, Absolute 0.72 10*3/mm3      Eosinophils, Absolute 0.20 10*3/mm3      Basophils, Absolute 0.06 10*3/mm3      Immature Grans, Absolute 0.31 10*3/mm3      nRBC 0.0 /100 WBC            Imaging Results (Last 24 Hours)     Procedure Component Value Units Date/Time    CT Pelvis Without Contrast [[id number]] Collected: 12/05/20 1729     Updated: 12/05/20 1811    Narrative:      PROCEDURE: CT PELVIS WO CONTRAST    CLINICAL INDICATION: Right hip fracture    COMPARISON STUDY: X-ray right hip dated 12/5/2020    TECHNIQUE: 3.0 mm axial images of the pelvis and right hip were  obtained without use of intravenous contrast. Sagittal and  coronal reformatted images were also provided.    Images acquired utilizing the principles of ALARA, dose as low as  reasonably achievable    DLP is 443.1    FINDINGS: Degenerative changes of the sacroiliac joints are  present with narrowing and sclerosis. A comminuted  intertrochanteric fracture of the right proximal femur is present  with mild overriding of fracture fragments and mild apex lateral  and superior angulation at fracture site. The lesser trochanter  appears to represent a separate fracture fragment. The femoral  head remains well-seated in the acetabulum. No other fracture or  malalignment is identified. Degenerative changes of visualized  portion of the lumbar spine are present with disc space narrowing  and facet arthropathy. There is minimal, grade 1 anterolisthesis  of L5 on S1  with vacuum disc phenomenon at this level.    Atherosclerotic vascular calcification is present. Colonic  diverticulosis is noted. The uterus is atrophic. Moderate amount  stool seen in the rectum.      Impression:      Comminuted, intertrochanteric fracture of the right  femur. Please see full description above, as well as nonemergent  findings.    Electronically signed by:  Marine Briscoe MD  12/5/2020 6:10 PM CST  Workstation: [id number]    XR Femur 2 View Right [[id number]] Collected: 12/05/20 1713     Updated: 12/05/20 1807    Narrative:      PROCEDURE: XR FEMUR 2 VW RIGHT    VIEWS: 4    INDICATION: Pain    COMPARISON: None    FINDINGS:     - fracture: Comminuted intertrochanteric right femoral fracture    - alignment: Mild apex superior angulation at fracture site and  mild overriding of fracture fragments. The right femoral head  remains well-seated in the acetabulum    - misc: Age-related degenerative changes of hip. Subcutaneous  stranding is probably secondary to trauma, overlying the greater  trochanter of the hip          Impression:      Comminuted intertrochanteric fracture of the right  femur    Electronically signed by:  Marine Briscoe MD  12/5/2020 6:06 PM CST  Workstation: [id number]    XR Hip With or Without Pelvis 2 - 3 View Right [[id number]] Collected: 12/05/20 1620     Updated: 12/05/20 1702    Narrative:      PROCEDURE: XR HIP W OR WO PELVIS 2-3 VIEW RIGHT    VIEWS: 3    INDICATION: Pain    COMPARISON: None    FINDINGS:     - fracture: Comminuted intertrochanteric fracture of the  proximal right femur    - alignment: Mild impaction at fracture site and mild apex  lateral angulation. The right humeral head remains well-seated in  the acetabulum    - misc: Age-related degenerative changes of hips, sacroiliac  joints, and visualized portion of lumbar spine          Impression:      Comminuted trochanteric fracture of the proximal  right femur.    Note:  if pain or symptoms persist beyond reasonable  expectations    and follow-up imaging is anticipated,  cross sectional imaging   (CT and/or MRI) is suggested, as is deemed clinically   appropriate.    Electronically signed by:  Marine Briscoe MD  12/5/2020 5:01 PM CST  Workstation: [id number]    XR Chest 1 View [[id number]] Collected: 12/05/20 1621     Updated: 12/05/20 1658    Narrative:      PROCEDURE: XR CHEST 1 VW    VIEWS:Single    INDICATION: Fall, right arm pain    COMPARISON: CXR: 5/6/2020    FINDINGS:       - lines/tubes: None    - cardiac: Borderline size.    - mediastinum: Contour within normal limits.     - lungs: No evidence of a focal air space process. Mild chronic  appearing interstitial prominence. A calcified granuloma in the  left mediastinum is present, unchanged    - pleura: No evidence of  fluid.      - osseous: Known right proximal humeral fracture is  incompletely visualized      Impression:      No acute abnormality of the thorax. Proximal right humeral  fracture is incompletely visualized    Electronically signed by:  Marine Briscoe MD  12/5/2020 4:56 PM CST  Workstation: [id number]    XR Shoulder 2+ View Right [[id number]] Collected: 12/05/20 1620     Updated: 12/05/20 1656    Narrative:      PROCEDURE: XR SHOULDER 2+ VW RIGHT    VIEWS:   2     INDICATION: Pain    COMPARISON: None    FINDINGS:     -Deformity of the humeral neck consistent with fracture, with  apex superior and lateral angulation, and some impaction at the  fracture site. The humeral head remains well-seated in the  glenoid. Degenerative changes of the acromioclavicular joint are  present which is normally aligned          Impression:      Mildly displaced humeral neck fracture with some  impaction fracture site    Electronically signed by:  Marine Briscoe MD  12/5/2020 4:55 PM CST  Workstation: [id number]    XR Forearm 2 View Right [[id number]] Collected: 12/05/20 1620     Updated: 12/05/20 1655    Narrative:      PROCEDURE: XR FOREARM 2 VW RIGHT    VIEWS: 2    INDICATION:  Pain fall    COMPARISON: None    FINDINGS:     - fracture: Tiny fracture of the distal radius with  intra-articular extension. There is also a small fracture  fragment at the tip of the ulnar styloid    - alignment: [place] palmar angulation at fracture site    - misc: Soft tissue swelling overlies the fractures          Impression:      Distal radial and suspected ulnar styloid tip  fractures as above    Electronically signed by:  Marine Briscoe MD  12/5/2020 4:54 PM CST  Workstation: [id number]    XR Humerus Right [[id number]] Collected: 12/05/20 1621     Updated: 12/05/20 1654    Narrative:      PROCEDURE: XR HUMERUS RIGHT    VIEWS: 2    INDICATION: Pain    COMPARISON: None    FINDINGS:     - fracture: Mildly comminuted fracture of the humeral neck    - alignment: [place] lateral angulation at fracture site    - misc: No significant soft tissue abnormality identified. The  acromioclavicular joint is normally aligned.      Impression:      Mildly displaced fracture of the humeral neck as  above    Electronically signed by:  Marine Briscoe MD  12/5/2020 4:52 PM CST  Workstation: [id number]    XR Wrist 3+ View Right [[id number]] Collected: 12/05/20 1621     Updated: 12/05/20 1643    Narrative:      PROCEDURE: XR WRIST 3+ VW RIGHT    VIEWS: 3    INDICATION: Pain, fall    COMPARISON: None    FINDINGS:     - fracture: Comminuted fracture of the distal radius with  intra-articular extension. There may also be a fracture of the  tip of the ulnar styloid.    - alignment: [place] palmar angulation at the radial fracture  site. Minimal displacement of the presumed ulnar styloid fracture    - misc: Soft tissue swelling overlies the fracture          Impression:        1.Fracture of the distal radius with apex palmar angulation and  extension into the radiocarpal joints.  2. There may be a tiny nondisplaced fracture of the tip of the  ulnar styloid    Electronically signed by:  Marine Briscoe MD  12/5/2020 4:41 PM CST  Workstation:  [id number]    CT Cervical Spine Without Contrast [[id number]] Collected: 12/05/20 1503     Updated: 12/05/20 1554    Narrative:      PROCEDURE: CT CERVICAL SPINE WO CONTRAST    INDICATION:  Trauma/pain    HISTORY:  Fall    COMPARISON:  5/31/2019    TECHNIQUE:    - reconstructions: axial, coronal, sagittal    - contrast:  oral:  no                       intravenous:none     This exam was performed according to our departmental  dose-optimization program, which includes automated exposure  control, adjustment of the mA and/or kV according to patient size  and/or use of iterative reconstruction technique.  DLP is 245.0    COMMENTS:    Fracture:  No evidence of fracture  Alignment:   normal spinous alignment  [place] caliber:  Unremarkable  Focal bone lesion(s):  None visualized  Soft tissue:   Unremarkable  Misc:  Age related degenerative changes with diffuse facet  arthropathy.  Sclerotic lesion in the C2 vertebral body is similar previous  study, probably represents a benign enostosis        Impression:      No evidence of acute traumatic osseous injury.  Degenerative changes as above.      If pain or symptoms persist beyond reasonable expectations, a  follow up radiographic and/or MRI examination is suggested, as is  deemed clinically indicated.    Electronically signed by:  Marine Briscoe MD  12/5/2020 3:53 PM CST  Workstation: [id number]    CT Head Without Contrast [[id number]] Collected: 12/05/20 1503     Updated: 12/05/20 1549    Narrative:      PROCEDURE: CT HEAD WO CONTRAST    INDICATION:  Fall    COMPARISON:  5/6/2020    TECHNIQUE:  CT head, without iv contrast.       This exam was performed according to our departmental  dose-optimization program, which includes automated exposure  control, adjustment of the mA and/or kV according to patient size  and/or use of iterative reconstruction technique.  DLP is 949.6    FINDINGS:    The degree of cerebral atrophy is within normal limits for age.    There is  preservation of the gray-white matter differentiation.     Acute or infarct in the right thalamus. There is also an area of  encephalomalacia which is stable in the right medial occipital  lobe.    There are age related degenerative cortical and deep white matter  changes.    There is no evidence of a hemorrhage or intracranial mass.    There is no midline shift.    The ventricles are normal in size and configuration.    The brain stem, cerebellum, globes, and nasal sinuses are within  normal limits. 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 This is unchanged from prior study      Impression:      1. No acute intracranial abnormality.  2. Old area of encephalomalacia in the right medial occipital  lobe and lacunar infarct in the right thalamus.  3. Expansile lesion in the right maxilla, similar to the previous  study    If pain or symptoms persist beyond reasonable expectations, an  MRI examination is suggested as is deemed clinically appropriate.    Electronically signed by:  Marine Briscoe MD  12/5/2020 3:48 PM CST  Workstation: [id number]            I reviewed the patient's new clinical results.    ASSESSMENT AND PLAN: This is a 89 y.o. female with:    Active Hospital Problems    Diagnosis POA   • **Closed nondisplaced intertrochanteric fracture of right femur (CMS/[facility]) [S72.144A] Yes     - Confirmed on imaging  - Orthopedics consulted an appreciate recommendations     • Closed displaced fracture of surgical neck of right humerus [S42.211A] Yes     -Confirmed on imaging  -Placed in splint   -Orthopedics consulted and appreciate recommendations     • Closed fracture of right wrist [S62.101A] Yes     -Fracture of the distal radius and possible small nondisplaced fracture of the tip of the ulnar styloid  -In splint   - Orthopedics consulted and appreciate recommendations     • DOUGLAS (acute kidney injury) (CMS/[facility]) [N17.9] Yes     - Cr 1.4 on admission. Baseline ~1  - IV fluids     • Leukocytosis  [D72.829] Yes     -12.5 on admission  -Monitor     • Benign essential HTN [I10] Yes     Amlodipine 5 mg daily     • Constipation [K59.00] Yes     - Miralax and senokot     • Anemia [D64.9] Yes     - 10.5 on admission  - Iron panel     • CKD (chronic kidney disease) stage 3, GFR 30-59 ml/min [N18.30] Yes       DVT prophylaxis: SCDs/TEDs     Marlene Horne and I have discussed pain goals for this hospitalization after reviewing her current clinical condition, medical history and prior pain experiences.  The goal is to keep the pain level <2.  To help achieve this, I plan to order tylenol and morphine as needed.    JOE reviewed via PDMP and consistent with patient reported medications.    Expected Length of Stay: Where: current living arrangements and When:  2-3 days    I discussed the patient's findings and my recommendations with patient and primary care team.     Maricarmen Bell MD is the attending on record at time of admission, She is aware of the patient's status and agrees with the above history and physical.          This document has been electronically signed by Jose Morales MD on December 5, 2020 23:49 CST

## 2020-12-06 NOTE — PROGRESS NOTES
"    FAMILY MEDICINE DAILY PROGRESS NOTE    NAME: Marlene Horne  : 1931  MRN: [id number]      LOS: 1 day     PROVIDER OF SERVICE: Yobani Lundberg MD    Chief Complaint: Closed nondisplaced intertrochanteric fracture of right femur (CMS/HCC)    Subjective:   Marlene Horne is a 89 y.o. female with a CMH of major neurocognitive disorder, iron deficiency anemia, chronic kidney disease stage III, who is s/p  fall breaking her right wrist, hip, and femur.    Interval History:  History taken from: patient  Patient Complaints: Patient is incoherent with response  Patient Denies:  Patient is incoherent with response    Patient has returned from OR, and upon interview repeats something that sounds like \"Say-a-light\".  She once also said something that sounded like \"Come around here\", to which she grabbed a hand and would not let go.  Also said something that sounded like \"Help me up\", but when the head of the bed was raised, she said \"easy\".    Review of Systems:   Review of Systems   Unable to perform ROS: Dementia       Objective:     Vital Signs  Temp:  [96.9 °F (36.1 °C)-98.2 °F (36.8 °C)] 96.9 °F (36.1 °C)  Heart Rate:  [] 104  Resp:  [14-20] 20  BP: ()/(51-83) 102/51  Body mass index is 28.82 kg/m².    Physical Exam  Physical Exam  Constitutional:       Comments: tremulous   HENT:      Head: Normocephalic and atraumatic.   Cardiovascular:      Rate and Rhythm: Normal rate and regular rhythm.      Heart sounds: Normal heart sounds.   Pulmonary:      Breath sounds: Normal breath sounds.   Abdominal:      General: Bowel sounds are normal.   Musculoskeletal:      Comments: Patient has chlorohexidine discoloration on her right upper leg with dressings in place.  Right arm in sling with dressings in place.    Neurological:      Mental Status: She is alert.      Comments: Patient is disoriented, and does not answer questions coherently.           Medication Review    Current " Facility-Administered Medications:   •  [DISCONTINUED] acetaminophen (TYLENOL) tablet 650 mg, 650 mg, Oral, Q4H PRN **OR** [DISCONTINUED] acetaminophen (TYLENOL) 160 MG/5ML solution 650 mg, 650 mg, Oral, Q4H PRN **OR** acetaminophen (TYLENOL) suppository 650 mg, 650 mg, Rectal, Q4H PRN, Baudilio Brooks MD  •  acetaminophen (TYLENOL) tablet 500 mg, 500 mg, Oral, 4x Daily PRN, Baudilio Brooks MD  •  albuterol (PROVENTIL) nebulizer solution 0.083% 2.5 mg/3mL, 2.5 mg, Nebulization, Q4H PRN, Baudilio Brooks MD  •  amLODIPine (NORVASC) tablet 5 mg, 5 mg, Oral, Q24H, Baudilio Brooks MD, 5 mg at 12/06/20 1029  •  aspirin EC tablet 81 mg, 81 mg, Oral, Daily, Baudilio Brooks MD  •  ceFAZolin in 0.9% normal saline (ANCEF) IVPB solution 2 g, 2 g, Intravenous, Q8H, Buadilio Brooks MD, Last Rate: 200 mL/hr at 12/06/20 1229, 2 g at 12/06/20 1229  •  ferrous sulfate EC tablet 324 mg, 324 mg, Oral, Daily With Breakfast, Baudilio Brooks MD  •  [START ON 12/7/2020] heparin (porcine) 5000 UNIT/ML injection 5,000 Units, 5,000 Units, Subcutaneous, Q12H, Baudilio Brooks MD  •  melatonin tablet 3 mg, 3 mg, Oral, Nightly, Baudilio Brooks MD, 3 mg at 12/05/20 2122  •  mirtazapine (REMERON) tablet 15 mg, 15 mg, Oral, Nightly, Baudilio Brooks MD, 15 mg at 12/05/20 2125  •  morphine injection 1 mg, 1 mg, Intravenous, Q4H PRN, 1 mg at 12/06/20 0341 **AND** naloxone (NARCAN) injection 0.4 mg, 0.4 mg, Intravenous, Q5 Min PRN, Baudilio Brooks MD  •  multivitamin with minerals 1 tablet, 1 tablet, Oral, Daily, Baudilio Brooks MD  •  nystatin (MYCOSTATIN) powder, , Topical, PRN, Baudilio Brooks MD  •  ondansetron (ZOFRAN) tablet 4 mg, 4 mg, Oral, Q6H PRN **OR** ondansetron (ZOFRAN) injection 4 mg, 4 mg, Intravenous, Q6H PRN, Baudilio Brooks MD  •  polyethylene glycol (MIRALAX) packet 17 g, 17 g, Oral, Daily, Baudilio Brooks MD  •  rosuvastatin (CRESTOR) tablet 5  mg, 5 mg, Oral, Daily, Baudilio Brooks MD  •  sennosides-docusate (PERICOLACE) 8.6-50 MG per tablet 2 tablet, 2 tablet, Oral, Nightly, Baudilio Brooks MD, 2 tablet at 12/05/20 2122  •  sodium chloride 0.9 % flush 10 mL, 10 mL, Intravenous, Q12H, Baudilio Brooks MD  •  sodium chloride 0.9 % flush 10 mL, 10 mL, Intravenous, PRN, Baudilio Brooks MD  •  sodium chloride 0.9 % infusion, 50 mL/hr, Intravenous, Continuous, Baudilio Brooks MD, Last Rate: 50 mL/hr at 12/05/20 2122, 50 mL/hr at 12/05/20 2122  •  sodium chloride tablet 1 g, 1 g, Oral, TID With Meals, Baudilio Brooks MD, 1 g at 12/06/20 1201  •  traMADol (ULTRAM) tablet 25 mg, 25 mg, Oral, Q6H PRN, Baudilio Brooks MD  •  Vitamin D 1,000 Units, 1,000 Units, Oral, Daily, Baudilio Brooks MD     Diagnostic Data    Lab Results (last 24 hours)     Procedure Component Value Units Date/Time    Basic Metabolic Panel [[id number]]  (Abnormal) Collected: 12/06/20 0452    Specimen: Blood Updated: 12/06/20 0541     Glucose 160 mg/dL      BUN 29 mg/dL      Creatinine 1.32 mg/dL      Sodium 136 mmol/L      Potassium 5.6 mmol/L      Comment: Specimen hemolyzed.  Results may be affected.        Chloride 104 mmol/L      CO2 22.0 mmol/L      Calcium 7.9 mg/dL      eGFR Non African Amer 38 mL/min/1.73      BUN/Creatinine Ratio 22.0     Anion Gap 10.0 mmol/L     Narrative:      GFR Normal >60  Chronic Kidney Disease <60  Kidney Failure <15      Ferritin [[id number]]  (Abnormal) Collected: 12/06/20 0452    Specimen: Blood Updated: 12/06/20 0540     Ferritin 173.20 ng/mL     Narrative:      Results may be falsely decreased if patient taking Biotin.      CBC & Differential [[id number]]  (Abnormal) Collected: 12/06/20 0452    Specimen: Blood Updated: 12/06/20 8063    Narrative:      The following orders were created for panel order CBC & Differential.  Procedure                               Abnormality         Status                      ---------                               -----------         ------                     Scan Slide[[id number]]                                                                  CBC Auto Differential[[id number]]        Abnormal            Final result                 Please view results for these tests on the individual orders.    CBC Auto Differential [[id number]]  (Abnormal) Collected: 12/06/20 0452    Specimen: Blood Updated: 12/06/20 0508     WBC 15.80 10*3/mm3      RBC 2.86 10*6/mm3      Hemoglobin 9.0 g/dL      Hematocrit 27.6 %      MCV 96.5 fL      MCH 31.5 pg      MCHC 32.6 g/dL      RDW 13.6 %      RDW-SD 48.2 fl      MPV 10.7 fL      Platelets 175 10*3/mm3      Neutrophil % 78.7 %      Lymphocyte % 12.2 %      Monocyte % 5.7 %      Eosinophil % 0.6 %      Basophil % 0.5 %      Immature Grans % 2.3 %      Neutrophils, Absolute 12.42 10*3/mm3      Lymphocytes, Absolute 1.93 10*3/mm3      Monocytes, Absolute 0.90 10*3/mm3      Eosinophils, Absolute 0.10 10*3/mm3      Basophils, Absolute 0.08 10*3/mm3      Immature Grans, Absolute 0.37 10*3/mm3      nRBC 0.0 /100 WBC     Iron Profile [[id number]]  (Abnormal) Collected: 12/05/20 1423    Specimen: Blood Updated: 12/06/20 0043     Iron 65 mcg/dL      Iron Saturation 23 %      Transferrin 188 mg/dL      TIBC 280 mcg/dL     COVID PRE-OP / PRE-PROCEDURE SCREENING ORDER (NO ISOLATION) - Swab, Nasopharynx [[id number]]  (Normal) Collected: 12/05/20 1847    Specimen: Swab from Nasopharynx Updated: 12/05/20 2142    Narrative:      The following orders were created for panel order COVID PRE-OP / PRE-PROCEDURE SCREENING ORDER (NO ISOLATION) - Swab, Nasopharynx.  Procedure                               Abnormality         Status                     ---------                               -----------         ------                     COVID-19, [facility]-HOUS...[[id number]]  Normal              Final result                 Please view results for these tests on the individual  orders.    COVID-19, [facility]-HOUSE, NP SWAB IN TRANSPORT MEDIA 8-10 HR TAT - Swab, Nasopharynx [[id number]]  (Normal) Collected: 12/05/20 1847    Specimen: Swab from Nasopharynx Updated: 12/05/20 2142     COVID19 Not Detected    Narrative:      Testing performed by Real Time RT-PCR  This test has not been approved by the [facility] but is authorized under the Emergency Use Act (EUA)    Fact sheet for providers: https://www.fda.gov/media/162996/download    Fact sheet for patients: https://www.fda.gov/media/076706/download        Protime-INR [[id number]]  (Normal) Collected: 12/05/20 1423    Specimen: Blood Updated: 12/05/20 1712     Protime 13.0 Seconds      INR 0.95    Narrative:      Therapeutic range for most indications is 2.0-3.0 INR,  or 2.5-3.5 for mechanical heart valves.    Extra Tubes [[id number]] Collected: 12/05/20 1423    Specimen: Blood Updated: 12/05/20 1530    Narrative:      The following orders were created for panel order Extra Tubes.  Procedure                               Abnormality         Status                     ---------                               -----------         ------                     Light Blue Top[[id number]]                                   Final result               Gold Top - SST[[id number]]                                   Final result                 Please view results for these tests on the individual orders.    Light Blue Top [[id number]] Collected: 12/05/20 1423    Specimen: Blood Updated: 12/05/20 1530     Extra Tube hold for add-on     Comment: Auto resulted       Gold Top - SST [[id number]] Collected: 12/05/20 1423    Specimen: Blood Updated: 12/05/20 1530     Extra Tube Hold for add-ons.     Comment: Auto resulted.               I reviewed the patient's new clinical results.    Assessment/Plan:     Active Hospital Problems    Diagnosis POA   • **Closed nondisplaced intertrochanteric fracture of right femur (CMS/HCC) [S72.144A] Yes     S/p OR fixation  - Pain control with  tylenol, ultram, morphine  - PT/OT  - Appreciate ortho recs     • Fall at nursing home [W19.XXXA, Y92.129] Unknown     Mechanical fall due to non-compliance from walker.  Non-compliance due to baseline dementia.  Right hip, femur, wrist fractures.  - Will require rehabilitiation     • Closed displaced fracture of surgical neck of right humerus [S42.211A] Yes     S/p OR fixation  - Pain control with tylenol, ultram, morphine  - PT/OT  - Appreciate ortho recs     • Closed fracture of right wrist [S62.101A] Yes     -Fracture of the distal radius and possible small nondisplaced fracture of the tip of the ulnar styloid  S/p OR fixation  - Pain control with tylenol, ultram, morphine  - PT/OT  - Appreciate ortho recs     • DOUGLAS (acute kidney injury) (CMS/HCC) [N17.9] Yes     - Cr 1.4 on admission. Baseline ~1  - IV fluids     • Leukocytosis [D72.829] Yes     -12.5 on admission  -Monitor     • Benign essential HTN [I10] Yes     Amlodipine 5 mg daily     • Constipation [K59.00] Yes     - Miralax and senokot     • Anemia [D64.9] Yes     - 10.5 on admission  - Iron panel     • CKD (chronic kidney disease) stage 3, GFR 30-59 ml/min [N18.30] Yes       DVT prophylaxis: SCDs/TEDs  Code status is   Code Status and Medical Interventions:   Ordered at: 12/05/20 2041     Limited Support to NOT Include:    Intubation     Code Status:    No CPR     Medical Interventions (Level of Support Prior to Arrest):    Limited       Plan for disposition:2-3 days      Time: 30 mins       This document has been electronically signed by Yobani Lundberg MD on December 6, 2020 15:24 CST

## 2020-12-06 NOTE — ANESTHESIA PROCEDURE NOTES
Airway  Urgency: elective    Date/Time: 12/6/2020 7:15 AM  Airway not difficult    General Information and Staff    Patient location during procedure: OR  CRNA: Guillermo Núñez CRNA    Indications and Patient Condition  Indications for airway management: airway protection    Preoxygenated: yes  MILS not maintained throughout  Mask difficulty assessment: 1 - vent by mask    Final Airway Details  Final airway type: supraglottic airway      Successful airway: classic and I-gel  Size 4    Number of attempts at approach: 1  Assessment: lips, teeth, and gum same as pre-op and atraumatic intubation

## 2020-12-06 NOTE — ANESTHESIA PREPROCEDURE EVALUATION
Anesthesia Evaluation     Patient summary reviewed   NPO Solid Status: > 8 hours             Airway   Mallampati: II  TM distance: <3 FB  Anterior  Dental    (+) poor dentition    Pulmonary     breath sounds clear to auscultation  Cardiovascular   Exercise tolerance: poor (<4 METS)    NYHA Classification: III  ECG reviewed  PT is on anticoagulation therapy    (+) hypertension, valvular problems/murmurs murmur, MEJIA, murmur, hyperlipidemia,     ROS comment: Last year normal EF but at least moderate aortic valve stnosis noted      Neuro/Psych  (+) CVA, dizziness/light headedness, psychiatric history Anxiety, dementia, poor historian.,     GI/Hepatic/Renal/Endo    (+)  GERD well controlled,  renal disease,     Musculoskeletal     (+) arthralgias, back pain, chronic pain, gait problem,   Abdominal    Substance History      OB/GYN          Other   arthritis,                    Anesthesia Plan    ASA 3     general   (The K this am is 5.6; will avoid sux and treat it with a few cc of sod bicarb in the or damion in light of slight drop in serum bicarb level to 22 from 25; the k also high as result of resp acidosis I believe as she is very sleepy on exam this am.  Will repeat the k in the or also    Spoke to rayo tejada over the phone, the daughter at length about her mothers comorbidities and risk of problems with the heart and or the lungs during the case. I assured her that we will be gentle with her mother given her age and frailty.  She understands and wishes to proceed this am.  )  intravenous induction     Anesthetic plan, all risks, benefits, and alternatives have been provided, discussed and informed consent has been obtained with: patient.

## 2020-12-06 NOTE — ACP (ADVANCE CARE PLANNING)
The patient wishes to be DNR/DNI as per ACP documents.          This document has been electronically signed by Jose Morales MD on December 5, 2020 18:26 CST

## 2020-12-06 NOTE — SIGNIFICANT NOTE
Spoke with daughter and normal behavior for the pt is to get agitated and might say swear words but truly is sweet but does not like to be messed with.the patient is alert and confused at this time.pt is very Unga.will give tylenol for pain until BP is higher

## 2020-12-06 NOTE — PLAN OF CARE
Problem: Adult Inpatient Plan of Care  Goal: Plan of Care Review  Outcome: Ongoing, Progressing  Goal: Patient-Specific Goal (Individualized)  Outcome: Ongoing, Progressing  Goal: Absence of Hospital-Acquired Illness or Injury  Outcome: Ongoing, Progressing  Goal: Optimal Comfort and Wellbeing  Outcome: Ongoing, Progressing  Goal: Readiness for Transition of Care  Outcome: Ongoing, Progressing     Problem: Fall Injury Risk  Goal: Absence of Fall and Fall-Related Injury  Outcome: Ongoing, Progressing     Problem: Skin Injury Risk Increased  Goal: Skin Health and Integrity  Outcome: Ongoing, Progressing   Goal Outcome Evaluation:

## 2020-12-07 ENCOUNTER — APPOINTMENT (OUTPATIENT)
Dept: GENERAL RADIOLOGY | Facility: HOSPITAL | Age: 85
End: 2020-12-07

## 2020-12-07 LAB
ANION GAP SERPL CALCULATED.3IONS-SCNC: 10 MMOL/L (ref 5–15)
BACTERIA UR QL AUTO: ABNORMAL /HPF
BASOPHILS # BLD AUTO: 0.02 10*3/MM3 (ref 0–0.2)
BASOPHILS NFR BLD AUTO: 0.1 % (ref 0–1.5)
BILIRUB UR QL STRIP: NEGATIVE
BUN SERPL-MCNC: 39 MG/DL (ref 8–23)
BUN/CREAT SERPL: 24.4 (ref 7–25)
CALCIUM SPEC-SCNC: 8.1 MG/DL (ref 8.6–10.5)
CHLORIDE SERPL-SCNC: 109 MMOL/L (ref 98–107)
CLARITY UR: ABNORMAL
CO2 SERPL-SCNC: 20 MMOL/L (ref 22–29)
COLOR UR: YELLOW
CREAT SERPL-MCNC: 1.6 MG/DL (ref 0.57–1)
DEPRECATED RDW RBC AUTO: 50 FL (ref 37–54)
EOSINOPHIL # BLD AUTO: 0.14 10*3/MM3 (ref 0–0.4)
EOSINOPHIL NFR BLD AUTO: 1 % (ref 0.3–6.2)
ERYTHROCYTE [DISTWIDTH] IN BLOOD BY AUTOMATED COUNT: 13.8 % (ref 12.3–15.4)
GFR SERPL CREATININE-BSD FRML MDRD: 30 ML/MIN/1.73
GLUCOSE BLDC GLUCOMTR-MCNC: 128 MG/DL (ref 70–130)
GLUCOSE BLDC GLUCOMTR-MCNC: 129 MG/DL (ref 70–130)
GLUCOSE BLDC GLUCOMTR-MCNC: 146 MG/DL (ref 70–130)
GLUCOSE SERPL-MCNC: 96 MG/DL (ref 65–99)
GLUCOSE UR STRIP-MCNC: NEGATIVE MG/DL
HCT VFR BLD AUTO: 21.5 % (ref 34–46.6)
HCT VFR BLD AUTO: 25.2 % (ref 34–46.6)
HGB BLD-MCNC: 7 G/DL (ref 12–15.9)
HGB BLD-MCNC: 8.3 G/DL (ref 12–15.9)
HGB UR QL STRIP.AUTO: NEGATIVE
HYALINE CASTS UR QL AUTO: ABNORMAL /LPF
IMM GRANULOCYTES # BLD AUTO: 0.15 10*3/MM3 (ref 0–0.05)
IMM GRANULOCYTES NFR BLD AUTO: 1 % (ref 0–0.5)
KETONES UR QL STRIP: NEGATIVE
LEUKOCYTE ESTERASE UR QL STRIP.AUTO: ABNORMAL
LYMPHOCYTES # BLD AUTO: 1.17 10*3/MM3 (ref 0.7–3.1)
LYMPHOCYTES NFR BLD AUTO: 8.2 % (ref 19.6–45.3)
MCH RBC QN AUTO: 32.4 PG (ref 26.6–33)
MCHC RBC AUTO-ENTMCNC: 32.6 G/DL (ref 31.5–35.7)
MCV RBC AUTO: 99.5 FL (ref 79–97)
MONOCYTES # BLD AUTO: 0.66 10*3/MM3 (ref 0.1–0.9)
MONOCYTES NFR BLD AUTO: 4.6 % (ref 5–12)
NEUTROPHILS NFR BLD AUTO: 12.2 10*3/MM3 (ref 1.7–7)
NEUTROPHILS NFR BLD AUTO: 85.1 % (ref 42.7–76)
NITRITE UR QL STRIP: NEGATIVE
NRBC BLD AUTO-RTO: 0 /100 WBC (ref 0–0.2)
PH UR STRIP.AUTO: <=5 [PH] (ref 5–9)
PLATELET # BLD AUTO: 174 10*3/MM3 (ref 140–450)
PMV BLD AUTO: 10.1 FL (ref 6–12)
POTASSIUM SERPL-SCNC: 4.6 MMOL/L (ref 3.5–5.2)
PROCALCITONIN SERPL-MCNC: 0.5 NG/ML (ref 0–0.25)
PROT UR QL STRIP: ABNORMAL
RBC # BLD AUTO: 2.16 10*6/MM3 (ref 3.77–5.28)
RBC # UR: ABNORMAL /HPF
REF LAB TEST METHOD: ABNORMAL
SODIUM SERPL-SCNC: 139 MMOL/L (ref 136–145)
SP GR UR STRIP: 1.02 (ref 1–1.03)
SQUAMOUS #/AREA URNS HPF: ABNORMAL /HPF
UROBILINOGEN UR QL STRIP: ABNORMAL
WBC # BLD AUTO: 14.34 10*3/MM3 (ref 3.4–10.8)
WBC UR QL AUTO: ABNORMAL /HPF

## 2020-12-07 PROCEDURE — 36430 TRANSFUSION BLD/BLD COMPNT: CPT

## 2020-12-07 PROCEDURE — 99024 POSTOP FOLLOW-UP VISIT: CPT | Performed by: ORTHOPAEDIC SURGERY

## 2020-12-07 PROCEDURE — 71045 X-RAY EXAM CHEST 1 VIEW: CPT

## 2020-12-07 PROCEDURE — 97162 PT EVAL MOD COMPLEX 30 MIN: CPT

## 2020-12-07 PROCEDURE — 84145 PROCALCITONIN (PCT): CPT | Performed by: STUDENT IN AN ORGANIZED HEALTH CARE EDUCATION/TRAINING PROGRAM

## 2020-12-07 PROCEDURE — 80048 BASIC METABOLIC PNL TOTAL CA: CPT | Performed by: ORTHOPAEDIC SURGERY

## 2020-12-07 PROCEDURE — 25010000002 MORPHINE PER 10 MG: Performed by: ORTHOPAEDIC SURGERY

## 2020-12-07 PROCEDURE — 86900 BLOOD TYPING SEROLOGIC ABO: CPT

## 2020-12-07 PROCEDURE — 85014 HEMATOCRIT: CPT | Performed by: STUDENT IN AN ORGANIZED HEALTH CARE EDUCATION/TRAINING PROGRAM

## 2020-12-07 PROCEDURE — 87205 SMEAR GRAM STAIN: CPT | Performed by: STUDENT IN AN ORGANIZED HEALTH CARE EDUCATION/TRAINING PROGRAM

## 2020-12-07 PROCEDURE — 82570 ASSAY OF URINE CREATININE: CPT | Performed by: STUDENT IN AN ORGANIZED HEALTH CARE EDUCATION/TRAINING PROGRAM

## 2020-12-07 PROCEDURE — 63710000001 ONDANSETRON PER 8 MG: Performed by: ORTHOPAEDIC SURGERY

## 2020-12-07 PROCEDURE — 85025 COMPLETE CBC W/AUTO DIFF WBC: CPT | Performed by: ORTHOPAEDIC SURGERY

## 2020-12-07 PROCEDURE — 25010000002 HEPARIN (PORCINE) PER 1000 UNITS: Performed by: ORTHOPAEDIC SURGERY

## 2020-12-07 PROCEDURE — 99224 PR SBSQ OBSERVATION CARE/DAY 15 MINUTES: CPT | Performed by: STUDENT IN AN ORGANIZED HEALTH CARE EDUCATION/TRAINING PROGRAM

## 2020-12-07 PROCEDURE — 82962 GLUCOSE BLOOD TEST: CPT

## 2020-12-07 PROCEDURE — 81001 URINALYSIS AUTO W/SCOPE: CPT | Performed by: STUDENT IN AN ORGANIZED HEALTH CARE EDUCATION/TRAINING PROGRAM

## 2020-12-07 PROCEDURE — 85018 HEMOGLOBIN: CPT | Performed by: STUDENT IN AN ORGANIZED HEALTH CARE EDUCATION/TRAINING PROGRAM

## 2020-12-07 PROCEDURE — P9016 RBC LEUKOCYTES REDUCED: HCPCS

## 2020-12-07 PROCEDURE — 84300 ASSAY OF URINE SODIUM: CPT | Performed by: STUDENT IN AN ORGANIZED HEALTH CARE EDUCATION/TRAINING PROGRAM

## 2020-12-07 PROCEDURE — 97166 OT EVAL MOD COMPLEX 45 MIN: CPT

## 2020-12-07 RX ADMIN — ROSUVASTATIN CALCIUM 5 MG: 5 TABLET, FILM COATED ORAL at 08:22

## 2020-12-07 RX ADMIN — SODIUM CHLORIDE, PRESERVATIVE FREE 10 ML: 5 INJECTION INTRAVENOUS at 21:10

## 2020-12-07 RX ADMIN — MELATONIN 3 MG: at 21:10

## 2020-12-07 RX ADMIN — MORPHINE SULFATE 1 MG: 2 INJECTION, SOLUTION INTRAMUSCULAR; INTRAVENOUS at 00:33

## 2020-12-07 RX ADMIN — TRAMADOL HYDROCHLORIDE 25 MG: 50 TABLET, FILM COATED ORAL at 21:13

## 2020-12-07 RX ADMIN — HEPARIN SODIUM 5000 UNITS: 5000 INJECTION, SOLUTION INTRAVENOUS; SUBCUTANEOUS at 08:23

## 2020-12-07 RX ADMIN — Medication 1 TABLET: at 08:22

## 2020-12-07 RX ADMIN — Medication 1 G: at 08:22

## 2020-12-07 RX ADMIN — Medication 1 G: at 17:55

## 2020-12-07 RX ADMIN — Medication 1 G: at 12:35

## 2020-12-07 RX ADMIN — Medication 1000 UNITS: at 08:22

## 2020-12-07 RX ADMIN — POLYETHYLENE GLYCOL 3350 17 G: 17 POWDER, FOR SOLUTION ORAL at 08:22

## 2020-12-07 RX ADMIN — MORPHINE SULFATE 1 MG: 2 INJECTION, SOLUTION INTRAMUSCULAR; INTRAVENOUS at 23:24

## 2020-12-07 RX ADMIN — MIRTAZAPINE 15 MG: 15 TABLET, FILM COATED ORAL at 21:10

## 2020-12-07 RX ADMIN — SODIUM CHLORIDE, PRESERVATIVE FREE 10 ML: 5 INJECTION INTRAVENOUS at 08:23

## 2020-12-07 RX ADMIN — ASPIRIN 81 MG: 81 TABLET, FILM COATED ORAL at 08:22

## 2020-12-07 RX ADMIN — MORPHINE SULFATE 1 MG: 2 INJECTION, SOLUTION INTRAMUSCULAR; INTRAVENOUS at 05:11

## 2020-12-07 RX ADMIN — TRAMADOL HYDROCHLORIDE 25 MG: 50 TABLET, FILM COATED ORAL at 12:35

## 2020-12-07 RX ADMIN — ONDANSETRON HYDROCHLORIDE 4 MG: 4 TABLET, FILM COATED ORAL at 12:35

## 2020-12-07 RX ADMIN — SODIUM CHLORIDE 50 ML/HR: 9 INJECTION, SOLUTION INTRAVENOUS at 21:28

## 2020-12-07 RX ADMIN — FERROUS SULFATE TAB EC 324 MG (65 MG FE EQUIVALENT) 324 MG: 324 (65 FE) TABLET DELAYED RESPONSE at 08:22

## 2020-12-07 RX ADMIN — AMLODIPINE BESYLATE 5 MG: 5 TABLET ORAL at 08:22

## 2020-12-07 RX ADMIN — DOCUSATE SODIUM 50 MG AND SENNOSIDES 8.6 MG 2 TABLET: 8.6; 5 TABLET, FILM COATED ORAL at 21:10

## 2020-12-07 NOTE — CONSULTS
Adult Nutrition  Assessment    Patient Name:  Marlene Horne  YOB: 1931  MRN: [id number]  Admit Date:  12/5/2020    Assessment Date:  12/7/2020    Comments:  Pt was admitted with closed non displaced intertrochanteric fracture of right femur and resultant intermedullary nailing of right femur and closed non displaced fracture of surgical neck of right humerus with closed reduction and splinting or right wrists following a fall at the nursing home.. Due to the dx of dementia, RN said pt cannot answer my questions for my nutrition assessment at this time per RN. Nursing agreed boost would benefit patient. Will add boost and magic cup and change consistency to mech soft for ease in eating. Nursing is feeding. RDN staff will continue to monitor intake of meals and oral supplements.      Reason for Assessment     Row Name 12/07/20 1517          Reason for Assessment    Reason For Assessment  identified at risk by screening criteria     Diagnosis  surgery/postoperative complications     Identified At Risk by Screening Criteria  reduced oral intake over the last month;MST SCORE 2+         Nutrition/Diet History     Row Name 12/07/20 1518          Nutrition/Diet History    Typical Food/Fluid Intake  nursing said pt doesn't eat much. must be fed. pt could benefit from boost per RN>           Labs/Tests/Procedures/Meds     Row Name 12/07/20 1519          Labs/Procedures/Meds    Lab Results Reviewed  reviewed, pertinent        Diagnostic Tests/Procedures    Diagnostic Test/Procedure Reviewed  reviewed, pertinent        Medications    Pertinent Medications Reviewed  reviewed, pertinent         Physical Findings     Row Name 12/07/20 1519          Physical Findings    Overall Physical Appearance  overweight         Estimated/Assessed Needs     Row Name 12/07/20 1520          Calculation Measurements    Weight Used For Calculations  60 kg (132 lb 4.4 oz)        Estimated/Assessed Needs    Additional  Documentation  Calorie Requirements (Group);Protein Requirements (Group);Verdon-St. Jeor Equation (Group);Fluid Requirements (Group)        Calorie Requirements    Weight Used For Calorie Calculations  60 kg (132 lb 4.4 oz)     Estimated Calorie Need Method  Verdon-St Jeor        Verdon-St. Jeor Equation    RMR (Verdon-St. Jeor Equation)  1010     Verdon-St. Jeor Activity Factors  1.4 - 1.5     Activity Factors (Verdon-St. Jeor)  1414 - 1515        Protein Requirements    Weight Used For Protein Calculations  60 kg (132 lb 4.4 oz)     Est Protein Requirement Amount (gms/kg)  1.2 gm protein     Estimated Protein Requirements (gms/day)  72        Fluid Requirements    Fluid Requirements (mL/day)  1500     RDA Method (mL)  1500         Nutrition Prescription Ordered     Row Name 12/07/20 1521          Nutrition Prescription PO    Current PO Diet  Soft Texture     Texture  Ground     Supplement  Boost Glucose Control (Glucerna Shake);Magic Cup     Supplement Frequency  3 times a day     Common Modifiers  Consistent Carbohydrate;Cardiac         Evaluation of Received Nutrient/Fluid Intake     Row Name 12/07/20 1523          PO Evaluation    Number of Days PO Intake Evaluated  Other (comment) diet just advanced to ada cardiac from npo-no intake data.               Electronically signed by:  Shelia Kent RD  12/07/20 15:24 CST

## 2020-12-07 NOTE — PROGRESS NOTES
ORTHOPEDIC PROGRESS NOTE:    Name:  Marlene Horne  Date:    12/7/2020  Date of admission:  12/5/2020    Post op day:  1 Day Post-Op  Procedure:    Procedure(s) (LRB):  INTERMEDULLARY NAILING OF RIGHT FEMUR USING GAMMA NAIL AND FLUOROSCOPIC ASSITANCE (Right)  CLOSED REDUCTION AND SPLINTING OF RIGHT WRIST WITH FLUOROSCOPIC ASSISTANCE (Right)    Subjective: Appears to be resting comfortably.  Labs pending this morning    Vitals:    Vitals:    12/07/20 0333   BP:    Pulse: 86   Resp: 16   Temp: 97.8 °F (36.6 °C)   SpO2: 90%       Exam: Good capillary refill at fingertips.  Dressing dry    Lab Results (last 24 hours)     ** No results found for the last 24 hours. **          ASSESSMENT:  Principal Problem:    Closed nondisplaced intertrochanteric fracture of right femur (CMS/HCC)  Active Problems:    CKD (chronic kidney disease) stage 3, GFR 30-59 ml/min    Anemia    Constipation    Benign essential HTN    Closed displaced fracture of surgical neck of right humerus    Closed fracture of right wrist    DOUGLAS (acute kidney injury) (CMS/HCC)    Leukocytosis    Fall at nursing home        PLAN: Case management.  Okay to get a bed.  Repeat x-rays the wrist in the splint and 10 days or so.  Staples in 10 days DVT prophylaxis started today.  Labs pending      Baudilio Brooks MD  12/07/20  08:01 CST

## 2020-12-07 NOTE — THERAPY EVALUATION
Patient Name: Marlene Horne  : 1931    MRN: [id number]                              Today's Date: 2020       Admit Date: 2020    Visit Dx:     ICD-10-CM ICD-9-CM   1. Closed nondisplaced intertrochanteric fracture of right femur, initial encounter (CMS/[facility])  S72.144A 820.21   2. Closed fracture of distal end of right radius, unspecified fracture morphology, initial encounter  S52.501A 813.42   3. Closed displaced fracture of surgical neck of right humerus, unspecified fracture morphology, initial encounter  S42.211A 812.01   4. Fall, initial encounter  W19.XXXA E888.9   5. Impaired physical mobility  Z74.09 781.99     Patient Active Problem List   Diagnosis   • Encephalopathy acute   • Major neurocognitive disorder (CMS/[facility])   • Iron deficiency anemia secondary to inadequate dietary iron intake   • Hyponatremia   • CKD (chronic kidney disease) stage 3, GFR 30-59 ml/min   • Hypovitaminosis D   • Drug-induced constipation   • Thyroid mass   • Anemia   • Hypokalemia   • Primary insomnia   • Constipation   • Hypoproteinemia (CMS/[facility])   • Altered mental status   • Dental disease   • Benign essential HTN   • Acute cystitis with hematuria   • Closed nondisplaced intertrochanteric fracture of right femur (CMS/[facility])   • Closed displaced fracture of surgical neck of right humerus   • Closed fracture of right wrist   • DOUGLAS (acute kidney injury) (CMS/[facility])   • Leukocytosis   • Fall at nursing home     Past Medical History:   Diagnosis Date   • Benign essential HTN 2019   • CKD (chronic kidney disease) stage 3, GFR 30-59 ml/min    • Constipation    • Dementia (CMS/[facility])    • Hyponatremia    • Hypovitaminosis D    • Iron deficiency anemia    • Major neurocognitive disorder (CMS/[facility])    • Stroke (CMS/[facility])      Past Surgical History:   Procedure Laterality Date   • CATARACT EXTRACTION       General Information     Row Name 20 0910          Physical Therapy Time and Intention    Document Type   evaluation  -CZ     Mode of Treatment  physical therapy;co-treatment;occupational therapy  -CZ     Row Name 12/07/20 0927          General Information    Patient Profile Reviewed  yes  -CZ     Prior Level of Function  min assist:;gait;transfer;bed mobility;independent:;w/c or scooter  -CZ     Existing Precautions/Restrictions  fall;right;hip;partial weight bearing;shoulder;non-weight bearing  -CZ     Barriers to Rehab  cognitive status;previous functional deficit;medically complex  -CZ     Row Name 12/07/20 0927          Living Environment    Lives With  facility resident  -CZ     Row Name 12/07/20 0927          Home Main Entrance    Number of Stairs, Main Entrance  none  -CZ     Row Name 12/07/20 0927          Stairs Within Home, Primary    Number of Stairs, Within Home, Primary  none  -CZ     Row Name 12/07/20 0927          Cognition    Orientation Status (Cognition)  oriented to;person  -CZ     Row Name 12/07/20 0927          Safety Issues, Functional Mobility    Safety Issues Affecting Function (Mobility)  ability to follow commands  -CZ     Impairments Affecting Function (Mobility)  balance;strength;endurance/activity tolerance;pain;cognition  -CZ     Comment, Safety Issues/Impairments (Mobility)  Patient is much more alert today, verbal at times, some anxiety with functional mobility but calms down quickly.  -CZ       User Key  (r) = Recorded By, (t) = Taken By, (c) = Cosigned By    Initials Name Provider Type    CZ Jw Martinez, PT Physical Therapist        Mobility     Row Name 12/07/20 0927          Bed Mobility    Bed Mobility  supine-sit;sit-supine;scooting/bridging;rolling left  -CZ     Rolling Left [place] (Bed Mobility)  maximum assist (25% patient effort);1 person assist  -CZ     Scooting/Bridging [place] (Bed Mobility)  dependent (less than 25% patient effort);2 person assist  -CZ     Supine-Sit [place] (Bed Mobility)  maximum assist (25% patient effort);2 person assist  -CZ      Sit-Supine [place] (Bed Mobility)  maximum assist (25% patient effort);2 person assist  -     Assistive Device (Bed Mobility)  bed rails;head of bed elevated;draw sheet  -CZ     Row Name 12/07/20 0927          Sit-Stand Transfer    Sit-Stand [place] (Transfers)  minimum assist (75% patient effort);1 person assist  -     Row Name 12/07/20 0927          Gait/Stairs (Locomotion)    [place] Level (Gait)  minimum assist (75% patient effort)  -     Distance in Feet (Gait)  0  -CZ     Comment (Gait/Stairs)  Stands quite well, does not weightbear on RLE, unable to advance either LE; more limited by anxiety and cognition than pain or strength.  -     Row Name 12/07/20 0927          Mobility    Extremity Weight-bearing Status  right upper extremity;right lower extremity  -CZ     Right Upper Extremity (Weight-bearing Status)  non weight-bearing (NWB)  -CZ     Right Lower Extremity (Weight-bearing Status)  partial weight-bearing (PWB)  -       User Key  (r) = Recorded By, (t) = Taken By, (c) = Cosigned By    Initials Name Provider Type    CZ Jw Martinez, PT Physical Therapist        Obj/Interventions     Row Name 12/07/20 0927          Range of Motion Comprehensive    General Range of Motion  bilateral lower extremity ROM WNL  -     Row Name 12/07/20 0927          Strength Comprehensive (MMT)    General Manual Muscle Testing (MMT) Assessment  other (see comments)  -CZ     Comment, General Manual Muscle Testing (MMT) Assessment  RLE: 3-/5, LLE: 3/5 grossly.  -     Row Name 12/07/20 0927          Sensory Assessment (Somatosensory)    Sensory Assessment (Somatosensory)  unable/difficult to assess  -       User Key  (r) = Recorded By, (t) = Taken By, (c) = Cosigned By    Initials Name Provider Type    CZ Jw Martinez, PT Physical Therapist        Goals/Plan     Row Name 12/07/20 0927          Bed Mobility Goal 1 (PT)    Activity/Assistive Device (Bed Mobility Goal 1, PT)  sit to supine/supine  to sit;rolling to right;rolling to left  -CZ     [place] Level/Cues Needed (Bed Mobility Goal 1, PT)  minimum assist (75% or more patient effort)  -CZ     Time Frame (Bed Mobility Goal 1, PT)  long term goal (LTG);by discharge  -CZ     Strategies/Barriers (Bed Mobility Goal 1, PT)  R hip ORIF: PWB, R wrist fx: NWB, splinted, R humerus fx: NWB, sling.  -CZ     Progress/Outcomes (Bed Mobility Goal 1, PT)  goal not met  -CZ     Row Name 12/07/20 0927          Transfer Goal 1 (PT)    Activity/Assistive Device (Transfer Goal 1, PT)  sit-to-stand/stand-to-sit;bed-to-chair/chair-to-bed;walker, carina  -CZ     [place] Level/Cues Needed (Transfer Goal 1, PT)  contact guard assist  -CZ     Time Frame (Transfer Goal 1, PT)  long term goal (LTG);by discharge  -CZ     Strategies/Barriers (Transfers Goal 1, PT)  R hip ORIF: PWB, R wrist fx: NWB, splinted, R humerus fx: NWB, sling.  -CZ     Progress/Outcome (Transfer Goal 1, PT)  goal not met  -CZ     Row Name 12/07/20 0927          ROM Goal 1 (PT)    ROM Goal 1 (PT)  Patient will tolerate AAROM to RLE, AROM to L LE, 10-15 reps each.  -CZ     Time Frame (ROM Goal 1, PT)  long-term goal (LTG);by discharge  -CZ     Strategies/Barriers (ROM Goal 1, PT)  R hip ORIF: PWB, R wrist fx: NWB, splinted, R humerus fx: NWB, sling.  -CZ     Progress/Outcome (ROM Goal 1, PT)  goal not met  -CZ       User Key  (r) = Recorded By, (t) = Taken By, (c) = Cosigned By    Initials Name Provider Type    CZ Jw Martinez, PT Physical Therapist        Clinical Impression     Row Name 12/07/20 0927          Pain    Additional Documentation  Pain Scale: Numbers Pre/Post-Treatment (Group)  -CZ     Row Name 12/07/20 0927          Pain Scale: Numbers Pre/Post-Treatment    Pretreatment Pain Rating  0/10 - no pain  -CZ     Posttreatment Pain Rating  0/10 - no pain  -CZ     Pain Location - Side  Right  -CZ     Pain Location - Orientation  lower  -CZ     Pain Location  extremity  -CZ      Pre/Posttreatment Pain Comment  Yells out with any movement, difficult to formally assess pain but verbally denies pain at rest.  -CZ     Pain Intervention(s)  Medication (See MAR);Repositioned;Ambulation/increased activity;Distraction  -CZ     Row Name 12/07/20 0927          Plan of Care Review    Plan of Care Reviewed With  patient  -     Row Name 12/07/20 0927          Therapy Assessment/Plan (PT)    Rehab Potential (PT)  good, to achieve stated therapy goals  -CZ     Criteria for Skilled Interventions Met (PT)  yes;skilled treatment is necessary  -CZ     Predicted Duration of Therapy Intervention (PT)  2-4 days plus rehab.  -CZ     Row Name 12/07/20 0927          Vital Signs    Pre Systolic BP Rehab  110  -CZ     Pre Treatment Diastolic BP  50  -CZ     Intratreatment Heart Rate (beats/min)  93  -CZ     Posttreatment Heart Rate (beats/min)  62  -CZ     Pre SpO2 (%)  83  -CZ     O2 Delivery Pre Treatment  room air  -CZ     Intra SpO2 (%)  93  -CZ     O2 Delivery Intra Treatment  nasal cannula  -CZ     Post SpO2 (%)  94  -CZ     O2 Delivery Post Treatment  nasal cannula  -CZ     Pre Patient Position  Supine  -CZ     Intra Patient Position  Supine  -CZ     Post Patient Position  Supine  -CZ     Row Name 12/07/20 0927          Positioning and Restraints    Pre-Treatment Position  in bed  -CZ     Post Treatment Position  bed  -CZ     In Bed  side lying left;call light within reach;encouraged to call for assist;exit alarm on;legs elevated  -CZ       User Key  (r) = Recorded By, (t) = Taken By, (c) = Cosigned By    Initials Name Provider Type    CZ Jw Martinez, PT Physical Therapist        Outcome Measures     Row Name 12/07/20 0927          How much help from another person do you currently need...    Turning from your back to your side while in flat bed without using bedrails?  2  -CZ     Moving from lying on back to sitting on the side of a flat bed without bedrails?  2  -CZ     Moving to and from a bed to a  chair (including a wheelchair)?  2  -CZ     Standing up from a chair using your arms (e.g., wheelchair, bedside chair)?  3  -CZ     Climbing 3-5 steps with a railing?  1  -CZ     To walk in hospital room?  2  -CZ     AM-PAC 6 Clicks Score (PT)  12  -CZ     Row Name 12/07/20 0927          Functional Assessment    Outcome Measure Options  AM-PAC 6 Clicks Basic Mobility (PT)  -       User Key  (r) = Recorded By, (t) = Taken By, (c) = Cosigned By    Initials Name Provider Type    CZ Jw Martinez, PT Physical Therapist        Physical Therapy Education                 Title: PT OT SLP Therapies (In Progress)     Topic: Physical Therapy (In Progress)     Point: Mobility training (Done)     Learning Progress Summary           Patient Acceptance, E, VU,NR by  at 12/7/2020 1041    Comment: Educated on proper technique for bed mobility, proper hand placement with transfers, PWB RLE.                   Point: Home exercise program (Not Started)     Learner Progress:  Not documented in this visit.          Point: Body mechanics (Not Started)     Learner Progress:  Not documented in this visit.          Point: Precautions (Not Started)     Learner Progress:  Not documented in this visit.                      User Key     Initials Effective Dates Name Provider Type Discipline     04/03/18 -  Jw Martinez, PT Physical Therapist PT              PT Recommendation and Plan  Planned Therapy Interventions (PT): balance training, bed mobility training, transfer training, patient/family education, strengthening, stretching, ROM (range of motion)  Plan of Care Reviewed With: patient  Outcome Summary: Initial PT evaluation compete; co-evaluation with OT.  Patient is much more alert and fairly cooperative today, verbal at times, anxious with functional mobility but calms back down quickly. SpO2: 83% on RA, 94% on 3 LPM  (patient not wearing O2 when PT arrived).  Patient requires max Ax2 with bed mobility, min Ax1 with sit to  stand and scooting to R while seated on bed.  Patient does not weightbear through RLE in stance, unable to advance either LE.  Return to SNF is appropriate for this patient, she will need continued skilled PT upon discharge.  Goals established, continue skilled PT.     Time Calculation:   PT Charges     Row Name 12/07/20 1049             Time Calculation    Start Time  0927  -CZ      Stop Time  1026  -CZ      Time Calculation (min)  59 min  -CZ      PT Received On  12/07/20  -CZ      PT Goal Re-Cert Due Date  12/20/20  -CZ        User Key  (r) = Recorded By, (t) = Taken By, (c) = Cosigned By    Initials Name Provider Type    CZ Jw Martinez, PT Physical Therapist        Therapy Charges for Today     Code Description Service Date Service Provider Modifiers Qty    75704944681 HC PT EVAL MOD COMPLEXITY 4 12/7/2020 Jw Martinez, PT GP 1          PT G-Codes  Outcome Measure Options: AM-PAC 6 Clicks Daily Activity (OT)  AM-PAC 6 Clicks Score (PT): 12  AM-PAC 6 Clicks Score (OT): 15    Jw Martinez PT  12/7/2020

## 2020-12-07 NOTE — PROGRESS NOTES
FAMILY MEDICINE DAILY PROGRESS NOTE    NAME: Marlene Horne  : 1931  MRN: [id number]      LOS: 2 days     PROVIDER OF SERVICE: Chuckie Nguyen MD    Chief Complaint: Closed nondisplaced intertrochanteric fracture of right femur (CMS/HCC)    Subjective:     Interval History:  History taken from: chart    POD#1 RIGHT intermedullary femoral nailing s/p fall.Also has right humerus fx - non operative, closed reduced right wrist fx in splint. Pain well controlled. No current complaints.     Review of Systems:   Review of Systems   Unable to perform ROS: Dementia       Objective:     Vital Signs  Temp:  [96.9 °F (36.1 °C)-98.2 °F (36.8 °C)] 97.8 °F (36.6 °C)  Heart Rate:  [] 86  Resp:  [14-20] 16  BP: ()/(51-83) 119/58  Body mass index is 28.82 kg/m².    Physical Exam  Physical Exam  Constitutional:       General: She is not in acute distress.     Appearance: She is not ill-appearing, toxic-appearing or diaphoretic.   HENT:      Head: Normocephalic and atraumatic.      Nose: Nose normal.      Mouth/Throat:      Mouth: Mucous membranes are moist.   Eyes:      Extraocular Movements: Extraocular movements intact.      Pupils: Pupils are equal, round, and reactive to light.   Cardiovascular:      Rate and Rhythm: Normal rate and regular rhythm.   Pulmonary:      Effort: Pulmonary effort is normal.      Breath sounds: No wheezing or rales.   Abdominal:      General: Bowel sounds are normal.      Tenderness: There is no abdominal tenderness.   Musculoskeletal:         General: Tenderness present.      Right lower leg: No edema.      Left lower leg: No edema.   Skin:     General: Skin is warm.      Capillary Refill: Capillary refill takes less than 2 seconds.   Neurological:      Mental Status: Mental status is at baseline. She is disoriented.         Medication Review    Current Facility-Administered Medications:   •  [DISCONTINUED] acetaminophen (TYLENOL) tablet 650 mg, 650 mg, Oral, Q4H PRN  **OR** [DISCONTINUED] acetaminophen (TYLENOL) 160 MG/5ML solution 650 mg, 650 mg, Oral, Q4H PRN **OR** acetaminophen (TYLENOL) suppository 650 mg, 650 mg, Rectal, Q4H PRN, Baudilio Brooks MD  •  acetaminophen (TYLENOL) tablet 500 mg, 500 mg, Oral, 4x Daily PRN, Baudilio Brooks MD  •  albuterol (PROVENTIL) nebulizer solution 0.083% 2.5 mg/3mL, 2.5 mg, Nebulization, Q4H PRN, Baudilio Brooks MD  •  amLODIPine (NORVASC) tablet 5 mg, 5 mg, Oral, Q24H, Baudilio Brooks MD, 5 mg at 12/06/20 1029  •  aspirin EC tablet 81 mg, 81 mg, Oral, Daily, Baudilio Brooks MD  •  ferrous sulfate EC tablet 324 mg, 324 mg, Oral, Daily With Breakfast, Baudilio Brooks MD  •  heparin (porcine) 5000 UNIT/ML injection 5,000 Units, 5,000 Units, Subcutaneous, Q12H, Baudilio Brooks MD  •  melatonin tablet 3 mg, 3 mg, Oral, Nightly, Baudilio Brooks MD, 3 mg at 12/06/20 1946  •  mirtazapine (REMERON) tablet 15 mg, 15 mg, Oral, Nightly, Baudilio Brooks MD, 15 mg at 12/06/20 1945  •  morphine injection 1 mg, 1 mg, Intravenous, Q4H PRN, 1 mg at 12/07/20 0511 **AND** naloxone (NARCAN) injection 0.4 mg, 0.4 mg, Intravenous, Q5 Min PRN, Baudilio Brooks MD  •  multivitamin with minerals 1 tablet, 1 tablet, Oral, Daily, Baudilio Brooks MD  •  nystatin (MYCOSTATIN) powder, , Topical, PRN, Baudilio Brooks MD  •  ondansetron (ZOFRAN) tablet 4 mg, 4 mg, Oral, Q6H PRN **OR** ondansetron (ZOFRAN) injection 4 mg, 4 mg, Intravenous, Q6H PRN, Baudilio Boroks MD  •  polyethylene glycol (MIRALAX) packet 17 g, 17 g, Oral, Daily, Baudilio Brooks MD  •  rosuvastatin (CRESTOR) tablet 5 mg, 5 mg, Oral, Daily, Baudilio Brooks MD  •  sennosides-docusate (PERICOLACE) 8.6-50 MG per tablet 2 tablet, 2 tablet, Oral, Nightly, Baudilio Brooks MD, 2 tablet at 12/06/20 1946  •  sodium chloride 0.9 % flush 10 mL, 10 mL, Intravenous, Q12H, Baudilio Brooks MD  •  sodium chloride 0.9 %  flush 10 mL, 10 mL, Intravenous, PRN, Baudilio Brooks MD  •  sodium chloride 0.9 % infusion, 50 mL/hr, Intravenous, Continuous, Baudilio Brooks MD, Last Rate: 50 mL/hr at 12/05/20 2122, 50 mL/hr at 12/05/20 2122  •  sodium chloride tablet 1 g, 1 g, Oral, TID With Meals, Baudilio Brooks MD, 1 g at 12/06/20 1814  •  traMADol (ULTRAM) tablet 25 mg, 25 mg, Oral, Q6H PRN, Baudilio Brooks MD, 25 mg at 12/06/20 1621  •  Vitamin D 1,000 Units, 1,000 Units, Oral, Daily, Baudilio Brooks MD     Diagnostic Data    Lab Results (last 24 hours)     ** No results found for the last 24 hours. **            I reviewed the patient's new clinical results.    Assessment/Plan:     Active Hospital Problems    Diagnosis POA   • **Closed nondisplaced intertrochanteric fracture of right femur (CMS/[facility]) [S72.144A] Yes     S/p OR fixation  - Pain control with tylenol, ultram, morphine  - PT/OT  - Appreciate ortho recs     • Fall at nursing home [W19.XXXA, Y92.129] Unknown     Mechanical fall due to non-compliance from walker.  Non-compliance due to baseline dementia.  Right hip, femur, wrist fractures.  - Will require rehabilitiation     • Closed displaced fracture of surgical neck of right humerus [S42.211A] Yes     S/p OR fixation  - Pain control with tylenol, ultram, morphine  - PT/OT  - Appreciate ortho recs     • Closed fracture of right wrist [S62.101A] Yes     -Fracture of the distal radius and possible small nondisplaced fracture of the tip of the ulnar styloid  S/p OR fixation  - Pain control with tylenol, ultram, morphine  - PT/OT  - Appreciate ortho recs     • DOUGLAS (acute kidney injury) (CMS/[facility]) [N17.9] Yes     - Cr 1.4 on admission. Baseline ~1  - IV fluids     • Leukocytosis [D72.829] Yes     -12.5 on admission  -Monitor     • Benign essential HTN [I10] Yes     Amlodipine 5 mg daily     • Constipation [K59.00] Yes     - Miralax and senokot     • Anemia [D64.9] Yes     - 10.5 on admission  - Iron  panel     • CKD (chronic kidney disease) stage 3, GFR 30-59 ml/min [N18.30] Yes       DVT prophylaxis: Heparin  Code status is   Code Status and Medical Interventions:   Ordered at: 12/05/20 2041     Limited Support to NOT Include:    Intubation     Code Status:    No CPR     Medical Interventions (Level of Support Prior to Arrest):    Limited       Plan for disposition:Where: current living arrangements and When:  1-2 days      Time: 20 mins       Chuckie Nguyen M.D. PGY3  [facility]  [address] [place] [address]  Office: [phone]    This document has been electronically signed by Chuckie Nguyen MD on December 7, 2020 07:08 CST

## 2020-12-07 NOTE — THERAPY EVALUATION
Patient Name: Marlene Horne  : 1931    MRN: [id number]                              Today's Date: 2020       Admit Date: 2020    Visit Dx:     ICD-10-CM ICD-9-CM   1. Closed nondisplaced intertrochanteric fracture of right femur, initial encounter (CMS/[facility])  S72.144A 820.21   2. Closed fracture of distal end of right radius, unspecified fracture morphology, initial encounter  S52.501A 813.42   3. Closed displaced fracture of surgical neck of right humerus, unspecified fracture morphology, initial encounter  S42.211A 812.01   4. Fall, initial encounter  W19.XXXA E888.9   5. Impaired physical mobility  Z74.09 781.99   6. Impaired mobility and activities of daily living  Z74.09 V49.89    Z78.9      Patient Active Problem List   Diagnosis   • Encephalopathy acute   • Major neurocognitive disorder (CMS/HCC)   • Iron deficiency anemia secondary to inadequate dietary iron intake   • Hyponatremia   • CKD (chronic kidney disease) stage 3, GFR 30-59 ml/min   • Hypovitaminosis D   • Drug-induced constipation   • Thyroid mass   • Anemia   • Hypokalemia   • Primary insomnia   • Constipation   • Hypoproteinemia (CMS/[facility])   • Altered mental status   • Dental disease   • Benign essential HTN   • Acute cystitis with hematuria   • Closed nondisplaced intertrochanteric fracture of right femur (CMS/HCC)   • Closed displaced fracture of surgical neck of right humerus   • Closed fracture of right wrist   • DOUGLAS (acute kidney injury) (CMS/[facility])   • Leukocytosis   • Fall at nursing home     Past Medical History:   Diagnosis Date   • Benign essential HTN 2019   • CKD (chronic kidney disease) stage 3, GFR 30-59 ml/min    • Constipation    • Dementia (CMS/HCC)    • Hyponatremia    • Hypovitaminosis D    • Iron deficiency anemia    • Major neurocognitive disorder (CMS/HCC)    • Stroke (CMS/HCC)      Past Surgical History:   Procedure Laterality Date   • CATARACT EXTRACTION       General Information     Row Name  12/07/20 0940          OT Time and Intention    Document Type  evaluation  -     Mode of Treatment  co-treatment;occupational therapy;physical therapy  -     Row Name 12/07/20 0940          General Information    Patient Profile Reviewed  yes  -     Existing Precautions/Restrictions  fall;right;hip;partial weight bearing;shoulder;non-weight bearing  -     Row Name 12/07/20 0940          Living Environment    Lives With  facility resident  -     Row Name 12/07/20 0940          Home Main Entrance    Number of Stairs, Main Entrance  none  -     Row Name 12/07/20 0940          Stairs Within Home, Primary    Number of Stairs, Within Home, Primary  none  -     Row Name 12/07/20 0940          Cognition    Orientation Status (Cognition)  oriented to;person  -     Row Name 12/07/20 0940          Safety Issues, Functional Mobility    Safety Issues Affecting Function (Mobility)  ability to follow commands  -     Impairments Affecting Function (Mobility)  balance;strength;endurance/activity tolerance;pain;cognition  -       User Key  (r) = Recorded By, (t) = Taken By, (c) = Cosigned By    Initials Name Provider Type     Kalpesh Fernandez OT Occupational Therapist        Mobility/ADL's     Row Name 12/07/20 0940          Bed Mobility    Bed Mobility  supine-sit;rolling left;sit-supine  -     Rolling Left [place] (Bed Mobility)  maximum assist (25% patient effort);1 person assist  -     Supine-Sit [place] (Bed Mobility)  maximum assist (25% patient effort);2 person assist  -     Sit-Supine [place] (Bed Mobility)  maximum assist (25% patient effort);2 person assist  -     Bed Mobility, Safety Issues  decreased use of legs for bridging/pushing;impaired trunk control for bed mobility  -     Assistive Device (Bed Mobility)  bed rails;head of bed elevated;draw sheet  -     Row Name 12/07/20 0940          Transfers    Transfers  sit-stand transfer  -     Sit-Stand [place] (Transfers)   minimum assist (75% patient effort);1 person assist  -[facility] Name 12/07/20 0940          Mobility    Extremity Weight-bearing Status  right upper extremity;right lower extremity  -     Right Upper Extremity (Weight-bearing Status)  non weight-bearing (NWB)  -     Right Lower Extremity (Weight-bearing Status)  partial weight-bearing (PWB)  -       User Key  (r) = Recorded By, (t) = Taken By, (c) = Cosigned By    Initials Name Provider Type    Kalpesh Yates OT Occupational Therapist        Obj/Interventions     [facility] Name 12/07/20 0940          Sensory Assessment (Somatosensory)    Sensory Assessment (Somatosensory)  UE sensation intact;sensation intact  -[facility] Name 12/07/20 0940          Vision Assessment/Intervention    Visual Impairment/Limitations  corrective lenses for reading  -[facility] Name 12/07/20 0940          Range of Motion Comprehensive    General Range of Motion  upper extremity range of motion deficits identified  -     Comment, General Range of Motion  RUE ROM Impaired 2/2 Wrist fracture and humerus fracture - pt in sling and not tolerating movement to RUE  -[facility] Name 12/07/20 0940          Strength Comprehensive (MMT)    Comment, General Manual Muscle Testing (MMT) Assessment  LUE MMT Strength 4-/5 Grossly, RUE MMT Strength not tested  -       User Key  (r) = Recorded By, (t) = Taken By, (c) = Cosigned By    Initials Name Provider Type    Kalpesh Yates OT Occupational Therapist        Goals/Plan     [facility] Name 12/07/20 0940          Bed Mobility Goal 1 (OT)    Activity/Assistive Device (Bed Mobility Goal 1, OT)  sit to supine/supine to sit  -     [place] Level/Cues Needed (Bed Mobility Goal 1, OT)  minimum assist (75% or more patient effort)  -     Time Frame (Bed Mobility Goal 1, OT)  long term goal (LTG);by discharge  -     Progress/Outcomes (Bed Mobility Goal 1, OT)  goal not met  -[facility] Name 12/07/20 0940          Transfer Goal 1 (OT)     Activity/Assistive Device (Transfer Goal 1, OT)  sit-to-stand/stand-to-sit  -     [place] Level/Cues Needed (Transfer Goal 1, OT)  contact guard assist  -     Time Frame (Transfer Goal 1, OT)  long term goal (LTG);by discharge  -     Progress/Outcome (Transfer Goal 1, OT)  goal not met  -     Row Name 12/07/20 0940          Bathing Goal 1 (OT)    Activity/Device (Bathing Goal 1, OT)  lower body bathing  -     [place] Level/Cues Needed (Bathing Goal 1, OT)  minimum assist (75% or more patient effort);verbal cues required;tactile cues required  -     Time Frame (Bathing Goal 1, OT)  long term goal (LTG);by discharge  -     Progress/Outcomes (Bathing Goal 1, OT)  goal not met  -     Row Name 12/07/20 0940          Dressing Goal 1 (OT)    Activity/Device (Dressing Goal 1, OT)  lower body dressing  -     [place]/Cues Needed (Dressing Goal 1, OT)  minimum assist (75% or more patient effort);verbal cues required;tactile cues required  -     Time Frame (Dressing Goal 1, OT)  long term goal (LTG);by discharge  -     Progress/Outcome (Dressing Goal 1, OT)  goal not met  -     Row Name 12/07/20 0940          Toileting Goal 1 (OT)    Activity/Device (Toileting Goal 1, OT)  toileting skills, all  -     [place] Level/Cues Needed (Toileting Goal 1, OT)  minimum assist (75% or more patient effort);verbal cues required;tactile cues required  -     Time Frame (Toileting Goal 1, OT)  long term goal (LTG);by discharge  -     Progress/Outcome (Toileting Goal 1, OT)  goal not met  -     Row Name 12/07/20 0940          Therapy Assessment/Plan (OT)    Planned Therapy Interventions (OT)  activity tolerance training;functional balance retraining;occupation/activity based interventions;BADL retraining;neuromuscular control/coordination retraining;transfer/mobility retraining;patient/caregiver education/training;strengthening exercise;ROM/therapeutic exercise  -       User Key  (r) =  Recorded By, (t) = Taken By, (c) = Cosigned By    Initials Name Provider Type     Kalpesh Fernandez, OT Occupational Therapist        Clinical Impression     Row Name 12/07/20 0940          Pain Assessment    Additional Documentation  Pain Scale: Numbers Pre/Post-Treatment (Group)  -     Row Name 12/07/20 0940          Pain Scale: Numbers Pre/Post-Treatment    Pretreatment Pain Rating  0/10 - no pain  -     Posttreatment Pain Rating  0/10 - no pain  -     Pain Location - Side  Right  -     Pain Location - Orientation  lower  -     Pain Location  extremity  -     Pre/Posttreatment Pain Comment  Yells out in pain with movement, but reports 0/10 pain  -     Pain Intervention(s)  Medication (See MAR);Repositioned;Emotional support;Ambulation/increased activity  -     Row Name 12/07/20 0940          Plan of Care Review    Plan of Care Reviewed With  patient  -     Row Name 12/07/20 0940          Therapy Assessment/Plan (OT)    Rehab Potential (OT)  good, to achieve stated therapy goals  -     Criteria for Skilled Therapeutic Interventions Met (OT)  yes;skilled treatment is necessary;no problems identified which require skilled intervention  [facility]     Therapy Frequency (OT)  other (see comments) 5-7 days per week  -     Predicted Duration of Therapy Intervention (OT)  Until goals met or d/c  -     Row Name 12/07/20 0940          Therapy Plan Review/Discharge Plan (OT)    Anticipated Discharge Disposition (OT)  [facility] nursing facility  -     Row Name 12/07/20 0940          Vital Signs    Pre Systolic BP Rehab  110  -JH     Pre Treatment Diastolic BP  50  -     Intratreatment Heart Rate (beats/min)  93  -     Posttreatment Heart Rate (beats/min)  62  -     Pre SpO2 (%)  83  -     O2 Delivery Pre Treatment  room air  -     Intra SpO2 (%)  93  -     O2 Delivery Intra Treatment  nasal cannula 3L of O2  -     Post SpO2 (%)  94  -     O2 Delivery Post Treatment  nasal cannula 3L of O2  -      Pre Patient Position  Supine  -     Intra Patient Position  Supine  -     Post Patient Position  Supine  -     Row Name 12/07/20 0940          Positioning and Restraints    Pre-Treatment Position  in bed  -     Post Treatment Position  bed  -JH     In Bed  supine;call light within reach;encouraged to call for assist;exit alarm on;side rails up x2;notified [facility]       User Key  (r) = Recorded By, (t) = Taken By, (c) = Cosigned By    Initials Name Provider Type    Kalpesh Yates OT Occupational Therapist        Outcome Measures     Row Name 12/07/20 0940          How much help from another is currently needed...    Putting on and taking off regular lower body clothing?  2  -JH     Bathing (including washing, rinsing, and drying)  2  -JH     Toileting (which includes using toilet bed pan or urinal)  2  -JH     Putting on and taking off regular upper body clothing  3  -JH     Taking care of personal grooming (such as brushing teeth)  3  -JH     Eating meals  3  -     AM-PAC 6 Clicks Score (OT)  15  -     Row Name 12/07/20 0940          Functional Assessment    Outcome Measure Options  AM-PAC 6 Clicks Daily Activity (OT)  [facility]       User Key  (r) = Recorded By, (t) = Taken By, (c) = Cosigned By    Initials Name Provider Type    Kalpesh Yates OT Occupational Therapist        Occupational Therapy Education                 Title: PT OT SLP Therapies (In Progress)     Topic: Occupational Therapy (In Progress)     Point: ADL training (Not Started)     Description:   Instruct learner(s) on proper safety adaptation and remediation techniques during self care or transfers.   Instruct in proper use of assistive devices.              Learner Progress:  Not documented in this visit.          Point: Home exercise program (Not Started)     Description:   Instruct learner(s) on appropriate technique for monitoring, assisting and/or progressing therapeutic exercises/activities.              Learner Progress:   Not documented in this visit.          Point: Precautions (In Progress)     Description:   Instruct learner(s) on prescribed precautions during self-care and functional transfers.              Learning Progress Summary           Patient Acceptance, E, NR by  at 12/7/2020 1012    Comment: Pt educated on role of OT, d/c recs, and POC                   Point: Body mechanics (Not Started)     Description:   Instruct learner(s) on proper positioning and spine alignment during self-care, functional mobility activities and/or exercises.              Learner Progress:  Not documented in this visit.                      User Key     Initials Effective Dates Name Provider Type Discipline     09/10/19 -  Kalpesh Fernandez OT Occupational Therapist OT              OT Recommendation and Plan  Planned Therapy Interventions (OT): activity tolerance training, functional balance retraining, occupation/activity based interventions, BADL retraining, neuromuscular control/coordination retraining, transfer/mobility retraining, patient/caregiver education/training, strengthening exercise, ROM/therapeutic exercise  Therapy Frequency (OT): other (see comments)(5-7 days per week)  Plan of Care Review  Plan of Care Reviewed With: patient  Outcome Summary: OT Eval completed on this date as co-eval with PT. Pt alert and agreeable to therapy - although very anxious with anticipated movement. Bed Mobility: Max A x 2 for Sup<>Sit<>Sup Transfers: Min A x 1 LBD: Max A Toileting: Dependent. Pt reports 0/10 pain at rest but yells out in pain with anticipated movement. Pt demos decreased cognition, decreased strength, decreased balance, increased pain with increased activity, and decreased safety awareness, Pt would benefit from continued skilled OT to address functional deficits. Recommend d/c back to SNF with further skilled therapy.     Time Calculation:   Time Calculation- OT     Row Name 12/07/20 1222             Time Calculation- OT    OT Start  Time  0940  -      OT Stop Time  1018  -      OT Time Calculation (min)  38 min  -      OT Received On  12/07/20  -      OT Goal Re-Cert Due Date  12/20/20  -        User Key  (r) = Recorded By, (t) = Taken By, (c) = Cosigned By    Initials Name Provider Type    Kalpesh Yates OT Occupational Therapist        Therapy Charges for Today     Code Description Service Date Service Provider Modifiers Qty    [id number]  OT EVAL MOD COMPLEXITY 3 12/7/2020 Kalpesh Fernandez OT GO 1               Kalpesh Fernandez OT  12/7/2020

## 2020-12-07 NOTE — PROGRESS NOTES
FAMILY MEDICINE DAILY PROGRESS NOTE    Angela Mcnamara MD      Consent, and risks and benefits of blood transfusion were dicussed with the patient's POA, Maureen Hernandez at [phone]  at 11:21 CST on 12/07/20.   Patient's POA understood and acknowledged education, counseling, medication/treatment benefits and side effects. Risks, complications, and expectations of outcomes were also addressed, discussed, and acknowledged. All questions were answered and addressed.         This document has been electronically signed by Angela Mcnamara MD on December 7, 2020 11:21 CST

## 2020-12-07 NOTE — PLAN OF CARE
Goal Outcome Evaluation:  Plan of Care Reviewed With: patient     Outcome Summary: Initial PT evaluation compete; co-evaluation with OT.  Patient is much more alert and fairly cooperative today, verbal at times, anxious with functional mobility but calms back down quickly. SpO2: 83% on RA, 94% on 3 LPM  (patient not wearing O2 when PT arrived).  Patient requires max Ax2 with bed mobility, min Ax1 with sit to stand and scooting to R while seated on bed.  Patient does not weightbear through RLE in stance, unable to advance either LE.  Return to SNF is appropriate for this patient, she will need continued skilled PT upon discharge.  Goals established, continue skilled PT.

## 2020-12-07 NOTE — DISCHARGE PLACEMENT REQUEST
"    Per MD, plan retun 1-2 days        Pam Horne (89 y.o. Female)     Date of Birth Social Security Number Address Home Phone MRN    11/28/1931  [address] [place] [address] [phone] [id number]    [employer] Marital Status          Restoration        Admission Date Admission Type Admitting Provider Attending Provider Department, Room/Bed    12/5/20 Emergency Nims, MD Patrick Chu Christofer P, MD [address], 381/1    Discharge Date Discharge Disposition Discharge Destination                       Attending Provider: Chuckie Nguyen MD    Allergies: No Known Allergies    Isolation: None   Infection: None   Code Status: No CPR    Ht: 162.6 cm (64\")   Wt: 76.2 kg (167 lb 14.4 oz)    Admission Cmt: None   Principal Problem: Closed nondisplaced intertrochanteric fracture of right femur (CMS/[facility]) [S72.144A] More...                 Active Insurance as of 12/5/2020     Primary Coverage     Payor Plan Insurance Group Employer/Plan Group    MEDICARE MEDICARE A & B      Payor Plan Address Payor Plan Phone Number Payor Plan Fax Number Effective Dates    [address] [phone]  11/1/1996 - None Entered    [facility] [address]       Subscriber Name Subscriber Birth Date Member ID       PAM HORNE 11/28/1931 [id number]           Secondary Coverage     Payor Plan Insurance Group Employer/Plan Group    [place]      Payor Plan Address Payor Plan Phone Number Payor Plan Fax Number Effective Dates    [address] [phone]  1/6/2020 - None Entered    [facility] [id number]       Subscriber Name Subscriber Birth Date Member ID       PAM HORNE 11/28/1931 [id number]                 Emergency Contacts      (Rel.) Home Phone Work Phone Mobile Phone    Maureen Hernandez (Power of ) [phone] -- [phone]    nicole simental (Son) -- -- [phone]            Emergency Contact Information     Name " Relation Home Work Mobile    Maureen Hernandez Power of  [phone]    nicole simental   [phone]          Insurance Information                MEDICARE/MEDICARE A & B Phone: [phone]    Subscriber: Marlene Horne Subscriber#: [id number]    Group#:  Precert#:         [place] Phone: [phone]    Subscriber: Marlene Horne Subscriber#: [id number]    Group#:  Precert#:

## 2020-12-07 NOTE — PLAN OF CARE
Problem: Adult Inpatient Plan of Care  Goal: Plan of Care Review  Flowsheets  Taken 12/7/2020 1222  Plan of Care Reviewed With: patient  Outcome Summary: OT Eval completed on this date as co-eval with PT. Pt alert and agreeable to therapy - although very anxious with anticipated movement. Pt RUE in sling 2/2 humerus fracture and R wrist in splint 2/2 wrist fracture s/p sx - pt not tolerating any movement to RUE. Bed Mobility: Max A x 2 for Sup<>Sit<>Sup Transfers: Min A x 1 LBD: Max A Toileting: Dependent. Pt reports 0/10 pain at rest but yells out in pain with anticipated movement. Pt demos decreased cognition,  Decreased ROM, decreased strength, decreased balance, increased pain with increased activity, and decreased safety awareness, Pt would benefit from continued skilled OT to address functional deficits. Recommend d/c back to SNF with further skilled therapy.  Taken 12/7/2020 0940  Plan of Care Reviewed With: patient

## 2020-12-07 NOTE — PLAN OF CARE
Problem: Adult Inpatient Plan of Care  Goal: Plan of Care Review  Outcome: Ongoing, Progressing  Goal: Patient-Specific Goal (Individualized)  Outcome: Ongoing, Progressing  Goal: Absence of Hospital-Acquired Illness or Injury  Outcome: Ongoing, Progressing  Goal: Optimal Comfort and Wellbeing  Outcome: Ongoing, Progressing  Goal: Readiness for Transition of Care  Outcome: Ongoing, Progressing     Problem: Fall Injury Risk  Goal: Absence of Fall and Fall-Related Injury  Outcome: Ongoing, Progressing     Problem: Skin Injury Risk Increased  Goal: Skin Health and Integrity  Outcome: Ongoing, Progressing   Goal Outcome Evaluation:  Plan of Care Reviewed With: patient

## 2020-12-07 NOTE — PLAN OF CARE
Goal Outcome Evaluation:  Plan of Care Reviewed With: patient  Spoke with nursing re pt's intake/will add oral supplements and change consistency to mech soft to add calories and protein and ease in eating.RDN staff will continue to monitor.

## 2020-12-07 NOTE — PLAN OF CARE
Goal Outcome Evaluation:  Plan of Care Reviewed With: patient  Pt refused 1 u prbc.H&H went up.pt has been aggravated with movement today and appears to be in pain,pain med given.pt is sleeping quite a bit and nutrition is inadequate

## 2020-12-08 VITALS
RESPIRATION RATE: 18 BRPM | HEIGHT: 64 IN | OXYGEN SATURATION: 95 % | HEART RATE: 110 BPM | BODY MASS INDEX: 28.6 KG/M2 | TEMPERATURE: 98 F | WEIGHT: 167.5 LBS | SYSTOLIC BLOOD PRESSURE: 122 MMHG | DIASTOLIC BLOOD PRESSURE: 76 MMHG

## 2020-12-08 LAB
ANION GAP SERPL CALCULATED.3IONS-SCNC: 8 MMOL/L (ref 5–15)
BASOPHILS # BLD AUTO: 0.04 10*3/MM3 (ref 0–0.2)
BASOPHILS NFR BLD AUTO: 0.3 % (ref 0–1.5)
BUN SERPL-MCNC: 34 MG/DL (ref 8–23)
BUN/CREAT SERPL: 24.1 (ref 7–25)
CALCIUM SPEC-SCNC: 8.4 MG/DL (ref 8.6–10.5)
CHLORIDE SERPL-SCNC: 108 MMOL/L (ref 98–107)
CO2 SERPL-SCNC: 22 MMOL/L (ref 22–29)
CREAT SERPL-MCNC: 1.41 MG/DL (ref 0.57–1)
CREAT UR-MCNC: 145.2 MG/DL
DEPRECATED RDW RBC AUTO: 47 FL (ref 37–54)
EOSINOPHIL # BLD AUTO: 0.38 10*3/MM3 (ref 0–0.4)
EOSINOPHIL NFR BLD AUTO: 2.8 % (ref 0.3–6.2)
ERYTHROCYTE [DISTWIDTH] IN BLOOD BY AUTOMATED COUNT: 14.1 % (ref 12.3–15.4)
GFR SERPL CREATININE-BSD FRML MDRD: 35 ML/MIN/1.73
GLUCOSE BLDC GLUCOMTR-MCNC: 122 MG/DL (ref 70–130)
GLUCOSE BLDC GLUCOMTR-MCNC: 127 MG/DL (ref 70–130)
GLUCOSE SERPL-MCNC: 108 MG/DL (ref 65–99)
HANSEL STAIN: NEGATIVE
HCT VFR BLD AUTO: 24.1 % (ref 34–46.6)
HGB BLD-MCNC: 8.3 G/DL (ref 12–15.9)
IMM GRANULOCYTES # BLD AUTO: 0.26 10*3/MM3 (ref 0–0.05)
IMM GRANULOCYTES NFR BLD AUTO: 1.9 % (ref 0–0.5)
LYMPHOCYTES # BLD AUTO: 1.57 10*3/MM3 (ref 0.7–3.1)
LYMPHOCYTES NFR BLD AUTO: 11.7 % (ref 19.6–45.3)
MCH RBC QN AUTO: 31.9 PG (ref 26.6–33)
MCHC RBC AUTO-ENTMCNC: 34.4 G/DL (ref 31.5–35.7)
MCV RBC AUTO: 92.7 FL (ref 79–97)
MONOCYTES # BLD AUTO: 0.59 10*3/MM3 (ref 0.1–0.9)
MONOCYTES NFR BLD AUTO: 4.4 % (ref 5–12)
NEUTROPHILS NFR BLD AUTO: 10.62 10*3/MM3 (ref 1.7–7)
NEUTROPHILS NFR BLD AUTO: 78.9 % (ref 42.7–76)
NRBC BLD AUTO-RTO: 0.1 /100 WBC (ref 0–0.2)
PLATELET # BLD AUTO: 170 10*3/MM3 (ref 140–450)
PMV BLD AUTO: 9.9 FL (ref 6–12)
POTASSIUM SERPL-SCNC: 4.3 MMOL/L (ref 3.5–5.2)
RBC # BLD AUTO: 2.6 10*6/MM3 (ref 3.77–5.28)
SARS-COV-2 N GENE RESP QL NAA+PROBE: NOT DETECTED
SODIUM SERPL-SCNC: 138 MMOL/L (ref 136–145)
SODIUM UR-SCNC: 84 MMOL/L
VIT B12 BLD-MCNC: 487 PG/ML (ref 211–946)
WBC # BLD AUTO: 13.46 10*3/MM3 (ref 3.4–10.8)

## 2020-12-08 PROCEDURE — 85025 COMPLETE CBC W/AUTO DIFF WBC: CPT | Performed by: ORTHOPAEDIC SURGERY

## 2020-12-08 PROCEDURE — 99231 SBSQ HOSP IP/OBS SF/LOW 25: CPT | Performed by: STUDENT IN AN ORGANIZED HEALTH CARE EDUCATION/TRAINING PROGRAM

## 2020-12-08 PROCEDURE — 82962 GLUCOSE BLOOD TEST: CPT

## 2020-12-08 PROCEDURE — 80048 BASIC METABOLIC PNL TOTAL CA: CPT | Performed by: ORTHOPAEDIC SURGERY

## 2020-12-08 PROCEDURE — 87635 SARS-COV-2 COVID-19 AMP PRB: CPT | Performed by: STUDENT IN AN ORGANIZED HEALTH CARE EDUCATION/TRAINING PROGRAM

## 2020-12-08 PROCEDURE — 97530 THERAPEUTIC ACTIVITIES: CPT

## 2020-12-08 RX ORDER — TRAMADOL HYDROCHLORIDE 50 MG/1
25 TABLET ORAL EVERY 6 HOURS PRN
Qty: 20 TABLET | Refills: 0 | Status: SHIPPED | OUTPATIENT
Start: 2020-12-08 | End: 2020-12-13

## 2020-12-08 RX ADMIN — Medication 1000 UNITS: at 08:33

## 2020-12-08 RX ADMIN — Medication 1 TABLET: at 08:33

## 2020-12-08 RX ADMIN — ROSUVASTATIN CALCIUM 5 MG: 5 TABLET, FILM COATED ORAL at 08:33

## 2020-12-08 RX ADMIN — Medication 1 G: at 17:41

## 2020-12-08 RX ADMIN — TRAMADOL HYDROCHLORIDE 25 MG: 50 TABLET, FILM COATED ORAL at 08:39

## 2020-12-08 RX ADMIN — Medication 1 G: at 08:33

## 2020-12-08 RX ADMIN — FERROUS SULFATE TAB EC 324 MG (65 MG FE EQUIVALENT) 324 MG: 324 (65 FE) TABLET DELAYED RESPONSE at 08:33

## 2020-12-08 RX ADMIN — AMLODIPINE BESYLATE 5 MG: 5 TABLET ORAL at 08:33

## 2020-12-08 RX ADMIN — POLYETHYLENE GLYCOL 3350 17 G: 17 POWDER, FOR SOLUTION ORAL at 08:32

## 2020-12-08 RX ADMIN — Medication 1 G: at 11:02

## 2020-12-08 RX ADMIN — ASPIRIN 81 MG: 81 TABLET, FILM COATED ORAL at 08:33

## 2020-12-08 NOTE — THERAPY TREATMENT NOTE
Acute Care - Physical Therapy Treatment Note  [facility]     Patient Name: Marlene Horne  : 1931  MRN: [id number]  Today's Date: 2020           PT Assessment (last 12 hours)      PT Evaluation and Treatment     Row Name 20 1105          Physical Therapy Time and Intention    Document Type  therapy note (daily note)  -     Mode of Treatment  physical therapy  -     Comment  pt became aggitated as nsg applied O2. pt upset and stating leave me alone. pt pushing away nsg and PTAs hands. no further tx.   -     Row Name 20 1105          General Information    Patient Profile Reviewed  yes  -     Existing Precautions/Restrictions  fall;right;hip;partial weight bearing;shoulder;non-weight bearing  -     Row Name 20 1105          Cognition    Affect/Mental Status (Cognitive)  confused pt would not answer any orientation questions.   -     Orientation Status (Cognition)  --  -     Row Name 20 1105          Pain Scale: Numbers Pre/Post-Treatment    Pretreatment Pain Rating  --  -     Posttreatment Pain Rating  --  -     Row Name 20 1105          Range of Motion Comprehensive    General Range of Motion  --  -     Row Name 20 1105          Strength Comprehensive (MMT)    General Manual Muscle Testing (MMT) Assessment  --  -     Row Name 20 1105          Mobility    Extremity Weight-bearing Status  right upper extremity;right lower extremity  -     Right Upper Extremity (Weight-bearing Status)  non weight-bearing (NWB)  -     Right Lower Extremity (Weight-bearing Status)  partial weight-bearing (PWB)  -     Row Name 20 1105          Bed Mobility    Bed Mobility  --  -     Rolling Left Jenkins (Bed Mobility)  --  -     Scooting/Bridging Jenkins (Bed Mobility)  --  -     Supine-Sit Jenkins (Bed Mobility)  --  -     Sit-Supine Jenkins (Bed Mobility)  --  -     Assistive Device (Bed Mobility)  --  -      Row Name 12/08/20 1105          Transfers    Sit-Stand Dietrich (Transfers)  --  -     Row Name 12/08/20 1105          Gait/Stairs (Locomotion)    Dietrich Level (Gait)  --  -     Row Name 12/08/20 1105          Safety Issues, Functional Mobility    Impairments Affecting Function (Mobility)  --  -     Row Name             Wound 12/06/20 0728 Right lateral greater trochanter    Wound - Properties Group Placement Date: 12/06/20  - Placement Time: 0728 -JE Side: Right  -JE Orientation: lateral  -JE Location: greater trochanter  -JE    Retired Wound - Properties Group Date first assessed: 12/06/20  - Time first assessed: 0728  -JE Side: Right  -JE Location: greater trochanter  -JE    Row Name 12/08/20 1105          Vital Signs    Pre Systolic BP Rehab  140  -     Pre Treatment Diastolic BP  49  -     Pretreatment Heart Rate (beats/min)  89  -     Pre SpO2 (%)  82 nsg notified, applied O2 and improved to increased to 96%  -     O2 Delivery Pre Treatment  room air  -     Intra SpO2 (%)  -- nsg took O2 back off and held steady at 96%  -     O2 Delivery Intra Treatment  -- nsg states she will check it again shortly.   -     Pre Patient Position  Supine  -     Post Patient Position  Supine  -     Row Name 12/08/20 1105          Bed Mobility Goal 1 (PT)    Activity/Assistive Device (Bed Mobility Goal 1, PT)  sit to supine/supine to sit;rolling to right;rolling to left  -     Dietrich Level/Cues Needed (Bed Mobility Goal 1, PT)  minimum assist (75% or more patient effort)  -     Time Frame (Bed Mobility Goal 1, PT)  long term goal (LTG);by discharge  -     Strategies/Barriers (Bed Mobility Goal 1, PT)  R hip ORIF: PWB, R wrist fx: NWB, splinted, R humerus fx: NWB, sling.  -     Progress/Outcomes (Bed Mobility Goal 1, PT)  goal not met  -     Row Name 12/08/20 1105          Transfer Goal 1 (PT)    Activity/Assistive Device (Transfer Goal 1, PT)   sit-to-stand/stand-to-sit;bed-to-chair/chair-to-bed;walker, carina  -ANATOLIY     Davie Level/Cues Needed (Transfer Goal 1, PT)  contact guard assist  -ANATOLIY     Time Frame (Transfer Goal 1, PT)  long term goal (LTG);by discharge  -ANATOLIY     Strategies/Barriers (Transfers Goal 1, PT)  R hip ORIF: PWB, R wrist fx: NWB, splinted, R humerus fx: NWB, sling.  -ANATOLIY     Progress/Outcome (Transfer Goal 1, PT)  goal not met  -     Row Name 12/08/20 1105          ROM Goal 1 (PT)    ROM Goal 1 (PT)  Patient will tolerate AAROM to RLE, AROM to L LE, 10-15 reps each.  -ANATOLIY     Time Frame (ROM Goal 1, PT)  long-term goal (LTG);by discharge  -ANATOLIY     Strategies/Barriers (ROM Goal 1, PT)  R hip ORIF: PWB, R wrist fx: NWB, splinted, R humerus fx: NWB, sling.  -     Progress/Outcome (ROM Goal 1, PT)  goal not met  -     Row Name 12/08/20 1105          Positioning and Restraints    Pre-Treatment Position  in bed  -ANATOLIY     Post Treatment Position  bed  -ANATOLIY     In Bed  fowlers;call light within reach;encouraged to call for assist;exit alarm on all needs met.   -     Row Name 12/08/20 1105          Therapy Assessment/Plan (PT)    Rehab Potential (PT)  good, to achieve stated therapy goals  -     Criteria for Skilled Interventions Met (PT)  yes;skilled treatment is necessary  -       User Key  (r) = Recorded By, (t) = Taken By, (c) = Cosigned By    Initials Name Provider Type    ANATOLIY Noah Jaimes PTA Physical Therapy Assistant    Aimee Villegas, RN Registered Nurse        Physical Therapy Education                 Title: PT OT SLP Therapies (In Progress)     Topic: Physical Therapy (In Progress)     Point: Mobility training (In Progress)     Learning Progress Summary           Patient Acceptance, E, NR by ANATOLIY at 12/8/2020 1205    Acceptance, E, VU,NR by CZ at 12/7/2020 1041    Comment: Educated on proper technique for bed mobility, proper hand placement with transfers, PWB RLE.                   Point: Home exercise program (Not  "Started)     Learner Progress:  Not documented in this visit.          Point: Body mechanics (Not Started)     Learner Progress:  Not documented in this visit.          Point: Precautions (Not Started)     Learner Progress:  Not documented in this visit.                      User Key     Initials Effective Dates Name Provider Type Discipline    ANATOLIY 03/07/18 -  Noah Jaimes PTA Physical Therapy Assistant PT    CZ 04/03/18 -  Jw Martinez, PT Physical Therapist PT              PT Recommendation and Plan  Anticipated Discharge Disposition (PT): skilled nursing facility  Therapy Frequency (PT): other (see comments)(5-7x/week, BID if able.)  Plan of Care Reviewed With: patient  Progress: improving  Outcome Summary: pt appears confused. would not answer any orientation questions. pts SpO2 was sabina 82% on RA. nsg notifed who applied O2 and improved to 96%. nsg removed O2 and held steady at 96%. nsg states she will monitor. pt became aggitated when nsg applied O2. pt repetitively stated, \"leave me alone.\" pt woudn't speak anymore to nsg or PTA, no further tx.       Time Calculation:   PT Charges     Row Name 12/08/20 1209             Time Calculation    Start Time  1105  -ANATOLIY      Stop Time  1122  -ANATOLIY      Time Calculation (min)  17 min  -ANATOLIY         Time Calculation- PT    Total Timed Code Minutes- PT  17 minute(s)  -ANATOLIY        User Key  (r) = Recorded By, (t) = Taken By, (c) = Cosigned By    Initials Name Provider Type    ANATOLIY Noah Jaimes PTA Physical Therapy Assistant        Therapy Charges for Today     Code Description Service Date Service Provider Modifiers Qty    [id number]  PT THERAPEUTIC ACT EA 15 MIN 12/8/2020 Noah Jaimes PTA GP 1          PT G-Codes  Outcome Measure Options: AM-PAC 6 Clicks Daily Activity (OT)  AM-PAC 6 Clicks Score (PT): 12  AM-PAC 6 Clicks Score (OT): 15    Noah Jaimes PTA  12/8/2020    "

## 2020-12-08 NOTE — PROGRESS NOTES
"Physicians Statement of Medical Necessity for  Ambulance Transportation    GENERAL INFORMATION     Name: Marlene Horne  YOB: 1931  Medicare #: [id number]  Transport Date:12/08/2020 (Valid for round trips this date, or for scheduled repetitive trips for 60 days from the date signed below.)  Origin: [facility] room 381  Destination: [facility] room 308  Is the Patient's stay covered under Medicare Part A (PPS/DRG?)Yes   Closest appropriate facility? Yes  If this a hosp-hosp transfer? No  Is this a hospice patient? No    MEDICAL NECESSITY QUESTIONAIRE    Ambulance Transportation is medically necessary only if other means of transportation are contraindicated or would be potentially harmful to the patient.  To meet this requirement, the patient must be either \"bed confined\" or suffer from a condition such that transport by means other than an ambulance is contraindicated by the patient's condition.  The following questions must be answered by the healthcare professional signing below for this form to be valid:     1) Describe the MEDICAL CONDITION (physical and/or mental) of this patient AT THE TIME OF AMBULANCE TRANSPORT that requires the patient to be transported in an ambulance, and why transport by other means is contraindicated by the patient's condition: activity intolerance and impaired mobility due to fractured right hip, humerus and wrist       Past Surgical History:   Procedure Laterality Date   • CATARACT EXTRACTION     • CLOSED REDUCTION WRIST FRACTURE Right 12/6/2020    Procedure: CLOSED REDUCTION AND SPLINTING OF RIGHT WRIST WITH FLUOROSCOPIC ASSISTANCE;  Surgeon: Baudilio Brooks MD;  Location: [facility];  Service: Orthopedics;  Laterality: Right;   • HIP INTERTROCHANTERIC NAILING Right 12/6/2020    Procedure: INTERMEDULLARY NAILING OF RIGHT FEMUR USING GAMMA NAIL AND FLUOROSCOPIC ASSITANCE;  Surgeon: Baudilio Brooks MD;  Location: [facility];  Service: Orthopedics;  " "Laterality: Right;      2) Is this patient \"bed confined\" as defined below?No    To be \"bed confined\" the patient must satisfy all three of the following criteria:  (1) unable to get up from bed without assistance; AND (2) unable to ambulate;  AND (3) unable to sit in a chair or wheelchair.  3) Can this patient safely be transported by car or wheelchair van (I.e., may safely sit during transport, without an attendant or monitoring?)No   4. In addition to completing questions 1-3 above, please check any of the following conditions that apply*:          *Note: supporting documentation for any boxes checked must be maintained in the patient's medical records Non-healed fractures, Patient is confused, Moderate/severe pain on movement, Medical attendant required and Unable to tolerate seated position for time needed to transport      SIGNATURE OF PHYSICIAN OR OTHER AUTHORIZED HEALTHCARE PROFESSIONAL    I certify that the above information is true and correct based on my evaluation of this patient, and represent that the patient requires transport by ambulance and that other forms of transport are contraindicated.  I understand that this information will be used by the Centers for Medicare and Medicaid Services (CMS) to support the determiniation of medical necessity for ambulance services, and I represent that I have personal knowledge of the patient's condition at the time of transport.       If this box is checked, I also certify that the patient is physically or mentally incapable of signing the ambulance service's claim form and that the institution with which I am affiliated has furnished care, services or assistance to the patient.  My signature below is made on behalf of the patient pursuant to 42 .36(b)(4). In accordance with 42 .37, the specific reason(s) that the patient is physically or mentally incapable of signing the claim for is as follows:     Signature of Physician or Healthcare " Professional   Dorie Suazo RN  Date/Time:   12/08/2020 15:00     (For Scheduled repetitive transport, this form is not valid for transports performed more than 60 days after this date).                                                                                                                                            --------------------------------------------------------------------------------------------  Printed Name and Credentials of Physician or Authorized Healthcare Professional     *Form must be signed by patient's attending physician for scheduled, repetitive transports,.  For non-repetitive ambulance transports, if unable to obtain the signature of the attending physician, any of the following may sign (please select below):     Physician  Clinical Nurse Specialist  Registered Nurse     Physician Assistant X Discharge Planner  Licensed Practical Nurse     Nurse Practitioner X

## 2020-12-08 NOTE — PROGRESS NOTES
"Physicians Statement of Medical Necessity for  Ambulance Transportation    GENERAL INFORMATION     Name: Marlene Horne  YOB: 1931  Medicare # [id number]  Transport Date: 12/08/2020 (Valid for round trips this date, or for scheduled repetitive trips for 60 days from the date signed below.)  Origin: [facility] room 381  Destination: [facility] Room 308  Is the Patient's stay covered under Medicare Part A (PPS/DRG?)Yes   Closest appropriate facility? Yes  If this a hosp-hosp transfer? No  Is this a hospice patient? No    MEDICAL NECESSITY QUESTIONAIRE    Ambulance Transportation is medically necessary only if other means of transportation are contraindicated or would be potentially harmful to the patient.  To meet this requirement, the patient must be either \"bed confined\" or suffer from a condition such that transport by means other than an ambulance is contraindicated by the patient's condition.  The following questions must be answered by the healthcare professional signing below for this form to be valid: impaired mobility,activity intolerance due to fractured right hip,humerus and wrist. High risk fall due to ams and dementia    1) Describe the MEDICAL CONDITION (physical and/or mental) of this patient AT THE TIME OF AMBULANCE TRANSPORT that requires the patient to be transported in an ambulance, and why transport by other means is contraindicated by the patient's condition: ***  Past Medical History:   Diagnosis Date   • Benign essential HTN 11/22/2019   • CKD (chronic kidney disease) stage 3, GFR 30-59 ml/min    • Constipation    • Dementia (CMS/HCC)    • Hyponatremia    • Hypovitaminosis D    • Iron deficiency anemia    • Major neurocognitive disorder (CMS/HCC)    • Stroke (CMS/HCC)       Past Surgical History:   Procedure Laterality Date   • CATARACT EXTRACTION     • CLOSED REDUCTION WRIST FRACTURE Right 12/6/2020    Procedure: CLOSED REDUCTION AND SPLINTING OF RIGHT WRIST WITH " "FLUOROSCOPIC ASSISTANCE;  Surgeon: Baudilio Brooks MD;  Location: [facility];  Service: Orthopedics;  Laterality: Right;   • HIP INTERTROCHANTERIC NAILING Right 12/6/2020    Procedure: INTERMEDULLARY NAILING OF RIGHT FEMUR USING GAMMA NAIL AND FLUOROSCOPIC ASSITANCE;  Surgeon: Baudilio Brooks MD;  Location: [facility];  Service: Orthopedics;  Laterality: Right;      2) Is this patient \"bed confined\" as defined below?{Yes No:35560}   To be \"bed confined\" the patient must satisfy all three of the following criteria:  (1) unable to get up from bed without assistance; AND (2) unable to ambulate;  AND (3) unable to sit in a chair or wheelchair.  3) Can this patient safely be transported by car or wheelchair van (I.e., may safely sit during transport, without an attendant or monitoring?){Yes No:96109}  4. In addition to completing questions 1-3 above, please check any of the following conditions that apply*:          *Note: supporting documentation for any boxes checked must be maintained in the patient's medical records {Ambulance Medical Necessity Reasons:12145}      SIGNATURE OF PHYSICIAN OR OTHER AUTHORIZED HEALTHCARE PROFESSIONAL    I certify that the above information is true and correct based on my evaluation of this patient, and represent that the patient requires transport by ambulance and that other forms of transport are contraindicated.  I understand that this information will be used by the Centers for Medicare and Medicaid Services (CMS) to support the determiniation of medical necessity for ambulance services, and I represent that I have personal knowledge of the patient's condition at the time of transport.    ***   If this box is checked, I also certify that the patient is physically or mentally incapable of signing the ambulance service's claim form and that the institution with which I am affiliated has furnished care, services or assistance to the patient.  My signature below is made on " behalf of the patient pursuant to 42 .36(b)(4). In accordance with 42 .37, the specific reason(s) that the patient is physically or mentally incapable of signing the claim for is as follows: ***    Signature of Physician or Healthcare Professional  Date/Time:   ***     (For Scheduled repetitive transport, this form is not valid for transports performed more than 60 days after this date).                                                                                                                                            --------------------------------------------------------------------------------------------  Printed Name and Credentials of Physician or Authorized Healthcare Professional     *Form must be signed by patient's attending physician for scheduled, repetitive transports,.  For non-repetitive ambulance transports, if unable to obtain the signature of the attending physician, any of the following may sign (please select below):     Physician  Clinical Nurse Specialist  Registered Nurse     Physician Assistant  Discharge Planner  Licensed Practical Nurse     Nurse Practitioner

## 2020-12-08 NOTE — PROGRESS NOTES
FAMILY MEDICINE DAILY PROGRESS NOTE    NAME: Marlene Horne  : 1931  MRN: [id number]      LOS: 3 days     PROVIDER OF SERVICE: Chuckie Nguyen MD    Chief Complaint: Closed nondisplaced intertrochanteric fracture of right femur (CMS/HCC)    Subjective:     Interval History:  History taken from: chart RN    POD#2 Right intermedullary femoral nailing s/p fall, with right colles fx-reduced and right humeral fracture with conservative management. Afebrile, VSS except some tachycardia - pain medicine appears to resolves. Working with PT/OT, to continue at SNF. Hgb dropped to 7.0 and received 1 unit. AM labs pending. Heparin stopped for DVT prophylaxis and placed on SCDs.       Review of Systems:   Review of Systems   Unable to perform ROS: Dementia       Objective:     Vital Signs  Temp:  [96.9 °F (36.1 °C)-98 °F (36.7 °C)] 98 °F (36.7 °C)  Heart Rate:  [] 110  Resp:  [18] 18  BP: (122-148)/(63-76) 122/76  Body mass index is 28.75 kg/m².    Physical Exam  Physical Exam  Constitutional:       General: She is not in acute distress.     Appearance: She is not ill-appearing, toxic-appearing or diaphoretic.   HENT:      Head: Normocephalic and atraumatic.      Nose: Nose normal.      Mouth/Throat:      Mouth: Mucous membranes are moist.   Eyes:      Extraocular Movements: Extraocular movements intact.      Pupils: Pupils are equal, round, and reactive to light.   Neck:      Musculoskeletal: Normal range of motion and neck supple.   Cardiovascular:      Rate and Rhythm: Normal rate and regular rhythm.   Pulmonary:      Effort: Pulmonary effort is normal.      Breath sounds: Normal breath sounds. No wheezing or rales.   Abdominal:      General: Bowel sounds are normal.      Tenderness: There is no abdominal tenderness. There is no guarding.   Musculoskeletal:      Right lower leg: No edema.      Left lower leg: No edema.   Lymphadenopathy:      Cervical: No cervical adenopathy.   Skin:     General:  Skin is warm.      Capillary Refill: Capillary refill takes less than 2 seconds.   Neurological:      Mental Status: She is alert. Mental status is at baseline.         Medication Review  No current facility-administered medications for this encounter.     Current Outpatient Medications:   •  acetaminophen (TYLENOL) 325 MG tablet, Take 2 tablets by mouth Every 4 (Four) Hours As Needed for Mild Pain ., Disp: , Rfl:   •  albuterol (PROVENTIL) (2.5 MG/3ML) 0.083% nebulizer solution, Take 2.5 mg by nebulization Every 4 (Four) Hours As Needed for Shortness of Air., Disp: , Rfl: 12  •  amLODIPine (NORVASC) 5 MG tablet, Take 1 tablet by mouth Daily., Disp: , Rfl:   •  aspirin 81 MG EC tablet, Take 81 mg by mouth Daily., Disp: , Rfl:   •  Chlorhexidine Gluconate solution, , Disp: , Rfl:   •  Cholecalciferol 25 MCG (1000 UT) tablet, Take 1 tablet by mouth Daily., Disp: 30 tablet, Rfl: 11  •  ferrous sulfate 324 (65 Fe) MG tablet delayed-release EC tablet, Take 1 tablet by mouth Daily With Breakfast., Disp: 30 tablet, Rfl:   •  melatonin 5 MG tablet tablet, Take 5 mg by mouth Every Night., Disp: , Rfl:   •  Menthol-Zinc Oxide (Calmoseptine) 0.44-20.6 % ointment, Apply  topically to the appropriate area as directed 2 (Two) Times a Day., Disp: , Rfl:   •  mirtazapine (REMERON) 7.5 MG half tablet, Take 15 mg by mouth Every Night., Disp: , Rfl:   •  Multiple Vitamins-Minerals (I-HERNESTO) tablet tablet, Take 1 tablet by mouth Daily., Disp: 90 tablet, Rfl: 2  •  nystatin (MYCOSTATIN) 678498 UNIT/GM powder, Apply  topically to the appropriate area as directed As Needed (skin lesions)., Disp: 30 g, Rfl: 2  •  polyethylene glycol (MIRALAX) pack packet, Take 17 g by mouth Daily., Disp: , Rfl:   •  rosuvastatin (CRESTOR) 5 MG tablet, Take 1 tablet by mouth Daily., Disp: 30 tablet, Rfl: 0  •  sennosides-docusate sodium (SENOKOT-S) 8.6-50 MG tablet, Take 2 tablets by mouth Every Night., Disp: 30 tablet, Rfl: 0  •  sodium chloride 1 g tablet,  Take 1 tablet by mouth 3 (Three) Times a Day With Meals., Disp: 180 tablet, Rfl: 0  •  apixaban (ELIQUIS) 2.5 MG tablet tablet, Take 1 tablet by mouth Every 12 (Twelve) Hours for 30 days., Disp: 60 tablet, Rfl: 2  •  Flavoring Agent (FLAVOR CONC-CHLORHEXIDINE) concentration, Take 20 mL by mouth 3 (Three) Times a Day., Disp: 500 mL, Rfl: 0  •  traMADol (ULTRAM) 50 MG tablet, Take 0.5 tablets by mouth Every 6 (Six) Hours As Needed for Moderate Pain  for up to 5 days., Disp: 20 tablet, Rfl: 0     Diagnostic Data    Lab Results (last 24 hours)     Procedure Component Value Units Date/Time    Creatinine, Urine, Random - Urine, Clean Catch [[id number]] Collected: 12/07/20 1937    Specimen: Urine, Clean Catch Updated: 12/08/20 2235     Creatinine, Urine 145.2 mg/dL     Narrative:      Reference intervals for random urine have not been established.  Clinical usage is dependent upon physician's interpretation in combination with other laboratory tests.       Urine Eosinophils, Servando's Stain - Urine, Clean Catch [[id number]]  (Normal) Collected: 12/07/20 1937    Specimen: Urine, Clean Catch Updated: 12/08/20 2121     Servando Stain Negative    Folate [[id number]] Collected: 12/05/20 1423    Specimen: Blood Updated: 12/08/20 1933    COVID-19, [facility] IN-HOUSE, NP SWAB IN TRANSPORT MEDIA 8-10 HR TAT - Swab, Nasopharynx [[id number]]  (Normal) Collected: 12/08/20 1052    Specimen: Swab from Nasopharynx Updated: 12/08/20 1844     COVID19 Not Detected    Narrative:      Testing performed by Real Time RT-PCR  This test has not been approved by the [facility] but is authorized under the Emergency Use Act (EUA)    Fact sheet for providers: https://www.fda.gov/media/063786/download    Fact sheet for patients: https://www.fda.gov/media/150703/download        POC Glucose Once [[id number]]  (Normal) Collected: 12/08/20 1646    Specimen: Blood Updated: 12/08/20 1714     Glucose 127 mg/dL      Comment: RN NotifiedOperator: [id number] PRATIK Carbajal  ID: [id number]       Vitamin B12 [[id number]]  (Normal) Collected: 12/05/20 1423    Specimen: Blood Updated: 12/08/20 1319     Vitamin B-12 487 pg/mL     Narrative:      Results may be falsely increased if patient taking Biotin.      Basic Metabolic Panel [[id number]]  (Abnormal) Collected: 12/08/20 0700    Specimen: Blood Updated: 12/08/20 0732     Glucose 108 mg/dL      BUN 34 mg/dL      Creatinine 1.41 mg/dL      Sodium 138 mmol/L      Potassium 4.3 mmol/L      Chloride 108 mmol/L      CO2 22.0 mmol/L      Calcium 8.4 mg/dL      eGFR Non African Amer 35 mL/min/1.73      BUN/Creatinine Ratio 24.1     Anion Gap 8.0 mmol/L     Narrative:      GFR Normal >60  Chronic Kidney Disease <60  Kidney Failure <15      CBC & Differential [[id number]]  (Abnormal) Collected: 12/08/20 0700    Specimen: Blood Updated: 12/08/20 0718    Narrative:      The following orders were created for panel order CBC & Differential.  Procedure                               Abnormality         Status                     ---------                               -----------         ------                     CBC Auto Differential[[id number]]        Abnormal            Final result                 Please view results for these tests on the individual orders.    CBC Auto Differential [[id number]]  (Abnormal) Collected: 12/08/20 0700    Specimen: Blood Updated: 12/08/20 0718     WBC 13.46 10*3/mm3      RBC 2.60 10*6/mm3      Hemoglobin 8.3 g/dL      Hematocrit 24.1 %      MCV 92.7 fL      MCH 31.9 pg      MCHC 34.4 g/dL      RDW 14.1 %      RDW-SD 47.0 fl      MPV 9.9 fL      Platelets 170 10*3/mm3      Neutrophil % 78.9 %      Lymphocyte % 11.7 %      Monocyte % 4.4 %      Eosinophil % 2.8 %      Basophil % 0.3 %      Immature Grans % 1.9 %      Neutrophils, Absolute 10.62 10*3/mm3      Lymphocytes, Absolute 1.57 10*3/mm3      Monocytes, Absolute 0.59 10*3/mm3      Eosinophils, Absolute 0.38 10*3/mm3      Basophils, Absolute 0.04 10*3/mm3      Immature  Grans, Absolute 0.26 10*3/mm3      nRBC 0.1 /100 WBC     Sodium, Urine, Random - Urine, Clean Catch [[id number]] Collected: 12/07/20 1937    Specimen: Urine, Clean Catch Updated: 12/08/20 0709     Sodium, Urine 84 mmol/L     Narrative:      Reference intervals for random urine have not been established.  Clinical usage is dependent upon physician's interpretation in combination with other laboratory tests.       POC Glucose Once [[id number]]  (Normal) Collected: 12/08/20 0601    Specimen: Blood Updated: 12/08/20 0647     Glucose 122 mg/dL      Comment: RN NotifiedOperator: [id number] GER MURPHYMeter ID: [id number]               I reviewed the patient's new clinical results.    Assessment/Plan:     Active Hospital Problems    Diagnosis POA   • **Closed nondisplaced intertrochanteric fracture of right femur (CMS/[facility]) [S72.144A] Yes     Conservative Tx  - Pain control with tylenol, ultram, morphine  - PT/OT  - Appreciate ortho recs     • DOUGLAS (acute kidney injury) (CMS/[facility]) [N17.9] Yes     Priority: High     - Cr 1.4 on admission. Baseline ~1. 1.60 12/7 and today labs pending  - IV fluids, increase rate  - order estevan stain, urine Na, urine Cr     • Acute blood loss anemia [D62] Yes     Priority: High     10.5 on admission. Iron panel indicates anemia of chronic disease however acutely decreased after surgery to 7.0 indicating added component of acute blood loss anemia. MCV elevated  - check folate, Vitamin B12  - follow H/H  - transfuse 1 unit pRBCs for Hgb <=7.0; consent obtained     • Fall at nursing home [W19.XXXA, Y92.129] Yes     Mechanical fall due to non-compliance from walker.  Non-compliance due to baseline dementia.  Right hip, femur, wrist fractures.  - Will require rehabilitiation     • Closed displaced fracture of surgical neck of right humerus [S42.211A] Yes     S/p OR fixation  - Pain control with tylenol, ultram, morphine  - PT/OT  - Appreciate ortho recs     • Closed fracture of right wrist  [S62.101A] Yes     -Fracture of the distal radius and possible small nondisplaced fracture of the tip of the ulnar styloid  S/p OR fixation  - Pain control with tylenol, ultram, morphine  - PT/OT  - Appreciate ortho recs     • Leukocytosis [D72.829] Yes     12.5 on admission. Likely stress reaction. Normal CXR, UA with sterile pyuria  -trend daily WBC     • Benign essential HTN [I10] Yes     Amlodipine 5 mg daily     • Constipation [K59.00] Yes     - Miralax and senokot     • CKD (chronic kidney disease) stage 3, GFR 30-59 ml/min [N18.30] Yes       DVT prophylaxis: SCDs/TEDs  Code status is   Code Status and Medical Interventions:   Ordered at: 12/05/20 2041     Limited Support to NOT Include:    Intubation     Code Status:    No CPR     Medical Interventions (Level of Support Prior to Arrest):    Limited       Plan for disposition:Where: current living arrangements and When:  1-2 days      Time: 20 mins       Chuckie Nguyen M.D. PGY3  [facility]  [address] [place] [address]  Office: [phone]    This document has been electronically signed by Chuckie Nguyen MD on December 9, 2020 13:56 CST

## 2020-12-08 NOTE — PLAN OF CARE
"  Problem: Adult Inpatient Plan of Care  Goal: Plan of Care Review  Outcome: Ongoing, Progressing  Flowsheets (Taken 12/8/2020 1205)  Progress: improving  Plan of Care Reviewed With: patient  Outcome Summary: pt appears confused. would not answer any orientation questions. pts SpO2 was reading 82% on RA. nsg notifed who applied O2 and improved to 96%. nsg removed O2 and held steady at 96%. nsg states she will monitor. pt became aggitated when nsg applied O2. pt repetitively stated, \"leave me alone.\" pt woudn't speak anymore to nsg or PTA, no further tx.   Goal Outcome Evaluation:  Plan of Care Reviewed With: patient  Progress: improving  Outcome Summary: pt appears confused. would not answer any orientation questions. pts SpO2 was sabina 82% on RA. nsg notifed who applied O2 and improved to 96%. nsg removed O2 and held steady at 96%. nsg states she will monitor. pt became aggitated when nsg applied O2. pt repetitively stated, \"leave me alone.\" pt woudn't speak anymore to nsg or PTA, no further tx.  "

## 2020-12-08 NOTE — DISCHARGE PLACEMENT REQUEST
"Pam Horne (89 y.o. Female)     Date of Birth Social Security Number Address Home Phone MRN    11/28/1931  [address] [facility] [address] [phone] [id number]    [employer] Marital Status          Baptism        Admission Date Admission Type Admitting Provider Attending Provider Department, Room/Bed    12/5/20 Emergency Nims, MD Patrick Chu Christofer P, MD [address], 381/1    Discharge Date Discharge Disposition Discharge Destination         Skilled Nursing Facility (DC - External)              Attending Provider: Chuckie Nguyen MD    Allergies: No Known Allergies    Isolation: None   Infection: COVID (rule out) (12/08/20)   Code Status: No CPR    Ht: 162.6 cm (64\")   Wt: 76 kg (167 lb 8 oz)    Admission Cmt: None   Principal Problem: Closed nondisplaced intertrochanteric fracture of right femur (CMS/HCC) [S72.144A] More...                 Active Insurance as of 12/5/2020     Primary Coverage     Payor Plan Insurance Group Employer/Plan Group    MEDICARE MEDICARE A & B      Payor Plan Address Payor Plan Phone Number Payor Plan Fax Number Effective Dates    [address] [phone]  11/1/1996 - None Entered    [facility] [address]       Subscriber Name Subscriber Birth Date Member ID       PAM HORNE 11/28/1931 [id number]           Secondary Coverage     Payor Plan Insurance Group Employer/Plan Group    [place]      Payor Plan Address Payor Plan Phone Number Payor Plan Fax Number Effective Dates    [address] [phone]  1/6/2020 - None Entered    [facility] [id number]       Subscriber Name Subscriber Birth Date Member ID       PAM HORNE 11/28/1931 [id number]                 Emergency Contacts      (Rel.) Home Phone Work Phone Mobile Phone    Maureen Hernandez (Power of ) [phone] -- [phone]    [email or web address] (Son) -- -- [phone]               Physician Progress " Notes (last 24 hours) (Notes from 20 1318 through 20 1318)      Chuckie Nguyen MD at 20 0622              FAMILY MEDICINE DAILY PROGRESS NOTE    NAME: Marlene Horne  : 1931  MRN: [id number]      LOS: 3 days     PROVIDER OF SERVICE: Chuckie Nguyen MD    Chief Complaint: Closed nondisplaced intertrochanteric fracture of right femur (CMS/HCC)    Subjective:     Interval History:  History taken from: chart RN    POD#2 Right intermedullary femoral nailing s/p fall, with right colles fx-reduced and right humeral fracture with conservative management. Afebrile, VSS except some tachycardia - pain medicine appears to resolves. Working with PT/OT, to continue at SNF. Hgb dropped to 7.0 and received 1 unit. AM labs pending. Heparin stopped for DVT prophylaxis and placed on SCDs.       Review of Systems:   Review of Systems   Unable to perform ROS: Dementia       Objective:     Vital Signs  Temp:  [94.7 °F (34.8 °C)-99.1 °F (37.3 °C)] 97.7 °F (36.5 °C)  Heart Rate:  [] 103  Resp:  [18-20] 20  BP: (101-147)/(48-65) 110/62  Body mass index is 28.75 kg/m².    Physical Exam  Physical Exam  Constitutional:       General: She is not in acute distress.     Appearance: She is not ill-appearing, toxic-appearing or diaphoretic.   HENT:      Head: Normocephalic and atraumatic.      Nose: Nose normal.      Mouth/Throat:      Mouth: Mucous membranes are moist.   Eyes:      Extraocular Movements: Extraocular movements intact.      Pupils: Pupils are equal, round, and reactive to light.   Neck:      Musculoskeletal: Normal range of motion and neck supple.   Cardiovascular:      Rate and Rhythm: Normal rate and regular rhythm.   Pulmonary:      Effort: Pulmonary effort is normal.      Breath sounds: Normal breath sounds. No wheezing or rales.   Abdominal:      General: Bowel sounds are normal.      Tenderness: There is no abdominal tenderness. There is no guarding.   Musculoskeletal:       Right lower leg: No edema.      Left lower leg: No edema.   Lymphadenopathy:      Cervical: No cervical adenopathy.   Skin:     General: Skin is warm.      Capillary Refill: Capillary refill takes less than 2 seconds.   Neurological:      Mental Status: She is alert. Mental status is at baseline.         Medication Review    Current Facility-Administered Medications:   •  [DISCONTINUED] acetaminophen (TYLENOL) tablet 650 mg, 650 mg, Oral, Q4H PRN **OR** [DISCONTINUED] acetaminophen (TYLENOL) 160 MG/5ML solution 650 mg, 650 mg, Oral, Q4H PRN **OR** acetaminophen (TYLENOL) suppository 650 mg, 650 mg, Rectal, Q4H PRN, Baudilio Brooks MD  •  acetaminophen (TYLENOL) tablet 500 mg, 500 mg, Oral, 4x Daily PRN, Baudilio Brooks MD  •  albuterol (PROVENTIL) nebulizer solution 0.083% 2.5 mg/3mL, 2.5 mg, Nebulization, Q4H PRN, Baudilio Brooks MD  •  amLODIPine (NORVASC) tablet 5 mg, 5 mg, Oral, Q24H, Baudilio Brooks MD, 5 mg at 12/07/20 0822  •  aspirin EC tablet 81 mg, 81 mg, Oral, Daily, Baudilio Brooks MD, 81 mg at 12/07/20 0822  •  ferrous sulfate EC tablet 324 mg, 324 mg, Oral, Daily With Breakfast, Baudilio Brooks MD, 324 mg at 12/07/20 0822  •  melatonin tablet 3 mg, 3 mg, Oral, Nightly, Baudilio Brooks MD, 3 mg at 12/07/20 2110  •  mirtazapine (REMERON) tablet 15 mg, 15 mg, Oral, Nightly, Baudilio Brooks MD, 15 mg at 12/07/20 2110  •  morphine injection 1 mg, 1 mg, Intravenous, Q4H PRN, 1 mg at 12/07/20 2324 **AND** naloxone (NARCAN) injection 0.4 mg, 0.4 mg, Intravenous, Q5 Min PRN, Baudilio Brooks MD  •  multivitamin with minerals 1 tablet, 1 tablet, Oral, Daily, Baudilio Brooks MD, 1 tablet at 12/07/20 0822  •  nystatin (MYCOSTATIN) powder, , Topical, PRN, Baudilio Brooks MD  •  ondansetron (ZOFRAN) tablet 4 mg, 4 mg, Oral, Q6H PRN, 4 mg at 12/07/20 1235 **OR** ondansetron (ZOFRAN) injection 4 mg, 4 mg, Intravenous, Q6H PRN, Baudilio Brooks MD  •   polyethylene glycol (MIRALAX) packet 17 g, 17 g, Oral, Daily, Baudilio Brooks MD, 17 g at 12/07/20 0822  •  rosuvastatin (CRESTOR) tablet 5 mg, 5 mg, Oral, Daily, Baudilio Brooks MD, 5 mg at 12/07/20 0822  •  sennosides-docusate (PERICOLACE) 8.6-50 MG per tablet 2 tablet, 2 tablet, Oral, Nightly, Baudilio Brooks MD, 2 tablet at 12/07/20 2110  •  sodium chloride 0.9 % flush 10 mL, 10 mL, Intravenous, Q12H, Baudilio Brooks MD, 10 mL at 12/07/20 2110  •  sodium chloride 0.9 % flush 10 mL, 10 mL, Intravenous, PRN, Baudilio Brooks MD  •  sodium chloride 0.9 % infusion, 125 mL/hr, Intravenous, Continuous, Chuckie Nguyen MD, Last Rate: 50 mL/hr at 12/07/20 2128, 50 mL/hr at 12/07/20 2128  •  sodium chloride tablet 1 g, 1 g, Oral, TID With Meals, Baudilio Brooks MD, 1 g at 12/07/20 1755  •  traMADol (ULTRAM) tablet 25 mg, 25 mg, Oral, Q6H PRN, Baudilio Brooks MD, 25 mg at 12/07/20 2113  •  Vitamin D 1,000 Units, 1,000 Units, Oral, Daily, Baudilio Brooks MD, 1,000 Units at 12/07/20 0822     Diagnostic Data    Lab Results (last 24 hours)     Procedure Component Value Units Date/Time    POC Glucose Once [[id number]]  (Normal) Collected: 12/08/20 0601    Specimen: Blood Updated: 12/08/20 0647     Glucose 122 mg/dL      Comment: RN NotifiedOperator: [id number] GER Mix ID: [id number]       POC Glucose Once [[id number]]  (Normal) Collected: 12/07/20 1921    Specimen: Blood Updated: 12/07/20 2147     Glucose 128 mg/dL      Comment: : [id number] GER SARAHMeter ID: [id number]       Urinalysis With Microscopic If Indicated (No Culture) - Urine, Clean Catch [[id number]]  (Abnormal) Collected: 12/07/20 1937    Specimen: Urine, Clean Catch Updated: 12/07/20 1954     Color, UA Yellow     Appearance, UA Cloudy     pH, UA <=5.0     Specific Gravity, UA 1.025     Glucose, UA Negative     Ketones, UA Negative     Bilirubin, UA Negative     Blood, UA Negative      "Protein, UA Trace     Leuk Esterase, UA Large (3+)     Nitrite, UA Negative     Urobilinogen, UA 0.2 E.U./dL    Urinalysis, Microscopic Only - Urine, Clean Catch [[id number]]  (Abnormal) Collected: 12/07/20 1937    Specimen: Urine, Clean Catch Updated: 12/07/20 1954     RBC, UA 3-5 /HPF      WBC, UA Too Numerous to Count /HPF      Bacteria, UA None Seen /HPF      Squamous Epithelial Cells, UA None Seen /HPF      Hyaline Casts, UA 0-2 /LPF      Methodology Automated Microscopy    POC Glucose Once [[id number]]  (Abnormal) Collected: 12/07/20 1648    Specimen: Blood Updated: 12/07/20 1703     Glucose 146 mg/dL      Comment: RN NotifiedOperator: [id number] ANTWAN CHAKRABORTYAmando ID: [id number]       Hemoglobin & Hematocrit, Blood [[id number]]  (Abnormal) Collected: 12/07/20 1638    Specimen: Blood Updated: 12/07/20 1648     Hemoglobin 8.3 g/dL      Hematocrit 25.2 %     Procalcitonin [[id number]]  (Abnormal) Collected: 12/07/20 0911    Specimen: Blood Updated: 12/07/20 1109     Procalcitonin 0.50 ng/mL     Narrative:      As a Marker for Sepsis (Non-Neonates):   1. <0.5 ng/mL represents a low risk of severe sepsis and/or septic shock.  1. >2 ng/mL represents a high risk of severe sepsis and/or septic shock.    As a Marker for Lower Respiratory Tract Infections that require antibiotic therapy:  PCT on Admission     Antibiotic Therapy             6-12 Hrs later  > 0.5                Strongly Recommended            >0.25 - <0.5         Recommended  0.1 - 0.25           Discouraged                   Remeasure/reassess PCT  <0.1                 Strongly Discouraged          Remeasure/reassess PCT      As 28 day mortality risk marker: \"Change in Procalcitonin Result\" (> 80 % or <=80 %) if Day 0 (or Day 1) and Day 4 values are available. Refer to http://www.[employer]-pct-calculator.com/   Change in PCT <=80 %   A decrease of PCT levels below or equal to 80 % defines a positive change in PCT test result representing a higher risk for " 28-day all-cause mortality of patients diagnosed with severe sepsis or septic shock.  Change in PCT > 80 %   A decrease of PCT levels of more than 80 % defines a negative change in PCT result representing a lower risk for 28-day all-cause mortality of patients diagnosed with severe sepsis or septic shock.                Results may be falsely decreased if patient taking Biotin.     POC Glucose Once [[id number]]  (Normal) Collected: 12/07/20 1036    Specimen: Blood Updated: 12/07/20 1100     Glucose 129 mg/dL      Comment: RN NotifiedOperator: [id number] ANTWAN Irvin ID: [id number]       Basic Metabolic Panel [[id number]]  (Abnormal) Collected: 12/07/20 0911    Specimen: Blood Updated: 12/07/20 0939     Glucose 96 mg/dL      BUN 39 mg/dL      Creatinine 1.60 mg/dL      Sodium 139 mmol/L      Potassium 4.6 mmol/L      Chloride 109 mmol/L      CO2 20.0 mmol/L      Calcium 8.1 mg/dL      eGFR Non African Amer 30 mL/min/1.73      BUN/Creatinine Ratio 24.4     Anion Gap 10.0 mmol/L     Narrative:      GFR Normal >60  Chronic Kidney Disease <60  Kidney Failure <15      CBC & Differential [[id number]]  (Abnormal) Collected: 12/07/20 0911    Specimen: Blood Updated: 12/07/20 0936    Narrative:      The following orders were created for panel order CBC & Differential.  Procedure                               Abnormality         Status                     ---------                               -----------         ------                     CBC Auto Differential[[id number]]        Abnormal            Final result                 Please view results for these tests on the individual orders.    CBC Auto Differential [[id number]]  (Abnormal) Collected: 12/07/20 0911    Specimen: Blood Updated: 12/07/20 0936     WBC 14.34 10*3/mm3      RBC 2.16 10*6/mm3      Hemoglobin 7.0 g/dL      Hematocrit 21.5 %      MCV 99.5 fL      MCH 32.4 pg      MCHC 32.6 g/dL      RDW 13.8 %      RDW-SD 50.0 fl      MPV 10.1 fL      Platelets 174  10*3/mm3      Neutrophil % 85.1 %      Lymphocyte % 8.2 %      Monocyte % 4.6 %      Eosinophil % 1.0 %      Basophil % 0.1 %      Immature Grans % 1.0 %      Neutrophils, Absolute 12.20 10*3/mm3      Lymphocytes, Absolute 1.17 10*3/mm3      Monocytes, Absolute 0.66 10*3/mm3      Eosinophils, Absolute 0.14 10*3/mm3      Basophils, Absolute 0.02 10*3/mm3      Immature Grans, Absolute 0.15 10*3/mm3      nRBC 0.0 /100 WBC             I reviewed the patient's new clinical results.    Assessment/Plan:     Active Hospital Problems    Diagnosis POA   • **Closed nondisplaced intertrochanteric fracture of right femur (CMS/[facility]) [S72.144A] Yes     Conservative Tx  - Pain control with tylenol, ultram, morphine  - PT/OT  - Appreciate ortho recs     • DOUGLAS (acute kidney injury) (CMS/[facility]) [N17.9] Yes     Priority: High     - Cr 1.4 on admission. Baseline ~1. 1.60 12/7 and today labs pending  - IV fluids, increase rate  - order estevan stain, urine Na, urine Cr     • Anemia [D64.9] Yes     Priority: High     10.5 on admission. Iron panel indicates anemia of chronic disease however acutely decreased after surgery to 7.0 indicating added component of acute blood loss anemia. MCV elevated  - check folate, Vitamin B12  - follow H/H  - transfuse 1 unit pRBCs for Hgb <=7.0; consent obtained     • Fall at nursing home [W19.XXXA, Y92.129] Yes     Mechanical fall due to non-compliance from walker.  Non-compliance due to baseline dementia.  Right hip, femur, wrist fractures.  - Will require rehabilitiation     • Closed displaced fracture of surgical neck of right humerus [S42.211A] Yes     S/p OR fixation  - Pain control with tylenol, ultram, morphine  - PT/OT  - Appreciate ortho recs     • Closed fracture of right wrist [S62.101A] Yes     -Fracture of the distal radius and possible small nondisplaced fracture of the tip of the ulnar styloid  S/p OR fixation  - Pain control with tylenol, ultram, morphine  - PT/OT  - Appreciate ortho recs     •  Leukocytosis [D72.829] Yes     12.5 on admission. Likely stress reaction. Normal CXR, UA with sterile pyuria  -trend daily WBC     • Benign essential HTN [I10] Yes     Amlodipine 5 mg daily     • Constipation [K59.00] Yes     - Miralax and senokot     • CKD (chronic kidney disease) stage 3, GFR 30-59 ml/min [N18.30] Yes       DVT prophylaxis: SCDs/TEDs  Code status is   Code Status and Medical Interventions:   Ordered at: 12/05/20 2041     Limited Support to NOT Include:    Intubation     Code Status:    No CPR     Medical Interventions (Level of Support Prior to Arrest):    Limited       Plan for disposition:Where: current living arrangements and When:  1-2 days      Time: 20 mins       Chuckie Nguyen M.D. PGY3  [facility]  [address] [place] [address]  Office: [phone]    This document has been electronically signed by Chuckie Nguyen MD on December 8, 2020 06:47 CST          Electronically signed by Chuckie Nguyen MD at 12/08/20 0647       Discharge Order (From admission, onward)     Start     Ordered    12/08/20 1229  Discharge patient  Once     Expected Discharge Date: 12/08/20    Discharge Disposition: Skilled Nursing Facility (DC - External)    Physician of Record for Attribution - Please select from Treatment Team: YONIS IBARRA [[id number]]    Review needed by CMO to determine Physician of Record: No       Question Answer Comment   Physician of Record for Attribution - Please select from Treatment Team YONIS IBARRA    Review needed by CMO to determine Physician of Record No        12/08/20 1248

## 2020-12-08 NOTE — DISCHARGE PLACEMENT REQUEST
"Pam Horne (89 y.o. Female)     Date of Birth Social Security Number Address Home Phone MRN    11/28/1931  [address] [facility] [address] [phone] [id number]    [place] Marital Status          Caodaism        Admission Date Admission Type Admitting Provider Attending Provider Department, Room/Bed    12/5/20 Emergency Nims, MD Patrick Chu Christofer P, MD [address], 381/1    Discharge Date Discharge Disposition Discharge Destination         Skilled Nursing Facility (DC - External)              Attending Provider: Chuckie Nguyen MD    Allergies: No Known Allergies    Isolation: None   Infection: COVID (rule out) (12/08/20)   Code Status: No CPR    Ht: 162.6 cm (64\")   Wt: 76 kg (167 lb 8 oz)    Admission Cmt: None   Principal Problem: Closed nondisplaced intertrochanteric fracture of right femur (CMS/HCC) [S72.144A] More...                 Active Insurance as of 12/5/2020     Primary Coverage     Payor Plan Insurance Group Employer/Plan Group    MEDICARE MEDICARE A & B      Payor Plan Address Payor Plan Phone Number Payor Plan Fax Number Effective Dates    [address] [phone]  11/1/1996 - None Entered    [facility] [address]       Subscriber Name Subscriber Birth Date Member ID       PAM HORNE 11/28/1931 [id number]           Secondary Coverage     Payor Plan Insurance Group Employer/Plan Group    [place]      Payor Plan Address Payor Plan Phone Number Payor Plan Fax Number Effective Dates    [address] [phone]  1/6/2020 - None Entered    [facility] [address]       Subscriber Name Subscriber Birth Date Member ID       PAM HORNE 11/28/1931 [id number]                 Emergency Contacts      (Rel.) Home Phone Work Phone Mobile Phone    Maureen Hernandez (Power of ) [phone] -- [phone]    [email or web address] (Son) -- -- [phone]            Emergency Contact " Information     Name Relation Home Work Mobile    Maureen Hernandez Power of  [phone]    nicole simental   [phone]          Insurance Information                MEDICARE/MEDICARE A & B Phone: [phone]    Subscriber: Marlene Horne Subscriber#: [id number]    Group#:  Precert#:         [place] Phone: [phone]    Subscriber: Marlene Horne Subscriber#: [id number]    Group#:  Precert#:

## 2020-12-08 NOTE — PLAN OF CARE
Goal Outcome Evaluation:  Plan of Care Reviewed With: patient  Progress: no change  Outcome Summary: Pt on bedrest. Vss. Nonverbal indicators of pain present with PRN pain medication given. Right thigh dressing intact. Right arm cast clean and intact. Will continue to monitor this pt.

## 2020-12-08 NOTE — DISCHARGE SUMMARY
DISCHARGE SUMMARY    PATIENT NAME: Marlene Horne       PHYSICIAN: Chuckie Nguyen MD  : 1931  MRN: [id number]    ADMITTED: 2020     DISCHARGED: 2020    ADMISSION DIAGNOSES:  Active Hospital Problems    Diagnosis  POA   • **Closed nondisplaced intertrochanteric fracture of right femur (CMS/HCC) [S72.144A]  Yes   • DOUGLAS (acute kidney injury) (CMS/HCC) [N17.9]  Yes     Priority: High   • Fall at nursing home [W19.XXXA, Y92.129]  Yes   • Closed displaced fracture of surgical neck of right humerus [S42.211A]  Yes   • Closed fracture of right wrist [S62.101A]  Yes   • Leukocytosis [D72.829]  Yes   • Benign essential HTN [I10]  Yes   • Constipation [K59.00]  Yes   • CKD (chronic kidney disease) stage 3, GFR 30-59 ml/min [N18.30]  Yes      Resolved Hospital Problems    Diagnosis Date Resolved POA   • Acute blood loss anemia [D62] 2020 Yes     Priority: High     DISCHARGE DIAGNOSES:   Active Hospital Problems    Diagnosis  POA   • **Closed nondisplaced intertrochanteric fracture of right femur (CMS/HCC) [S72.144A]  Yes   • DOUGLAS (acute kidney injury) (CMS/HCC) [N17.9]  Yes     Priority: High   • Fall at nursing home [W19.XXXA, Y92.129]  Yes   • Closed displaced fracture of surgical neck of right humerus [S42.211A]  Yes   • Closed fracture of right wrist [S62.101A]  Yes   • Leukocytosis [D72.829]  Yes   • Benign essential HTN [I10]  Yes   • Constipation [K59.00]  Yes   • CKD (chronic kidney disease) stage 3, GFR 30-59 ml/min [N18.30]  Yes      Resolved Hospital Problems    Diagnosis Date Resolved POA   • Acute blood loss anemia [D62] 2020 Yes     Priority: High       SERVICE: Family Medicine Residency  Attending: Maricarmen Bell MD  Resident: Chuckie Nguyen MD    CONSULTS:   Consult Orders (all) (From admission, onward)     Start     Ordered    20 1159  Inpatient Case Management  Consult  Once     Provider:  (Not yet assigned)    20 1158    20 181   Inpatient Orthopedic Surgery Consult  STAT     Specialty:  Orthopedic Surgery  Provider:  Baudilio Brooks MD    12/05/20 1818                PROCEDURES:   Splint applicatioin 12/5/2020 - Dr. Brooks  Intermedullary nailing of right femur 12/6/2020 - Dr. Brooks    HISTORY OF PRESENT ILLNESS: Per. Dr. Morales's H&P 12/5/2020:    Marlene Horne is a 89 y.o. female with a CMH of major neurocognitive disorder, iron deficiency anemia, chronic kidney disease stage III, and hypertension who was walking down hallway at [facility] where she is admitted without her walker and witnesses say she got weak, subsequently collapsing on her right side without loss of consciousness or head trauma. She was complaining of right wrist and hip pain so was transferred to [facility] ED.     In the ED   Vital signs are significant for hypertension of 182/82, labs showed creatinine elevated 1.4 went to her usual baseline of around 1, white count of 12.15, hemoglobin of 10.5.  Chest x-ray unremarkable, XR shoulder and humerus showed mildly displaced humeral neck fracture with some impaction at fracture site, x-ray hip and CT pelvis showed comminuted intertrochanteric fracture of the proximal right femur, XR wrist showed distal radial and suspected ulnar styloid tip fracture, CT head showed no acute intracranial abnormality CT cervical spine showed no evidence of acute traumatic osseous injury.  Orthopedics was consulted, right wrist plate was placed, and will see tomorrow morning      DIAGNOSTIC DATA:   Lab Results (last 24 hours)     Procedure Component Value Units Date/Time    Creatinine, Urine, Random - Urine, Clean Catch [[id number]] Collected: 12/07/20 1937    Specimen: Urine, Clean Catch Updated: 12/08/20 2235     Creatinine, Urine 145.2 mg/dL     Narrative:      Reference intervals for random urine have not been established.  Clinical usage is dependent upon physician's interpretation in combination with  other laboratory tests.       Urine Eosinophils, Servando's Stain - Urine, Clean Catch [[id number]]  (Normal) Collected: 12/07/20 1937    Specimen: Urine, Clean Catch Updated: 12/08/20 2121     Servando Stain Negative    Folate [[id number]] Collected: 12/05/20 1423    Specimen: Blood Updated: 12/08/20 1933    COVID-19, [facility] IN-HOUSE, NP SWAB IN TRANSPORT MEDIA 8-10 HR TAT - Swab, Nasopharynx [[id number]]  (Normal) Collected: 12/08/20 1052    Specimen: Swab from Nasopharynx Updated: 12/08/20 1844     COVID19 Not Detected    Narrative:      Testing performed by Real Time RT-PCR  This test has not been approved by the [facility] but is authorized under the Emergency Use Act (EUA)    Fact sheet for providers: https://www.fda.gov/media/973623/download    Fact sheet for patients: https://www.fda.gov/media/983455/download        POC Glucose Once [[id number]]  (Normal) Collected: 12/08/20 1646    Specimen: Blood Updated: 12/08/20 1714     Glucose 127 mg/dL      Comment: RN NotifiedOperator: [id number] [place] N[place]eter ID: [id number]       Vitamin B12 [[id number]]  (Normal) Collected: 12/05/20 1423    Specimen: Blood Updated: 12/08/20 1319     Vitamin B-12 487 pg/mL     Narrative:      Results may be falsely increased if patient taking Biotin.      Basic Metabolic Panel [[id number]]  (Abnormal) Collected: 12/08/20 0700    Specimen: Blood Updated: 12/08/20 0732     Glucose 108 mg/dL      BUN 34 mg/dL      Creatinine 1.41 mg/dL      Sodium 138 mmol/L      Potassium 4.3 mmol/L      Chloride 108 mmol/L      CO2 22.0 mmol/L      Calcium 8.4 mg/dL      eGFR Non African Amer 35 mL/min/1.73      BUN/Creatinine Ratio 24.1     Anion Gap 8.0 mmol/L     Narrative:      GFR Normal >60  Chronic Kidney Disease <60  Kidney Failure <15      CBC & Differential [[id number]]  (Abnormal) Collected: 12/08/20 0700    Specimen: Blood Updated: 12/08/20 0718    Narrative:      The following orders were created for panel order CBC & Differential.  Procedure                                Abnormality         Status                     ---------                               -----------         ------                     CBC Auto Differential[[id number]]        Abnormal            Final result                 Please view results for these tests on the individual orders.    CBC Auto Differential [[id number]]  (Abnormal) Collected: 12/08/20 0700    Specimen: Blood Updated: 12/08/20 0718     WBC 13.46 10*3/mm3      RBC 2.60 10*6/mm3      Hemoglobin 8.3 g/dL      Hematocrit 24.1 %      MCV 92.7 fL      MCH 31.9 pg      MCHC 34.4 g/dL      RDW 14.1 %      RDW-SD 47.0 fl      MPV 9.9 fL      Platelets 170 10*3/mm3      Neutrophil % 78.9 %      Lymphocyte % 11.7 %      Monocyte % 4.4 %      Eosinophil % 2.8 %      Basophil % 0.3 %      Immature Grans % 1.9 %      Neutrophils, Absolute 10.62 10*3/mm3      Lymphocytes, Absolute 1.57 10*3/mm3      Monocytes, Absolute 0.59 10*3/mm3      Eosinophils, Absolute 0.38 10*3/mm3      Basophils, Absolute 0.04 10*3/mm3      Immature Grans, Absolute 0.26 10*3/mm3      nRBC 0.1 /100 WBC     Sodium, Urine, Random - Urine, Clean Catch [[id number]] Collected: 12/07/20 1937    Specimen: Urine, Clean Catch Updated: 12/08/20 0709     Sodium, Urine 84 mmol/L     Narrative:      Reference intervals for random urine have not been established.  Clinical usage is dependent upon physician's interpretation in combination with other laboratory tests.       POC Glucose Once [[id number]]  (Normal) Collected: 12/08/20 0601    Specimen: Blood Updated: 12/08/20 0647     Glucose 122 mg/dL      Comment: RN NotifiedOperator: [id number] [place] City[place] ID: [id number]           Xr Shoulder 2+ View Right    Result Date: 12/5/2020  Mildly displaced humeral neck fracture with some impaction fracture site Electronically signed by:  Marine Briscoe MD  12/5/2020 4:55 PM CST Workstation: [id number]    Xr Humerus Right    Result Date: 12/5/2020  Mildly displaced fracture of the  humeral neck as above Electronically signed by:  Marine Briscoe MD  12/5/2020 4:52 PM CST Workstation: [id number]    Xr Forearm 2 View Right    Result Date: 12/5/2020  Distal radial and suspected ulnar styloid tip fractures as above Electronically signed by:  Marine Briscoe MD  12/5/2020 4:54 PM CST Workstation: [id number]    Xr Wrist 3+ View Right    Result Date: 12/5/2020  1.Fracture of the distal radius with apex palmar angulation and extension into the radiocarpal joints. 2. There may be a tiny nondisplaced fracture of the tip of the ulnar styloid Electronically signed by:  Marine Briscoe MD  12/5/2020 4:41 PM CST Workstation: [id number]    Xr Femur 2 View Right    Result Date: 12/5/2020  Comminuted intertrochanteric fracture of the right femur Electronically signed by:  Marine Briscoe MD  12/5/2020 6:06 PM CST Workstation: [id number]    Ct Head Without Contrast    Result Date: 12/5/2020  1. No acute intracranial abnormality. 2. 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 3. 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 Electronically signed by:  Marine Briscoe MD  12/5/2020 3:48 PM CST Workstation: [id number]    Ct Cervical Spine Without Contrast    Result Date: 12/5/2020  No evidence of acute traumatic osseous injury. Degenerative changes as above. If pain or symptoms persist beyond reasonable expectations, a follow up radiographic and/or MRI examination is suggested, as is deemed clinically indicated. Electronically signed by:  Marine Briscoe MD  12/5/2020 3:53 PM CST Workstation: [id number]    Ct Pelvis Without Contrast    Result Date: 12/5/2020  Comminuted, intertrochanteric fracture of the right femur. Please see full description above, as well as nonemergent findings. Electronically signed by:   Marine Briscoe MD  12/5/2020 6:10 PM CST Workstation: [id number]    Xr Chest 1 View    Result Date: 12/7/2020  1. No radiographic evidence of acute cardiopulmonary disease. Electronically signed by:  Inna Jorgensen MD  12/7/2020 12:08 PM CST Workstation: [id number]    Xr Chest 1 View    Result Date: 12/5/2020  No acute abnormality of the thorax. Proximal right humeral fracture is incompletely visualized Electronically signed by:  Marine Briscoe MD  12/5/2020 4:56 PM CST Workstation: [id number]    Xr Hip With Or Without Pelvis 2 - 3 View Right    Result Date: 12/5/2020  Comminuted trochanteric fracture of the proximal right femur. Note:  if pain or symptoms persist beyond reasonable expectations and follow-up imaging is anticipated,  cross sectional imaging  (CT and/or MRI) is suggested, as is deemed clinically appropriate. Electronically signed by:  Marine Briscoe MD  12/5/2020 5:01 PM CST Workstation: [id number]       HOSPITAL COURSE:  Patient admitted and seen Dr. Brooks, orthopedic surgeon, who took Mrs. Horne to the OR on 12/6/2020 and reduced her right Colles fx under anesthesia, then she had a right femoral nail placed to repair right femur fx. Hospitalization was complicated by DOUGLAS and anemia. DOUGLAS improved mildly with IVF hydration with NS and is expected to keep improving with oral fluids at SNF. She has chronic anemia likely due to CKDIII, however post op day 1 she did experience a sharp drop in Hgb from 10.5 on admission to 7.0 indicating acute blood loss anemia and received 1 unit pRBC. DVT prophylaxis with heparin was stopped and SCD's applied. Post op day 2 Hgb was stable 8.3. Patient evaluated by nutrition (add calories and protein to diet), PT, and OT with recommendations passed on to SNF for continued therapy on discharge and retun to SNF. On day of discharge she stable and question answered in setting of dementia.      DISCHARGE CONDITION:   Stable    DISPOSITION:  Skilled Nursing Facility (DC -  External)    DISCHARGE MEDICATIONS     Discharge Medications      New Medications      Instructions Start Date   apixaban 2.5 MG tablet tablet  Commonly known as: ELIQUIS  Notes to patient: 12/08/2020   2.5 mg, Oral, Every 12 Hours Scheduled      traMADol 50 MG tablet  Commonly known as: ULTRAM  Notes to patient: As needed   25 mg, Oral, Every 6 Hours PRN         Continue These Medications      Instructions Start Date   acetaminophen 325 MG tablet  Commonly known as: TYLENOL  Notes to patient: As needed   650 mg, Oral, Every 4 Hours PRN      albuterol (2.5 MG/3ML) 0.083% nebulizer solution  Commonly known as: PROVENTIL  Notes to patient: As needed   2.5 mg, Nebulization, Every 4 Hours PRN      amLODIPine 5 MG tablet  Commonly known as: NORVASC  Notes to patient: 12/09/2020   5 mg, Oral, Every 24 Hours Scheduled      aspirin 81 MG EC tablet  Notes to patient: 12/09/2020   81 mg, Oral, Daily      Calmoseptine 0.44-20.6 % ointment  Generic drug: Menthol-Zinc Oxide  Notes to patient: 12/08/2020   Topical, 2 Times Daily      Chlorhexidine Gluconate solution  Notes to patient: As directed   Does not apply      Cholecalciferol 25 MCG (1000 UT) tablet  Notes to patient: 12/09/2020   1,000 Units, Oral, Daily      ferrous sulfate 324 (65 Fe) MG tablet delayed-release EC tablet  Notes to patient: 12/09/2020   324 mg, Oral, Daily With Breakfast      Flavor Conc-Chlorhexidine concentration  Notes to patient: 12/08/2020   20 mL, Oral, 3 Times Daily      I-gudelia tablet tablet  Generic drug: multivitamin with minerals  Notes to patient: 12/09/2020   1 tablet, Oral, Daily      melatonin 5 MG tablet tablet  Notes to patient: 12/08/2020   5 mg, Oral, Nightly      mirtazapine 7.5 MG half tablet  Commonly known as: REMERON  Notes to patient: 12/08/2020   15 mg, Oral, Nightly      nystatin 638921 UNIT/GM powder  Commonly known as: MYCOSTATIN  Notes to patient: As directed   Topical, As Needed      polyethylene glycol pack packet  Commonly  known as: MIRALAX  Notes to patient: 12/09/2020   17 g, Oral, Daily      rosuvastatin 5 MG tablet  Commonly known as: CRESTOR  Notes to patient: 12/09/2020   5 mg, Oral, Daily      sennosides-docusate 8.6-50 MG per tablet  Commonly known as: PERICOLACE  Notes to patient: 12/08/2020   2 tablets, Oral, Nightly      sodium chloride 1 g tablet  Notes to patient: 12/08/2020   1 g, Oral, 3 Times Daily With Meals             INSTRUCTIONS:  Activity:   Activity Instructions     Up WIth Assist          Diet:   Diet Instructions     Diet: Regular      Discharge Diet: Regular          FOLLOW UP:   Additional Instructions for the Follow-ups that You Need to Schedule     Ambulatory Referral to Occupational Therapy   As directed      Ambulatory Referral to Physical Therapy Evaluate and treat, POST OP   As directed      Specialty needed: Evaluate and treat POST OP         Ambulatory Referral to Physical Therapy Evaluate and treat, Wound Care; ROM, Strengthening   As directed      Specialty needed: Evaluate and treat Wound Care    Exercises: ROM Strengthening         Discharge Follow-up with PCP   As directed       Currently Documented PCP:    Chuckie Nguyen MD    PCP Phone Number:    [phone]     Follow Up Details: within one week         Discharge Follow-up with Specified Provider: Dr. Evans; 1 Week   As directed      To: Dr. Evans    Follow Up: 1 Week    Follow Up Details: 8 days         Referral to Occupational Therapy   As directed      Specialty needed: Evaluate and treat Wound Care            Contact information for follow-up providers     Chuckie Nguyen MD .    Specialties: Family Medicine, Emergency Medicine  Why: within one week  Contact information:  [address] [place] [address] [phone]             Chuckie Nguyen MD .    Specialties: Family Medicine, Emergency Medicine  Contact information:  [address] [place] [address] [phone]                   Contact  information for after-discharge care     Destination     [facility] .    Service: Skilled Nursing  Contact information:  [address]  [facility] [address] [phone]                             PENDING TEST RESULTS AT DISCHARGE  Pending Labs     Order Current Status    Folate In process          Time: >30 minutes was spent in discharge planning, medication reconciliation and coordination of care for this patient.    Maricarmen Bell MD is the attending at time of discharge, She is aware of the patient's status and agrees with the above discharge summary.      Chuckie Nguyen M.D. PGY3  [facility]  [address] [place], [place] [address]  Office: [phone]    This document has been electronically signed by Chuckie Nguyen MD on December 9, 2020 13:57 CST

## 2020-12-09 ENCOUNTER — TELEPHONE (OUTPATIENT)
Dept: ORTHOPEDIC SURGERY | Facility: CLINIC | Age: 85
End: 2020-12-09

## 2020-12-09 DIAGNOSIS — D64.9 POSTOPERATIVE ANEMIA: Primary | ICD-10-CM

## 2020-12-09 PROBLEM — D62 ACUTE BLOOD LOSS ANEMIA: Status: RESOLVED | Noted: 2019-03-01 | Resolved: 2020-12-09

## 2020-12-09 PROBLEM — D62 ACUTE BLOOD LOSS ANEMIA: Status: ACTIVE | Noted: 2019-03-01

## 2020-12-09 LAB
BH BB BLOOD EXPIRATION DATE: NORMAL
BH BB BLOOD TYPE BARCODE: NORMAL
BH BB DISPENSE STATUS: NORMAL
BH BB PRODUCT CODE: NORMAL
BH BB UNIT NUMBER: NORMAL
CROSSMATCH INTERPRETATION: NORMAL
UNIT  ABO: NORMAL
UNIT  RH: NORMAL

## 2020-12-09 NOTE — TELEPHONE ENCOUNTER
Brooks Patient      Marlee at [facility] wanted to know the weight bearing status of patient right hip and arm? Phone number is [phone]

## 2020-12-10 NOTE — TELEPHONE ENCOUNTER
Called [facility] to speak with Marlee and she was busy. I spoke with Denise the charge nurse taking care of Ms. Horne and informed her of what Dr. Brooks said.

## 2020-12-10 NOTE — PROGRESS NOTES
Received call from Ana RN at [facility] about CBC of 7.3.  Patient is not symptomatic and she is at her baseline.  Patient is status post operative repairs and recent discharge back to nursing facility from hospital.  Counseled to check her CBC in 1 month with order placed during this phone encounter.  Also to check and call if she becomes short of breath altered or has any visible blood.        This document has been electronically signed by Srinivasa Chairez III, MD on December 9, 2020 20:22 CST      Dragon disclaimer: Parts of this note were transcribed using dragon dictation software.

## 2020-12-11 LAB — FOLATE SERPL-MCNC: 17.4 NG/ML (ref 4.78–24.2)

## 2020-12-14 ENCOUNTER — TELEPHONE (OUTPATIENT)
Dept: ORTHOPEDIC SURGERY | Facility: CLINIC | Age: 85
End: 2020-12-14

## 2020-12-14 DIAGNOSIS — S72.001D CLOSED FRACTURE OF NECK OF RIGHT FEMUR WITH ROUTINE HEALING, SUBSEQUENT ENCOUNTER: Primary | ICD-10-CM

## 2020-12-14 DIAGNOSIS — S62.101A CLOSED FRACTURE OF RIGHT WRIST, INITIAL ENCOUNTER: Primary | ICD-10-CM

## 2020-12-14 RX ORDER — HYDROCODONE BITARTRATE AND ACETAMINOPHEN 5; 325 MG/1; MG/1
1 TABLET ORAL EVERY 6 HOURS PRN
Qty: 40 TABLET | Refills: 0 | Status: SHIPPED | OUTPATIENT
Start: 2020-12-14 | End: 2022-09-12

## 2020-12-14 NOTE — TELEPHONE ENCOUNTER
Garrick from [facility] called to notify us that patient is repeatedly removing splint. They have tried taping, kerlex, and coban. He reports patient has removed splint at least 10-12 times on his shift alone.     Dr Brooks notified. Patient has upcoming appointment on Wednesday 12/16/2020. She will be re-evaluated at that time with repeat x-rays.

## 2020-12-16 ENCOUNTER — OFFICE VISIT (OUTPATIENT)
Dept: ORTHOPEDIC SURGERY | Facility: CLINIC | Age: 85
End: 2020-12-16

## 2020-12-16 VITALS — BODY MASS INDEX: 28.51 KG/M2 | WEIGHT: 167 LBS | HEIGHT: 64 IN

## 2020-12-16 DIAGNOSIS — S62.101A CLOSED FRACTURE OF RIGHT WRIST, INITIAL ENCOUNTER: Primary | ICD-10-CM

## 2020-12-16 DIAGNOSIS — T14.8XXA FRACTURE: ICD-10-CM

## 2020-12-16 DIAGNOSIS — M25.551 RIGHT HIP PAIN: ICD-10-CM

## 2020-12-16 DIAGNOSIS — M25.511 RIGHT SHOULDER PAIN, UNSPECIFIED CHRONICITY: ICD-10-CM

## 2020-12-16 DIAGNOSIS — R52 PAIN: Primary | ICD-10-CM

## 2020-12-16 PROCEDURE — 99024 POSTOP FOLLOW-UP VISIT: CPT | Performed by: ORTHOPAEDIC SURGERY

## 2020-12-16 RX ORDER — POLYETHYLENE GLYCOL 3350 17 G/17G
17 POWDER, FOR SOLUTION ORAL DAILY
COMMUNITY
End: 2022-09-12

## 2020-12-16 NOTE — PROGRESS NOTES
Marlene Horne is a 89 y.o. female is s/p       Chief Complaint   Patient presents with   • Right Hip - Post-op   • Right Wrist - Post-op   • Right Shoulder - Post-op      12/06/20 (10d) Baudilio Brooks MD     Intermedullary Nailing Of Right Femur Using Gamma Nail And Fluoroscopic Assitance - Right     Closed Reduction And Splinting Of Right Wrist With Fluoroscopic Assistance - Right        Xray done today.  HISTORY OF PRESENT ILLNESS: Post op right hip, wrist, and shoulder.  She had an IM nail on her right hip and her wrist reduced.  She is been taking her splint off in the nursing home she is accompanied by her daughter today she is actually doing fairly well no real complaints today.       No Known Allergies      Current Outpatient Medications:   •  acetaminophen (TYLENOL) 325 MG tablet, Take 2 tablets by mouth Every 4 (Four) Hours As Needed for Mild Pain ., Disp: , Rfl:   •  albuterol (PROVENTIL) (2.5 MG/3ML) 0.083% nebulizer solution, Take 2.5 mg by nebulization Every 4 (Four) Hours As Needed for Shortness of Air., Disp: , Rfl: 12  •  amLODIPine (NORVASC) 5 MG tablet, Take 1 tablet by mouth Daily., Disp: , Rfl:   •  apixaban (ELIQUIS) 2.5 MG tablet tablet, Take 1 tablet by mouth Every 12 (Twelve) Hours for 30 days., Disp: 60 tablet, Rfl: 2  •  aspirin 81 MG EC tablet, Take 81 mg by mouth Daily., Disp: , Rfl:   •  Chlorhexidine Gluconate solution, , Disp: , Rfl:   •  Cholecalciferol 25 MCG (1000 UT) tablet, Take 1 tablet by mouth Daily., Disp: 30 tablet, Rfl: 11  •  ferrous sulfate 324 (65 Fe) MG tablet delayed-release EC tablet, Take 1 tablet by mouth Daily With Breakfast., Disp: 30 tablet, Rfl:   •  Flavoring Agent (FLAVOR CONC-CHLORHEXIDINE) concentration, Take 20 mL by mouth 3 (Three) Times a Day., Disp: 500 mL, Rfl: 0  •  HYDROcodone-acetaminophen (Norco) 5-325 MG per tablet, Take 1 tablet by mouth Every 6 (Six) Hours As Needed for Moderate Pain ., Disp: 40 tablet, Rfl: 0  •  melatonin 5 MG  tablet tablet, Take 5 mg by mouth Every Night., Disp: , Rfl:   •  Menthol-Zinc Oxide (Calmoseptine) 0.44-20.6 % ointment, Apply  topically to the appropriate area as directed 2 (Two) Times a Day., Disp: , Rfl:   •  mirtazapine (REMERON) 7.5 MG half tablet, Take 15 mg by mouth Every Night., Disp: , Rfl:   •  Multiple Vitamins-Minerals (I-HERNESTO) tablet tablet, Take 1 tablet by mouth Daily., Disp: 90 tablet, Rfl: 2  •  nystatin (MYCOSTATIN) 372666 UNIT/GM powder, Apply  topically to the appropriate area as directed As Needed (skin lesions)., Disp: 30 g, Rfl: 2  •  polyethylene glycol (MIRALAX) 17 g packet, Take 17 g by mouth Daily., Disp: , Rfl:   •  rosuvastatin (CRESTOR) 5 MG tablet, Take 1 tablet by mouth Daily., Disp: 30 tablet, Rfl: 0  •  sennosides-docusate sodium (SENOKOT-S) 8.6-50 MG tablet, Take 2 tablets by mouth Every Night., Disp: 30 tablet, Rfl: 0  •  sodium chloride 1 g tablet, Take 1 tablet by mouth 3 (Three) Times a Day With Meals., Disp: 180 tablet, Rfl: 0        PHYSICAL EXAMINATION:       Marlene Horne is a 89 y.o. female    Patient is awake and alert, answers questions appropriately and is in no apparent distress.    GAIT:     []  Normal  []  Antalgic    Assistive device: []  None  []  Walker     []  Crutches  []  Cane     [x]  Wheelchair  []  Stretcher    Ortho Exam  Wounds look good at the hip the calf is negative mild swelling and ecchymosis proximally about the hip on the right side.  Tender about the shoulder with decreased motion.  Her splint is on to fit this not tight.  She is moving her fingers well not complaining of much pain here.    Xr Shoulder 2+ View Right    Result Date: 12/5/2020  Narrative: PROCEDURE: XR SHOULDER 2+ VW RIGHT VIEWS:   2 INDICATION: Pain COMPARISON: None FINDINGS:   -Deformity of the humeral neck consistent with fracture, with apex superior and lateral angulation, and some impaction at the fracture site. The humeral head remains well-seated in the glenoid.  Degenerative changes of the acromioclavicular joint are present which is normally aligned     Impression: Mildly displaced humeral neck fracture with some impaction fracture site Electronically signed by:  Marine Briscoe MD  12/5/2020 4:55 PM CST Workstation: [id number]    Xr Humerus Right    Result Date: 12/5/2020  Narrative: PROCEDURE: XR HUMERUS RIGHT VIEWS: 2 INDICATION: Pain COMPARISON: None FINDINGS:   - fracture: Mildly comminuted fracture of the humeral neck   - alignment: [place] lateral angulation at fracture site   - misc: No significant soft tissue abnormality identified. The acromioclavicular joint is normally aligned.     Impression: Mildly displaced fracture of the humeral neck as above Electronically signed by:  Marine Briscoe MD  12/5/2020 4:52 PM CST Workstation: [id number]    Xr Forearm 2 View Right    Result Date: 12/5/2020  Narrative: PROCEDURE: XR FOREARM 2 VW RIGHT VIEWS: 2 INDICATION: Pain fall COMPARISON: None FINDINGS:   - fracture: Tiny fracture of the distal radius with intra-articular extension. There is also a small fracture fragment at the tip of the ulnar styloid   - alignment: [place] palmar angulation at fracture site   - misc: Soft tissue swelling overlies the fractures     Impression: Distal radial and suspected ulnar styloid tip fractures as above Electronically signed by:  Marine Briscoe MD  12/5/2020 4:54 PM CST Workstation: [id number]    Xr Wrist 3+ View Right    Result Date: 12/5/2020  Narrative: PROCEDURE: XR WRIST 3+ VW RIGHT VIEWS: 3 INDICATION: Pain, fall COMPARISON: None FINDINGS:   - fracture: Comminuted fracture of the distal radius with intra-articular extension. There may also be a fracture of the tip of the ulnar styloid.   - alignment: [place] palmar angulation at the radial fracture site. Minimal displacement of the presumed ulnar styloid fracture   - misc: Soft tissue swelling overlies the fracture     Impression: 1.Fracture of the distal radius with apex palmar angulation  and extension into the radiocarpal joints. 2. There may be a tiny nondisplaced fracture of the tip of the ulnar styloid Electronically signed by:  Marine Briscoe MD  12/5/2020 4:41 PM CST Workstation: [id number]    Xr Femur 2 View Right    Result Date: 12/5/2020  Narrative: PROCEDURE: XR FEMUR 2 VW RIGHT VIEWS: 4 INDICATION: Pain COMPARISON: None FINDINGS:   - fracture: Comminuted intertrochanteric right femoral fracture   - alignment: Mild apex superior angulation at fracture site and mild overriding of fracture fragments. The right femoral head remains well-seated in the acetabulum   - misc: Age-related degenerative changes of hip. Subcutaneous stranding is probably secondary to trauma, overlying the greater trochanter of the hip     Impression: Comminuted intertrochanteric fracture of the right femur Electronically signed by:  Marine Briscoe MD  12/5/2020 6:06 PM CST Workstation: [id number]    Ct Head Without Contrast    Result Date: 12/5/2020  Narrative: PROCEDURE: CT HEAD WO CONTRAST INDICATION:  Fall COMPARISON:  5/6/2020 TECHNIQUE:  CT head, without iv contrast.   This exam was performed according to our departmental dose-optimization program, which includes automated exposure control, adjustment of the mA and/or kV according to patient size and/or use of iterative reconstruction technique. DLP is 949.6 FINDINGS: The degree of cerebral atrophy is within normal limits for age. There is preservation of the gray-white matter differentiation. Acute or infarct in the right thalamus. There is also an area of encephalomalacia which is stable in the right medial occipital lobe. There are age related degenerative cortical and deep white matter changes. There is no evidence of a hemorrhage or intracranial mass. There is no midline shift. The ventricles are normal in size and configuration. The brain stem, cerebellum, globes, and nasal sinuses are within normal limits. 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 This is unchanged from prior study     Impression: 1. No acute intracranial abnormality. 2. 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 3. 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 Electronically signed by:  Marine Briscoe MD  12/5/2020 3:48 PM CST Workstation: [id number]    Ct Cervical Spine Without Contrast    Result Date: 12/5/2020  Narrative: PROCEDURE: CT CERVICAL SPINE WO CONTRAST INDICATION:  Trauma/pain HISTORY:  Fall COMPARISON:  5/31/2019 TECHNIQUE:   - reconstructions: axial, coronal, sagittal   - contrast:  oral:  no                      intravenous:none  This exam was performed according to our departmental dose-optimization program, which includes automated exposure control, adjustment of the mA and/or kV according to patient size and/or use of iterative reconstruction technique. DLP is 245.0 COMMENTS: Fracture:  No evidence of fracture Alignment:   normal spinous alignment Canal caliber:  Unremarkable Focal bone lesion(s):  None visualized Soft tissue:   Unremarkable Misc:  Age related degenerative changes with diffuse facet arthropathy. 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 Degenerative changes as above. If pain or symptoms persist beyond reasonable expectations, a follow up radiographic and/or MRI examination is suggested, as is deemed clinically indicated. Electronically signed by:  Marine Briscoe MD  12/5/2020 3:53 PM CST Workstation: [id number]    Ct Pelvis Without Contrast    Result Date: 12/5/2020  Narrative: PROCEDURE: CT PELVIS WO CONTRAST CLINICAL INDICATION: Right hip fracture COMPARISON STUDY: X-ray right hip dated 12/5/2020 TECHNIQUE:  3.0 mm axial images of the pelvis and right hip were obtained without use of intravenous contrast. Sagittal and coronal reformatted images were also provided. Images acquired utilizing the principles of ALARA, dose as low as reasonably achievable DLP is 443.1 FINDINGS: Degenerative changes of the sacroiliac joints are present with narrowing and sclerosis. A comminuted intertrochanteric fracture of the right proximal femur is present with mild overriding of fracture fragments and mild apex lateral and superior angulation at fracture site. The lesser trochanter appears to represent a separate fracture fragment. The femoral head remains well-seated in the acetabulum. No other fracture or malalignment is identified. Degenerative changes of visualized portion of the lumbar spine are present with disc space narrowing and facet arthropathy. There is minimal, grade 1 anterolisthesis of L5 on S1 with vacuum disc phenomenon at this level. Atherosclerotic vascular calcification is present. Colonic diverticulosis is noted. The uterus is atrophic. Moderate amount stool seen in the rectum.     Impression: Comminuted, intertrochanteric fracture of the right femur. Please see full description above, as well as nonemergent findings. Electronically signed by:  Marine Briscoe MD  12/5/2020 6:10 PM CST Workstation: [id number]    Xr Chest 1 View    Result Date: 12/7/2020  Narrative: EXAM DESCRIPTION:  XR CHEST 1 VW CLINICAL HISTORY: 89 years  Female  hypoxia, S72.144A Nondisplaced intertrochanteric fracture of right femur, initial encounter for closed fracture S52.501A Unspecified fracture of the lower end of right radius, initial encounter for closed fracture S42.211A Unspecified displaced fracture of surgical neck of right humerus, initial encounter for closed fracture W19.XXXA Unspecified fall, initial encounter Z74.09 Other reduced mobility COMPARISON: December 5, 2020 TECHNIQUE: One view-AP portable radiograph of the chest FINDINGS:  The lungs are well-expanded. There is vague increase opacity in the right apex which is felt to be artifactual-due to rotation of the patient and overlying soft tissue attenuation. No definitive evidence of an acute infiltrate is seen. The cardiac silhouette and pulmonary vasculature are within normal limits. There are no pleural effusions.     Impression: 1. No radiographic evidence of acute cardiopulmonary disease. Electronically signed by:  Inna Jorgensen MD  12/7/2020 12:08 PM CST Workstation: [id number]    Xr Chest 1 View    Result Date: 12/5/2020  Narrative: PROCEDURE: XR CHEST 1 VW VIEWS:Single INDICATION: Fall, right arm pain COMPARISON: CXR: 5/6/2020 FINDINGS:   - lines/tubes: None   - cardiac: Borderline size.   - mediastinum: Contour within normal limits.   - lungs: No evidence of a focal air space process. Mild chronic appearing interstitial prominence. A calcified granuloma in the left mediastinum is present, unchanged   - pleura: No evidence of  fluid.    - osseous: Known right proximal humeral fracture is incompletely visualized     Impression: No acute abnormality of the thorax. Proximal right humeral fracture is incompletely visualized Electronically signed by:  Marine Briscoe MD  12/5/2020 4:56 PM CST Workstation: [id number]    Xr Hip With Or Without Pelvis 2 - 3 View Right    Result Date: 12/5/2020  Narrative: PROCEDURE: XR HIP W OR WO PELVIS 2-3 VIEW RIGHT VIEWS: 3 INDICATION: Pain COMPARISON: None FINDINGS:   - fracture: Comminuted intertrochanteric fracture of the proximal right femur   - alignment: Mild impaction at fracture site and mild apex lateral angulation. The right humeral head remains well-seated in the acetabulum   - misc: Age-related degenerative changes of hips, sacroiliac joints, and visualized portion of lumbar spine     Impression: Comminuted trochanteric fracture of the proximal right femur. Note:  if pain or symptoms persist beyond reasonable expectations and follow-up imaging is  anticipated,  cross sectional imaging  (CT and/or MRI) is suggested, as is deemed clinically appropriate. Electronically signed by:  Marine Briscoe MD  12/5/2020 5:01 PM CST Workstation: [id number]    X-rays the hip fracture look good without change.  X-rays wrist show some loss of dorsal tilt compared to the hospital films.    ASSESSMENT:    Diagnoses and all orders for this visit:    Closed fracture of right wrist, initial encounter    Right hip pain    Right shoulder pain, unspecified chronicity    Other orders  -     polyethylene glycol (MIRALAX) 17 g packet; Take 17 g by mouth Daily.          PLAN we will take her staples out today limit her weightbearing for 3 weeks see her back in 3 weeks x-ray on arrival probably start weightbearing as tolerated that point in physical therapy for her shoulder.  I explained to her daughter about the wrist we will keep her in a splint repeat x-ray in 3 weeks as well.  I think no reason for any aggressive operative treatment at this point will repeat x-ray in 3 weeks.  Call sooner with any problems.    Body mass index is 28.67 kg/m².              This document has been electronically signed by Elena Tejeda MA on December 16, 2020 16:02 CST

## 2020-12-17 ENCOUNTER — TELEPHONE (OUTPATIENT)
Dept: ORTHOPEDIC SURGERY | Facility: CLINIC | Age: 85
End: 2020-12-17

## 2020-12-17 NOTE — TELEPHONE ENCOUNTER
DR RODRIGUEZ.  YOLANDA GREGORIO, [facility] PHYSICAL THERAPY, CALLED ASKING IF THE PATIENT SHOULD HAVE ANY WEIGHT BEARING ON THE RIGHT HIP?    [phone]

## 2020-12-30 ENCOUNTER — NURSING HOME (OUTPATIENT)
Dept: FAMILY MEDICINE CLINIC | Facility: CLINIC | Age: 85
End: 2020-12-30

## 2020-12-30 DIAGNOSIS — S72.141D CLOSED DISPLACED INTERTROCHANTERIC FRACTURE OF RIGHT FEMUR WITH ROUTINE HEALING, SUBSEQUENT ENCOUNTER: ICD-10-CM

## 2020-12-30 DIAGNOSIS — S42.211A CLOSED DISPLACED FRACTURE OF SURGICAL NECK OF RIGHT HUMERUS, UNSPECIFIED FRACTURE MORPHOLOGY, INITIAL ENCOUNTER: ICD-10-CM

## 2020-12-30 DIAGNOSIS — K59.00 CONSTIPATION, UNSPECIFIED CONSTIPATION TYPE: ICD-10-CM

## 2020-12-30 DIAGNOSIS — E87.1 HYPONATREMIA: ICD-10-CM

## 2020-12-30 DIAGNOSIS — F03.90 MAJOR NEUROCOGNITIVE DISORDER (HCC): Primary | ICD-10-CM

## 2020-12-30 DIAGNOSIS — S62.101D CLOSED FRACTURE OF RIGHT WRIST WITH ROUTINE HEALING, SUBSEQUENT ENCOUNTER: ICD-10-CM

## 2020-12-30 PROCEDURE — 99307 SBSQ NF CARE SF MDM 10: CPT | Performed by: STUDENT IN AN ORGANIZED HEALTH CARE EDUCATION/TRAINING PROGRAM

## 2021-01-04 DIAGNOSIS — S62.101A CLOSED FRACTURE OF RIGHT WRIST, INITIAL ENCOUNTER: Primary | ICD-10-CM

## 2021-01-04 DIAGNOSIS — M25.551 RIGHT HIP PAIN: ICD-10-CM

## 2021-01-04 DIAGNOSIS — M25.511 RIGHT SHOULDER PAIN, UNSPECIFIED CHRONICITY: ICD-10-CM

## 2021-01-05 ENCOUNTER — OFFICE VISIT (OUTPATIENT)
Dept: OTOLARYNGOLOGY | Facility: CLINIC | Age: 86
End: 2021-01-05

## 2021-01-05 VITALS — WEIGHT: 167 LBS | BODY MASS INDEX: 28.51 KG/M2 | OXYGEN SATURATION: 95 % | HEIGHT: 64 IN

## 2021-01-05 DIAGNOSIS — M27.40 MAXILLARY CYST: Primary | ICD-10-CM

## 2021-01-05 PROCEDURE — 99203 OFFICE O/P NEW LOW 30 MIN: CPT | Performed by: OTOLARYNGOLOGY

## 2021-01-05 RX ORDER — DONEPEZIL HYDROCHLORIDE 5 MG/1
5 TABLET, FILM COATED ORAL NIGHTLY
COMMUNITY
End: 2023-02-23 | Stop reason: SDUPTHER

## 2021-01-05 RX ORDER — ERGOCALCIFEROL 1.25 MG/1
50000 CAPSULE ORAL WEEKLY
COMMUNITY

## 2021-01-05 NOTE — PROGRESS NOTES
Cj Horne is a 89 y.o. female.       History of Present Illness   Patient is here for evaluation of a cyst of the jaw.  This was apparently found incidentally on a CT scan of the head.  Patient is noncommunicative today and has a history of multiple strokes.  Does not her head to questions.  Currently resides in a nursing home.      The following portions of the patient's history were reviewed and updated as appropriate: allergies, current medications, past family history, past medical history, past social history, past surgical history and problem list.     reports that she has never smoked. She has never used smokeless tobacco. She reports that she does not drink alcohol or use drugs.   Patient is not a tobacco user and has not been counseled for use of tobacco products      Review of Systems        Objective   Physical Exam  Wheelchair-bound female in no acute distress.  Nods to questions but does not verbalize  Oral cavity: Severe dental caries with multiple teeth that have been fractured at the gumline.  Specifically the midline of the maxilla does show what appears to be some prominence of the buccal aspect of the maxilla but no purulence.    Multiple x-rays are independently reviewed.  There is a cystic lesion present of the anterior maxilla on 1 CT scan of the head from earlier this year.  She has had several other head CTs that did not go low enough to demonstrate the cyst however she did have an MRI in May 2019 and this is reviewed and shows the cyst was present and unchanged at that point.      Assessment/Plan   Diagnoses and all orders for this visit:    1. 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  Likelihood of malignancy is very low.  If a dental evaluation could be arranged for the patient that would be my only recommendation.  I do not think pursuing any aggressive surgical procedure is indicated  given the fact that it is been present and stable for at least 20 months and given the patient's significant comorbidities.

## 2021-01-06 ENCOUNTER — TELEPHONE (OUTPATIENT)
Dept: FAMILY MEDICINE CLINIC | Facility: CLINIC | Age: 86
End: 2021-01-06

## 2021-01-06 ENCOUNTER — OFFICE VISIT (OUTPATIENT)
Dept: ORTHOPEDIC SURGERY | Facility: CLINIC | Age: 86
End: 2021-01-06

## 2021-01-06 VITALS — BODY MASS INDEX: 28.51 KG/M2 | HEIGHT: 64 IN | WEIGHT: 167 LBS

## 2021-01-06 DIAGNOSIS — S62.101D CLOSED FRACTURE OF RIGHT WRIST WITH ROUTINE HEALING, SUBSEQUENT ENCOUNTER: Primary | ICD-10-CM

## 2021-01-06 DIAGNOSIS — M25.511 RIGHT SHOULDER PAIN, UNSPECIFIED CHRONICITY: ICD-10-CM

## 2021-01-06 DIAGNOSIS — M25.551 RIGHT HIP PAIN: ICD-10-CM

## 2021-01-06 DIAGNOSIS — S72.141D CLOSED DISPLACED INTERTROCHANTERIC FRACTURE OF RIGHT FEMUR WITH ROUTINE HEALING, SUBSEQUENT ENCOUNTER: ICD-10-CM

## 2021-01-06 PROBLEM — S72.141A CLOSED DISPLACED INTERTROCHANTERIC FRACTURE OF RIGHT FEMUR (HCC): Status: ACTIVE | Noted: 2020-12-05

## 2021-01-06 PROCEDURE — 99024 POSTOP FOLLOW-UP VISIT: CPT | Performed by: ORTHOPAEDIC SURGERY

## 2021-01-06 NOTE — TELEPHONE ENCOUNTER
Mayuri Rachel from medical records at [facility] called stating that pt returned to their facility on December 8. They are wanting Dr. Nguyen to do a telephone visit with her before January 8.    Her call back number is [phone]

## 2021-01-06 NOTE — PROGRESS NOTES
Marlene Horne is a 89 y.o. female is s/p     12/06/20 (31d) Baudilio Brooks MD     Intermedullary Nailing Of Right Femur Using Gamma Nail And Fluoroscopic Assitance - Right     Closed Reduction And Splinting Of Right Wrist With Fluoroscopic Assistance - Right           Chief Complaint   Patient presents with   • Right Femur - Follow-up   • Right Wrist - Follow-up   • Right Shoulder - Follow-up   • Wound Check       HISTORY OF PRESENT ILLNESS: She is now about 6 weeks out from her fracture doing well for repeat x-rays.       No Known Allergies      Current Outpatient Medications:   •  acetaminophen (TYLENOL) 325 MG tablet, Take 2 tablets by mouth Every 4 (Four) Hours As Needed for Mild Pain ., Disp: , Rfl:   •  albuterol (PROVENTIL) (2.5 MG/3ML) 0.083% nebulizer solution, Take 2.5 mg by nebulization Every 4 (Four) Hours As Needed for Shortness of Air., Disp: , Rfl: 12  •  amLODIPine (NORVASC) 5 MG tablet, Take 1 tablet by mouth Daily., Disp: , Rfl:   •  apixaban (ELIQUIS) 2.5 MG tablet tablet, Take 1 tablet by mouth Every 12 (Twelve) Hours for 30 days., Disp: 60 tablet, Rfl: 2  •  aspirin 81 MG EC tablet, Take 81 mg by mouth Daily., Disp: , Rfl:   •  Chlorhexidine Gluconate solution, , Disp: , Rfl:   •  Cholecalciferol 25 MCG (1000 UT) tablet, Take 1 tablet by mouth Daily., Disp: 30 tablet, Rfl: 11  •  donepezil (ARICEPT) 5 MG tablet, Take 5 mg by mouth Every Night., Disp: , Rfl:   •  ferrous sulfate 324 (65 Fe) MG tablet delayed-release EC tablet, Take 1 tablet by mouth Daily With Breakfast., Disp: 30 tablet, Rfl:   •  Flavoring Agent (FLAVOR CONC-CHLORHEXIDINE) concentration, Take 20 mL by mouth 3 (Three) Times a Day., Disp: 500 mL, Rfl: 0  •  HYDROcodone-acetaminophen (Norco) 5-325 MG per tablet, Take 1 tablet by mouth Every 6 (Six) Hours As Needed for Moderate Pain ., Disp: 40 tablet, Rfl: 0  •  melatonin 5 MG tablet tablet, Take 5 mg by mouth Every Night., Disp: , Rfl:   •  mirtazapine (REMERON) 7.5  "MG half tablet, Take  by mouth Every Night., Disp: , Rfl:   •  Multiple Vitamins-Minerals (I-HERNESTO) tablet tablet, Take 1 tablet by mouth Daily., Disp: 90 tablet, Rfl: 2  •  nystatin (MYCOSTATIN) 127029 UNIT/GM powder, Apply  topically to the appropriate area as directed As Needed (skin lesions)., Disp: 30 g, Rfl: 2  •  polyethylene glycol (MIRALAX) 17 g packet, Take 17 g by mouth Daily., Disp: , Rfl:   •  rosuvastatin (CRESTOR) 5 MG tablet, Take 1 tablet by mouth Daily., Disp: 30 tablet, Rfl: 0  •  sennosides-docusate sodium (SENOKOT-S) 8.6-50 MG tablet, Take 2 tablets by mouth Every Night., Disp: 30 tablet, Rfl: 0  •  sodium chloride 1 g tablet, Take 1 tablet by mouth 3 (Three) Times a Day With Meals., Disp: 180 tablet, Rfl: 0  •  vitamin D (ERGOCALCIFEROL) 1.25 MG (10951 UT) capsule capsule, Take 50,000 Units by mouth 1 (One) Time Per Week., Disp: , Rfl:         PHYSICAL EXAMINATION:       Marlene Horne is a 89 y.o. female    Patient is awake and alert, answers questions appropriately and is in no apparent distress.    GAIT:     []  Normal  []  Antalgic    Assistive device: []  None  []  Walker     []  Crutches  []  Cane     [x]  Wheelchair  []  Stretcher    Ortho Exam  Good motion hip minimally painful.  Mild tenderness over the proximal humerus forgets her hand to her mouth not particular tender over the wrist mild deformity is noted.  Moves her fingers well.  Vitals:    01/06/21 1422   Weight: 75.8 kg (167 lb)   Height: 162.6 cm (64\")     Xr Shoulder 2+ View Right    Result Date: 12/17/2020  Narrative: Three-view right shoulder compared to hospital film Again seen is an impacted minimally displaced fracture of the proximal humerus without change from prior film Impression: Right proximal humerus fracture without change    Xr Wrist 3+ View Right    Result Date: 12/17/2020  Narrative: 3 views right wrist compared to hospital films There is been interval change from the operative film the operative films " showed near anatomic alignment.  There is been recurrent dorsal displacement of the fracture fragment with loss of volar tilt.  Significant comminution is also seen the fracture Impression: Interval change in distal right radius fracture as above    Xr Hip With Or Without Pelvis 2 - 3 View Right    Result Date: 12/17/2020  Narrative: 2 views right hip with pelvis compared to hospital film Patient is status post IM nail intertrochanteric fracture on the right side.  No change in alignment is noted no complicating features are noted. Impression: Status post IM nail right intertrochanteric fracture without change  2 views of the right hip with pelvis show good callus formation healing of the intertrochanteric fracture.  3 views of the shoulder show healing fracture of the proximal humerus with minimal displacement mild impaction no change early callus  3 views of the right wrist show some loss of dorsal tilt which is changed but not particular different from last time we saw her mild shortening is also noted        ASSESSMENT:    Diagnoses and all orders for this visit:    Closed fracture of right wrist with routine healing, subsequent encounter    Right hip pain    Right shoulder pain, unspecified chronicity    Closed displaced intertrochanteric fracture of right femur with routine healing, subsequent encounter          PLAN she is just over 4 weeks out actually.  I will use the splint on the wrist for another 10 days she is minimally tender she get rid of the sling she can weight-bear as tolerated on her upper extremity and her lower extremity I written this order physical therapy we will see her back 1 more time in a month repeat x-rays of her right shoulder/right wrist/right hip                This document has been electronically signed by Baudilio Brooks MD on January 7, 2021 09:07 CST

## 2021-01-15 ENCOUNTER — TELEPHONE (OUTPATIENT)
Dept: FAMILY MEDICINE CLINIC | Facility: CLINIC | Age: 86
End: 2021-01-15

## 2021-01-15 NOTE — TELEPHONE ENCOUNTER
Tuesday FROM [facility] CALLED AND IT HAS BEEN SINCE 12/08 SINCE THERE WAS A VISIT AND SHE IS NEEDING A TELEHEALTH VISIT. HER NUMBER TO CALL BACK [phone].          THANK YOU,          NOAH

## 2021-01-27 ENCOUNTER — TELEPHONE (OUTPATIENT)
Dept: FAMILY MEDICINE CLINIC | Facility: CLINIC | Age: 86
End: 2021-01-27

## 2021-01-28 ENCOUNTER — NURSING HOME (OUTPATIENT)
Dept: FAMILY MEDICINE CLINIC | Facility: CLINIC | Age: 86
End: 2021-01-28

## 2021-01-28 DIAGNOSIS — F03.90 MAJOR NEUROCOGNITIVE DISORDER (HCC): Primary | ICD-10-CM

## 2021-01-28 DIAGNOSIS — E87.1 HYPONATREMIA: ICD-10-CM

## 2021-01-28 DIAGNOSIS — S72.141D CLOSED DISPLACED INTERTROCHANTERIC FRACTURE OF RIGHT FEMUR WITH ROUTINE HEALING, SUBSEQUENT ENCOUNTER: ICD-10-CM

## 2021-01-28 DIAGNOSIS — S42.211A CLOSED DISPLACED FRACTURE OF SURGICAL NECK OF RIGHT HUMERUS, UNSPECIFIED FRACTURE MORPHOLOGY, INITIAL ENCOUNTER: ICD-10-CM

## 2021-01-28 DIAGNOSIS — E77.8 HYPOPROTEINEMIA (HCC): ICD-10-CM

## 2021-01-28 DIAGNOSIS — I10 BENIGN ESSENTIAL HTN: ICD-10-CM

## 2021-01-28 DIAGNOSIS — S62.101D CLOSED FRACTURE OF RIGHT WRIST WITH ROUTINE HEALING, SUBSEQUENT ENCOUNTER: ICD-10-CM

## 2021-01-28 DIAGNOSIS — K59.00 CONSTIPATION, UNSPECIFIED CONSTIPATION TYPE: ICD-10-CM

## 2021-01-28 PROCEDURE — 99307 SBSQ NF CARE SF MDM 10: CPT | Performed by: STUDENT IN AN ORGANIZED HEALTH CARE EDUCATION/TRAINING PROGRAM

## 2021-01-29 VITALS
WEIGHT: 155.8 LBS | OXYGEN SATURATION: 97 % | RESPIRATION RATE: 16 BRPM | HEIGHT: 65 IN | TEMPERATURE: 97.5 F | HEART RATE: 70 BPM | SYSTOLIC BLOOD PRESSURE: 107 MMHG | BODY MASS INDEX: 25.96 KG/M2 | DIASTOLIC BLOOD PRESSURE: 60 MMHG

## 2021-01-29 VITALS
HEART RATE: 88 BPM | HEIGHT: 65 IN | DIASTOLIC BLOOD PRESSURE: 82 MMHG | WEIGHT: 161.2 LBS | RESPIRATION RATE: 18 BRPM | OXYGEN SATURATION: 98 % | SYSTOLIC BLOOD PRESSURE: 126 MMHG | BODY MASS INDEX: 26.86 KG/M2 | TEMPERATURE: 98.7 F

## 2021-01-29 NOTE — PROGRESS NOTES
MONTHLY NURSING HOME VISIT    NAME: Marlene Horne  : 1931  MRN: [id number]    DATE & TIME SEEN: 21 1520 via video visit facilitated by Nurse Duenas    PCP: Chuckie Nguyen MD    NURSING HOME: [facility]    Monthly [facility] Visit     Chief Complaint:Routine Visit     You have chosen to receive care through a telehealth visit.  Do you consent to use a video/audio connection for your medical care today? Yes      Subjective:     Marlene Horne is a 89 y.o. female with current medical history of major neurocognitive dementia, chronic hyponatremia, constipation, and healing from closed right wrist fracture, right humeral fracture, and right femoral fracture status post nailing seen for routine visit.  Currently no complaints.  Currently denies pain.  Following with orthopedic surgery and recovering with PT.      Current Meds:    Current Outpatient Medications:   •  acetaminophen (TYLENOL) 325 MG tablet, Take 2 tablets by mouth Every 4 (Four) Hours As Needed for Mild Pain ., Disp: , Rfl:   •  albuterol (PROVENTIL) (2.5 MG/3ML) 0.083% nebulizer solution, Take 2.5 mg by nebulization Every 4 (Four) Hours As Needed for Shortness of Air., Disp: , Rfl: 12  •  amLODIPine (NORVASC) 5 MG tablet, Take 1 tablet by mouth Daily., Disp: , Rfl:   •  aspirin 81 MG EC tablet, Take 81 mg by mouth Daily., Disp: , Rfl:   •  Chlorhexidine Gluconate solution, , Disp: , Rfl:   •  Cholecalciferol 25 MCG (1000 UT) tablet, Take 1 tablet by mouth Daily., Disp: 30 tablet, Rfl: 11  •  donepezil (ARICEPT) 5 MG tablet, Take 5 mg by mouth Every Night., Disp: , Rfl:   •  ferrous sulfate 324 (65 Fe) MG tablet delayed-release EC tablet, Take 1 tablet by mouth Daily With Breakfast., Disp: 30 tablet, Rfl:   •  Flavoring Agent (FLAVOR CONC-CHLORHEXIDINE) concentration, Take 20 mL by mouth 3 (Three) Times a Day., Disp: 500 mL, Rfl: 0  •  HYDROcodone-acetaminophen (Norco) 5-325 MG per tablet, Take 1 tablet by mouth Every  "6 (Six) Hours As Needed for Moderate Pain ., Disp: 40 tablet, Rfl: 0  •  melatonin 5 MG tablet tablet, Take 5 mg by mouth Every Night., Disp: , Rfl:   •  mirtazapine (REMERON) 7.5 MG half tablet, Take  by mouth Every Night., Disp: , Rfl:   •  Multiple Vitamins-Minerals (I-HERNESTO) tablet tablet, Take 1 tablet by mouth Daily., Disp: 90 tablet, Rfl: 2  •  nystatin (MYCOSTATIN) 799623 UNIT/GM powder, Apply  topically to the appropriate area as directed As Needed (skin lesions)., Disp: 30 g, Rfl: 2  •  polyethylene glycol (MIRALAX) 17 g packet, Take 17 g by mouth Daily., Disp: , Rfl:   •  rosuvastatin (CRESTOR) 5 MG tablet, Take 1 tablet by mouth Daily., Disp: 30 tablet, Rfl: 0  •  sennosides-docusate sodium (SENOKOT-S) 8.6-50 MG tablet, Take 2 tablets by mouth Every Night., Disp: 30 tablet, Rfl: 0  •  sodium chloride 1 g tablet, Take 1 tablet by mouth 3 (Three) Times a Day With Meals., Disp: 180 tablet, Rfl: 0  •  vitamin D (ERGOCALCIFEROL) 1.25 MG (00850 UT) capsule capsule, Take 50,000 Units by mouth 1 (One) Time Per Week., Disp: , Rfl:     Allergies:  Patient has no known allergies.    Review of Systems:  Review of Systems   Constitutional: Negative for chills and fatigue.   HENT: Negative for congestion and trouble swallowing.    Respiratory: Negative for cough and shortness of breath.    Cardiovascular: Negative for chest pain and palpitations.   Gastrointestinal: Negative for abdominal pain and constipation.   Genitourinary: Negative for dysuria and flank pain.   Musculoskeletal: Negative for back pain and gait problem.   Skin: Negative for pallor and rash.   Neurological: Negative for dizziness and headaches.   Psychiatric/Behavioral: Positive for confusion. Negative for dysphoric mood.       Objective:     /60   Pulse 70   Temp 97.5 °F (36.4 °C)   Resp 16   Ht 165.1 cm (65\")   Wt 70.7 kg (155 lb 12.8 oz)   SpO2 97% Comment: room air  BMI 25.93 kg/m²   Physical Exam  Constitutional:       General: She " is not in acute distress.     Appearance: She is not ill-appearing, toxic-appearing or diaphoretic.   HENT:      Head: Normocephalic and atraumatic.      Nose: Nose normal.   Eyes:      Extraocular Movements: Extraocular movements intact.      Pupils: Pupils are equal, round, and reactive to light.   Neurological:      Mental Status: She is alert. Mental status is at baseline. She is disoriented.         Diagnostic Data:     Please refer to the paper chart at [facility]     Assessment/Plan:      89 y.o. female with:  Problem List Items Addressed This Visit        Cardiac and Vasculature    Benign essential HTN    Overview     Amlodipine 5 mg daily            Gastrointestinal Abdominal     Constipation    Overview     - Miralax and senokot            Genitourinary and Reproductive     Hyponatremia    Overview     Managed by nephrology  Continue 2 g sodium tablets 3 times daily.    Well controlled-most recent sodium 141            Musculoskeletal and Injuries    Closed displaced intertrochanteric fracture of right femur (CMS/HCC)    Overview     Status post femoral nailing 12/6/2020  -Following with orthopedic surgery, healing well, PT, repeat x-rays per Ortho         Closed displaced fracture of surgical neck of right humerus    Overview     Conservative management  -Following with orthopedic surgery, repeat x-rays per Ortho         Closed fracture of right wrist    Overview     Status post ORIF fixation 12/8/6/2020  -Following with orthopedic surgery, splint, repeat x-rays per Ortho            Neuro    Major neurocognitive disorder (CMS/HCC) - Primary    Overview     Alzheimer's Dementia with Vascular Dementia  Patient POA (daughter) has previously refused treatment with Aricept 5 mg daily.  - Redirect patient if confused or distressed              Symptoms and Signs    Hypoproteinemia (CMS/HCC)    Overview     High-protein high-calorie diet.  Magic cups 3 times daily.  Dietary consult, follow  recommendations.               CODE STATUS: DNR and DNI    Dr. Acuña is the attending on record for this patient, He is aware of the patient's status and agrees with the above.      Chuckie Nguyen M.D. PGY3  [facility]  [address] [place] [address]  Office: [phone]    This document has been electronically signed by Chuckie Nguyen MD on January 29, 2021 14:30 CST    Time: 8 minutes

## 2021-01-29 NOTE — PROGRESS NOTES
MONTHLY NURSING HOME VISIT    NAME: Marlene Horne  : 1931  MRN: [id number]    DATE & TIME SEEN: 20 1425 via video visit facilitated by Nurse Cassidy    PCP: Chuckie Nguyen MD    NURSING HOME: [facility]    Monthly residential Visit    Chief Complaint: Routine visit    You have chosen to receive care through a telehealth visit.  Do you consent to use a video/audio connection for your medical care today? Yes      Subjective:     Marlene Horne is a 89 y.o. female [facility] Alzheimer's Dementia, chronic hyponatremia, constipation.  Baseline pleasant confusion.  Recently discharged from the hospital with right hip fracture status post femoral nailing and right humeral fracture conservatively managed as well as right wrist fracture conservatively managed.  Has follow-up scheduled with Ortho.  No current complaints.    Current Meds:    Current Outpatient Medications:   •  acetaminophen (TYLENOL) 325 MG tablet, Take 2 tablets by mouth Every 4 (Four) Hours As Needed for Mild Pain ., Disp: , Rfl:   •  albuterol (PROVENTIL) (2.5 MG/3ML) 0.083% nebulizer solution, Take 2.5 mg by nebulization Every 4 (Four) Hours As Needed for Shortness of Air., Disp: , Rfl: 12  •  amLODIPine (NORVASC) 5 MG tablet, Take 1 tablet by mouth Daily., Disp: , Rfl:   •  aspirin 81 MG EC tablet, Take 81 mg by mouth Daily., Disp: , Rfl:   •  Chlorhexidine Gluconate solution, , Disp: , Rfl:   •  Cholecalciferol 25 MCG (1000 UT) tablet, Take 1 tablet by mouth Daily., Disp: 30 tablet, Rfl: 11  •  donepezil (ARICEPT) 5 MG tablet, Take 5 mg by mouth Every Night., Disp: , Rfl:   •  ferrous sulfate 324 (65 Fe) MG tablet delayed-release EC tablet, Take 1 tablet by mouth Daily With Breakfast., Disp: 30 tablet, Rfl:   •  Flavoring Agent (FLAVOR CONC-CHLORHEXIDINE) concentration, Take 20 mL by mouth 3 (Three) Times a Day., Disp: 500 mL, Rfl: 0  •  HYDROcodone-acetaminophen (Norco) 5-325 MG per tablet, Take 1 tablet by mouth Every 6  "(Six) Hours As Needed for Moderate Pain ., Disp: 40 tablet, Rfl: 0  •  melatonin 5 MG tablet tablet, Take 5 mg by mouth Every Night., Disp: , Rfl:   •  mirtazapine (REMERON) 7.5 MG half tablet, Take  by mouth Every Night., Disp: , Rfl:   •  Multiple Vitamins-Minerals (I-HERNESTO) tablet tablet, Take 1 tablet by mouth Daily., Disp: 90 tablet, Rfl: 2  •  nystatin (MYCOSTATIN) [id number] UNIT/GM powder, Apply  topically to the appropriate area as directed As Needed (skin lesions)., Disp: 30 g, Rfl: 2  •  polyethylene glycol (MIRALAX) 17 g packet, Take 17 g by mouth Daily., Disp: , Rfl:   •  rosuvastatin (CRESTOR) 5 MG tablet, Take 1 tablet by mouth Daily., Disp: 30 tablet, Rfl: 0  •  sennosides-docusate sodium (SENOKOT-S) 8.6-50 MG tablet, Take 2 tablets by mouth Every Night., Disp: 30 tablet, Rfl: 0  •  sodium chloride 1 g tablet, Take 1 tablet by mouth 3 (Three) Times a Day With Meals., Disp: 180 tablet, Rfl: 0  •  vitamin D (ERGOCALCIFEROL) 1.25 MG (38350 UT) capsule capsule, Take 50,000 Units by mouth 1 (One) Time Per Week., Disp: , Rfl:     Allergies:  Patient has no known allergies.    Review of Systems:  Review of Systems   Constitutional: Negative for chills and fever.   HENT: Negative for congestion and trouble swallowing.    Respiratory: Negative for cough and shortness of breath.    Cardiovascular: Negative for chest pain and leg swelling.   Gastrointestinal: Negative for abdominal pain and constipation.   Genitourinary: Negative for dysuria and flank pain.   Musculoskeletal: Positive for gait problem. Negative for back pain.   Neurological: Negative for dizziness and headaches.   Psychiatric/Behavioral: Positive for confusion. Negative for dysphoric mood.       Objective:     /82   Pulse 88   Temp 98.7 °F (37.1 °C)   Resp 18   Ht 165.1 cm (65\")   Wt 73.1 kg (161 lb 3.2 oz)   SpO2 98% Comment: room air  BMI 26.83 kg/m²   Physical Exam  Constitutional:       General: She is not in acute distress.     " Appearance: She is not ill-appearing, toxic-appearing or diaphoretic.   HENT:      Head: Normocephalic and atraumatic.      Nose: Nose normal.   Eyes:      Extraocular Movements: Extraocular movements intact.      Pupils: Pupils are equal, round, and reactive to light.   Neurological:      Mental Status: She is alert. Mental status is at baseline. She is disoriented.         Diagnostic Data:     Please refer to paper chart at [facility]     Assessment/Plan:      89 y.o. female with:  Problem List Items Addressed This Visit        Gastrointestinal Abdominal     Constipation    Overview     - Miralax and senokot            Genitourinary and Reproductive     Hyponatremia    Overview     Managed by nephrology  Continue 2 g sodium tablets 3 times daily.    fluid restriction 2000mL daily.            Musculoskeletal and Injuries    Closed displaced intertrochanteric fracture of right femur (CMS/[facility])    Overview     Status post intramedullary nailing of right femur 12/6/2020  -Management per orthopedic surgery         Closed displaced fracture of surgical neck of right humerus    Overview     Conservative management per orthopedic surgery         Closed fracture of right wrist    Overview     Reduced in the OR, closed, 12/6/2020  -Management per orthopedic surgery            Neuro    Major neurocognitive disorder (CMS/[facility]) - Primary    Overview     Alzheimer's Dementia with Vascular Dementia  Patient POA (daughter) has previously refused treatment with Aricept 5 mg daily.  - Redirect patient if confused or distressed                 CODE STATUS: DNR and DNI    Dr. Acuña is the attending on record for this patient, He is aware of the patient's status and agrees with the above.      Chuckie Nguyen M.D. PGY3  [facility]  [address] [place] [address]  Office: [phone]    This document has been electronically signed by Chuckie Nguyen MD on February 1,  2021 14:02 CST

## 2021-01-31 NOTE — PROGRESS NOTES
I have seen the patient with the resident..  I have reviewed the notes, assessments, and/or procedures performed by Chuckie Nguyen MD  , I concur with her/his documentation and assessment and plan for Marlene Horne.  She is alert and conversive.  Seems to be doing well at the nursing home.          This document has been electronically signed by Maricarmen Bell MD on January 31, 2021 11:47 CST

## 2021-02-03 DIAGNOSIS — M25.551 RIGHT HIP PAIN: Primary | ICD-10-CM

## 2021-02-03 DIAGNOSIS — S62.101A CLOSED FRACTURE OF RIGHT WRIST, INITIAL ENCOUNTER: ICD-10-CM

## 2021-02-03 DIAGNOSIS — M25.511 RIGHT SHOULDER PAIN, UNSPECIFIED CHRONICITY: ICD-10-CM

## 2021-02-05 ENCOUNTER — OFFICE VISIT (OUTPATIENT)
Dept: ORTHOPEDIC SURGERY | Facility: CLINIC | Age: 86
End: 2021-02-05

## 2021-02-05 VITALS — BODY MASS INDEX: 25.83 KG/M2 | HEIGHT: 65 IN | WEIGHT: 155 LBS

## 2021-02-05 DIAGNOSIS — S62.101D CLOSED FRACTURE OF RIGHT WRIST WITH ROUTINE HEALING, SUBSEQUENT ENCOUNTER: ICD-10-CM

## 2021-02-05 DIAGNOSIS — S42.211A CLOSED DISPLACED FRACTURE OF SURGICAL NECK OF RIGHT HUMERUS, UNSPECIFIED FRACTURE MORPHOLOGY, INITIAL ENCOUNTER: ICD-10-CM

## 2021-02-05 DIAGNOSIS — S72.141D CLOSED DISPLACED INTERTROCHANTERIC FRACTURE OF RIGHT FEMUR WITH ROUTINE HEALING, SUBSEQUENT ENCOUNTER: Primary | ICD-10-CM

## 2021-02-05 PROCEDURE — 99024 POSTOP FOLLOW-UP VISIT: CPT | Performed by: ORTHOPAEDIC SURGERY

## 2021-02-05 NOTE — PROGRESS NOTES
Marlene Horne is a 89 y.o. female is s/p  IM Nail of right femur & closed reduction of right wrist.     Chief Complaint   Patient presents with   • Follow-up     Right humerus/wrist/hip       HISTORY OF PRESENT ILLNESS: Patient is here today s/p   IM Nail of right femur & closed reduction of right wrist on 12/6/20. She was sent to [facility] upon arrival. She has no complaints of pain.  Actually doing very well she is accompanied by her daughter today.            No Known Allergies      Current Outpatient Medications:   •  acetaminophen (TYLENOL) 325 MG tablet, Take 2 tablets by mouth Every 4 (Four) Hours As Needed for Mild Pain ., Disp: , Rfl:   •  albuterol (PROVENTIL) (2.5 MG/3ML) 0.083% nebulizer solution, Take 2.5 mg by nebulization Every 4 (Four) Hours As Needed for Shortness of Air., Disp: , Rfl: 12  •  amLODIPine (NORVASC) 5 MG tablet, Take 1 tablet by mouth Daily., Disp: , Rfl:   •  aspirin 81 MG EC tablet, Take 81 mg by mouth Daily., Disp: , Rfl:   •  Chlorhexidine Gluconate solution, , Disp: , Rfl:   •  Cholecalciferol 25 MCG (1000 UT) tablet, Take 1 tablet by mouth Daily., Disp: 30 tablet, Rfl: 11  •  donepezil (ARICEPT) 5 MG tablet, Take 5 mg by mouth Every Night., Disp: , Rfl:   •  ferrous sulfate 324 (65 Fe) MG tablet delayed-release EC tablet, Take 1 tablet by mouth Daily With Breakfast., Disp: 30 tablet, Rfl:   •  Flavoring Agent (FLAVOR CONC-CHLORHEXIDINE) concentration, Take 20 mL by mouth 3 (Three) Times a Day., Disp: 500 mL, Rfl: 0  •  HYDROcodone-acetaminophen (Norco) 5-325 MG per tablet, Take 1 tablet by mouth Every 6 (Six) Hours As Needed for Moderate Pain ., Disp: 40 tablet, Rfl: 0  •  melatonin 5 MG tablet tablet, Take 5 mg by mouth Every Night., Disp: , Rfl:   •  mirtazapine (REMERON) 7.5 MG half tablet, Take  by mouth Every Night., Disp: , Rfl:   •  Multiple Vitamins-Minerals (I-HERNESTO) tablet tablet, Take 1 tablet by mouth Daily., Disp: 90 tablet, Rfl: 2  •  nystatin (MYCOSTATIN) [id number]  UNIT/GM powder, Apply  topically to the appropriate area as directed As Needed (skin lesions)., Disp: 30 g, Rfl: 2  •  polyethylene glycol (MIRALAX) 17 g packet, Take 17 g by mouth Daily., Disp: , Rfl:   •  rosuvastatin (CRESTOR) 5 MG tablet, Take 1 tablet by mouth Daily., Disp: 30 tablet, Rfl: 0  •  sennosides-docusate sodium (SENOKOT-S) 8.6-50 MG tablet, Take 2 tablets by mouth Every Night., Disp: 30 tablet, Rfl: 0  •  sodium chloride 1 g tablet, Take 1 tablet by mouth 3 (Three) Times a Day With Meals., Disp: 180 tablet, Rfl: 0  •  vitamin D (ERGOCALCIFEROL) 1.25 MG (19039 UT) capsule capsule, Take 50,000 Units by mouth 1 (One) Time Per Week., Disp: , Rfl:         PHYSICAL EXAMINATION:       Marlene Horne is a 89 y.o. female    Patient is awake and alert, answers questions appropriately and is in no apparent distress.    GAIT:     []  Normal  []  Antalgic    Assistive device: []  None  []  Walker     []  Crutches  []  Cane     []  Wheelchair  []  Stretcher    Ortho Exam  Shoulder moves well as does the wrist.  No deformities noted to the wrist.  Good internal/external range of motion the hip axial pressure does not hurt.    Xr Shoulder 2+ View Right    Result Date: 1/7/2021  Narrative: 3 views right shoulder compared to prior film Impacted fracture seen in the proximal humerus with no change in overall alignment.  Minimal callus is noted. Impression: Healing right proximal humerus fracture without significant change    Xr Wrist 3+ View Right    Result Date: 1/7/2021  Narrative: 3 views right wrist compared to prior film Again seen is a comminuted distal radius fracture with some dorsal displacement and mild loss of volar tilt.  No change from prior x-ray. Impression: Moderately displaced distal radius fracture with some dorsal comminution and loss of dorsal tilt without change    Xr Hip With Or Without Pelvis 2 - 3 View Right    Result Date: 1/7/2021  Narrative: 2 views right hip with pelvis compared to  prior film Again seen patient is status post intramedullary nail fixation of intertrochanteric fracture.  There is interval callus development.  No change or complicating features noted of hardware or the fracture. Impression: Healing inotrope fracture status post IM nail    3 views of the wrist shows shortening loss of volar tilt as before without change interval healing.  Also 2 views of the shoulder show interval healing as does the hip without any complication the hardware.      ASSESSMENT:    Diagnoses and all orders for this visit:    Closed displaced intertrochanteric fracture of right femur with routine healing, subsequent encounter    Closed displaced fracture of surgical neck of right humerus, unspecified fracture morphology, initial encounter    Closed fracture of right wrist with routine healing, subsequent encounter          PLAN I think at this point she is doing extraordinarily well.  She really having no pain she is getting back to her regular baseline.  I think she has no restrictions regard to her shoulder wrist or her hip she can weight-bear as tolerated.  I explained this to the daughter will call with any problems we will see her back as needed.                This document has been electronically signed by Julia Salvador MA on February 5, 2021 08:46 CST

## 2021-02-17 ENCOUNTER — LAB REQUISITION (OUTPATIENT)
Dept: LAB | Facility: HOSPITAL | Age: 86
End: 2021-02-17

## 2021-02-17 DIAGNOSIS — R79.89 OTHER SPECIFIED ABNORMAL FINDINGS OF BLOOD CHEMISTRY: ICD-10-CM

## 2021-02-17 LAB
ANION GAP SERPL CALCULATED.3IONS-SCNC: 7 MMOL/L (ref 5–15)
BUN SERPL-MCNC: 27 MG/DL (ref 8–23)
BUN/CREAT SERPL: 22.9 (ref 7–25)
CALCIUM SPEC-SCNC: 8.7 MG/DL (ref 8.6–10.5)
CHLORIDE SERPL-SCNC: 108 MMOL/L (ref 98–107)
CO2 SERPL-SCNC: 27 MMOL/L (ref 22–29)
CREAT SERPL-MCNC: 1.18 MG/DL (ref 0.57–1)
GFR SERPL CREATININE-BSD FRML MDRD: 43 ML/MIN/1.73
GLUCOSE SERPL-MCNC: 115 MG/DL (ref 65–99)
POTASSIUM SERPL-SCNC: 3.7 MMOL/L (ref 3.5–5.2)
SODIUM SERPL-SCNC: 142 MMOL/L (ref 136–145)

## 2021-02-17 PROCEDURE — 80048 BASIC METABOLIC PNL TOTAL CA: CPT | Performed by: FAMILY MEDICINE

## 2021-02-25 ENCOUNTER — LAB REQUISITION (OUTPATIENT)
Dept: LAB | Facility: HOSPITAL | Age: 86
End: 2021-02-25

## 2021-02-25 DIAGNOSIS — N39.0 URINARY TRACT INFECTION, SITE NOT SPECIFIED: ICD-10-CM

## 2021-02-25 LAB
BILIRUB UR QL STRIP: NEGATIVE
CLARITY UR: CLEAR
COLOR UR: YELLOW
GLUCOSE UR STRIP-MCNC: NEGATIVE MG/DL
HGB UR QL STRIP.AUTO: NEGATIVE
KETONES UR QL STRIP: NEGATIVE
LEUKOCYTE ESTERASE UR QL STRIP.AUTO: ABNORMAL
NITRITE UR QL STRIP: NEGATIVE
PH UR STRIP.AUTO: 6 [PH] (ref 5–9)
PROT UR QL STRIP: NEGATIVE
SP GR UR STRIP: 1.01 (ref 1–1.03)
UROBILINOGEN UR QL STRIP: ABNORMAL

## 2021-02-25 PROCEDURE — 87077 CULTURE AEROBIC IDENTIFY: CPT | Performed by: STUDENT IN AN ORGANIZED HEALTH CARE EDUCATION/TRAINING PROGRAM

## 2021-02-25 PROCEDURE — 87086 URINE CULTURE/COLONY COUNT: CPT | Performed by: STUDENT IN AN ORGANIZED HEALTH CARE EDUCATION/TRAINING PROGRAM

## 2021-02-25 PROCEDURE — 87186 SC STD MICRODIL/AGAR DIL: CPT | Performed by: STUDENT IN AN ORGANIZED HEALTH CARE EDUCATION/TRAINING PROGRAM

## 2021-02-25 PROCEDURE — 81003 URINALYSIS AUTO W/O SCOPE: CPT | Performed by: STUDENT IN AN ORGANIZED HEALTH CARE EDUCATION/TRAINING PROGRAM

## 2021-02-26 ENCOUNTER — NURSING HOME (OUTPATIENT)
Dept: FAMILY MEDICINE CLINIC | Facility: CLINIC | Age: 86
End: 2021-02-26

## 2021-02-26 DIAGNOSIS — F03.90 MAJOR NEUROCOGNITIVE DISORDER (HCC): ICD-10-CM

## 2021-02-26 DIAGNOSIS — S42.211A CLOSED DISPLACED FRACTURE OF SURGICAL NECK OF RIGHT HUMERUS, UNSPECIFIED FRACTURE MORPHOLOGY, INITIAL ENCOUNTER: ICD-10-CM

## 2021-02-26 DIAGNOSIS — E87.1 HYPONATREMIA: ICD-10-CM

## 2021-02-26 DIAGNOSIS — S62.101D CLOSED FRACTURE OF RIGHT WRIST WITH ROUTINE HEALING, SUBSEQUENT ENCOUNTER: ICD-10-CM

## 2021-02-26 DIAGNOSIS — S72.141D CLOSED DISPLACED INTERTROCHANTERIC FRACTURE OF RIGHT FEMUR WITH ROUTINE HEALING, SUBSEQUENT ENCOUNTER: ICD-10-CM

## 2021-02-26 DIAGNOSIS — K59.00 CONSTIPATION, UNSPECIFIED CONSTIPATION TYPE: ICD-10-CM

## 2021-02-26 DIAGNOSIS — N30.00 ACUTE CYSTITIS WITHOUT HEMATURIA: Primary | ICD-10-CM

## 2021-02-26 DIAGNOSIS — I10 BENIGN ESSENTIAL HTN: ICD-10-CM

## 2021-02-26 PROCEDURE — 99307 SBSQ NF CARE SF MDM 10: CPT | Performed by: STUDENT IN AN ORGANIZED HEALTH CARE EDUCATION/TRAINING PROGRAM

## 2021-02-27 LAB
BACTERIA SPEC AEROBE CULT: ABNORMAL
BACTERIA SPEC AEROBE CULT: ABNORMAL

## 2021-03-02 VITALS
BODY MASS INDEX: 25.96 KG/M2 | HEIGHT: 65 IN | WEIGHT: 155.8 LBS | TEMPERATURE: 97.1 F | OXYGEN SATURATION: 99 % | SYSTOLIC BLOOD PRESSURE: 100 MMHG | RESPIRATION RATE: 18 BRPM | DIASTOLIC BLOOD PRESSURE: 62 MMHG | HEART RATE: 74 BPM

## 2021-03-02 NOTE — PROGRESS NOTES
MONTHLY NURSING HOME VISIT    NAME: Marlene Horne  : 1931  MRN: [id number]    You have chosen to receive care through a telehealth visit.  Do you consent to use a video/audio connection for your medical care today? Yes    DATE & TIME SEEN: 21 at 1450 with nurse Walker    PCP: Chuckie Nguyen MD    NURSING HOME: [facility]    Monthly [facility] Visit     Chief Complaint:     Subjective:     Marlene Horne is a 89 y.o. female with CMH of major neurocognitive dementia, chronic hyponatremia, constipation, who is seen for routine visit. She is healing well and following with ortho and undergoing PT/OT for R wrist fx, R humeral fx, and R femoral fx. Currently voices no complaints. There is a positive urinalysis for acute cystitis with urine culture pending.     Current Meds:    Current Outpatient Medications:   •  acetaminophen (TYLENOL) 325 MG tablet, Take 2 tablets by mouth Every 4 (Four) Hours As Needed for Mild Pain ., Disp: , Rfl:   •  albuterol (PROVENTIL) (2.5 MG/3ML) 0.083% nebulizer solution, Take 2.5 mg by nebulization Every 4 (Four) Hours As Needed for Shortness of Air., Disp: , Rfl: 12  •  amLODIPine (NORVASC) 5 MG tablet, Take 1 tablet by mouth Daily., Disp: , Rfl:   •  aspirin 81 MG EC tablet, Take 81 mg by mouth Daily., Disp: , Rfl:   •  Chlorhexidine Gluconate solution, , Disp: , Rfl:   •  Cholecalciferol 25 MCG (1000 UT) tablet, Take 1 tablet by mouth Daily., Disp: 30 tablet, Rfl: 11  •  donepezil (ARICEPT) 5 MG tablet, Take 5 mg by mouth Every Night., Disp: , Rfl:   •  ferrous sulfate 324 (65 Fe) MG tablet delayed-release EC tablet, Take 1 tablet by mouth Daily With Breakfast., Disp: 30 tablet, Rfl:   •  Flavoring Agent (FLAVOR CONC-CHLORHEXIDINE) concentration, Take 20 mL by mouth 3 (Three) Times a Day., Disp: 500 mL, Rfl: 0  •  HYDROcodone-acetaminophen (Norco) 5-325 MG per tablet, Take 1 tablet by mouth Every 6 (Six) Hours As Needed for Moderate Pain ., Disp:  "40 tablet, Rfl: 0  •  melatonin 5 MG tablet tablet, Take 5 mg by mouth Every Night., Disp: , Rfl:   •  mirtazapine (REMERON) 7.5 MG half tablet, Take  by mouth Every Night., Disp: , Rfl:   •  Multiple Vitamins-Minerals (I-HERNESTO) tablet tablet, Take 1 tablet by mouth Daily., Disp: 90 tablet, Rfl: 2  •  nystatin (MYCOSTATIN) [id number] UNIT/GM powder, Apply  topically to the appropriate area as directed As Needed (skin lesions)., Disp: 30 g, Rfl: 2  •  polyethylene glycol (MIRALAX) 17 g packet, Take 17 g by mouth Daily., Disp: , Rfl:   •  rosuvastatin (CRESTOR) 5 MG tablet, Take 1 tablet by mouth Daily., Disp: 30 tablet, Rfl: 0  •  sennosides-docusate sodium (SENOKOT-S) 8.6-50 MG tablet, Take 2 tablets by mouth Every Night., Disp: 30 tablet, Rfl: 0  •  sodium chloride 1 g tablet, Take 1 tablet by mouth 3 (Three) Times a Day With Meals., Disp: 180 tablet, Rfl: 0  •  vitamin D (ERGOCALCIFEROL) 1.25 MG (40377 UT) capsule capsule, Take 50,000 Units by mouth 1 (One) Time Per Week., Disp: , Rfl:     Allergies:  Patient has no known allergies.    Review of Systems:  Review of Systems   Constitutional: Negative for chills and fever.   HENT: Negative for congestion and trouble swallowing.    Eyes: Negative for pain and visual disturbance.   Respiratory: Negative for cough and shortness of breath.    Cardiovascular: Negative for chest pain and palpitations.   Gastrointestinal: Negative for abdominal pain and constipation.   Genitourinary: Negative for dysuria and flank pain.   Musculoskeletal: Negative for arthralgias and back pain.   Skin: Negative for pallor and rash.   Neurological: Negative for dizziness and headaches.   Psychiatric/Behavioral: Positive for confusion. Negative for dysphoric mood.       Objective:     /62   Pulse 74   Temp 97.1 °F (36.2 °C)   Resp 18   Ht 165.1 cm (65\")   Wt 70.7 kg (155 lb 12.8 oz)   SpO2 99% Comment: room air  BMI 25.93 kg/m²   Physical Exam  Constitutional:       General: She is " not in acute distress.     Appearance: She is not ill-appearing, toxic-appearing or diaphoretic.   HENT:      Head: Normocephalic.   Eyes:      Extraocular Movements: Extraocular movements intact.   Neurological:      Mental Status: She is alert.         Diagnostic Data:     Please refer to paper chart at [facility]     Assessment/Plan:      89 y.o. female with:  Problem List Items Addressed This Visit        Cardiac and Vasculature    Benign essential HTN    Overview     Amlodipine 5 mg daily            Gastrointestinal Abdominal     Constipation    Overview     - Miralax and senokot            Genitourinary and Reproductive     Hyponatremia    Overview     Managed by nephrology  Continue sodium tablets            Musculoskeletal and Injuries    Closed displaced intertrochanteric fracture of right femur (CMS/[facility])    Overview     Conservative Tx  - PT/OT         Closed displaced fracture of surgical neck of right humerus    Overview     S/p OR fixation  - PT/OT         Closed fracture of right wrist    Overview     -Fracture of the distal radius and possible small nondisplaced fracture of the tip of the ulnar styloid  S/p OR fixation  - PT/OT            Neuro    Major neurocognitive disorder (CMS/[facility])    Overview     Alzheimer's Dementia with Vascular Dementia  Patient POA (daughter) has previously refused treatment with Aricept 5 mg daily.  - CT head: negative for acute disease  - Redirect patient if confused or distressed             Other Visit Diagnoses     Acute cystitis without hematuria    -  Primary                Verbal given for cefdinir 300 mg twice daily for 7 days. Will follow urine culture       CODE STATUS: DNR and DNI    Dr. Bell  is the attending on record for this patient, She is aware of the patient's status and agrees with the above.      Chuckie Nguyen M.D. PGY3  [facility]  [address] [place] [address]  Office:  [phone]    This document has been electronically signed by Chuckie Nguyen MD on March 2, 2021 09:28 CST

## 2021-03-04 ENCOUNTER — TELEPHONE (OUTPATIENT)
Dept: FAMILY MEDICINE CLINIC | Facility: CLINIC | Age: 86
End: 2021-03-04

## 2021-03-04 DIAGNOSIS — S72.001D CLOSED FRACTURE OF NECK OF RIGHT FEMUR WITH ROUTINE HEALING, SUBSEQUENT ENCOUNTER: ICD-10-CM

## 2021-03-04 NOTE — TELEPHONE ENCOUNTER
[facility] CALLED REQUESTING A REFILL OF HYDROcodone-acetaminophen (Norco) 5-325 MG per tablet.    THEIR CALL BACK NUMBER [phone] ASK FOR CAROLINE WITH LONG TERM CARE.

## 2021-03-05 RX ORDER — HYDROCODONE BITARTRATE AND ACETAMINOPHEN 5; 325 MG/1; MG/1
1 TABLET ORAL EVERY 6 HOURS PRN
Qty: 40 TABLET | Refills: 0 | OUTPATIENT
Start: 2021-03-05

## 2021-03-05 NOTE — TELEPHONE ENCOUNTER
No pain documented in recent ortho clinic note. Called [facility] and no pain documented in chart. If has pain verbal given for tylenol 500 mg PO q8h PRN mild pain.

## 2021-03-10 ENCOUNTER — TELEPHONE (OUTPATIENT)
Dept: FAMILY MEDICINE CLINIC | Facility: CLINIC | Age: 86
End: 2021-03-10

## 2021-03-10 NOTE — TELEPHONE ENCOUNTER
WILVER FROM [facility] CALLED AND IS NEEDING A CALL BACK FROM DR RODRÍGUEZ REGARDING THE PATIENTS BEHAVIOR. HER NUMBER TO CALL BACK [phone].          THANK YOU,        NOAH

## 2021-03-18 ENCOUNTER — TELEPHONE (OUTPATIENT)
Dept: FAMILY MEDICINE CLINIC | Facility: CLINIC | Age: 86
End: 2021-03-18

## 2021-03-18 NOTE — TELEPHONE ENCOUNTER
WILVER FROM [facility] CALLED AND WANTED TO PASS ALONG TO DR RODRÍGUEZ A RECOMMENDATION ON A MEDICATION DEPAKOTE 1285 MG EVERY NIGHT. THERE NUMBER TO CALL BACK [phone].        THANK YOU,        NOAH

## 2021-03-26 NOTE — PROGRESS NOTES
I was present with the resident during the entire visit I have reviewed the notes, assessments, and/or procedures performed by Chuckie Nguyen MD   during the office visit.  I concur with her/his documentation and assessment and plan for Marlene Horne.        This document has been electronically signed by Maricarmen Bell MD on March 26, 2021 17:08 CDT

## 2021-04-27 ENCOUNTER — TELEPHONE (OUTPATIENT)
Dept: FAMILY MEDICINE CLINIC | Facility: CLINIC | Age: 86
End: 2021-04-27

## 2021-04-27 NOTE — TELEPHONE ENCOUNTER
Tuesday WITH MEDICAL RECORDS AT [facility] CALLED STATING THAT THIS PATIENT WAS LAST ROUNDED ON IN February. THEY ARE REQUESTING THAT DR. RODRÍGUEZ COME ROUND ON THIS PATIENT AGAIN SINCE IT HAS BEEN OVER 60 DAYS.    THEIR CALL BACK [phone]

## 2021-04-30 ENCOUNTER — NURSING HOME (OUTPATIENT)
Dept: FAMILY MEDICINE CLINIC | Facility: CLINIC | Age: 86
End: 2021-04-30

## 2021-04-30 DIAGNOSIS — F03.90 MAJOR NEUROCOGNITIVE DISORDER (HCC): Primary | ICD-10-CM

## 2021-04-30 DIAGNOSIS — K59.00 CONSTIPATION, UNSPECIFIED CONSTIPATION TYPE: ICD-10-CM

## 2021-04-30 DIAGNOSIS — I10 BENIGN ESSENTIAL HTN: ICD-10-CM

## 2021-04-30 PROCEDURE — 99307 SBSQ NF CARE SF MDM 10: CPT | Performed by: STUDENT IN AN ORGANIZED HEALTH CARE EDUCATION/TRAINING PROGRAM

## 2021-05-04 VITALS
HEART RATE: 79 BPM | TEMPERATURE: 97 F | HEIGHT: 65 IN | BODY MASS INDEX: 26.19 KG/M2 | DIASTOLIC BLOOD PRESSURE: 63 MMHG | SYSTOLIC BLOOD PRESSURE: 98 MMHG | OXYGEN SATURATION: 98 % | RESPIRATION RATE: 18 BRPM | WEIGHT: 157.2 LBS

## 2021-05-04 NOTE — PROGRESS NOTES
MONTHLY NURSING HOME VISIT    NAME: Marlene Horne  : 1931  MRN: [id number]    DATE & TIME SEEN: 21 at 1215     PCP: Chuckie Nguyen MD    NURSING HOME: [facility]    Monthly care home Visit    Chief Complaint:     Subjective:     Marlene Horne is a 89 y.o. female CMH of major neurocognitive dementia, chronic hyponatremia, constipation, who is seen for routine visit. Has no complaints currently.     Current Meds:    Current Outpatient Medications:   •  acetaminophen (TYLENOL) 325 MG tablet, Take 2 tablets by mouth Every 4 (Four) Hours As Needed for Mild Pain ., Disp: , Rfl:   •  albuterol (PROVENTIL) (2.5 MG/3ML) 0.083% nebulizer solution, Take 2.5 mg by nebulization Every 4 (Four) Hours As Needed for Shortness of Air., Disp: , Rfl: 12  •  amLODIPine (NORVASC) 5 MG tablet, Take 1 tablet by mouth Daily., Disp: , Rfl:   •  aspirin 81 MG EC tablet, Take 81 mg by mouth Daily., Disp: , Rfl:   •  Chlorhexidine Gluconate solution, , Disp: , Rfl:   •  donepezil (ARICEPT) 5 MG tablet, Take 5 mg by mouth Every Night., Disp: , Rfl:   •  ferrous sulfate 324 (65 Fe) MG tablet delayed-release EC tablet, Take 1 tablet by mouth Daily With Breakfast., Disp: 30 tablet, Rfl:   •  Flavoring Agent (FLAVOR CONC-CHLORHEXIDINE) concentration, Take 20 mL by mouth 3 (Three) Times a Day., Disp: 500 mL, Rfl: 0  •  HYDROcodone-acetaminophen (Norco) 5-325 MG per tablet, Take 1 tablet by mouth Every 6 (Six) Hours As Needed for Moderate Pain ., Disp: 40 tablet, Rfl: 0  •  melatonin 5 MG tablet tablet, Take 5 mg by mouth Every Night., Disp: , Rfl:   •  mirtazapine (REMERON) 7.5 MG half tablet, Take  by mouth Every Night., Disp: , Rfl:   •  Multiple Vitamins-Minerals (I-HERNESTO) tablet tablet, Take 1 tablet by mouth Daily., Disp: 90 tablet, Rfl: 2  •  nystatin (MYCOSTATIN) 216254 UNIT/GM powder, Apply  topically to the appropriate area as directed As Needed (skin lesions)., Disp: 30 g, Rfl: 2  •  polyethylene  "glycol (MIRALAX) 17 g packet, Take 17 g by mouth Daily., Disp: , Rfl:   •  rosuvastatin (CRESTOR) 5 MG tablet, Take 1 tablet by mouth Daily., Disp: 30 tablet, Rfl: 0  •  sennosides-docusate sodium (SENOKOT-S) 8.6-50 MG tablet, Take 2 tablets by mouth Every Night., Disp: 30 tablet, Rfl: 0  •  sodium chloride 1 g tablet, Take 1 tablet by mouth 3 (Three) Times a Day With Meals., Disp: 180 tablet, Rfl: 0  •  vitamin D (ERGOCALCIFEROL) 1.25 MG (33984 UT) capsule capsule, Take 50,000 Units by mouth 1 (One) Time Per Week., Disp: , Rfl:     Allergies:  Patient has no known allergies.    Review of Systems:  Review of Systems   Unable to perform ROS: Dementia       Objective:     BP 98/63   Pulse 79   Temp 97 °F (36.1 °C)   Resp 18   Ht 165.1 cm (65\")   Wt 71.3 kg (157 lb 3.2 oz)   SpO2 98% Comment: on RA  BMI 26.16 kg/m²   Physical Exam  Constitutional:       General: She is not in acute distress.     Appearance: Normal appearance. She is not ill-appearing, toxic-appearing or diaphoretic.   HENT:      Head: Normocephalic and atraumatic.      Nose: Nose normal.      Mouth/Throat:      Mouth: Mucous membranes are moist.   Eyes:      Extraocular Movements: Extraocular movements intact.      Pupils: Pupils are equal, round, and reactive to light.   Cardiovascular:      Rate and Rhythm: Normal rate.      Pulses: Normal pulses.   Pulmonary:      Effort: Pulmonary effort is normal.   Abdominal:      General: Abdomen is flat. Bowel sounds are normal.      Tenderness: There is no abdominal tenderness.   Musculoskeletal:      Cervical back: Normal range of motion.      Right lower leg: Edema (trace) present.      Left lower leg: Edema (trace) present.   Skin:     General: Skin is warm.      Capillary Refill: Capillary refill takes less than 2 seconds.   Neurological:      Mental Status: She is alert. Mental status is at baseline. She is disoriented.         Diagnostic Data:     Please refer to paper chart at [facility]   "   Assessment/Plan:      89 y.o. female with:  Problem List Items Addressed This Visit        Cardiac and Vasculature    Benign essential HTN    Overview     Amlodipine 5 mg daily            Gastrointestinal Abdominal     Constipation    Overview     - Miralax and senokot            Neuro    Major neurocognitive disorder (CMS/HCC) - Primary    Overview     Alzheimer's Dementia with Vascular Dementia  Patient POA (daughter) has previously refused treatment with Aricept 5 mg daily.  - CT head: negative for acute disease  - Redirect patient if confused or distressed                 CODE STATUS: DNR and DNI    Dr. Mandujano is the attending on record for this patient, He is aware of the patient's status and agrees with the above.      Chuckie Nguyen M.D. PGY3  [facility]  [address] [place] [address]  Office: [phone]    This document has been electronically signed by Chuckie Nguyen MD on May 4, 2021 13:44 CDT

## 2021-05-26 ENCOUNTER — NURSING HOME (OUTPATIENT)
Dept: FAMILY MEDICINE CLINIC | Facility: CLINIC | Age: 86
End: 2021-05-26

## 2021-05-26 DIAGNOSIS — K59.03 DRUG-INDUCED CONSTIPATION: ICD-10-CM

## 2021-05-26 DIAGNOSIS — F03.90 MAJOR NEUROCOGNITIVE DISORDER (HCC): Primary | ICD-10-CM

## 2021-05-26 DIAGNOSIS — I10 BENIGN ESSENTIAL HTN: ICD-10-CM

## 2021-05-26 PROCEDURE — 99304 1ST NF CARE SF/LOW MDM 25: CPT | Performed by: STUDENT IN AN ORGANIZED HEALTH CARE EDUCATION/TRAINING PROGRAM

## 2021-05-27 VITALS
WEIGHT: 157.2 LBS | DIASTOLIC BLOOD PRESSURE: 64 MMHG | OXYGEN SATURATION: 98 % | SYSTOLIC BLOOD PRESSURE: 133 MMHG | BODY MASS INDEX: 26.19 KG/M2 | HEART RATE: 75 BPM | HEIGHT: 65 IN | TEMPERATURE: 97 F | RESPIRATION RATE: 18 BRPM

## 2021-05-27 NOTE — PROGRESS NOTES
MONTHLY NURSING HOME VISIT    NAME: Marlene Horne  : 1931  MRN: [id number]    DATE & TIME SEEN: 2021 at 1745     PCP: Chuckie Nguyen MD    NURSING HOME: [facility]    Monthly residential Visit    Chief Complaint:     Subjective:     Marlene Horne is a 89 y.o. female CMH of major neurocognitive dementia, chronic hyponatremia, constipation, who is seen for routine visit. Concerned she needs help with ADL's. Has no physical or ill complaints today.     Current Meds:    Current Outpatient Medications:   •  acetaminophen (TYLENOL) 325 MG tablet, Take 2 tablets by mouth Every 4 (Four) Hours As Needed for Mild Pain ., Disp: , Rfl:   •  albuterol (PROVENTIL) (2.5 MG/3ML) 0.083% nebulizer solution, Take 2.5 mg by nebulization Every 4 (Four) Hours As Needed for Shortness of Air., Disp: , Rfl: 12  •  amLODIPine (NORVASC) 5 MG tablet, Take 1 tablet by mouth Daily., Disp: , Rfl:   •  aspirin 81 MG EC tablet, Take 81 mg by mouth Daily., Disp: , Rfl:   •  Chlorhexidine Gluconate solution, , Disp: , Rfl:   •  donepezil (ARICEPT) 5 MG tablet, Take 5 mg by mouth Every Night., Disp: , Rfl:   •  ferrous sulfate 324 (65 Fe) MG tablet delayed-release EC tablet, Take 1 tablet by mouth Daily With Breakfast., Disp: 30 tablet, Rfl:   •  Flavoring Agent (FLAVOR CONC-CHLORHEXIDINE) concentration, Take 20 mL by mouth 3 (Three) Times a Day., Disp: 500 mL, Rfl: 0  •  HYDROcodone-acetaminophen (Norco) 5-325 MG per tablet, Take 1 tablet by mouth Every 6 (Six) Hours As Needed for Moderate Pain ., Disp: 40 tablet, Rfl: 0  •  melatonin 5 MG tablet tablet, Take 5 mg by mouth Every Night., Disp: , Rfl:   •  mirtazapine (REMERON) 7.5 MG half tablet, Take  by mouth Every Night., Disp: , Rfl:   •  Multiple Vitamins-Minerals (I-HERNESTO) tablet tablet, Take 1 tablet by mouth Daily., Disp: 90 tablet, Rfl: 2  •  nystatin (MYCOSTATIN) 688780 UNIT/GM powder, Apply  topically to the appropriate area as directed As Needed  "(skin lesions)., Disp: 30 g, Rfl: 2  •  polyethylene glycol (MIRALAX) 17 g packet, Take 17 g by mouth Daily., Disp: , Rfl:   •  rosuvastatin (CRESTOR) 5 MG tablet, Take 1 tablet by mouth Daily., Disp: 30 tablet, Rfl: 0  •  sennosides-docusate sodium (SENOKOT-S) 8.6-50 MG tablet, Take 2 tablets by mouth Every Night., Disp: 30 tablet, Rfl: 0  •  sodium chloride 1 g tablet, Take 1 tablet by mouth 3 (Three) Times a Day With Meals., Disp: 180 tablet, Rfl: 0  •  vitamin D (ERGOCALCIFEROL) 1.25 MG (39385 UT) capsule capsule, Take 50,000 Units by mouth 1 (One) Time Per Week., Disp: , Rfl:     Allergies:  Patient has no known allergies.    Review of Systems:  Review of Systems   Unable to perform ROS: Dementia       Objective:     /64   Pulse 75   Temp 97 °F (36.1 °C)   Resp 18   Ht 165.1 cm (65\")   Wt 71.3 kg (157 lb 3.2 oz)   SpO2 98% Comment: room air  BMI 26.16 kg/m²   Physical Exam  Constitutional:       General: She is not in acute distress.     Appearance: Normal appearance. She is not ill-appearing, toxic-appearing or diaphoretic.   HENT:      Head: Normocephalic and atraumatic.      Nose: Nose normal.      Mouth/Throat:      Mouth: Mucous membranes are moist.   Eyes:      Extraocular Movements: Extraocular movements intact.      Pupils: Pupils are equal, round, and reactive to light.   Cardiovascular:      Rate and Rhythm: Normal rate and regular rhythm.      Pulses: Normal pulses.   Pulmonary:      Effort: Pulmonary effort is normal.      Breath sounds: No wheezing or rales.   Abdominal:      General: Abdomen is flat. Bowel sounds are normal.      Tenderness: There is no abdominal tenderness. There is no guarding.   Musculoskeletal:      Cervical back: Normal range of motion and neck supple.      Right lower leg: Edema (trace) present.      Left lower leg: Edema (trace) present.   Skin:     General: Skin is warm.      Capillary Refill: Capillary refill takes less than 2 seconds.   Neurological:      " Mental Status: She is alert. Mental status is at baseline. She is disoriented.         Diagnostic Data:     Please refer to paper chart at T     Assessment/Plan:      89 y.o. female with:  Problem List Items Addressed This Visit        Cardiac and Vasculature    Benign essential HTN    Overview     Amlodipine 5 mg daily            Gastrointestinal Abdominal     Constipation    Overview     - Miralax and senokot            Neuro    Major neurocognitive disorder (CMS/HCC) - Primary    Overview     Alzheimer's Dementia with Vascular Dementia  Patient POA (daughter) has previously refused treatment with Aricept 5 mg daily.  - CT head: negative for acute disease  - Redirect patient if confused or distressed                 CODE STATUS: DNR and DNI    Dr. Mandujano is the attending on record for this patient, He is aware of the patient's status and agrees with the above.      Chuckie Nguyen M.D. PGY3  [facility]  [address] [place] [address]  Office: [phone]    This document has been electronically signed by Chuckie Nguyen MD on May 27, 2021 09:06 CDT

## 2021-06-29 ENCOUNTER — NURSING HOME (OUTPATIENT)
Dept: FAMILY MEDICINE CLINIC | Facility: CLINIC | Age: 86
End: 2021-06-29

## 2021-06-29 VITALS
BODY MASS INDEX: 25.83 KG/M2 | OXYGEN SATURATION: 99 % | HEART RATE: 78 BPM | HEIGHT: 65 IN | SYSTOLIC BLOOD PRESSURE: 174 MMHG | DIASTOLIC BLOOD PRESSURE: 82 MMHG | RESPIRATION RATE: 18 BRPM | TEMPERATURE: 98.7 F | WEIGHT: 155 LBS

## 2021-06-29 DIAGNOSIS — F03.90 MAJOR NEUROCOGNITIVE DISORDER (HCC): Primary | ICD-10-CM

## 2021-06-29 PROCEDURE — 99307 SBSQ NF CARE SF MDM 10: CPT | Performed by: STUDENT IN AN ORGANIZED HEALTH CARE EDUCATION/TRAINING PROGRAM

## 2021-06-29 NOTE — PROGRESS NOTES
MONTHLY NURSING HOME VISIT    NAME: Marlene Horne  : 1931  MRN: [id number]    DATE & TIME SEEN: 2021 at 0930 via video visit with nurse Pedroza    PCP: Chuckie Nguyen MD    NURSING HOME: [facility]    Monthly FDC Visit    Chief Complaint: Routine visit    You have chosen to receive care through a telephone visit. Do you consent to use a telephone visit for your medical care today? Yes    Subjective:     Marlene Horne is a 89 y.o. female CMH of major neurocognitive dementia, chronic hyponatremia, constipation, who is seen for routine visit.  Has no complaints today.     Current Meds:    Current Outpatient Medications:   •  acetaminophen (TYLENOL) 325 MG tablet, Take 2 tablets by mouth Every 4 (Four) Hours As Needed for Mild Pain ., Disp: , Rfl:   •  albuterol (PROVENTIL) (2.5 MG/3ML) 0.083% nebulizer solution, Take 2.5 mg by nebulization Every 4 (Four) Hours As Needed for Shortness of Air., Disp: , Rfl: 12  •  amLODIPine (NORVASC) 5 MG tablet, Take 1 tablet by mouth Daily., Disp: , Rfl:   •  aspirin 81 MG EC tablet, Take 81 mg by mouth Daily., Disp: , Rfl:   •  Chlorhexidine Gluconate solution, , Disp: , Rfl:   •  donepezil (ARICEPT) 5 MG tablet, Take 5 mg by mouth Every Night., Disp: , Rfl:   •  ferrous sulfate 324 (65 Fe) MG tablet delayed-release EC tablet, Take 1 tablet by mouth Daily With Breakfast., Disp: 30 tablet, Rfl:   •  Flavoring Agent (FLAVOR CONC-CHLORHEXIDINE) concentration, Take 20 mL by mouth 3 (Three) Times a Day., Disp: 500 mL, Rfl: 0  •  HYDROcodone-acetaminophen (Norco) 5-325 MG per tablet, Take 1 tablet by mouth Every 6 (Six) Hours As Needed for Moderate Pain ., Disp: 40 tablet, Rfl: 0  •  melatonin 5 MG tablet tablet, Take 5 mg by mouth Every Night., Disp: , Rfl:   •  mirtazapine (REMERON) 7.5 MG half tablet, Take  by mouth Every Night., Disp: , Rfl:   •  Multiple Vitamins-Minerals (I-HERNESTO) tablet tablet, Take 1 tablet by mouth Daily., Disp: 90  "tablet, Rfl: 2  •  nystatin (MYCOSTATIN) 971729 UNIT/GM powder, Apply  topically to the appropriate area as directed As Needed (skin lesions)., Disp: 30 g, Rfl: 2  •  polyethylene glycol (MIRALAX) 17 g packet, Take 17 g by mouth Daily., Disp: , Rfl:   •  rosuvastatin (CRESTOR) 5 MG tablet, Take 1 tablet by mouth Daily., Disp: 30 tablet, Rfl: 0  •  sennosides-docusate sodium (SENOKOT-S) 8.6-50 MG tablet, Take 2 tablets by mouth Every Night., Disp: 30 tablet, Rfl: 0  •  sodium chloride 1 g tablet, Take 1 tablet by mouth 3 (Three) Times a Day With Meals., Disp: 180 tablet, Rfl: 0  •  vitamin D (ERGOCALCIFEROL) 1.25 MG (73507 UT) capsule capsule, Take 50,000 Units by mouth 1 (One) Time Per Week., Disp: , Rfl:     Allergies:  Patient has no known allergies.    Review of Systems:  Review of Systems   Unable to perform ROS: Dementia       Objective:     /82   Pulse 78   Temp 98.7 °F (37.1 °C)   Resp 18   Ht 165.1 cm (65\")   Wt 70.3 kg (155 lb)   SpO2 99% Comment: on room air  BMI 25.79 kg/m²   Physical Exam  Constitutional:       General: She is awake. She is not in acute distress.     Appearance: Normal appearance. She is well-developed and well-groomed. She is not ill-appearing, toxic-appearing or diaphoretic.   HENT:      Head: Normocephalic and atraumatic.      Nose: Nose normal.   Eyes:      Extraocular Movements: Extraocular movements intact.   Pulmonary:      Effort: Pulmonary effort is normal.   Musculoskeletal:      Cervical back: Normal range of motion.   Neurological:      Mental Status: She is alert. Mental status is at baseline. She is disoriented.   Psychiatric:         Behavior: Behavior is cooperative.         Diagnostic Data:     Please refer to paper chart at T     Assessment/Plan:      89 y.o. female with:  Problem List Items Addressed This Visit        Neuro    Major neurocognitive disorder (CMS/HCC) - Primary    Overview     Alzheimer's Dementia with Vascular Dementia  Patient POA " (daughter) has previously refused treatment with Aricept 5 mg daily.  - CT head: negative for acute disease  - Redirect patient if confused or distressed                 CODE STATUS: DNR and DNI    Dr. Bell  and Dr. Mandujano is the attending on record for this patient, She is aware of the patient's status and agrees with the above.      Chuckie Nguyen M.D. PGY3  [facility]  [address] [place] [address]  Office: [phone]    This document has been electronically signed by Chuckie Nguyen MD on June 29, 2021 10:54 CDT    Time: 6 mins

## 2021-06-30 NOTE — PROGRESS NOTES
I have reviewed the notes, assessments, and/or procedures performed by Dr. Nguyen  , I concur with her/his documentation of Marlene Horne.       This document has been electronically signed by Maricarmen Bell MD on June 30, 2021 11:18 CDT

## 2021-07-02 ENCOUNTER — TELEPHONE (OUTPATIENT)
Dept: FAMILY MEDICINE CLINIC | Facility: CLINIC | Age: 86
End: 2021-07-02

## 2021-07-02 NOTE — TELEPHONE ENCOUNTER
[facility]ER [facility] CALLED NEEDING TO SPEAK TO DR TABARES IN REGARDS TO A DOSAGE REDUCTION FOR PATIENT     PLEASE CALL [phone] ASK FOR THE NURSE IN CHARGE OF PATIENT     THANK YOU

## 2021-08-24 ENCOUNTER — TELEPHONE (OUTPATIENT)
Dept: FAMILY MEDICINE CLINIC | Facility: CLINIC | Age: 86
End: 2021-08-24

## 2021-08-24 NOTE — TELEPHONE ENCOUNTER
Mayuri MARTIN FROM [facility] CALLED WANTING TO KNOW IF A RESIDENT COULD COME ROUND ON HER BECAUSE HER LAST VISIT WAS ON 6/30.     HER CALL BACK NUMBER [phone]

## 2021-08-26 ENCOUNTER — NURSING HOME (OUTPATIENT)
Dept: FAMILY MEDICINE CLINIC | Facility: CLINIC | Age: 86
End: 2021-08-26

## 2021-08-26 DIAGNOSIS — F03.90 MAJOR NEUROCOGNITIVE DISORDER (HCC): ICD-10-CM

## 2021-08-26 DIAGNOSIS — K59.03 DRUG-INDUCED CONSTIPATION: ICD-10-CM

## 2021-08-26 DIAGNOSIS — E87.1 HYPONATREMIA: ICD-10-CM

## 2021-08-26 DIAGNOSIS — I10 BENIGN ESSENTIAL HTN: Primary | ICD-10-CM

## 2021-08-26 PROCEDURE — 99309 SBSQ NF CARE MODERATE MDM 30: CPT | Performed by: STUDENT IN AN ORGANIZED HEALTH CARE EDUCATION/TRAINING PROGRAM

## 2021-08-31 ENCOUNTER — TELEPHONE (OUTPATIENT)
Dept: FAMILY MEDICINE CLINIC | Facility: CLINIC | Age: 86
End: 2021-08-31

## 2021-08-31 NOTE — TELEPHONE ENCOUNTER
Tuesday FROM [facility] CALLED WANTING TO KNOW IF THIS PATIENT WAS SEEN ON 8/26. SHE STATED THAT THE ECOUNTER IS IN Epic BUT IT IS OPENED AND UNSIGNED WITH NO DICTATION. HER CALL BACK NUMBER [phone]

## 2021-09-07 PROBLEM — K59.03 DRUG-INDUCED CONSTIPATION: Status: RESOLVED | Noted: 2019-01-07 | Resolved: 2021-09-07

## 2021-09-07 NOTE — PROGRESS NOTES
I have seen the patient.  I have reviewed the notes, assessments, and/or procedures performed by Dr. Mcnamara, I concur with her/his documentation and assessment and plan for Marlene Horne.       This document has been electronically signed by Anderson Mandujano MD on September 7, 2021 14:52 CDT

## 2021-09-07 NOTE — PROGRESS NOTES
MONTHLY NURSING HOME VISIT    NAME: Marlene Horne  : 1931  MRN: [id number]    DATE & TIME SEEN: 21 6157    PCP: Angela Mcnamara MD    NURSING HOME: [facility]    Monthly Nursing Home Visit for HTN, Constipation, Neurocognitive disorder, Hyponatremia, & h/o right hip fracture.     Chief Complaint:     Subjective:     Marlene Horne is a 89 y.o. female resident of [facility] who presents for her monthly nursing home visit. Patient is resting comfortably in bed, denies any complaints or sores, and uses a walker as she needs to for mobility. She is doing well and remains pleasantly confused.     Current Meds:    Current Outpatient Medications:   •  acetaminophen (TYLENOL) 325 MG tablet, Take 2 tablets by mouth Every 4 (Four) Hours As Needed for Mild Pain ., Disp: , Rfl:   •  albuterol (PROVENTIL) (2.5 MG/3ML) 0.083% nebulizer solution, Take 2.5 mg by nebulization Every 4 (Four) Hours As Needed for Shortness of Air., Disp: , Rfl: 12  •  amLODIPine (NORVASC) 5 MG tablet, Take 1 tablet by mouth Daily., Disp: , Rfl:   •  aspirin 81 MG EC tablet, Take 81 mg by mouth Daily., Disp: , Rfl:   •  Chlorhexidine Gluconate solution, , Disp: , Rfl:   •  donepezil (ARICEPT) 5 MG tablet, Take 5 mg by mouth Every Night., Disp: , Rfl:   •  ferrous sulfate 324 (65 Fe) MG tablet delayed-release EC tablet, Take 1 tablet by mouth Daily With Breakfast., Disp: 30 tablet, Rfl:   •  Flavoring Agent (FLAVOR CONC-CHLORHEXIDINE) concentration, Take 20 mL by mouth 3 (Three) Times a Day., Disp: 500 mL, Rfl: 0  •  HYDROcodone-acetaminophen (Norco) 5-325 MG per tablet, Take 1 tablet by mouth Every 6 (Six) Hours As Needed for Moderate Pain ., Disp: 40 tablet, Rfl: 0  •  melatonin 5 MG tablet tablet, Take 5 mg by mouth Every Night., Disp: , Rfl:   •  mirtazapine (REMERON) 7.5 MG half tablet, Take  by mouth Every Night., Disp: , Rfl:   •  Multiple Vitamins-Minerals (I-HERNESTO) tablet tablet, Take 1 tablet by mouth  Daily., Disp: 90 tablet, Rfl: 2  •  nystatin (MYCOSTATIN) 667313 UNIT/GM powder, Apply  topically to the appropriate area as directed As Needed (skin lesions)., Disp: 30 g, Rfl: 2  •  polyethylene glycol (MIRALAX) 17 g packet, Take 17 g by mouth Daily., Disp: , Rfl:   •  rosuvastatin (CRESTOR) 5 MG tablet, Take 1 tablet by mouth Daily., Disp: 30 tablet, Rfl: 0  •  sennosides-docusate sodium (SENOKOT-S) 8.6-50 MG tablet, Take 2 tablets by mouth Every Night., Disp: 30 tablet, Rfl: 0  •  sodium chloride 1 g tablet, Take 1 tablet by mouth 3 (Three) Times a Day With Meals., Disp: 180 tablet, Rfl: 0  •  vitamin D (ERGOCALCIFEROL) 1.25 MG (34514 UT) capsule capsule, Take 50,000 Units by mouth 1 (One) Time Per Week., Disp: , Rfl:     Allergies:  Patient has no known allergies.    Review of Systems:  Review of Systems   Constitutional: Negative for chills and fever.   HENT: Negative for rhinorrhea and sore throat.    Eyes: Negative for photophobia and visual disturbance.   Respiratory: Negative for cough and shortness of breath.    Cardiovascular: Negative for chest pain and palpitations.   Gastrointestinal: Negative for abdominal pain and nausea.   Genitourinary: Negative for difficulty urinating and dyspareunia.   Musculoskeletal: Negative for arthralgias and back pain.   Skin: Negative for wound.   Neurological: Negative for light-headedness and headaches.   Psychiatric/Behavioral: Negative for dysphoric mood and sleep disturbance.   All other systems reviewed and are negative.      Objective:     There were no vitals taken for this visit.  Physical Exam  Vitals reviewed.   Constitutional:       General: She is not in acute distress.     Appearance: Normal appearance. She is well-developed, well-groomed and normal weight.   HENT:      Head: Normocephalic.      Right Ear: Hearing and external ear normal.      Left Ear: Hearing and external ear normal.      Nose: Nose normal.      Mouth/Throat:      Lips: Pink.      Mouth:  Mucous membranes are moist.      Dentition: Abnormal dentition.      Pharynx: Oropharynx is clear.   Eyes:      General: Lids are normal.      Conjunctiva/sclera: Conjunctivae normal.      Pupils: Pupils are equal, round, and reactive to light.   Cardiovascular:      Rate and Rhythm: Normal rate and regular rhythm.      Pulses: Normal pulses.      Heart sounds: Normal heart sounds. No murmur heard.   No friction rub. No gallop.    Pulmonary:      Effort: Pulmonary effort is normal.      Breath sounds: Normal breath sounds and air entry. No wheezing, rhonchi or rales.   Abdominal:      General: Abdomen is flat. Bowel sounds are normal.      Palpations: Abdomen is soft.      Tenderness: There is no abdominal tenderness.   Musculoskeletal:      Cervical back: Neck supple.   Skin:     General: Skin is warm.   Neurological:      Mental Status: She is alert and oriented to person, place, and time.   Psychiatric:         Attention and Perception: Attention and perception normal.         Mood and Affect: Mood and affect normal.         Speech: Speech normal.         Behavior: Behavior normal. Behavior is cooperative.         Thought Content: Thought content normal.         Diagnostic Data:     None     Assessment/Plan:      89 y.o. female with:  Problem List Items Addressed This Visit        Cardiac and Vasculature    Benign essential HTN - Primary    Overview     Amlodipine 5 mg daily            Gastrointestinal Abdominal     Constipation    Overview     - Miralax and senokot            Genitourinary and Reproductive     Hyponatremia    Overview     Managed by nephrology  Continue 2 g sodium tablets 3 times daily.    Increase fluid restriction from 1500mL daily to 2000mL daily.  Repeat BMP on Friday 4/24/2020                Neuro    Major neurocognitive disorder (CMS/HCC)    Overview     Alzheimer's Dementia with Vascular Dementia  Patient POA (daughter) has previously refused treatment with Aricept 5 mg daily.  - CT head:  negative for acute disease  - Redirect patient if confused or distressed                 CODE STATUS: DNR and DNI    Dr. Mandujano is the attending on record for this patient, He is aware of the patient's status and agrees with the above.      This document has been electronically signed by Angela Mcnamara MD on September 7, 2021 14:47 CDT

## 2021-09-30 ENCOUNTER — NURSING HOME (OUTPATIENT)
Dept: FAMILY MEDICINE CLINIC | Facility: CLINIC | Age: 86
End: 2021-09-30

## 2021-09-30 DIAGNOSIS — I10 BENIGN ESSENTIAL HTN: ICD-10-CM

## 2021-09-30 DIAGNOSIS — K59.01 SLOW TRANSIT CONSTIPATION: ICD-10-CM

## 2021-09-30 DIAGNOSIS — F03.90 MAJOR NEUROCOGNITIVE DISORDER (HCC): ICD-10-CM

## 2021-09-30 DIAGNOSIS — E87.1 HYPONATREMIA: Primary | ICD-10-CM

## 2021-09-30 PROCEDURE — 99309 SBSQ NF CARE MODERATE MDM 30: CPT | Performed by: STUDENT IN AN ORGANIZED HEALTH CARE EDUCATION/TRAINING PROGRAM

## 2021-09-30 NOTE — PROGRESS NOTES
Family Medicine Residency  Angela Mcnamara MD    MONTHLY NURSING HOME VISIT    NAME: Marlene Horne  : 1931  MRN: [id number]    DATE & TIME SEEN: 21 0800    PCP: Angela Mcnamara MD    NURSING HOME: [facility]    Monthly halfway Visit    Chief Complaint: Hyponatremia, constipation    Subjective:     Marlene Horne is a 89 y.o. female at [facility] who is doing well. She states that she is sleepy and was eating breakfast at the time of the visit. She has no complaints. Nursing notes that her days and nights get confused, and her sleep schedule is altered. At night she stays up and calls out for her daughter and during the day she sleeps a lot. Otherwise, no complaints or concerns.     Current Meds:    Current Outpatient Medications:   •  acetaminophen (TYLENOL) 325 MG tablet, Take 2 tablets by mouth Every 4 (Four) Hours As Needed for Mild Pain ., Disp: , Rfl:   •  albuterol (PROVENTIL) (2.5 MG/3ML) 0.083% nebulizer solution, Take 2.5 mg by nebulization Every 4 (Four) Hours As Needed for Shortness of Air., Disp: , Rfl: 12  •  amLODIPine (NORVASC) 5 MG tablet, Take 1 tablet by mouth Daily., Disp: , Rfl:   •  aspirin 81 MG EC tablet, Take 81 mg by mouth Daily., Disp: , Rfl:   •  Chlorhexidine Gluconate solution, , Disp: , Rfl:   •  donepezil (ARICEPT) 5 MG tablet, Take 5 mg by mouth Every Night., Disp: , Rfl:   •  ferrous sulfate 324 (65 Fe) MG tablet delayed-release EC tablet, Take 1 tablet by mouth Daily With Breakfast., Disp: 30 tablet, Rfl:   •  Flavoring Agent (FLAVOR CONC-CHLORHEXIDINE) concentration, Take 20 mL by mouth 3 (Three) Times a Day., Disp: 500 mL, Rfl: 0  •  HYDROcodone-acetaminophen (Norco) 5-325 MG per tablet, Take 1 tablet by mouth Every 6 (Six) Hours As Needed for Moderate Pain ., Disp: 40 tablet, Rfl: 0  •  melatonin 5 MG tablet tablet, Take 5 mg by mouth Every Night., Disp: , Rfl:   •  mirtazapine (REMERON) 7.5 MG half tablet, Take  by mouth Every Night.,  Disp: , Rfl:   •  Multiple Vitamins-Minerals (I-HERNESTO) tablet tablet, Take 1 tablet by mouth Daily., Disp: 90 tablet, Rfl: 2  •  nystatin (MYCOSTATIN) 860446 UNIT/GM powder, Apply  topically to the appropriate area as directed As Needed (skin lesions)., Disp: 30 g, Rfl: 2  •  polyethylene glycol (MIRALAX) 17 g packet, Take 17 g by mouth Daily., Disp: , Rfl:   •  rosuvastatin (CRESTOR) 5 MG tablet, Take 1 tablet by mouth Daily., Disp: 30 tablet, Rfl: 0  •  sennosides-docusate sodium (SENOKOT-S) 8.6-50 MG tablet, Take 2 tablets by mouth Every Night., Disp: 30 tablet, Rfl: 0  •  sodium chloride 1 g tablet, Take 1 tablet by mouth 3 (Three) Times a Day With Meals., Disp: 180 tablet, Rfl: 0  •  vitamin D (ERGOCALCIFEROL) 1.25 MG (80641 UT) capsule capsule, Take 50,000 Units by mouth 1 (One) Time Per Week., Disp: , Rfl:     Allergies:  Patient has no known allergies.    Review of Systems:  Review of Systems   Constitutional: Negative for chills and fever.   HENT: Negative for rhinorrhea and sore throat.    Eyes: Negative for photophobia and visual disturbance.   Respiratory: Negative for cough and shortness of breath.    Cardiovascular: Negative for chest pain and palpitations.   Gastrointestinal: Positive for constipation. Negative for abdominal pain and nausea.   Genitourinary: Negative for difficulty urinating and dyspareunia.   Musculoskeletal: Negative for arthralgias and back pain.   Neurological: Negative for light-headedness and headaches.   Psychiatric/Behavioral: Positive for sleep disturbance. Negative for behavioral problems and dysphoric mood.   All other systems reviewed and are negative.      Objective:     There were no vitals taken for this visit.  Physical Exam  Vitals reviewed.   Constitutional:       General: She is not in acute distress.     Appearance: Normal appearance. She is well-developed, well-groomed and normal weight.   HENT:      Head: Normocephalic.      Right Ear: Hearing and external ear  normal.      Left Ear: Hearing and external ear normal.      Nose: Nose normal.      Mouth/Throat:      Lips: Pink.      Mouth: Mucous membranes are moist.   Eyes:      General: Lids are normal.      Conjunctiva/sclera: Conjunctivae normal.   Cardiovascular:      Rate and Rhythm: Normal rate and regular rhythm.      Pulses: Normal pulses.      Heart sounds: Normal heart sounds. No murmur heard.   No friction rub. No gallop.    Pulmonary:      Effort: Pulmonary effort is normal.      Breath sounds: Normal breath sounds and air entry. No wheezing, rhonchi or rales.   Abdominal:      General: Abdomen is flat. Bowel sounds are normal.      Palpations: Abdomen is soft.      Tenderness: There is no abdominal tenderness.   Musculoskeletal:      Cervical back: Neck supple.      Right lower leg: No edema.      Left lower leg: No edema.   Skin:     General: Skin is warm.   Neurological:      Mental Status: She is easily aroused. Mental status is at baseline. She is lethargic.   Psychiatric:         Attention and Perception: Attention and perception normal.         Mood and Affect: Mood and affect normal.         Speech: Speech normal.         Behavior: Behavior normal. Behavior is cooperative.         Diagnostic Data:     None     Assessment/Plan:      89 y.o. female with:  Problem List Items Addressed This Visit        Cardiac and Vasculature    Benign essential HTN    Overview     · Amlodipine 5 mg daily            Gastrointestinal Abdominal     Constipation    Overview     · Miralax and senokot as needed            Genitourinary and Reproductive     Hyponatremia - Primary    Overview     · Last Na 145 in September  · Remove fluid restriction  · Salt tablets 2g once daily  · Repeat BMP for October         Relevant Orders    Basic metabolic panel       Neuro    Major neurocognitive disorder (CMS/HCC)    Overview     · Alzheimer's Dementia with Vascular Dementia  · Aricept 5 mg daily.  · Redirect patient if confused or  distressed               Return in about 1 month (around 10/30/2021) for Next scheduled follow up.      CODE STATUS: DNR and DNI    Dr. Mandujano is the attending on record for this patient, He is aware of the patient's status and agrees with the above.        This document has been electronically signed by Angela Mcnamara MD on September 30, 2021 09:27 CDT

## 2021-10-27 ENCOUNTER — NURSING HOME (OUTPATIENT)
Dept: FAMILY MEDICINE CLINIC | Facility: CLINIC | Age: 86
End: 2021-10-27

## 2021-10-27 DIAGNOSIS — K59.01 SLOW TRANSIT CONSTIPATION: ICD-10-CM

## 2021-10-27 DIAGNOSIS — F03.90 MAJOR NEUROCOGNITIVE DISORDER (HCC): Primary | ICD-10-CM

## 2021-10-27 DIAGNOSIS — E87.1 HYPONATREMIA: ICD-10-CM

## 2021-10-27 DIAGNOSIS — I10 BENIGN ESSENTIAL HTN: ICD-10-CM

## 2021-10-27 PROCEDURE — 99307 SBSQ NF CARE SF MDM 10: CPT | Performed by: STUDENT IN AN ORGANIZED HEALTH CARE EDUCATION/TRAINING PROGRAM

## 2021-11-02 NOTE — PROGRESS NOTES
Family Medicine Residency  Angela Mcnamara MD    MONTHLY NURSING HOME VISIT    NAME: Marlene Horne  : 1931  MRN: [id number]    DATE & TIME SEEN: 21    PCP: Angela Mcnamara MD    NURSING HOME: [facility]    Monthly long-term Visit    Chief Complaint: None    Subjective:     Marlene Horne is a 89 y.o. female resident of [facility] who has no new complaints. With RN staff present for a bath, patient is pleasantly refusing a bath. She feels well and denies any issues.     Current Meds:    Current Outpatient Medications:   •  acetaminophen (TYLENOL) 325 MG tablet, Take 2 tablets by mouth Every 4 (Four) Hours As Needed for Mild Pain ., Disp: , Rfl:   •  albuterol (PROVENTIL) (2.5 MG/3ML) 0.083% nebulizer solution, Take 2.5 mg by nebulization Every 4 (Four) Hours As Needed for Shortness of Air., Disp: , Rfl: 12  •  amLODIPine (NORVASC) 5 MG tablet, Take 1 tablet by mouth Daily., Disp: , Rfl:   •  aspirin 81 MG EC tablet, Take 81 mg by mouth Daily., Disp: , Rfl:   •  Chlorhexidine Gluconate solution, , Disp: , Rfl:   •  donepezil (ARICEPT) 5 MG tablet, Take 5 mg by mouth Every Night., Disp: , Rfl:   •  ferrous sulfate 324 (65 Fe) MG tablet delayed-release EC tablet, Take 1 tablet by mouth Daily With Breakfast., Disp: 30 tablet, Rfl:   •  Flavoring Agent (FLAVOR CONC-CHLORHEXIDINE) concentration, Take 20 mL by mouth 3 (Three) Times a Day., Disp: 500 mL, Rfl: 0  •  HYDROcodone-acetaminophen (Norco) 5-325 MG per tablet, Take 1 tablet by mouth Every 6 (Six) Hours As Needed for Moderate Pain ., Disp: 40 tablet, Rfl: 0  •  melatonin 5 MG tablet tablet, Take 5 mg by mouth Every Night., Disp: , Rfl:   •  mirtazapine (REMERON) 7.5 MG half tablet, Take  by mouth Every Night., Disp: , Rfl:   •  Multiple Vitamins-Minerals (I-HERNESTO) tablet tablet, Take 1 tablet by mouth Daily., Disp: 90 tablet, Rfl: 2  •  nystatin (MYCOSTATIN) 680389 UNIT/GM powder, Apply  topically to the appropriate area  as directed As Needed (skin lesions)., Disp: 30 g, Rfl: 2  •  polyethylene glycol (MIRALAX) 17 g packet, Take 17 g by mouth Daily., Disp: , Rfl:   •  rosuvastatin (CRESTOR) 5 MG tablet, Take 1 tablet by mouth Daily., Disp: 30 tablet, Rfl: 0  •  sennosides-docusate sodium (SENOKOT-S) 8.6-50 MG tablet, Take 2 tablets by mouth Every Night., Disp: 30 tablet, Rfl: 0  •  sodium chloride 1 g tablet, Take 1 tablet by mouth 3 (Three) Times a Day With Meals., Disp: 180 tablet, Rfl: 0  •  vitamin D (ERGOCALCIFEROL) 1.25 MG (34061 UT) capsule capsule, Take 50,000 Units by mouth 1 (One) Time Per Week., Disp: , Rfl:     Allergies:  Patient has no known allergies.    Review of Systems:  Review of Systems   Constitutional: Negative for chills and fever.   HENT: Negative for rhinorrhea and sore throat.    Eyes: Negative for photophobia and visual disturbance.   Respiratory: Negative for cough and shortness of breath.    Cardiovascular: Negative for chest pain and palpitations.   Gastrointestinal: Negative for abdominal pain and nausea.   Genitourinary: Negative for difficulty urinating and dyspareunia.   Musculoskeletal: Negative for arthralgias and back pain.   Neurological: Negative for light-headedness and headaches.   Psychiatric/Behavioral: Negative for dysphoric mood and sleep disturbance.   All other systems reviewed and are negative.      Objective:     There were no vitals taken for this visit.  Physical Exam  Vitals reviewed.   Constitutional:       General: She is not in acute distress.     Appearance: Normal appearance. She is well-developed, well-groomed and normal weight.   HENT:      Head: Normocephalic.      Right Ear: Hearing and external ear normal.      Left Ear: Hearing and external ear normal.      Nose: Nose normal.      Mouth/Throat:      Lips: Pink.      Mouth: Mucous membranes are moist.   Eyes:      General: Lids are normal.      Conjunctiva/sclera: Conjunctivae normal.      Pupils: Pupils are equal, round,  and reactive to light.   Cardiovascular:      Rate and Rhythm: Normal rate and regular rhythm.      Pulses: Normal pulses.      Heart sounds: S1 normal and S2 normal. Heart sounds are distant. No murmur heard.  No friction rub. No gallop.    Pulmonary:      Effort: Pulmonary effort is normal.      Breath sounds: Normal breath sounds and air entry. No wheezing, rhonchi or rales.   Abdominal:      General: Bowel sounds are normal.      Palpations: Abdomen is soft.   Musculoskeletal:      Cervical back: Neck supple.      Right lower leg: No edema.      Left lower leg: No edema.   Skin:     General: Skin is warm.   Neurological:      Mental Status: She is alert. Mental status is at baseline. She is confused.   Psychiatric:         Attention and Perception: Attention and perception normal.         Mood and Affect: Mood and affect normal.         Speech: Speech normal.         Behavior: Behavior normal. Behavior is cooperative.         Thought Content: Thought content normal.         Cognition and Memory: Cognition normal.         Judgment: Judgment normal.         Diagnostic Data:     None       Assessment/Plan:      89 y.o. female with:    Diagnoses and all orders for this visit:    1. Major neurocognitive disorder (CMS/HCC) (Primary)    2. Slow transit constipation    3. Benign essential HTN    4. Hyponatremia      1. Continue Aricept 5mg and redirection with confusion.    2. Miralax & senakot as needed    3. Amlodipine 5mg    4. Resolved, continue 1g salt tablets daily.    CODE STATUS: DNR and DNI    Dr. Mandujano is the attending on record for this patient, He is aware of the patient's status and agrees with the above.      Return in about 1 month (around 11/27/2021) for Next scheduled follow up.          This document has been electronically signed by Angela Mcnamara MD on November 2, 2021 13:14 CDT

## 2021-11-19 NOTE — PROGRESS NOTES
I have seen the patient.  I have reviewed the notes, assessments, and/or procedures performed by Dr. Mcnamara, I concur with her/his documentation and assessment and plan for Marlene Horne.       This document has been electronically signed by Anderson Mandujano MD on November 19, 2021 15:17 CST

## 2021-11-30 ENCOUNTER — NURSING HOME (OUTPATIENT)
Dept: FAMILY MEDICINE CLINIC | Facility: CLINIC | Age: 86
End: 2021-11-30

## 2021-11-30 VITALS
DIASTOLIC BLOOD PRESSURE: 66 MMHG | OXYGEN SATURATION: 97 % | RESPIRATION RATE: 18 BRPM | SYSTOLIC BLOOD PRESSURE: 132 MMHG | HEART RATE: 68 BPM | TEMPERATURE: 98.4 F

## 2021-11-30 DIAGNOSIS — K59.01 SLOW TRANSIT CONSTIPATION: ICD-10-CM

## 2021-11-30 DIAGNOSIS — F03.90 MAJOR NEUROCOGNITIVE DISORDER (HCC): Primary | ICD-10-CM

## 2021-11-30 DIAGNOSIS — I10 BENIGN ESSENTIAL HTN: ICD-10-CM

## 2021-11-30 PROCEDURE — 99308 SBSQ NF CARE LOW MDM 20: CPT | Performed by: STUDENT IN AN ORGANIZED HEALTH CARE EDUCATION/TRAINING PROGRAM

## 2021-11-30 NOTE — PROGRESS NOTES
Family Medicine Residency  Angela Mcnamara MD    MONTHLY NURSING HOME VISIT    NAME: Marlene Horne  : 1931  MRN: [id number]    DATE & TIME SEEN: 21 1307    PCP: Angela Mcnamara MD    NURSING HOME: [facility]    Monthly assisted Visit     Chief Complaint: None, routine visit    Subjective:     Marlene Horne is a 89 y.o. female resident of [facility], who is pleasantly confused and laying in her bed, warm under the covers. She has no complaints, states that she is eating well and enjoying her time.     Current Meds:    Current Outpatient Medications:   •  acetaminophen (TYLENOL) 325 MG tablet, Take 2 tablets by mouth Every 4 (Four) Hours As Needed for Mild Pain ., Disp: , Rfl:   •  albuterol (PROVENTIL) (2.5 MG/3ML) 0.083% nebulizer solution, Take 2.5 mg by nebulization Every 4 (Four) Hours As Needed for Shortness of Air., Disp: , Rfl: 12  •  amLODIPine (NORVASC) 5 MG tablet, Take 1 tablet by mouth Daily., Disp: , Rfl:   •  aspirin 81 MG EC tablet, Take 81 mg by mouth Daily., Disp: , Rfl:   •  Chlorhexidine Gluconate solution, , Disp: , Rfl:   •  donepezil (ARICEPT) 5 MG tablet, Take 5 mg by mouth Every Night., Disp: , Rfl:   •  ferrous sulfate 324 (65 Fe) MG tablet delayed-release EC tablet, Take 1 tablet by mouth Daily With Breakfast., Disp: 30 tablet, Rfl:   •  Flavoring Agent (FLAVOR CONC-CHLORHEXIDINE) concentration, Take 20 mL by mouth 3 (Three) Times a Day., Disp: 500 mL, Rfl: 0  •  HYDROcodone-acetaminophen (Norco) 5-325 MG per tablet, Take 1 tablet by mouth Every 6 (Six) Hours As Needed for Moderate Pain ., Disp: 40 tablet, Rfl: 0  •  melatonin 5 MG tablet tablet, Take 5 mg by mouth Every Night., Disp: , Rfl:   •  mirtazapine (REMERON) 7.5 MG half tablet, Take  by mouth Every Night., Disp: , Rfl:   •  Multiple Vitamins-Minerals (I-HERNESTO) tablet tablet, Take 1 tablet by mouth Daily., Disp: 90 tablet, Rfl: 2  •  nystatin (MYCOSTATIN) 230024 UNIT/GM powder, Apply   topically to the appropriate area as directed As Needed (skin lesions)., Disp: 30 g, Rfl: 2  •  polyethylene glycol (MIRALAX) 17 g packet, Take 17 g by mouth Daily., Disp: , Rfl:   •  rosuvastatin (CRESTOR) 5 MG tablet, Take 1 tablet by mouth Daily., Disp: 30 tablet, Rfl: 0  •  sennosides-docusate sodium (SENOKOT-S) 8.6-50 MG tablet, Take 2 tablets by mouth Every Night., Disp: 30 tablet, Rfl: 0  •  sodium chloride 1 g tablet, Take 1 tablet by mouth 3 (Three) Times a Day With Meals., Disp: 180 tablet, Rfl: 0  •  vitamin D (ERGOCALCIFEROL) 1.25 MG (62008 UT) capsule capsule, Take 50,000 Units by mouth 1 (One) Time Per Week., Disp: , Rfl:     Allergies:  Patient has no known allergies.    Review of Systems:  Review of Systems   Constitutional: Negative for chills and fever.   HENT: Negative for rhinorrhea and sore throat.    Eyes: Negative for photophobia and visual disturbance.   Respiratory: Negative for cough and shortness of breath.    Cardiovascular: Negative for chest pain and palpitations.   Gastrointestinal: Negative for abdominal pain and nausea.   Genitourinary: Negative for difficulty urinating and dyspareunia.   Musculoskeletal: Negative for arthralgias and back pain.   Neurological: Negative for light-headedness and headaches.   Psychiatric/Behavioral: Negative for dysphoric mood and sleep disturbance.   All other systems reviewed and are negative.      Objective:     /66   Pulse 68   Temp 98.4 °F (36.9 °C)   Resp 18   SpO2 97%   Physical Exam  Vitals reviewed.   Constitutional:       General: She is not in acute distress.     Appearance: Normal appearance. She is well-developed, well-groomed and normal weight.      Interventions: Face mask in place.   HENT:      Head: Normocephalic.      Right Ear: Hearing and external ear normal.      Left Ear: Hearing and external ear normal.      Nose: Nose normal.      Mouth/Throat:      Lips: Pink.      Mouth: Mucous membranes are moist. Injury present.       Dentition: Abnormal dentition (  Missing teeth and black teeth). Dental caries present.   Eyes:      General: Lids are normal.      Conjunctiva/sclera: Conjunctivae normal.      Pupils: Pupils are equal, round, and reactive to light.   Cardiovascular:      Rate and Rhythm: Normal rate and regular rhythm.      Pulses: Normal pulses.      Heart sounds: Normal heart sounds. No murmur heard.  No friction rub. No gallop.    Pulmonary:      Effort: Pulmonary effort is normal.      Breath sounds: Normal breath sounds and air entry. No wheezing, rhonchi or rales.   Abdominal:      General: Bowel sounds are normal.      Palpations: Abdomen is soft.   Musculoskeletal:      Cervical back: Neck supple.      Right lower leg: No edema.      Left lower leg: No edema.   Feet:      Right foot:      Skin integrity: Callus and dry skin present.      Toenail Condition: Right toenails are abnormally thick. Fungal disease present.     Left foot:      Skin integrity: Callus and dry skin present.      Toenail Condition: Left toenails are abnormally thick. Fungal disease present.  Skin:     General: Skin is warm.   Neurological:      Mental Status: She is alert. Mental status is at baseline. She is confused.   Psychiatric:         Attention and Perception: Attention normal.         Mood and Affect: Mood and affect normal.         Speech: Speech normal.         Behavior: Behavior normal. Behavior is cooperative.         Diagnostic Data:     None     Assessment/Plan:      89 y.o. female with:  Problem List Items Addressed This Visit        Cardiac and Vasculature    Benign essential HTN    Overview     · Amlodipine 5 mg daily            Gastrointestinal Abdominal     Constipation    Overview     · Miralax and senokot as needed            Neuro    Major neurocognitive disorder (CMS/HCC) - Primary    Overview     · Alzheimer's Dementia with Vascular Dementia  · Aricept 5 mg daily.  · Redirect patient if confused or distressed                CODE STATUS: DNR and DNI    Dr. Mandujano is the attending on record for this patient, He is aware of the patient's status and agrees with the above.      Return in about 1 month (around 12/30/2021) for Next scheduled follow up.         This document has been electronically signed by Angela Mcnamara MD on November 30, 2021 17:45 CST

## 2021-12-02 ENCOUNTER — TELEPHONE (OUTPATIENT)
Dept: FAMILY MEDICINE CLINIC | Facility: CLINIC | Age: 86
End: 2021-12-02

## 2021-12-02 NOTE — TELEPHONE ENCOUNTER
KENDY MCCLENDON FROM [facility] CALLED ABOUT PATIENT NOT SLEEPING AND WANDERING HALLS AND INTO OTHER PATIENT ROOMS. SHE IS ON MELATONIN 5MG AND WANTS TO KNOW IF IT CAN BE INCREASED? [facility] PATIENT. HER NUMBER -[phone]

## 2021-12-10 DIAGNOSIS — E87.1 HYPONATREMIA: ICD-10-CM

## 2021-12-17 ENCOUNTER — NURSING HOME (OUTPATIENT)
Dept: FAMILY MEDICINE CLINIC | Facility: CLINIC | Age: 86
End: 2021-12-17

## 2021-12-17 DIAGNOSIS — I10 BENIGN ESSENTIAL HTN: Primary | ICD-10-CM

## 2021-12-17 DIAGNOSIS — F03.90 MAJOR NEUROCOGNITIVE DISORDER (HCC): ICD-10-CM

## 2021-12-17 DIAGNOSIS — K59.01 SLOW TRANSIT CONSTIPATION: ICD-10-CM

## 2021-12-17 PROCEDURE — 99308 SBSQ NF CARE LOW MDM 20: CPT | Performed by: STUDENT IN AN ORGANIZED HEALTH CARE EDUCATION/TRAINING PROGRAM

## 2021-12-20 VITALS
TEMPERATURE: 98.1 F | SYSTOLIC BLOOD PRESSURE: 107 MMHG | DIASTOLIC BLOOD PRESSURE: 51 MMHG | RESPIRATION RATE: 18 BRPM | HEART RATE: 60 BPM | OXYGEN SATURATION: 97 %

## 2021-12-20 NOTE — PROGRESS NOTES
Family Medicine Residency  Angela Mcnamara MD    MONTHLY NURSING HOME VISIT    NAME: Marlene Horne  : 1931  MRN: [id number]    DATE & TIME SEEN: 21 0810    PCP: Angela Mcnamara MD    NURSING HOME: [facility]    Monthly skilled nursing Visit    Chief Complaint: Wandering the halls at night    Subjective:     Marlene Horne is a 89 y.o. female of [facility] who earlier this month was having difficulty sleeping where she was wandering the halls at night. Phone intake from nursing staff on 21 about potentially increasing her Melatonin at night to keep this from happening. Her Melatonin was increased to 10mg for 2 nights and her sleeping habits returned to normal. Patient is pleasantly confused and denies any complaints.     Current Meds:    Current Outpatient Medications:   •  acetaminophen (TYLENOL) 325 MG tablet, Take 2 tablets by mouth Every 4 (Four) Hours As Needed for Mild Pain ., Disp: , Rfl:   •  albuterol (PROVENTIL) (2.5 MG/3ML) 0.083% nebulizer solution, Take 2.5 mg by nebulization Every 4 (Four) Hours As Needed for Shortness of Air., Disp: , Rfl: 12  •  amLODIPine (NORVASC) 5 MG tablet, Take 1 tablet by mouth Daily., Disp: , Rfl:   •  aspirin 81 MG EC tablet, Take 81 mg by mouth Daily., Disp: , Rfl:   •  Chlorhexidine Gluconate solution, , Disp: , Rfl:   •  donepezil (ARICEPT) 5 MG tablet, Take 5 mg by mouth Every Night., Disp: , Rfl:   •  ferrous sulfate 324 (65 Fe) MG tablet delayed-release EC tablet, Take 1 tablet by mouth Daily With Breakfast., Disp: 30 tablet, Rfl:   •  Flavoring Agent (FLAVOR CONC-CHLORHEXIDINE) concentration, Take 20 mL by mouth 3 (Three) Times a Day., Disp: 500 mL, Rfl: 0  •  HYDROcodone-acetaminophen (Norco) 5-325 MG per tablet, Take 1 tablet by mouth Every 6 (Six) Hours As Needed for Moderate Pain ., Disp: 40 tablet, Rfl: 0  •  melatonin 5 MG tablet tablet, Take 5 mg by mouth Every Night., Disp: , Rfl:   •  mirtazapine (REMERON) 7.5 MG half  tablet, Take  by mouth Every Night., Disp: , Rfl:   •  Multiple Vitamins-Minerals (I-HERNESTO) tablet tablet, Take 1 tablet by mouth Daily., Disp: 90 tablet, Rfl: 2  •  nystatin (MYCOSTATIN) 846992 UNIT/GM powder, Apply  topically to the appropriate area as directed As Needed (skin lesions)., Disp: 30 g, Rfl: 2  •  polyethylene glycol (MIRALAX) 17 g packet, Take 17 g by mouth Daily., Disp: , Rfl:   •  rosuvastatin (CRESTOR) 5 MG tablet, Take 1 tablet by mouth Daily., Disp: 30 tablet, Rfl: 0  •  sennosides-docusate sodium (SENOKOT-S) 8.6-50 MG tablet, Take 2 tablets by mouth Every Night., Disp: 30 tablet, Rfl: 0  •  sodium chloride 1 g tablet, Take 1 tablet by mouth 3 (Three) Times a Day With Meals., Disp: 180 tablet, Rfl: 0  •  vitamin D (ERGOCALCIFEROL) 1.25 MG (79172 UT) capsule capsule, Take 50,000 Units by mouth 1 (One) Time Per Week., Disp: , Rfl:     Allergies:  Patient has no known allergies.    Review of Systems:  Review of Systems   Constitutional: Negative for chills and fever.   HENT: Negative for rhinorrhea and sore throat.    Eyes: Negative for photophobia and visual disturbance.   Respiratory: Negative for cough and shortness of breath.    Cardiovascular: Negative for chest pain and palpitations.   Gastrointestinal: Negative for abdominal pain and nausea.   Genitourinary: Negative for difficulty urinating and dyspareunia.   Musculoskeletal: Negative for arthralgias and back pain.   Neurological: Negative for light-headedness and headaches.   Psychiatric/Behavioral: Negative for dysphoric mood and sleep disturbance (  Resolved with 2 nights 10mg Melatonin).   All other systems reviewed and are negative.      Objective:     /51   Pulse 60   Temp 98.1 °F (36.7 °C)   Resp 18   SpO2 97%   Physical Exam  Vitals reviewed.   Constitutional:       General: She is not in acute distress.     Appearance: Normal appearance. She is well-developed and well-groomed.   HENT:      Head: Normocephalic.      Right  Ear: Hearing and external ear normal.      Left Ear: Hearing and external ear normal.      Nose: Nose normal.      Mouth/Throat:      Lips: Pink.      Mouth: Mucous membranes are moist.      Dentition: Abnormal dentition.   Eyes:      General: Lids are normal.      Conjunctiva/sclera: Conjunctivae normal.   Cardiovascular:      Rate and Rhythm: Normal rate and regular rhythm.      Pulses: Normal pulses.      Heart sounds: Normal heart sounds. No murmur heard.  No friction rub. No gallop.    Pulmonary:      Effort: Pulmonary effort is normal.      Breath sounds: Normal breath sounds and air entry. No wheezing, rhonchi or rales.   Abdominal:      General: Bowel sounds are normal.      Palpations: Abdomen is soft.   Skin:     General: Skin is cool and moist.   Neurological:      Mental Status: She is alert. Mental status is at baseline. She is confused.   Psychiatric:         Attention and Perception: Attention normal.         Mood and Affect: Mood and affect normal.         Speech: Speech normal.         Behavior: Behavior normal. Behavior is cooperative.         Cognition and Memory: Cognition is impaired. Memory is impaired.         Diagnostic Data:     None     Assessment/Plan:      89 y.o. female with:  Problem List Items Addressed This Visit        Cardiac and Vasculature    Benign essential HTN - Primary    Overview     · Amlodipine 5 mg daily            Gastrointestinal Abdominal     Constipation    Overview     · Miralax and senokot as needed            Neuro    Major neurocognitive disorder (CMS/HCC)    Overview     · Alzheimer's Dementia with Vascular Dementia  · Aricept 5 mg daily.  · Redirect patient if confused or distressed               CODE STATUS: DNR and DNI    Dr. Mandujano is the attending on record for this patient, He is aware of the patient's status and agrees with the above.      Return in about 1 month (around 1/17/2022) for Next scheduled follow up.        This document has been electronically  signed by Angela Mcnamara MD on December 20, 2021 04:48 CST

## 2022-01-21 ENCOUNTER — NURSING HOME (OUTPATIENT)
Dept: FAMILY MEDICINE CLINIC | Facility: CLINIC | Age: 87
End: 2022-01-21

## 2022-01-21 VITALS
HEART RATE: 57 BPM | RESPIRATION RATE: 18 BRPM | OXYGEN SATURATION: 96 % | SYSTOLIC BLOOD PRESSURE: 101 MMHG | DIASTOLIC BLOOD PRESSURE: 52 MMHG | TEMPERATURE: 98.2 F

## 2022-01-21 DIAGNOSIS — I10 BENIGN ESSENTIAL HTN: ICD-10-CM

## 2022-01-21 DIAGNOSIS — F03.90 MAJOR NEUROCOGNITIVE DISORDER: ICD-10-CM

## 2022-01-21 DIAGNOSIS — K59.01 SLOW TRANSIT CONSTIPATION: ICD-10-CM

## 2022-01-21 DIAGNOSIS — E87.0 HYPERNATREMIA: Primary | ICD-10-CM

## 2022-01-21 PROCEDURE — 99308 SBSQ NF CARE LOW MDM 20: CPT | Performed by: STUDENT IN AN ORGANIZED HEALTH CARE EDUCATION/TRAINING PROGRAM

## 2022-01-24 NOTE — PROGRESS NOTES
MONTHLY NURSING HOME VISIT    NAME: Marlene Horne  : 1931  MRN: [id number]    DATE & TIME SEEN: 22 0805    PCP: Angela Mcnamara MD    NURSING HOME: [facility]    Chief Complaint: None    Subjective:     Marlene Horne is a 89 y.o. female at [facility] who recently had labs drawn in 2021 that showed hypernatremia at 151. She is doing well, and no longer roaming the halls at night like previously. RN reports she does continue to  however, can be redirected and melatonin has helped with regulating her sleep.     Current Meds:    Current Outpatient Medications:   •  acetaminophen (TYLENOL) 325 MG tablet, Take 2 tablets by mouth Every 4 (Four) Hours As Needed for Mild Pain ., Disp: , Rfl:   •  albuterol (PROVENTIL) (2.5 MG/3ML) 0.083% nebulizer solution, Take 2.5 mg by nebulization Every 4 (Four) Hours As Needed for Shortness of Air., Disp: , Rfl: 12  •  amLODIPine (NORVASC) 5 MG tablet, Take 1 tablet by mouth Daily., Disp: , Rfl:   •  aspirin 81 MG EC tablet, Take 81 mg by mouth Daily., Disp: , Rfl:   •  Chlorhexidine Gluconate solution, , Disp: , Rfl:   •  donepezil (ARICEPT) 5 MG tablet, Take 5 mg by mouth Every Night., Disp: , Rfl:   •  ferrous sulfate 324 (65 Fe) MG tablet delayed-release EC tablet, Take 1 tablet by mouth Daily With Breakfast., Disp: 30 tablet, Rfl:   •  Flavoring Agent (FLAVOR CONC-CHLORHEXIDINE) concentration, Take 20 mL by mouth 3 (Three) Times a Day., Disp: 500 mL, Rfl: 0  •  HYDROcodone-acetaminophen (Norco) 5-325 MG per tablet, Take 1 tablet by mouth Every 6 (Six) Hours As Needed for Moderate Pain ., Disp: 40 tablet, Rfl: 0  •  melatonin 5 MG tablet tablet, Take 5 mg by mouth Every Night., Disp: , Rfl:   •  mirtazapine (REMERON) 7.5 MG half tablet, Take  by mouth Every Night., Disp: , Rfl:   •  Multiple Vitamins-Minerals (I-HERNESTO) tablet tablet, Take 1 tablet by mouth Daily., Disp: 90 tablet, Rfl: 2  •  nystatin (MYCOSTATIN) [id number]  UNIT/GM powder, Apply  topically to the appropriate area as directed As Needed (skin lesions)., Disp: 30 g, Rfl: 2  •  polyethylene glycol (MIRALAX) 17 g packet, Take 17 g by mouth Daily., Disp: , Rfl:   •  rosuvastatin (CRESTOR) 5 MG tablet, Take 1 tablet by mouth Daily., Disp: 30 tablet, Rfl: 0  •  sennosides-docusate sodium (SENOKOT-S) 8.6-50 MG tablet, Take 2 tablets by mouth Every Night., Disp: 30 tablet, Rfl: 0  •  sodium chloride 1 g tablet, Take 1 tablet by mouth 3 (Three) Times a Day With Meals., Disp: 180 tablet, Rfl: 0  •  vitamin D (ERGOCALCIFEROL) 1.25 MG (85649 UT) capsule capsule, Take 50,000 Units by mouth 1 (One) Time Per Week., Disp: , Rfl:     Allergies:  Patient has no known allergies.    Review of Systems:  Review of Systems   Constitutional: Negative for chills and fever.   HENT: Negative for rhinorrhea and sore throat.    Eyes: Negative for photophobia and visual disturbance.   Respiratory: Negative for cough and shortness of breath.    Cardiovascular: Negative for chest pain and palpitations.   Gastrointestinal: Negative for abdominal pain and nausea.   Genitourinary: Negative for difficulty urinating and dyspareunia.   Musculoskeletal: Negative for arthralgias and back pain.   Neurological: Negative for light-headedness and headaches.   Psychiatric/Behavioral: Negative for dysphoric mood and sleep disturbance.   All other systems reviewed and are negative.      Objective:     /52   Pulse 57   Temp 98.2 °F (36.8 °C)   Resp 18   SpO2 96%   Physical Exam  Vitals reviewed.   Constitutional:       General: She is not in acute distress.     Appearance: Normal appearance. She is well-developed and well-groomed.   HENT:      Head: Normocephalic.      Right Ear: Hearing and external ear normal.      Left Ear: Hearing and external ear normal.      Nose: Nose normal.      Mouth/Throat:      Lips: Pink.      Mouth: Mucous membranes are moist.      Dentition: Abnormal dentition.   Eyes:       General: Lids are normal.      Conjunctiva/sclera: Conjunctivae normal.   Cardiovascular:      Rate and Rhythm: Normal rate and regular rhythm.      Pulses: Normal pulses.      Heart sounds: Normal heart sounds. No murmur heard.  No friction rub. No gallop.    Pulmonary:      Effort: Pulmonary effort is normal.      Breath sounds: Normal breath sounds and air entry. No wheezing, rhonchi or rales.   Abdominal:      General: Bowel sounds are normal.      Palpations: Abdomen is soft.   Musculoskeletal:      Cervical back: Neck supple.      Right lower leg: No edema.      Left lower leg: No edema.   Skin:     General: Skin is warm.   Neurological:      Mental Status: She is alert. Mental status is at baseline.   Psychiatric:         Attention and Perception: Attention normal.         Mood and Affect: Mood and affect normal.         Speech: Speech normal.         Behavior: Behavior normal. Behavior is cooperative.         Diagnostic Data:     Not yet scanned into the chart from December 2021.     Assessment/Plan:      89 y.o. female with:    Problem List Items Addressed This Visit        Cardiac and Vasculature    Benign essential HTN    Overview     · Amlodipine 5 mg daily            Gastrointestinal Abdominal     Constipation    Overview     · Miralax and senokot as needed            Neuro    Major neurocognitive disorder (CMS/HCC)    Overview     · Alzheimer's Dementia with Vascular Dementia  · Aricept 5 mg daily.  · Redirect patient if confused or distressed           Other Visit Diagnoses     Hypernatremia    -  Primary    Na 151. Encourage oral water intake daily.            CODE STATUS: DNR and DNI    Dr. Mandujano is the attending on record for this patient, He is aware of the patient's status and agrees with the above.      Return in about 1 month (around 2/21/2022) for Next scheduled follow up.        This document has been electronically signed by Angela Mcnamara MD on January 23, 2022 20:05 CST

## 2022-02-12 NOTE — OP NOTE
Apixaban/Eliquis is used to treat and prevent blood clots. If you are not able to swallow the tablets whole, they may be crushed and mixed in water, apple juice, or applesauce and promptly taken within four hours. Never skip a dose of Apixaban/Eliquis. If you forget to take your Apixaban/Eliquis, take a dose as soon as you remember. If it is almost time for your next Apixaban/Eliquis dose, wait until then and take a regular dose. DO NOT take an extra pill to ‘catch up’.  NEVER TAKE A DOUBLE DOSE. Notify your doctor that you missed a dose. Take Apixaban/Eliquis at the same time each morning and evening. Apixaban/Eliquis may be taken with other medication or food. Procedure(s):  INTERMEDULLARY NAILING OF RIGHT FEMUR USING GAMMA NAIL AND FLUOROSCOPIC ASSITANCE  CLOSED REDUCTION AND SPLINTING OF RIGHT WRIST WITH FLUOROSCOPIC ASSISTANCE  Procedure Note    Marlene Horne  12/6/2020    Pre-op Diagnosis:   Closed nondisplaced intertrochanteric fracture of right femur, initial encounter (CMS/[facility]) [S72.144A]  Closed displaced fracture of surgical neck of right humerus, unspecified fracture morphology, initial encounter [S42.211A]    Post-op Diagnosis:     Post-Op Diagnosis Codes:     * Closed nondisplaced intertrochanteric fracture of right femur, initial encounter (CMS/[facility]) [S72.144A]     * Closed displaced fracture of surgical neck of right humerus, unspecified fracture morphology, initial encounter [S42.211A]    Procedure/CPT® Codes:      Procedure(s):  1.INTERMEDULLARY NAILING OF RIGHT FEMUR USING GAMMA NAIL AND FLUOROSCOPIC ASSITANCE  CLOSED REDUCTION AND SPLINTING OF RIGHT WRIST FRACTURE WITH FLUOROSCOPIC ASSISTANCE    Surgeon(s):  Baudilio Brooks MD    Anesthesia: General    Staff:   Circulator: Aimee Arias RN  Radiology Technologist: Ginny Sultana  Scrub Person: April Norris Niki  Vendor Representative: Chucho Alvarez     was responsible for performing the following activities: Retraction, Suction, Closing, Placing Dressing and Positioning and their skilled assistance was necessary for the success of this case.     Estimated Blood Loss: 100ml    Specimens:                None      Drains: * No LDAs found *    Findings: Distal radius fracture/Colles' fracture displaced intertrochanteric fracture right hip    Complications: None    Dictation: Indications: 89-year-old female unfortunately feels that there is a go fracturing her right hip right proximal humerus and right wrist.  The proximal humerus is packed but minimally displaced.  The wrist is displaced dorsally and intertrochanteric fracture on the right side is also displaced.  I discussed the  case with her daughter she is for close reduction of the wrist we will treat the humerus conservatively and IM nail of the right femur.  She is an ambulator at the nursing home she does have fairly significant dementia.  I discussed the risk and benefits with her daughter who is her power of  including but not limited to loss of reduction, bleeding, blood clot, fracture, need for further surgery, malunion, nonunion, neurovascular injury, need for further surgery, medical anesthetic complications, etc.  She understands detailed informed consent is given going proceed as planned.    Procedure:After adequate anesthesia was obtained the patient was placed on the [place] table with appropriate padding precautions. The affected leg was placed in the traction boot and the other was placed in the well leg davis.  A surgical timeout was performed prior to procedure.  Prior to proceeding with the hip the patient's her right arm was taken out of the splint it was manipulated under direct C-arm control reducing the Colles' fracture.  It was confirmed good alignment both AP and lateral plane.  A sugar tong splint was applied with the wrist palmar flexed and slightly ulnar deviated.  After allowing the splint to harden repeat C-arm control was used to confirm position in the splint.    At that time attention was paid back to the hip.    C-arm control was brought in for reduction of the fracture and was reduced in 2 planes, both AP and lateral views.  At that point the hip was prepped and draped in usual sterile fashion.  Using C-arm control throughout the case a small incision was made proximal to the greater trochanter.  Electrocautery was used to control hemostasis and go to the subcutaneous tissue to the tensor fascia.  Tensor fascia split line with the skin incision palpating the tip of the greater trochanter.  The guidepin was placed in the tip of greater trochanter and driven in down to the intramedullary canal.  This  was confirmed again by C-arm control in 2 planes.  The 14 mm reamer was used to enter the canal.  A guidepin was then placed down the canal and was confirmed by C-arm in 2 planes.  A short Gamma nail was used to 125 degree across the fracture to the appropriate length so that the proximal screw would end up centered in the femoral head or slightly inferior.    At that point a guide was used to have guidepin across the femoral neck through the nail up in the femoral head. It was centered in both the AP and lateral planes.  It was judged to the dark tip apex distance will be less than 20 mm.  We measured the appropriate screw. It was then reamed and placed under direct C-arm control.  A locking screw was then placed proximally and backed off 180 degrees to allow the fracture to collapse somewhat.    This again was confirmed by C-arm control in 2 planes.    Finally the distal screw was then applied. It was drilled under C-arm control and the distal screw was placed after appropriate measurement.  It was confirmed in 2 planes by C-arm control.  The guide arm was removed. The areas were irrigated with copious amounts of saline solution.  The deep fascia was proximally closed with 0 Vicryl suture interrupted fashion and subcutaneous with 2-0 Vicryl. The skin was closed with staples. The patient tolerated the procedure well without complications.  Counts were correct.    Baudilio Brooks MD  12/06/20  08:22 CST           (1) Outpatient Area

## 2022-02-21 ENCOUNTER — NURSING HOME (OUTPATIENT)
Dept: FAMILY MEDICINE CLINIC | Facility: CLINIC | Age: 87
End: 2022-02-21

## 2022-02-21 DIAGNOSIS — I10 BENIGN ESSENTIAL HTN: Primary | ICD-10-CM

## 2022-02-21 DIAGNOSIS — K59.01 SLOW TRANSIT CONSTIPATION: ICD-10-CM

## 2022-02-21 DIAGNOSIS — F03.90 MAJOR NEUROCOGNITIVE DISORDER: ICD-10-CM

## 2022-02-21 PROCEDURE — 99307 SBSQ NF CARE SF MDM 10: CPT | Performed by: STUDENT IN AN ORGANIZED HEALTH CARE EDUCATION/TRAINING PROGRAM

## 2022-02-22 VITALS
HEART RATE: 74 BPM | DIASTOLIC BLOOD PRESSURE: 55 MMHG | RESPIRATION RATE: 20 BRPM | TEMPERATURE: 98.1 F | SYSTOLIC BLOOD PRESSURE: 115 MMHG | OXYGEN SATURATION: 99 %

## 2022-02-22 RX ORDER — AMLODIPINE BESYLATE 5 MG/1
2.5 TABLET ORAL
Start: 2022-02-22 | End: 2023-02-23

## 2022-02-22 NOTE — PROGRESS NOTES
MONTHLY NURSING HOME VISIT    NAME: Marlene Horne  : 1931  MRN: [id number]    DATE & TIME SEEN: 22 1820    PCP: Angela Mcnamara MD    NURSING HOME: [facility]    Chief Complaint: None    Subjective:     Marlene Horne is a 89 y.o. female resident at [facility] doing well. She denies any complaints other than feeling cold. Nursing reports she is doing well, very self sufficient and asks for help if needed.     Current Meds:    Current Outpatient Medications:   •  acetaminophen (TYLENOL) 325 MG tablet, Take 2 tablets by mouth Every 4 (Four) Hours As Needed for Mild Pain ., Disp: , Rfl:   •  albuterol (PROVENTIL) (2.5 MG/3ML) 0.083% nebulizer solution, Take 2.5 mg by nebulization Every 4 (Four) Hours As Needed for Shortness of Air., Disp: , Rfl: 12  •  amLODIPine (NORVASC) 5 MG tablet, Take 1 tablet by mouth Daily., Disp: , Rfl:   •  aspirin 81 MG EC tablet, Take 81 mg by mouth Daily., Disp: , Rfl:   •  Chlorhexidine Gluconate solution, , Disp: , Rfl:   •  donepezil (ARICEPT) 5 MG tablet, Take 5 mg by mouth Every Night., Disp: , Rfl:   •  ferrous sulfate 324 (65 Fe) MG tablet delayed-release EC tablet, Take 1 tablet by mouth Daily With Breakfast., Disp: 30 tablet, Rfl:   •  Flavoring Agent (FLAVOR CONC-CHLORHEXIDINE) concentration, Take 20 mL by mouth 3 (Three) Times a Day., Disp: 500 mL, Rfl: 0  •  HYDROcodone-acetaminophen (Norco) 5-325 MG per tablet, Take 1 tablet by mouth Every 6 (Six) Hours As Needed for Moderate Pain ., Disp: 40 tablet, Rfl: 0  •  melatonin 5 MG tablet tablet, Take 5 mg by mouth Every Night., Disp: , Rfl:   •  mirtazapine (REMERON) 7.5 MG half tablet, Take  by mouth Every Night., Disp: , Rfl:   •  Multiple Vitamins-Minerals (I-HERNESTO) tablet tablet, Take 1 tablet by mouth Daily., Disp: 90 tablet, Rfl: 2  •  nystatin (MYCOSTATIN) 021489 UNIT/GM powder, Apply  topically to the appropriate area as directed As Needed (skin lesions)., Disp: 30 g, Rfl: 2  •   polyethylene glycol (MIRALAX) 17 g packet, Take 17 g by mouth Daily., Disp: , Rfl:   •  rosuvastatin (CRESTOR) 5 MG tablet, Take 1 tablet by mouth Daily., Disp: 30 tablet, Rfl: 0  •  sennosides-docusate sodium (SENOKOT-S) 8.6-50 MG tablet, Take 2 tablets by mouth Every Night., Disp: 30 tablet, Rfl: 0  •  sodium chloride 1 g tablet, Take 1 tablet by mouth 3 (Three) Times a Day With Meals., Disp: 180 tablet, Rfl: 0  •  vitamin D (ERGOCALCIFEROL) 1.25 MG (26542 UT) capsule capsule, Take 50,000 Units by mouth 1 (One) Time Per Week., Disp: , Rfl:     Allergies:  Patient has no known allergies.    Review of Systems:  Review of Systems   Constitutional: Negative for chills and fever.   HENT: Negative for rhinorrhea and sore throat.    Eyes: Negative for photophobia and visual disturbance.   Respiratory: Negative for cough and shortness of breath.    Cardiovascular: Negative for chest pain and palpitations.   Gastrointestinal: Negative for abdominal pain and nausea.   Genitourinary: Negative for difficulty urinating and dyspareunia.   Musculoskeletal: Negative for arthralgias and back pain.   Neurological: Negative for light-headedness and headaches.   Psychiatric/Behavioral: Negative for dysphoric mood and sleep disturbance.   All other systems reviewed and are negative.      Objective:     /55   Pulse 74   Temp 98.1 °F (36.7 °C)   Resp 20   SpO2 99%   Physical Exam  Vitals reviewed.   Constitutional:       General: She is not in acute distress.     Appearance: Normal appearance. She is well-developed and well-groomed.   HENT:      Head: Normocephalic.      Right Ear: Hearing and external ear normal.      Left Ear: Hearing and external ear normal.      Nose: Nose normal.      Mouth/Throat:      Lips: Pink.      Mouth: Mucous membranes are moist.      Dentition: Abnormal dentition.   Eyes:      General: Lids are normal.      Conjunctiva/sclera: Conjunctivae normal.      Pupils: Pupils are equal, round, and  reactive to light.   Cardiovascular:      Rate and Rhythm: Normal rate and regular rhythm.      Pulses: Normal pulses.      Heart sounds: Normal heart sounds. No murmur heard.  No friction rub. No gallop.    Pulmonary:      Effort: Pulmonary effort is normal.      Breath sounds: Normal breath sounds and air entry. No wheezing, rhonchi or rales.   Abdominal:      General: Abdomen is flat. Bowel sounds are normal.      Palpations: Abdomen is soft.   Musculoskeletal:      Cervical back: Neck supple.      Right lower leg: No edema.      Left lower leg: No edema.   Skin:     General: Skin is warm.   Neurological:      Mental Status: She is alert. Mental status is at baseline.   Psychiatric:         Attention and Perception: Attention and perception normal.         Mood and Affect: Mood and affect normal.         Speech: Speech normal.         Behavior: Behavior normal. Behavior is cooperative.         Diagnostic Data:     None     Assessment/Plan:      89 y.o. female with:    Problem List Items Addressed This Visit        Cardiac and Vasculature    Benign essential HTN - Primary    Overview     · Amlodipine 2.5 mg daily         Relevant Medications    amLODIPine (NORVASC) 5 MG tablet 1/2 tablet daily.       Gastrointestinal Abdominal     Constipation    Overview     · Miralax and senokot as needed            Neuro    Major neurocognitive disorder (CMS/HCC)    Overview     · Alzheimer's Dementia with Vascular Dementia  · Aricept 5 mg daily.  · Redirect patient if confused or distressed               CODE STATUS: DNR and DNI    Dr. Mandujano is the attending on record for this patient, He is aware of the patient's status and agrees with the above.    Return in about 1 month (around 3/21/2022) for Next scheduled follow up BP med.        This document has been electronically signed by Angela Mcnamara MD on February 22, 2022 09:13 CST

## 2022-03-11 ENCOUNTER — DOCUMENTATION (OUTPATIENT)
Dept: FAMILY MEDICINE CLINIC | Facility: CLINIC | Age: 87
End: 2022-03-11

## 2022-03-11 NOTE — PROGRESS NOTES
I was called overnight on Ms. Horne from [facility].  She had a difficulty sleeping overnight and finally got some rest around 2.  At approximately 3:10 AM nursing staff noticed that she was difficult to arouse.  Eventually they were able to wake her up.  After the time that she was awake she was altered further off her baseline.  At baseline she has Alzheimer's.  At current time she is calling out asking for help and seems worse than previous day, acutely.  Patient's daughter, Maureen, is present at bedside currently.  I spoke with nursing and with Maureen.  I suggested having her come to the emergency department for a quicker work-up and for imaging that would not be possible at the nursing home.  Maureen who is POA did not want to do that at this time and was requesting lab work be done prior to that.  I went over all risks and benefits including and up to the potential at this could be a stroke and that time is very important in those settings.  She still declined.  Asked her nurse to get a BMP, CBC, UA and procalcitonin.  Reiterated to both that someone from our office will be reachable once these results were available.  Stated to daughter that depending on what these results show we may need to order more testing and the recommendation is still may be to go to the emergency department.  Per nursing, there is currently 0 Covid cases and the nursing home that she is in and patient recently recovered from an asymptomatic Covid case.  This slightly raises my suspicion of a potential cause beyond UTI or hyponatremia.  Nursing did bring up an additional issue that she has been having recently of severely diminished sleep.  She estimated she had had only a couple hours of sleep over the last 2 days.  Patient was given a trial of 2 days of melatonin 10 mg which she seemed to respond to well getting approximately 6 hours of sleep per night.    After signing this note I will message this information to her PCP and  to Dr. Acuña the attending for this nursing home.       Milind Hernandez MD   PGY-2    [address], [place] [address]  Office: [phone]    This document has been electronically signed by Milind Hernandez MD on March 11, 2022 05:55 CST

## 2022-03-30 ENCOUNTER — NURSING HOME (OUTPATIENT)
Dept: FAMILY MEDICINE CLINIC | Facility: CLINIC | Age: 87
End: 2022-03-30

## 2022-03-30 DIAGNOSIS — G30.1 LATE ONSET ALZHEIMER'S DEMENTIA WITHOUT BEHAVIORAL DISTURBANCE: ICD-10-CM

## 2022-03-30 DIAGNOSIS — I10 BENIGN ESSENTIAL HTN: Primary | ICD-10-CM

## 2022-03-30 DIAGNOSIS — F02.80 LATE ONSET ALZHEIMER'S DEMENTIA WITHOUT BEHAVIORAL DISTURBANCE: ICD-10-CM

## 2022-03-30 DIAGNOSIS — K59.00 CONSTIPATION, UNSPECIFIED CONSTIPATION TYPE: ICD-10-CM

## 2022-03-30 PROCEDURE — 99307 SBSQ NF CARE SF MDM 10: CPT | Performed by: STUDENT IN AN ORGANIZED HEALTH CARE EDUCATION/TRAINING PROGRAM

## 2022-04-01 NOTE — PROGRESS NOTES
"MONTHLY NURSING HOME VISIT    NAME: Marlene Horne  : 1931  MRN: [id number]    DATE & TIME SEEN: 3/30/22 @ 1700    PCP: Yobani Lundberg MD    NURSING HOME: [facility]    Monthly [facility] Visit for follow up    Chief Complaint: None    Subjective:     Marlene Horne is a 89 y.o. female with a history of Asthma, HTN, Dementia, and HLD who is being check up on at [place].    Patient just replies \"Ok\" to all questions.    Current Meds:    Current Outpatient Medications:   •  acetaminophen (TYLENOL) 325 MG tablet, Take 2 tablets by mouth Every 4 (Four) Hours As Needed for Mild Pain ., Disp: , Rfl:   •  albuterol (PROVENTIL) (2.5 MG/3ML) 0.083% nebulizer solution, Take 2.5 mg by nebulization Every 4 (Four) Hours As Needed for Shortness of Air., Disp: , Rfl: 12  •  amLODIPine (NORVASC) 5 MG tablet, Take 0.5 tablets by mouth Daily., Disp: , Rfl:   •  aspirin 81 MG EC tablet, Take 81 mg by mouth Daily., Disp: , Rfl:   •  Chlorhexidine Gluconate solution, , Disp: , Rfl:   •  donepezil (ARICEPT) 5 MG tablet, Take 5 mg by mouth Every Night., Disp: , Rfl:   •  ferrous sulfate 324 (65 Fe) MG tablet delayed-release EC tablet, Take 1 tablet by mouth Daily With Breakfast., Disp: 30 tablet, Rfl:   •  Flavoring Agent (FLAVOR CONC-CHLORHEXIDINE) concentration, Take 20 mL by mouth 3 (Three) Times a Day., Disp: 500 mL, Rfl: 0  •  HYDROcodone-acetaminophen (Norco) 5-325 MG per tablet, Take 1 tablet by mouth Every 6 (Six) Hours As Needed for Moderate Pain ., Disp: 40 tablet, Rfl: 0  •  melatonin 5 MG tablet tablet, Take 5 mg by mouth Every Night., Disp: , Rfl:   •  mirtazapine (REMERON) 7.5 MG half tablet, Take  by mouth Every Night., Disp: , Rfl:   •  Multiple Vitamins-Minerals (I-HERNESTO) tablet tablet, Take 1 tablet by mouth Daily., Disp: 90 tablet, Rfl: 2  •  nystatin (MYCOSTATIN) 719497 UNIT/GM powder, Apply  topically to the appropriate area as directed As Needed (skin lesions)., Disp: 30 g, Rfl: 2  •  " "polyethylene glycol (MIRALAX) 17 g packet, Take 17 g by mouth Daily., Disp: , Rfl:   •  rosuvastatin (CRESTOR) 5 MG tablet, Take 1 tablet by mouth Daily., Disp: 30 tablet, Rfl: 0  •  sennosides-docusate sodium (SENOKOT-S) 8.6-50 MG tablet, Take 2 tablets by mouth Every Night., Disp: 30 tablet, Rfl: 0  •  sodium chloride 1 g tablet, Take 1 tablet by mouth 3 (Three) Times a Day With Meals., Disp: 180 tablet, Rfl: 0  •  vitamin D (ERGOCALCIFEROL) 1.25 MG (20195 UT) capsule capsule, Take 50,000 Units by mouth 1 (One) Time Per Week., Disp: , Rfl:     Allergies:  Patient has no known allergies.    Review of Systems:  Review of Systems   Unable to perform ROS: Dementia       Objective:       VS T 98.2F, P 75, /55, RR 18, Sat 97RA  Physical Exam  Constitutional:       General: She is not in acute distress.  HENT:      Head: Normocephalic and atraumatic.   Cardiovascular:      Rate and Rhythm: Normal rate and regular rhythm.      Heart sounds: Normal heart sounds.   Pulmonary:      Effort: Pulmonary effort is normal. No respiratory distress.      Breath sounds: Normal breath sounds.   Abdominal:      Palpations: Abdomen is soft.      Tenderness: There is no abdominal tenderness.   Musculoskeletal:      Right lower leg: No edema.      Left lower leg: No edema.   Skin:     General: Skin is warm and dry.   Neurological:      Mental Status: She is alert.      Comments: Moving all extremities.  Unsure if the patient is just saying \"OK\" due to dementia or if that is what she is actually trying to say.   Psychiatric:         Mood and Affect: Mood normal.         Behavior: Behavior normal.         Diagnostic Data:     Lab Results (last 48 hours)     ** No results found for the last 48 hours. **           Assessment/Plan:      89 y.o. female with:  Problem List Items Addressed This Visit        Cardiac and Vasculature    Benign essential HTN - Primary    Overview     Stable at this time.  -Continue Norvasc 5mg Qday              " Gastrointestinal Abdominal     Constipation    Overview     Stable  - Miralax and senokot as needed             Other Visit Diagnoses     Late onset Alzheimer's dementia without behavioral disturbance (HCC)      Stable  - Sundowning precautions  - Aricept 5mg Qday        F/u in 1 month    CODE STATUS: DNR and DNI    Dr. Mandujano is the attending on record for this patient, He is aware of the patient's status and agrees with the above.      This document has been electronically signed by Yobani Lundberg MD on April 6, 2022 16:47 CDT       Admission Reconciliation is Completed  Discharge Reconciliation is Not Complete Admission Reconciliation is Completed  Discharge Reconciliation is Completed

## 2022-04-12 NOTE — PROGRESS NOTES
I have seen the patient.  I have reviewed the notes, assessments, and/or procedures performed by Dr. Lundberg, I concur with her/his documentation and assessment and plan for Marlene Horne.       This document has been electronically signed by Anderson Mandujano MD on April 12, 2022 10:58 CDT

## 2022-04-20 ENCOUNTER — NURSING HOME (OUTPATIENT)
Dept: FAMILY MEDICINE CLINIC | Facility: CLINIC | Age: 87
End: 2022-04-20

## 2022-04-20 DIAGNOSIS — I10 BENIGN ESSENTIAL HTN: Primary | ICD-10-CM

## 2022-04-20 DIAGNOSIS — F02.80 LATE ONSET ALZHEIMER'S DEMENTIA WITHOUT BEHAVIORAL DISTURBANCE: ICD-10-CM

## 2022-04-20 DIAGNOSIS — G30.1 LATE ONSET ALZHEIMER'S DEMENTIA WITHOUT BEHAVIORAL DISTURBANCE: ICD-10-CM

## 2022-04-20 DIAGNOSIS — K59.00 CONSTIPATION, UNSPECIFIED CONSTIPATION TYPE: ICD-10-CM

## 2022-04-20 PROCEDURE — 99307 SBSQ NF CARE SF MDM 10: CPT | Performed by: STUDENT IN AN ORGANIZED HEALTH CARE EDUCATION/TRAINING PROGRAM

## 2022-05-11 NOTE — PROGRESS NOTES
MONTHLY NURSING HOME VISIT    NAME: Marlene Horne  : 1931  MRN: [id number]    DATE & TIME SEEN: 22 @ 1700    PCP: Yobani Lundberg MD    NURSING HOME: [facility]    Monthly [facility] Visit for follow up    Chief Complaint: None    Subjective:     Marlene Horne is a 89 y.o. female with a history of Asthma, HTN, Dementia, and HLD who is being check up on at [place].    Patient states she is doing well.  She has no problems or complaints at this time.    Current Meds:    Current Outpatient Medications:   •  acetaminophen (TYLENOL) 325 MG tablet, Take 2 tablets by mouth Every 4 (Four) Hours As Needed for Mild Pain ., Disp: , Rfl:   •  albuterol (PROVENTIL) (2.5 MG/3ML) 0.083% nebulizer solution, Take 2.5 mg by nebulization Every 4 (Four) Hours As Needed for Shortness of Air., Disp: , Rfl: 12  •  amLODIPine (NORVASC) 5 MG tablet, Take 0.5 tablets by mouth Daily., Disp: , Rfl:   •  aspirin 81 MG EC tablet, Take 81 mg by mouth Daily., Disp: , Rfl:   •  Chlorhexidine Gluconate solution, , Disp: , Rfl:   •  donepezil (ARICEPT) 5 MG tablet, Take 5 mg by mouth Every Night., Disp: , Rfl:   •  ferrous sulfate 324 (65 Fe) MG tablet delayed-release EC tablet, Take 1 tablet by mouth Daily With Breakfast., Disp: 30 tablet, Rfl:   •  Flavoring Agent (FLAVOR CONC-CHLORHEXIDINE) concentration, Take 20 mL by mouth 3 (Three) Times a Day., Disp: 500 mL, Rfl: 0  •  HYDROcodone-acetaminophen (Norco) 5-325 MG per tablet, Take 1 tablet by mouth Every 6 (Six) Hours As Needed for Moderate Pain ., Disp: 40 tablet, Rfl: 0  •  melatonin 5 MG tablet tablet, Take 5 mg by mouth Every Night., Disp: , Rfl:   •  mirtazapine (REMERON) 7.5 MG half tablet, Take  by mouth Every Night., Disp: , Rfl:   •  Multiple Vitamins-Minerals (I-HERNESTO) tablet tablet, Take 1 tablet by mouth Daily., Disp: 90 tablet, Rfl: 2  •  nystatin (MYCOSTATIN) 474834 UNIT/GM powder, Apply  topically to the appropriate area as directed As Needed (skin  lesions)., Disp: 30 g, Rfl: 2  •  polyethylene glycol (MIRALAX) 17 g packet, Take 17 g by mouth Daily., Disp: , Rfl:   •  rosuvastatin (CRESTOR) 5 MG tablet, Take 1 tablet by mouth Daily., Disp: 30 tablet, Rfl: 0  •  sennosides-docusate sodium (SENOKOT-S) 8.6-50 MG tablet, Take 2 tablets by mouth Every Night., Disp: 30 tablet, Rfl: 0  •  sodium chloride 1 g tablet, Take 1 tablet by mouth 3 (Three) Times a Day With Meals., Disp: 180 tablet, Rfl: 0  •  vitamin D (ERGOCALCIFEROL) 1.25 MG (12946 UT) capsule capsule, Take 50,000 Units by mouth 1 (One) Time Per Week., Disp: , Rfl:     Allergies:  Patient has no known allergies.    Review of Systems:  Review of Systems   Constitutional: Negative for chills and fever.   HENT: Negative for congestion and rhinorrhea.    Eyes: Negative for visual disturbance.   Respiratory: Negative for shortness of breath.    Cardiovascular: Negative for chest pain.   Gastrointestinal: Negative for abdominal pain, diarrhea, nausea and vomiting.   Endocrine: Negative for polyuria.   Genitourinary: Negative for difficulty urinating and dyspareunia.   Musculoskeletal: Negative for back pain and myalgias.   Skin: Negative for rash and wound.   Neurological: Negative for weakness, numbness and headaches.   Psychiatric/Behavioral: Positive for confusion. Negative for agitation and behavioral problems.       Objective:       Vital Signs Within Normal Limits.    Physical Exam  Constitutional:       General: She is not in acute distress.  HENT:      Head: Normocephalic and atraumatic.      Nose: No congestion.   Cardiovascular:      Rate and Rhythm: Normal rate and regular rhythm.      Heart sounds: Normal heart sounds.   Pulmonary:      Effort: Pulmonary effort is normal. No respiratory distress.      Breath sounds: Normal breath sounds.   Abdominal:      Palpations: Abdomen is soft.      Tenderness: There is no abdominal tenderness.   Musculoskeletal:      Right lower leg: No edema.      Left lower  leg: No edema.   Skin:     General: Skin is warm and dry.   Neurological:      Mental Status: She is alert.      Comments: Moving all extremities.  Interactive.  However only oriented to name.   Psychiatric:         Mood and Affect: Mood normal.         Behavior: Behavior normal.         Diagnostic Data:     Lab Results (last 48 hours)     ** No results found for the last 48 hours. **           Assessment/Plan:      89 y.o. female with:  Problem List Items Addressed This Visit        Cardiac and Vasculature    Benign essential HTN - Primary    Overview     Stable at this time.  -Continue Norvasc 5mg Qday              Gastrointestinal Abdominal     Constipation    Overview     Stable  - Miralax and senokot as needed             Other Visit Diagnoses     Late onset Alzheimer's dementia without behavioral disturbance (HCC)      Stable  - Sundowning precautions  - Aricept 5mg Qday        F/u in 1 month    CODE STATUS: DNR and DNI    Dr. Mandujano is the attending on record for this patient, He is aware of the patient's status and agrees with the above.      This document has been electronically signed by Yobani Lundberg MD on May 11, 2022 01:55 CDT

## 2022-05-11 NOTE — PROGRESS NOTES
I have seen the patient.  I have reviewed the notes, assessments, and/or procedures performed by Dr. Lundberg, I concur with her/his documentation and assessment and plan for Marlene Horne.       This document has been electronically signed by Anderson Mandujano MD on May 11, 2022 14:18 CDT

## 2022-05-20 ENCOUNTER — NURSING HOME (OUTPATIENT)
Dept: FAMILY MEDICINE CLINIC | Facility: CLINIC | Age: 87
End: 2022-05-20

## 2022-05-20 DIAGNOSIS — R79.89 CREATININE ELEVATION: Primary | ICD-10-CM

## 2022-05-20 DIAGNOSIS — K59.00 CONSTIPATION, UNSPECIFIED CONSTIPATION TYPE: ICD-10-CM

## 2022-05-20 DIAGNOSIS — I10 BENIGN ESSENTIAL HTN: ICD-10-CM

## 2022-05-20 DIAGNOSIS — F02.80 LATE ONSET ALZHEIMER'S DEMENTIA WITHOUT BEHAVIORAL DISTURBANCE: ICD-10-CM

## 2022-05-20 DIAGNOSIS — G30.1 LATE ONSET ALZHEIMER'S DEMENTIA WITHOUT BEHAVIORAL DISTURBANCE: ICD-10-CM

## 2022-05-20 PROCEDURE — 99307 SBSQ NF CARE SF MDM 10: CPT | Performed by: STUDENT IN AN ORGANIZED HEALTH CARE EDUCATION/TRAINING PROGRAM

## 2022-05-24 VITALS
SYSTOLIC BLOOD PRESSURE: 132 MMHG | DIASTOLIC BLOOD PRESSURE: 76 MMHG | TEMPERATURE: 98 F | RESPIRATION RATE: 18 BRPM | HEART RATE: 88 BPM | OXYGEN SATURATION: 95 %

## 2022-05-24 NOTE — PROGRESS NOTES
MONTHLY NURSING HOME VISIT    NAME: Marlene Horne  : 1931  MRN: [id number]    DATE & TIME SEEN: 22 @ 8219    PCP: Yobani Lundberg MD    NURSING HOME: [facility]    Monthly Nursing Home Visit for HTN, Constipation, Dementia    Chief Complaint: Monthly Checkup    Subjective:     Marlene Horne is a 89 y.o. female who is being managed for HTN, constipation, and dementia.    Patient had some nausea earlier in the day, but has had no issues other than this.  Nausea is resolved by the time of visit.  No urinary symptoms.    Current Meds:    Current Outpatient Medications:   •  acetaminophen (TYLENOL) 325 MG tablet, Take 2 tablets by mouth Every 4 (Four) Hours As Needed for Mild Pain ., Disp: , Rfl:   •  albuterol (PROVENTIL) (2.5 MG/3ML) 0.083% nebulizer solution, Take 2.5 mg by nebulization Every 4 (Four) Hours As Needed for Shortness of Air., Disp: , Rfl: 12  •  amLODIPine (NORVASC) 5 MG tablet, Take 0.5 tablets by mouth Daily., Disp: , Rfl:   •  aspirin 81 MG EC tablet, Take 81 mg by mouth Daily., Disp: , Rfl:   •  Chlorhexidine Gluconate solution, , Disp: , Rfl:   •  donepezil (ARICEPT) 5 MG tablet, Take 5 mg by mouth Every Night., Disp: , Rfl:   •  ferrous sulfate 324 (65 Fe) MG tablet delayed-release EC tablet, Take 1 tablet by mouth Daily With Breakfast., Disp: 30 tablet, Rfl:   •  Flavoring Agent (FLAVOR CONC-CHLORHEXIDINE) concentration, Take 20 mL by mouth 3 (Three) Times a Day., Disp: 500 mL, Rfl: 0  •  HYDROcodone-acetaminophen (Norco) 5-325 MG per tablet, Take 1 tablet by mouth Every 6 (Six) Hours As Needed for Moderate Pain ., Disp: 40 tablet, Rfl: 0  •  melatonin 5 MG tablet tablet, Take 5 mg by mouth Every Night., Disp: , Rfl:   •  mirtazapine (REMERON) 7.5 MG half tablet, Take  by mouth Every Night., Disp: , Rfl:   •  Multiple Vitamins-Minerals (I-HERNESTO) tablet tablet, Take 1 tablet by mouth Daily., Disp: 90 tablet, Rfl: 2  •  nystatin (MYCOSTATIN) 031952 UNIT/GM powder, Apply   topically to the appropriate area as directed As Needed (skin lesions)., Disp: 30 g, Rfl: 2  •  polyethylene glycol (MIRALAX) 17 g packet, Take 17 g by mouth Daily., Disp: , Rfl:   •  rosuvastatin (CRESTOR) 5 MG tablet, Take 1 tablet by mouth Daily., Disp: 30 tablet, Rfl: 0  •  sennosides-docusate sodium (SENOKOT-S) 8.6-50 MG tablet, Take 2 tablets by mouth Every Night., Disp: 30 tablet, Rfl: 0  •  sodium chloride 1 g tablet, Take 1 tablet by mouth 3 (Three) Times a Day With Meals., Disp: 180 tablet, Rfl: 0  •  vitamin D (ERGOCALCIFEROL) 1.25 MG (36602 UT) capsule capsule, Take 50,000 Units by mouth 1 (One) Time Per Week., Disp: , Rfl:     Allergies:  Patient has no known allergies.    Review of Systems:  Review of Systems   Constitutional: Negative for chills and fever.   HENT: Negative for congestion and rhinorrhea.    Eyes: Negative for visual disturbance.   Respiratory: Negative for shortness of breath.    Cardiovascular: Negative for chest pain.   Gastrointestinal: Negative for abdominal pain, diarrhea, nausea and vomiting.   Endocrine: Negative for polyuria.   Genitourinary: Negative for difficulty urinating and dyspareunia.   Musculoskeletal: Negative for back pain and myalgias.   Skin: Negative for rash and wound.   Neurological: Negative for weakness, numbness and headaches.   Psychiatric/Behavioral: Negative for agitation, behavioral problems and confusion.       Objective:     /76   Pulse 88   Temp 98 °F (36.7 °C)   Resp 18   SpO2 95%   Physical Exam  Constitutional:       General: She is not in acute distress.  HENT:      Head: Normocephalic and atraumatic.   Cardiovascular:      Rate and Rhythm: Normal rate and regular rhythm.      Heart sounds: Normal heart sounds.   Pulmonary:      Effort: Pulmonary effort is normal. No respiratory distress.      Breath sounds: Normal breath sounds.   Abdominal:      Palpations: Abdomen is soft.      Tenderness: There is no abdominal tenderness.    Musculoskeletal:      Right lower leg: No edema.      Left lower leg: No edema.   Skin:     General: Skin is warm and dry.   Neurological:      Mental Status: She is alert.      Comments: Only oriented into person.   Psychiatric:         Mood and Affect: Mood normal.         Behavior: Behavior normal.         Diagnostic Data:     Labs drawn show a 0.1 increase in Cr from her previous value.     Assessment/Plan:      89 y.o. female with:  Problem List Items Addressed This Visit        Cardiac and Vasculature    Benign essential HTN    Overview     Stable.  · Amlodipine 5 mg daily              Gastrointestinal Abdominal     Constipation    Overview     Stable.  · Miralax and senokot as needed             Other Visit Diagnoses     Creatinine elevation    -  Primary  Slight increase. Already has baseline CKD.  - Increase PO intake of hydration  - Redraw BMP in 2 weeks to trend    Late onset Alzheimer's dementia without behavioral disturbance (HCC)      Stable.  - Aricept 5mg Daily  - Sundowning Precautions          CODE STATUS: DNR and DNI    Dr. Mandujano is the attending on record for this patient, He is aware of the patient's status and agrees with the above.      This document has been electronically signed by Yobani Lundberg MD on May 24, 2022 02:42 CDT

## 2022-06-19 ENCOUNTER — NURSING HOME (OUTPATIENT)
Dept: FAMILY MEDICINE CLINIC | Facility: CLINIC | Age: 87
End: 2022-06-19

## 2022-06-19 DIAGNOSIS — K59.00 CONSTIPATION, UNSPECIFIED CONSTIPATION TYPE: ICD-10-CM

## 2022-06-19 DIAGNOSIS — N18.32 STAGE 3B CHRONIC KIDNEY DISEASE: ICD-10-CM

## 2022-06-19 DIAGNOSIS — R79.89 CREATININE ELEVATION: ICD-10-CM

## 2022-06-19 DIAGNOSIS — G30.1 LATE ONSET ALZHEIMER'S DEMENTIA WITHOUT BEHAVIORAL DISTURBANCE: ICD-10-CM

## 2022-06-19 DIAGNOSIS — F02.80 LATE ONSET ALZHEIMER'S DEMENTIA WITHOUT BEHAVIORAL DISTURBANCE: ICD-10-CM

## 2022-06-19 DIAGNOSIS — I10 BENIGN ESSENTIAL HTN: Primary | ICD-10-CM

## 2022-06-19 PROCEDURE — 99307 SBSQ NF CARE SF MDM 10: CPT | Performed by: STUDENT IN AN ORGANIZED HEALTH CARE EDUCATION/TRAINING PROGRAM

## 2022-06-30 NOTE — PROGRESS NOTES
MONTHLY NURSING HOME VISIT    NAME: Marlene Horne  : 1931  MRN: [id number]    DATE & TIME SEEN: 22 @ 1400    PCP: Yobani Lundberg MD    NURSING HOME: [facility]    Monthly Nursing Home Visit for Chronic Issues    Chief Complaint: Monthly Check up    Subjective:     Marlene Horne is a 89 y.o. female who is being managed for HTN, constipation, and dementia.    Patient having no acute complaints.  No signs of dysuria.  No change in appetite or behavior per nursing.    Current Meds:    Current Outpatient Medications:   •  acetaminophen (TYLENOL) 325 MG tablet, Take 2 tablets by mouth Every 4 (Four) Hours As Needed for Mild Pain ., Disp: , Rfl:   •  albuterol (PROVENTIL) (2.5 MG/3ML) 0.083% nebulizer solution, Take 2.5 mg by nebulization Every 4 (Four) Hours As Needed for Shortness of Air., Disp: , Rfl: 12  •  amLODIPine (NORVASC) 5 MG tablet, Take 0.5 tablets by mouth Daily., Disp: , Rfl:   •  aspirin 81 MG EC tablet, Take 81 mg by mouth Daily., Disp: , Rfl:   •  Chlorhexidine Gluconate solution, , Disp: , Rfl:   •  donepezil (ARICEPT) 5 MG tablet, Take 5 mg by mouth Every Night., Disp: , Rfl:   •  ferrous sulfate 324 (65 Fe) MG tablet delayed-release EC tablet, Take 1 tablet by mouth Daily With Breakfast., Disp: 30 tablet, Rfl:   •  Flavoring Agent (FLAVOR CONC-CHLORHEXIDINE) concentration, Take 20 mL by mouth 3 (Three) Times a Day., Disp: 500 mL, Rfl: 0  •  HYDROcodone-acetaminophen (Norco) 5-325 MG per tablet, Take 1 tablet by mouth Every 6 (Six) Hours As Needed for Moderate Pain ., Disp: 40 tablet, Rfl: 0  •  melatonin 5 MG tablet tablet, Take 5 mg by mouth Every Night., Disp: , Rfl:   •  mirtazapine (REMERON) 7.5 MG half tablet, Take  by mouth Every Night., Disp: , Rfl:   •  Multiple Vitamins-Minerals (I-HERNESTO) tablet tablet, Take 1 tablet by mouth Daily., Disp: 90 tablet, Rfl: 2  •  nystatin (MYCOSTATIN) 869313 UNIT/GM powder, Apply  topically to the appropriate area as directed As  Needed (skin lesions)., Disp: 30 g, Rfl: 2  •  polyethylene glycol (MIRALAX) 17 g packet, Take 17 g by mouth Daily., Disp: , Rfl:   •  rosuvastatin (CRESTOR) 5 MG tablet, Take 1 tablet by mouth Daily., Disp: 30 tablet, Rfl: 0  •  sennosides-docusate sodium (SENOKOT-S) 8.6-50 MG tablet, Take 2 tablets by mouth Every Night., Disp: 30 tablet, Rfl: 0  •  sodium chloride 1 g tablet, Take 1 tablet by mouth 3 (Three) Times a Day With Meals., Disp: 180 tablet, Rfl: 0  •  vitamin D (ERGOCALCIFEROL) 1.25 MG (19871 UT) capsule capsule, Take 50,000 Units by mouth 1 (One) Time Per Week., Disp: , Rfl:     Allergies:  Patient has no known allergies.    Review of Systems:  Review of Systems   Constitutional: Negative for chills and fever.   HENT: Negative for congestion and rhinorrhea.    Eyes: Negative for visual disturbance.   Respiratory: Negative for shortness of breath.    Cardiovascular: Negative for chest pain.   Gastrointestinal: Negative for abdominal pain, diarrhea, nausea and vomiting.   Endocrine: Negative for polyuria.   Genitourinary: Negative for difficulty urinating and dyspareunia.   Musculoskeletal: Negative for back pain and myalgias.   Skin: Negative for rash and wound.   Neurological: Negative for weakness, numbness and headaches.   Psychiatric/Behavioral: Negative for agitation, behavioral problems and confusion.       Objective:     There were no vitals taken for this visit.  Physical Exam  Constitutional:       General: She is not in acute distress.  HENT:      Head: Normocephalic and atraumatic.   Cardiovascular:      Rate and Rhythm: Normal rate and regular rhythm.      Heart sounds: Normal heart sounds.   Pulmonary:      Effort: Pulmonary effort is normal. No respiratory distress.      Breath sounds: Normal breath sounds.   Abdominal:      Palpations: Abdomen is soft.      Tenderness: There is no abdominal tenderness.   Musculoskeletal:      Right lower leg: No edema.      Left lower leg: No edema.    Skin:     General: Skin is warm and dry.   Neurological:      Mental Status: She is alert.   Psychiatric:         Mood and Affect: Mood normal.         Behavior: Behavior normal.         Diagnostic Data:     Patient GFR was still found to be depressed from her previous baseline.  UA was unremarkable.     Assessment/Plan:      89 y.o. female with:  Problem List Items Addressed This Visit        Cardiac and Vasculature    Benign essential HTN - Primary    Overview     · Amlodipine 2.5 mg daily              Gastrointestinal Abdominal     Constipation    Overview     · Miralax and senokot as needed              Genitourinary and Reproductive     CKD (chronic kidney disease) stage 3, GFR 30-59 ml/min   Creatinine still elevated with decreasing GFR.  UA negative.  No dysuria or reduced urine volume. Likely patient's new baseline from normal aging.   Other Visit Diagnoses     Late onset Alzheimer's dementia without behavioral disturbance (HCC)     - Aricept 5mg Daily  - Sundowning Precautions     Creatinine elevation      As above.          CODE STATUS: DNR and DNI    Dr. Mandujano is the attending on record for this patient, He is aware of the patient's status and agrees with the above.      This document has been electronically signed by Yobani Lundberg MD on June 30, 2022 14:46 CDT

## 2022-07-20 ENCOUNTER — NURSING HOME (OUTPATIENT)
Dept: FAMILY MEDICINE CLINIC | Facility: CLINIC | Age: 87
End: 2022-07-20

## 2022-07-20 DIAGNOSIS — N18.32 STAGE 3B CHRONIC KIDNEY DISEASE: ICD-10-CM

## 2022-07-20 DIAGNOSIS — F02.80 LATE ONSET ALZHEIMER'S DEMENTIA WITHOUT BEHAVIORAL DISTURBANCE: ICD-10-CM

## 2022-07-20 DIAGNOSIS — K59.00 CONSTIPATION, UNSPECIFIED CONSTIPATION TYPE: ICD-10-CM

## 2022-07-20 DIAGNOSIS — I10 BENIGN ESSENTIAL HTN: Primary | ICD-10-CM

## 2022-07-20 DIAGNOSIS — G30.1 LATE ONSET ALZHEIMER'S DEMENTIA WITHOUT BEHAVIORAL DISTURBANCE: ICD-10-CM

## 2022-07-20 PROCEDURE — 99307 SBSQ NF CARE SF MDM 10: CPT | Performed by: STUDENT IN AN ORGANIZED HEALTH CARE EDUCATION/TRAINING PROGRAM

## 2022-07-22 VITALS
DIASTOLIC BLOOD PRESSURE: 60 MMHG | SYSTOLIC BLOOD PRESSURE: 98 MMHG | OXYGEN SATURATION: 94 % | RESPIRATION RATE: 18 BRPM | TEMPERATURE: 98.2 F | HEART RATE: 73 BPM

## 2022-07-22 NOTE — PROGRESS NOTES
"MONTHLY NURSING HOME VISIT    NAME: Marlene Horne  : 1931  MRN: [id number]    DATE & TIME SEEN: 22 @ 1900    PCP: Yobani Lundberg MD    NURSING HOME: [facility]    Monthly Nursing Home Visit for management of chronic issues.    Chief Complaint: Monthly Check Up    Subjective:     Marlene Horne is a 89 y.o. female who is being managed for HTN, CKD, Dementia, and constipation.    At this time patient has no acute complaints.  States she is doing \"fair\" per usual.  Nursing staff is not reporting any issues with the patient.    Current Meds:    Current Outpatient Medications:   •  acetaminophen (TYLENOL) 325 MG tablet, Take 2 tablets by mouth Every 4 (Four) Hours As Needed for Mild Pain ., Disp: , Rfl:   •  albuterol (PROVENTIL) (2.5 MG/3ML) 0.083% nebulizer solution, Take 2.5 mg by nebulization Every 4 (Four) Hours As Needed for Shortness of Air., Disp: , Rfl: 12  •  amLODIPine (NORVASC) 5 MG tablet, Take 0.5 tablets by mouth Daily., Disp: , Rfl:   •  aspirin 81 MG EC tablet, Take 81 mg by mouth Daily., Disp: , Rfl:   •  Chlorhexidine Gluconate solution, , Disp: , Rfl:   •  donepezil (ARICEPT) 5 MG tablet, Take 5 mg by mouth Every Night., Disp: , Rfl:   •  ferrous sulfate 324 (65 Fe) MG tablet delayed-release EC tablet, Take 1 tablet by mouth Daily With Breakfast., Disp: 30 tablet, Rfl:   •  Flavoring Agent (FLAVOR CONC-CHLORHEXIDINE) concentration, Take 20 mL by mouth 3 (Three) Times a Day., Disp: 500 mL, Rfl: 0  •  HYDROcodone-acetaminophen (Norco) 5-325 MG per tablet, Take 1 tablet by mouth Every 6 (Six) Hours As Needed for Moderate Pain ., Disp: 40 tablet, Rfl: 0  •  melatonin 5 MG tablet tablet, Take 5 mg by mouth Every Night., Disp: , Rfl:   •  mirtazapine (REMERON) 7.5 MG half tablet, Take  by mouth Every Night., Disp: , Rfl:   •  Multiple Vitamins-Minerals (I-HERNESTO) tablet tablet, Take 1 tablet by mouth Daily., Disp: 90 tablet, Rfl: 2  •  nystatin (MYCOSTATIN) 589160 UNIT/GM powder, " Apply  topically to the appropriate area as directed As Needed (skin lesions)., Disp: 30 g, Rfl: 2  •  polyethylene glycol (MIRALAX) 17 g packet, Take 17 g by mouth Daily., Disp: , Rfl:   •  rosuvastatin (CRESTOR) 5 MG tablet, Take 1 tablet by mouth Daily., Disp: 30 tablet, Rfl: 0  •  sennosides-docusate sodium (SENOKOT-S) 8.6-50 MG tablet, Take 2 tablets by mouth Every Night., Disp: 30 tablet, Rfl: 0  •  sodium chloride 1 g tablet, Take 1 tablet by mouth 3 (Three) Times a Day With Meals., Disp: 180 tablet, Rfl: 0  •  vitamin D (ERGOCALCIFEROL) 1.25 MG (74669 UT) capsule capsule, Take 50,000 Units by mouth 1 (One) Time Per Week., Disp: , Rfl:     Allergies:  Patient has no known allergies.    Review of Systems:  Review of Systems   Constitutional: Negative for chills and fever.   HENT: Negative for congestion and rhinorrhea.    Eyes: Negative for visual disturbance.   Respiratory: Negative for shortness of breath.    Cardiovascular: Negative for chest pain.   Gastrointestinal: Negative for abdominal pain, diarrhea, nausea and vomiting.   Endocrine: Negative for polyuria.   Genitourinary: Negative for difficulty urinating and dyspareunia.   Musculoskeletal: Negative for back pain and myalgias.   Skin: Negative for rash and wound.   Neurological: Negative for weakness, numbness and headaches.   Psychiatric/Behavioral: Positive for confusion. Negative for agitation and behavioral problems.       Objective:     BP 98/60   Pulse 73   Temp 98.2 °F (36.8 °C)   Resp 18   SpO2 94% Comment: RA  Physical Exam  Constitutional:       General: She is not in acute distress.  HENT:      Head: Normocephalic and atraumatic.   Cardiovascular:      Rate and Rhythm: Normal rate and regular rhythm.      Heart sounds: Normal heart sounds.   Pulmonary:      Effort: Pulmonary effort is normal. No respiratory distress.      Breath sounds: Normal breath sounds.   Abdominal:      Palpations: Abdomen is soft.      Tenderness: There is no  abdominal tenderness.   Musculoskeletal:      Right lower leg: No edema.      Left lower leg: No edema.   Skin:     General: Skin is warm and dry.   Neurological:      General: No focal deficit present.      Mental Status: She is alert.   Psychiatric:         Mood and Affect: Mood normal.         Behavior: Behavior normal.         Diagnostic Data:     Lab Results (last 7 days)     ** No results found for the last 168 hours. **           Assessment/Plan:      89 y.o. female with:  Problem List Items Addressed This Visit        Cardiac and Vasculature    Benign essential HTN - Primary  Stable.    Overview     · Amlodipine 2.5 mg daily              Gastrointestinal Abdominal     Constipation  Stable    Overview     · Miralax and senokot as needed              Genitourinary and Reproductive     CKD (chronic kidney disease) stage 3, GFR 30-59 ml/min  Per UA and GFR Trends, likely age related.  Concern for acute pathology is quite low.    - Continue to avoid nephrotoxic agents.      Other Visit Diagnoses     Late onset Alzheimer's dementia without behavioral disturbance (HCC)      Stable.  - Aricept 5mg Daily  - Sundowning Precautions           CODE STATUS: DNR and DNI    Dr. Acuña is the attending on record for this patient, He is aware of the patient's status and agrees with the above.      This document has been electronically signed by Yobani Lundberg MD on July 22, 2022 10:56 CDT

## 2022-08-03 ENCOUNTER — APPOINTMENT (OUTPATIENT)
Dept: CT IMAGING | Facility: HOSPITAL | Age: 87
End: 2022-08-03

## 2022-08-03 ENCOUNTER — HOSPITAL ENCOUNTER (INPATIENT)
Facility: HOSPITAL | Age: 87
LOS: 1 days | Discharge: NURSING, REHAB OR LONG-TERM CARE | End: 2022-08-04
Attending: EMERGENCY MEDICINE | Admitting: FAMILY MEDICINE

## 2022-08-03 ENCOUNTER — APPOINTMENT (OUTPATIENT)
Dept: GENERAL RADIOLOGY | Facility: HOSPITAL | Age: 87
End: 2022-08-03

## 2022-08-03 DIAGNOSIS — N39.0 URINARY TRACT INFECTION WITHOUT HEMATURIA, SITE UNSPECIFIED: ICD-10-CM

## 2022-08-03 DIAGNOSIS — R41.82 ALTERED MENTAL STATUS, UNSPECIFIED ALTERED MENTAL STATUS TYPE: Primary | ICD-10-CM

## 2022-08-03 LAB
ALBUMIN SERPL-MCNC: 3.4 G/DL (ref 3.5–5.2)
ALBUMIN/GLOB SERPL: 1.1 G/DL
ALP SERPL-CCNC: 123 U/L (ref 39–117)
ALT SERPL W P-5'-P-CCNC: 10 U/L (ref 1–33)
ANION GAP SERPL CALCULATED.3IONS-SCNC: 13 MMOL/L (ref 5–15)
AST SERPL-CCNC: 23 U/L (ref 1–32)
BACTERIA UR QL AUTO: ABNORMAL /HPF
BASOPHILS # BLD AUTO: 0.04 10*3/MM3 (ref 0–0.2)
BASOPHILS NFR BLD AUTO: 0.6 % (ref 0–1.5)
BILIRUB SERPL-MCNC: 0.3 MG/DL (ref 0–1.2)
BILIRUB UR QL STRIP: NEGATIVE
BUN SERPL-MCNC: 23 MG/DL (ref 8–23)
BUN/CREAT SERPL: 15.8 (ref 7–25)
CALCIUM SPEC-SCNC: 8.9 MG/DL (ref 8.2–9.6)
CHLORIDE SERPL-SCNC: 101 MMOL/L (ref 98–107)
CLARITY UR: ABNORMAL
CO2 SERPL-SCNC: 18 MMOL/L (ref 22–29)
COLOR UR: YELLOW
CREAT SERPL-MCNC: 1.46 MG/DL (ref 0.57–1)
D-LACTATE SERPL-SCNC: 1.1 MMOL/L (ref 0.5–2)
DEPRECATED RDW RBC AUTO: 43.5 FL (ref 37–54)
EGFRCR SERPLBLD CKD-EPI 2021: 34.1 ML/MIN/1.73
EOSINOPHIL # BLD AUTO: 0.07 10*3/MM3 (ref 0–0.4)
EOSINOPHIL NFR BLD AUTO: 1.1 % (ref 0.3–6.2)
ERYTHROCYTE [DISTWIDTH] IN BLOOD BY AUTOMATED COUNT: 13.3 % (ref 12.3–15.4)
FLUAV SUBTYP SPEC NAA+PROBE: NOT DETECTED
FLUBV RNA ISLT QL NAA+PROBE: NOT DETECTED
GLOBULIN UR ELPH-MCNC: 3 GM/DL
GLUCOSE BLDC GLUCOMTR-MCNC: 91 MG/DL (ref 70–130)
GLUCOSE SERPL-MCNC: 133 MG/DL (ref 65–99)
GLUCOSE UR STRIP-MCNC: NEGATIVE MG/DL
HCT VFR BLD AUTO: 31.7 % (ref 34–46.6)
HGB BLD-MCNC: 11.2 G/DL (ref 12–15.9)
HGB UR QL STRIP.AUTO: ABNORMAL
HOLD SPECIMEN: NORMAL
HOLD SPECIMEN: NORMAL
HYALINE CASTS UR QL AUTO: ABNORMAL /LPF
IMM GRANULOCYTES # BLD AUTO: 0.03 10*3/MM3 (ref 0–0.05)
IMM GRANULOCYTES NFR BLD AUTO: 0.5 % (ref 0–0.5)
KETONES UR QL STRIP: NEGATIVE
LEUKOCYTE ESTERASE UR QL STRIP.AUTO: ABNORMAL
LYMPHOCYTES # BLD AUTO: 1.41 10*3/MM3 (ref 0.7–3.1)
LYMPHOCYTES NFR BLD AUTO: 22.3 % (ref 19.6–45.3)
MAGNESIUM SERPL-MCNC: 2.2 MG/DL (ref 1.6–2.4)
MCH RBC QN AUTO: 31.8 PG (ref 26.6–33)
MCHC RBC AUTO-ENTMCNC: 35.3 G/DL (ref 31.5–35.7)
MCV RBC AUTO: 90.1 FL (ref 79–97)
MONOCYTES # BLD AUTO: 0.52 10*3/MM3 (ref 0.1–0.9)
MONOCYTES NFR BLD AUTO: 8.2 % (ref 5–12)
NEUTROPHILS NFR BLD AUTO: 4.24 10*3/MM3 (ref 1.7–7)
NEUTROPHILS NFR BLD AUTO: 67.3 % (ref 42.7–76)
NITRITE UR QL STRIP: POSITIVE
NRBC BLD AUTO-RTO: 0 /100 WBC (ref 0–0.2)
PH UR STRIP.AUTO: 5.5 [PH] (ref 5–9)
PLATELET # BLD AUTO: 237 10*3/MM3 (ref 140–450)
PMV BLD AUTO: 10 FL (ref 6–12)
POTASSIUM SERPL-SCNC: 4.5 MMOL/L (ref 3.5–5.2)
PROT SERPL-MCNC: 6.4 G/DL (ref 6–8.5)
PROT UR QL STRIP: ABNORMAL
RBC # BLD AUTO: 3.52 10*6/MM3 (ref 3.77–5.28)
RBC # UR STRIP: ABNORMAL /HPF
REF LAB TEST METHOD: ABNORMAL
SARS-COV-2 RNA PNL SPEC NAA+PROBE: NOT DETECTED
SODIUM SERPL-SCNC: 132 MMOL/L (ref 136–145)
SP GR UR STRIP: 1.01 (ref 1–1.03)
SQUAMOUS #/AREA URNS HPF: ABNORMAL /HPF
T4 FREE SERPL-MCNC: 1.05 NG/DL (ref 0.93–1.7)
TROPONIN T SERPL-MCNC: <0.01 NG/ML (ref 0–0.03)
TSH SERPL DL<=0.05 MIU/L-ACNC: 7.47 UIU/ML (ref 0.27–4.2)
UROBILINOGEN UR QL STRIP: ABNORMAL
VALPROATE SERPL-MCNC: 16.7 MCG/ML (ref 50–125)
WBC # UR STRIP: ABNORMAL /HPF
WBC NRBC COR # BLD: 6.31 10*3/MM3 (ref 3.4–10.8)
WHOLE BLOOD HOLD COAG: NORMAL
WHOLE BLOOD HOLD SPECIMEN: NORMAL

## 2022-08-03 PROCEDURE — 83735 ASSAY OF MAGNESIUM: CPT | Performed by: EMERGENCY MEDICINE

## 2022-08-03 PROCEDURE — 80164 ASSAY DIPROPYLACETIC ACD TOT: CPT | Performed by: EMERGENCY MEDICINE

## 2022-08-03 PROCEDURE — 85025 COMPLETE CBC W/AUTO DIFF WBC: CPT | Performed by: EMERGENCY MEDICINE

## 2022-08-03 PROCEDURE — 82962 GLUCOSE BLOOD TEST: CPT

## 2022-08-03 PROCEDURE — 25010000002 CEFTRIAXONE PER 250 MG: Performed by: EMERGENCY MEDICINE

## 2022-08-03 PROCEDURE — 93010 ELECTROCARDIOGRAM REPORT: CPT | Performed by: INTERNAL MEDICINE

## 2022-08-03 PROCEDURE — 87077 CULTURE AEROBIC IDENTIFY: CPT | Performed by: EMERGENCY MEDICINE

## 2022-08-03 PROCEDURE — 84439 ASSAY OF FREE THYROXINE: CPT | Performed by: STUDENT IN AN ORGANIZED HEALTH CARE EDUCATION/TRAINING PROGRAM

## 2022-08-03 PROCEDURE — 99285 EMERGENCY DEPT VISIT HI MDM: CPT

## 2022-08-03 PROCEDURE — 25010000002 HEPARIN (PORCINE) PER 1000 UNITS: Performed by: STUDENT IN AN ORGANIZED HEALTH CARE EDUCATION/TRAINING PROGRAM

## 2022-08-03 PROCEDURE — 84443 ASSAY THYROID STIM HORMONE: CPT | Performed by: STUDENT IN AN ORGANIZED HEALTH CARE EDUCATION/TRAINING PROGRAM

## 2022-08-03 PROCEDURE — 87086 URINE CULTURE/COLONY COUNT: CPT | Performed by: EMERGENCY MEDICINE

## 2022-08-03 PROCEDURE — 71045 X-RAY EXAM CHEST 1 VIEW: CPT

## 2022-08-03 PROCEDURE — 83605 ASSAY OF LACTIC ACID: CPT | Performed by: EMERGENCY MEDICINE

## 2022-08-03 PROCEDURE — 87186 SC STD MICRODIL/AGAR DIL: CPT | Performed by: EMERGENCY MEDICINE

## 2022-08-03 PROCEDURE — 25010000002 HALOPERIDOL LACTATE PER 5 MG: Performed by: EMERGENCY MEDICINE

## 2022-08-03 PROCEDURE — 80053 COMPREHEN METABOLIC PANEL: CPT | Performed by: EMERGENCY MEDICINE

## 2022-08-03 PROCEDURE — 87636 SARSCOV2 & INF A&B AMP PRB: CPT | Performed by: EMERGENCY MEDICINE

## 2022-08-03 PROCEDURE — 81001 URINALYSIS AUTO W/SCOPE: CPT | Performed by: EMERGENCY MEDICINE

## 2022-08-03 PROCEDURE — 70450 CT HEAD/BRAIN W/O DYE: CPT

## 2022-08-03 PROCEDURE — 93005 ELECTROCARDIOGRAM TRACING: CPT | Performed by: EMERGENCY MEDICINE

## 2022-08-03 PROCEDURE — 99221 1ST HOSP IP/OBS SF/LOW 40: CPT | Performed by: STUDENT IN AN ORGANIZED HEALTH CARE EDUCATION/TRAINING PROGRAM

## 2022-08-03 PROCEDURE — 87040 BLOOD CULTURE FOR BACTERIA: CPT | Performed by: EMERGENCY MEDICINE

## 2022-08-03 PROCEDURE — 36415 COLL VENOUS BLD VENIPUNCTURE: CPT | Performed by: EMERGENCY MEDICINE

## 2022-08-03 PROCEDURE — 84484 ASSAY OF TROPONIN QUANT: CPT | Performed by: EMERGENCY MEDICINE

## 2022-08-03 RX ORDER — BUSPIRONE HYDROCHLORIDE 5 MG/1
7.5 TABLET ORAL 2 TIMES DAILY
COMMUNITY

## 2022-08-03 RX ORDER — INSULIN ASPART 100 [IU]/ML
0-7 INJECTION, SOLUTION INTRAVENOUS; SUBCUTANEOUS
Status: DISCONTINUED | OUTPATIENT
Start: 2022-08-03 | End: 2022-08-04 | Stop reason: HOSPADM

## 2022-08-03 RX ORDER — DEXTROSE MONOHYDRATE 25 G/50ML
25 INJECTION, SOLUTION INTRAVENOUS
Status: DISCONTINUED | OUTPATIENT
Start: 2022-08-03 | End: 2022-08-04 | Stop reason: HOSPADM

## 2022-08-03 RX ORDER — BUSPIRONE HYDROCHLORIDE 7.5 MG/1
7.5 TABLET ORAL 2 TIMES DAILY
Status: DISCONTINUED | OUTPATIENT
Start: 2022-08-03 | End: 2022-08-04 | Stop reason: HOSPADM

## 2022-08-03 RX ORDER — SODIUM CHLORIDE 9 MG/ML
75 INJECTION, SOLUTION INTRAVENOUS CONTINUOUS
Status: DISCONTINUED | OUTPATIENT
Start: 2022-08-03 | End: 2022-08-04 | Stop reason: HOSPADM

## 2022-08-03 RX ORDER — ROSUVASTATIN CALCIUM 5 MG/1
5 TABLET, COATED ORAL DAILY
Status: DISCONTINUED | OUTPATIENT
Start: 2022-08-04 | End: 2022-08-04 | Stop reason: HOSPADM

## 2022-08-03 RX ORDER — ONDANSETRON 4 MG/1
4 TABLET, FILM COATED ORAL EVERY 6 HOURS PRN
Status: DISCONTINUED | OUTPATIENT
Start: 2022-08-03 | End: 2022-08-04 | Stop reason: HOSPADM

## 2022-08-03 RX ORDER — HALOPERIDOL 5 MG/ML
2 INJECTION INTRAMUSCULAR ONCE
Status: COMPLETED | OUTPATIENT
Start: 2022-08-03 | End: 2022-08-03

## 2022-08-03 RX ORDER — ASPIRIN 81 MG/1
81 TABLET ORAL DAILY
Status: DISCONTINUED | OUTPATIENT
Start: 2022-08-04 | End: 2022-08-04 | Stop reason: HOSPADM

## 2022-08-03 RX ORDER — SODIUM CHLORIDE 9 MG/ML
100 INJECTION, SOLUTION INTRAVENOUS CONTINUOUS
Status: DISCONTINUED | OUTPATIENT
Start: 2022-08-03 | End: 2022-08-03

## 2022-08-03 RX ORDER — BISACODYL 5 MG/1
5 TABLET, DELAYED RELEASE ORAL DAILY PRN
Status: DISCONTINUED | OUTPATIENT
Start: 2022-08-03 | End: 2022-08-04 | Stop reason: HOSPADM

## 2022-08-03 RX ORDER — DIVALPROEX SODIUM 125 MG/1
125 CAPSULE, COATED PELLETS ORAL NIGHTLY
COMMUNITY
End: 2022-09-14 | Stop reason: HOSPADM

## 2022-08-03 RX ORDER — ACETAMINOPHEN 325 MG/1
650 TABLET ORAL EVERY 4 HOURS PRN
Status: DISCONTINUED | OUTPATIENT
Start: 2022-08-03 | End: 2022-08-04 | Stop reason: HOSPADM

## 2022-08-03 RX ORDER — HEPARIN SODIUM 5000 [USP'U]/ML
5000 INJECTION, SOLUTION INTRAVENOUS; SUBCUTANEOUS EVERY 12 HOURS SCHEDULED
Status: DISCONTINUED | OUTPATIENT
Start: 2022-08-03 | End: 2022-08-04 | Stop reason: HOSPADM

## 2022-08-03 RX ORDER — SODIUM CHLORIDE 0.9 % (FLUSH) 0.9 %
10 SYRINGE (ML) INJECTION AS NEEDED
Status: DISCONTINUED | OUTPATIENT
Start: 2022-08-03 | End: 2022-08-04 | Stop reason: HOSPADM

## 2022-08-03 RX ORDER — AMOXICILLIN 250 MG
2 CAPSULE ORAL 2 TIMES DAILY
Status: DISCONTINUED | OUTPATIENT
Start: 2022-08-03 | End: 2022-08-04 | Stop reason: HOSPADM

## 2022-08-03 RX ORDER — DIVALPROEX SODIUM 125 MG/1
125 CAPSULE, COATED PELLETS ORAL 2 TIMES DAILY
Status: DISCONTINUED | OUTPATIENT
Start: 2022-08-03 | End: 2022-08-04 | Stop reason: HOSPADM

## 2022-08-03 RX ORDER — MELATONIN
1000 DAILY
COMMUNITY

## 2022-08-03 RX ORDER — BISACODYL 10 MG
10 SUPPOSITORY, RECTAL RECTAL DAILY PRN
Status: DISCONTINUED | OUTPATIENT
Start: 2022-08-03 | End: 2022-08-04 | Stop reason: HOSPADM

## 2022-08-03 RX ORDER — FERROUS SULFATE TAB EC 324 MG (65 MG FE EQUIVALENT) 324 (65 FE) MG
324 TABLET DELAYED RESPONSE ORAL
Status: DISCONTINUED | OUTPATIENT
Start: 2022-08-04 | End: 2022-08-04 | Stop reason: HOSPADM

## 2022-08-03 RX ORDER — POLYETHYLENE GLYCOL 3350 17 G/17G
17 POWDER, FOR SOLUTION ORAL DAILY PRN
Status: DISCONTINUED | OUTPATIENT
Start: 2022-08-03 | End: 2022-08-04 | Stop reason: HOSPADM

## 2022-08-03 RX ORDER — HYDROCODONE BITARTRATE AND ACETAMINOPHEN 5; 325 MG/1; MG/1
1 TABLET ORAL EVERY 6 HOURS PRN
Status: DISCONTINUED | OUTPATIENT
Start: 2022-08-03 | End: 2022-08-03

## 2022-08-03 RX ORDER — DONEPEZIL HYDROCHLORIDE 5 MG/1
5 TABLET, FILM COATED ORAL NIGHTLY
Status: DISCONTINUED | OUTPATIENT
Start: 2022-08-03 | End: 2022-08-04 | Stop reason: HOSPADM

## 2022-08-03 RX ORDER — MIRTAZAPINE 15 MG/1
7.5 TABLET, FILM COATED ORAL NIGHTLY
Status: DISCONTINUED | OUTPATIENT
Start: 2022-08-03 | End: 2022-08-04 | Stop reason: HOSPADM

## 2022-08-03 RX ORDER — NYSTATIN 100000 [USP'U]/G
POWDER TOPICAL AS NEEDED
Status: DISCONTINUED | OUTPATIENT
Start: 2022-08-03 | End: 2022-08-04 | Stop reason: HOSPADM

## 2022-08-03 RX ORDER — ALBUTEROL SULFATE 2.5 MG/3ML
2.5 SOLUTION RESPIRATORY (INHALATION) EVERY 4 HOURS PRN
Status: DISCONTINUED | OUTPATIENT
Start: 2022-08-03 | End: 2022-08-04 | Stop reason: HOSPADM

## 2022-08-03 RX ORDER — SODIUM CHLORIDE 0.9 % (FLUSH) 0.9 %
10 SYRINGE (ML) INJECTION EVERY 12 HOURS SCHEDULED
Status: DISCONTINUED | OUTPATIENT
Start: 2022-08-03 | End: 2022-08-04 | Stop reason: HOSPADM

## 2022-08-03 RX ORDER — MELATONIN
1000 DAILY
Status: DISCONTINUED | OUTPATIENT
Start: 2022-08-04 | End: 2022-08-04 | Stop reason: HOSPADM

## 2022-08-03 RX ORDER — ONDANSETRON 4 MG/1
4 TABLET, FILM COATED ORAL EVERY 6 HOURS PRN
COMMUNITY
End: 2023-03-09

## 2022-08-03 RX ORDER — ONDANSETRON 2 MG/ML
4 INJECTION INTRAMUSCULAR; INTRAVENOUS EVERY 6 HOURS PRN
Status: DISCONTINUED | OUTPATIENT
Start: 2022-08-03 | End: 2022-08-04 | Stop reason: HOSPADM

## 2022-08-03 RX ORDER — SODIUM CHLORIDE 1000 MG
1 TABLET, SOLUBLE MISCELLANEOUS
Status: DISCONTINUED | OUTPATIENT
Start: 2022-08-03 | End: 2022-08-04 | Stop reason: HOSPADM

## 2022-08-03 RX ORDER — LANOLIN ALCOHOL/MO/W.PET/CERES
5 CREAM (GRAM) TOPICAL NIGHTLY
Status: DISCONTINUED | OUTPATIENT
Start: 2022-08-03 | End: 2022-08-04 | Stop reason: HOSPADM

## 2022-08-03 RX ORDER — NICOTINE POLACRILEX 4 MG
15 LOZENGE BUCCAL
Status: DISCONTINUED | OUTPATIENT
Start: 2022-08-03 | End: 2022-08-04 | Stop reason: HOSPADM

## 2022-08-03 RX ORDER — AMLODIPINE BESYLATE 2.5 MG/1
2.5 TABLET ORAL
Status: DISCONTINUED | OUTPATIENT
Start: 2022-08-04 | End: 2022-08-04 | Stop reason: HOSPADM

## 2022-08-03 RX ORDER — MICONAZOLE NITRATE 20 MG/G
POWDER TOPICAL
COMMUNITY
Start: 2022-07-18 | End: 2022-09-12

## 2022-08-03 RX ADMIN — Medication 1 G: at 23:14

## 2022-08-03 RX ADMIN — BUSPIRONE HYDROCHLORIDE 7.5 MG: 7.5 TABLET ORAL at 23:13

## 2022-08-03 RX ADMIN — DIVALPROEX SODIUM 125 MG: 125 CAPSULE, COATED PELLETS ORAL at 23:13

## 2022-08-03 RX ADMIN — DONEPEZIL HYDROCHLORIDE 5 MG: 5 TABLET, FILM COATED ORAL at 23:13

## 2022-08-03 RX ADMIN — Medication 7.5 MG: at 23:13

## 2022-08-03 RX ADMIN — SODIUM CHLORIDE 125 ML/HR: 9 INJECTION, SOLUTION INTRAVENOUS at 16:06

## 2022-08-03 RX ADMIN — HALOPERIDOL LACTATE 2 MG: 5 INJECTION, SOLUTION INTRAMUSCULAR at 13:27

## 2022-08-03 RX ADMIN — HEPARIN SODIUM 5000 UNITS: 5000 INJECTION INTRAVENOUS; SUBCUTANEOUS at 23:13

## 2022-08-03 RX ADMIN — DOCUSATE SODIUM 50 MG AND SENNOSIDES 8.6 MG 2 TABLET: 8.6; 5 TABLET, FILM COATED ORAL at 23:14

## 2022-08-03 RX ADMIN — SODIUM CHLORIDE 500 ML: 9 INJECTION, SOLUTION INTRAVENOUS at 15:31

## 2022-08-03 RX ADMIN — MELATONIN 5.25 MG: 3 TAB ORAL at 23:13

## 2022-08-03 NOTE — ACP (ADVANCE CARE PLANNING)
Patient is a nursing home resident, currently DNR/DNI which was confirmed by her daughter Maureen Hernandez at her bed side. Ms Maureen Hernandez phone no [phone] is her POA.          James Meyers MD PGY-3  [facility]   This document has been electronically signed by James Meyers MD on August 3, 2022 17:53 CDT

## 2022-08-03 NOTE — ED PROVIDER NOTES
Subjective   Patient presents emergency department complaint of altered mental status.  Patient with history of dementia.  The nursing home personnel note the patient is confused somewhat combative trying to get out of bed.  The patient evidently is more decile on her normal mental status.  There is been no known fevers.  Patient does arrive tachycardic with borderline blood pressures in the 100 systolic.  Patient unable to give a history secondary to her delirium at this time.          Review of Systems   Constitutional: Positive for activity change.   Neurological: Positive for weakness.   Psychiatric/Behavioral: Positive for agitation, behavioral problems, confusion and decreased concentration.       Past Medical History:   Diagnosis Date   • Benign essential HTN 11/22/2019   • CKD (chronic kidney disease) stage 3, GFR 30-59 ml/min (HCC)    • Constipation    • Dementia (HCC)    • Hyponatremia    • Hypovitaminosis D    • Iron deficiency anemia    • Major neurocognitive disorder (HCC)    • Stroke ([facility])        No Known Allergies    Past Surgical History:   Procedure Laterality Date   • CATARACT EXTRACTION     • CLOSED REDUCTION WRIST FRACTURE Right 12/6/2020    Procedure: CLOSED REDUCTION AND SPLINTING OF RIGHT WRIST WITH FLUOROSCOPIC ASSISTANCE;  Surgeon: Baudilio Brooks MD;  Location: [facility];  Service: Orthopedics;  Laterality: Right;   • HIP INTERTROCHANTERIC NAILING Right 12/6/2020    Procedure: INTERMEDULLARY NAILING OF RIGHT FEMUR USING GAMMA NAIL AND FLUOROSCOPIC ASSITANCE;  Surgeon: Baudilio Brooks MD;  Location: [facility];  Service: Orthopedics;  Laterality: Right;       Family History   Problem Relation Age of Onset   • No Known Problems Mother    • No Known Problems Father    • Cancer Sister    • Cancer Brother        Social History     Socioeconomic History   • Marital status:    • Number of children: 2   • Highest education level: Bachelor's degree (e.g., BA, AB, BS)   Tobacco Use    • Smoking status: Never Smoker   • Smokeless tobacco: Never Used   Substance and Sexual Activity   • Alcohol use: No   • Drug use: No   • Sexual activity: Not Currently           Objective   Physical Exam  Vitals and nursing note reviewed.   Constitutional:       General: She is in acute distress.      Appearance: She is ill-appearing.   HENT:      Head: Normocephalic and atraumatic.   Eyes:      Pupils: Pupils are equal, round, and reactive to light.   Neck:      Meningeal: Brudzinski's sign and Kernig's sign absent.   Cardiovascular:      Rate and Rhythm: Tachycardia present.   Pulmonary:      Effort: Pulmonary effort is normal. No respiratory distress.      Breath sounds: Normal breath sounds.   Abdominal:      General: There is no distension.      Palpations: Abdomen is soft.      Tenderness: There is no abdominal tenderness.   Musculoskeletal:         General: No swelling. Normal range of motion.      Cervical back: Normal range of motion. No rigidity.   Lymphadenopathy:      Cervical: No cervical adenopathy.   Skin:     General: Skin is warm and dry.      Capillary Refill: Capillary refill takes less than 2 seconds.   Neurological:      Mental Status: She is disoriented and confused.      GCS: GCS eye subscore is 4. GCS verbal subscore is 4. GCS motor subscore is 6.      Sensory: No sensory deficit.      Motor: No weakness.      Comments: Patient unable to cooperate with neurologic exam   Psychiatric:         Attention and Perception: She is inattentive.         Mood and Affect: Mood is anxious.         Speech: Speech is rapid and pressured.         Behavior: Behavior is agitated.         Cognition and Memory: Cognition is impaired. Memory is impaired.         Judgment: Judgment is impulsive.         Procedures           ED Course  ED Course as of 08/03/22 1606   Wed Aug 03, 2022   1605 Mild DOUGLAS.  UTI is notable.  Altered mental status in the setting, with the patient history of the same.  Rocephin started  in the emergency department.  Patient continues one-on-one secondary to behavioral concerns.  Patient will be admitted for further resuscitation and management. [PC]      ED Course User Index  [PC] Albino Mahan MD                                            Labs Reviewed   COMPREHENSIVE METABOLIC PANEL - Abnormal; Notable for the following components:       Result Value    Glucose 133 (*)     Creatinine 1.46 (*)     Sodium 132 (*)     CO2 18.0 (*)     Albumin 3.40 (*)     Alkaline Phosphatase 123 (*)     eGFR 34.1 (*)     All other components within normal limits    Narrative:     GFR Normal >60  Chronic Kidney Disease <60  Kidney Failure <15     URINALYSIS W/ CULTURE IF INDICATED - Abnormal; Notable for the following components:    Appearance, UA Turbid (*)     Blood, UA Trace (*)     Protein, UA Trace (*)     Leuk Esterase, UA Large (3+) (*)     Nitrite, UA Positive (*)     All other components within normal limits    Narrative:     In absence of clinical symptoms, the presence of pyuria, bacteria, and/or nitrites on the urinalysis result does not correlate with infection.   CBC WITH AUTO DIFFERENTIAL - Abnormal; Notable for the following components:    RBC 3.52 (*)     Hemoglobin 11.2 (*)     Hematocrit 31.7 (*)     All other components within normal limits   URINALYSIS, MICROSCOPIC ONLY - Abnormal; Notable for the following components:    RBC, UA 0-2 (*)     WBC, UA Too Numerous to Count (*)     Bacteria, UA 4+ (*)     Squamous Epithelial Cells, UA 6-12 (*)     All other components within normal limits   VALPROIC ACID LEVEL, TOTAL - Abnormal; Notable for the following components:    Valproic Acid 16.7 (*)     All other components within normal limits   COVID-19 AND FLU A/B, NP SWAB IN TRANSPORT MEDIA 8-12 HR TAT - Normal    Narrative:     Fact sheet for providers: https://www.fda.gov/media/139058/download    Fact sheet for patients: https://www.fda.gov/media/423963/download    Test performed by PCR.    TROPONIN (IN-HOUSE) - Normal    Narrative:     Troponin T Reference Range:  <= 0.03 ng/mL-   Negative for AMI  >0.03 ng/mL-     Abnormal for myocardial necrosis.  Clinicians would have to utilize clinical acumen, EKG, Troponin and serial changes to determine if it is an Acute Myocardial Infarction or myocardial injury due to an underlying chronic condition.       Results may be falsely decreased if patient taking Biotin.     MAGNESIUM - Normal   URINE CULTURE   BLOOD CULTURE   BLOOD CULTURE   RAINBOW DRAW    Narrative:     The following orders were created for panel order [place] Draw.  Procedure                               Abnormality         Status                     ---------                               -----------         ------                     Green Top (Gel)[[id number]]                                  Final result               Lavender Top[[id number]]                                     Final result               Gold Top - SST[[id number]]                                   Final result               Light Blue Top[[id number]]                                   Final result                 Please view results for these tests on the individual orders.   LACTIC ACID, PLASMA   CBC AND DIFFERENTIAL    Narrative:     The following orders were created for panel order CBC & Differential.  Procedure                               Abnormality         Status                     ---------                               -----------         ------                     CBC Auto Differential[[id number]]        Abnormal            Final result                 Please view results for these tests on the individual orders.   GREEN TOP   LAVENDER TOP   GOLD TOP - SST   LIGHT BLUE TOP       CT Head Without Contrast   Final Result   No acute intracranial abnormality.      Stable findings of old infarct and encephalomalacia posterior   parietal and medial right occipital region.      Moderate diffuse atrophy.      [id number]          Electronically signed by:  Jose Boyle MD  8/3/2022 3:09 PM   CDT Workstation: [id number]      XR Chest 1 View    (Results Pending)         MDM    Final diagnoses:   Altered mental status, unspecified altered mental status type   Urinary tract infection without hematuria, site unspecified       ED Disposition  ED Disposition     ED Disposition   Decision to Admit    Condition   --    Comment   Level of Care: Med/Surg [1]   Diagnosis: Altered mental status, unspecified altered mental status type [[id number]]   Admitting Physician: CHELA TABARES [[id number]]   Attending Physician: CHELA TABARES [[id number]]   Isolate for COVID?: No [0]   Certification: I Certify That Inpatient Hospital Services Are Medically Necessary For Greater Than 2 Midnights               No follow-up provider specified.       Medication List      No changes were made to your prescriptions during this visit.          Albino Mahan MD  08/03/22 1603

## 2022-08-03 NOTE — ED NOTES
This RN placed an armboard on patient's right arm to promote IV line patency. Patient tolerating well at this time.

## 2022-08-03 NOTE — ED NOTES
Patient becomes combative and agitated while trying to obtain blood pressure. RN notified at this time.

## 2022-08-03 NOTE — ED NOTES
Per EMS, patient is a resident at [facility] and is presenting with altered mental status. Patient is noted to be agitated and combative. BP 97/76, , 96% SpO2 on room air en route.

## 2022-08-03 NOTE — ED NOTES
Per EMS, patient had been agitated and combative while en route. Patient was pinching EMS and pulling on the straps of the stretcher, resulting in a skin tear to the left forearm.    This RN bandaged the skin tear to prevent further irritation.

## 2022-08-03 NOTE — H&P
HISTORY AND PHYSICAL  NAME: Marlene Horne  : 1931  MRN: [id number]    DATE OF ADMISSION:  8/3/2022     DATE & TIME SEEN: 22 at 1730     PCP: Yobani Lundberg MD    CODE STATUS:  Code Status and Medical Interventions:   Ordered at: 22 1751     Medical Intervention Limits:    NO intubation (DNI)     Level Of Support Discussed With:    Health Care Surrogate     Code Status (Patient has no pulse and is not breathing):    No CPR (Do Not Attempt to Resuscitate)     Medical Interventions (Patient has pulse or is breathing):    Limited Support         CHIEF COMPLAINT:  Altered mental status     HPI:  Marlene Horne is a 89 y.o. female with a CMH of neurocognitive disorder, chronic hyponatremia, CKD stage 3a, HTN, iron def anemia  who was sent by [facility] facility, [facility] due to concern for altered mental status. Spoke with the nurse at [facility] facility who reports patient was fine in the morning however during lunch time noticed she was more fidgety, had slurred speech, and her behavior was not her usual self. There was no fall or concern for falls. Nursing staff was concern therefore sent the patient to ED with EMS. No recent medication changes or introduction of new medications.   At the time of examination patient's daughter was by her bedside who reports she saw her in the AM and seemed fine. Patient has underlying neurocognitive impairment and seem to repeat same word over and over again. Daughter did not notice any change in speech during nursing home visit and currently.     In the ED, patient had negative CT head and CXR. She was found to have UTI and give one dose of Rocephin, started on IVF. Laboratory work up was consistent with hyponatremia and CKD. Patient was admitted for AMS in the setting of UTI.      CONCURRENT MEDICAL HISTORY:  Past Medical History:   Diagnosis Date   • Benign essential HTN 2019   • CKD (chronic kidney disease) stage 3, GFR 30-59  ml/min (HCC)    • Constipation    • Dementia (HCC)    • Hyponatremia    • Hypovitaminosis D    • Iron deficiency anemia    • Major neurocognitive disorder (HCC)    • Stroke (HCC)        PAST SURGICAL HISTORY:  Past Surgical History:   Procedure Laterality Date   • CATARACT EXTRACTION     • CLOSED REDUCTION WRIST FRACTURE Right 12/6/2020    Procedure: CLOSED REDUCTION AND SPLINTING OF RIGHT WRIST WITH FLUOROSCOPIC ASSISTANCE;  Surgeon: Baudilio Brooks MD;  Location: [facility];  Service: Orthopedics;  Laterality: Right;   • HIP INTERTROCHANTERIC NAILING Right 12/6/2020    Procedure: INTERMEDULLARY NAILING OF RIGHT FEMUR USING GAMMA NAIL AND FLUOROSCOPIC ASSITANCE;  Surgeon: Baudilio Brooks MD;  Location: [facility];  Service: Orthopedics;  Laterality: Right;       FAMILY HISTORY:  Family History   Problem Relation Age of Onset   • No Known Problems Mother    • No Known Problems Father    • Cancer Sister    • Cancer Brother         SOCIAL HISTORY:  Social History     Socioeconomic History   • Marital status:    • Number of children: 2   • Highest education level: Bachelor's degree (e.g., BA, AB, BS)   Tobacco Use   • Smoking status: Never Smoker   • Smokeless tobacco: Never Used   Substance and Sexual Activity   • Alcohol use: No   • Drug use: No   • Sexual activity: Not Currently       HOME MEDICATIONS:  Prior to Admission medications    Medication Sig Start Date End Date Taking? Authorizing Provider   miconazole (Zeasorb-AF) 2 % powder Apply  topically to the appropriate area as directed. 7/18/22  Yes Provider, MD Pratima   acetaminophen (TYLENOL) 325 MG tablet Take 2 tablets by mouth Every 4 (Four) Hours As Needed for Mild Pain . 1/3/19   Guanakito Camara MD   albuterol (PROVENTIL) (2.5 MG/3ML) 0.083% nebulizer solution Take 2.5 mg by nebulization Every 4 (Four) Hours As Needed for Shortness of Air. 1/3/19   Guanakito Camara MD   amLODIPine (NORVASC) 5 MG tablet Take 0.5 tablets by mouth  Daily. 2/22/22   Angela Mcnamara MD   aspirin 81 MG EC tablet Take 81 mg by mouth Daily.    Pratima Mcmahon MD   busPIRone (BUSPAR) 5 MG tablet Take 7.5 mg by mouth 2 (Two) Times a Day.    Pratima Mcmahon MD   Chlorhexidine Gluconate solution     Pratima Mcmahon MD   cholecalciferol (VITAMIN D3) 25 MCG (1000 UT) tablet Take 1,000 Units by mouth Daily.    Pratima Mcmahon MD   Divalproex Sodium (DEPAKOTE SPRINKLE) 125 MG capsule Take 125 mg by mouth 2 (Two) Times a Day.    Pratima Mcmahon MD   donepezil (ARICEPT) 5 MG tablet Take 5 mg by mouth Every Night.    Pratima Mcmahon MD   ferrous sulfate 324 (65 Fe) MG tablet delayed-release EC tablet Take 1 tablet by mouth Daily With Breakfast. 1/7/19   Maricarmen Bell MD   Flavoring Agent (FLAVOR CONC-CHLORHEXIDINE) concentration Take 20 mL by mouth 3 (Three) Times a Day. 9/27/19   Chuckie Nguyen MD   HYDROcodone-acetaminophen (Norco) 5-325 MG per tablet Take 1 tablet by mouth Every 6 (Six) Hours As Needed for Moderate Pain . 12/14/20   Chuckie Nguyen MD   melatonin 5 MG tablet tablet Take 5 mg by mouth Every Night.    Pratima Mcmahon MD   mirtazapine (REMERON) 7.5 MG half tablet Take  by mouth Every Night.    Pratima Mcmahon MD   Multiple Vitamins-Minerals (I-HERNESTO) tablet tablet Take 1 tablet by mouth Daily. 4/29/20   Srinivasa Chairez III, MD   nystatin (MYCOSTATIN) 249335 UNIT/GM powder Apply  topically to the appropriate area as directed As Needed (skin lesions). 4/29/20   Srinivasa Chairez III, MD   ondansetron (ZOFRAN) 4 MG tablet Take 4 mg by mouth Every 6 (Six) Hours As Needed for Nausea or Vomiting.    Pratima Mcmahon MD   polyethylene glycol (MIRALAX) 17 g packet Take 17 g by mouth Daily.    Pratima Mcmahon MD   rosuvastatin (CRESTOR) 5 MG tablet Take 1 tablet by mouth Daily. 5/31/19   Margot Perez MD   sennosides-docusate sodium (SENOKOT-S) 8.6-50 MG tablet Take 2 tablets by  mouth Every Night. 5/10/19   Maricarmen Bell MD   sodium chloride 1 g tablet Take 1 tablet by mouth 3 (Three) Times a Day With Meals. 4/24/20   Srinivasa Chairez III, MD   vitamin D (ERGOCALCIFEROL) 1.25 MG (13227 UT) capsule capsule Take 50,000 Units by mouth 1 (One) Time Per Week.    Provider, MD Pratima       ALLERGIES:  Patient has no known allergies.    REVIEW OF SYSTEMS  Review of Systems   Unable to perform ROS: Mental status change   Not answering any of my questions and keeps repeating same word     PHYSICAL EXAM:  Temp:  [98.3 °F (36.8 °C)] 98.3 °F (36.8 °C)  Heart Rate:  [] 82  Resp:  [19-20] 20  BP: (110)/(86) 110/86  Body mass index is 25.29 kg/m².  Physical Exam  Vitals and nursing note reviewed.   Constitutional:       General: She is not in acute distress.     Appearance: Normal appearance. She is not ill-appearing, toxic-appearing or diaphoretic.      Comments: Limited as patient was not cooperating    HENT:      Head: Normocephalic and atraumatic.      Mouth/Throat:      Mouth: Mucous membranes are dry.      Pharynx: Oropharynx is clear.   Eyes:      Comments: Unable to assess    Cardiovascular:      Rate and Rhythm: Normal rate and regular rhythm.      Pulses: Normal pulses.      Heart sounds: Normal heart sounds. No murmur heard.  Pulmonary:      Effort: Pulmonary effort is normal.      Breath sounds: Normal breath sounds. No wheezing.   Abdominal:      General: Bowel sounds are normal. There is no distension.      Palpations: Abdomen is soft.      Tenderness: There is no abdominal tenderness. There is no guarding.   Musculoskeletal:      Right lower leg: No edema.      Left lower leg: No edema.      Comments: Ecchymotic lesions noted on bilateral hands/forearm region    Skin:     General: Skin is warm and dry.      Capillary Refill: Capillary refill takes 2 to 3 seconds.      Findings: Bruising present.   Neurological:      Mental Status: She is alert. Mental status is at baseline.       Comments: Unable to assess. Patient not following commands and repeats same word over and over    Psychiatric:      Comments: Unable to assess due to baseline neurocognitive impairment          DIAGNOSTIC DATA:   Lab Results (last 24 hours)     Procedure Component Value Units Date/Time    Lactic Acid, Plasma [[id number]]  (Normal) Collected: 08/03/22 1627    Specimen: Blood Updated: 08/03/22 1721     Lactate 1.1 mmol/L     Blood Culture - Blood, Arm, Left [[id number]] Collected: 08/03/22 1627    Specimen: Blood from Arm, Left Updated: 08/03/22 1630    Valproic Acid Level, Total [[id number]]  (Abnormal) Collected: 08/03/22 1301    Specimen: Blood Updated: 08/03/22 1423     Valproic Acid 16.7 mcg/mL     [place] Draw [[id number]] Collected: 08/03/22 1301    Specimen: Blood Updated: 08/03/22 1418    Narrative:      The following orders were created for panel order [place] Draw.  Procedure                               Abnormality         Status                     ---------                               -----------         ------                     Green Top (Gel)[[id number]]                                  Final result               Lavender Top[[id number]]                                     Final result               Gold Top - SST[[id number]]                                   Final result               Light Blue Top[[id number]]                                   Final result                 Please view results for these tests on the individual orders.    Green Top (Gel) [[id number]] Collected: 08/03/22 1301    Specimen: Blood Updated: 08/03/22 1418     Extra Tube Hold for add-ons.     Comment: Auto resulted.       Lavender Top [[id number]] Collected: 08/03/22 1301    Specimen: Blood Updated: 08/03/22 1418     Extra Tube hold for add-on     Comment: Auto resulted       Gold Top - SST [[id number]] Collected: 08/03/22 1301    Specimen: Blood Updated: 08/03/22 1418     Extra Tube Hold for add-ons.     Comment: Auto  resulted.       Light Blue Top [[id number]] Collected: 08/03/22 1301    Specimen: Blood Updated: 08/03/22 1418     Extra Tube Hold for add-ons.     Comment: Auto resulted       Blood Culture - Blood, Arm, Right [[id number]] Collected: 08/03/22 1349    Specimen: Blood from Arm, Right Updated: 08/03/22 1353    Comprehensive Metabolic Panel [[id number]]  (Abnormal) Collected: 08/03/22 1301    Specimen: Blood Updated: 08/03/22 1352     Glucose 133 mg/dL      BUN 23 mg/dL      Creatinine 1.46 mg/dL      Sodium 132 mmol/L      Potassium 4.5 mmol/L      Comment: Slight hemolysis detected by analyzer. Results may be affected.        Chloride 101 mmol/L      CO2 18.0 mmol/L      Calcium 8.9 mg/dL      Total Protein 6.4 g/dL      Albumin 3.40 g/dL      ALT (SGPT) 10 U/L      AST (SGOT) 23 U/L      Comment: Slight hemolysis detected by analyzer. Results may be affected.        Alkaline Phosphatase 123 U/L      Total Bilirubin 0.3 mg/dL      Globulin 3.0 gm/dL      A/G Ratio 1.1 g/dL      BUN/Creatinine Ratio 15.8     Anion Gap 13.0 mmol/L      eGFR 34.1 mL/min/1.73      Comment: National Kidney Foundation and American Society of Nephrology (ASN) Task Force recommended calculation based on the Chronic Kidney Disease Epidemiology Collaboration (CKD-EPI) equation refit without adjustment for race.       Narrative:      GFR Normal >60  Chronic Kidney Disease <60  Kidney Failure <15      Magnesium [[id number]]  (Normal) Collected: 08/03/22 1301    Specimen: Blood Updated: 08/03/22 1347     Magnesium 2.2 mg/dL     Troponin [[id number]]  (Normal) Collected: 08/03/22 1301    Specimen: Blood Updated: 08/03/22 1345     Troponin T <0.010 ng/mL     Narrative:      Troponin T Reference Range:  <= 0.03 ng/mL-   Negative for AMI  >0.03 ng/mL-     Abnormal for myocardial necrosis.  Clinicians would have to utilize clinical acumen, EKG, Troponin and serial changes to determine if it is an Acute Myocardial Infarction or myocardial injury due to an  underlying chronic condition.       Results may be falsely decreased if patient taking Biotin.      COVID-19 and FLU A/B PCR - Swab, Nasopharynx [[id number]]  (Normal) Collected: 08/03/22 1307    Specimen: Swab from Nasopharynx Updated: 08/03/22 1339     COVID19 Not Detected     Influenza A PCR Not Detected     Influenza B PCR Not Detected    Narrative:      Fact sheet for providers: https://www.fda.gov/media/835931/download    Fact sheet for patients: https://www.fda.gov/media/724549/download    Test performed by PCR.    Urinalysis, Microscopic Only - Urine, Clean Catch [[id number]]  (Abnormal) Collected: 08/03/22 1246    Specimen: Urine, Clean Catch Updated: 08/03/22 1329     RBC, UA 0-2 /HPF      WBC, UA Too Numerous to Count /HPF      Bacteria, UA 4+ /HPF      Squamous Epithelial Cells, UA 6-12 /HPF      Hyaline Casts, UA 3-6 /LPF      Methodology Automated Microscopy    Urine Culture - Urine, Urine, Clean Catch [[id number]] Collected: 08/03/22 1246    Specimen: Urine, Clean Catch Updated: 08/03/22 1329    Urinalysis With Culture If Indicated - Urine, Clean Catch [[id number]]  (Abnormal) Collected: 08/03/22 1246    Specimen: Urine, Clean Catch Updated: 08/03/22 1328     Color, UA Yellow     Appearance, UA Turbid     pH, UA 5.5     Specific Gravity, UA 1.011     Glucose, UA Negative     Ketones, UA Negative     Bilirubin, UA Negative     Blood, UA Trace     Protein, UA Trace     Leuk Esterase, UA Large (3+)     Nitrite, UA Positive     Urobilinogen, UA 0.2 E.U./dL    Narrative:      In absence of clinical symptoms, the presence of pyuria, bacteria, and/or nitrites on the urinalysis result does not correlate with infection.    CBC & Differential [[id number]]  (Abnormal) Collected: 08/03/22 1301    Specimen: Blood Updated: 08/03/22 1314    Narrative:      The following orders were created for panel order CBC & Differential.  Procedure                               Abnormality         Status                     ---------                                -----------         ------                     CBC Auto Differential[[id number]]        Abnormal            Final result                 Please view results for these tests on the individual orders.    CBC Auto Differential [[id number]]  (Abnormal) Collected: 08/03/22 1301    Specimen: Blood Updated: 08/03/22 1314     WBC 6.31 10*3/mm3      RBC 3.52 10*6/mm3      Hemoglobin 11.2 g/dL      Hematocrit 31.7 %      MCV 90.1 fL      MCH 31.8 pg      MCHC 35.3 g/dL      RDW 13.3 %      RDW-SD 43.5 fl      MPV 10.0 fL      Platelets 237 10*3/mm3      Neutrophil % 67.3 %      Lymphocyte % 22.3 %      Monocyte % 8.2 %      Eosinophil % 1.1 %      Basophil % 0.6 %      Immature Grans % 0.5 %      Neutrophils, Absolute 4.24 10*3/mm3      Lymphocytes, Absolute 1.41 10*3/mm3      Monocytes, Absolute 0.52 10*3/mm3      Eosinophils, Absolute 0.07 10*3/mm3      Basophils, Absolute 0.04 10*3/mm3      Immature Grans, Absolute 0.03 10*3/mm3      nRBC 0.0 /100 WBC            Imaging Results (Last 24 Hours)     Procedure Component Value Units Date/Time    XR Chest 1 View [[id number]] Collected: 08/03/22 1528     Updated: 08/03/22 1608    Narrative:      Chest x-ray single view.       CLINICAL INDICATION: Shortness of breath . Alteration mental  status.    COMPARISON: Chest December 7, 2020.    FINDINGS: Cardiac silhouette is normal in size. Pulmonary  vascularity is unremarkable.     No focal infiltrate or consolidation.  No pleural effusion.  No  pneumothorax.  Subtle fibrotic changes right lung base.        Impression:      No evidence of active disease.    Electronically signed by:  Giovanni Pineda MD  8/3/2022 4:06 PM CDT  Workstation: [id number]    CT Head Without Contrast [[id number]] Collected: 08/03/22 1430     Updated: 08/03/22 1512    Narrative:      PROCEDURE: Bony calvarium intact.  The frontal, ethmoid, sphenoid and maxillary sinuses are clear.  Mastoid air cells are clear.        INDICATION: Altered  mental status.    COMPARISON: CT head 12/5/2020 and earlier CT head exams.    Total .9 mGy-cm.    TECHNIQUE: This exam was performed according to our departmental  dose-optimization program, which includes automated exposure  control, adjustment of the mA and/or kV according to patient size  and/or use of iterative reconstruction technique. CT head without  contrast performed with axial and reconstructed sagittal and  coronal images.    FINDINGS:    Bony calvarium intact.  Frontal, ethmoid, sphenoid and maxillary sinuses clear.  Mastoid air cells are clear.    No abnormal extra-axial fluid collections.  No intracranial bleed.  No mass effect or midline shift.  Stable area of old infarct with encephalomalacia in the right  posterior parietal and medial occipital lobe.  No new focal areas of hypodensity or acute appearing ischemic  change.  Moderate diffuse atrophy with symmetrical prominence sulci,  sylvian fissures and ventricles.      Impression:      No acute intracranial abnormality.    Stable findings of old infarct and encephalomalacia posterior  parietal and medial right occipital region.    Moderate diffuse atrophy.    [id number]      Electronically signed by:  Jose Boyle MD  8/3/2022 3:09 PM  CDT Workstation: [id number]            I reviewed the patient's new clinical results.    ASSESSMENT AND PLAN: This is a 89 y.o. female with:    @Active and Resolved Problems  Active Hospital Problems    Diagnosis  POA   • **Encephalopathy acute [G93.40]  Yes   • Acute cystitis with hematuria [N30.01]  Yes   • Benign essential HTN [I10]  Yes   • Altered mental status [R41.82]  Yes   • Major neurocognitive disorder (CMS/HCC) [F03.90]  Yes   • CKD (chronic kidney disease) stage 3, GFR 30-59 ml/min [N18.30]  Yes   • Chronic hyponatremia [E87.1]  Yes   • Iron deficiency anemia secondary to inadequate dietary iron intake [D50.8]  Yes      Resolved Hospital Problems   No resolved problems to display.       # Acute  encephalopathy   - Per nursing home facility, there was concern for slurred speech which has now resolved. CT head negative for any acute changes. No notable weakness although neuro exam limited due to baseline impaired neurocognitive findings.   - Likely due to underlying UTI   -Will monitor daily and treat underlying UTI  - Noted elevated TSH at 7.470, awaiting T4 level. H/o thyroid mass  - S/p one dose of Halodol in the ED  - No signs or symptoms of hypoxia noted, which could contribute to some agitation in elderly patients.     # Acute cystitis with hematuria  - UA showed positive nitrites, trace blood protein, large 3+ Leuk esterase   - Urine culture pending  - started on Ceftriaxone 1 g daily for 7 days     # Benign essential HTN   - will continue home medication Norvasc   - BP stable on admission     # CKD stage 3   - Creatinine 1.46 on admission. Baseline around 1.4  - will continue IVF @ 100 ml/hr due to poor oral intake   - Monitor vitals while on IVF    # Chronic Hyponatremia   - Na on admission 132  - Continued Salt tablet 1g TID     # Iron deficiency anemia   - Continue oral iron supplements     # Major neurocognitive disorder  - Will continue Aricept 5 mg daily  - Baseline confusion and tends to repeat one word constantly  - Sun downing precautions and redirect if distressed.   - s/p one dose of Halodol in the ED due to agitation. No signs of hypoxia noted on vitals.     # H/O Thyroid mass  - TSH 7.470, awaiting Free T4       DVT prophylaxis: Heparin   Medical DVT prophylaxis orders are present.     Marlene Horne and I have discussed pain goals for this hospitalization after reviewing her current clinical condition, medical history and prior pain experiences.  The goal is to keep the pain level controlled.  To help achieve this, I plan to use PRN medication.    PDMP reviewed and consistent with patient reported medications.    Expected Length of Stay: 1-2 days until symptoms resolve. Location :  [facility].     I discussed the patient's findings and my recommendations with family and nursing staff.     Dr. Anderson Mandujano is the attending on record at time of admission, He is aware of the patient's status and agrees with the above history and physical.             James Meyers MD PGY-3  [facility]   This document has been electronically signed by James Meyers MD on August 3, 2022 18:55 CDT

## 2022-08-04 VITALS
DIASTOLIC BLOOD PRESSURE: 58 MMHG | OXYGEN SATURATION: 98 % | RESPIRATION RATE: 18 BRPM | SYSTOLIC BLOOD PRESSURE: 126 MMHG | BODY MASS INDEX: 25.54 KG/M2 | HEIGHT: 65 IN | TEMPERATURE: 98.3 F | HEART RATE: 58 BPM | WEIGHT: 153.31 LBS

## 2022-08-04 PROBLEM — R41.82 ALTERED MENTAL STATUS: Status: RESOLVED | Noted: 2019-05-31 | Resolved: 2022-08-04

## 2022-08-04 PROBLEM — G93.40 ENCEPHALOPATHY ACUTE: Status: RESOLVED | Noted: 2018-12-28 | Resolved: 2022-08-04

## 2022-08-04 LAB
ALBUMIN SERPL-MCNC: 2.9 G/DL (ref 3.5–5.2)
ALBUMIN/GLOB SERPL: 1.2 G/DL
ALP SERPL-CCNC: 107 U/L (ref 39–117)
ALT SERPL W P-5'-P-CCNC: 8 U/L (ref 1–33)
ANION GAP SERPL CALCULATED.3IONS-SCNC: 6 MMOL/L (ref 5–15)
AST SERPL-CCNC: 19 U/L (ref 1–32)
BASOPHILS # BLD AUTO: 0.05 10*3/MM3 (ref 0–0.2)
BASOPHILS NFR BLD AUTO: 0.8 % (ref 0–1.5)
BILIRUB SERPL-MCNC: 0.3 MG/DL (ref 0–1.2)
BUN SERPL-MCNC: 17 MG/DL (ref 8–23)
BUN/CREAT SERPL: 13.8 (ref 7–25)
CALCIUM SPEC-SCNC: 8.1 MG/DL (ref 8.2–9.6)
CHLORIDE SERPL-SCNC: 108 MMOL/L (ref 98–107)
CO2 SERPL-SCNC: 23 MMOL/L (ref 22–29)
CREAT SERPL-MCNC: 1.23 MG/DL (ref 0.57–1)
DEPRECATED RDW RBC AUTO: 44.2 FL (ref 37–54)
EGFRCR SERPLBLD CKD-EPI 2021: 41.8 ML/MIN/1.73
EOSINOPHIL # BLD AUTO: 0.06 10*3/MM3 (ref 0–0.4)
EOSINOPHIL NFR BLD AUTO: 0.9 % (ref 0.3–6.2)
ERYTHROCYTE [DISTWIDTH] IN BLOOD BY AUTOMATED COUNT: 13.4 % (ref 12.3–15.4)
GLOBULIN UR ELPH-MCNC: 2.5 GM/DL
GLUCOSE BLDC GLUCOMTR-MCNC: 92 MG/DL (ref 70–130)
GLUCOSE BLDC GLUCOMTR-MCNC: 95 MG/DL (ref 70–130)
GLUCOSE SERPL-MCNC: 92 MG/DL (ref 65–99)
HCT VFR BLD AUTO: 28.7 % (ref 34–46.6)
HGB BLD-MCNC: 9.9 G/DL (ref 12–15.9)
IMM GRANULOCYTES # BLD AUTO: 0.03 10*3/MM3 (ref 0–0.05)
IMM GRANULOCYTES NFR BLD AUTO: 0.5 % (ref 0–0.5)
LYMPHOCYTES # BLD AUTO: 1.69 10*3/MM3 (ref 0.7–3.1)
LYMPHOCYTES NFR BLD AUTO: 25.5 % (ref 19.6–45.3)
MCH RBC QN AUTO: 31.4 PG (ref 26.6–33)
MCHC RBC AUTO-ENTMCNC: 34.5 G/DL (ref 31.5–35.7)
MCV RBC AUTO: 91.1 FL (ref 79–97)
MONOCYTES # BLD AUTO: 0.64 10*3/MM3 (ref 0.1–0.9)
MONOCYTES NFR BLD AUTO: 9.7 % (ref 5–12)
NEUTROPHILS NFR BLD AUTO: 4.15 10*3/MM3 (ref 1.7–7)
NEUTROPHILS NFR BLD AUTO: 62.6 % (ref 42.7–76)
NRBC BLD AUTO-RTO: 0 /100 WBC (ref 0–0.2)
PLATELET # BLD AUTO: 204 10*3/MM3 (ref 140–450)
PMV BLD AUTO: 9.9 FL (ref 6–12)
POTASSIUM SERPL-SCNC: 4 MMOL/L (ref 3.5–5.2)
PROT SERPL-MCNC: 5.4 G/DL (ref 6–8.5)
RBC # BLD AUTO: 3.15 10*6/MM3 (ref 3.77–5.28)
SODIUM SERPL-SCNC: 137 MMOL/L (ref 136–145)
WBC NRBC COR # BLD: 6.62 10*3/MM3 (ref 3.4–10.8)

## 2022-08-04 PROCEDURE — 25010000002 CEFTRIAXONE PER 250 MG: Performed by: STUDENT IN AN ORGANIZED HEALTH CARE EDUCATION/TRAINING PROGRAM

## 2022-08-04 PROCEDURE — 85025 COMPLETE CBC W/AUTO DIFF WBC: CPT | Performed by: STUDENT IN AN ORGANIZED HEALTH CARE EDUCATION/TRAINING PROGRAM

## 2022-08-04 PROCEDURE — 82962 GLUCOSE BLOOD TEST: CPT

## 2022-08-04 PROCEDURE — 25010000002 HEPARIN (PORCINE) PER 1000 UNITS: Performed by: STUDENT IN AN ORGANIZED HEALTH CARE EDUCATION/TRAINING PROGRAM

## 2022-08-04 PROCEDURE — 80053 COMPREHEN METABOLIC PANEL: CPT | Performed by: STUDENT IN AN ORGANIZED HEALTH CARE EDUCATION/TRAINING PROGRAM

## 2022-08-04 PROCEDURE — 99239 HOSP IP/OBS DSCHRG MGMT >30: CPT | Performed by: STUDENT IN AN ORGANIZED HEALTH CARE EDUCATION/TRAINING PROGRAM

## 2022-08-04 RX ADMIN — DIVALPROEX SODIUM 125 MG: 125 CAPSULE, COATED PELLETS ORAL at 08:21

## 2022-08-04 RX ADMIN — Medication 1000 UNITS: at 08:21

## 2022-08-04 RX ADMIN — Medication 1 G: at 11:47

## 2022-08-04 RX ADMIN — CEFTRIAXONE SODIUM 1 G: 1 INJECTION, POWDER, FOR SOLUTION INTRAMUSCULAR; INTRAVENOUS at 13:15

## 2022-08-04 RX ADMIN — ASPIRIN 81 MG: 81 TABLET, FILM COATED ORAL at 08:21

## 2022-08-04 RX ADMIN — SODIUM CHLORIDE 75 ML/HR: 9 INJECTION, SOLUTION INTRAVENOUS at 01:52

## 2022-08-04 RX ADMIN — DOCUSATE SODIUM 50 MG AND SENNOSIDES 8.6 MG 2 TABLET: 8.6; 5 TABLET, FILM COATED ORAL at 08:21

## 2022-08-04 RX ADMIN — AMLODIPINE BESYLATE 2.5 MG: 2.5 TABLET ORAL at 08:21

## 2022-08-04 RX ADMIN — Medication 1 G: at 08:21

## 2022-08-04 RX ADMIN — FERROUS SULFATE TAB EC 324 MG (65 MG FE EQUIVALENT) 324 MG: 324 (65 FE) TABLET DELAYED RESPONSE at 08:21

## 2022-08-04 RX ADMIN — BUSPIRONE HYDROCHLORIDE 7.5 MG: 7.5 TABLET ORAL at 08:21

## 2022-08-04 RX ADMIN — HEPARIN SODIUM 5000 UNITS: 5000 INJECTION INTRAVENOUS; SUBCUTANEOUS at 08:21

## 2022-08-04 NOTE — DISCHARGE PLACEMENT REQUEST
"Pam Horne (89 y.o. Female)     [facility] case management  Phone [phone]            Date of Birth   11/28/1931    Social Security Number       Address   [address] [place] [address]    Home Phone   [phone]    MRN   [id number]       Mandaen   Alevism    Marital Status                               Admission Date   8/3/22    Admission Type   Emergency    Admitting Provider   Anderson Mandujano MD    Attending Provider   James Meyers MD    Department, Room/Bed   [address], 382/1       Discharge Date       Discharge Disposition       Discharge Destination                               Attending Provider: James Meyers MD    Allergies: No Known Allergies    Isolation: None   Infection: None   Code Status: No CPR   Advance Care Planning Activity    Ht: 165.1 cm (65\")   Wt: 69.5 kg (153 lb 5 oz)    Admission Cmt: None   Principal Problem: Encephalopathy acute [G93.40]                 Active Insurance as of 8/3/2022     Primary Coverage     Payor Plan Insurance Group Employer/Plan Group    MEDICARE MEDICARE A & B      Payor Plan Address Payor Plan Phone Number Payor Plan Fax Number Effective Dates    [address] [phone]  11/1/1996 - None Entered    [facility] [id number]       Subscriber Name Subscriber Birth Date Member ID       PAM HORNE 11/28/1931 [id number]           Secondary Coverage     Payor Plan Insurance Group Employer/Plan Group    [place]      Payor Plan Address Payor Plan Phone Number Payor Plan Fax Number Effective Dates    [address] [phone]  1/6/2020 - None Entered    [place] [address]       Subscriber Name Subscriber Birth Date Member ID       PAM HORNE 11/28/1931 [id number]                 Emergency Contacts      (Rel.) Home Phone Work Phone Mobile Phone    Maureen Hernandez (Power of ) [phone] -- [phone]    [email or web address] (Son) -- -- " [phone]

## 2022-08-04 NOTE — DISCHARGE SUMMARY
DISCHARGE SUMMARY    PATIENT NAME: Marlene Horne   PHYSICIAN: Funmi Tran MD  : 1931  MRN: [id number]    ADMITTED: 8/3/2022     DISCHARGED: 2022    DISCHARGE DIAGNOSIS:  Acute cystitis with hematuria    ADMISSION AND RESOLVED DIAGNOSES:  Active Hospital Problems    Diagnosis  POA    Acute cystitis with hematuria [N30.01]  Yes    Benign essential HTN [I10]  Yes    Major neurocognitive disorder (CMS/HCC) [F03.90]  Yes    CKD (chronic kidney disease) stage 3, GFR 30-59 ml/min [N18.30]  Yes    Chronic hyponatremia [E87.1]  Yes    Iron deficiency anemia secondary to inadequate dietary iron intake [D50.8]  Yes      Resolved Hospital Problems    Diagnosis Date Resolved POA    **Encephalopathy acute [G93.40] 2022 Yes    Altered mental status [R41.82] 2022 Yes       SERVICE: Family Medicine Residency  Attending: Dr. Anderson Mandujano  Resident: Funmi Tran MD    CONSULTS:   Consult Orders (all) (From admission, onward)      None            PROCEDURES:   none    HISTORY OF PRESENT ILLNESS:   Per the H&P written by , dated 2022:    Marlene Horne is a 89 y.o. female with a CMH of neurocognitive disorder, chronic hyponatremia, CKD stage 3a, HTN, iron def anemia  who was sent by [facility], [facility] due to concern for altered mental status. Spoke with the nurse at [facility] facility who reports patient was fine in the morning however during lunch time noticed she was more fidgety, had slurred speech, and her behavior was not her usual self. There was no fall or concern for falls. Nursing staff was concern therefore sent the patient to ED with EMS. No recent medication changes or introduction of new medications.   At the time of examination patient's daughter was by her bedside who reports she saw her in the AM and seemed fine. Patient has underlying neurocognitive impairment and seem to repeat same word over and over again. Daughter did not notice any  change in speech during nursing home visit and currently.      In the ED, patient had negative CT head and CXR. She was found to have UTI and give one dose of Rocephin, started on IVF. Laboratory work up was consistent with hyponatremia and CKD. Patient was admitted for AMS in the setting of UTI.         DIAGNOSTIC DATA:   Lab Results (last 72 hours)       Procedure Component Value Units Date/Time    Blood Culture - Blood, Arm, Right [[id number]]  (Normal) Collected: 08/03/22 1349    Specimen: Blood from Arm, Right Updated: 08/04/22 1403     Blood Culture No growth at 24 hours    Urine Culture - Urine, Urine, Clean Catch [[id number]]  (Abnormal) Collected: 08/03/22 1246    Specimen: Urine, Clean Catch Updated: 08/04/22 1246     Urine Culture >100,000 CFU/mL Gram Negative Bacilli    Narrative:      Colonization of the urinary tract without infection is common. Treatment is discouraged unless the patient is symptomatic, pregnant, or undergoing an invasive urologic procedure.    POC Glucose Once [[id number]]  (Normal) Collected: 08/04/22 1041    Specimen: Blood Updated: 08/04/22 1115     Glucose 95 mg/dL      Comment: : [id number] JACEK LALASAMeter ID: [id number]       Comprehensive Metabolic Panel [[id number]]  (Abnormal) Collected: 08/04/22 0623    Specimen: Blood Updated: 08/04/22 0703     Glucose 92 mg/dL      BUN 17 mg/dL      Creatinine 1.23 mg/dL      Sodium 137 mmol/L      Potassium 4.0 mmol/L      Chloride 108 mmol/L      CO2 23.0 mmol/L      Calcium 8.1 mg/dL      Total Protein 5.4 g/dL      Albumin 2.90 g/dL      ALT (SGPT) 8 U/L      AST (SGOT) 19 U/L      Alkaline Phosphatase 107 U/L      Total Bilirubin 0.3 mg/dL      Globulin 2.5 gm/dL      A/G Ratio 1.2 g/dL      BUN/Creatinine Ratio 13.8     Anion Gap 6.0 mmol/L      eGFR 41.8 mL/min/1.73      Comment: National Kidney Foundation and American Society of Nephrology (ASN) Task Force recommended calculation based on the Chronic Kidney Disease  Epidemiology Collaboration (CKD-EPI) equation refit without adjustment for race.       Narrative:      GFR Normal >60  Chronic Kidney Disease <60  Kidney Failure <15      CBC & Differential [[id number]]  (Abnormal) Collected: 08/04/22 0622    Specimen: Blood Updated: 08/04/22 0643    Narrative:      The following orders were created for panel order CBC & Differential.  Procedure                               Abnormality         Status                     ---------                               -----------         ------                     CBC Auto Differential[[id number]]        Abnormal            Final result                 Please view results for these tests on the individual orders.    CBC Auto Differential [[id number]]  (Abnormal) Collected: 08/04/22 0622    Specimen: Blood Updated: 08/04/22 0643     WBC 6.62 10*3/mm3      RBC 3.15 10*6/mm3      Hemoglobin 9.9 g/dL      Hematocrit 28.7 %      MCV 91.1 fL      MCH 31.4 pg      MCHC 34.5 g/dL      RDW 13.4 %      RDW-SD 44.2 fl      MPV 9.9 fL      Platelets 204 10*3/mm3      Neutrophil % 62.6 %      Lymphocyte % 25.5 %      Monocyte % 9.7 %      Eosinophil % 0.9 %      Basophil % 0.8 %      Immature Grans % 0.5 %      Neutrophils, Absolute 4.15 10*3/mm3      Lymphocytes, Absolute 1.69 10*3/mm3      Monocytes, Absolute 0.64 10*3/mm3      Eosinophils, Absolute 0.06 10*3/mm3      Basophils, Absolute 0.05 10*3/mm3      Immature Grans, Absolute 0.03 10*3/mm3      nRBC 0.0 /100 WBC     POC Glucose Once [[id number]]  (Normal) Collected: 08/04/22 0600    Specimen: Blood Updated: 08/04/22 0619     Glucose 92 mg/dL      Comment: : [id number] VEENA JAIMIEMeter ID: [id number]       POC Glucose Once [[id number]]  (Normal) Collected: 08/03/22 2100    Specimen: Blood Updated: 08/03/22 2118     Glucose 91 mg/dL      Comment: RN NotifiedOperator: [id number] GUERDA Joe ID: [id number]       T4, Free [[id number]]  (Normal) Collected: 08/03/22 1301    Specimen: Blood  Updated: 08/03/22 2010     Free T4 1.05 ng/dL     Narrative:      Results may be falsely increased if patient taking Biotin.      TSH [[id number]]  (Abnormal) Collected: 08/03/22 1301    Specimen: Blood Updated: 08/03/22 1847     TSH 7.470 uIU/mL     Lactic Acid, Plasma [[id number]]  (Normal) Collected: 08/03/22 1627    Specimen: Blood Updated: 08/03/22 1721     Lactate 1.1 mmol/L     Blood Culture - Blood, Arm, Left [[id number]] Collected: 08/03/22 1627    Specimen: Blood from Arm, Left Updated: 08/03/22 1630    Valproic Acid Level, Total [[id number]]  (Abnormal) Collected: 08/03/22 1301    Specimen: Blood Updated: 08/03/22 1423     Valproic Acid 16.7 mcg/mL     [place] Draw [[id number]] Collected: 08/03/22 1301    Specimen: Blood Updated: 08/03/22 1418    Narrative:      The following orders were created for panel order [place] Draw.  Procedure                               Abnormality         Status                     ---------                               -----------         ------                     Green Top (Gel)[[id number]]                                  Final result               Lavender Top[[id number]]                                     Final result               Gold Top - SST[[id number]]                                   Final result               Light Blue Top[[id number]]                                   Final result                 Please view results for these tests on the individual orders.    Green Top (Gel) [[id number]] Collected: 08/03/22 1301    Specimen: Blood Updated: 08/03/22 1418     Extra Tube Hold for add-ons.     Comment: Auto resulted.       Lavender Top [[id number]] Collected: 08/03/22 1301    Specimen: Blood Updated: 08/03/22 1418     Extra Tube hold for add-on     Comment: Auto resulted       Gold Top - SST [[id number]] Collected: 08/03/22 1301    Specimen: Blood Updated: 08/03/22 1418     Extra Tube Hold for add-ons.     Comment: Auto resulted.       Light Blue Top [[id number]] Collected:  08/03/22 1301    Specimen: Blood Updated: 08/03/22 1418     Extra Tube Hold for add-ons.     Comment: Auto resulted       Comprehensive Metabolic Panel [[id number]]  (Abnormal) Collected: 08/03/22 1301    Specimen: Blood Updated: 08/03/22 1352     Glucose 133 mg/dL      BUN 23 mg/dL      Creatinine 1.46 mg/dL      Sodium 132 mmol/L      Potassium 4.5 mmol/L      Comment: Slight hemolysis detected by analyzer. Results may be affected.        Chloride 101 mmol/L      CO2 18.0 mmol/L      Calcium 8.9 mg/dL      Total Protein 6.4 g/dL      Albumin 3.40 g/dL      ALT (SGPT) 10 U/L      AST (SGOT) 23 U/L      Comment: Slight hemolysis detected by analyzer. Results may be affected.        Alkaline Phosphatase 123 U/L      Total Bilirubin 0.3 mg/dL      Globulin 3.0 gm/dL      A/G Ratio 1.1 g/dL      BUN/Creatinine Ratio 15.8     Anion Gap 13.0 mmol/L      eGFR 34.1 mL/min/1.73      Comment: National Kidney Foundation and American Society of Nephrology (ASN) Task Force recommended calculation based on the Chronic Kidney Disease Epidemiology Collaboration (CKD-EPI) equation refit without adjustment for race.       Narrative:      GFR Normal >60  Chronic Kidney Disease <60  Kidney Failure <15      Magnesium [[id number]]  (Normal) Collected: 08/03/22 1301    Specimen: Blood Updated: 08/03/22 1347     Magnesium 2.2 mg/dL     Troponin [[id number]]  (Normal) Collected: 08/03/22 1301    Specimen: Blood Updated: 08/03/22 1345     Troponin T <0.010 ng/mL     Narrative:      Troponin T Reference Range:  <= 0.03 ng/mL-   Negative for AMI  >0.03 ng/mL-     Abnormal for myocardial necrosis.  Clinicians would have to utilize clinical acumen, EKG, Troponin and serial changes to determine if it is an Acute Myocardial Infarction or myocardial injury due to an underlying chronic condition.       Results may be falsely decreased if patient taking Biotin.      COVID-19 and FLU A/B PCR - Swab, Nasopharynx [[id number]]  (Normal) Collected: 08/03/22  1307    Specimen: Swab from Nasopharynx Updated: 08/03/22 1339     COVID19 Not Detected     Influenza A PCR Not Detected     Influenza B PCR Not Detected    Narrative:      Fact sheet for providers: https://www.fda.gov/media/578276/download    Fact sheet for patients: https://www.fda.gov/media/739667/download    Test performed by PCR.    Urinalysis, Microscopic Only - Urine, Clean Catch [[id number]]  (Abnormal) Collected: 08/03/22 1246    Specimen: Urine, Clean Catch Updated: 08/03/22 1329     RBC, UA 0-2 /HPF      WBC, UA Too Numerous to Count /HPF      Bacteria, UA 4+ /HPF      Squamous Epithelial Cells, UA 6-12 /HPF      Hyaline Casts, UA 3-6 /LPF      Methodology Automated Microscopy    Urinalysis With Culture If Indicated - Urine, Clean Catch [[id number]]  (Abnormal) Collected: 08/03/22 1246    Specimen: Urine, Clean Catch Updated: 08/03/22 1328     Color, UA Yellow     Appearance, UA Turbid     pH, UA 5.5     Specific Gravity, UA 1.011     Glucose, UA Negative     Ketones, UA Negative     Bilirubin, UA Negative     Blood, UA Trace     Protein, UA Trace     Leuk Esterase, UA Large (3+)     Nitrite, UA Positive     Urobilinogen, UA 0.2 E.U./dL    Narrative:      In absence of clinical symptoms, the presence of pyuria, bacteria, and/or nitrites on the urinalysis result does not correlate with infection.    CBC & Differential [[id number]]  (Abnormal) Collected: 08/03/22 1301    Specimen: Blood Updated: 08/03/22 1314    Narrative:      The following orders were created for panel order CBC & Differential.  Procedure                               Abnormality         Status                     ---------                               -----------         ------                     CBC Auto Differential[[id number]]        Abnormal            Final result                 Please view results for these tests on the individual orders.    CBC Auto Differential [[id number]]  (Abnormal) Collected: 08/03/22 1301    Specimen: Blood  Updated: 08/03/22 1314     WBC 6.31 10*3/mm3      RBC 3.52 10*6/mm3      Hemoglobin 11.2 g/dL      Hematocrit 31.7 %      MCV 90.1 fL      MCH 31.8 pg      MCHC 35.3 g/dL      RDW 13.3 %      RDW-SD 43.5 fl      MPV 10.0 fL      Platelets 237 10*3/mm3      Neutrophil % 67.3 %      Lymphocyte % 22.3 %      Monocyte % 8.2 %      Eosinophil % 1.1 %      Basophil % 0.6 %      Immature Grans % 0.5 %      Neutrophils, Absolute 4.24 10*3/mm3      Lymphocytes, Absolute 1.41 10*3/mm3      Monocytes, Absolute 0.52 10*3/mm3      Eosinophils, Absolute 0.07 10*3/mm3      Basophils, Absolute 0.04 10*3/mm3      Immature Grans, Absolute 0.03 10*3/mm3      nRBC 0.0 /100 WBC              CT Head Without Contrast    Result Date: 8/3/2022  No acute intracranial abnormality. 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 Moderate diffuse atrophy. [id number] Electronically signed by:  Jose Boyle MD  8/3/2022 3:09 PM CDT Workstation: [id number]    XR Chest 1 View    Result Date: 8/3/2022  No evidence of active disease. Electronically signed by:  Giovanni Pineda MD  8/3/2022 4:06 PM CDT Workstation: [id number]       HOSPITAL COURSE:  Patient admitted for AMS secondary to acute cystitis. Initially presented for stroke howver CT head demonstrated no acute intracranial process. UA with 4+ bacteria, leuck esterase and nitrite positive. She was started on Rocephin IV. Her mental status improved within 24 hours. Discussed with patient;s daughter she likely had AMS due to uti. Daughter expressed patient has recurrent UTI's. She was educated patient will need proper hygiene to prevent future UTI's. Likely she does not have underlying anatomical abnormality. She was discharged to [facility] in stable condition. Plan to continue IV Rocephin for 7 days total therapy. Urine culture was pending at time of discharge. Please follow urine culture for sensitivities.     DISCHARGE CONDITION:    Stable    Vitals:    08/03/22 2332 08/04/22 0317 08/04/22 0442 08/04/22 0749   BP:  136/63  144/65   BP Location:  Right arm  Right arm   Patient Position:  Lying  Lying   Pulse:  61  59   Resp:  18  18   Temp: 98.1 °F (36.7 °C) 97.8 °F (36.6 °C)  97.7 °F (36.5 °C)   TempSrc: Oral Temporal  Temporal   SpO2:  96%  98%   Weight:   69.5 kg (153 lb 5 oz)    Height:         Physical Exam  Vitals reviewed.   Constitutional:       Appearance: Normal appearance. She is normal weight.   HENT:      Head: Normocephalic and atraumatic.      Nose: Nose normal.      Mouth/Throat:      Mouth: Mucous membranes are moist.   Cardiovascular:      Rate and Rhythm: Normal rate and regular rhythm.      Pulses: Normal pulses.      Heart sounds: Normal heart sounds.   Pulmonary:      Effort: Pulmonary effort is normal.      Breath sounds: Normal breath sounds.   Abdominal:      General: Abdomen is flat. Bowel sounds are normal.      Palpations: Abdomen is soft.   Musculoskeletal:         General: Normal range of motion.      Cervical back: Normal range of motion.   Skin:     General: Skin is warm and dry.      Capillary Refill: Capillary refill takes less than 2 seconds.   Neurological:      General: No focal deficit present.      Mental Status: She is alert.      Motor: Weakness present.   Psychiatric:         Mood and Affect: Mood normal.         Behavior: Behavior normal.           DISPOSITION:      DISCHARGE MEDICATIONS     Discharge Medications        New Medications        Instructions Start Date   cefTRIAXone  Commonly known as: ROCEPHIN   1 g, Intravenous, Every 24 Hours   Start Date: August 5, 2022            Continue These Medications        Instructions Start Date   acetaminophen 325 MG tablet  Commonly known as: TYLENOL   650 mg, Oral, Every 4 Hours PRN      albuterol (2.5 MG/3ML) 0.083% nebulizer solution  Commonly known as: PROVENTIL   2.5 mg, Nebulization, Every 4 Hours PRN      amLODIPine 5 MG tablet  Commonly known as:  NORVASC   2.5 mg, Oral, Every 24 Hours Scheduled      aspirin 81 MG EC tablet   81 mg, Oral, Daily      busPIRone 5 MG tablet  Commonly known as: BUSPAR   7.5 mg, Oral, 2 Times Daily      Chlorhexidine Gluconate solution   Does not apply      cholecalciferol 25 MCG (1000 UT) tablet  Commonly known as: VITAMIN D3   1,000 Units, Oral, Daily      Divalproex Sodium 125 MG capsule  Commonly known as: DEPAKOTE SPRINKLE   125 mg, Oral, 2 Times Daily      donepezil 5 MG tablet  Commonly known as: ARICEPT   5 mg, Oral, Nightly      ferrous sulfate 324 (65 Fe) MG tablet delayed-release EC tablet   324 mg, Oral, Daily With Breakfast      Flavor Conc-Chlorhexidine concentration   20 mL, Oral, 3 Times Daily      HYDROcodone-acetaminophen 5-325 MG per tablet  Commonly known as: Norco   1 tablet, Oral, Every 6 Hours PRN      I-gudelia tablet tablet  Generic drug: multivitamin with minerals   1 tablet, Oral, Daily      melatonin 5 MG tablet tablet   5 mg, Oral, Nightly      mirtazapine 7.5 MG half tablet  Commonly known as: REMERON   Oral, Nightly      nystatin 832077 UNIT/GM powder  Commonly known as: MYCOSTATIN   Topical, As Needed      ondansetron 4 MG tablet  Commonly known as: ZOFRAN   4 mg, Oral, Every 6 Hours PRN      polyethylene glycol 17 g packet  Commonly known as: MIRALAX   17 g, Oral, Daily      rosuvastatin 5 MG tablet  Commonly known as: CRESTOR   5 mg, Oral, Daily      sennosides-docusate 8.6-50 MG per tablet  Commonly known as: PERICOLACE   2 tablets, Oral, Nightly      sodium chloride 1 g tablet   1 g, Oral, 3 Times Daily With Meals      vitamin D 1.25 MG (47690 UT) capsule capsule  Commonly known as: ERGOCALCIFEROL   50,000 Units, Oral, Weekly      Zeasorb-AF 2 % powder  Generic drug: miconazole   Topical               INSTRUCTIONS:  Activity:   Activity Instructions       Up WIth Assist            Diet:   Diet Instructions       Diet: Soft Texture; Thin Liquids, No Restrictions; Ground      Discharge Diet: Soft  Texture    Fluid Consistency: Thin Liquids, No Restrictions    Soft Options: Ground            FOLLOW UP:   Additional Instructions for the Follow-ups that You Need to Schedule       Call MD With Problems / Concerns   As directed      Instructions: Please take medications as prescribed.  Please follow with PCP in one week  Call PCP or return to ED should you develop lightheadedness, confusion, chest pain, shortness of breath or any other concerning symptoms.    Order Comments: Instructions: Please take medications as prescribed. Please follow with PCP in one week Call PCP or return to ED should you develop lightheadedness, confusion, chest pain, shortness of breath or any other concerning symptoms.          Discharge Follow-up with PCP   As directed       Currently Documented PCP:    Yobani Lundberg MD    PCP Phone Number:    [phone]     Follow Up Details: one week                Follow-up Information       Yobani Lundberg MD .    Specialties: Family Medicine, Emergency Medicine, Urgent Care  Why: one week  Contact information:  [address]  [facility] [address] [phone]                             PENDING TEST RESULTS AT DISCHARGE  Pending Labs       Order Current Status    Blood Culture - Blood, Arm, Left In process    Blood Culture - Blood, Arm, Right Preliminary result    Urine Culture - Urine, Urine, Clean Catch Preliminary result            Time: >30 minutes were spent in discharge planning, medication reconciliation and coordination of care for this patient.    Dr. Anderson Mandujano is the attending at time of discharge, He is aware of the patient's status and agrees with the above discharge summary.           Funmi Tran M.D. PGY 2  [facility]  [address] [place] [address]  Office: [phone]  This document has been electronically signed by Funmi Tran MD on August 4, 2022 14:40 CDT

## 2022-08-04 NOTE — PLAN OF CARE
"  Problem: Adult Inpatient Plan of Care  Goal: Plan of Care Review  Outcome: Ongoing, Progressing  Flowsheets (Taken 8/4/2022 1443)  Plan of Care Reviewed With: daughter   Goal Outcome Evaluation:  Plan of Care Reviewed With: daughter            AMS r/t cystitis w/ hx of dementia, stroke and CKD stg 3. Pt w/ skin tear left arm and MASD to breasts. Remeron 7.5 in place and alb 2.9L. Reg diet. # w/ BMI 25.5. Pt unable to answer most RD ? r/t dementia/stroke. Daugher at bedside. Reports some chewing problems and requests ground meats.  She reports pt \"eats like a bird\" but has \"sweet tooth\"- takes magic cups at NH. She thinks pt will d/c to NH later today. Will alert FNS to soft diet/ground meats, magic cups all meals. RD following.  "

## 2022-08-04 NOTE — PROGRESS NOTES
"Adult Nutrition  Assessment    Patient Name:  Marlene Horne  YOB: 1931  MRN: [id number]  Admit Date:  8/3/2022    Assessment Date:  8/4/2022    Comments:  89yo female admit from NH w/ AMS r/t cystitis w/ hx of dementia, stroke and CKD stg 3. Pt w/ skin tear left arm and MASD to breasts. Remeron 7.5 in place and alb 2.9L. Reg diet. # w/ BMI 25.5. Pt unable to answer most RD ? r/t dementia/stroke. Daugher at bedside. Reports some chewing problems and requests ground meats.  She reports pt \"eats like a bird\" but has \"sweet tooth\"- takes magic cups at NH. She thinks pt will d/c to NH later today. Will alert FNS to soft diet/ground meats, magic cups all meals. RD to follow hospital course.      Reason for Assessment     Row Name 08/04/22 1432          Reason for Assessment    Diagnosis infection/sepsis     Identified At Risk by Screening Criteria large or nonhealing wound, burn or pressure injury;difficulty chewing/swallowing                Nutrition/Diet History     Row Name 08/04/22 1432          Nutrition/Diet History    Typical Intake (Food/Fluid/EN/PN) Pt unable to answer most RD ? r/t dementia/stroke. Daugher at bedside. Reports some chewing problems and requests ground meats.  She reports pt \"eats like a bird\" but has \"sweet tooth\"     Supplemental Drinks/Foods/Additives likes berry magic cups.     Factors Affecting Nutritional Intake chewing difficulties;cognitive status/motor function                Anthropometrics     Row Name 08/04/22 1437          Anthropometrics    Weight for Calculation 69.4 kg (153 lb)                Labs/Tests/Procedures/Meds     Row Name 08/04/22 1436          Labs/Procedures/Meds    Lab Results Reviewed reviewed     Lab Results Comments Glu 92, BUN 17/Cr 1.2H, alb 2.9L            Diagnostic Tests/Procedures    Diagnostic Test/Procedure Reviewed reviewed            Medications    Pertinent Medications Reviewed reviewed     Pertinent Medications Comments IVF " 75ml/hr, D/Fe, SSI, Remeron 7.5, bowel meds                  Estimated/Assessed Needs - Anthropometrics     Row Name 08/04/22 1437          Anthropometrics    Weight for Calculation 69.4 kg (153 lb)            Estimated/Assessed Needs    Additional Documentation Fluid Requirements (Group);Estimated Calorie Needs (Group);KCAL/KG (Group);Protein Requirements (Group)            Estimated Calorie Needs    Estimated Calorie Requirement (kcal/day) 1800            KCAL/KG    KCAL/KG 25 Kcal/Kg (kcal)     25 Kcal/Kg (kcal) 1735            Protein Requirements    Weight Used For Protein Calculations 69.4 kg (153 lb)     Est Protein Requirement Amount (gms/kg) 1.0 gm protein     Estimated Protein Requirements (gms/day) 69.4            Fluid Requirements    Fluid Requirements (mL/day) 1800     RDA Method (mL) 1800                Nutrition Prescription Ordered     Row Name 08/04/22 1437          Nutrition Prescription PO    Current PO Diet Regular                       Electronically signed by:  Peyton Brown RD  08/04/22 14:40 CDT

## 2022-08-05 LAB
BACTERIA SPEC AEROBE CULT: ABNORMAL
GLUCOSE BLDC GLUCOMTR-MCNC: 101 MG/DL (ref 70–130)

## 2022-08-05 NOTE — PROGRESS NOTES
Received call from Rylie from [facility], who reports they just received the patient back to their facility from recent hospitalization. Patient was discharged on IV rocephin for 5 doses however prior to discharge her IV was removed. Patient was combative and not cooperating and the facility reportedly cannot start IV line. Rylie wanted to know if Rocephin 1 g can be given IM and confirmed they can give her IM and will relay the information to day team on service for follow up. Also patient as discharged with [place] to be continued however patient is not on it chronically so therefore advised to discontinue the order.           James Meyers MD PGY-3  [facility]   This document has been electronically signed by James Meyers MD on August 4, 2022 19:46 CDT

## 2022-08-08 LAB
BACTERIA SPEC AEROBE CULT: NORMAL
BACTERIA SPEC AEROBE CULT: NORMAL

## 2022-08-12 LAB
QT INTERVAL: 364 MS
QTC INTERVAL: 445 MS

## 2022-08-19 ENCOUNTER — NURSING HOME (OUTPATIENT)
Dept: FAMILY MEDICINE CLINIC | Facility: CLINIC | Age: 87
End: 2022-08-19

## 2022-08-19 DIAGNOSIS — I10 BENIGN ESSENTIAL HTN: Primary | ICD-10-CM

## 2022-08-19 DIAGNOSIS — F02.80 LATE ONSET ALZHEIMER'S DEMENTIA WITHOUT BEHAVIORAL DISTURBANCE: ICD-10-CM

## 2022-08-19 DIAGNOSIS — Z87.440 HISTORY OF UTI: ICD-10-CM

## 2022-08-19 DIAGNOSIS — G30.1 LATE ONSET ALZHEIMER'S DEMENTIA WITHOUT BEHAVIORAL DISTURBANCE: ICD-10-CM

## 2022-08-19 DIAGNOSIS — K59.00 CONSTIPATION, UNSPECIFIED CONSTIPATION TYPE: ICD-10-CM

## 2022-08-19 PROCEDURE — 99307 SBSQ NF CARE SF MDM 10: CPT | Performed by: STUDENT IN AN ORGANIZED HEALTH CARE EDUCATION/TRAINING PROGRAM

## 2022-08-22 ENCOUNTER — NURSING HOME (OUTPATIENT)
Dept: FAMILY MEDICINE CLINIC | Facility: CLINIC | Age: 87
End: 2022-08-22

## 2022-08-22 ENCOUNTER — TELEPHONE (OUTPATIENT)
Dept: FAMILY MEDICINE CLINIC | Facility: CLINIC | Age: 87
End: 2022-08-22

## 2022-08-22 DIAGNOSIS — F02.80 LATE ONSET ALZHEIMER'S DEMENTIA WITHOUT BEHAVIORAL DISTURBANCE: ICD-10-CM

## 2022-08-22 DIAGNOSIS — G30.1 LATE ONSET ALZHEIMER'S DEMENTIA WITHOUT BEHAVIORAL DISTURBANCE: ICD-10-CM

## 2022-08-22 DIAGNOSIS — N18.31 STAGE 3A CHRONIC KIDNEY DISEASE: ICD-10-CM

## 2022-08-22 DIAGNOSIS — I10 BENIGN ESSENTIAL HTN: Primary | ICD-10-CM

## 2022-08-22 PROCEDURE — 99307 SBSQ NF CARE SF MDM 10: CPT | Performed by: CHIROPRACTOR

## 2022-08-22 NOTE — TELEPHONE ENCOUNTER
JESÚS FROM [facility] CALLED REQUESTING THAT DR. TAPIA ROUND ON THIS PT. HER CALL BACK NUMBER [phone].

## 2022-08-25 PROBLEM — G30.1 LATE ONSET ALZHEIMER'S DEMENTIA WITHOUT BEHAVIORAL DISTURBANCE: Status: ACTIVE | Noted: 2022-08-25

## 2022-08-25 PROBLEM — F02.80 LATE ONSET ALZHEIMER'S DEMENTIA WITHOUT BEHAVIORAL DISTURBANCE (HCC): Status: ACTIVE | Noted: 2022-08-25

## 2022-08-25 NOTE — PROGRESS NOTES
MONTHLY NURSING HOME VISIT    NAME: Marlene Horne  : 1931  MRN: [id number]    DATE & TIME SEEN: 22 0330    PCP: Yobani Lundberg MD    NURSING HOME: [facility]    Monthly Nursing Home Visit for routine care    Chief Complaint: None    Subjective:     Marlene Horne is a 89 y.o. female with hypertension, CKD 3A, and dementia    Current Meds:    Current Outpatient Medications:   •  acetaminophen (TYLENOL) 325 MG tablet, Take 2 tablets by mouth Every 4 (Four) Hours As Needed for Mild Pain ., Disp: , Rfl:   •  albuterol (PROVENTIL) (2.5 MG/3ML) 0.083% nebulizer solution, Take 2.5 mg by nebulization Every 4 (Four) Hours As Needed for Shortness of Air., Disp: , Rfl: 12  •  amLODIPine (NORVASC) 5 MG tablet, Take 0.5 tablets by mouth Daily., Disp: , Rfl:   •  aspirin 81 MG EC tablet, Take 81 mg by mouth Daily., Disp: , Rfl:   •  busPIRone (BUSPAR) 5 MG tablet, Take 7.5 mg by mouth 2 (Two) Times a Day., Disp: , Rfl:   •  Chlorhexidine Gluconate solution, , Disp: , Rfl:   •  cholecalciferol (VITAMIN D3) 25 MCG (1000 UT) tablet, Take 1,000 Units by mouth Daily., Disp: , Rfl:   •  Divalproex Sodium (DEPAKOTE SPRINKLE) 125 MG capsule, Take 125 mg by mouth 2 (Two) Times a Day., Disp: , Rfl:   •  donepezil (ARICEPT) 5 MG tablet, Take 5 mg by mouth Every Night., Disp: , Rfl:   •  ferrous sulfate 324 (65 Fe) MG tablet delayed-release EC tablet, Take 1 tablet by mouth Daily With Breakfast., Disp: 30 tablet, Rfl:   •  Flavoring Agent (FLAVOR CONC-CHLORHEXIDINE) concentration, Take 20 mL by mouth 3 (Three) Times a Day., Disp: 500 mL, Rfl: 0  •  HYDROcodone-acetaminophen (Norco) 5-325 MG per tablet, Take 1 tablet by mouth Every 6 (Six) Hours As Needed for Moderate Pain ., Disp: 40 tablet, Rfl: 0  •  melatonin 5 MG tablet tablet, Take 5 mg by mouth Every Night., Disp: , Rfl:   •  miconazole (Zeasorb-AF) 2 % powder, Apply  topically to the appropriate area as directed., Disp: , Rfl:   •  mirtazapine (REMERON)  7.5 MG half tablet, Take  by mouth Every Night., Disp: , Rfl:   •  Multiple Vitamins-Minerals (I-HERNESTO) tablet tablet, Take 1 tablet by mouth Daily., Disp: 90 tablet, Rfl: 2  •  nystatin (MYCOSTATIN) 507309 UNIT/GM powder, Apply  topically to the appropriate area as directed As Needed (skin lesions)., Disp: 30 g, Rfl: 2  •  ondansetron (ZOFRAN) 4 MG tablet, Take 4 mg by mouth Every 6 (Six) Hours As Needed for Nausea or Vomiting., Disp: , Rfl:   •  polyethylene glycol (MIRALAX) 17 g packet, Take 17 g by mouth Daily., Disp: , Rfl:   •  rosuvastatin (CRESTOR) 5 MG tablet, Take 1 tablet by mouth Daily., Disp: 30 tablet, Rfl: 0  •  sennosides-docusate sodium (SENOKOT-S) 8.6-50 MG tablet, Take 2 tablets by mouth Every Night., Disp: 30 tablet, Rfl: 0  •  sodium chloride 1 g tablet, Take 1 tablet by mouth 3 (Three) Times a Day With Meals., Disp: 180 tablet, Rfl: 0  •  vitamin D (ERGOCALCIFEROL) 1.25 MG (28950 UT) capsule capsule, Take 50,000 Units by mouth 1 (One) Time Per Week., Disp: , Rfl:     Allergies:  Patient has no known allergies.    Review of Systems:  Review of Systems   Unable to perform ROS: Dementia       Objective:     Vitals: Temperature 97.8, pulse rate 68 bpm, blood pressure 112/60, respiratory rate 18 breaths/min, oxygen saturation 97% on room air.  Physical Exam  Vitals reviewed.   Constitutional:       General: She is not in acute distress.     Appearance: She is not ill-appearing or diaphoretic.      Comments: Patient has moderate dementia with some confusion but is very pleasant.   HENT:      Head: Normocephalic and atraumatic.      Right Ear: External ear normal.      Left Ear: External ear normal.      Nose: No rhinorrhea.      Mouth/Throat:      Mouth: Mucous membranes are moist.      Pharynx: Oropharynx is clear.   Eyes:      General: No scleral icterus.        Right eye: No discharge.         Left eye: No discharge.   Neck:      Vascular: No carotid bruit.   Cardiovascular:      Rate and Rhythm:  Normal rate and regular rhythm.      Pulses: Normal pulses.      Heart sounds: No murmur heard.  Pulmonary:      Effort: No respiratory distress.      Breath sounds: No wheezing.   Abdominal:      General: Bowel sounds are normal.      Palpations: There is no mass.      Tenderness: There is no abdominal tenderness.   Musculoskeletal:      Right lower leg: No edema.      Left lower leg: No edema.   Skin:     Capillary Refill: Capillary refill takes less than 2 seconds.      Findings: No rash.   Neurological:      Mental Status: Mental status is at baseline.         Diagnostic Data:     No new diagnostic data available.     Assessment/Plan:      89 y.o. female with:  Problem List Items Addressed This Visit        Cardiac and Vasculature    Benign essential HTN - Primary    Overview     · Amlodipine 2.5 mg daily  · Well-controlled at this time current blood pressure 112/60            Genitourinary and Reproductive     CKD (chronic kidney disease) stage 3, GFR 30-59 ml/min       Other    Late onset Alzheimer's dementia without behavioral disturbance (HCC)          CODE STATUS: DNR    Dr. Acuña is the attending on record for this patient, He is aware of the patient's status and agrees with the above.        This document has been electronically signed by Oskar Burleson MD on August 25, 2022 13:54 CDT

## 2022-08-26 NOTE — PROGRESS NOTES
I have reviewed the notes, assessments, and/or procedures performed by Dr. Burleson during nursing home visit. I concur with her/his documentation and assessment and plan for Marlene Horne.           This document has been electronically signed by Kavin Acuña MD on August 26, 2022 15:42 CDT

## 2022-08-29 VITALS
TEMPERATURE: 97.9 F | HEART RATE: 70 BPM | DIASTOLIC BLOOD PRESSURE: 80 MMHG | SYSTOLIC BLOOD PRESSURE: 130 MMHG | OXYGEN SATURATION: 98 % | RESPIRATION RATE: 18 BRPM

## 2022-08-29 NOTE — PROGRESS NOTES
I have seen the patient.  I have reviewed the notes, assessments, and/or procedures performed by Dr. Lundberg, I concur with her/his documentation and assessment and plan for Marlene Horne.       This document has been electronically signed by Anderson Mandujano MD on August 29, 2022 10:45 CDT

## 2022-08-29 NOTE — PROGRESS NOTES
Family Medicine Residency  Yobani Lundberg MD    MONTHLY NURSING HOME VISIT    NAME: Marlene Horne  : 1931  MRN: [id number]    DATE & TIME SEEN: 22 @ 8pm    PCP: Yobani Lundberg MD    NURSING HOME: [facility]    Monthly long-term Visit for follow up    Chief Complaint: Monthly Visit    Subjective:     Marlene Horne is a 89 y.o. female who is being managed for chronic medical issues while at a nursing home.    Patient reports no acute issues at this time.    Current Meds:    Current Outpatient Medications:   •  acetaminophen (TYLENOL) 325 MG tablet, Take 2 tablets by mouth Every 4 (Four) Hours As Needed for Mild Pain ., Disp: , Rfl:   •  albuterol (PROVENTIL) (2.5 MG/3ML) 0.083% nebulizer solution, Take 2.5 mg by nebulization Every 4 (Four) Hours As Needed for Shortness of Air., Disp: , Rfl: 12  •  amLODIPine (NORVASC) 5 MG tablet, Take 0.5 tablets by mouth Daily., Disp: , Rfl:   •  aspirin 81 MG EC tablet, Take 81 mg by mouth Daily., Disp: , Rfl:   •  busPIRone (BUSPAR) 5 MG tablet, Take 7.5 mg by mouth 2 (Two) Times a Day., Disp: , Rfl:   •  Chlorhexidine Gluconate solution, , Disp: , Rfl:   •  cholecalciferol (VITAMIN D3) 25 MCG (1000 UT) tablet, Take 1,000 Units by mouth Daily., Disp: , Rfl:   •  Divalproex Sodium (DEPAKOTE SPRINKLE) 125 MG capsule, Take 125 mg by mouth 2 (Two) Times a Day., Disp: , Rfl:   •  donepezil (ARICEPT) 5 MG tablet, Take 5 mg by mouth Every Night., Disp: , Rfl:   •  ferrous sulfate 324 (65 Fe) MG tablet delayed-release EC tablet, Take 1 tablet by mouth Daily With Breakfast., Disp: 30 tablet, Rfl:   •  Flavoring Agent (FLAVOR CONC-CHLORHEXIDINE) concentration, Take 20 mL by mouth 3 (Three) Times a Day., Disp: 500 mL, Rfl: 0  •  HYDROcodone-acetaminophen (Norco) 5-325 MG per tablet, Take 1 tablet by mouth Every 6 (Six) Hours As Needed for Moderate Pain ., Disp: 40 tablet, Rfl: 0  •  melatonin 5 MG tablet tablet, Take 5 mg by mouth Every Night., Disp: , Rfl:    •  miconazole (Zeasorb-AF) 2 % powder, Apply  topically to the appropriate area as directed., Disp: , Rfl:   •  mirtazapine (REMERON) 7.5 MG half tablet, Take  by mouth Every Night., Disp: , Rfl:   •  Multiple Vitamins-Minerals (I-HERNESTO) tablet tablet, Take 1 tablet by mouth Daily., Disp: 90 tablet, Rfl: 2  •  nystatin (MYCOSTATIN) 644962 UNIT/GM powder, Apply  topically to the appropriate area as directed As Needed (skin lesions)., Disp: 30 g, Rfl: 2  •  ondansetron (ZOFRAN) 4 MG tablet, Take 4 mg by mouth Every 6 (Six) Hours As Needed for Nausea or Vomiting., Disp: , Rfl:   •  polyethylene glycol (MIRALAX) 17 g packet, Take 17 g by mouth Daily., Disp: , Rfl:   •  rosuvastatin (CRESTOR) 5 MG tablet, Take 1 tablet by mouth Daily., Disp: 30 tablet, Rfl: 0  •  sennosides-docusate sodium (SENOKOT-S) 8.6-50 MG tablet, Take 2 tablets by mouth Every Night., Disp: 30 tablet, Rfl: 0  •  sodium chloride 1 g tablet, Take 1 tablet by mouth 3 (Three) Times a Day With Meals., Disp: 180 tablet, Rfl: 0  •  vitamin D (ERGOCALCIFEROL) 1.25 MG (22226 UT) capsule capsule, Take 50,000 Units by mouth 1 (One) Time Per Week., Disp: , Rfl:     Allergies:  Patient has no known allergies.    Review of Systems:  Review of Systems   Constitutional: Negative for chills and fever.   HENT: Negative for congestion and rhinorrhea.    Eyes: Negative for visual disturbance.   Respiratory: Negative for shortness of breath.    Cardiovascular: Negative for chest pain.   Gastrointestinal: Negative for abdominal pain, diarrhea, nausea and vomiting.   Endocrine: Negative for polyuria.   Genitourinary: Negative for difficulty urinating and dyspareunia.   Musculoskeletal: Negative for back pain and myalgias.   Skin: Negative for rash and wound.   Neurological: Negative for weakness, numbness and headaches.   Psychiatric/Behavioral: Negative for agitation, behavioral problems and confusion.       Objective:     /80   Pulse 70   Temp 97.9 °F (36.6 °C)    Resp 18   SpO2 98%   Physical Exam  Constitutional:       General: She is not in acute distress.  HENT:      Head: Normocephalic and atraumatic.   Cardiovascular:      Rate and Rhythm: Normal rate and regular rhythm.      Heart sounds: Normal heart sounds.   Pulmonary:      Effort: Pulmonary effort is normal. No respiratory distress.      Breath sounds: Normal breath sounds.   Abdominal:      Palpations: Abdomen is soft.      Tenderness: There is no abdominal tenderness.   Musculoskeletal:      Right lower leg: No edema.      Left lower leg: No edema.   Skin:     General: Skin is warm and dry.   Neurological:      Mental Status: She is alert.   Psychiatric:         Mood and Affect: Mood normal.         Behavior: Behavior normal.         Diagnostic Data:     Lab Results (last 7 days)     ** No results found for the last 168 hours. **            Assessment/Plan:      89 y.o. female with:  Diagnoses and all orders for this visit:    1. Benign essential HTN (Primary)  Stable.  - Continue current regimen    2. Constipation, unspecified constipation type  Stable.  - Continue current regimen    3. Late onset Alzheimer's dementia without behavioral disturbance (HCC)  Stable.  - Continue current regimen    4. History of UTI  Asymptomatic and just completed course ABx  - Continue to monitor            CODE STATUS: DNR and DNI    Dr. Anderson Mandujano MD is the attending on record for this patient, He is aware of the patient's status and agrees with the above.      This document has been electronically signed by Yobani Lundberg MD on August 29, 2022 00:43 CDT

## 2022-09-02 ENCOUNTER — TELEPHONE (OUTPATIENT)
Dept: FAMILY MEDICINE CLINIC | Facility: CLINIC | Age: 87
End: 2022-09-02

## 2022-09-02 NOTE — TELEPHONE ENCOUNTER
Yulissa at [facility] has called, asking for Dr Lundberg to confirm it is okay to give this patient busPIRone (BUSPAR) 5 MG tablet, bid.    The # to call is [phone].    APIRL Blackman

## 2022-09-12 ENCOUNTER — APPOINTMENT (OUTPATIENT)
Dept: CT IMAGING | Facility: HOSPITAL | Age: 87
End: 2022-09-12

## 2022-09-12 ENCOUNTER — HOSPITAL ENCOUNTER (OUTPATIENT)
Facility: HOSPITAL | Age: 87
Setting detail: OBSERVATION
Discharge: NURSING, REHAB OR LONG-TERM CARE | End: 2022-09-14
Attending: FAMILY MEDICINE | Admitting: HOSPITALIST

## 2022-09-12 ENCOUNTER — APPOINTMENT (OUTPATIENT)
Dept: GENERAL RADIOLOGY | Facility: HOSPITAL | Age: 87
End: 2022-09-12

## 2022-09-12 DIAGNOSIS — R13.11 ORAL PHASE DYSPHAGIA: ICD-10-CM

## 2022-09-12 DIAGNOSIS — Z74.09 IMPAIRED PHYSICAL MOBILITY: ICD-10-CM

## 2022-09-12 DIAGNOSIS — Z78.9 IMPAIRED MOBILITY AND ADLS: ICD-10-CM

## 2022-09-12 DIAGNOSIS — Z74.09 IMPAIRED MOBILITY AND ADLS: ICD-10-CM

## 2022-09-12 DIAGNOSIS — R56.9 SEIZURE: Primary | ICD-10-CM

## 2022-09-12 PROBLEM — N39.0 UTI (URINARY TRACT INFECTION): Status: ACTIVE | Noted: 2022-09-12

## 2022-09-12 LAB
ALBUMIN SERPL-MCNC: 3.5 G/DL (ref 3.5–5.2)
ALBUMIN/GLOB SERPL: 1.2 G/DL
ALP SERPL-CCNC: 137 U/L (ref 39–117)
ALT SERPL W P-5'-P-CCNC: 10 U/L (ref 1–33)
ANION GAP SERPL CALCULATED.3IONS-SCNC: 14 MMOL/L (ref 5–15)
AST SERPL-CCNC: 15 U/L (ref 1–32)
BACTERIA UR QL AUTO: ABNORMAL /HPF
BASOPHILS # BLD AUTO: 0.07 10*3/MM3 (ref 0–0.2)
BASOPHILS NFR BLD AUTO: 0.8 % (ref 0–1.5)
BILIRUB SERPL-MCNC: 0.2 MG/DL (ref 0–1.2)
BILIRUB UR QL STRIP: NEGATIVE
BUN SERPL-MCNC: 20 MG/DL (ref 8–23)
BUN/CREAT SERPL: 14.6 (ref 7–25)
CALCIUM SPEC-SCNC: 8.8 MG/DL (ref 8.2–9.6)
CHLORIDE SERPL-SCNC: 106 MMOL/L (ref 98–107)
CK SERPL-CCNC: 35 U/L (ref 20–180)
CLARITY UR: ABNORMAL
CO2 SERPL-SCNC: 22 MMOL/L (ref 22–29)
COLOR UR: YELLOW
CREAT SERPL-MCNC: 1.37 MG/DL (ref 0.57–1)
D-LACTATE SERPL-SCNC: 1.5 MMOL/L (ref 0.5–2)
D-LACTATE SERPL-SCNC: 6.1 MMOL/L (ref 0.5–2)
DEPRECATED RDW RBC AUTO: 47.8 FL (ref 37–54)
EGFRCR SERPLBLD CKD-EPI 2021: 36.8 ML/MIN/1.73
EOSINOPHIL # BLD AUTO: 0.23 10*3/MM3 (ref 0–0.4)
EOSINOPHIL NFR BLD AUTO: 2.5 % (ref 0.3–6.2)
ERYTHROCYTE [DISTWIDTH] IN BLOOD BY AUTOMATED COUNT: 13.3 % (ref 12.3–15.4)
FLUAV RNA RESP QL NAA+PROBE: NOT DETECTED
FLUBV RNA RESP QL NAA+PROBE: NOT DETECTED
GLOBULIN UR ELPH-MCNC: 3 GM/DL
GLUCOSE SERPL-MCNC: 126 MG/DL (ref 65–99)
GLUCOSE UR STRIP-MCNC: NEGATIVE MG/DL
HCT VFR BLD AUTO: 35.8 % (ref 34–46.6)
HGB BLD-MCNC: 11.6 G/DL (ref 12–15.9)
HGB UR QL STRIP.AUTO: ABNORMAL
HOLD SPECIMEN: NORMAL
HYALINE CASTS UR QL AUTO: ABNORMAL /LPF
IMM GRANULOCYTES # BLD AUTO: 0.04 10*3/MM3 (ref 0–0.05)
IMM GRANULOCYTES NFR BLD AUTO: 0.4 % (ref 0–0.5)
KETONES UR QL STRIP: NEGATIVE
LEUKOCYTE ESTERASE UR QL STRIP.AUTO: ABNORMAL
LYMPHOCYTES # BLD AUTO: 4.4 10*3/MM3 (ref 0.7–3.1)
LYMPHOCYTES NFR BLD AUTO: 47.9 % (ref 19.6–45.3)
MAGNESIUM SERPL-MCNC: 2.2 MG/DL (ref 1.6–2.4)
MCH RBC QN AUTO: 31.6 PG (ref 26.6–33)
MCHC RBC AUTO-ENTMCNC: 32.4 G/DL (ref 31.5–35.7)
MCV RBC AUTO: 97.5 FL (ref 79–97)
MONOCYTES # BLD AUTO: 0.52 10*3/MM3 (ref 0.1–0.9)
MONOCYTES NFR BLD AUTO: 5.7 % (ref 5–12)
NEUTROPHILS NFR BLD AUTO: 3.92 10*3/MM3 (ref 1.7–7)
NEUTROPHILS NFR BLD AUTO: 42.7 % (ref 42.7–76)
NITRITE UR QL STRIP: NEGATIVE
NRBC BLD AUTO-RTO: 0 /100 WBC (ref 0–0.2)
NT-PROBNP SERPL-MCNC: 1465 PG/ML (ref 0–1800)
PH UR STRIP.AUTO: 7.5 [PH] (ref 5–9)
PLATELET # BLD AUTO: 221 10*3/MM3 (ref 140–450)
PMV BLD AUTO: 10.4 FL (ref 6–12)
POTASSIUM SERPL-SCNC: 4.1 MMOL/L (ref 3.5–5.2)
PROT SERPL-MCNC: 6.5 G/DL (ref 6–8.5)
PROT UR QL STRIP: NEGATIVE
QT INTERVAL: 354 MS
QTC INTERVAL: 454 MS
RBC # BLD AUTO: 3.67 10*6/MM3 (ref 3.77–5.28)
RBC # UR STRIP: ABNORMAL /HPF
REF LAB TEST METHOD: ABNORMAL
SARS-COV-2 RNA RESP QL NAA+PROBE: NOT DETECTED
SODIUM SERPL-SCNC: 142 MMOL/L (ref 136–145)
SP GR UR STRIP: 1.01 (ref 1–1.03)
SQUAMOUS #/AREA URNS HPF: ABNORMAL /HPF
TROPONIN T SERPL-MCNC: <0.01 NG/ML (ref 0–0.03)
TROPONIN T SERPL-MCNC: <0.01 NG/ML (ref 0–0.03)
UROBILINOGEN UR QL STRIP: ABNORMAL
VALPROATE SERPL-MCNC: 12.5 MCG/ML (ref 50–125)
WBC # UR STRIP: ABNORMAL /HPF
WBC NRBC COR # BLD: 9.18 10*3/MM3 (ref 3.4–10.8)
WHOLE BLOOD HOLD COAG: NORMAL
WHOLE BLOOD HOLD SPECIMEN: NORMAL
YEAST URNS QL MICRO: ABNORMAL /HPF

## 2022-09-12 PROCEDURE — 25010000002 LORAZEPAM PER 2 MG: Performed by: STUDENT IN AN ORGANIZED HEALTH CARE EDUCATION/TRAINING PROGRAM

## 2022-09-12 PROCEDURE — 70450 CT HEAD/BRAIN W/O DYE: CPT

## 2022-09-12 PROCEDURE — G0378 HOSPITAL OBSERVATION PER HR: HCPCS

## 2022-09-12 PROCEDURE — 99285 EMERGENCY DEPT VISIT HI MDM: CPT

## 2022-09-12 PROCEDURE — 80164 ASSAY DIPROPYLACETIC ACD TOT: CPT | Performed by: FAMILY MEDICINE

## 2022-09-12 PROCEDURE — 87040 BLOOD CULTURE FOR BACTERIA: CPT | Performed by: FAMILY MEDICINE

## 2022-09-12 PROCEDURE — 96376 TX/PRO/DX INJ SAME DRUG ADON: CPT

## 2022-09-12 PROCEDURE — 83735 ASSAY OF MAGNESIUM: CPT | Performed by: FAMILY MEDICINE

## 2022-09-12 PROCEDURE — 80053 COMPREHEN METABOLIC PANEL: CPT | Performed by: FAMILY MEDICINE

## 2022-09-12 PROCEDURE — 87636 SARSCOV2 & INF A&B AMP PRB: CPT | Performed by: FAMILY MEDICINE

## 2022-09-12 PROCEDURE — 81001 URINALYSIS AUTO W/SCOPE: CPT | Performed by: FAMILY MEDICINE

## 2022-09-12 PROCEDURE — 84484 ASSAY OF TROPONIN QUANT: CPT | Performed by: FAMILY MEDICINE

## 2022-09-12 PROCEDURE — 99219 PR INITIAL OBSERVATION CARE/DAY 50 MINUTES: CPT | Performed by: STUDENT IN AN ORGANIZED HEALTH CARE EDUCATION/TRAINING PROGRAM

## 2022-09-12 PROCEDURE — 96361 HYDRATE IV INFUSION ADD-ON: CPT

## 2022-09-12 PROCEDURE — 71045 X-RAY EXAM CHEST 1 VIEW: CPT

## 2022-09-12 PROCEDURE — 36415 COLL VENOUS BLD VENIPUNCTURE: CPT | Performed by: FAMILY MEDICINE

## 2022-09-12 PROCEDURE — 83880 ASSAY OF NATRIURETIC PEPTIDE: CPT | Performed by: FAMILY MEDICINE

## 2022-09-12 PROCEDURE — 96375 TX/PRO/DX INJ NEW DRUG ADDON: CPT

## 2022-09-12 PROCEDURE — 93010 ELECTROCARDIOGRAM REPORT: CPT | Performed by: INTERNAL MEDICINE

## 2022-09-12 PROCEDURE — 25010000002 LORAZEPAM PER 2 MG: Performed by: FAMILY MEDICINE

## 2022-09-12 PROCEDURE — 36415 COLL VENOUS BLD VENIPUNCTURE: CPT

## 2022-09-12 PROCEDURE — 83605 ASSAY OF LACTIC ACID: CPT | Performed by: FAMILY MEDICINE

## 2022-09-12 PROCEDURE — 82550 ASSAY OF CK (CPK): CPT | Performed by: FAMILY MEDICINE

## 2022-09-12 PROCEDURE — 93005 ELECTROCARDIOGRAM TRACING: CPT | Performed by: FAMILY MEDICINE

## 2022-09-12 PROCEDURE — C9803 HOPD COVID-19 SPEC COLLECT: HCPCS

## 2022-09-12 PROCEDURE — 85025 COMPLETE CBC W/AUTO DIFF WBC: CPT | Performed by: FAMILY MEDICINE

## 2022-09-12 RX ORDER — SODIUM CHLORIDE 0.9 % (FLUSH) 0.9 %
10 SYRINGE (ML) INJECTION AS NEEDED
Status: DISCONTINUED | OUTPATIENT
Start: 2022-09-12 | End: 2022-09-14 | Stop reason: HOSPADM

## 2022-09-12 RX ORDER — SODIUM CHLORIDE 9 MG/ML
75 INJECTION, SOLUTION INTRAVENOUS CONTINUOUS
Status: DISCONTINUED | OUTPATIENT
Start: 2022-09-12 | End: 2022-09-14 | Stop reason: HOSPADM

## 2022-09-12 RX ORDER — DONEPEZIL HYDROCHLORIDE 5 MG/1
5 TABLET, FILM COATED ORAL NIGHTLY
Status: DISCONTINUED | OUTPATIENT
Start: 2022-09-12 | End: 2022-09-14 | Stop reason: HOSPADM

## 2022-09-12 RX ORDER — DIVALPROEX SODIUM 250 MG/1
250 TABLET, DELAYED RELEASE ORAL EVERY 8 HOURS SCHEDULED
Status: CANCELLED | OUTPATIENT
Start: 2022-09-12

## 2022-09-12 RX ORDER — LORAZEPAM 2 MG/ML
0.5 INJECTION INTRAMUSCULAR ONCE
Status: COMPLETED | OUTPATIENT
Start: 2022-09-12 | End: 2022-09-12

## 2022-09-12 RX ORDER — SODIUM CHLORIDE 1000 MG
1 TABLET, SOLUBLE MISCELLANEOUS
Status: DISCONTINUED | OUTPATIENT
Start: 2022-09-12 | End: 2022-09-14 | Stop reason: HOSPADM

## 2022-09-12 RX ORDER — DIVALPROEX SODIUM 250 MG/1
250 TABLET, DELAYED RELEASE ORAL EVERY 8 HOURS SCHEDULED
Status: DISCONTINUED | OUTPATIENT
Start: 2022-09-12 | End: 2022-09-13

## 2022-09-12 RX ORDER — ONDANSETRON 2 MG/ML
4 INJECTION INTRAMUSCULAR; INTRAVENOUS EVERY 6 HOURS PRN
Status: DISCONTINUED | OUTPATIENT
Start: 2022-09-12 | End: 2022-09-14 | Stop reason: HOSPADM

## 2022-09-12 RX ORDER — BISACODYL 5 MG/1
5 TABLET, DELAYED RELEASE ORAL DAILY PRN
Status: DISCONTINUED | OUTPATIENT
Start: 2022-09-12 | End: 2022-09-14 | Stop reason: HOSPADM

## 2022-09-12 RX ORDER — LORAZEPAM 2 MG/ML
1 INJECTION INTRAMUSCULAR ONCE
Status: COMPLETED | OUTPATIENT
Start: 2022-09-12 | End: 2022-09-12

## 2022-09-12 RX ORDER — BUSPIRONE HYDROCHLORIDE 7.5 MG/1
7.5 TABLET ORAL 2 TIMES DAILY
Status: DISCONTINUED | OUTPATIENT
Start: 2022-09-12 | End: 2022-09-14 | Stop reason: HOSPADM

## 2022-09-12 RX ORDER — AMLODIPINE BESYLATE 2.5 MG/1
2.5 TABLET ORAL
Status: DISCONTINUED | OUTPATIENT
Start: 2022-09-12 | End: 2022-09-14 | Stop reason: HOSPADM

## 2022-09-12 RX ORDER — ASPIRIN 81 MG/1
81 TABLET ORAL DAILY
Status: DISCONTINUED | OUTPATIENT
Start: 2022-09-13 | End: 2022-09-14 | Stop reason: HOSPADM

## 2022-09-12 RX ORDER — NYSTATIN 100000 [USP'U]/G
1 POWDER TOPICAL 2 TIMES DAILY PRN
COMMUNITY

## 2022-09-12 RX ORDER — LANOLIN ALCOHOL/MO/W.PET/CERES
3 CREAM (GRAM) TOPICAL
Status: DISCONTINUED | OUTPATIENT
Start: 2022-09-12 | End: 2022-09-14 | Stop reason: HOSPADM

## 2022-09-12 RX ORDER — POLYETHYLENE GLYCOL 3350 17 G/17G
17 POWDER, FOR SOLUTION ORAL DAILY PRN
Status: DISCONTINUED | OUTPATIENT
Start: 2022-09-12 | End: 2022-09-14 | Stop reason: HOSPADM

## 2022-09-12 RX ORDER — BISACODYL 10 MG
10 SUPPOSITORY, RECTAL RECTAL DAILY PRN
Status: DISCONTINUED | OUTPATIENT
Start: 2022-09-12 | End: 2022-09-14 | Stop reason: HOSPADM

## 2022-09-12 RX ORDER — ACETAMINOPHEN 325 MG/1
650 TABLET ORAL EVERY 4 HOURS PRN
Status: DISCONTINUED | OUTPATIENT
Start: 2022-09-12 | End: 2022-09-14 | Stop reason: HOSPADM

## 2022-09-12 RX ORDER — DIVALPROEX SODIUM 250 MG/1
1000 TABLET, DELAYED RELEASE ORAL ONCE
Status: COMPLETED | OUTPATIENT
Start: 2022-09-12 | End: 2022-09-12

## 2022-09-12 RX ORDER — CALCIUM CARBONATE 200(500)MG
2 TABLET,CHEWABLE ORAL 2 TIMES DAILY PRN
Status: DISCONTINUED | OUTPATIENT
Start: 2022-09-12 | End: 2022-09-14 | Stop reason: HOSPADM

## 2022-09-12 RX ORDER — ACETAMINOPHEN 325 MG/1
650 TABLET ORAL EVERY 4 HOURS PRN
Status: DISCONTINUED | OUTPATIENT
Start: 2022-09-12 | End: 2022-09-12

## 2022-09-12 RX ORDER — HYDRALAZINE HYDROCHLORIDE 20 MG/ML
10 INJECTION INTRAMUSCULAR; INTRAVENOUS EVERY 6 HOURS PRN
Status: DISCONTINUED | OUTPATIENT
Start: 2022-09-12 | End: 2022-09-14 | Stop reason: HOSPADM

## 2022-09-12 RX ORDER — AMOXICILLIN 250 MG
2 CAPSULE ORAL 2 TIMES DAILY
Status: DISCONTINUED | OUTPATIENT
Start: 2022-09-12 | End: 2022-09-14 | Stop reason: HOSPADM

## 2022-09-12 RX ORDER — ROSUVASTATIN CALCIUM 5 MG/1
5 TABLET, COATED ORAL DAILY
Status: DISCONTINUED | OUTPATIENT
Start: 2022-09-12 | End: 2022-09-14 | Stop reason: HOSPADM

## 2022-09-12 RX ORDER — ALBUTEROL SULFATE 2.5 MG/3ML
2.5 SOLUTION RESPIRATORY (INHALATION) EVERY 4 HOURS PRN
Status: DISCONTINUED | OUTPATIENT
Start: 2022-09-12 | End: 2022-09-14 | Stop reason: HOSPADM

## 2022-09-12 RX ORDER — SODIUM CHLORIDE 0.9 % (FLUSH) 0.9 %
10 SYRINGE (ML) INJECTION EVERY 12 HOURS SCHEDULED
Status: DISCONTINUED | OUTPATIENT
Start: 2022-09-12 | End: 2022-09-14 | Stop reason: HOSPADM

## 2022-09-12 RX ORDER — OLANZAPINE 5 MG/1
10 TABLET, ORALLY DISINTEGRATING ORAL NIGHTLY
Status: DISCONTINUED | OUTPATIENT
Start: 2022-09-12 | End: 2022-09-13

## 2022-09-12 RX ORDER — HYDROCODONE BITARTRATE AND ACETAMINOPHEN 5; 325 MG/1; MG/1
1 TABLET ORAL EVERY 4 HOURS PRN
Status: DISCONTINUED | OUTPATIENT
Start: 2022-09-12 | End: 2022-09-14 | Stop reason: HOSPADM

## 2022-09-12 RX ORDER — DIVALPROEX SODIUM 250 MG/1
1000 TABLET, DELAYED RELEASE ORAL EVERY 8 HOURS SCHEDULED
Status: DISCONTINUED | OUTPATIENT
Start: 2022-09-12 | End: 2022-09-12

## 2022-09-12 RX ORDER — MIRTAZAPINE 15 MG/1
7.5 TABLET, FILM COATED ORAL NIGHTLY
Status: DISCONTINUED | OUTPATIENT
Start: 2022-09-12 | End: 2022-09-14 | Stop reason: HOSPADM

## 2022-09-12 RX ORDER — ROSUVASTATIN CALCIUM 5 MG/1
5 TABLET, COATED ORAL NIGHTLY
COMMUNITY
End: 2023-02-23 | Stop reason: SDUPTHER

## 2022-09-12 RX ADMIN — BUSPIRONE HYDROCHLORIDE 7.5 MG: 7.5 TABLET ORAL at 21:28

## 2022-09-12 RX ADMIN — SODIUM CHLORIDE 125 ML/HR: 9 INJECTION, SOLUTION INTRAVENOUS at 11:33

## 2022-09-12 RX ADMIN — DONEPEZIL HYDROCHLORIDE 5 MG: 5 TABLET, FILM COATED ORAL at 21:28

## 2022-09-12 RX ADMIN — LORAZEPAM 1 MG: 2 INJECTION, SOLUTION INTRAMUSCULAR; INTRAVENOUS at 11:15

## 2022-09-12 RX ADMIN — SODIUM CHLORIDE 500 ML: 9 INJECTION, SOLUTION INTRAVENOUS at 11:33

## 2022-09-12 RX ADMIN — OLANZAPINE 10 MG: 5 TABLET, ORALLY DISINTEGRATING ORAL at 21:28

## 2022-09-12 RX ADMIN — MELATONIN 3 MG: 3 TAB ORAL at 21:26

## 2022-09-12 RX ADMIN — DOCUSATE SODIUM 50 MG AND SENNOSIDES 8.6 MG 2 TABLET: 8.6; 5 TABLET, FILM COATED ORAL at 21:28

## 2022-09-12 RX ADMIN — Medication 7.5 MG: at 21:28

## 2022-09-12 RX ADMIN — DIVALPROEX SODIUM 1000 MG: 250 TABLET, DELAYED RELEASE ORAL at 21:27

## 2022-09-12 RX ADMIN — LORAZEPAM 0.5 MG: 2 INJECTION, SOLUTION INTRAMUSCULAR; INTRAVENOUS at 21:26

## 2022-09-12 RX ADMIN — LORAZEPAM 1 MG: 2 INJECTION, SOLUTION INTRAMUSCULAR; INTRAVENOUS at 11:33

## 2022-09-12 NOTE — ED NOTES
"Lab informed RN they would not be able to collect specimens, reporting, \"There doesn't seem to be a good spot for me to get them.\"  "

## 2022-09-12 NOTE — ED NOTES
"This RN spoke with Maribeth from [facility]. Per facility: \"We were in the hallway and we witnessed her have a seizure. She was jerking, drooling, and bleeding from the mouth. She was unresponsive to stimuli and it lasted about two minutes. She then had some facial droop on the right side of her face, and her vitals were stable.\"    Facility reports patient's baseline as being able to feed herself and ambulate to the restroom with minimal assistance.      "

## 2022-09-12 NOTE — ED NOTES
Nursing report ED to floor  Marlene Horne  89 y.o.  female    HPI:   Chief Complaint   Patient presents with   • Altered Mental Status   • Seizures       Admitting doctor:   Anival Meier MD    Consulting provider(s):  Consults     Date and Time Order Name Status Description    9/12/2022  3:26 PM Family Practice - Resident (on-call MD unless specified)             Admitting diagnosis:   The encounter diagnosis was Seizure (HCC).    Code status:   Current Code Status     Date Active Code Status Order ID Comments User Context       9/12/2022 1555 No CPR (Do Not Attempt to Resuscitate) [id number]  Yobani Lundberg MD ED     Advance Care Planning Activity      Questions for Current Code Status     Question Answer    Code Status (Patient has no pulse and is not breathing) No CPR (Do Not Attempt to Resuscitate)    Medical Interventions (Patient has pulse or is breathing) Limited Support    Medical Intervention Limits: NO intubation (DNI)          Allergies:   Patient has no known allergies.    Intake and Output  No intake or output data in the 24 hours ending 09/12/22 1730    Weight:       09/12/22  1131   Weight: 69.9 kg (154 lb)       Most recent vitals:   Vitals:    09/12/22 1556 09/12/22 1600 09/12/22 1601 09/12/22 1645   BP: 134/68 139/65     BP Location:       Patient Position:       Pulse: 72   70   Resp:   20    Temp:       TempSrc:       SpO2: 97%   97%   Weight:       Height:           Active LDAs/IV Access:   Lines, Drains & Airways     Active LDAs     Name Placement date Placement time Site Days    Peripheral IV 09/12/22 1132 Right Antecubital 09/12/22  1132  Antecubital  less than 1                Labs (abnormal labs have a star):   Labs Reviewed   COMPREHENSIVE METABOLIC PANEL - Abnormal; Notable for the following components:       Result Value    Glucose 126 (*)     Creatinine 1.37 (*)     Alkaline Phosphatase 137 (*)     eGFR 36.8 (*)     All other components within normal limits    Narrative:      GFR Normal >60  Chronic Kidney Disease <60  Kidney Failure <15     URINALYSIS W/ CULTURE IF INDICATED - Abnormal; Notable for the following components:    Appearance, UA Cloudy (*)     Blood, UA Trace (*)     Leuk Esterase, UA Trace (*)     All other components within normal limits    Narrative:     In absence of clinical symptoms, the presence of pyuria, bacteria, and/or nitrites on the urinalysis result does not correlate with infection.   LACTIC ACID, PLASMA - Abnormal; Notable for the following components:    Lactate 6.1 (*)     All other components within normal limits   VALPROIC ACID LEVEL, TOTAL - Abnormal; Notable for the following components:    Valproic Acid 12.5 (*)     All other components within normal limits   CBC WITH AUTO DIFFERENTIAL - Abnormal; Notable for the following components:    RBC 3.67 (*)     Hemoglobin 11.6 (*)     MCV 97.5 (*)     Lymphocyte % 47.9 (*)     Lymphocytes, Absolute 4.40 (*)     All other components within normal limits   URINALYSIS, MICROSCOPIC ONLY - Abnormal; Notable for the following components:    RBC, UA 0-2 (*)     Bacteria, UA 4+ (*)     Squamous Epithelial Cells, UA 3-5 (*)     All other components within normal limits   COVID-19 AND FLU A/B, NP SWAB IN TRANSPORT MEDIA 8-12 HR TAT - Normal    Narrative:     Fact sheet for providers: https://www.fda.gov/media/070588/download    Fact sheet for patients: https://www.fda.gov/media/404197/download    Test performed by PCR.   BNP (IN-HOUSE) - Normal    Narrative:     Among patients with dyspnea, NT-proBNP is highly sensitive for the detection of acute congestive heart failure. In addition NT-proBNP of <300 pg/ml effectively rules out acute congestive heart failure with 99% negative predictive value.    Results may be falsely decreased if patient taking Biotin.     TROPONIN (IN-HOUSE) - Normal    Narrative:     Troponin T Reference Range:  <= 0.03 ng/mL-   Negative for AMI  >0.03 ng/mL-     Abnormal for myocardial necrosis.   Clinicians would have to utilize clinical acumen, EKG, Troponin and serial changes to determine if it is an Acute Myocardial Infarction or myocardial injury due to an underlying chronic condition.       Results may be falsely decreased if patient taking Biotin.     TROPONIN (IN-HOUSE) - Normal    Narrative:     Troponin T Reference Range:  <= 0.03 ng/mL-   Negative for AMI  >0.03 ng/mL-     Abnormal for myocardial necrosis.  Clinicians would have to utilize clinical acumen, EKG, Troponin and serial changes to determine if it is an Acute Myocardial Infarction or myocardial injury due to an underlying chronic condition.       Results may be falsely decreased if patient taking Biotin.     CK - Normal   MAGNESIUM - Normal   LACTIC ACID, REFLEX - Normal   BLOOD CULTURE   BLOOD CULTURE   CBC AND DIFFERENTIAL    Narrative:     The following orders were created for panel order CBC & Differential.  Procedure                               Abnormality         Status                     ---------                               -----------         ------                     CBC Auto Differential[[id number]]        Abnormal            Final result                 Please view results for these tests on the individual orders.   EXTRA TUBES    Narrative:     The following orders were created for panel order Extra Tubes.  Procedure                               Abnormality         Status                     ---------                               -----------         ------                     Lavender Top[[id number]]                                     Final result               Gold Top - SST[[id number]]                                   Final result               Light Blue Top[[id number]]                                   Final result                 Please view results for these tests on the individual orders.   LAVENDER TOP   GOLD TOP - SST   LIGHT BLUE TOP       Meds given in ED:   Medications   sodium chloride 0.9 % infusion (75  mL/hr Intravenous Rate/Dose Change 9/12/22 5710)   valproate (DEPACON) 1,000 mg in sodium chloride 0.9 % 100 mL IVPB (has no administration in time range)   divalproex (DEPAKOTE) DR tablet 250 mg (has no administration in time range)   acetaminophen (TYLENOL) tablet 650 mg (has no administration in time range)   albuterol (PROVENTIL) nebulizer solution 0.083% 2.5 mg/3mL (has no administration in time range)   amLODIPine (NORVASC) tablet 2.5 mg (has no administration in time range)   aspirin EC tablet 81 mg (has no administration in time range)   busPIRone (BUSPAR) tablet 7.5 mg (has no administration in time range)   donepezil (ARICEPT) tablet 5 mg (has no administration in time range)   melatonin tablet 3 mg (has no administration in time range)   mirtazapine (REMERON) half tablet 7.5 mg (has no administration in time range)   rosuvastatin (CRESTOR) tablet 5 mg (has no administration in time range)   sodium chloride tablet 1 g (has no administration in time range)   sodium chloride 0.9 % flush 10 mL (has no administration in time range)   sodium chloride 0.9 % flush 10 mL (has no administration in time range)   acetaminophen (TYLENOL) tablet 650 mg (has no administration in time range)   HYDROcodone-acetaminophen (NORCO) 5-325 MG per tablet 1 tablet (has no administration in time range)   sennosides-docusate (PERICOLACE) 8.6-50 MG per tablet 2 tablet (has no administration in time range)     And   polyethylene glycol (MIRALAX) packet 17 g (has no administration in time range)     And   bisacodyl (DULCOLAX) EC tablet 5 mg (has no administration in time range)     And   bisacodyl (DULCOLAX) suppository 10 mg (has no administration in time range)   ondansetron (ZOFRAN) injection 4 mg (has no administration in time range)   calcium carbonate (TUMS) chewable tablet 500 mg (200 mg elemental) (has no administration in time range)   OLANZapine zydis (zyPREXA) disintegrating tablet 10 mg (has no administration in time  range)   sodium chloride 0.9 % bolus 500 mL (0 mL Intravenous Stopped 9/12/22 1150)   LORazepam (ATIVAN) injection 1 mg (1 mg Intravenous Given 9/12/22 1115)   LORazepam (ATIVAN) injection 1 mg (1 mg Intravenous Given 9/12/22 1133)     sodium chloride, 75 mL/hr, Last Rate: 75 mL/hr (09/12/22 1550)         NIH Stroke Scale:       Isolation/Infection(s):  No active isolations   No active infections     COVID Testing  Collected yes  Resulted yes-negative    Nursing report ED to floor:  Mental status: disorientedx4- this is baseline for her  Ambulatory status: bedbound currently; uses wheelchair at nursing home  Precautions: fall risk    ED nurse phone [phone]

## 2022-09-12 NOTE — ACP (ADVANCE CARE PLANNING)
Patient is a nursing home resident, currently DNR/DNI which was confirmed by her daughter Maureen Hernandez at her bed side. Ms Maureen Hernandez phone no [phone] is her POA.      Yobani Lundberg M.D. PGY-3  [facility] Family Medicine Residency  [address] [place] [address]  Office: [phone]    This document has been electronically signed by Yobani Lundberg MD on September 12, 2022 17:22 CDT

## 2022-09-12 NOTE — ED NOTES
Patient not tolerating this RN attempting to collect specimen via phlebotomy stick. Will notify lab for assistance.

## 2022-09-12 NOTE — ED NOTES
"Patient presents to the ED via EMS from [facility]. Per EMS, \"The nurse over there said they saw the patient have a seizure, and when she came to, she had right-sided facial droop. When we got there, she was not alert and acting like she is now.\"    EMS unsure if patient was experiencing CVA or postictal symptoms. Patient currently agitated and attempting to climb out of bed, repeating, \"Help me out.\"  "

## 2022-09-12 NOTE — ED PROVIDER NOTES
Subjective       Patient presents emergency department with confusion after sustaining a seizure that was witnessed in the nursing home today.  Does have a seizure history and takes Depakote.  In the emergency department patient is very active, and confused.      Altered Mental Status  Presenting symptoms: confusion    Severity:  Moderate  Episode history:  Single  Duration:  1 hour  Timing:  Constant  Chronicity:  New  Context: nursing home resident    Associated symptoms: seizures    Seizures      Review of Systems   Unable to perform ROS: Mental status change   Neurological: Positive for seizures.   Psychiatric/Behavioral: Positive for confusion.       Past Medical History:   Diagnosis Date   • Benign essential HTN 11/22/2019   • CKD (chronic kidney disease) stage 3, GFR 30-59 ml/min (HCC)    • Constipation    • Dementia (HCC)    • Hyponatremia    • Hypovitaminosis D    • Iron deficiency anemia    • Major neurocognitive disorder (HCC)    • Stroke ([facility])        No Known Allergies    Past Surgical History:   Procedure Laterality Date   • CATARACT EXTRACTION     • CLOSED REDUCTION WRIST FRACTURE Right 12/6/2020    Procedure: CLOSED REDUCTION AND SPLINTING OF RIGHT WRIST WITH FLUOROSCOPIC ASSISTANCE;  Surgeon: Baudilio Brooks MD;  Location: [facility];  Service: Orthopedics;  Laterality: Right;   • HIP INTERTROCHANTERIC NAILING Right 12/6/2020    Procedure: INTERMEDULLARY NAILING OF RIGHT FEMUR USING GAMMA NAIL AND FLUOROSCOPIC ASSITANCE;  Surgeon: Baudilio Brooks MD;  Location: [facility];  Service: Orthopedics;  Laterality: Right;       Family History   Problem Relation Age of Onset   • No Known Problems Mother    • No Known Problems Father    • Cancer Sister    • Cancer Brother        Social History     Socioeconomic History   • Marital status:    • Number of children: 2   • Highest education level: Bachelor's degree (e.g., BA, AB, BS)   Tobacco Use   • Smoking status: Never Smoker   • Smokeless  tobacco: Never Used   Substance and Sexual Activity   • Alcohol use: No   • Drug use: No   • Sexual activity: Not Currently           Objective   Physical Exam  HENT:      Head: Normocephalic and atraumatic.   Eyes:      Extraocular Movements: Extraocular movements intact.   Cardiovascular:      Rate and Rhythm: Normal rate.      Heart sounds: Normal heart sounds.   Pulmonary:      Effort: Pulmonary effort is normal.      Breath sounds: Normal breath sounds.   Musculoskeletal:         General: No swelling.      Cervical back: Normal range of motion and neck supple.   Skin:     General: Skin is warm and dry.      Findings: No erythema or rash.   Neurological:      Mental Status: She is alert. She is confused.      GCS: GCS eye subscore is 4. GCS verbal subscore is 4. GCS motor subscore is 5.      Cranial Nerves: No facial asymmetry.      Motor: No weakness.   Psychiatric:         Mood and Affect: Mood is anxious.         Behavior: Behavior is agitated.         ECG 12 Lead      Date/Time: 9/12/2022 3:28 PM  Performed by: Serjio Carrillo MD  Authorized by: Serjio Carrillo MD   Interpreted by physician  Rhythm: sinus rhythm  BPM: 99  ST Segments: ST segments normal                   ED Course                   Labs Reviewed   COMPREHENSIVE METABOLIC PANEL - Abnormal; Notable for the following components:       Result Value    Glucose 126 (*)     Creatinine 1.37 (*)     Alkaline Phosphatase 137 (*)     eGFR 36.8 (*)     All other components within normal limits    Narrative:     GFR Normal >60  Chronic Kidney Disease <60  Kidney Failure <15     URINALYSIS W/ CULTURE IF INDICATED - Abnormal; Notable for the following components:    Appearance, UA Cloudy (*)     Blood, UA Trace (*)     Leuk Esterase, UA Trace (*)     All other components within normal limits    Narrative:     In absence of clinical symptoms, the presence of pyuria, bacteria, and/or nitrites on the urinalysis result does not correlate with  infection.   LACTIC ACID, PLASMA - Abnormal; Notable for the following components:    Lactate 6.1 (*)     All other components within normal limits   VALPROIC ACID LEVEL, TOTAL - Abnormal; Notable for the following components:    Valproic Acid 12.5 (*)     All other components within normal limits   CBC WITH AUTO DIFFERENTIAL - Abnormal; Notable for the following components:    RBC 3.67 (*)     Hemoglobin 11.6 (*)     MCV 97.5 (*)     Lymphocyte % 47.9 (*)     Lymphocytes, Absolute 4.40 (*)     All other components within normal limits   URINALYSIS, MICROSCOPIC ONLY - Abnormal; Notable for the following components:    RBC, UA 0-2 (*)     Bacteria, UA 4+ (*)     Squamous Epithelial Cells, UA 3-5 (*)     All other components within normal limits   COVID-19 AND FLU A/B, NP SWAB IN TRANSPORT MEDIA 8-12 HR TAT - Normal    Narrative:     Fact sheet for providers: https://www.fda.gov/media/323548/download    Fact sheet for patients: https://www.fda.gov/media/983941/download    Test performed by PCR.   BNP (IN-HOUSE) - Normal    Narrative:     Among patients with dyspnea, NT-proBNP is highly sensitive for the detection of acute congestive heart failure. In addition NT-proBNP of <300 pg/ml effectively rules out acute congestive heart failure with 99% negative predictive value.    Results may be falsely decreased if patient taking Biotin.     TROPONIN (IN-HOUSE) - Normal    Narrative:     Troponin T Reference Range:  <= 0.03 ng/mL-   Negative for AMI  >0.03 ng/mL-     Abnormal for myocardial necrosis.  Clinicians would have to utilize clinical acumen, EKG, Troponin and serial changes to determine if it is an Acute Myocardial Infarction or myocardial injury due to an underlying chronic condition.       Results may be falsely decreased if patient taking Biotin.     CK - Normal   MAGNESIUM - Normal   BLOOD CULTURE   BLOOD CULTURE   TROPONIN (IN-HOUSE)   LACTIC ACID, REFLEX   CBC AND DIFFERENTIAL    Narrative:     The following  orders were created for panel order CBC & Differential.  Procedure                               Abnormality         Status                     ---------                               -----------         ------                     CBC Auto Differential[[id number]]        Abnormal            Final result                 Please view results for these tests on the individual orders.   EXTRA TUBES    Narrative:     The following orders were created for panel order Extra Tubes.  Procedure                               Abnormality         Status                     ---------                               -----------         ------                     Lavender Top[[id number]]                                     Final result               Gold Top - SST[[id number]]                                   Final result               Light Blue Top[[id number]]                                   Final result                 Please view results for these tests on the individual orders.   LAVENDER TOP   GOLD TOP - SST   LIGHT BLUE TOP       CT Head Without Contrast   Final Result   Atrophy with evidence of chronic small vessel white matter   ischemic change.   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   No significant focal or acute intracranial pathology.      Electronically signed by:  Nando Ambrocio MD  9/12/2022 1:50 PM   CDT Workstation: [id number]       Chest 1 View   Final Result   Stable exam without acute cardiopulmonary process.      Electronically signed by:  Milind Conner MD  9/12/2022 12:41 PM   CDT Workstation: [id number]                                       [facility]    Final diagnoses:   Seizure (HCC)       ED Disposition  ED Disposition     ED Disposition   Decision to Admit    Condition   --    Comment   Level of Care: Med/Surg [1]   Diagnosis: Seizure (HCC) [205090]   Admitting Physician: YASIR HALEY [[id number]]   Attending Physician: YASIR HALEY [[id number]]               No follow-up provider specified.       Medication List       No changes were made to your prescriptions during this visit.          Serjio Carrillo MD  09/12/22 1992

## 2022-09-12 NOTE — H&P
HISTORY AND PHYSICAL  NAME: Marlene Horne  : 1931  MRN: [id number]    DATE OF ADMISSION:  2022     DATE & TIME SEEN: 22 at 1500    PCP: Yobani Lundberg MD    CODE STATUS:  Code Status and Medical Interventions:   Ordered at: 22 155     Medical Intervention Limits:    NO intubation (DNI)     Code Status (Patient has no pulse and is not breathing):    No CPR (Do Not Attempt to Resuscitate)     Medical Interventions (Patient has pulse or is breathing):    Limited Support         CHIEF COMPLAINT:  Seizure    HPI:  Marlene Horne is a 89 y.o. female with a CMH of neurocognitive disorder, chronic hyponatremia, CKD stage 3a, HTN, iron def anemia who presents with seizure.    Around 10am this morning patient was found in her bed in what seemed to be a tonic-clonic seizure.  Patient wears diapers so incontinence could not be assessed.  Has no teeth so cannot assess if she bit tongue.  Demented at baseline so cannot assess post-ictal state.  Has not had a seizure in at least 5 years at this time.  Given 1mg Ativan IM at NH.  Comes from [place].    ED Course:  Ativan 2mg given IV.  Neuro curbsided and will help us with this.  Started a IV load of depakote 1gm per neuro recs.    CONCURRENT MEDICAL HISTORY:  Past Medical History:   Diagnosis Date   • Benign essential HTN 2019   • CKD (chronic kidney disease) stage 3, GFR 30-59 ml/min (HCC)    • Constipation    • Dementia (HCC)    • Hyponatremia    • Hypovitaminosis D    • Iron deficiency anemia    • Major neurocognitive disorder (HCC)    • Stroke (HCC)        PAST SURGICAL HISTORY:  Past Surgical History:   Procedure Laterality Date   • CATARACT EXTRACTION     • CLOSED REDUCTION WRIST FRACTURE Right 2020    Procedure: CLOSED REDUCTION AND SPLINTING OF RIGHT WRIST WITH FLUOROSCOPIC ASSISTANCE;  Surgeon: Baudilio Brooks MD;  Location: [facility];  Service: Orthopedics;  Laterality: Right;   • HIP INTERTROCHANTERIC NAILING  Right 12/6/2020    Procedure: INTERMEDULLARY NAILING OF RIGHT FEMUR USING GAMMA NAIL AND FLUOROSCOPIC ASSITANCE;  Surgeon: Baudilio Brooks MD;  Location: [facility];  Service: Orthopedics;  Laterality: Right;       FAMILY HISTORY:  Family History   Problem Relation Age of Onset   • No Known Problems Mother    • No Known Problems Father    • Cancer Sister    • Cancer Brother         SOCIAL HISTORY:  Social History     Socioeconomic History   • Marital status:    • Number of children: 2   • Highest education level: Bachelor's degree (e.g., BA, AB, BS)   Tobacco Use   • Smoking status: Never Smoker   • Smokeless tobacco: Never Used   Substance and Sexual Activity   • Alcohol use: No   • Drug use: No   • Sexual activity: Not Currently       HOME MEDICATIONS:  Prior to Admission medications    Medication Sig Start Date End Date Taking? Authorizing Provider   acetaminophen (TYLENOL) 325 MG tablet Take 2 tablets by mouth Every 4 (Four) Hours As Needed for Mild Pain . 1/3/19   Guanakito Camara MD   albuterol (PROVENTIL) (2.5 MG/3ML) 0.083% nebulizer solution Take 2.5 mg by nebulization Every 4 (Four) Hours As Needed for Shortness of Air. 1/3/19   Guanakito Camraa MD   amLODIPine (NORVASC) 5 MG tablet Take 0.5 tablets by mouth Daily. 2/22/22   Angela Mcnamara MD   aspirin 81 MG EC tablet Take 81 mg by mouth Daily.    Pratima Mcmahon MD   busPIRone (BUSPAR) 5 MG tablet Take 7.5 mg by mouth 2 (Two) Times a Day.    Pratima Mcmahon MD   cholecalciferol (VITAMIN D3) 25 MCG (1000 UT) tablet Take 1,000 Units by mouth Daily.    Pratima Mcmahon MD   Divalproex Sodium (DEPAKOTE SPRINKLE) 125 MG capsule Take 125 mg by mouth Every Night.    Pratima Mcmahon MD   donepezil (ARICEPT) 5 MG tablet Take 5 mg by mouth Every Night.    Pratima Mcmahon MD   ferrous sulfate 324 (65 Fe) MG tablet delayed-release EC tablet Take 1 tablet by mouth Daily With Breakfast. 1/7/19   Maricarmen Bell MD    melatonin 5 MG tablet tablet Take 5 mg by mouth Every Night.    Pratima Mcmahon MD   mirtazapine (REMERON) 7.5 MG half tablet Take  by mouth Every Night.    Pratima Mcmahon MD   Multiple Vitamins-Minerals (I-HERNESTO) tablet tablet Take 1 tablet by mouth Daily. 4/29/20   Srinivasa Chairez III, MD   nystatin (MYCOSTATIN) 478096 UNIT/GM powder Apply 1 application topically to the appropriate area as directed 2 (Two) Times a Day As Needed (redness/fungal infection). Apply under breast topically as needed for redness    Pratima Mcmahon MD   ondansetron (ZOFRAN) 4 MG tablet Take 4 mg by mouth Every 6 (Six) Hours As Needed for Nausea or Vomiting.    Pratima Mcmahon MD   rosuvastatin (CRESTOR) 5 MG tablet Take 1 tablet by mouth Daily. 5/31/19   Margot Perez MD   sennosides-docusate sodium (SENOKOT-S) 8.6-50 MG tablet Take 2 tablets by mouth Every Night. 5/10/19   Maricarmen Bell MD   sodium chloride 1 g tablet Take 1 tablet by mouth 3 (Three) Times a Day With Meals. 4/24/20   Srinivasa Chairez III, MD   vitamin D (ERGOCALCIFEROL) 1.25 MG (83126 UT) capsule capsule Take 50,000 Units by mouth 1 (One) Time Per Week.    Pratima Mcmahon MD   Chlorhexidine Gluconate solution   9/12/22  Pratima Mcmahon MD   Flavoring Agent (FLAVOR CONC-CHLORHEXIDINE) concentration Take 20 mL by mouth 3 (Three) Times a Day. 9/27/19 9/12/22  Chuckie Nguyen MD   HYDROcodone-acetaminophen (Norco) 5-325 MG per tablet Take 1 tablet by mouth Every 6 (Six) Hours As Needed for Moderate Pain . 12/14/20 9/12/22  Chuckie Nguyen MD   miconazole (Zeasorb-AF) 2 % powder Apply  topically to the appropriate area as directed. 7/18/22 9/12/22  Pratima Mcmahon MD   nystatin (MYCOSTATIN) 166619 UNIT/GM powder Apply  topically to the appropriate area as directed As Needed (skin lesions). 4/29/20 9/12/22  Srinivasa Chairez III, MD   polyethylene glycol (MIRALAX) 17 g packet Take 17 g by mouth Daily.   9/12/22  Provider, Pratima, MD       ALLERGIES:  Patient has no known allergies.    REVIEW OF SYSTEMS  Review of Systems   Unable to perform ROS: Dementia       PHYSICAL EXAM:  Temp:  [96.8 °F (36 °C)] 96.8 °F (36 °C)  Heart Rate:  [] 70  Resp:  [20-26] 20  BP: (134-165)/() 139/65  Body mass index is 25.63 kg/m².  Physical Exam  Constitutional:       General: She is not in acute distress.  HENT:      Head: Normocephalic and atraumatic.      Right Ear: External ear normal.      Left Ear: External ear normal.      Mouth/Throat:      Pharynx: No oropharyngeal exudate or posterior oropharyngeal erythema.      Comments: No teeth.  No tongue laceration  Eyes:      Pupils: Pupils are equal, round, and reactive to light.   Cardiovascular:      Rate and Rhythm: Normal rate and regular rhythm.      Heart sounds: Normal heart sounds.   Pulmonary:      Effort: Pulmonary effort is normal. No respiratory distress.      Breath sounds: Normal breath sounds.   Abdominal:      Palpations: Abdomen is soft.      Tenderness: There is no abdominal tenderness.   Musculoskeletal:      Right lower leg: No edema.      Left lower leg: No edema.   Skin:     General: Skin is warm and dry.   Neurological:      Mental Status: She is alert.      Comments: Not follow commands and repeats that it is cold over and over.   Psychiatric:      Comments: Anxious         DIAGNOSTIC DATA:   Lab Results (last 24 hours)     Procedure Component Value Units Date/Time    Troponin [[id number]]  (Normal) Collected: 09/12/22 1507    Specimen: Blood Updated: 09/12/22 1549     Troponin T <0.010 ng/mL     Narrative:      Troponin T Reference Range:  <= 0.03 ng/mL-   Negative for AMI  >0.03 ng/mL-     Abnormal for myocardial necrosis.  Clinicians would have to utilize clinical acumen, EKG, Troponin and serial changes to determine if it is an Acute Myocardial Infarction or myocardial injury due to an underlying chronic condition.       Results may be  falsely decreased if patient taking Biotin.      STAT Lactic Acid, Reflex [[id number]]  (Normal) Collected: 09/12/22 1507    Specimen: Blood Updated: 09/12/22 1545     Lactate 1.5 mmol/L     Blood Culture - Blood, Arm, Left [[id number]] Collected: 09/12/22 1506    Specimen: Blood from Arm, Left Updated: 09/12/22 1530    Blood Culture - Blood, Hand, Right [[id number]] Collected: 09/12/22 1506    Specimen: Blood from Hand, Right Updated: 09/12/22 1530    Urinalysis, Microscopic Only - Urine, Clean Catch [[id number]]  (Abnormal) Collected: 09/12/22 1312    Specimen: Urine, Clean Catch Updated: 09/12/22 1355     RBC, UA 0-2 /HPF      WBC, UA 3-5 /HPF      Comment: Urine culture not indicated.        Bacteria, UA 4+ /HPF      Squamous Epithelial Cells, UA 3-5 /HPF      Yeast, UA Small/1+ Yeast /HPF      Hyaline Casts, UA 0-2 /LPF      Methodology Manual Light Microscopy    Urinalysis With Culture If Indicated - Urine, Clean Catch [[id number]]  (Abnormal) Collected: 09/12/22 1312    Specimen: Urine, Clean Catch Updated: 09/12/22 1324     Color, UA Yellow     Appearance, UA Cloudy     pH, UA 7.5     Specific Gravity, UA 1.010     Glucose, UA Negative     Ketones, UA Negative     Bilirubin, UA Negative     Blood, UA Trace     Protein, UA Negative     Leuk Esterase, UA Trace     Nitrite, UA Negative     Urobilinogen, UA 0.2 E.U./dL    Narrative:      In absence of clinical symptoms, the presence of pyuria, bacteria, and/or nitrites on the urinalysis result does not correlate with infection.    Extra Tubes [[id number]] Collected: 09/12/22 1132    Specimen: Blood, Venous Line Updated: 09/12/22 1248    Narrative:      The following orders were created for panel order Extra Tubes.  Procedure                               Abnormality         Status                     ---------                               -----------         ------                     Lavender Top[[id number]]                                     Final result                Gold Top - SST[[id number]]                                   Final result               Light Blue Top[[id number]]                                   Final result                 Please view results for these tests on the individual orders.    Lavender Top [[id number]] Collected: 09/12/22 1132    Specimen: Blood Updated: 09/12/22 1248     Extra Tube hold for add-on     Comment: Auto resulted       Gold Top - SST [[id number]] Collected: 09/12/22 1132    Specimen: Blood Updated: 09/12/22 1248     Extra Tube Hold for add-ons.     Comment: Auto resulted.       Light Blue Top [[id number]] Collected: 09/12/22 1132    Specimen: Blood Updated: 09/12/22 1248     Extra Tube Hold for add-ons.     Comment: Auto resulted       COVID-19 and FLU A/B PCR - Swab, Nasopharynx [[id number]]  (Normal) Collected: 09/12/22 1122    Specimen: Swab from Nasopharynx Updated: 09/12/22 1208     COVID19 Not Detected     Influenza A PCR Not Detected     Influenza B PCR Not Detected    Narrative:      Fact sheet for providers: https://www.fda.gov/media/970918/download    Fact sheet for patients: https://www.fda.gov/media/420441/download    Test performed by PCR.    Comprehensive Metabolic Panel [[id number]]  (Abnormal) Collected: 09/12/22 1122    Specimen: Blood Updated: 09/12/22 1156     Glucose 126 mg/dL      BUN 20 mg/dL      Creatinine 1.37 mg/dL      Sodium 142 mmol/L      Potassium 4.1 mmol/L      Chloride 106 mmol/L      CO2 22.0 mmol/L      Calcium 8.8 mg/dL      Total Protein 6.5 g/dL      Albumin 3.50 g/dL      ALT (SGPT) 10 U/L      AST (SGOT) 15 U/L      Alkaline Phosphatase 137 U/L      Total Bilirubin 0.2 mg/dL      Globulin 3.0 gm/dL      A/G Ratio 1.2 g/dL      BUN/Creatinine Ratio 14.6     Anion Gap 14.0 mmol/L      eGFR 36.8 mL/min/1.73      Comment: National Kidney Foundation and American Society of Nephrology (ASN) Task Force recommended calculation based on the Chronic Kidney Disease Epidemiology Collaboration (CKD-EPI) equation  refit without adjustment for race.       Narrative:      GFR Normal >60  Chronic Kidney Disease <60  Kidney Failure <15      CK [[id number]]  (Normal) Collected: 09/12/22 1122    Specimen: Blood Updated: 09/12/22 1156     Creatine Kinase 35 U/L     Magnesium [[id number]]  (Normal) Collected: 09/12/22 1122    Specimen: Blood Updated: 09/12/22 1156     Magnesium 2.2 mg/dL     Valproic Acid Level, Total [[id number]]  (Abnormal) Collected: 09/12/22 1122    Specimen: Blood Updated: 09/12/22 1156     Valproic Acid 12.5 mcg/mL     Troponin [[id number]]  (Normal) Collected: 09/12/22 1122    Specimen: Blood Updated: 09/12/22 1156     Troponin T <0.010 ng/mL     Narrative:      Troponin T Reference Range:  <= 0.03 ng/mL-   Negative for AMI  >0.03 ng/mL-     Abnormal for myocardial necrosis.  Clinicians would have to utilize clinical acumen, EKG, Troponin and serial changes to determine if it is an Acute Myocardial Infarction or myocardial injury due to an underlying chronic condition.       Results may be falsely decreased if patient taking Biotin.      BNP [[id number]]  (Normal) Collected: 09/12/22 1122    Specimen: Blood Updated: 09/12/22 1154     proBNP 1,465.0 pg/mL     Narrative:      Among patients with dyspnea, NT-proBNP is highly sensitive for the detection of acute congestive heart failure. In addition NT-proBNP of <300 pg/ml effectively rules out acute congestive heart failure with 99% negative predictive value.    Results may be falsely decreased if patient taking Biotin.      Lactic Acid, Plasma [[id number]]  (Abnormal) Collected: 09/12/22 1122    Specimen: Blood Updated: 09/12/22 1153     Lactate 6.1 mmol/L     CBC & Differential [[id number]]  (Abnormal) Collected: 09/12/22 1122    Specimen: Blood Updated: 09/12/22 1138    Narrative:      The following orders were created for panel order CBC & Differential.  Procedure                               Abnormality         Status                     ---------                                -----------         ------                     CBC Auto Differential[[id number]]        Abnormal            Final result                 Please view results for these tests on the individual orders.    CBC Auto Differential [[id number]]  (Abnormal) Collected: 09/12/22 1122    Specimen: Blood Updated: 09/12/22 1138     WBC 9.18 10*3/mm3      RBC 3.67 10*6/mm3      Hemoglobin 11.6 g/dL      Hematocrit 35.8 %      MCV 97.5 fL      MCH 31.6 pg      MCHC 32.4 g/dL      RDW 13.3 %      RDW-SD 47.8 fl      MPV 10.4 fL      Platelets 221 10*3/mm3      Neutrophil % 42.7 %      Lymphocyte % 47.9 %      Monocyte % 5.7 %      Eosinophil % 2.5 %      Basophil % 0.8 %      Immature Grans % 0.4 %      Neutrophils, Absolute 3.92 10*3/mm3      Lymphocytes, Absolute 4.40 10*3/mm3      Monocytes, Absolute 0.52 10*3/mm3      Eosinophils, Absolute 0.23 10*3/mm3      Basophils, Absolute 0.07 10*3/mm3      Immature Grans, Absolute 0.04 10*3/mm3      nRBC 0.0 /100 WBC            Imaging Results (Last 24 Hours)     Procedure Component Value Units Date/Time    CT Head Without Contrast [[id number]] Collected: 09/12/22 1245     Updated: 09/12/22 1352    Narrative:      CT head without IV contrast September 12, 2022    INDICATION: Acute onset confusion    TECHNIQUE: Spiral images obtained from foramen magnum through  vertex without IV contrast. Sagittal, axial and coronal  reformatted images generated retrospectively.    This exam was performed according to our departmental  dose-optimization program, which includes automated exposure  control, adjustment of the mA and/or kV according to patient size  and/or use of iterative reconstruction technique.      FINDINGS:  No mass effect, midline shift, hemorrhage, hydrocephalus or  extra-axial fluid collections.  Atrophy with evidence of chronic small vessel white matter  ischemic change.  Old right posterior parieto-occipital infarct.  Old lacunar infarct right thalamus.  No definite acute  parenchymal pathology.  Gray-white distinction maintained throughout.  Visualized mastoids and sinuses grossly clear.  No focal or acute bony abnormality.      Impression:      Atrophy with evidence of chronic small vessel white matter  ischemic change.  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  No significant focal or acute intracranial pathology.    Electronically signed by:  Nando Ambrocio MD  9/12/2022 1:50 PM  CDT Workstation: [id number]    XR Chest 1 View [[id number]] Collected: 09/12/22 1116     Updated: 09/12/22 1243    Narrative:      EXAM DESCRIPTION:  XR CHEST 1 VIEW    CLINICAL INDICATION: AMS     COMPARISON: Chest x-ray dated 8/3/2022    FINDINGS: Mild likely scarring in the lung bases, also present  previously. No pleural effusion or pneumothorax. Normal heart  size and pulmonary vascularity. Evidence of a prior granulomatous  process. Remote right humeral head/neck fracture. No significant  change relative to 8/3/2022.      Impression:      Stable exam without acute cardiopulmonary process.    Electronically signed by:  Milind Conner MD  9/12/2022 12:41 PM  CDT Workstation: [id number]            I reviewed the patient's new clinical results.    ASSESSMENT AND PLAN: This is a 89 y.o. female with:    Active and Resolved Problems  Active Hospital Problems    Diagnosis  POA   • **Seizure (HCC) [R56.9]  Yes   • UTI (urinary tract infection) [N39.0]  Unknown   • Acute cystitis with hematuria [N30.01]  Yes   • Benign essential HTN [I10]  Yes   • Chronic hyponatremia [E87.1]  Yes   • CKD (chronic kidney disease) stage 3, GFR 30-59 ml/min [N18.30]  Yes   • Major neurocognitive disorder (CMS/HCC) [F03.90]  Yes   • Iron deficiency anemia secondary to inadequate dietary iron intake [D50.8]  Yes      Resolved Hospital Problems   No resolved problems to display.     #Seizure  Per NH staff, patient had what was described to be a tonic-clonic seizure.  She had a dosing of depakote 125mg BID, which seemed to be low.  Neurology at  [facility] was curbsided and made them selves available for questions in regards to management.  They made the recommendation to load 1gram VPA now and start the patient on 250mg PO BID VPA.  Patient acting somewhat more altered than baseline.  Post-ictal vs s/p ativan.  - 1gm VPA infused now  -  TID PO   - Seizure precautions  - Neuro available for status or further assistance per Dr. Collins  - VPA level tomorrow AM  - Ativan PRN IV  - Sitter at bedside, and restraints as needed    #UTI  UA positive  - Rocephin 1g x 5 days    #Chronic Issues  - Continue home medications  - Zyprexa for sleep tonight  - CTM    DVT prophylaxis:  Mechanical DVT prophylaxis orders are present.     Marlene Horne and I have discussed pain goals for this hospitalization after reviewing her current clinical condition, medical history and prior pain experiences.  The goal is to keep the pain level controlled.  To help achieve this, I plan to use PRN medication.    PDMP reviewed and consistent with patient reported medications.    Expected Length of Stay: 1 day    I discussed the patient's findings and my recommendations with family.     Dr. Anival Meier is the attending on record at time of admission, He is aware of the patient's status and agrees with the above history and physical.      Yobani Lundberg M.D. PGY-3  [facility]  [address] [place] [address]  Office: [phone]    This document has been electronically signed by Yobani Lundberg MD on September 12, 2022 17:40 CDT

## 2022-09-12 NOTE — ED NOTES
ED tech sitting at patient bedside helping keep patient in bed at this time. Patient needing continuous redirection.

## 2022-09-13 LAB
ALBUMIN SERPL-MCNC: 3 G/DL (ref 3.5–5.2)
ALBUMIN/GLOB SERPL: 1.3 G/DL
ALP SERPL-CCNC: 119 U/L (ref 39–117)
ALT SERPL W P-5'-P-CCNC: 7 U/L (ref 1–33)
ANION GAP SERPL CALCULATED.3IONS-SCNC: 5 MMOL/L (ref 5–15)
AST SERPL-CCNC: 12 U/L (ref 1–32)
BASOPHILS # BLD AUTO: 0.04 10*3/MM3 (ref 0–0.2)
BASOPHILS NFR BLD AUTO: 0.6 % (ref 0–1.5)
BILIRUB SERPL-MCNC: 0.2 MG/DL (ref 0–1.2)
BUN SERPL-MCNC: 16 MG/DL (ref 8–23)
BUN/CREAT SERPL: 14.4 (ref 7–25)
CALCIUM SPEC-SCNC: 8 MG/DL (ref 8.2–9.6)
CHLORIDE SERPL-SCNC: 110 MMOL/L (ref 98–107)
CO2 SERPL-SCNC: 26 MMOL/L (ref 22–29)
CREAT SERPL-MCNC: 1.11 MG/DL (ref 0.57–1)
DEPRECATED RDW RBC AUTO: 47.7 FL (ref 37–54)
EGFRCR SERPLBLD CKD-EPI 2021: 47.3 ML/MIN/1.73
EOSINOPHIL # BLD AUTO: 0.14 10*3/MM3 (ref 0–0.4)
EOSINOPHIL NFR BLD AUTO: 2.2 % (ref 0.3–6.2)
ERYTHROCYTE [DISTWIDTH] IN BLOOD BY AUTOMATED COUNT: 13.3 % (ref 12.3–15.4)
GLOBULIN UR ELPH-MCNC: 2.4 GM/DL
GLUCOSE SERPL-MCNC: 83 MG/DL (ref 65–99)
HCT VFR BLD AUTO: 29.6 % (ref 34–46.6)
HGB BLD-MCNC: 9.5 G/DL (ref 12–15.9)
IMM GRANULOCYTES # BLD AUTO: 0.04 10*3/MM3 (ref 0–0.05)
IMM GRANULOCYTES NFR BLD AUTO: 0.6 % (ref 0–0.5)
LYMPHOCYTES # BLD AUTO: 2.16 10*3/MM3 (ref 0.7–3.1)
LYMPHOCYTES NFR BLD AUTO: 34 % (ref 19.6–45.3)
MCH RBC QN AUTO: 31.3 PG (ref 26.6–33)
MCHC RBC AUTO-ENTMCNC: 32.1 G/DL (ref 31.5–35.7)
MCV RBC AUTO: 97.4 FL (ref 79–97)
MONOCYTES # BLD AUTO: 0.36 10*3/MM3 (ref 0.1–0.9)
MONOCYTES NFR BLD AUTO: 5.7 % (ref 5–12)
NEUTROPHILS NFR BLD AUTO: 3.61 10*3/MM3 (ref 1.7–7)
NEUTROPHILS NFR BLD AUTO: 56.9 % (ref 42.7–76)
NRBC BLD AUTO-RTO: 0 /100 WBC (ref 0–0.2)
PLATELET # BLD AUTO: 169 10*3/MM3 (ref 140–450)
PMV BLD AUTO: 10.6 FL (ref 6–12)
POTASSIUM SERPL-SCNC: 4.1 MMOL/L (ref 3.5–5.2)
PROT SERPL-MCNC: 5.4 G/DL (ref 6–8.5)
RBC # BLD AUTO: 3.04 10*6/MM3 (ref 3.77–5.28)
SODIUM SERPL-SCNC: 141 MMOL/L (ref 136–145)
VALPROATE SERPL-MCNC: 61.1 MCG/ML (ref 50–125)
WBC NRBC COR # BLD: 6.35 10*3/MM3 (ref 3.4–10.8)

## 2022-09-13 PROCEDURE — 92610 EVALUATE SWALLOWING FUNCTION: CPT | Performed by: SPEECH-LANGUAGE PATHOLOGIST

## 2022-09-13 PROCEDURE — 99225 PR SBSQ OBSERVATION CARE/DAY 25 MINUTES: CPT | Performed by: STUDENT IN AN ORGANIZED HEALTH CARE EDUCATION/TRAINING PROGRAM

## 2022-09-13 PROCEDURE — 96366 THER/PROPH/DIAG IV INF ADDON: CPT

## 2022-09-13 PROCEDURE — 80053 COMPREHEN METABOLIC PANEL: CPT | Performed by: STUDENT IN AN ORGANIZED HEALTH CARE EDUCATION/TRAINING PROGRAM

## 2022-09-13 PROCEDURE — G0378 HOSPITAL OBSERVATION PER HR: HCPCS

## 2022-09-13 PROCEDURE — 96365 THER/PROPH/DIAG IV INF INIT: CPT

## 2022-09-13 PROCEDURE — 25010000002 CEFTRIAXONE PER 250 MG: Performed by: STUDENT IN AN ORGANIZED HEALTH CARE EDUCATION/TRAINING PROGRAM

## 2022-09-13 PROCEDURE — 85025 COMPLETE CBC W/AUTO DIFF WBC: CPT | Performed by: STUDENT IN AN ORGANIZED HEALTH CARE EDUCATION/TRAINING PROGRAM

## 2022-09-13 PROCEDURE — 80164 ASSAY DIPROPYLACETIC ACD TOT: CPT | Performed by: STUDENT IN AN ORGANIZED HEALTH CARE EDUCATION/TRAINING PROGRAM

## 2022-09-13 PROCEDURE — 25010000002 NA FERRIC GLUC CPLX PER 12.5 MG: Performed by: STUDENT IN AN ORGANIZED HEALTH CARE EDUCATION/TRAINING PROGRAM

## 2022-09-13 RX ORDER — NYSTATIN 100000 [USP'U]/G
POWDER TOPICAL EVERY 12 HOURS SCHEDULED
Status: DISCONTINUED | OUTPATIENT
Start: 2022-09-13 | End: 2022-09-14 | Stop reason: HOSPADM

## 2022-09-13 RX ORDER — GRANULES FOR ORAL 3 G/1
3 POWDER ORAL ONCE
Status: COMPLETED | OUTPATIENT
Start: 2022-09-13 | End: 2022-09-13

## 2022-09-13 RX ORDER — OLANZAPINE 5 MG/1
5 TABLET, ORALLY DISINTEGRATING ORAL NIGHTLY PRN
Status: DISCONTINUED | OUTPATIENT
Start: 2022-09-13 | End: 2022-09-14 | Stop reason: HOSPADM

## 2022-09-13 RX ORDER — DIVALPROEX SODIUM 125 MG/1
250 CAPSULE, COATED PELLETS ORAL EVERY 8 HOURS SCHEDULED
Status: DISCONTINUED | OUTPATIENT
Start: 2022-09-13 | End: 2022-09-14 | Stop reason: HOSPADM

## 2022-09-13 RX ADMIN — Medication 1 G: at 18:46

## 2022-09-13 RX ADMIN — SODIUM CHLORIDE 75 ML/HR: 9 INJECTION, SOLUTION INTRAVENOUS at 04:00

## 2022-09-13 RX ADMIN — MELATONIN 3 MG: 3 TAB ORAL at 18:46

## 2022-09-13 RX ADMIN — SODIUM CHLORIDE 250 MG: 9 INJECTION, SOLUTION INTRAVENOUS at 10:00

## 2022-09-13 RX ADMIN — FOSFOMYCIN TROMETHAMINE 3 G: 3 POWDER ORAL at 10:00

## 2022-09-13 RX ADMIN — AMLODIPINE BESYLATE 2.5 MG: 2.5 TABLET ORAL at 10:00

## 2022-09-13 RX ADMIN — BUSPIRONE HYDROCHLORIDE 7.5 MG: 7.5 TABLET ORAL at 21:09

## 2022-09-13 RX ADMIN — DONEPEZIL HYDROCHLORIDE 5 MG: 5 TABLET, FILM COATED ORAL at 21:09

## 2022-09-13 RX ADMIN — DIVALPROEX SODIUM 250 MG: 125 CAPSULE, COATED PELLETS ORAL at 21:09

## 2022-09-13 RX ADMIN — DIVALPROEX SODIUM 250 MG: 125 CAPSULE, COATED PELLETS ORAL at 15:35

## 2022-09-13 RX ADMIN — BUSPIRONE HYDROCHLORIDE 7.5 MG: 7.5 TABLET ORAL at 10:00

## 2022-09-13 RX ADMIN — NYSTATIN: 100000 POWDER TOPICAL at 21:09

## 2022-09-13 RX ADMIN — ROSUVASTATIN CALCIUM 5 MG: 5 TABLET, FILM COATED ORAL at 10:00

## 2022-09-13 RX ADMIN — Medication 7.5 MG: at 21:09

## 2022-09-13 RX ADMIN — Medication 1 G: at 10:10

## 2022-09-13 RX ADMIN — ASPIRIN 81 MG: 81 TABLET, FILM COATED ORAL at 10:09

## 2022-09-13 RX ADMIN — DOCUSATE SODIUM 50 MG AND SENNOSIDES 8.6 MG 2 TABLET: 8.6; 5 TABLET, FILM COATED ORAL at 10:09

## 2022-09-13 RX ADMIN — Medication 1 G: at 15:35

## 2022-09-13 RX ADMIN — DIVALPROEX SODIUM 250 MG: 250 TABLET, DELAYED RELEASE ORAL at 09:19

## 2022-09-13 NOTE — PROGRESS NOTES
"    FAMILY MEDICINE RESIDENCY SERVICE  DAILY PROGRESS NOTE    NAME: Marlene Horne  : 1931  MRN: [id number]      LOS: 0 days     PROVIDER OF SERVICE: Yobani Lundberg MD    Chief Complaint: Seizure (HCC)    Subjective:     Interval History:  History taken from: patient    Patient states she is doing \"fair\" this AM.    Review of Systems:   Review of Systems   Constitutional: Negative for chills and fever.   HENT: Negative for congestion and rhinorrhea.    Eyes: Negative for visual disturbance.   Respiratory: Negative for shortness of breath.    Cardiovascular: Negative for chest pain.   Gastrointestinal: Negative for abdominal pain, diarrhea, nausea and vomiting.   Endocrine: Negative for polyuria.   Genitourinary: Negative for difficulty urinating and dyspareunia.   Musculoskeletal: Negative for back pain and myalgias.   Skin: Negative for rash and wound.   Neurological: Negative for weakness, numbness and headaches.   Psychiatric/Behavioral: Positive for behavioral problems and confusion. Negative for agitation.       Objective:     Vital Signs  Temp:  [96.8 °F (36 °C)-97.2 °F (36.2 °C)] 97.2 °F (36.2 °C)  Heart Rate:  [] 68  Resp:  [16-26] 18  BP: (134-168)/() 158/72   Body mass index is 25.44 kg/m².    Physical Exam  Physical Exam  Constitutional:       General: She is not in acute distress.  HENT:      Head: Normocephalic and atraumatic.   Cardiovascular:      Rate and Rhythm: Normal rate and regular rhythm.      Heart sounds: Normal heart sounds.   Pulmonary:      Effort: Pulmonary effort is normal. No respiratory distress.      Breath sounds: Normal breath sounds.   Abdominal:      Palpations: Abdomen is soft.      Tenderness: There is no abdominal tenderness.   Musculoskeletal:      Right lower leg: No edema.      Left lower leg: No edema.   Skin:     General: Skin is warm and dry.   Neurological:      Mental Status: She is alert.   Psychiatric:         Mood and Affect: Mood normal.        "  Behavior: Behavior normal.         Scheduled Meds   amLODIPine, 2.5 mg, Oral, Q24H  aspirin, 81 mg, Oral, Daily  busPIRone, 7.5 mg, Oral, BID  divalproex, 250 mg, Oral, Q8H  donepezil, 5 mg, Oral, Nightly  sodium ferric gluconate complex IVPB in 100 mL NS, 250 mg, Intravenous, Once  fosfomycin, 3 g, Oral, Once  melatonin, 3 mg, Oral, Daily With Dinner  mirtazapine, 7.5 mg, Oral, Nightly  OLANZapine zydis, 10 mg, Oral, Nightly  rosuvastatin, 5 mg, Oral, Daily  senna-docusate sodium, 2 tablet, Oral, BID  sodium chloride, 10 mL, Intravenous, Q12H  sodium chloride, 1 g, Oral, TID With Meals       PRN Meds   •  acetaminophen  •  albuterol  •  senna-docusate sodium **AND** polyethylene glycol **AND** bisacodyl **AND** bisacodyl  •  calcium carbonate  •  hydrALAZINE  •  HYDROcodone-acetaminophen  •  ondansetron  •  sodium chloride      Diagnostic Data    Results from last 7 days   Lab Units 09/13/22  0532   WBC 10*3/mm3 6.35   HEMOGLOBIN g/dL 9.5*   HEMATOCRIT % 29.6*   PLATELETS 10*3/mm3 169   GLUCOSE mg/dL 83   CREATININE mg/dL 1.11*   BUN mg/dL 16   SODIUM mmol/L 141   POTASSIUM mmol/L 4.1   AST (SGOT) U/L 12   ALT (SGPT) U/L 7   ALK PHOS U/L 119*   BILIRUBIN mg/dL 0.2   ANION GAP mmol/L 5.0       CT Head Without Contrast    Result Date: 9/12/2022  Atrophy with evidence of chronic small vessel white matter ischemic change. 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 No significant focal or acute intracranial pathology. Electronically signed by:  Nando Ambrocio MD  9/12/2022 1:50 PM CDT Workstation: [id number]    XR Chest 1 View    Result Date: 9/12/2022  Stable exam without acute cardiopulmonary process. Electronically signed by:  Milind Conner MD  9/12/2022 12:41 PM CDT Workstation: [id number]        I reviewed the patient's new clinical results.    Assessment/Plan:     Active and Resolved Problems  Active Hospital Problems    Diagnosis  POA   • **Seizure (HCC) [R56.9]  Yes   • UTI (urinary tract infection) [N39.0]  Unknown   • Acute  cystitis with hematuria [N30.01]  Yes   • Benign essential HTN [I10]  Yes   • Chronic hyponatremia [E87.1]  Yes   • CKD (chronic kidney disease) stage 3, GFR 30-59 ml/min [N18.30]  Yes   • Major neurocognitive disorder (CMS/HCC) [F03.90]  Yes   • Iron deficiency anemia secondary to inadequate dietary iron intake [D50.8]  Yes      Resolved Hospital Problems   No resolved problems to display.     #Seizure  Per NH staff, patient had what was described to be a tonic-clonic seizure.  She had a dosing of depakote 125mg BID, which seemed to be low.  Neurology at [facility] was curbsided and made them selves available for questions in regards to management.  They made the recommendation to load 1gram depakote now and start the patient on 250mg PO BID depakote.  S/p 1 gram depakote via oral as IV was not available.  VPA level wnL.  - depakote 250 TID PO   - Seizure precautions  - Neuro available for status or further assistance per Dr. Collins  - Ativan PRN IV  - Sitter at bedside, and restraints as needed  - AM VPA level to see how 250TID depakote effects blood level with this patient     #UTI  UA positive  - Rocephin 1g x 5 days    #Sundowning  Patient needed heavy doses of ativan yesterday.  Needed zyprexa to sleep through the night.  Considering adding it to her discharge medications as needed.  - Remeron continued  - Zyprexa 10mg SL as needed for sleep     #Chronic Issues  - Continue home medications  - CTM    DVT prophylaxis:  Mechanical DVT prophylaxis orders are present.     Code status is   Code Status and Medical Interventions:   Ordered at: 09/12/22 4392     Medical Intervention Limits:    NO intubation (DNI)     Code Status (Patient has no pulse and is not breathing):    No CPR (Do Not Attempt to Resuscitate)     Medical Interventions (Patient has pulse or is breathing):    Limited Support       Plan for disposition:NH in 1 days    Time: 30 minutes      Yobani Lundberg M.D. PGY-3  [facility]  Family Medicine Residency  [address] [place] [address]  Office: [phone]    This document has been electronically signed by Yobani Lundberg MD on September 13, 2022 09:05 CDT

## 2022-09-13 NOTE — PLAN OF CARE
Goal Outcome Evaluation:  Plan of Care Reviewed With: patient           Outcome Evaluation: Pt seen for skilled ST services this date to assess swallow function.  Pt's dtr reports that pt is on mechanical soft solids w/ground meats and thin liquids at SNF.  Pt is able to self feed w/tray setup.  Dtr reports that pt is unable to hold conversation at baseline and most days does not know who she is or her brother.  Pt consumed mech soft solids w/increased oral prep time d/t prolonged mastication 2/2 limited dentition.  Pt had mild diffuse residue, but was able to clear independently.  Pt consumed thin liquids via straw and cup rim w/no overt s/s of aspiration.  SLP to modify diet to baseline diet and skilled ST services not recommended.  Pt's dtr educated on results and recommendations and was in agreement.

## 2022-09-13 NOTE — SIGNIFICANT NOTE
09/13/22 1021   OTHER   Discipline occupational therapist;physical therapist   Rehab Time/Intention   Session Not Performed other (see comments)  (Patient not appropriate for therapy at this time, decreased alertness levels. Will continue to follow and attempt eval as able.)   Recommendation   PT - Next Appointment 09/14/22   Recommendation   OT - Next Appointment 09/14/22     Attempt 2: 15:50- Patient declining to participate in therapy today, will f/u as able.

## 2022-09-13 NOTE — PLAN OF CARE
Goal Outcome Evaluation:     BP elevated, prn ordered, but not needed at this time. Other Vitals stable. Pt awakened agitated and confused once during night. Unable to reorient to surroundings. Pt was reassured and one time dose of Ativan was given. Pt calmed down after ativan and was able to sleep.

## 2022-09-13 NOTE — THERAPY DISCHARGE NOTE
Acute Care - Speech Language Pathology   Swallow Initial Evaluation/Discharge [facility]     Patient Name: Marlene Horne  : 1931  MRN: [id number]  Today's Date: 2022               Admit Date: 2022     Pt seen for skilled ST services this date to assess swallow function.  Pt's dtr reports that pt is on mechanical soft solids w/ground meats and thin liquids at SNF.  Pt is able to self feed w/tray setup.  Dtr reports that pt is unable to hold conversation at baseline and most days does not know who she is or her brother.  [facility] w/increased oral prep time d/t prolonged mastication 2/2 limited dentition.  Pt had mild diffuse residue, but was able to clear independently.  Pt consumed thin liquids via straw and cup rim w/no overt s/s of aspiration.  SLP to modify diet to baseline diet and skilled ST services not recommended.  Pt's dtr educated on results and recommendations and was in agreement.    Visit Dx:    ICD-10-CM ICD-9-CM   1. Seizure ([facility])  R56.9 780.39   2. 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[facility])   • Dental disease   • Benign essential HTN   • Acute cystitis with hematuria   • Closed displaced intertrochanteric fracture of right femur ([facility])   • Closed displaced fracture of surgical neck of right humerus   • Closed fracture of right wrist   • DOUGLAS (acute kidney injury) ([facility])   • Leukocytosis   • Fall at nursing home   • Right shoulder pain   • Right hip pain   • Late onset Alzheimer's dementia without behavioral disturbance ([facility])   • Seizure ([facility])   • UTI (urinary tract infection)     Past Medical History:    Diagnosis Date   • Benign essential HTN 11/22/2019   • CKD (chronic kidney disease) stage 3, GFR 30-59 ml/min (HCC)    • Constipation    • Dementia (HCC)    • Hyponatremia    • Hypovitaminosis D    • Iron deficiency anemia    • Major neurocognitive disorder (HCC)    • Stroke (HCC)      Past Surgical History:   Procedure Laterality Date   • CATARACT EXTRACTION     • CLOSED REDUCTION WRIST FRACTURE Right 12/6/2020    Procedure: CLOSED REDUCTION AND SPLINTING OF RIGHT WRIST WITH FLUOROSCOPIC ASSISTANCE;  Surgeon: Baudilio Brooks MD;  Location: [facility];  Service: Orthopedics;  Laterality: Right;   • HIP INTERTROCHANTERIC NAILING Right 12/6/2020    Procedure: INTERMEDULLARY NAILING OF RIGHT FEMUR USING GAMMA NAIL AND FLUOROSCOPIC ASSITANCE;  Surgeon: Baudilio Brooks MD;  Location: [facility];  Service: Orthopedics;  Laterality: Right;       SLP Recommendation and Plan  SLP Swallowing Diagnosis: mild, oral dysphagia (09/13/22 0923)  SLP Diet Recommendation: soft textures, ground, thin liquids (09/13/22 0923)     Monitor for Signs of Aspiration: notify SLP if any concerns (09/13/22 0923)  Recommended Diagnostics: No further SLP services recommended (09/13/22 0923)  Swallow Criteria for Skilled Therapeutic Interventions Met: baseline status (09/13/22 0923)  Anticipated Discharge Disposition (SLP): skilled nursing facility (09/13/22 1055)     Therapy Frequency (Swallow): evaluation only (09/13/22 0923)              Anticipated Discharge Disposition (SLP): skilled nursing facility (09/13/22 1055)        Reason for Discharge: other (see comments) (Eval only) (09/13/22 1055)             Plan of Care Reviewed With: patient (09/13/22 1056)  Outcome Evaluation: Pt seen for skilled ST services this date to assess swallow function.  Pt's dtr reports that pt is on mechanical soft solids w/ground meats and thin liquids at SNF.  Pt is able to self feed w/tray setup.  Dtr reports that pt is unable to hold conversation at  baseline and most days does not know who she is or her brother.  Pt consumed mech soft solids w/increased oral prep time d/t prolonged mastication 2/2 limited dentition.  Pt had mild diffuse residue, but was able to clear independently.  Pt consumed thin liquids via straw and cup rim w/no overt s/s of aspiration.  SLP to modify diet to baseline diet and skilled ST services not recommended.  Pt's dtr educated on results and recommendations and was in agreement. (09/13/22 1660)    SWALLOW EVALUATION (last 72 hours)     SLP Adult Swallow Evaluation     Row Name 09/13/22 0619                   Rehab Evaluation    Document Type evaluation  -CK        Total Evaluation Minutes, SLP 46  -CK        Subjective Information complains of;fatigue  -CK        Patient Observations lethargic;cooperative;agree to therapy  -CK        Patient/Family/Caregiver Comments/Observations Pt's dtr at bedside  -CK        Patient Effort good  -CK                  General Information    Patient Profile Reviewed yes  -CK        Pertinent History Of Current Problem Pt admitted from SNF ([facility]) w/witnessed seizure w/reported facial droop after seizure (unknown by dtr which side)  -CK        Current Method of Nutrition regular textures;thin liquids  -CK        Precautions/Limitations, Vision corrective lenses needed for reading  -CK        Precautions/Limitations, Hearing WFL  -CK        Prior Level of Function-Communication cognitive-linguistic impairment  dementia  -CK        Prior Level of Function-Swallowing mechanical soft textures;thin liquids  -CK        Patient's Goals for Discharge patient could not state  -CK        Family Goals for Discharge patient able to return to all previous activities/roles  -CK                  Pain    Additional Documentation Pain Scale: FACES Pre/Post-Treatment (Group)  -CK                  Pain Scale: FACES Pre/Post-Treatment    Pain: FACES Scale, Pretreatment 0-->no hurt  -CK        Posttreatment Pain Rating  0-->no hurt  -CK                  Oral Motor Structure and Function    Dentition Assessment missing teeth;teeth are in poor condition  Only has a few bottom teeth  -CK        Secretion Management WNL/WFL  -CK        Mucosal Quality moist, healthy  -CK        Volitional Swallow unable to elicit  -CK        Volitional Cough unable to elicit  -CK                  General Eating/Swallowing Observations    Respiratory Support Currently in Use room air  -CK        Eating/Swallowing Skills self-fed;fed by SLP  -CK        Positioning During Eating upright 90 degree;upright in bed  -CK        Utensils Used straw;cup  -CK        Consistencies Trialed soft textures;ground;thin liquids  -CK                  Clinical Swallow Eval    Oral Prep Phase impaired  -CK        Oral Transit WFL  -CK        Oral Residue impaired  -CK        Pharyngeal Phase no overt signs/symptoms of pharyngeal impairment  -CK        Esophageal Phase unremarkable  -CK        Clinical Swallow Evaluation Summary Pt seen for skilled ST services this date to assess swallow function.  Pt's dtr reports that pt is on mechanical soft solids w/ground meats and thin liquids at SNF.  Pt is able to self feed w/tray setup.  Dtr reports that pt is unable to hold conversation at baseline and most days does not know who she is or her brother.  Pt consumed [facility] soft solids w/increased oral prep time d/t prolonged mastication 2/2 limited dentition.  Pt had mild diffuse residue, but was able to clear independently.  Pt consumed thin liquids via straw and cup rim w/no overt s/s of aspiration.  SLP to modify diet to baseline diet and skilled ST services not recommended.  Pt's dtr educated on results and recommendations and was in agreement.  -CK                  Oral Prep Concerns    Oral Prep Concerns prolonged mastication;increased prep time  -CK        Prolonged Mastication mechanical soft  -CK        Increased Prep Time mechanical soft  -CK                  Oral Residue  Concerns    Oral Residue Concerns diffuse residue throughout oral cavity  -CK        Diffuse Residue Throughout Oral Cavity mechanical soft  -CK                  Swallowing Quality of Life Assessment    Education and counseling provided Signs of aspiration;Risks of aspiration;Aspiration precautions;Compensatory strategy recommendations and rationale  -CK                  SLP Evaluation Clinical Impression    SLP Swallowing Diagnosis mild;oral dysphagia  -CK        Functional Impact no impact on function  -CK        Swallow Criteria for Skilled Therapeutic Interventions Met baseline status  -CK                  SLP Treatment Clinical Impressions    Care Plan Review evaluation/treatment results reviewed;patient/other agree to care plan  -CK                  Recommendations    Therapy Frequency (Swallow) evaluation only  -CK        SLP Diet Recommendation soft textures;ground;thin liquids  -CK        Recommended Diagnostics No further SLP services recommended  -CK        Recommended Precautions and Strategies upright posture during/after eating;small bites of food and sips of liquid;alternate between small bites of food and sips of liquid;general aspiration precautions;fatigue precautions;assist with feeding  -CK        Oral Care Recommendations Oral Care before breakfast, after meals and PRN  -CK        SLP Rec. for Method of Medication Administration as tolerated  -CK        Monitor for Signs of Aspiration notify SLP if any concerns  -CK        Anticipated Discharge Disposition (SLP) skilled nursing facility  -              User Key  (r) = Recorded By, (t) = Taken By, (c) = Cosigned By    Initials Name Effective Dates    CK Betsy Hays MS CCC-SLP 06/16/21 -                 EDUCATION  The patient has been educated in the following areas:   Dysphagia (Swallowing Impairment) Modified Diet Instruction.                 Time Calculation:    Time Calculation- SLP     Row Name 09/13/22 1056             Time  Calculation- SLP    SLP Start Time 0923  -CK      SLP Stop Time 1009  -CK      SLP Time Calculation (min) 46 min  -CK      Total Timed Code Minutes- SLP 46 minute(s)  -CK      SLP Received On 09/13/22  -CK              Untimed Charges    SLP Eval/Re-eval  ST Eval Oral Pharyng Swallow - 92610  -CK      23458-OL Eval Oral Pharyng Swallow Minutes 46  -CK              Total Minutes    Untimed Charges Total Minutes 46  -CK       Total Minutes 46  -CK            User Key  (r) = Recorded By, (t) = Taken By, (c) = Cosigned By    Initials Name Provider Type     Betsy Hays MS CCC-SLP Speech and Language Pathologist                Therapy Charges for Today     Code Description Service Date Service Provider Modifiers Qty    [id number]  ST EVAL ORAL PHARYNG SWALLOW 3 9/13/2022 Betsy Hays MS CCC-SLP GN 1               SLP Discharge Summary  Anticipated Discharge Disposition (SLP): skilled nursing facility  Reason for Discharge: other (see comments) (Eval only)    Betsy Hays MS CCC-SLP  9/13/2022

## 2022-09-14 VITALS
HEART RATE: 61 BPM | SYSTOLIC BLOOD PRESSURE: 148 MMHG | OXYGEN SATURATION: 98 % | HEIGHT: 65 IN | RESPIRATION RATE: 18 BRPM | BODY MASS INDEX: 25.02 KG/M2 | WEIGHT: 150.2 LBS | DIASTOLIC BLOOD PRESSURE: 64 MMHG | TEMPERATURE: 96.3 F

## 2022-09-14 PROBLEM — N39.0 UTI (URINARY TRACT INFECTION): Status: RESOLVED | Noted: 2022-09-12 | Resolved: 2022-09-14

## 2022-09-14 PROBLEM — R56.9 SEIZURE: Status: RESOLVED | Noted: 2022-09-12 | Resolved: 2022-09-14

## 2022-09-14 PROBLEM — N30.01 ACUTE CYSTITIS WITH HEMATURIA: Status: RESOLVED | Noted: 2019-12-31 | Resolved: 2022-09-14

## 2022-09-14 LAB
ALBUMIN SERPL-MCNC: 3 G/DL (ref 3.5–5.2)
ALBUMIN/GLOB SERPL: 1.3 G/DL
ALP SERPL-CCNC: 117 U/L (ref 39–117)
ALT SERPL W P-5'-P-CCNC: 8 U/L (ref 1–33)
ANION GAP SERPL CALCULATED.3IONS-SCNC: 7 MMOL/L (ref 5–15)
AST SERPL-CCNC: 13 U/L (ref 1–32)
BASOPHILS # BLD AUTO: 0.04 10*3/MM3 (ref 0–0.2)
BASOPHILS NFR BLD AUTO: 0.7 % (ref 0–1.5)
BILIRUB SERPL-MCNC: 0.2 MG/DL (ref 0–1.2)
BUN SERPL-MCNC: 13 MG/DL (ref 8–23)
BUN/CREAT SERPL: 13.1 (ref 7–25)
CALCIUM SPEC-SCNC: 7.9 MG/DL (ref 8.2–9.6)
CHLORIDE SERPL-SCNC: 111 MMOL/L (ref 98–107)
CO2 SERPL-SCNC: 25 MMOL/L (ref 22–29)
CREAT SERPL-MCNC: 0.99 MG/DL (ref 0.57–1)
DEPRECATED RDW RBC AUTO: 47.7 FL (ref 37–54)
EGFRCR SERPLBLD CKD-EPI 2021: 54.3 ML/MIN/1.73
EOSINOPHIL # BLD AUTO: 0.3 10*3/MM3 (ref 0–0.4)
EOSINOPHIL NFR BLD AUTO: 5.6 % (ref 0.3–6.2)
ERYTHROCYTE [DISTWIDTH] IN BLOOD BY AUTOMATED COUNT: 13.4 % (ref 12.3–15.4)
GLOBULIN UR ELPH-MCNC: 2.4 GM/DL
GLUCOSE SERPL-MCNC: 81 MG/DL (ref 65–99)
HCT VFR BLD AUTO: 30.1 % (ref 34–46.6)
HGB BLD-MCNC: 9.7 G/DL (ref 12–15.9)
IMM GRANULOCYTES # BLD AUTO: 0.04 10*3/MM3 (ref 0–0.05)
IMM GRANULOCYTES NFR BLD AUTO: 0.7 % (ref 0–0.5)
LYMPHOCYTES # BLD AUTO: 1.7 10*3/MM3 (ref 0.7–3.1)
LYMPHOCYTES NFR BLD AUTO: 31.5 % (ref 19.6–45.3)
MCH RBC QN AUTO: 31.3 PG (ref 26.6–33)
MCHC RBC AUTO-ENTMCNC: 32.2 G/DL (ref 31.5–35.7)
MCV RBC AUTO: 97.1 FL (ref 79–97)
MONOCYTES # BLD AUTO: 0.41 10*3/MM3 (ref 0.1–0.9)
MONOCYTES NFR BLD AUTO: 7.6 % (ref 5–12)
NEUTROPHILS NFR BLD AUTO: 2.91 10*3/MM3 (ref 1.7–7)
NEUTROPHILS NFR BLD AUTO: 53.9 % (ref 42.7–76)
NRBC BLD AUTO-RTO: 0 /100 WBC (ref 0–0.2)
PLATELET # BLD AUTO: 176 10*3/MM3 (ref 140–450)
PMV BLD AUTO: 10.3 FL (ref 6–12)
POTASSIUM SERPL-SCNC: 3.9 MMOL/L (ref 3.5–5.2)
PROT SERPL-MCNC: 5.4 G/DL (ref 6–8.5)
RBC # BLD AUTO: 3.1 10*6/MM3 (ref 3.77–5.28)
SODIUM SERPL-SCNC: 143 MMOL/L (ref 136–145)
VALPROATE SERPL-MCNC: 59.9 MCG/ML (ref 50–125)
WBC NRBC COR # BLD: 5.4 10*3/MM3 (ref 3.4–10.8)

## 2022-09-14 PROCEDURE — 80053 COMPREHEN METABOLIC PANEL: CPT | Performed by: STUDENT IN AN ORGANIZED HEALTH CARE EDUCATION/TRAINING PROGRAM

## 2022-09-14 PROCEDURE — 97166 OT EVAL MOD COMPLEX 45 MIN: CPT

## 2022-09-14 PROCEDURE — 85025 COMPLETE CBC W/AUTO DIFF WBC: CPT | Performed by: STUDENT IN AN ORGANIZED HEALTH CARE EDUCATION/TRAINING PROGRAM

## 2022-09-14 PROCEDURE — G0378 HOSPITAL OBSERVATION PER HR: HCPCS

## 2022-09-14 PROCEDURE — 36415 COLL VENOUS BLD VENIPUNCTURE: CPT | Performed by: STUDENT IN AN ORGANIZED HEALTH CARE EDUCATION/TRAINING PROGRAM

## 2022-09-14 PROCEDURE — 99217 PR OBSERVATION CARE DISCHARGE MANAGEMENT: CPT | Performed by: STUDENT IN AN ORGANIZED HEALTH CARE EDUCATION/TRAINING PROGRAM

## 2022-09-14 PROCEDURE — 97162 PT EVAL MOD COMPLEX 30 MIN: CPT

## 2022-09-14 PROCEDURE — 80164 ASSAY DIPROPYLACETIC ACD TOT: CPT | Performed by: STUDENT IN AN ORGANIZED HEALTH CARE EDUCATION/TRAINING PROGRAM

## 2022-09-14 RX ORDER — OLANZAPINE 5 MG/1
5 TABLET, ORALLY DISINTEGRATING ORAL NIGHTLY PRN
Qty: 30 TABLET | Refills: 2 | Status: SHIPPED | OUTPATIENT
Start: 2022-09-14 | End: 2023-02-16

## 2022-09-14 RX ORDER — DIVALPROEX SODIUM 125 MG/1
250 CAPSULE, COATED PELLETS ORAL EVERY 8 HOURS SCHEDULED
Qty: 90 CAPSULE | Refills: 2 | Status: SHIPPED | OUTPATIENT
Start: 2022-09-14 | End: 2023-02-23 | Stop reason: SDUPTHER

## 2022-09-14 RX ADMIN — DIVALPROEX SODIUM 250 MG: 125 CAPSULE, COATED PELLETS ORAL at 14:00

## 2022-09-14 RX ADMIN — BUSPIRONE HYDROCHLORIDE 7.5 MG: 7.5 TABLET ORAL at 10:00

## 2022-09-14 RX ADMIN — ASPIRIN 81 MG: 81 TABLET, FILM COATED ORAL at 10:00

## 2022-09-14 RX ADMIN — DIVALPROEX SODIUM 250 MG: 125 CAPSULE, COATED PELLETS ORAL at 06:14

## 2022-09-14 RX ADMIN — NYSTATIN: 100000 POWDER TOPICAL at 10:00

## 2022-09-14 RX ADMIN — AMLODIPINE BESYLATE 2.5 MG: 2.5 TABLET ORAL at 10:00

## 2022-09-14 RX ADMIN — SODIUM CHLORIDE 75 ML/HR: 9 INJECTION, SOLUTION INTRAVENOUS at 00:39

## 2022-09-14 RX ADMIN — Medication 1 G: at 09:00

## 2022-09-14 RX ADMIN — Medication 1 G: at 12:00

## 2022-09-14 RX ADMIN — ROSUVASTATIN CALCIUM 5 MG: 5 TABLET, FILM COATED ORAL at 10:00

## 2022-09-14 NOTE — THERAPY EVALUATION
Patient Name: Marlene Horne  : 1931    MRN: [id number]                              Today's Date: 2022       Admit Date: 2022    Visit Dx:     ICD-10-CM ICD-9-CM   1. Seizure (HCC)  R56.9 780.39   2. Oral phase dysphagia  R13.11 787.21   3. Impaired physical mobility  Z74.09 781.99   4. Impaired mobility and ADLs  Z74.09 V49.89    Z78.9      Patient Active Problem List   Diagnosis   • Major neurocognitive disorder (CMS/HCC)   • Iron deficiency anemia secondary to inadequate dietary iron intake   • Chronic hyponatremia   • CKD (chronic kidney disease) stage 3, GFR 30-59 ml/min   • Hypovitaminosis D   • Thyroid mass   • Hypokalemia   • Primary insomnia   • Constipation   • Hypoproteinemia (HCC)   • Dental disease   • Benign essential HTN   • Closed displaced intertrochanteric fracture of right femur (HCC)   • Closed displaced fracture of surgical neck of right humerus   • Closed fracture of right wrist   • DOUGLAS (acute kidney injury) (HCC)   • Leukocytosis   • Fall at nursing home   • Right shoulder pain   • Right hip pain   • Late onset Alzheimer's dementia without behavioral disturbance (HCC)     Past Medical History:   Diagnosis Date   • Benign essential HTN 2019   • CKD (chronic kidney disease) stage 3, GFR 30-59 ml/min (HCC)    • Constipation    • Dementia (HCC)    • Hyponatremia    • Hypovitaminosis D    • Iron deficiency anemia    • Major neurocognitive disorder (HCC)    • Stroke (HCC)      Past Surgical History:   Procedure Laterality Date   • CATARACT EXTRACTION     • CLOSED REDUCTION WRIST FRACTURE Right 2020    Procedure: CLOSED REDUCTION AND SPLINTING OF RIGHT WRIST WITH FLUOROSCOPIC ASSISTANCE;  Surgeon: Baudilio Brooks MD;  Location: [facility];  Service: Orthopedics;  Laterality: Right;   • HIP INTERTROCHANTERIC NAILING Right 2020    Procedure: INTERMEDULLARY NAILING OF RIGHT FEMUR USING GAMMA NAIL AND FLUOROSCOPIC ASSITANCE;  Surgeon: Baudilio Brooks,  MD;  Location: [facility];  Service: Orthopedics;  Laterality: Right;      General Information     Row Name 09/14/22 1145          OT Time and Intention    Document Type evaluation  -RW     Mode of Treatment co-treatment;physical therapy;occupational therapy  -RW     Row Name 09/14/22 1145          General Information    Patient Profile Reviewed yes  -RW     Prior Level of Function independent:;feeding;max assist:;grooming;dressing;bathing  -RW     Existing Precautions/Restrictions fall  -RW     Barriers to Rehab medically complex;previous functional deficit  -RW     Row Name 09/14/22 1145          Living Environment    People in Home facility resident  -RW     Row Name 09/14/22 1145          Cognition    Orientation Status (Cognition) oriented to;person  to name  -RW           User Key  (r) = Recorded By, (t) = Taken By, (c) = Cosigned By    Initials Name Provider Type    RW Juanita Toledo, JALEESA Occupational Therapist                 Mobility/ADL's     Row Name 09/14/22 1145          Bed Mobility    Bed Mobility --  -RW     Comment, (Bed Mobility) deferred mobility as pt repeatedly stating she wanted to eat her lunch; noted pt able to push through hands to straighten trunk  -RW     Row Name 09/14/22 1145          Activities of Daily Living    BADL Assessment/Intervention feeding  -RW     Row Name 09/14/22 1145          Self-Feeding Assessment/Training    [place] Level (Feeding) liquids to mouth;set up;supervision;scoop food and bring to mouth  -RW     Position (Self-Feeding) sitting up in bed  -RW           User Key  (r) = Recorded By, (t) = Taken By, (c) = Cosigned By    Initials Name Provider Type    RW Juanita Toledo OT Occupational Therapist               Obj/Interventions     Row Name 09/14/22 1145          Sensory Assessment (Somatosensory)    Sensory Assessment (Somatosensory) UE sensation intact  -RW     Row Name 09/14/22 1145          Range of Motion Comprehensive    General Range of Motion  bilateral upper extremity ROM WFL  -RW     Row Name 09/14/22 1145          Strength Comprehensive (MMT)    General Manual Muscle Testing (MMT) Assessment other (see comments)  -RW     Comment, General Manual Muscle Testing (MMT) Assessment BUE MMT not assessed due to cognition  -RW           User Key  (r) = Recorded By, (t) = Taken By, (c) = Cosigned By    Initials Name Provider Type    RW Juanita Toledo OT Occupational Therapist               Goals/Plan     Row Name 09/14/22 1145          Bathing Goal 1 (OT)    Activity/Device (Bathing Goal 1, OT) upper body bathing  -RW     [place] Level/Cues Needed (Bathing Goal 1, OT) set-up required;supervision required  -RW     Time Frame (Bathing Goal 1, OT) long term goal (LTG);by discharge  -RW     Progress/Outcomes (Bathing Goal 1, OT) goal not met  -[facility] Name 09/14/22 1145          Dressing Goal 1 (OT)    Activity/Device (Dressing Goal 1, OT) upper body dressing  -RW     [place]/Cues Needed (Dressing Goal 1, OT) set-up required;supervision required  -RW     Time Frame (Dressing Goal 1, OT) long term goal (LTG);by discharge  -RW     Progress/Outcome (Dressing Goal 1, OT) goal not met  -[facility] Name 09/14/22 1145          Grooming Goal 1 (OT)    Activity/Device (Grooming Goal 1, OT) hair care;oral care;wash face, hands  -RW     [place] (Grooming Goal 1, OT) set-up required;supervision required  -RW     Time Frame (Grooming Goal 1, OT) long term goal (LTG);by discharge  -RW     Progress/Outcome (Grooming Goal 1, OT) goal not met  -[facility] Name 09/14/22 1145          Therapy Assessment/Plan (OT)    Planned Therapy Interventions (OT) activity tolerance training;manual therapy/joint mobilization;patient/caregiver education/training;adaptive equipment training;neuromuscular control/coordination retraining;BADL retraining;ROM/therapeutic exercise;cognitive/visual perception retraining;occupation/activity based interventions;strengthening  exercise;edema control/reduction;functional balance retraining;IADL retraining;passive ROM/stretching;transfer/mobility retraining  -           User Key  (r) = Recorded By, (t) = Taken By, (c) = Cosigned By    Initials Name Provider Type    RW Juanita Toledo, JALEESA Occupational Therapist               Clinical Impression     Row Name 09/14/22 1149          Pain Assessment    Pretreatment Pain Rating 0/10 - no pain  -RW     Posttreatment Pain Rating 0/10 - no pain  -RW     Additional Documentation --  -     Row Name 09/14/22 1144          Pain Scale: FACES Pre/Post-Treatment    Pain: FACES Scale, Pretreatment --  -     Row Name 09/14/22 1148          Plan of Care Review    Plan of Care Reviewed With patient;daughter  -RW     Outcome Evaluation OT eval complete, co-eval with PT. Patient resting upon arrival, arousable, and agreeable to therapy. Patients BUE ROM was WFL. Unable to assess BUE MMT due to cognition. Patient repeatedly stating she wanted to eat lunch. Patient demonstrated ability to straighten herself up in bed. Patient provided lunch tray and able to feed self with set up assist and supervision. Patient noted to have R hand tremor while self feeding. Daughter of patient stated that has become more frequent. Patient noted to have some spillage while self-feeding. Patient demonstrated deficits with decreased independence with ADLs and strength. Cont inpatient OT. Recommend SNF at d/c.  -     Row Name 09/14/22 1141          Therapy Assessment/Plan (OT)    Patient/Family Therapy Goal Statement (OT) to become more independent with ADLs.  -RW     Rehab Potential (OT) good, to achieve stated therapy goals  -RW     Criteria for Skilled Therapeutic Interventions Met (OT) yes;skilled treatment is necessary  -RW     Therapy Frequency (OT) other (see comments)  3-7 days per week  -RW     Predicted Duration of Therapy Intervention (OT) until all goals met or d/c from hospital  -     Row Name 09/14/22 5052           Therapy Plan Review/Discharge Plan (OT)    Anticipated Discharge Disposition (OT) skilled nursing facility  -     Row Name 09/14/22 1145          Vital Signs    Pre Systolic BP Rehab 135  -RW     Pre Treatment Diastolic BP 56  -RW     Pretreatment Heart Rate (beats/min) 68  -RW     Pre SpO2 (%) 94  -RW     O2 Delivery Pre Treatment room air  -RW     Pre Patient Position Supine  -RW     Post Patient Position Supine  -RW     Row Name 09/14/22 1145          Positioning and Restraints    Pre-Treatment Position in bed  -RW     Post Treatment Position bed  -RW     In Bed sitting;call light within reach;encouraged to call for assist;exit alarm on;with family/caregiver  -RW           User Key  (r) = Recorded By, (t) = Taken By, (c) = Cosigned By    Initials Name Provider Type    RW Juanita Toledo OT Occupational Therapist               Outcome Measures     Row Name 09/14/22 1145          How much help from another is currently needed...    Putting on and taking off regular lower body clothing? 1  -RW     Bathing (including washing, rinsing, and drying) 1  -RW     Toileting (which includes using toilet bed pan or urinal) 1  -RW     Putting on and taking off regular upper body clothing 2  -RW     Taking care of personal grooming (such as brushing teeth) 3  -RW     Eating meals 3  -RW     AM-PAC 6 Clicks Score (OT) 11  -RW     Row Name 09/14/22 1140 09/14/22 0900       How much help from another person do you currently need...    Turning from your back to your side while in flat bed without using bedrails? 2  -ANATOLIY 2  -MM    Moving from lying on back to sitting on the side of a flat bed without bedrails? 2  -ANATOLIY 2  -MM    Moving to and from a bed to a chair (including a wheelchair)? 2  -ANATOLIY 1  -MM    Standing up from a chair using your arms (e.g., wheelchair, bedside chair)? 2  -ANATOLIY 1  -MM    Climbing 3-5 steps with a railing? 1  -ANATOLIY 1  -MM    To walk in hospital room? 2  -ANATOLIY 1  -MM    AM-PAC 6 Clicks Score (PT) 11  -ANATOLIY  8  -MM    Highest level of mobility 4 --> Transferred to chair/commode  -ANATOLIY 3 --> Sat at edge of bed  -MM    Row Name 09/14/22 1145 09/14/22 1140       Functional Assessment    Outcome Measure Options AM-PAC 6 Clicks Daily Activity (OT)  -RW AM-PAC 6 Clicks Basic Mobility (PT)  -ANATOLIY          User Key  (r) = Recorded By, (t) = Taken By, (c) = Cosigned By    Initials Name Provider Type    Louisa Owens PT Physical Therapist    MM Tanisha Boucher, RN Registered Nurse    Juanita Durand, OT Occupational Therapist                Occupational Therapy Education                 Title: PT OT SLP Therapies (In Progress)     Topic: Occupational Therapy (In Progress)     Point: ADL training (Done)     Description:   Instruct learner(s) on proper safety adaptation and remediation techniques during self care or transfers.   Instruct in proper use of assistive devices.              Learning Progress Summary           Patient Acceptance, E,TB, VU by  at 9/14/2022 1401    Comment: POC, Role of OT                   Point: Home exercise program (Not Started)     Description:   Instruct learner(s) on appropriate technique for monitoring, assisting and/or progressing therapeutic exercises/activities.              Learner Progress:  Not documented in this visit.          Point: Precautions (Done)     Description:   Instruct learner(s) on prescribed precautions during self-care and functional transfers.              Learning Progress Summary           Patient Acceptance, E,TB, VU by RW at 9/14/2022 1401    Comment: POC, Role of OT                   Point: Body mechanics (Done)     Description:   Instruct learner(s) on proper positioning and spine alignment during self-care, functional mobility activities and/or exercises.              Learning Progress Summary           Patient Acceptance, E,TB, VU by RW at 9/14/2022 1401    Comment: POC, Role of OT                               User Key     Initials Effective Dates Name  Provider Type Discipline     05/17/22 -  Juanita Toledo, OT Occupational Therapist OT              OT Recommendation and Plan  Planned Therapy Interventions (OT): activity tolerance training, manual therapy/joint mobilization, patient/caregiver education/training, adaptive equipment training, neuromuscular control/coordination retraining, BADL retraining, ROM/therapeutic exercise, cognitive/visual perception retraining, occupation/activity based interventions, strengthening exercise, edema control/reduction, functional balance retraining, IADL retraining, passive ROM/stretching, transfer/mobility retraining  Therapy Frequency (OT): other (see comments) (3-7 days per week)  Plan of Care Review  Plan of Care Reviewed With: patient, daughter  Outcome Evaluation: OT eval complete, co-eval with PT. Patient resting upon arrival, arousable, and agreeable to therapy. Patients BUE ROM was WFL. Unable to assess BUE MMT due to cognition. Patient repeatedly stating she wanted to eat lunch. Patient demonstrated ability to straighten herself up in bed. Patient provided lunch tray and able to feed self with set up assist and supervision. Patient noted to have R hand tremor while self feeding. Daughter of patient stated that has become more frequent. Patient noted to have some spillage while self-feeding. Patient demonstrated deficits with decreased independence with ADLs and strength. Cont inpatient OT. Recommend SNF at d/c.     Time Calculation:    Time Calculation- OT     Row Name 09/14/22 1402             Time Calculation- OT    OT Start Time 1140  -RW      OT Stop Time 1212  -RW      OT Time Calculation (min) 32 min  -RW      OT Received On 09/14/22  -RW      OT Goal Re-Cert Due Date 09/27/22  -RW              Untimed Charges    OT Eval/Re-eval Minutes 32  -RW              Total Minutes    Untimed Charges Total Minutes 32  -RW       Total Minutes 32  -RW            User Key  (r) = Recorded By, (t) = Taken By, (c) =  Cosigned By    Initials Name Provider Type    RW Juanita Toledo OT Occupational Therapist              Therapy Charges for Today     Code Description Service Date Service Provider Modifiers Qty    03089174015 HC OT EVAL MOD COMPLEXITY 2 9/14/2022 Juanita Toledo OT GO 1               JUANITA TOLEDO OT  9/14/2022

## 2022-09-14 NOTE — DISCHARGE SUMMARY
"    DISCHARGE SUMMARY    PATIENT NAME: Marlene Horne   PHYSICIAN: Yobani Lundberg MD  : 1931  MRN: [id number]    ADMITTED: 2022     DISCHARGED: 2022      ADMITTING DIAGNOSES:  Seizure (HCC) [R56.9]      DISCHARGE AND RESOLVED DIAGNOSES:  Active Hospital Problems    Diagnosis  POA   • Benign essential HTN [I10]  Yes   • Chronic hyponatremia [E87.1]  Yes   • CKD (chronic kidney disease) stage 3, GFR 30-59 ml/min [N18.30]  Yes   • Major neurocognitive disorder (CMS/HCC) [F03.90]  Yes   • Iron deficiency anemia secondary to inadequate dietary iron intake [D50.8]  Yes      Resolved Hospital Problems    Diagnosis Date Resolved POA   • **Seizure (HCC) [R56.9] 2022 Yes   • UTI (urinary tract infection) [N39.0] 2022 Clinically Undetermined   • Acute cystitis with hematuria [N30.01] 2022 Yes         SERVICE: Family Medicine Residency  Attending: Dr. Anival Meier  Resident: Yobani Lundberg MD    CONSULTS:   Consult Orders (all) (From admission, onward)     Start     Ordered    22 1527  Family Practice - Resident (on-call MD unless specified)  Once        Specialty:  Family Medicine  Provider:  (Not yet assigned)    22 1526                PROCEDURES:   None    HISTORY OF PRESENT ILLNESS:   Per the H&P written by Dr. Lundberg, dated 22:  \"Marlene Horne is a 89 y.o. female with a CMH of neurocognitive disorder, chronic hyponatremia, CKD stage 3a, HTN, iron def anemia who presents with seizure.     Around 10am this morning patient was found in her bed in what seemed to be a tonic-clonic seizure.  Patient wears diapers so incontinence could not be assessed.  Has no teeth so cannot assess if she bit tongue.  Demented at baseline so cannot assess post-ictal state.  Has not had a seizure in at least 5 years at this time.  Given 1mg Ativan IM at NH.  Comes from [place].\"    REVIEW OF SYSTEMS:  Review of Systems   Unable to perform ROS: Dementia        PHYSICAL EXAM:  Physical " Exam  Constitutional:       General: She is not in acute distress.  HENT:      Head: Normocephalic and atraumatic.   Cardiovascular:      Rate and Rhythm: Normal rate and regular rhythm.      Heart sounds: Normal heart sounds.   Pulmonary:      Effort: Pulmonary effort is normal. No respiratory distress.      Breath sounds: Normal breath sounds.   Abdominal:      Palpations: Abdomen is soft.      Tenderness: There is no abdominal tenderness.   Musculoskeletal:      Right lower leg: No edema.      Left lower leg: No edema.   Skin:     General: Skin is warm and dry.   Neurological:      Mental Status: She is alert.      Comments: Moving all extremities.  A&O only to person.   Psychiatric:         Mood and Affect: Mood normal.         Behavior: Behavior normal.          DIAGNOSTIC DATA:   Lab Results (last 72 hours)     Procedure Component Value Units Date/Time    Comprehensive Metabolic Panel [[id number]]  (Abnormal) Collected: 09/14/22 0521    Specimen: Blood Updated: 09/14/22 0553     Glucose 81 mg/dL      BUN 13 mg/dL      Creatinine 0.99 mg/dL      Sodium 143 mmol/L      Potassium 3.9 mmol/L      Chloride 111 mmol/L      CO2 25.0 mmol/L      Calcium 7.9 mg/dL      Total Protein 5.4 g/dL      Albumin 3.00 g/dL      ALT (SGPT) 8 U/L      AST (SGOT) 13 U/L      Alkaline Phosphatase 117 U/L      Total Bilirubin 0.2 mg/dL      Globulin 2.4 gm/dL      A/G Ratio 1.3 g/dL      BUN/Creatinine Ratio 13.1     Anion Gap 7.0 mmol/L      eGFR 54.3 mL/min/1.73      Comment: National Kidney Foundation and American Society of Nephrology (ASN) Task Force recommended calculation based on the Chronic Kidney Disease Epidemiology Collaboration (CKD-EPI) equation refit without adjustment for race.       Narrative:      GFR Normal >60  Chronic Kidney Disease <60  Kidney Failure <15      Valproic Acid Level, Total [[id number]]  (Normal) Collected: 09/14/22 0521    Specimen: Blood Updated: 09/14/22 0553     Valproic Acid 59.9 mcg/mL     CBC  Auto Differential [[id number]]  (Abnormal) Collected: 09/14/22 0521    Specimen: Blood Updated: 09/14/22 0536     WBC 5.40 10*3/mm3      RBC 3.10 10*6/mm3      Hemoglobin 9.7 g/dL      Hematocrit 30.1 %      MCV 97.1 fL      MCH 31.3 pg      MCHC 32.2 g/dL      RDW 13.4 %      RDW-SD 47.7 fl      MPV 10.3 fL      Platelets 176 10*3/mm3      Neutrophil % 53.9 %      Lymphocyte % 31.5 %      Monocyte % 7.6 %      Eosinophil % 5.6 %      Basophil % 0.7 %      Immature Grans % 0.7 %      Neutrophils, Absolute 2.91 10*3/mm3      Lymphocytes, Absolute 1.70 10*3/mm3      Monocytes, Absolute 0.41 10*3/mm3      Eosinophils, Absolute 0.30 10*3/mm3      Basophils, Absolute 0.04 10*3/mm3      Immature Grans, Absolute 0.04 10*3/mm3      nRBC 0.0 /100 WBC     Blood Culture - Blood, Arm, Left [[id number]]  (Normal) Collected: 09/12/22 1506    Specimen: Blood from Arm, Left Updated: 09/13/22 1533     Blood Culture No growth at 24 hours    Blood Culture - Blood, Hand, Right [[id number]]  (Normal) Collected: 09/12/22 1506    Specimen: Blood from Hand, Right Updated: 09/13/22 1533     Blood Culture No growth at 24 hours    CBC Auto Differential [[id number]]  (Abnormal) Collected: 09/13/22 0532    Specimen: Blood Updated: 09/13/22 0654     WBC 6.35 10*3/mm3      RBC 3.04 10*6/mm3      Hemoglobin 9.5 g/dL      Hematocrit 29.6 %      MCV 97.4 fL      MCH 31.3 pg      MCHC 32.1 g/dL      RDW 13.3 %      RDW-SD 47.7 fl      MPV 10.6 fL      Platelets 169 10*3/mm3      Neutrophil % 56.9 %      Lymphocyte % 34.0 %      Monocyte % 5.7 %      Eosinophil % 2.2 %      Basophil % 0.6 %      Immature Grans % 0.6 %      Neutrophils, Absolute 3.61 10*3/mm3      Lymphocytes, Absolute 2.16 10*3/mm3      Monocytes, Absolute 0.36 10*3/mm3      Eosinophils, Absolute 0.14 10*3/mm3      Basophils, Absolute 0.04 10*3/mm3      Immature Grans, Absolute 0.04 10*3/mm3      nRBC 0.0 /100 WBC     Comprehensive Metabolic Panel [[id number]]  (Abnormal) Collected:  09/13/22 0532    Specimen: Blood Updated: 09/13/22 0637     Glucose 83 mg/dL      BUN 16 mg/dL      Creatinine 1.11 mg/dL      Sodium 141 mmol/L      Potassium 4.1 mmol/L      Chloride 110 mmol/L      CO2 26.0 mmol/L      Calcium 8.0 mg/dL      Total Protein 5.4 g/dL      Albumin 3.00 g/dL      ALT (SGPT) 7 U/L      AST (SGOT) 12 U/L      Alkaline Phosphatase 119 U/L      Total Bilirubin 0.2 mg/dL      Globulin 2.4 gm/dL      A/G Ratio 1.3 g/dL      BUN/Creatinine Ratio 14.4     Anion Gap 5.0 mmol/L      eGFR 47.3 mL/min/1.73      Comment: National Kidney Foundation and American Society of Nephrology (ASN) Task Force recommended calculation based on the Chronic Kidney Disease Epidemiology Collaboration (CKD-EPI) equation refit without adjustment for race.       Narrative:      GFR Normal >60  Chronic Kidney Disease <60  Kidney Failure <15      Valproic Acid Level, Total [[id number]]  (Normal) Collected: 09/13/22 0532    Specimen: Blood Updated: 09/13/22 0637     Valproic Acid 61.1 mcg/mL     Troponin [[id number]]  (Normal) Collected: 09/12/22 1507    Specimen: Blood Updated: 09/12/22 1549     Troponin T <0.010 ng/mL     Narrative:      Troponin T Reference Range:  <= 0.03 ng/mL-   Negative for AMI  >0.03 ng/mL-     Abnormal for myocardial necrosis.  Clinicians would have to utilize clinical acumen, EKG, Troponin and serial changes to determine if it is an Acute Myocardial Infarction or myocardial injury due to an underlying chronic condition.       Results may be falsely decreased if patient taking Biotin.      STAT Lactic Acid, Reflex [[id number]]  (Normal) Collected: 09/12/22 1507    Specimen: Blood Updated: 09/12/22 1545     Lactate 1.5 mmol/L     Urinalysis, Microscopic Only - Urine, Clean Catch [[id number]]  (Abnormal) Collected: 09/12/22 1312    Specimen: Urine, Clean Catch Updated: 09/12/22 1355     RBC, UA 0-2 /HPF      WBC, UA 3-5 /HPF      Comment: Urine culture not indicated.        Bacteria, UA 4+ /HPF       Squamous Epithelial Cells, UA 3-5 /HPF      Yeast, UA Small/1+ Yeast /HPF      Hyaline Casts, UA 0-2 /LPF      Methodology Manual Light Microscopy    Urinalysis With Culture If Indicated - Urine, Clean Catch [[id number]]  (Abnormal) Collected: 09/12/22 1312    Specimen: Urine, Clean Catch Updated: 09/12/22 1324     Color, UA Yellow     Appearance, UA Cloudy     pH, UA 7.5     Specific Gravity, UA 1.010     Glucose, UA Negative     Ketones, UA Negative     Bilirubin, UA Negative     Blood, UA Trace     Protein, UA Negative     Leuk Esterase, UA Trace     Nitrite, UA Negative     Urobilinogen, UA 0.2 E.U./dL    Narrative:      In absence of clinical symptoms, the presence of pyuria, bacteria, and/or nitrites on the urinalysis result does not correlate with infection.    Extra Tubes [[id number]] Collected: 09/12/22 1132    Specimen: Blood, Venous Line Updated: 09/12/22 1248    Narrative:      The following orders were created for panel order Extra Tubes.  Procedure                               Abnormality         Status                     ---------                               -----------         ------                     Lavender Top[[id number]]                                     Final result               Gold Top - SST[[id number]]                                   Final result               Light Blue Top[[id number]]                                   Final result                 Please view results for these tests on the individual orders.    Lavender Top [[id number]] Collected: 09/12/22 1132    Specimen: Blood Updated: 09/12/22 1248     Extra Tube hold for add-on     Comment: Auto resulted       Gold Top - SST [[id number]] Collected: 09/12/22 1132    Specimen: Blood Updated: 09/12/22 1248     Extra Tube Hold for add-ons.     Comment: Auto resulted.       Light Blue Top [[id number]] Collected: 09/12/22 1132    Specimen: Blood Updated: 09/12/22 1248     Extra Tube Hold for add-ons.     Comment: Auto resulted        COVID-19 and FLU A/B PCR - Swab, Nasopharynx [[id number]]  (Normal) Collected: 09/12/22 1122    Specimen: Swab from Nasopharynx Updated: 09/12/22 1208     COVID19 Not Detected     Influenza A PCR Not Detected     Influenza B PCR Not Detected    Narrative:      Fact sheet for providers: https://www.fda.gov/media/059837/download    Fact sheet for patients: https://www.fda.gov/media/835447/download    Test performed by PCR.    Comprehensive Metabolic Panel [[id number]]  (Abnormal) Collected: 09/12/22 1122    Specimen: Blood Updated: 09/12/22 1156     Glucose 126 mg/dL      BUN 20 mg/dL      Creatinine 1.37 mg/dL      Sodium 142 mmol/L      Potassium 4.1 mmol/L      Chloride 106 mmol/L      CO2 22.0 mmol/L      Calcium 8.8 mg/dL      Total Protein 6.5 g/dL      Albumin 3.50 g/dL      ALT (SGPT) 10 U/L      AST (SGOT) 15 U/L      Alkaline Phosphatase 137 U/L      Total Bilirubin 0.2 mg/dL      Globulin 3.0 gm/dL      A/G Ratio 1.2 g/dL      BUN/Creatinine Ratio 14.6     Anion Gap 14.0 mmol/L      eGFR 36.8 mL/min/1.73      Comment: National Kidney Foundation and American Society of Nephrology (ASN) Task Force recommended calculation based on the Chronic Kidney Disease Epidemiology Collaboration (CKD-EPI) equation refit without adjustment for race.       Narrative:      GFR Normal >60  Chronic Kidney Disease <60  Kidney Failure <15      CK [[id number]]  (Normal) Collected: 09/12/22 1122    Specimen: Blood Updated: 09/12/22 1156     Creatine Kinase 35 U/L     Magnesium [[id number]]  (Normal) Collected: 09/12/22 1122    Specimen: Blood Updated: 09/12/22 1156     Magnesium 2.2 mg/dL     Valproic Acid Level, Total [[id number]]  (Abnormal) Collected: 09/12/22 1122    Specimen: Blood Updated: 09/12/22 1156     Valproic Acid 12.5 mcg/mL     Troponin [[id number]]  (Normal) Collected: 09/12/22 1122    Specimen: Blood Updated: 09/12/22 1156     Troponin T <0.010 ng/mL     Narrative:      Troponin T Reference Range:  <= 0.03 ng/mL-    Negative for AMI  >0.03 ng/mL-     Abnormal for myocardial necrosis.  Clinicians would have to utilize clinical acumen, EKG, Troponin and serial changes to determine if it is an Acute Myocardial Infarction or myocardial injury due to an underlying chronic condition.       Results may be falsely decreased if patient taking Biotin.      BNP [[id number]]  (Normal) Collected: 09/12/22 1122    Specimen: Blood Updated: 09/12/22 1154     proBNP 1,465.0 pg/mL     Narrative:      Among patients with dyspnea, NT-proBNP is highly sensitive for the detection of acute congestive heart failure. In addition NT-proBNP of <300 pg/ml effectively rules out acute congestive heart failure with 99% negative predictive value.    Results may be falsely decreased if patient taking Biotin.      Lactic Acid, Plasma [[id number]]  (Abnormal) Collected: 09/12/22 1122    Specimen: Blood Updated: 09/12/22 1153     Lactate 6.1 mmol/L     CBC & Differential [[id number]]  (Abnormal) Collected: 09/12/22 1122    Specimen: Blood Updated: 09/12/22 1138    Narrative:      The following orders were created for panel order CBC & Differential.  Procedure                               Abnormality         Status                     ---------                               -----------         ------                     CBC Auto Differential[[id number]]        Abnormal            Final result                 Please view results for these tests on the individual orders.    CBC Auto Differential [[id number]]  (Abnormal) Collected: 09/12/22 1122    Specimen: Blood Updated: 09/12/22 1138     WBC 9.18 10*3/mm3      RBC 3.67 10*6/mm3      Hemoglobin 11.6 g/dL      Hematocrit 35.8 %      MCV 97.5 fL      MCH 31.6 pg      MCHC 32.4 g/dL      RDW 13.3 %      RDW-SD 47.8 fl      MPV 10.4 fL      Platelets 221 10*3/mm3      Neutrophil % 42.7 %      Lymphocyte % 47.9 %      Monocyte % 5.7 %      Eosinophil % 2.5 %      Basophil % 0.8 %      Immature Grans % 0.4 %       Neutrophils, Absolute 3.92 10*3/mm3      Lymphocytes, Absolute 4.40 10*3/mm3      Monocytes, Absolute 0.52 10*3/mm3      Eosinophils, Absolute 0.23 10*3/mm3      Basophils, Absolute 0.07 10*3/mm3      Immature Grans, Absolute 0.04 10*3/mm3      nRBC 0.0 /100 WBC            CT Head Without Contrast    Result Date: 9/12/2022  Atrophy with evidence of chronic small vessel white matter ischemic change. 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 No significant focal or acute intracranial pathology. Electronically signed by:  Nando Ambrocio MD  9/12/2022 1:50 PM CDT Workstation: [id number]    XR Chest 1 View    Result Date: 9/12/2022  Stable exam without acute cardiopulmonary process. Electronically signed by:  Milind Conner MD  9/12/2022 12:41 PM CDT Workstation: [id number]       HOSPITAL COURSE:  Patient was brought to ED and given ativan for agitation while there.  Prior to accepting the patient, neurology was contacted and agreed to assist with the care of the patient.  It was decided the patient would be loaded with 1 gram depakote followed by 250mg TID.  This was all oral as the hospital did not have an IV formulation.  Patient's VPA levels were tracked for 2 days and found to be at acceptable levels not concerning for subtherapeutic or supratherapeutic dosing.  Due to the patient having dementia and having agitation from being in an unfamiliar environment, patient was started on zyprexa SL as needed for sleep along with other sundowning precautions.    Patient was found to have a UTI and was treated with ceftriaxone and fosfomycin.  Was also given 250mg IV Iron while inpatient.  Patient needs to have a VPA, BMP, UA, CBC completed in a week.    DISCHARGE CONDITION:   Stable    DISPOSITION: SNF [facility] ([place] - External)    DISCHARGE MEDICATIONS     Discharge Medications      New Medications      Instructions Start Date   OLANZapine zydis 5 MG disintegrating tablet  Commonly known as: zyPREXA   5  mg, Translingual, Nightly PRN         Changes to Medications      Instructions Start Date   Divalproex Sodium 125 MG capsule  Commonly known as: DEPAKOTE SPRINKLE  What changed:   · how much to take  · when to take this   250 mg, Oral, Every 8 Hours Scheduled         Continue These Medications      Instructions Start Date   acetaminophen 325 MG tablet  Commonly known as: TYLENOL   650 mg, Oral, Every 4 Hours PRN      albuterol (2.5 MG/3ML) 0.083% nebulizer solution  Commonly known as: PROVENTIL   2.5 mg, Nebulization, Every 4 Hours PRN      amLODIPine 5 MG tablet  Commonly known as: NORVASC   2.5 mg, Oral, Every 24 Hours Scheduled      aspirin 81 MG EC tablet   81 mg, Oral, Daily      busPIRone 5 MG tablet  Commonly known as: BUSPAR   7.5 mg, Oral, 2 Times Daily      cholecalciferol 25 MCG (1000 UT) tablet  Commonly known as: VITAMIN D3   1,000 Units, Oral, Daily      donepezil 5 MG tablet  Commonly known as: ARICEPT   5 mg, Oral, Nightly      ferrous sulfate 324 (65 Fe) MG tablet delayed-release EC tablet   324 mg, Oral, Daily With Breakfast      I-gudelia tablet tablet  Generic drug: multivitamin with minerals   1 tablet, Oral, Daily      melatonin 5 MG tablet tablet   5 mg, Oral, Nightly      mirtazapine 7.5 MG half tablet  Commonly known as: REMERON   Oral, Nightly      nystatin 138733 UNIT/GM powder  Commonly known as: MYCOSTATIN   1 application, Topical, 2 Times Daily PRN, Apply under breast topically as needed for redness      ondansetron 4 MG tablet  Commonly known as: ZOFRAN   4 mg, Oral, Every 6 Hours PRN      rosuvastatin 5 MG tablet  Commonly known as: CRESTOR   5 mg, Oral, Nightly      sennosides-docusate 8.6-50 MG per tablet  Commonly known as: PERICOLACE   2 tablets, Oral, Nightly      sodium chloride 1 g tablet   1 g, Oral, 3 Times Daily With Meals      vitamin D 1.25 MG (33133 UT) capsule capsule  Commonly known as: ERGOCALCIFEROL   50,000 Units, Oral, Weekly             INSTRUCTIONS:  Activity:    Activity Instructions     Activity as Tolerated          Diet:   Diet Instructions     Diet: Cardiac, Soft Texture; Thin Liquids, No Restrictions; Ground      Discharge Diet:  Cardiac  Soft Texture       Fluid Consistency: Thin Liquids, No Restrictions    Soft Options: Ground          FOLLOW UP:   Additional Instructions for the Follow-ups that You Need to Schedule     Call MD With Problems / Concerns   As directed      Instructions: Return to care if chest pain, shortness of breath, or NVD occur.    Order Comments: Instructions: Return to care if chest pain, shortness of breath, or NVD occur.          Discharge Follow-up with PCP   As directed       Currently Documented PCP:    Yobani Lundberg MD    PCP Phone Number:    [phone]     Follow Up Details: Will be seen in nursing home in 1 week            Follow-up Information     Yboani Lundberg MD .    Specialties: Family Medicine, Emergency Medicine, Urgent Care  Why: Will be seen in nursing home in 1 week  Contact information:  [address] [facility] [address] [phone]                         PENDING TEST RESULTS AT DISCHARGE  Pending Labs     Order Current Status    Blood Culture - Blood, Arm, Left Preliminary result    Blood Culture - Blood, Hand, Right Preliminary result          Time: >30 minutes were spent in discharge planning, medication reconciliation and coordination of care for this patient.    Dr. Anival Meier is the attending at time of discharge, He is aware of the patient's status and agrees with the above discharge summary.      This document has been electronically signed by Yobani Lundberg MD on September 14, 2022 10:43 CDT

## 2022-09-14 NOTE — CASE MANAGEMENT/SOCIAL WORK
"Physicians Statement of Medical Necessity for  Ambulance Transportation    GENERAL INFORMATION     Name: Marlene Horne  YOB: 1931  Medicare #: [id number]  Transport Date: 9/14/2022 (Valid for round trips this date, or for scheduled repetitive trips for 60 days from the date signed below.)  Origin: [facility] Room 411 Destination: [facility] Room 303-1  Is the Patient's stay covered under Medicare Part A (PPS/DRG?)Yes  Closest appropriate facility? Yes  If this a hosp-hosp transfer? No  Is this a hospice patient? No    MEDICAL NECESSITY QUESTIONAIRE    Ambulance Transportation is medically necessary only if other means of transportation are contraindicated or would be potentially harmful to the patient.  To meet this requirement, the patient must be either \"bed confined\" or suffer from a condition such that transport by means other than an ambulance is contraindicated by the patient's condition.  The following questions must be answered by the healthcare professional signing below for this form to be valid:     1) Describe the MEDICAL CONDITION (physical and/or mental) of this patient AT THE TIME OF AMBULANCE TRANSPORT that requires the patient to be transported in an ambulance, and why transport by other means is contraindicated by the patient's condition:    Patient with hx of neurocognitive disorder, seizures, and prior stroke requiring EMS transport back to SNF. Patient is confused and unable to follow directions. Patient has only sat EOB and was deemed not appropriate for therapy evals while admitted here at [facility] due to her decreased alertness. Patient cannot safely be transported by car of w/c van at this time.      2) Is this patient \"bed confined\" as defined below?Yes   To be \"bed confined\" the patient must satisfy all three of the following criteria:  (1) unable to get up from bed without assistance; AND (2) unable to ambulate;  AND (3) unable to sit in a chair or wheelchair.  3) Can " this patient safely be transported by car or wheelchair van (I.e., may safely sit during transport, without an attendant or monitoring?)No   4. In addition to completing questions 1-3 above, please check any of the following conditions that apply*:          *Note: supporting documentation for any boxes checked must be maintained in the patient's medical records Patient is confused, Medical attendant required and Unable to tolerate seated position for time needed to transport      SIGNATURE OF PHYSICIAN OR OTHER AUTHORIZED HEALTHCARE PROFESSIONAL    I certify that the above information is true and correct based on my evaluation of this patient, and represent that the patient requires transport by ambulance and that other forms of transport are contraindicated.  I understand that this information will be used by the Centers for Medicare and Medicaid Services (CMS) to support the determiniation of medical necessity for ambulance services, and I represent that I have personal knowledge of the patient's condition at the time of transport.       If this box is checked, I also certify that the patient is physically or mentally incapable of signing the ambulance service's claim form and that the institution with which I am affiliated has furnished care, services or assistance to the patient.  My signature below is made on behalf of the patient pursuant to 42 .36(b)(4). In accordance with 42 .37, the specific reason(s) that the patient is physically or mentally incapable of signing the claim for is as follows:    Signature of Physician or Healthcare Professional    Chi Brooks RN, ELMA Date/Time:     9/14/2022 at 1207     (For Scheduled repetitive transport, this form is not valid for transports performed more than 60 days after this date).    Chi Brooks RN, ELMA.                                                                                                                                         --------------------------------------------------------------------------------------------  Printed Name and Credentials of Physician or Authorized Healthcare Professional     *Form must be signed by patient's attending physician for scheduled, repetitive transports,.  For non-repetitive ambulance transports, if unable to obtain the signature of the attending physician, any of the following may sign (please select below):     Physician  Clinical Nurse Specialist X Registered Nurse     Physician Assistant X Discharge Planner  Licensed Practical Nurse     Nurse Practitioner X

## 2022-09-14 NOTE — NURSING NOTE
Daughter at bedside. Pt discharged. IV and telemetry removed per order. Report called tp SNF, discharge instructions and AVS reviewed, all questions answered. Awaiting EMS at this time. Pt AOx1, pills in apple sauce. BM 9/13. VSS.

## 2022-09-14 NOTE — PLAN OF CARE
Goal Outcome Evaluation:  Plan of Care Reviewed With: patient, daughter           Outcome Evaluation: OT alexis complete, co-eval with PT. Patient resting upon arrival, arousable, and agreeable to therapy. Patients BUE ROM was WFL. Unable to assess BUE MMT due to cognition. Patient repeatedly stating she wanted to eat lunch. Patient demonstrated ability to straighten herself up in bed. Patient provided lunch tray and able to feed self with set up assist and supervision. Patient noted to have R hand tremor while self feeding. Daughter of patient stated that has become more frequent. Patient noted to have some spillage while self-feeding. Patient demonstrated deficits with decreased independence with ADLs and strength. Cont inpatient OT. Recommend SNF at d/c.

## 2022-09-14 NOTE — PLAN OF CARE
"  Problem: Adult Inpatient Plan of Care  Goal: Plan of Care Review  Recent Flowsheet Documentation  Taken 9/14/2022 1140 by Louisa Irving PT  Plan of Care Reviewed With:   patient   daughter  Outcome Evaluation: PT evaluation completed as co-eval with OT. Pt oriented to person only. AROM WFL BLE and strength grossly 4-/5. Unable to assess mobility as pt stating repeatedly she wanted to eat lunch. Observed pt was able to push through UE\"s to straitghten trunk before starting to feed herself. Dtr reports pt required max assistance of staff at NH to transfer to w/c but pt able to propel w/c herself. Dtr also reports pt would walk with walker with assistance of restorative staff at NH. Function limited by decreased strength and tolerance for functional mobility and activities. Pt will benefit from PT to progress back to baseline mobility and OOB tolerance. Anticipate return to NH at discharge with continued rehab.   Goal Outcome Evaluation:  Plan of Care Reviewed With: patient, daughter           Outcome Evaluation: PT evaluation completed as co-eval with OT. Pt oriented to person only. AROM WFL BLE and strength grossly 4-/5. Unable to assess mobility as pt stating repeatedly she wanted to eat lunch. Observed pt was able to push through UE\"s to straitghten trunk before starting to feed herself. Dtr reports pt required max assistance of staff at NH to transfer to w/c but pt able to propel w/c herself. Dtr also reports pt would walk with walker with assistance of restorative staff at NH. Function limited by decreased strength and tolerance for functional mobility and activities. Pt will benefit from PT to progress back to baseline mobility and OOB tolerance. Anticipate return to NH at discharge with continued rehab.  "

## 2022-09-14 NOTE — THERAPY EVALUATION
Patient Name: Marlene Horne  : 1931    MRN: [id number]                              Today's Date: 2022       Admit Date: 2022    Visit Dx:     ICD-10-CM ICD-9-CM   1. Seizure (HCC)  R56.9 780.39   2. Oral phase dysphagia  R13.11 787.21   3. Impaired physical mobility  Z74.09 781.99     Patient Active Problem List   Diagnosis   • Major neurocognitive disorder (CMS/HCC)   • Iron deficiency anemia secondary to inadequate dietary iron intake   • Chronic hyponatremia   • CKD (chronic kidney disease) stage 3, GFR 30-59 ml/min   • Hypovitaminosis D   • Thyroid mass   • Hypokalemia   • Primary insomnia   • Constipation   • Hypoproteinemia (HCC)   • Dental disease   • Benign essential HTN   • Closed displaced intertrochanteric fracture of right femur (HCC)   • Closed displaced fracture of surgical neck of right humerus   • Closed fracture of right wrist   • DOUGLAS (acute kidney injury) (HCC)   • Leukocytosis   • Fall at nursing home   • Right shoulder pain   • Right hip pain   • Late onset Alzheimer's dementia without behavioral disturbance (HCC)     Past Medical History:   Diagnosis Date   • Benign essential HTN 2019   • CKD (chronic kidney disease) stage 3, GFR 30-59 ml/min (HCC)    • Constipation    • Dementia (HCC)    • Hyponatremia    • Hypovitaminosis D    • Iron deficiency anemia    • Major neurocognitive disorder (HCC)    • Stroke (HCC)      Past Surgical History:   Procedure Laterality Date   • CATARACT EXTRACTION     • CLOSED REDUCTION WRIST FRACTURE Right 2020    Procedure: CLOSED REDUCTION AND SPLINTING OF RIGHT WRIST WITH FLUOROSCOPIC ASSISTANCE;  Surgeon: Baudilio Brooks MD;  Location: [facility];  Service: Orthopedics;  Laterality: Right;   • HIP INTERTROCHANTERIC NAILING Right 2020    Procedure: INTERMEDULLARY NAILING OF RIGHT FEMUR USING GAMMA NAIL AND FLUOROSCOPIC ASSITANCE;  Surgeon: Baudilio Brooks MD;  Location: [facility];  Service: Orthopedics;   Laterality: Right;      General Information     Row Name 09/14/22 1140          Physical Therapy Time and Intention    Document Type evaluation  -     Mode of Treatment co-treatment;physical therapy;occupational therapy  -     Row Name 09/14/22 1140          General Information    Patient Profile Reviewed yes  -ANATOLIY     Prior Level of Function independent:;feeding;w/c or scooter;min assist:;mod assist:;gait;max assist:;transfer;grooming;dressing;bathing  dtr reports pt able to walk with walker with assistance of restorative staff and able to propel w/c herself once pt transferred  -     Existing Precautions/Restrictions fall  -     Barriers to Rehab medically complex;previous functional deficit  -     Row Name 09/14/22 1140          Living Environment    People in Home facility resident  RWT  -     Row Name 09/14/22 1140          Cognition    Orientation Status (Cognition) oriented to;person  -     Row Name 09/14/22 1140          Safety Issues, Functional Mobility    Impairments Affecting Function (Mobility) strength;endurance/activity tolerance  -           User Key  (r) = Recorded By, (t) = Taken By, (c) = Cosigned By    Initials Name Provider Type    Louisa Owens, PT Physical Therapist               Mobility     Row Name 09/14/22 1140          Bed Mobility    Comment, (Bed Mobility) deferred mobility as pt repeatedly stating she wanted to eat her lunch; noted pt able to push through hands to straighten trunk  -           User Key  (r) = Recorded By, (t) = Taken By, (c) = Cosigned By    Initials Name Provider Type    Louisa Owens PT Physical Therapist               Obj/Interventions     Row Name 09/14/22 1140          Range of Motion Comprehensive    Comment, General Range of Motion BLE: AROM WFL  -     Row Name 09/14/22 1140          Strength Comprehensive (MMT)    Comment, General Manual Muscle Testing (MMT) Assessment BLE: grossly 4-/5  -     Row Name 09/14/22 1140           Sensory Assessment (Somatosensory)    Sensory Assessment (Somatosensory) LE sensation intact  -           User Key  (r) = Recorded By, (t) = Taken By, (c) = Cosigned By    Initials Name Provider Type    Louisa Owens, PT Physical Therapist               Goals/Plan     Row Name 09/14/22 1140          Bed Mobility Goal 1 (PT)    Activity/Assistive Device (Bed Mobility Goal 1, PT) sit to supine;supine to sit  -     [place] Level/Cues Needed (Bed Mobility Goal 1, PT) minimum assist (75% or more patient effort);moderate assist (50-74% patient effort)  -     Time Frame (Bed Mobility Goal 1, PT) 5 days  -ANATOLIY     Progress/Outcomes (Bed Mobility Goal 1, PT) goal not met  -     Row Name 09/14/22 1140          Transfer Goal 1 (PT)    Activity/Assistive Device (Transfer Goal 1, PT) sit-to-stand/stand-to-sit;bed-to-chair/chair-to-bed  -ANATOLIY     [place] Level/Cues Needed (Transfer Goal 1, PT) minimum assist (75% or more patient effort);moderate assist (50-74% patient effort)  -ANATOLIY     Time Frame (Transfer Goal 1, PT) 5 days  -ANATOLIY     Progress/Outcome (Transfer Goal 1, PT) goal not met  -     Row Name 09/14/22 1140          Gait Training Goal 1 (PT)    Activity/Assistive Device (Gait Training Goal 1, PT) gait (walking locomotion);assistive device use;decrease fall risk;increase endurance/gait distance  -     [place] Level (Gait Training Goal 1, PT) minimum assist (75% or more patient effort);moderate assist (50-74% patient effort)  -     Distance (Gait Training Goal 1, PT) 50ft or more  -     Time Frame (Gait Training Goal 1, PT) 1 week;2 weeks  -     Progress/Outcome (Gait Training Goal 1, PT) goal not met  -     Row Name 09/14/22 1140          Therapy Assessment/Plan (PT)    Planned Therapy Interventions (PT) balance training;bed mobility training;gait training;home exercise program;patient/family education;strengthening;transfer training;wheelchair management/propulsion training  -            "User Key  (r) = Recorded By, (t) = Taken By, (c) = Cosigned By    Initials Name Provider Type    ANATOLIY Louisa Irving, PT Physical Therapist               Clinical Impression     Row Name 09/14/22 1140          Pain    Pretreatment Pain Rating 0/10 - no pain  -ANATOLIY     Posttreatment Pain Rating 0/10 - no pain  -     Additional Documentation Pain Scale: Numbers Pre/Post-Treatment (Group)  -[facility] Name 09/14/22 1140          Plan of Care Review    Plan of Care Reviewed With patient;daughter  -     Outcome Evaluation PT evaluation completed as co-eval with OT. Pt oriented to person only. AROM WFL BLE and strength grossly 4-/5. Unable to assess mobility as pt stating repeatedly she wanted to eat lunch. Observed pt was able to push through UE\"s to straitghten trunk before starting to feed herself. Dtr reports pt required max assistance of staff at NH to transfer to w/c but pt able to propel w/c herself. Dtr also reports pt would walk with walker with assistance of restorative staff at NH. Function limited by decreased strength and tolerance for functional mobility and activities. Pt will benefit from PT to progress back to baseline mobility and OOB tolerance. Anticipate return to NH at discharge with continued rehab.  -     Row Name 09/14/22 1140          Therapy Assessment/Plan (PT)    Patient/Family Therapy Goals Statement (PT) Pt not cognitively able to participate in goal setting.  -     Rehab Potential (PT) good, to achieve stated therapy goals  -     Criteria for Skilled Interventions Met (PT) yes;meets criteria;skilled treatment is necessary  -     Therapy Frequency (PT) other (see comments)  5-7 days/wk  -     Row Name 09/14/22 1140          Vital Signs    Pre Systolic BP Rehab 146  -ANATOLIY     Pre Treatment Diastolic BP 98  -ANATOLIY     Post Systolic BP Rehab 135  -ANATOLIY     Post Treatment Diastolic BP 56  -ANATOLIY     Pretreatment Heart Rate (beats/min) 68  -ANATOLIY     Pre SpO2 (%) 94  -ANATOLIY     O2 Delivery Pre Treatment " room air  -ANATOLIY     Pre Patient Position Supine  -ANATOLIY     Post Patient Position Supine  -ANATOLIY     Row Name 09/14/22 1140          Positioning and Restraints    Pre-Treatment Position in bed  -ANATOLIY     Post Treatment Position bed  -ANATOLIY     In Bed notified nsg;call light within reach;encouraged to call for assist;exit alarm on;with family/caregiver  HOB elevated eating lunch  -ANATOLIY           User Key  (r) = Recorded By, (t) = Taken By, (c) = Cosigned By    Initials Name Provider Type    Louisa Owens, PT Physical Therapist               Outcome Measures     Row Name 09/14/22 1140 09/14/22 0900       How much help from another person do you currently need...    Turning from your back to your side while in flat bed without using bedrails? 2  -ANATOLIY 2  -MM    Moving from lying on back to sitting on the side of a flat bed without bedrails? 2  -ANATOLIY 2  -MM    Moving to and from a bed to a chair (including a wheelchair)? 2  -ANATOLIY 1  -MM    Standing up from a chair using your arms (e.g., wheelchair, bedside chair)? 2  -ANATOLIY 1  -MM    Climbing 3-5 steps with a railing? 1  -ANATOLIY 1  -MM    To walk in hospital room? 2  -ANATOLIY 1  -MM    AM-PAC 6 Clicks Score (PT) 11  -ANATOLIY 8  -MM    Highest level of mobility 4 --> Transferred to chair/commode  -ANATOLIY 3 --> Sat at edge of bed  -MM    Row Name 09/14/22 1145 09/14/22 1140       Functional Assessment    Outcome Measure Options AM-PAC 6 Clicks Daily Activity (OT)  -RW AM-PAC 6 Clicks Basic Mobility (PT)  -          User Key  (r) = Recorded By, (t) = Taken By, (c) = Cosigned By    Initials Name Provider Type    Louisa Owens, PT Physical Therapist    Tanisha Lepe RN Registered Nurse    RW Juanita Toledo OT Occupational Therapist                             Physical Therapy Education                 Title: PT OT SLP Therapies (In Progress)     Topic: Physical Therapy (Not Started)     Point: Mobility training (Not Started)     Learner Progress:  Not documented in this visit.          Point:  "Home exercise program (Not Started)     Learner Progress:  Not documented in this visit.          Point: Body mechanics (Not Started)     Learner Progress:  Not documented in this visit.          Point: Precautions (Not Started)     Learner Progress:  Not documented in this visit.                          PT Recommendation and Plan  Planned Therapy Interventions (PT): balance training, bed mobility training, gait training, home exercise program, patient/family education, strengthening, transfer training, wheelchair management/propulsion training  Plan of Care Reviewed With: patient, daughter  Outcome Evaluation: PT evaluation completed as co-eval with OT. Pt oriented to person only. AROM WFL BLE and strength grossly 4-/5. Unable to assess mobility as pt stating repeatedly she wanted to eat lunch. Observed pt was able to push through UE\"s to straitghten trunk before starting to feed herself. Dtr reports pt required max assistance of staff at NH to transfer to w/c but pt able to propel w/c herself. Dtr also reports pt would walk with walker with assistance of restorative staff at NH. Function limited by decreased strength and tolerance for functional mobility and activities. Pt will benefit from PT to progress back to baseline mobility and OOB tolerance. Anticipate return to NH at discharge with continued rehab.     Time Calculation:    PT Charges     Row Name 09/14/22 1400             Time Calculation    Start Time 1140  -ANATOLIY      Stop Time 1212  -ANATOLIY      Time Calculation (min) 32 min  -ANATOLIY      PT Received On 09/14/22  -ANATOLIY      PT Goal Re-Cert Due Date 09/27/22  -ANATOLIY              Time Calculation- PT    Total Timed Code Minutes- PT 0 minute(s)  -ANATOLIY              Untimed Charges    PT Eval/Re-eval Minutes 32  -ANATOLYI              Total Minutes    Untimed Charges Total Minutes 32  -ANATOLIY       Total Minutes 32  -ANATOLIY            User Key  (r) = Recorded By, (t) = Taken By, (c) = Cosigned By    Initials Name Provider Type    ANATOLIY " Louisa Irving, PT Physical Therapist              Therapy Charges for Today     Code Description Service Date Service Provider Modifiers Qty    45745545114 HC PT EVAL MOD COMPLEXITY 2 9/14/2022 Louisa Irving, PT GP 1          PT G-Codes  Outcome Measure Options: AM-PAC 6 Clicks Daily Activity (OT)  AM-PAC 6 Clicks Score (PT): 11  AM-PAC 6 Clicks Score (OT): 11    Louisa Irving, [place]  9/14/2022

## 2022-09-14 NOTE — DISCHARGE PLACEMENT REQUEST
"Pam Aleman (89 y.o. Female)             Date of Birth   11/28/1931    Social Security Number       Address   [address] [id number]    Home Phone   [phone]    MRN   [id number]       Yarsani   Muslim    Marital Status                               Admission Date   9/12/22    Admission Type   Emergency    Admitting Provider   Anival Meier MD    Attending Provider   Yobani Lundberg MD    Department, Room/Bed   [address], 411/1       Discharge Date       Discharge Disposition   Skilled Nursing Facility (DC - External)    Discharge Destination                               Attending Provider: Yobani Lundberg MD    Allergies: No Known Allergies    Isolation: None   Infection: None   Code Status: No CPR   Advance Care Planning Activity    Ht: 165.1 cm (65\")   Wt: 68.1 kg (150 lb 3.2 oz)    Admission Cmt: None   Principal Problem: Seizure (HCC) [R56.9]                 Active Insurance as of 9/12/2022     Primary Coverage     Payor Plan Insurance Group Employer/Plan Group    MEDICARE MEDICARE A & B      Payor Plan Address Payor Plan Phone Number Payor Plan Fax Number Effective Dates     [address] [phone]  11/1/1996 - None Entered    [place] [id number]       Subscriber Name Subscriber Birth Date Member ID       PAM ALEMAN 11/28/1931 [id number]           Secondary Coverage     Payor Plan Insurance Group Employer/Plan Group    MISC NURSING HOME MISC NURSING HOME      Coverage Address Coverage Phone Number Coverage Fax Number Effective Dates    [address] [phone]  12/16/2021 - None Entered    [place] [address]       Subscriber Name Subscriber Birth Date Member ID       PAM ALEMAN 11/28/1931 [id number]           Tertiary Coverage     Payor Plan Insurance Group Employer/Plan Group    [place]      Payor Plan Address Payor Plan Phone Number Payor Plan Fax Number Effective Dates "    [address] [phone]  1/6/2020 - None Entered    [place] [address]       Subscriber Name Subscriber Birth Date Member ID       PAM ALEMAN 11/28/1931 [id number]                 Emergency Contacts      (Rel.) Home Phone Work Phone Mobile Phone    DavidMaureen (Power of ) [phone] -- [phone]    nicole simental (Son) -- -- [phone]            Emergency Contact Information     Name Relation Home Work Mobile    Maureen Hernandez Power of  [phone]    nicole simental Son   [phone]          Insurance Information                MEDICARE/MEDICARE A & B Phone: [phone]    Subscriber: Pam Aleman Subscriber#: [id number]    Group#: -- Precert#: --        MISC NURSING HOME/MISC NURSING HOME Phone: [phone]    Subscriber: Pam Aleman Subscriber#: [id number]    Group#: -- Precert#: --        [place] Phone: [phone]    Subscriber: Pam Aleman Subscriber#: [id number]    Group#: -- Precert#: --

## 2022-09-14 NOTE — PLAN OF CARE
Goal Outcome Evaluation:      PT more alert overnight. Oriented to name only. Vitals WNL except BP elevated. Slept through most of night without any issues.

## 2022-09-15 ENCOUNTER — TELEPHONE (OUTPATIENT)
Dept: FAMILY MEDICINE CLINIC | Facility: CLINIC | Age: 87
End: 2022-09-15

## 2022-09-15 NOTE — TELEPHONE ENCOUNTER
Erica from [facility] just called and wanted to let Dr Lundberg know the patient returned to the nursing home yesterday so he would need to see her.    Her#[phone]

## 2022-09-17 LAB
BACTERIA SPEC AEROBE CULT: NORMAL
BACTERIA SPEC AEROBE CULT: NORMAL

## 2022-09-19 ENCOUNTER — NURSING HOME (OUTPATIENT)
Dept: FAMILY MEDICINE CLINIC | Facility: CLINIC | Age: 87
End: 2022-09-19

## 2022-09-19 DIAGNOSIS — Z87.440 HISTORY OF UTI: ICD-10-CM

## 2022-09-19 DIAGNOSIS — G30.1 LATE ONSET ALZHEIMER'S DEMENTIA WITHOUT BEHAVIORAL DISTURBANCE: ICD-10-CM

## 2022-09-19 DIAGNOSIS — K59.00 CONSTIPATION, UNSPECIFIED CONSTIPATION TYPE: ICD-10-CM

## 2022-09-19 DIAGNOSIS — G40.909 SEIZURE DISORDER: Primary | ICD-10-CM

## 2022-09-19 DIAGNOSIS — I10 BENIGN ESSENTIAL HTN: ICD-10-CM

## 2022-09-19 DIAGNOSIS — F02.80 LATE ONSET ALZHEIMER'S DEMENTIA WITHOUT BEHAVIORAL DISTURBANCE: ICD-10-CM

## 2022-09-19 PROCEDURE — 99307 SBSQ NF CARE SF MDM 10: CPT | Performed by: STUDENT IN AN ORGANIZED HEALTH CARE EDUCATION/TRAINING PROGRAM

## 2022-09-20 VITALS
OXYGEN SATURATION: 96 % | TEMPERATURE: 97.8 F | SYSTOLIC BLOOD PRESSURE: 144 MMHG | DIASTOLIC BLOOD PRESSURE: 70 MMHG | RESPIRATION RATE: 18 BRPM | HEART RATE: 72 BPM

## 2022-09-21 NOTE — PROGRESS NOTES
"  Family Medicine Residency  Yobani Lundberg MD    MONTHLY NURSING HOME VISIT    NAME: Marlene Horne  : 1931  MRN: [id number]    DATE & TIME SEEN: 22 @ 1730    PCP: Yobani Lundberg MD    NURSING HOME: [facility]    Monthly Nursing Home Visit for s/p hospital discharge    Chief Complaint: s/p discharge for seizures    Subjective:     Marlene Horne is a 89 y.o. female who was recently discharged from the hospital for having a seizure.    Patient states she is \"fair\" per usual.  Denies having dysuria or excessive drowsiness.    Current Meds:    Current Outpatient Medications:   •  acetaminophen (TYLENOL) 325 MG tablet, Take 2 tablets by mouth Every 4 (Four) Hours As Needed for Mild Pain ., Disp: , Rfl:   •  albuterol (PROVENTIL) (2.5 MG/3ML) 0.083% nebulizer solution, Take 2.5 mg by nebulization Every 4 (Four) Hours As Needed for Shortness of Air., Disp: , Rfl: 12  •  amLODIPine (NORVASC) 5 MG tablet, Take 0.5 tablets by mouth Daily., Disp: , Rfl:   •  aspirin 81 MG EC tablet, Take 81 mg by mouth Daily., Disp: , Rfl:   •  busPIRone (BUSPAR) 5 MG tablet, Take 7.5 mg by mouth 2 (Two) Times a Day., Disp: , Rfl:   •  cholecalciferol (VITAMIN D3) 25 MCG (1000 UT) tablet, Take 1,000 Units by mouth Daily., Disp: , Rfl:   •  Divalproex Sodium (DEPAKOTE SPRINKLE) 125 MG capsule, Take 2 capsules by mouth Every 8 (Eight) Hours., Disp: 90 capsule, Rfl: 2  •  donepezil (ARICEPT) 5 MG tablet, Take 5 mg by mouth Every Night., Disp: , Rfl:   •  ferrous sulfate 324 (65 Fe) MG tablet delayed-release EC tablet, Take 1 tablet by mouth Daily With Breakfast., Disp: 30 tablet, Rfl:   •  melatonin 5 MG tablet tablet, Take 5 mg by mouth Every Night., Disp: , Rfl:   •  mirtazapine (REMERON) 7.5 MG half tablet, Take  by mouth Every Night., Disp: , Rfl:   •  Multiple Vitamins-Minerals (I-HERNESTO) tablet tablet, Take 1 tablet by mouth Daily., Disp: 90 tablet, Rfl: 2  •  nystatin (MYCOSTATIN) 036662 UNIT/GM powder, Apply 1 " application topically to the appropriate area as directed 2 (Two) Times a Day As Needed (redness/fungal infection). Apply under breast topically as needed for redness, Disp: , Rfl:   •  OLANZapine zydis (zyPREXA) 5 MG disintegrating tablet, Place 1 tablet on the tongue At Night As Needed (sleep or agitation)., Disp: 30 tablet, Rfl: 2  •  ondansetron (ZOFRAN) 4 MG tablet, Take 4 mg by mouth Every 6 (Six) Hours As Needed for Nausea or Vomiting., Disp: , Rfl:   •  rosuvastatin (CRESTOR) 5 MG tablet, Take 5 mg by mouth Every Night., Disp: , Rfl:   •  sennosides-docusate sodium (SENOKOT-S) 8.6-50 MG tablet, Take 2 tablets by mouth Every Night., Disp: 30 tablet, Rfl: 0  •  sodium chloride 1 g tablet, Take 1 tablet by mouth 3 (Three) Times a Day With Meals., Disp: 180 tablet, Rfl: 0  •  vitamin D (ERGOCALCIFEROL) 1.25 MG (69733 UT) capsule capsule, Take 50,000 Units by mouth 1 (One) Time Per Week., Disp: , Rfl:     Allergies:  Patient has no known allergies.    Review of Systems:  Review of Systems   Constitutional: Negative for chills and fever.   HENT: Negative for congestion and rhinorrhea.    Eyes: Negative for visual disturbance.   Respiratory: Negative for shortness of breath.    Cardiovascular: Negative for chest pain.   Gastrointestinal: Negative for abdominal pain, diarrhea, nausea and vomiting.   Endocrine: Negative for polyuria.   Genitourinary: Negative for difficulty urinating and dyspareunia.   Musculoskeletal: Negative for back pain and myalgias.   Skin: Negative for rash and wound.   Neurological: Negative for weakness, numbness and headaches.   Psychiatric/Behavioral: Negative for agitation, behavioral problems and confusion.       Objective:     /70   Pulse 72   Temp 97.8 °F (36.6 °C)   Resp 18   SpO2 96%   Physical Exam  Constitutional:       General: She is not in acute distress.  HENT:      Head: Normocephalic and atraumatic.   Cardiovascular:      Rate and Rhythm: Normal rate and regular  rhythm.      Heart sounds: Normal heart sounds.   Pulmonary:      Effort: Pulmonary effort is normal. No respiratory distress.      Breath sounds: Normal breath sounds.   Abdominal:      Palpations: Abdomen is soft.      Tenderness: There is no abdominal tenderness.   Musculoskeletal:      Right lower leg: No edema.      Left lower leg: No edema.   Skin:     General: Skin is warm and dry.   Neurological:      Mental Status: She is alert.      Comments: Moving all extremities.   Psychiatric:         Mood and Affect: Mood normal.         Behavior: Behavior normal.         Diagnostic Data:     Lab Results (last 7 days)     ** No results found for the last 168 hours. **            Assessment/Plan:      89 y.o. female with:  Diagnoses and all orders for this visit:    1. Seizure disorder (HCC) (Primary)  Doing well after discharge on current regimen.  Will need follow up labs.  - CBC  - VPA level  - Continue Depakote 250mg TID PO    2. Benign essential HTN  Stable.  - Continue norvasc 5mg Qday    3. Constipation, unspecified constipation type  Stable  - Continue current regimen    4. Late onset Alzheimer's dementia without behavioral disturbance (HCC)  Stable  - Continue current dosing of aricept    5. History of UTI  UTI while hospitalized.  Treated with fosfomycin on discharge.  Asymptomatic at this time.  - UA with reflex cx  - BMP            CODE STATUS: DNR and DNI    Dr. Anderson Mandujano MD is the attending on record for this patient, He is aware of the patient's status and agrees with the above.      Yobani Lundberg M.D. PGY-3  Chief Resident  [facility] Medicine Residency  [address] [place] [address]  Office: [phone]    This document has been electronically signed by Yobani Lundberg MD on September 20, 2022 23:24 CDT

## 2022-10-25 ENCOUNTER — NURSING HOME (OUTPATIENT)
Dept: FAMILY MEDICINE CLINIC | Facility: CLINIC | Age: 87
End: 2022-10-25

## 2022-10-25 DIAGNOSIS — Z87.440 HISTORY OF UTI: ICD-10-CM

## 2022-10-25 DIAGNOSIS — F02.80 LATE ONSET ALZHEIMER'S DEMENTIA WITHOUT BEHAVIORAL DISTURBANCE: ICD-10-CM

## 2022-10-25 DIAGNOSIS — K59.00 CONSTIPATION, UNSPECIFIED CONSTIPATION TYPE: ICD-10-CM

## 2022-10-25 DIAGNOSIS — G40.909 SEIZURE DISORDER: ICD-10-CM

## 2022-10-25 DIAGNOSIS — I10 BENIGN ESSENTIAL HTN: Primary | ICD-10-CM

## 2022-10-25 DIAGNOSIS — G30.1 LATE ONSET ALZHEIMER'S DEMENTIA WITHOUT BEHAVIORAL DISTURBANCE: ICD-10-CM

## 2022-10-25 PROCEDURE — 99307 SBSQ NF CARE SF MDM 10: CPT | Performed by: STUDENT IN AN ORGANIZED HEALTH CARE EDUCATION/TRAINING PROGRAM

## 2022-10-26 ENCOUNTER — TELEPHONE (OUTPATIENT)
Dept: FAMILY MEDICINE CLINIC | Facility: CLINIC | Age: 87
End: 2022-10-26

## 2022-10-26 NOTE — TELEPHONE ENCOUNTER
Erica from [facility] called and wanted to remind Dr Lundberg he needs to make rounds on this patient before 10/31.    Her#[phone]

## 2022-10-28 NOTE — PROGRESS NOTES
"  Family Medicine Residency  Yobani Lundberg MD    MONTHLY NURSING HOME VISIT    NAME: Marlene Horne  : 1931  MRN: [id number]    DATE & TIME SEEN: 22 @ 1730    PCP: Yobani Lundberg MD    NURSING HOME: [facility]    Monthly Nursing Home Visit for monthly f/u    Chief Complaint: chronic issues    Subjective:     Marlene Horne is a 89 y.o. female who is being seen for her monthly f/u.    Patient states she is \"fair\", yet again.  Nursing staff does not report any issues.    Current Meds:    Current Outpatient Medications:   •  acetaminophen (TYLENOL) 325 MG tablet, Take 2 tablets by mouth Every 4 (Four) Hours As Needed for Mild Pain ., Disp: , Rfl:   •  albuterol (PROVENTIL) (2.5 MG/3ML) 0.083% nebulizer solution, Take 2.5 mg by nebulization Every 4 (Four) Hours As Needed for Shortness of Air., Disp: , Rfl: 12  •  amLODIPine (NORVASC) 5 MG tablet, Take 0.5 tablets by mouth Daily., Disp: , Rfl:   •  aspirin 81 MG EC tablet, Take 81 mg by mouth Daily., Disp: , Rfl:   •  busPIRone (BUSPAR) 5 MG tablet, Take 7.5 mg by mouth 2 (Two) Times a Day., Disp: , Rfl:   •  cholecalciferol (VITAMIN D3) 25 MCG (1000 UT) tablet, Take 1,000 Units by mouth Daily., Disp: , Rfl:   •  Divalproex Sodium (DEPAKOTE SPRINKLE) 125 MG capsule, Take 2 capsules by mouth Every 8 (Eight) Hours., Disp: 90 capsule, Rfl: 2  •  donepezil (ARICEPT) 5 MG tablet, Take 5 mg by mouth Every Night., Disp: , Rfl:   •  ferrous sulfate 324 (65 Fe) MG tablet delayed-release EC tablet, Take 1 tablet by mouth Daily With Breakfast., Disp: 30 tablet, Rfl:   •  melatonin 5 MG tablet tablet, Take 5 mg by mouth Every Night., Disp: , Rfl:   •  mirtazapine (REMERON) 7.5 MG half tablet, Take  by mouth Every Night., Disp: , Rfl:   •  Multiple Vitamins-Minerals (I-HERNESTO) tablet tablet, Take 1 tablet by mouth Daily., Disp: 90 tablet, Rfl: 2  •  nystatin (MYCOSTATIN) 385963 UNIT/GM powder, Apply 1 application topically to the appropriate area as directed 2 " (Two) Times a Day As Needed (redness/fungal infection). Apply under breast topically as needed for redness, Disp: , Rfl:   •  OLANZapine zydis (zyPREXA) 5 MG disintegrating tablet, Place 1 tablet on the tongue At Night As Needed (sleep or agitation)., Disp: 30 tablet, Rfl: 2  •  ondansetron (ZOFRAN) 4 MG tablet, Take 4 mg by mouth Every 6 (Six) Hours As Needed for Nausea or Vomiting., Disp: , Rfl:   •  rosuvastatin (CRESTOR) 5 MG tablet, Take 5 mg by mouth Every Night., Disp: , Rfl:   •  sennosides-docusate sodium (SENOKOT-S) 8.6-50 MG tablet, Take 2 tablets by mouth Every Night., Disp: 30 tablet, Rfl: 0  •  sodium chloride 1 g tablet, Take 1 tablet by mouth 3 (Three) Times a Day With Meals., Disp: 180 tablet, Rfl: 0  •  vitamin D (ERGOCALCIFEROL) 1.25 MG (62480 UT) capsule capsule, Take 50,000 Units by mouth 1 (One) Time Per Week., Disp: , Rfl:     Allergies:  Patient has no known allergies.    Review of Systems:  Review of Systems   Constitutional: Negative for chills and fever.   HENT: Negative for congestion and rhinorrhea.    Eyes: Negative for visual disturbance.   Respiratory: Negative for shortness of breath.    Cardiovascular: Negative for chest pain.   Gastrointestinal: Negative for abdominal pain, diarrhea, nausea and vomiting.   Endocrine: Negative for polyuria.   Genitourinary: Negative for difficulty urinating and dyspareunia.   Musculoskeletal: Negative for back pain and myalgias.   Skin: Negative for rash and wound.   Neurological: Negative for weakness, numbness and headaches.   Psychiatric/Behavioral: Negative for agitation, behavioral problems and confusion.       Objective:     Vitals: T 98F, P 70, Bp 130/68, RR 18, O2 98%RA    Physical Exam  Constitutional:       General: She is not in acute distress.  HENT:      Head: Normocephalic and atraumatic.   Cardiovascular:      Rate and Rhythm: Normal rate and regular rhythm.      Heart sounds: Normal heart sounds.   Pulmonary:      Effort: Pulmonary  effort is normal. No respiratory distress.      Breath sounds: Normal breath sounds.   Abdominal:      Palpations: Abdomen is soft.      Tenderness: There is no abdominal tenderness.   Musculoskeletal:      Right lower leg: No edema.      Left lower leg: No edema.   Skin:     General: Skin is warm and dry.   Neurological:      Mental Status: She is alert.      Comments: Moving all extremities. Oriented only to self.   Psychiatric:         Mood and Affect: Mood normal.         Behavior: Behavior normal.         Diagnostic Data:     Lab Results (last 7 days)     ** No results found for the last 168 hours. **            Assessment/Plan:      89 y.o. female with:  Diagnoses and all orders for this visit:    1. Seizure disorder (HCC) (Primary)  VPA 52 and wnL.  CBC wnL.  - Continue Depakote 250mg TID PO  - Additional CBC and Depakote level    2. Benign essential HTN  Stable.  - Continue norvasc 5mg Qday    3. Constipation, unspecified constipation type  Stable  - Continue current regimen    4. Late onset Alzheimer's dementia without behavioral disturbance (HCC)  Stable  - Continue current dosing of aricept 5mg daily    5. History of UTI  UA was wnL.  - Continue to monitor            CODE STATUS: DNR and DNI    Dr. Anderson Mandujano MD is the attending on record for this patient, He is aware of the patient's status and agrees with the above.      Yobani Lundberg M.D. PGY-3  Chief Resident  [facility]  [address] [place] [address]  Office: [phone]    This document has been electronically signed by Yobani Lundberg MD on October 28, 2022 09:51 CDT

## 2022-11-15 ENCOUNTER — TELEPHONE (OUTPATIENT)
Dept: FAMILY MEDICINE CLINIC | Facility: CLINIC | Age: 87
End: 2022-11-15

## 2022-11-15 NOTE — TELEPHONE ENCOUNTER
Kasia at [facility] has called regarding this patient.  She stated Ms. Horne has tested positive for COVID and would like Dr. Lundberg to return her call with treatment instructions.   Her # is [phone].    APRIL Blackman

## 2022-11-17 ENCOUNTER — NURSING HOME (OUTPATIENT)
Dept: FAMILY MEDICINE CLINIC | Facility: CLINIC | Age: 87
End: 2022-11-17

## 2022-11-17 DIAGNOSIS — K59.00 CONSTIPATION, UNSPECIFIED CONSTIPATION TYPE: ICD-10-CM

## 2022-11-17 DIAGNOSIS — G40.909 SEIZURE DISORDER: ICD-10-CM

## 2022-11-17 DIAGNOSIS — I10 BENIGN ESSENTIAL HTN: ICD-10-CM

## 2022-11-17 DIAGNOSIS — Z87.440 HISTORY OF UTI: ICD-10-CM

## 2022-11-17 DIAGNOSIS — G30.1 LATE ONSET ALZHEIMER'S DEMENTIA WITHOUT BEHAVIORAL DISTURBANCE: ICD-10-CM

## 2022-11-17 DIAGNOSIS — U07.1 COVID-19: Primary | ICD-10-CM

## 2022-11-17 DIAGNOSIS — F02.80 LATE ONSET ALZHEIMER'S DEMENTIA WITHOUT BEHAVIORAL DISTURBANCE: ICD-10-CM

## 2022-11-17 PROCEDURE — 99308 SBSQ NF CARE LOW MDM 20: CPT | Performed by: STUDENT IN AN ORGANIZED HEALTH CARE EDUCATION/TRAINING PROGRAM

## 2022-11-23 NOTE — PROGRESS NOTES
"  Family Medicine Residency  Yobani Lundberg MD    MONTHLY NURSING HOME VISIT    NAME: Marlene Horne  : 1931  MRN: [id number]    DATE & TIME SEEN: 22 @ 1800    PCP: Yobani Lundberg MD    NURSING HOME: [facility]    Monthly Nursing Home Visit for COVID and monthly check up    Chief Complaint: COVID Positive    Subjective:     Marlene Horne is a 89 y.o. female who is being evaluated after testing positive for COVID as well as her monthly check up.    Nursing reports the patient has been inactive for the last day, staying mostly in bed.  However, today PT and nursing reports patient is more active and walking around now.  Patient states she is doing \"fair\" as usual.  She deies cough ,SOB, FC, NVD.    Current Meds:    Current Outpatient Medications:   •  acetaminophen (TYLENOL) 325 MG tablet, Take 2 tablets by mouth Every 4 (Four) Hours As Needed for Mild Pain ., Disp: , Rfl:   •  albuterol (PROVENTIL) (2.5 MG/3ML) 0.083% nebulizer solution, Take 2.5 mg by nebulization Every 4 (Four) Hours As Needed for Shortness of Air., Disp: , Rfl: 12  •  amLODIPine (NORVASC) 5 MG tablet, Take 0.5 tablets by mouth Daily., Disp: , Rfl:   •  aspirin 81 MG EC tablet, Take 81 mg by mouth Daily., Disp: , Rfl:   •  busPIRone (BUSPAR) 5 MG tablet, Take 7.5 mg by mouth 2 (Two) Times a Day., Disp: , Rfl:   •  cholecalciferol (VITAMIN D3) 25 MCG (1000 UT) tablet, Take 1,000 Units by mouth Daily., Disp: , Rfl:   •  Divalproex Sodium (DEPAKOTE SPRINKLE) 125 MG capsule, Take 2 capsules by mouth Every 8 (Eight) Hours., Disp: 90 capsule, Rfl: 2  •  donepezil (ARICEPT) 5 MG tablet, Take 5 mg by mouth Every Night., Disp: , Rfl:   •  ferrous sulfate 324 (65 Fe) MG tablet delayed-release EC tablet, Take 1 tablet by mouth Daily With Breakfast., Disp: 30 tablet, Rfl:   •  melatonin 5 MG tablet tablet, Take 5 mg by mouth Every Night., Disp: , Rfl:   •  mirtazapine (REMERON) 7.5 MG half tablet, Take  by mouth Every Night., Disp: , " Rfl:   •  Multiple Vitamins-Minerals (I-HERNESTO) tablet tablet, Take 1 tablet by mouth Daily., Disp: 90 tablet, Rfl: 2  •  nystatin (MYCOSTATIN) 013380 UNIT/GM powder, Apply 1 application topically to the appropriate area as directed 2 (Two) Times a Day As Needed (redness/fungal infection). Apply under breast topically as needed for redness, Disp: , Rfl:   •  OLANZapine zydis (zyPREXA) 5 MG disintegrating tablet, Place 1 tablet on the tongue At Night As Needed (sleep or agitation)., Disp: 30 tablet, Rfl: 2  •  ondansetron (ZOFRAN) 4 MG tablet, Take 4 mg by mouth Every 6 (Six) Hours As Needed for Nausea or Vomiting., Disp: , Rfl:   •  rosuvastatin (CRESTOR) 5 MG tablet, Take 5 mg by mouth Every Night., Disp: , Rfl:   •  sennosides-docusate sodium (SENOKOT-S) 8.6-50 MG tablet, Take 2 tablets by mouth Every Night., Disp: 30 tablet, Rfl: 0  •  sodium chloride 1 g tablet, Take 1 tablet by mouth 3 (Three) Times a Day With Meals., Disp: 180 tablet, Rfl: 0  •  vitamin D (ERGOCALCIFEROL) 1.25 MG (22785 UT) capsule capsule, Take 50,000 Units by mouth 1 (One) Time Per Week., Disp: , Rfl:   Paxlovid    Allergies:  Patient has no known allergies.    Review of Systems:  Review of Systems   Constitutional: Positive for fatigue. Negative for chills and fever.   HENT: Negative for congestion and rhinorrhea.    Eyes: Negative for visual disturbance.   Respiratory: Negative for shortness of breath.    Cardiovascular: Negative for chest pain.   Gastrointestinal: Negative for abdominal pain, diarrhea, nausea and vomiting.   Endocrine: Negative for polyuria.   Genitourinary: Negative for difficulty urinating and dyspareunia.   Musculoskeletal: Negative for back pain and myalgias.   Skin: Negative for rash and wound.   Neurological: Negative for weakness, numbness and headaches.   Psychiatric/Behavioral: Negative for agitation, behavioral problems and confusion.       Objective:     T 97.9F, P 78, /60, RR 18, Sat 98% RA  Physical  Exam  Constitutional:       General: She is not in acute distress.  HENT:      Head: Normocephalic and atraumatic.      Right Ear: External ear normal.      Left Ear: External ear normal.      Nose: No congestion or rhinorrhea.      Mouth/Throat:      Pharynx: No oropharyngeal exudate or posterior oropharyngeal erythema.   Cardiovascular:      Rate and Rhythm: Normal rate and regular rhythm.      Heart sounds: Normal heart sounds.   Pulmonary:      Effort: Pulmonary effort is normal.      Breath sounds: Normal breath sounds.   Abdominal:      General: Bowel sounds are normal.      Tenderness: There is no abdominal tenderness.   Musculoskeletal:      Right lower leg: No edema.      Left lower leg: No edema.   Skin:     General: Skin is warm and dry.      Capillary Refill: Capillary refill takes less than 2 seconds.   Neurological:      General: No focal deficit present.      Mental Status: She is alert.      Comments: Alert and Oriented only to self   Psychiatric:         Mood and Affect: Mood normal.         Behavior: Behavior normal.         Diagnostic Data:     Lab Results (last 7 days)     ** No results found for the last 168 hours. **            Assessment/Plan:      89 y.o. female with:  Diagnoses and all orders for this visit:    1. COVID-19 (Primary)  Patient doing well at this time without signs of respiratory distress.  - Renal dosing Paxlovid x5 days    2. Benign essential HTN  Stable  - Continue current dosing of norvasc    3. Constipation, unspecified constipation type  Stable  - Continue Senna    4. Seizure disorder (HCC)  Stable.  - Continue current doing of Depakote    5. History of UTI  Asymptomatic  - Continue to monitor    6. Late onset Alzheimer's dementia without behavioral disturbance (HCC)  Stable  - Continue current dosing or Aricept            CODE STATUS: DNR and DNI    Dr. Anderson Mandujano MD is the attending on record for this patient, He is aware of the patient's status and agrees with the  above.      Yobani Lundberg M.D. PGY-3  Chief Resident  [facility]  [address] [place] [address]  Office: [phone]    This document has been electronically signed by Yobani Lundberg MD on November 22, 2022 22:18 CST

## 2022-11-28 NOTE — PROGRESS NOTES
I have seen the patient.  I have reviewed the notes, assessments, and/or procedures performed by Dr. Lundberg, I concur with her/his documentation and assessment and plan for Marlene Horne.       This document has been electronically signed by Anderson Mandujano MD on November 28, 2022 09:09 CST

## 2022-12-16 ENCOUNTER — NURSING HOME (OUTPATIENT)
Dept: FAMILY MEDICINE CLINIC | Facility: CLINIC | Age: 87
End: 2022-12-16
Payer: COMMERCIAL

## 2022-12-16 DIAGNOSIS — G30.1 LATE ONSET ALZHEIMER'S DEMENTIA WITHOUT BEHAVIORAL DISTURBANCE: ICD-10-CM

## 2022-12-16 DIAGNOSIS — Z87.440 HISTORY OF UTI: ICD-10-CM

## 2022-12-16 DIAGNOSIS — U07.1 COVID-19: Primary | ICD-10-CM

## 2022-12-16 DIAGNOSIS — I10 BENIGN ESSENTIAL HTN: ICD-10-CM

## 2022-12-16 DIAGNOSIS — K59.00 CONSTIPATION, UNSPECIFIED CONSTIPATION TYPE: ICD-10-CM

## 2022-12-16 DIAGNOSIS — G40.909 SEIZURE DISORDER: ICD-10-CM

## 2022-12-16 DIAGNOSIS — F02.80 LATE ONSET ALZHEIMER'S DEMENTIA WITHOUT BEHAVIORAL DISTURBANCE: ICD-10-CM

## 2022-12-16 PROCEDURE — 99307 SBSQ NF CARE SF MDM 10: CPT | Performed by: STUDENT IN AN ORGANIZED HEALTH CARE EDUCATION/TRAINING PROGRAM

## 2023-01-02 NOTE — PROGRESS NOTES
Family Medicine Residency  Yobnai Lundberg MD    MONTHLY NURSING HOME VISIT    NAME: Marlene Horne  : 1931  MRN: [id number]    DATE & TIME SEEN: 22 @ 1230    PCP: Yobani Lundberg MD    NURSING HOME: [facility]    Monthly Nursing Home Visit for check up    Chief Complaint: monthly visit    Subjective:     Marlene Horne is a 89 y.o. female who is being seen for her monthly follow up.    Patient states she is \"fair\" per usual.  Denies having SOB, dysuria.  No nursing concerns.  Lost an incisor during the month.  No complications.    Current Meds:    Current Outpatient Medications:   •  acetaminophen (TYLENOL) 325 MG tablet, Take 2 tablets by mouth Every 4 (Four) Hours As Needed for Mild Pain ., Disp: , Rfl:   •  albuterol (PROVENTIL) (2.5 MG/3ML) 0.083% nebulizer solution, Take 2.5 mg by nebulization Every 4 (Four) Hours As Needed for Shortness of Air., Disp: , Rfl: 12  •  amLODIPine (NORVASC) 5 MG tablet, Take 0.5 tablets by mouth Daily., Disp: , Rfl:   •  aspirin 81 MG EC tablet, Take 81 mg by mouth Daily., Disp: , Rfl:   •  busPIRone (BUSPAR) 5 MG tablet, Take 7.5 mg by mouth 2 (Two) Times a Day., Disp: , Rfl:   •  cholecalciferol (VITAMIN D3) 25 MCG (1000 UT) tablet, Take 1,000 Units by mouth Daily., Disp: , Rfl:   •  Divalproex Sodium (DEPAKOTE SPRINKLE) 125 MG capsule, Take 2 capsules by mouth Every 8 (Eight) Hours., Disp: 90 capsule, Rfl: 2  •  donepezil (ARICEPT) 5 MG tablet, Take 5 mg by mouth Every Night., Disp: , Rfl:   •  ferrous sulfate 324 (65 Fe) MG tablet delayed-release EC tablet, Take 1 tablet by mouth Daily With Breakfast., Disp: 30 tablet, Rfl:   •  melatonin 5 MG tablet tablet, Take 5 mg by mouth Every Night., Disp: , Rfl:   •  mirtazapine (REMERON) 7.5 MG half tablet, Take  by mouth Every Night., Disp: , Rfl:   •  Multiple Vitamins-Minerals (I-HERNESTO) tablet tablet, Take 1 tablet by mouth Daily., Disp: 90 tablet, Rfl: 2  •  nystatin (MYCOSTATIN) 890221 UNIT/GM powder,  Apply 1 application topically to the appropriate area as directed 2 (Two) Times a Day As Needed (redness/fungal infection). Apply under breast topically as needed for redness, Disp: , Rfl:   •  OLANZapine zydis (zyPREXA) 5 MG disintegrating tablet, Place 1 tablet on the tongue At Night As Needed (sleep or agitation)., Disp: 30 tablet, Rfl: 2  •  ondansetron (ZOFRAN) 4 MG tablet, Take 4 mg by mouth Every 6 (Six) Hours As Needed for Nausea or Vomiting., Disp: , Rfl:   •  rosuvastatin (CRESTOR) 5 MG tablet, Take 5 mg by mouth Every Night., Disp: , Rfl:   •  sennosides-docusate sodium (SENOKOT-S) 8.6-50 MG tablet, Take 2 tablets by mouth Every Night., Disp: 30 tablet, Rfl: 0  •  sodium chloride 1 g tablet, Take 1 tablet by mouth 3 (Three) Times a Day With Meals., Disp: 180 tablet, Rfl: 0  •  vitamin D (ERGOCALCIFEROL) 1.25 MG (31824 UT) capsule capsule, Take 50,000 Units by mouth 1 (One) Time Per Week., Disp: , Rfl:     Allergies:  Patient has no known allergies.    Review of Systems:  Review of Systems   Constitutional: Negative for chills and fever.   HENT: Negative for congestion and rhinorrhea.    Eyes: Negative for visual disturbance.   Respiratory: Negative for shortness of breath.    Cardiovascular: Negative for chest pain.   Gastrointestinal: Negative for abdominal pain, diarrhea, nausea and vomiting.   Endocrine: Negative for polyuria.   Genitourinary: Negative for difficulty urinating and dyspareunia.   Musculoskeletal: Negative for back pain and myalgias.   Skin: Negative for rash and wound.   Neurological: Negative for weakness, numbness and headaches.   Psychiatric/Behavioral: Negative for agitation, behavioral problems and confusion.       Objective:     There were no vitals taken for this visit.  Physical Exam  Constitutional:       General: She is not in acute distress.  HENT:      Head: Normocephalic and atraumatic.   Cardiovascular:      Rate and Rhythm: Normal rate and regular rhythm.      Heart  sounds: Normal heart sounds.   Pulmonary:      Effort: Pulmonary effort is normal. No respiratory distress.      Breath sounds: Normal breath sounds.   Abdominal:      Palpations: Abdomen is soft.      Tenderness: There is no abdominal tenderness.   Musculoskeletal:      Right lower leg: No edema.      Left lower leg: No edema.   Skin:     General: Skin is warm and dry.   Neurological:      General: No focal deficit present.      Mental Status: She is alert. Mental status is at baseline.      Comments: Moving all extremities.   Psychiatric:         Mood and Affect: Mood normal.         Behavior: Behavior normal.         Diagnostic Data:     Lab Results (last 7 days)     ** No results found for the last 168 hours. **            Assessment/Plan:      89 y.o. female with:  Diagnoses and all orders for this visit:    1. Seizure disorder (HCC) (Primary)  Stable on Valproate.  No seizures.  - CBC  - VPA Level    2. Benign essential HTN  Stable.  - Continue norvasc 5mg Qday    3. Constipation, unspecified constipation type  Stable  - Continue current regimen    4. Late onset Alzheimer's dementia without behavioral disturbance (HCC)  Stable  - Continue current dosing of aricept 5mg    5. History of UTI  GFR stable.  - CTM    6. S/P Covid  MARK and now asymptomatic.  - Resolved            CODE STATUS: DNR and DNI    Dr. Anderson Mandujano MD is the attending on record for this patient, He is aware of the patient's status and agrees with the above.      Yobani Lundberg M.D. PGY-3  Chief Resident  [facility]  [address] [place] [address]  Office: [phone]    This document has been electronically signed by Yobani Lundberg MD on January 2, 2023 14:58 CST

## 2023-01-07 NOTE — PROGRESS NOTES
I have seen the patient.  I have reviewed the notes, assessments, and/or procedures performed by Dr. Lundberg, I concur with her/his documentation and assessment and plan for Marlene Horne.       This document has been electronically signed by Anderson Mandujano MD on January 7, 2023 13:43 CST

## 2023-01-20 ENCOUNTER — NURSING HOME (OUTPATIENT)
Dept: FAMILY MEDICINE CLINIC | Facility: CLINIC | Age: 88
End: 2023-01-20
Payer: MEDICARE

## 2023-01-20 DIAGNOSIS — G40.909 SEIZURE DISORDER: ICD-10-CM

## 2023-01-20 DIAGNOSIS — N18.32 STAGE 3B CHRONIC KIDNEY DISEASE: ICD-10-CM

## 2023-01-20 DIAGNOSIS — G20 PARKINSONISM, UNSPECIFIED PARKINSONISM TYPE: Primary | ICD-10-CM

## 2023-01-20 DIAGNOSIS — F02.80 LATE ONSET ALZHEIMER'S DEMENTIA WITHOUT BEHAVIORAL DISTURBANCE: ICD-10-CM

## 2023-01-20 DIAGNOSIS — G30.1 LATE ONSET ALZHEIMER'S DEMENTIA WITHOUT BEHAVIORAL DISTURBANCE: ICD-10-CM

## 2023-01-20 DIAGNOSIS — I10 BENIGN ESSENTIAL HTN: ICD-10-CM

## 2023-01-20 DIAGNOSIS — K59.00 CONSTIPATION, UNSPECIFIED CONSTIPATION TYPE: ICD-10-CM

## 2023-01-20 PROCEDURE — 99308 SBSQ NF CARE LOW MDM 20: CPT | Performed by: STUDENT IN AN ORGANIZED HEALTH CARE EDUCATION/TRAINING PROGRAM

## 2023-01-27 ENCOUNTER — HOSPITAL ENCOUNTER (EMERGENCY)
Facility: HOSPITAL | Age: 88
Discharge: NURSING, REHAB OR LONG-TERM CARE | End: 2023-01-27
Attending: STUDENT IN AN ORGANIZED HEALTH CARE EDUCATION/TRAINING PROGRAM | Admitting: FAMILY MEDICINE
Payer: MEDICARE

## 2023-01-27 ENCOUNTER — APPOINTMENT (OUTPATIENT)
Dept: GENERAL RADIOLOGY | Facility: HOSPITAL | Age: 88
End: 2023-01-27
Payer: MEDICARE

## 2023-01-27 VITALS
BODY MASS INDEX: 24.91 KG/M2 | HEART RATE: 60 BPM | RESPIRATION RATE: 16 BRPM | TEMPERATURE: 98.2 F | DIASTOLIC BLOOD PRESSURE: 69 MMHG | OXYGEN SATURATION: 98 % | WEIGHT: 155 LBS | HEIGHT: 66 IN | SYSTOLIC BLOOD PRESSURE: 145 MMHG

## 2023-01-27 DIAGNOSIS — R50.9 FEVER OF UNKNOWN ORIGIN (FUO): Primary | ICD-10-CM

## 2023-01-27 LAB
ALBUMIN SERPL-MCNC: 2.8 G/DL (ref 3.5–5.2)
ALBUMIN/GLOB SERPL: 0.8 G/DL
ALP SERPL-CCNC: 126 U/L (ref 39–117)
ALT SERPL W P-5'-P-CCNC: 5 U/L (ref 1–33)
ANION GAP SERPL CALCULATED.3IONS-SCNC: 7 MMOL/L (ref 5–15)
AST SERPL-CCNC: 14 U/L (ref 1–32)
B PARAPERT DNA SPEC QL NAA+PROBE: NOT DETECTED
B PERT DNA SPEC QL NAA+PROBE: NOT DETECTED
BASOPHILS # BLD AUTO: 0.04 10*3/MM3 (ref 0–0.2)
BASOPHILS NFR BLD AUTO: 0.4 % (ref 0–1.5)
BILIRUB SERPL-MCNC: 0.3 MG/DL (ref 0–1.2)
BILIRUB UR QL STRIP: NEGATIVE
BUN SERPL-MCNC: 18 MG/DL (ref 8–23)
BUN/CREAT SERPL: 16.2 (ref 7–25)
C PNEUM DNA NPH QL NAA+NON-PROBE: NOT DETECTED
CALCIUM SPEC-SCNC: 8.8 MG/DL (ref 8.2–9.6)
CHLORIDE SERPL-SCNC: 100 MMOL/L (ref 98–107)
CLARITY UR: ABNORMAL
CO2 SERPL-SCNC: 26 MMOL/L (ref 22–29)
COLOR UR: YELLOW
CREAT SERPL-MCNC: 1.11 MG/DL (ref 0.57–1)
D-LACTATE SERPL-SCNC: 1.9 MMOL/L (ref 0.5–2)
DEPRECATED RDW RBC AUTO: 42.5 FL (ref 37–54)
EGFRCR SERPLBLD CKD-EPI 2021: 47 ML/MIN/1.73
EOSINOPHIL # BLD AUTO: 0 10*3/MM3 (ref 0–0.4)
EOSINOPHIL NFR BLD AUTO: 0 % (ref 0.3–6.2)
ERYTHROCYTE [DISTWIDTH] IN BLOOD BY AUTOMATED COUNT: 12.4 % (ref 12.3–15.4)
FLUAV RNA RESP QL NAA+PROBE: NOT DETECTED
FLUAV SUBTYP SPEC NAA+PROBE: NOT DETECTED
FLUBV RNA ISLT QL NAA+PROBE: NOT DETECTED
FLUBV RNA RESP QL NAA+PROBE: NOT DETECTED
GLOBULIN UR ELPH-MCNC: 3.3 GM/DL
GLUCOSE SERPL-MCNC: 109 MG/DL (ref 65–99)
GLUCOSE UR STRIP-MCNC: NEGATIVE MG/DL
HADV DNA SPEC NAA+PROBE: NOT DETECTED
HCOV 229E RNA SPEC QL NAA+PROBE: NOT DETECTED
HCOV HKU1 RNA SPEC QL NAA+PROBE: NOT DETECTED
HCOV NL63 RNA SPEC QL NAA+PROBE: NOT DETECTED
HCOV OC43 RNA SPEC QL NAA+PROBE: NOT DETECTED
HCT VFR BLD AUTO: 32.3 % (ref 34–46.6)
HGB BLD-MCNC: 10.8 G/DL (ref 12–15.9)
HGB UR QL STRIP.AUTO: NEGATIVE
HMPV RNA NPH QL NAA+NON-PROBE: NOT DETECTED
HOLD SPECIMEN: NORMAL
HPIV1 RNA ISLT QL NAA+PROBE: NOT DETECTED
HPIV2 RNA SPEC QL NAA+PROBE: NOT DETECTED
HPIV3 RNA NPH QL NAA+PROBE: NOT DETECTED
HPIV4 P GENE NPH QL NAA+PROBE: NOT DETECTED
IMM GRANULOCYTES # BLD AUTO: 0.12 10*3/MM3 (ref 0–0.05)
IMM GRANULOCYTES NFR BLD AUTO: 1.2 % (ref 0–0.5)
KETONES UR QL STRIP: ABNORMAL
LEUKOCYTE ESTERASE UR QL STRIP.AUTO: NEGATIVE
LYMPHOCYTES # BLD AUTO: 0.85 10*3/MM3 (ref 0.7–3.1)
LYMPHOCYTES NFR BLD AUTO: 8.7 % (ref 19.6–45.3)
M PNEUMO IGG SER IA-ACNC: NOT DETECTED
MCH RBC QN AUTO: 31.2 PG (ref 26.6–33)
MCHC RBC AUTO-ENTMCNC: 33.4 G/DL (ref 31.5–35.7)
MCV RBC AUTO: 93.4 FL (ref 79–97)
MONOCYTES # BLD AUTO: 0.42 10*3/MM3 (ref 0.1–0.9)
MONOCYTES NFR BLD AUTO: 4.3 % (ref 5–12)
NEUTROPHILS NFR BLD AUTO: 8.31 10*3/MM3 (ref 1.7–7)
NEUTROPHILS NFR BLD AUTO: 85.4 % (ref 42.7–76)
NITRITE UR QL STRIP: NEGATIVE
NRBC BLD AUTO-RTO: 0 /100 WBC (ref 0–0.2)
PH UR STRIP.AUTO: 8 [PH] (ref 5–9)
PLATELET # BLD AUTO: 263 10*3/MM3 (ref 140–450)
PMV BLD AUTO: 10 FL (ref 6–12)
POTASSIUM SERPL-SCNC: 4.7 MMOL/L (ref 3.5–5.2)
PROT SERPL-MCNC: 6.1 G/DL (ref 6–8.5)
PROT UR QL STRIP: NEGATIVE
RBC # BLD AUTO: 3.46 10*6/MM3 (ref 3.77–5.28)
RHINOVIRUS RNA SPEC NAA+PROBE: NOT DETECTED
RSV RNA NPH QL NAA+NON-PROBE: NOT DETECTED
SARS-COV-2 RNA NPH QL NAA+NON-PROBE: NOT DETECTED
SARS-COV-2 RNA RESP QL NAA+PROBE: NOT DETECTED
SODIUM SERPL-SCNC: 133 MMOL/L (ref 136–145)
SP GR UR STRIP: 1.01 (ref 1–1.03)
TSH SERPL DL<=0.05 MIU/L-ACNC: 4.1 UIU/ML (ref 0.27–4.2)
UROBILINOGEN UR QL STRIP: ABNORMAL
WBC NRBC COR # BLD: 9.74 10*3/MM3 (ref 3.4–10.8)
WHOLE BLOOD HOLD COAG: NORMAL

## 2023-01-27 PROCEDURE — 0202U NFCT DS 22 TRGT SARS-COV-2: CPT | Performed by: FAMILY MEDICINE

## 2023-01-27 PROCEDURE — 93010 ELECTROCARDIOGRAM REPORT: CPT | Performed by: INTERNAL MEDICINE

## 2023-01-27 PROCEDURE — 25010000002 PIPERACILLIN SOD-TAZOBACTAM PER 1 G: Performed by: FAMILY MEDICINE

## 2023-01-27 PROCEDURE — 85025 COMPLETE CBC W/AUTO DIFF WBC: CPT | Performed by: STUDENT IN AN ORGANIZED HEALTH CARE EDUCATION/TRAINING PROGRAM

## 2023-01-27 PROCEDURE — 83605 ASSAY OF LACTIC ACID: CPT | Performed by: STUDENT IN AN ORGANIZED HEALTH CARE EDUCATION/TRAINING PROGRAM

## 2023-01-27 PROCEDURE — 71045 X-RAY EXAM CHEST 1 VIEW: CPT

## 2023-01-27 PROCEDURE — 87636 SARSCOV2 & INF A&B AMP PRB: CPT | Performed by: STUDENT IN AN ORGANIZED HEALTH CARE EDUCATION/TRAINING PROGRAM

## 2023-01-27 PROCEDURE — 36415 COLL VENOUS BLD VENIPUNCTURE: CPT | Performed by: FAMILY MEDICINE

## 2023-01-27 PROCEDURE — 81003 URINALYSIS AUTO W/O SCOPE: CPT | Performed by: STUDENT IN AN ORGANIZED HEALTH CARE EDUCATION/TRAINING PROGRAM

## 2023-01-27 PROCEDURE — 84443 ASSAY THYROID STIM HORMONE: CPT | Performed by: FAMILY MEDICINE

## 2023-01-27 PROCEDURE — 87040 BLOOD CULTURE FOR BACTERIA: CPT | Performed by: FAMILY MEDICINE

## 2023-01-27 PROCEDURE — 99285 EMERGENCY DEPT VISIT HI MDM: CPT

## 2023-01-27 PROCEDURE — 80053 COMPREHEN METABOLIC PANEL: CPT | Performed by: STUDENT IN AN ORGANIZED HEALTH CARE EDUCATION/TRAINING PROGRAM

## 2023-01-27 PROCEDURE — 93005 ELECTROCARDIOGRAM TRACING: CPT | Performed by: STUDENT IN AN ORGANIZED HEALTH CARE EDUCATION/TRAINING PROGRAM

## 2023-01-27 PROCEDURE — 96365 THER/PROPH/DIAG IV INF INIT: CPT

## 2023-01-27 PROCEDURE — P9612 CATHETERIZE FOR URINE SPEC: HCPCS

## 2023-01-27 PROCEDURE — G0378 HOSPITAL OBSERVATION PER HR: HCPCS

## 2023-01-27 RX ORDER — ACETAMINOPHEN 650 MG/1
650 SUPPOSITORY RECTAL ONCE
Status: COMPLETED | OUTPATIENT
Start: 2023-01-27 | End: 2023-01-27

## 2023-01-27 RX ADMIN — SODIUM CHLORIDE 1000 ML: 9 INJECTION, SOLUTION INTRAVENOUS at 08:18

## 2023-01-27 RX ADMIN — PIPERACILLIN SODIUM AND TAZOBACTAM SODIUM 3.38 G: 3; .375 INJECTION, POWDER, LYOPHILIZED, FOR SOLUTION INTRAVENOUS at 08:18

## 2023-01-27 RX ADMIN — ACETAMINOPHEN 650 MG: 650 SUPPOSITORY RECTAL at 05:50

## 2023-01-27 NOTE — PROGRESS NOTES
Family Medicine Residency  Yobani Lundberg MD    MONTHLY NURSING HOME VISIT    NAME: Marlene Horne  : 1931  MRN: [id number]    DATE & TIME SEEN: 23 @ 1730    PCP: Yobani Lundberg MD    NURSING HOME: [facility]    Monthly Nursing Home Visit for Check-Up    Chief Complaint: Monthly Visit    Subjective:     Marlene Horne is a 89 y.o. female who is being seen for a monthly nursing home visit.    Current Meds:  No current facility-administered medications for this visit.    Current Outpatient Medications:   •  acetaminophen (TYLENOL) 325 MG tablet, Take 2 tablets by mouth Every 4 (Four) Hours As Needed for Mild Pain ., Disp: , Rfl:   •  albuterol (PROVENTIL) (2.5 MG/3ML) 0.083% nebulizer solution, Take 2.5 mg by nebulization Every 4 (Four) Hours As Needed for Shortness of Air., Disp: , Rfl: 12  •  amLODIPine (NORVASC) 5 MG tablet, Take 0.5 tablets by mouth Daily., Disp: , Rfl:   •  aspirin 81 MG EC tablet, Take 81 mg by mouth Daily., Disp: , Rfl:   •  busPIRone (BUSPAR) 5 MG tablet, Take 7.5 mg by mouth 2 (Two) Times a Day., Disp: , Rfl:   •  cholecalciferol (VITAMIN D3) 25 MCG (1000 UT) tablet, Take 1,000 Units by mouth Daily., Disp: , Rfl:   •  Divalproex Sodium (DEPAKOTE SPRINKLE) 125 MG capsule, Take 2 capsules by mouth Every 8 (Eight) Hours., Disp: 90 capsule, Rfl: 2  •  donepezil (ARICEPT) 5 MG tablet, Take 5 mg by mouth Every Night., Disp: , Rfl:   •  ferrous sulfate 324 (65 Fe) MG tablet delayed-release EC tablet, Take 1 tablet by mouth Daily With Breakfast., Disp: 30 tablet, Rfl:   •  melatonin 5 MG tablet tablet, Take 5 mg by mouth Every Night., Disp: , Rfl:   •  mirtazapine (REMERON) 7.5 MG half tablet, Take  by mouth Every Night., Disp: , Rfl:   •  Multiple Vitamins-Minerals (I-HERNESTO) tablet tablet, Take 1 tablet by mouth Daily., Disp: 90 tablet, Rfl: 2  •  nystatin (MYCOSTATIN) 020208 UNIT/GM powder, Apply 1 application topically to the appropriate area as directed 2 (Two) Times a  Day As Needed (redness/fungal infection). Apply under breast topically as needed for redness, Disp: , Rfl:   •  OLANZapine zydis (zyPREXA) 5 MG disintegrating tablet, Place 1 tablet on the tongue At Night As Needed (sleep or agitation)., Disp: 30 tablet, Rfl: 2  •  ondansetron (ZOFRAN) 4 MG tablet, Take 4 mg by mouth Every 6 (Six) Hours As Needed for Nausea or Vomiting., Disp: , Rfl:   •  rosuvastatin (CRESTOR) 5 MG tablet, Take 5 mg by mouth Every Night., Disp: , Rfl:   •  sennosides-docusate sodium (SENOKOT-S) 8.6-50 MG tablet, Take 2 tablets by mouth Every Night., Disp: 30 tablet, Rfl: 0  •  sodium chloride 1 g tablet, Take 1 tablet by mouth 3 (Three) Times a Day With Meals., Disp: 180 tablet, Rfl: 0  •  vitamin D (ERGOCALCIFEROL) 1.25 MG (61653 UT) capsule capsule, Take 50,000 Units by mouth 1 (One) Time Per Week., Disp: , Rfl:     Allergies:  Patient has no known allergies.    Review of Systems:  Review of Systems   Neurological: Positive for tremors.   Psychiatric/Behavioral: Positive for behavioral problems and confusion.       Objective:     T 98.4F, P 68, /63, RR 20, Sat 95% RA    Physical Exam  Constitutional:       General: She is not in acute distress.  HENT:      Head: Normocephalic and atraumatic.   Cardiovascular:      Rate and Rhythm: Normal rate and regular rhythm.      Heart sounds: Normal heart sounds.   Pulmonary:      Effort: Pulmonary effort is normal. No respiratory distress.      Breath sounds: Normal breath sounds.   Abdominal:      Palpations: Abdomen is soft.      Tenderness: There is no abdominal tenderness.   Musculoskeletal:      Right lower leg: No edema.      Left lower leg: No edema.   Skin:     General: Skin is warm and dry.   Neurological:      Mental Status: She is alert.      Comments: Patient is less responsive and interactive than usual.  Patient is also demonstrating a tremor and pill-rolling with her RUE.   Psychiatric:         Mood and Affect: Mood normal.          Behavior: Behavior normal.         Diagnostic Data:     Patient labs collected at NH reviewed     Assessment/Plan:      89 y.o. female with:  Diagnoses and all orders for this visit:    1. Parkinsonism, unspecified Parkinsonism type (HCC) (Primary)  Pill rolling and right hand tremor with cognitive decline concerning for Parkinson's  - Starting Levo/Carbadopa 25/100    2. Benign essential HTN  Stable  - Continue current dosing of norvasc     3. Constipation, unspecified constipation type  Stable  - Continue Senna     4. Seizure disorder (HCC)  Stable.  VPA level wnL  - Continue current doing of Depakote     5. CKD  GFR stable  - Continue to monitor     6. Late onset Alzheimer's dementia without behavioral disturbance (HCC)  Worsening.  Likely due to superimposed Parkinson's  - Continue current dosing or Aricept  - Levo/Carbadopa 25/100 as above    CODE STATUS: DNR and DNI    Dr. Anderson Mandujano MD is the attending on record for this patient, He is aware of the patient's status and agrees with the above.      oYbani Lundberg M.D. PGY-3  Chief Resident  [facility]  [address] [place] [address]  Office: [phone]    This document has been electronically signed by Yobani Lundberg MD on January 27, 2023 09:10 CST

## 2023-02-01 LAB
BACTERIA SPEC AEROBE CULT: NORMAL
BACTERIA SPEC AEROBE CULT: NORMAL

## 2023-02-02 LAB
QT INTERVAL: 318 MS
QTC INTERVAL: 405 MS

## 2023-02-06 NOTE — PROGRESS NOTES
I have seen the patient.  I have reviewed the notes, assessments, and/or procedures performed by Dr. Lundberg, I concur with her/his documentation and assessment and plan for Marlene Horne.       This document has been electronically signed by Anderson Mandujano MD on February 6, 2023 10:43 CST

## 2023-02-16 ENCOUNTER — HOSPITAL ENCOUNTER (INPATIENT)
Facility: HOSPITAL | Age: 88
LOS: 1 days | Discharge: NURSING, REHAB OR LONG-TERM CARE | DRG: 309 | End: 2023-02-17
Attending: EMERGENCY MEDICINE | Admitting: STUDENT IN AN ORGANIZED HEALTH CARE EDUCATION/TRAINING PROGRAM
Payer: MEDICARE

## 2023-02-16 ENCOUNTER — APPOINTMENT (OUTPATIENT)
Dept: CT IMAGING | Facility: HOSPITAL | Age: 88
DRG: 309 | End: 2023-02-16
Payer: MEDICARE

## 2023-02-16 ENCOUNTER — APPOINTMENT (OUTPATIENT)
Dept: CARDIOLOGY | Facility: HOSPITAL | Age: 88
DRG: 309 | End: 2023-02-16
Payer: MEDICARE

## 2023-02-16 ENCOUNTER — APPOINTMENT (OUTPATIENT)
Dept: GENERAL RADIOLOGY | Facility: HOSPITAL | Age: 88
DRG: 309 | End: 2023-02-16
Payer: MEDICARE

## 2023-02-16 ENCOUNTER — APPOINTMENT (OUTPATIENT)
Dept: PULMONOLOGY | Facility: HOSPITAL | Age: 88
DRG: 309 | End: 2023-02-16
Payer: MEDICARE

## 2023-02-16 DIAGNOSIS — I48.91 ATRIAL FIBRILLATION WITH RAPID VENTRICULAR RESPONSE: Primary | ICD-10-CM

## 2023-02-16 DIAGNOSIS — Z78.9 IMPAIRED MOBILITY AND ADLS: ICD-10-CM

## 2023-02-16 DIAGNOSIS — Z74.09 IMPAIRED MOBILITY AND ADLS: ICD-10-CM

## 2023-02-16 DIAGNOSIS — I48.0 PAF (PAROXYSMAL ATRIAL FIBRILLATION): ICD-10-CM

## 2023-02-16 DIAGNOSIS — G30.9 ALZHEIMER'S DEMENTIA, UNSPECIFIED DEMENTIA SEVERITY, UNSPECIFIED TIMING OF DEMENTIA ONSET, UNSPECIFIED WHETHER BEHAVIORAL, PSYCHOTIC, OR MOOD DISTURBANCE OR ANXIETY: ICD-10-CM

## 2023-02-16 DIAGNOSIS — F02.80 ALZHEIMER'S DEMENTIA, UNSPECIFIED DEMENTIA SEVERITY, UNSPECIFIED TIMING OF DEMENTIA ONSET, UNSPECIFIED WHETHER BEHAVIORAL, PSYCHOTIC, OR MOOD DISTURBANCE OR ANXIETY: ICD-10-CM

## 2023-02-16 PROBLEM — G20 PARKINSON DISEASE (HCC): Status: ACTIVE | Noted: 2023-02-16

## 2023-02-16 PROBLEM — R56.9 SEIZURE-LIKE ACTIVITY (HCC): Status: RESOLVED | Noted: 2022-09-12 | Resolved: 2023-02-16

## 2023-02-16 PROBLEM — G20.A1 PARKINSON DISEASE: Status: ACTIVE | Noted: 2023-02-16

## 2023-02-16 LAB
ALBUMIN SERPL-MCNC: 3 G/DL (ref 3.5–5.2)
ALBUMIN/GLOB SERPL: 1.2 G/DL
ALP SERPL-CCNC: 116 U/L (ref 39–117)
ALT SERPL W P-5'-P-CCNC: <5 U/L (ref 1–33)
ANION GAP SERPL CALCULATED.3IONS-SCNC: 12 MMOL/L (ref 5–15)
APTT PPP: 31.3 SECONDS (ref 20–40.3)
AST SERPL-CCNC: 12 U/L (ref 1–32)
BASOPHILS # BLD AUTO: 0.04 10*3/MM3 (ref 0–0.2)
BASOPHILS NFR BLD AUTO: 0.7 % (ref 0–1.5)
BILIRUB SERPL-MCNC: 0.2 MG/DL (ref 0–1.2)
BILIRUB UR QL STRIP: NEGATIVE
BUN SERPL-MCNC: 28 MG/DL (ref 8–23)
BUN/CREAT SERPL: 26.2 (ref 7–25)
CALCIUM SPEC-SCNC: 8.6 MG/DL (ref 8.2–9.6)
CHLORIDE SERPL-SCNC: 105 MMOL/L (ref 98–107)
CLARITY UR: CLEAR
CO2 SERPL-SCNC: 24 MMOL/L (ref 22–29)
COLOR UR: YELLOW
CRE SCREEN PCR: NOT DETECTED
CREAT SERPL-MCNC: 1.07 MG/DL (ref 0.57–1)
D-DIMER, QUANTITATIVE (MAD,POW, STR): 900 NG/ML (FEU) (ref 0–910)
DEPRECATED RDW RBC AUTO: 45.7 FL (ref 37–54)
EGFRCR SERPLBLD CKD-EPI 2021: 49.1 ML/MIN/1.73
EOSINOPHIL # BLD AUTO: 0.09 10*3/MM3 (ref 0–0.4)
EOSINOPHIL NFR BLD AUTO: 1.6 % (ref 0.3–6.2)
ERYTHROCYTE [DISTWIDTH] IN BLOOD BY AUTOMATED COUNT: 13.1 % (ref 12.3–15.4)
GEN 5 2HR TROPONIN T REFLEX: 27 NG/L
GLOBULIN UR ELPH-MCNC: 2.6 GM/DL
GLUCOSE SERPL-MCNC: 101 MG/DL (ref 65–99)
GLUCOSE UR STRIP-MCNC: NEGATIVE MG/DL
HCT VFR BLD AUTO: 32.7 % (ref 34–46.6)
HGB BLD-MCNC: 10.8 G/DL (ref 12–15.9)
HGB UR QL STRIP.AUTO: NEGATIVE
IMM GRANULOCYTES # BLD AUTO: 0.04 10*3/MM3 (ref 0–0.05)
IMM GRANULOCYTES NFR BLD AUTO: 0.7 % (ref 0–0.5)
IMP STRAIN: NOT DETECTED
INR PPP: 1.2 (ref 0.8–1.2)
KETONES UR QL STRIP: NEGATIVE
KPC STRAIN: NOT DETECTED
LEUKOCYTE ESTERASE UR QL STRIP.AUTO: NEGATIVE
LYMPHOCYTES # BLD AUTO: 1.57 10*3/MM3 (ref 0.7–3.1)
LYMPHOCYTES NFR BLD AUTO: 27.9 % (ref 19.6–45.3)
MAGNESIUM SERPL-MCNC: 2 MG/DL (ref 1.7–2.3)
MCH RBC QN AUTO: 31.7 PG (ref 26.6–33)
MCHC RBC AUTO-ENTMCNC: 33 G/DL (ref 31.5–35.7)
MCV RBC AUTO: 95.9 FL (ref 79–97)
MONOCYTES # BLD AUTO: 0.49 10*3/MM3 (ref 0.1–0.9)
MONOCYTES NFR BLD AUTO: 8.7 % (ref 5–12)
NDM STRAIN: NOT DETECTED
NEUTROPHILS NFR BLD AUTO: 3.4 10*3/MM3 (ref 1.7–7)
NEUTROPHILS NFR BLD AUTO: 60.4 % (ref 42.7–76)
NITRITE UR QL STRIP: NEGATIVE
NRBC BLD AUTO-RTO: 0 /100 WBC (ref 0–0.2)
NT-PROBNP SERPL-MCNC: 1207 PG/ML (ref 0–1800)
OXA 48 STRAIN: NOT DETECTED
PH UR STRIP.AUTO: 7.5 [PH] (ref 5–9)
PLATELET # BLD AUTO: 157 10*3/MM3 (ref 140–450)
PMV BLD AUTO: 10.8 FL (ref 6–12)
POTASSIUM SERPL-SCNC: 4 MMOL/L (ref 3.5–5.2)
PROT SERPL-MCNC: 5.6 G/DL (ref 6–8.5)
PROT UR QL STRIP: NEGATIVE
PROTHROMBIN TIME: 15.2 SECONDS (ref 11.1–15.3)
QT INTERVAL: 282 MS
QT INTERVAL: 372 MS
QTC INTERVAL: 415 MS
QTC INTERVAL: 416 MS
RBC # BLD AUTO: 3.41 10*6/MM3 (ref 3.77–5.28)
SODIUM SERPL-SCNC: 141 MMOL/L (ref 136–145)
SP GR UR STRIP: 1.01 (ref 1–1.03)
T4 FREE SERPL-MCNC: 1.24 NG/DL (ref 0.93–1.7)
TROPONIN T DELTA: 3 NG/L
TROPONIN T SERPL HS-MCNC: 24 NG/L
TROPONIN T SERPL HS-MCNC: 29 NG/L
TSH SERPL DL<=0.05 MIU/L-ACNC: 7.43 UIU/ML (ref 0.27–4.2)
UROBILINOGEN UR QL STRIP: NORMAL
VALPROATE SERPL-MCNC: 54.6 MCG/ML (ref 50–125)
VIM STRAIN: NOT DETECTED
WBC NRBC COR # BLD: 5.63 10*3/MM3 (ref 3.4–10.8)

## 2023-02-16 PROCEDURE — 36415 COLL VENOUS BLD VENIPUNCTURE: CPT

## 2023-02-16 PROCEDURE — 93010 ELECTROCARDIOGRAM REPORT: CPT | Performed by: INTERNAL MEDICINE

## 2023-02-16 PROCEDURE — 84443 ASSAY THYROID STIM HORMONE: CPT | Performed by: EMERGENCY MEDICINE

## 2023-02-16 PROCEDURE — 99222 1ST HOSP IP/OBS MODERATE 55: CPT | Performed by: STUDENT IN AN ORGANIZED HEALTH CARE EDUCATION/TRAINING PROGRAM

## 2023-02-16 PROCEDURE — 93005 ELECTROCARDIOGRAM TRACING: CPT | Performed by: STUDENT IN AN ORGANIZED HEALTH CARE EDUCATION/TRAINING PROGRAM

## 2023-02-16 PROCEDURE — 70450 CT HEAD/BRAIN W/O DYE: CPT

## 2023-02-16 PROCEDURE — 84484 ASSAY OF TROPONIN QUANT: CPT | Performed by: STUDENT IN AN ORGANIZED HEALTH CARE EDUCATION/TRAINING PROGRAM

## 2023-02-16 PROCEDURE — 84484 ASSAY OF TROPONIN QUANT: CPT | Performed by: EMERGENCY MEDICINE

## 2023-02-16 PROCEDURE — 85379 FIBRIN DEGRADATION QUANT: CPT | Performed by: EMERGENCY MEDICINE

## 2023-02-16 PROCEDURE — 83735 ASSAY OF MAGNESIUM: CPT | Performed by: EMERGENCY MEDICINE

## 2023-02-16 PROCEDURE — 95816 EEG AWAKE AND DROWSY: CPT

## 2023-02-16 PROCEDURE — 80053 COMPREHEN METABOLIC PANEL: CPT | Performed by: EMERGENCY MEDICINE

## 2023-02-16 PROCEDURE — 81003 URINALYSIS AUTO W/O SCOPE: CPT | Performed by: EMERGENCY MEDICINE

## 2023-02-16 PROCEDURE — 71045 X-RAY EXAM CHEST 1 VIEW: CPT

## 2023-02-16 PROCEDURE — P9612 CATHETERIZE FOR URINE SPEC: HCPCS

## 2023-02-16 PROCEDURE — 83880 ASSAY OF NATRIURETIC PEPTIDE: CPT | Performed by: EMERGENCY MEDICINE

## 2023-02-16 PROCEDURE — 87081 CULTURE SCREEN ONLY: CPT | Performed by: STUDENT IN AN ORGANIZED HEALTH CARE EDUCATION/TRAINING PROGRAM

## 2023-02-16 PROCEDURE — 85730 THROMBOPLASTIN TIME PARTIAL: CPT | Performed by: EMERGENCY MEDICINE

## 2023-02-16 PROCEDURE — 85610 PROTHROMBIN TIME: CPT | Performed by: EMERGENCY MEDICINE

## 2023-02-16 PROCEDURE — 87102 FUNGUS ISOLATION CULTURE: CPT | Performed by: STUDENT IN AN ORGANIZED HEALTH CARE EDUCATION/TRAINING PROGRAM

## 2023-02-16 PROCEDURE — 85025 COMPLETE CBC W/AUTO DIFF WBC: CPT | Performed by: EMERGENCY MEDICINE

## 2023-02-16 PROCEDURE — 99285 EMERGENCY DEPT VISIT HI MDM: CPT

## 2023-02-16 PROCEDURE — 80164 ASSAY DIPROPYLACETIC ACD TOT: CPT | Performed by: EMERGENCY MEDICINE

## 2023-02-16 PROCEDURE — 36415 COLL VENOUS BLD VENIPUNCTURE: CPT | Performed by: STUDENT IN AN ORGANIZED HEALTH CARE EDUCATION/TRAINING PROGRAM

## 2023-02-16 PROCEDURE — 93005 ELECTROCARDIOGRAM TRACING: CPT | Performed by: EMERGENCY MEDICINE

## 2023-02-16 PROCEDURE — 84439 ASSAY OF FREE THYROXINE: CPT | Performed by: EMERGENCY MEDICINE

## 2023-02-16 PROCEDURE — 74018 RADEX ABDOMEN 1 VIEW: CPT

## 2023-02-16 RX ORDER — ACETAMINOPHEN 650 MG/1
650 SUPPOSITORY RECTAL EVERY 4 HOURS PRN
Status: DISCONTINUED | OUTPATIENT
Start: 2023-02-16 | End: 2023-02-17 | Stop reason: HOSPADM

## 2023-02-16 RX ORDER — LABETALOL HYDROCHLORIDE 5 MG/ML
10 INJECTION, SOLUTION INTRAVENOUS EVERY 6 HOURS PRN
Status: DISCONTINUED | OUTPATIENT
Start: 2023-02-16 | End: 2023-02-17 | Stop reason: HOSPADM

## 2023-02-16 RX ORDER — DIVALPROEX SODIUM 125 MG/1
250 CAPSULE, COATED PELLETS ORAL EVERY 8 HOURS SCHEDULED
Status: DISCONTINUED | OUTPATIENT
Start: 2023-02-16 | End: 2023-02-17 | Stop reason: HOSPADM

## 2023-02-16 RX ORDER — SODIUM CHLORIDE 9 MG/ML
40 INJECTION, SOLUTION INTRAVENOUS AS NEEDED
Status: DISCONTINUED | OUTPATIENT
Start: 2023-02-16 | End: 2023-02-17 | Stop reason: HOSPADM

## 2023-02-16 RX ORDER — ACETAMINOPHEN 325 MG/1
650 TABLET ORAL EVERY 4 HOURS PRN
Status: DISCONTINUED | OUTPATIENT
Start: 2023-02-16 | End: 2023-02-17 | Stop reason: HOSPADM

## 2023-02-16 RX ORDER — SODIUM CHLORIDE 1000 MG
1 TABLET, SOLUBLE MISCELLANEOUS
Status: DISCONTINUED | OUTPATIENT
Start: 2023-02-16 | End: 2023-02-17 | Stop reason: HOSPADM

## 2023-02-16 RX ORDER — AMOXICILLIN 250 MG
2 CAPSULE ORAL 2 TIMES DAILY
Status: DISCONTINUED | OUTPATIENT
Start: 2023-02-16 | End: 2023-02-17 | Stop reason: HOSPADM

## 2023-02-16 RX ORDER — DILTIAZEM HYDROCHLORIDE 5 MG/ML
2.5 INJECTION INTRAVENOUS EVERY 6 HOURS
Status: DISCONTINUED | OUTPATIENT
Start: 2023-02-16 | End: 2023-02-16

## 2023-02-16 RX ORDER — ROSUVASTATIN CALCIUM 5 MG/1
5 TABLET, COATED ORAL NIGHTLY
Status: DISCONTINUED | OUTPATIENT
Start: 2023-02-16 | End: 2023-02-17 | Stop reason: HOSPADM

## 2023-02-16 RX ORDER — ONDANSETRON 2 MG/ML
4 INJECTION INTRAMUSCULAR; INTRAVENOUS EVERY 6 HOURS PRN
Status: DISCONTINUED | OUTPATIENT
Start: 2023-02-16 | End: 2023-02-17 | Stop reason: HOSPADM

## 2023-02-16 RX ORDER — PANTOPRAZOLE SODIUM 40 MG/10ML
40 INJECTION, POWDER, LYOPHILIZED, FOR SOLUTION INTRAVENOUS
Status: DISCONTINUED | OUTPATIENT
Start: 2023-02-16 | End: 2023-02-17 | Stop reason: HOSPADM

## 2023-02-16 RX ORDER — DONEPEZIL HYDROCHLORIDE 5 MG/1
5 TABLET, FILM COATED ORAL NIGHTLY
Status: DISCONTINUED | OUTPATIENT
Start: 2023-02-16 | End: 2023-02-17 | Stop reason: HOSPADM

## 2023-02-16 RX ORDER — SODIUM CHLORIDE 0.9 % (FLUSH) 0.9 %
10 SYRINGE (ML) INJECTION AS NEEDED
Status: DISCONTINUED | OUTPATIENT
Start: 2023-02-16 | End: 2023-02-17 | Stop reason: HOSPADM

## 2023-02-16 RX ORDER — BISACODYL 10 MG
10 SUPPOSITORY, RECTAL RECTAL DAILY PRN
Status: DISCONTINUED | OUTPATIENT
Start: 2023-02-16 | End: 2023-02-17 | Stop reason: HOSPADM

## 2023-02-16 RX ORDER — ONDANSETRON 4 MG/1
4 TABLET, FILM COATED ORAL EVERY 6 HOURS PRN
Status: DISCONTINUED | OUTPATIENT
Start: 2023-02-16 | End: 2023-02-17 | Stop reason: HOSPADM

## 2023-02-16 RX ORDER — BISACODYL 5 MG/1
5 TABLET, DELAYED RELEASE ORAL DAILY PRN
Status: DISCONTINUED | OUTPATIENT
Start: 2023-02-16 | End: 2023-02-17 | Stop reason: HOSPADM

## 2023-02-16 RX ORDER — BUSPIRONE HYDROCHLORIDE 7.5 MG/1
7.5 TABLET ORAL 2 TIMES DAILY
Status: DISCONTINUED | OUTPATIENT
Start: 2023-02-16 | End: 2023-02-17 | Stop reason: HOSPADM

## 2023-02-16 RX ORDER — POLYETHYLENE GLYCOL 3350 17 G/17G
17 POWDER, FOR SOLUTION ORAL DAILY PRN
Status: DISCONTINUED | OUTPATIENT
Start: 2023-02-16 | End: 2023-02-17 | Stop reason: HOSPADM

## 2023-02-16 RX ORDER — MIRTAZAPINE 15 MG/1
7.5 TABLET, FILM COATED ORAL NIGHTLY
Status: DISCONTINUED | OUTPATIENT
Start: 2023-02-16 | End: 2023-02-17 | Stop reason: HOSPADM

## 2023-02-16 RX ORDER — MELATONIN
1000 DAILY
Status: DISCONTINUED | OUTPATIENT
Start: 2023-02-16 | End: 2023-02-17 | Stop reason: HOSPADM

## 2023-02-16 RX ORDER — LANOLIN ALCOHOL/MO/W.PET/CERES
6 CREAM (GRAM) TOPICAL NIGHTLY PRN
Status: DISCONTINUED | OUTPATIENT
Start: 2023-02-16 | End: 2023-02-17 | Stop reason: HOSPADM

## 2023-02-16 RX ORDER — SODIUM CHLORIDE 0.9 % (FLUSH) 0.9 %
10 SYRINGE (ML) INJECTION EVERY 12 HOURS SCHEDULED
Status: DISCONTINUED | OUTPATIENT
Start: 2023-02-16 | End: 2023-02-17 | Stop reason: HOSPADM

## 2023-02-16 RX ORDER — FERROUS SULFATE TAB EC 324 MG (65 MG FE EQUIVALENT) 324 (65 FE) MG
324 TABLET DELAYED RESPONSE ORAL
Status: DISCONTINUED | OUTPATIENT
Start: 2023-02-16 | End: 2023-02-17 | Stop reason: HOSPADM

## 2023-02-16 RX ORDER — ACETAMINOPHEN 160 MG/5ML
650 SOLUTION ORAL EVERY 4 HOURS PRN
Status: DISCONTINUED | OUTPATIENT
Start: 2023-02-16 | End: 2023-02-17 | Stop reason: HOSPADM

## 2023-02-16 RX ORDER — SODIUM CHLORIDE 9 MG/ML
75 INJECTION, SOLUTION INTRAVENOUS CONTINUOUS
Status: DISCONTINUED | OUTPATIENT
Start: 2023-02-16 | End: 2023-02-17 | Stop reason: HOSPADM

## 2023-02-16 RX ORDER — AMLODIPINE BESYLATE 2.5 MG/1
2.5 TABLET ORAL
Status: DISCONTINUED | OUTPATIENT
Start: 2023-02-16 | End: 2023-02-17 | Stop reason: HOSPADM

## 2023-02-16 RX ADMIN — DIVALPROEX SODIUM 250 MG: 125 CAPSULE, COATED PELLETS ORAL at 21:12

## 2023-02-16 RX ADMIN — SODIUM CHLORIDE 75 ML/HR: 9 INJECTION, SOLUTION INTRAVENOUS at 20:45

## 2023-02-16 RX ADMIN — SODIUM CHLORIDE 75 ML/HR: 9 INJECTION, SOLUTION INTRAVENOUS at 06:26

## 2023-02-16 RX ADMIN — CARBIDOPA AND LEVODOPA 1 TABLET: 25; 100 TABLET ORAL at 20:43

## 2023-02-16 RX ADMIN — ROSUVASTATIN CALCIUM 5 MG: 5 TABLET, COATED ORAL at 20:43

## 2023-02-16 RX ADMIN — SODIUM CHLORIDE 5 MG/HR: 900 INJECTION, SOLUTION INTRAVENOUS at 06:34

## 2023-02-16 RX ADMIN — DONEPEZIL HYDROCHLORIDE 5 MG: 5 TABLET, FILM COATED ORAL at 20:43

## 2023-02-16 RX ADMIN — BUSPIRONE HYDROCHLORIDE 7.5 MG: 7.5 TABLET ORAL at 21:12

## 2023-02-16 RX ADMIN — MIRTAZAPINE 7.5 MG: 15 TABLET, FILM COATED ORAL at 21:12

## 2023-02-16 RX ADMIN — DOCUSATE SODIUM 50 MG AND SENNOSIDES 8.6 MG 2 TABLET: 8.6; 5 TABLET, FILM COATED ORAL at 21:13

## 2023-02-16 RX ADMIN — PANTOPRAZOLE SODIUM 40 MG: 40 INJECTION, POWDER, FOR SOLUTION INTRAVENOUS at 08:06

## 2023-02-16 NOTE — PROGRESS NOTES
Pt will not let me draw an ABG. MD is aware. Family stated to this RT that the patient will try and fight when doing an EEG. This RT will attempt an EEG.

## 2023-02-16 NOTE — SIGNIFICANT NOTE
Spoke with Dr. Oneal about plan for cardiac meds given patient has failed her swallow eval.  Plan to transfer patient to floor but and unable to do IV Cardizem pushes on floor.  Patient is currently in sinus rhythm in the 60s.    If patient goes back into A-fib, will give amnio loading dose with amnio drip.    If patient's heart rate and blood pressure increased greater than 90 bpm, can give 2.5 mg of Lopressor IV push and then reevaluate.      This document has been electronically signed by Cely Parisi MD on February 16, 2023 17:36 CST    Cely Parisi MD   Part of this note may be an electronic transcription/translation of spoken language to printed text using the Dragon Dictation System.

## 2023-02-16 NOTE — ACP (ADVANCE CARE PLANNING)
Patient has advanced dementia and is not able to tell us her CODE STATUS. She is accompanied by her daughter and power of  Maureen Hernandez phone #[phone] who states that the patient is a DNR/DNI.         This document has been electronically signed by Conor Gunn MD on February 16, 2023 06:34 CST

## 2023-02-16 NOTE — H&P
HISTORY AND PHYSICAL  NAME: Marlene Horne  : 1931  MRN: [id number]    DATE OF ADMISSION: 23    DATE & TIME SEEN: 23 06:50 CST    PCP: Yobani Lundberg MD    CODE STATUS:   Code Status and Medical Interventions:   Ordered at: 23 0645     Medical Intervention Limits:    NO intubation (DNI)     Level Of Support Discussed With:    Health Care Surrogate     Code Status (Patient has no pulse and is not breathing):    No CPR (Do Not Attempt to Resuscitate)     Medical Interventions (Patient has pulse or is breathing):    Limited Support       CHIEF COMPLAINT: Atrial fibrillation with rapid ventricular response    HPI:  Marlene Horne is a 89 y.o. female with a PMH of significantly advanced dementia, chronic hyponatremia, stage IIIa CKD, essential hypertension, and iron deficiency anemia who presents with new onset atrial fibrillation with rapid ventricular response after referral from nursing home.     Patient is a resident of [facility]. Per Kasia at skilled nursing, patient's issues all began this morning when the nurses had difficulty waking her.  Vital signs were normal at that point.  Patient had not recovered to mental baseline when EMS arrived.  Blood was noticed around her mouth, though nurse Kasia clarifies that patient has a history of grinding her teeth against her gums.    In ED, EKG was obtained that demonstrated atrial fibrillation with rapid ventricular response.  Cardizem bolus was ordered by ED physician, who also ordered a Cardizem drip and maintenance fluids.    Admission was requested for atrial fibrillation with rapid ventricular response. Labs were not back at time admission was requested.    Patient is not able to converse with us or participate in review of systems due to advanced dementia    CONCURRENT MEDICAL HISTORY:  Past Medical History:   Diagnosis Date   • Benign essential HTN 2019   • CKD (chronic kidney disease) stage 3, GFR 30-59 ml/min ([facility])     • Constipation    • Dementia (HCC)    • Hyponatremia    • Hypovitaminosis D    • Iron deficiency anemia    • Major neurocognitive disorder (HCC)    • Stroke (HCC)        PAST SURGICAL HISTORY:  Past Surgical History:   Procedure Laterality Date   • CATARACT EXTRACTION     • CLOSED REDUCTION WRIST FRACTURE Right 12/6/2020    Procedure: CLOSED REDUCTION AND SPLINTING OF RIGHT WRIST WITH FLUOROSCOPIC ASSISTANCE;  Surgeon: Baudilio Brooks MD;  Location: [facility];  Service: Orthopedics;  Laterality: Right;   • HIP INTERTROCHANTERIC NAILING Right 12/6/2020    Procedure: INTERMEDULLARY NAILING OF RIGHT FEMUR USING GAMMA NAIL AND FLUOROSCOPIC ASSITANCE;  Surgeon: Baudilio Brooks MD;  Location: [facility];  Service: Orthopedics;  Laterality: Right;       FAMILY HISTORY:  Family History   Problem Relation Age of Onset   • No Known Problems Mother    • No Known Problems Father    • Cancer Sister    • Cancer Brother         SOCIAL HISTORY:  Social History     Socioeconomic History   • Marital status:    • Number of children: 2   • Highest education level: Bachelor's degree (e.g., BA, AB, BS)   Tobacco Use   • Smoking status: Never   • Smokeless tobacco: Never   Substance and Sexual Activity   • Alcohol use: No   • Drug use: No   • Sexual activity: Not Currently       HOME MEDICATIONS:  Prior to Admission medications    Medication Sig Start Date End Date Taking? Authorizing Provider   acetaminophen (TYLENOL) 325 MG tablet Take 2 tablets by mouth Every 4 (Four) Hours As Needed for Mild Pain . 1/3/19   Guanakito Camara MD   albuterol (PROVENTIL) (2.5 MG/3ML) 0.083% nebulizer solution Take 2.5 mg by nebulization Every 4 (Four) Hours As Needed for Shortness of Air. 1/3/19   Guanakito Camara MD   amLODIPine (NORVASC) 5 MG tablet Take 0.5 tablets by mouth Daily. 2/22/22   Angela Mcnamara MD   aspirin 81 MG EC tablet Take 81 mg by mouth Daily.    Provider, MD Pratima   busPIRone (BUSPAR) 5 MG tablet Take  7.5 mg by mouth 2 (Two) Times a Day.    Pratima Mcmahon MD   cholecalciferol (VITAMIN D3) 25 MCG (1000 UT) tablet Take 1,000 Units by mouth Daily.    Pratima Mcmahon MD   Divalproex Sodium (DEPAKOTE SPRINKLE) 125 MG capsule Take 2 capsules by mouth Every 8 (Eight) Hours. 9/14/22   Yobani Lundberg MD   donepezil (ARICEPT) 5 MG tablet Take 5 mg by mouth Every Night.    Pratima Mcmahon MD   ferrous sulfate 324 (65 Fe) MG tablet delayed-release EC tablet Take 1 tablet by mouth Daily With Breakfast. 1/7/19   Maricarmen Bell MD   melatonin 5 MG tablet tablet Take 5 mg by mouth Every Night.    Pratima Mcmahon MD   mirtazapine (REMERON) 7.5 MG half tablet Take  by mouth Every Night.    Pratima Mcmahon MD   Multiple Vitamins-Minerals (I-HERNESTO) tablet tablet Take 1 tablet by mouth Daily. 4/29/20   Srinivasa Chairez III, MD   nystatin (MYCOSTATIN) 154156 UNIT/GM powder Apply 1 application topically to the appropriate area as directed 2 (Two) Times a Day As Needed (redness/fungal infection). Apply under breast topically as needed for redness    Pratima Mcmahon MD   OLANZapine zydis (zyPREXA) 5 MG disintegrating tablet Place 1 tablet on the tongue At Night As Needed (sleep or agitation). 9/14/22   Yobani Lundberg MD   ondansetron (ZOFRAN) 4 MG tablet Take 4 mg by mouth Every 6 (Six) Hours As Needed for Nausea or Vomiting.    Pratima Mcmahon MD   rosuvastatin (CRESTOR) 5 MG tablet Take 5 mg by mouth Every Night.    Pratima Mcmahon MD   sennosides-docusate sodium (SENOKOT-S) 8.6-50 MG tablet Take 2 tablets by mouth Every Night. 5/10/19   Maricarmen Bell MD   sodium chloride 1 g tablet Take 1 tablet by mouth 3 (Three) Times a Day With Meals. 4/24/20   Srinivasa Chairez III, MD   vitamin D (ERGOCALCIFEROL) 1.25 MG (84304 UT) capsule capsule Take 50,000 Units by mouth 1 (One) Time Per Week.    Provider, Historical, MD       ALLERGIES:  Patient has no known allergies.    REVIEW OF  SYSTEMS  Review of Systems   Unable to perform ROS: Dementia       PHYSICAL EXAM:  Temp:  [98.2 °F (36.8 °C)] 98.2 °F (36.8 °C)  Heart Rate:  [104-126] 104  Resp:  [20-22] 22  BP: (147-176)/(66-80) 176/79  Body mass index is 25.02 kg/m².  Physical Exam  Constitutional:       General: She is not in acute distress.     Appearance: She is ill-appearing. She is not toxic-appearing or diaphoretic.   HENT:      Head: Normocephalic and atraumatic.      Nose: Nose normal. No congestion or rhinorrhea.      Mouth/Throat:      Comments: Dried blood around mouth   Eyes:      General: No scleral icterus.        Right eye: No discharge.         Left eye: No discharge.      Extraocular Movements: Extraocular movements intact.   Cardiovascular:      Rate and Rhythm: Tachycardia present.   Pulmonary:      Effort: Pulmonary effort is normal. No respiratory distress.      Breath sounds: Normal breath sounds. No stridor. No wheezing, rhonchi or rales.   Chest:      Chest wall: No tenderness.   Abdominal:      General: Bowel sounds are normal. There is no distension.      Palpations: Abdomen is soft.      Tenderness: There is abdominal tenderness (left lower quadrant ). There is guarding.   Musculoskeletal:      Cervical back: Normal range of motion and neck supple. No rigidity.   Lymphadenopathy:      Cervical: No cervical adenopathy.   Neurological:      Mental Status: She is disoriented.         DIAGNOSTIC DATA:   Lab Results (last 24 hours)     Procedure Component Value Units Date/Time    D-dimer, Quantitative [[id number]]  (Normal) Collected: 02/16/23 0607    Specimen: Blood Updated: 02/16/23 0648     D-Dimer, Quantitative 900 ng/mL (FEU)     Narrative:      According to the assay 's published package insert, a normal (<500 ng/mL (FEU)) D-dimer result in conjunction with a non-high clinical probability assessment, excludes deep vein thrombosis (DVT) and pulmonary embolism (PE) with high sensitivity.    D-dimer values  "increase with age and this can make VTE exclusion of an older population difficult. To address this, the [employer], based on best available evidence and recent guidelines, recommends that clinicians use age-adjusted D-dimer thresholds in patients greater than 50 years of age with: a) a low probability of PE who do not meet all Pulmonary Embolism Rule Out Criteria, or b) in those with intermediate probability of PE.   The formula for an age-adjusted D-dimer cut-off is \"age*10\".  For example, a 60 year old patient would have an age-adjusted cut-off of 600 ng/mL (FEU) and an 80 year old 800 ng/mL (FEU).      aPTT [[id number]]  (Normal) Collected: 02/16/23 0607    Specimen: Blood Updated: 02/16/23 0648     PTT 31.3 seconds     Narrative:      The recommended Heparin therapeutic range is 68-97 seconds.    Protime-INR [[id number]]  (Normal) Collected: 02/16/23 0607    Specimen: Blood Updated: 02/16/23 0646     Protime 15.2 Seconds      INR 1.20    Narrative:      Therapeutic range for most indications is 2.0-3.0 INR,  or 2.5-3.5 for mechanical heart valves.    BNP [[id number]]  (Normal) Collected: 02/16/23 0607    Specimen: Blood Updated: 02/16/23 0637     proBNP 1,207.0 pg/mL     Narrative:      Among patients with dyspnea, NT-proBNP is highly sensitive for the detection of acute congestive heart failure. In addition NT-proBNP of <300 pg/ml effectively rules out acute congestive heart failure with 99% negative predictive value.    Results may be falsely decreased if patient taking Biotin.      TSH+Free T4 [[id number]]  (Abnormal) Collected: 02/16/23 0607    Specimen: Blood Updated: 02/16/23 0637     TSH 7.430 uIU/mL      Free T4 1.24 ng/dL      Comment: T4 results may be falsely increased if patient taking Biotin.       Comprehensive Metabolic Panel [[id number]]  (Abnormal) Collected: 02/16/23 0607    Specimen: Blood Updated: 02/16/23 0631     Glucose 101 mg/dL      BUN 28 mg/dL      Creatinine 1.07 mg/dL "      Sodium 141 mmol/L      Potassium 4.0 mmol/L      Chloride 105 mmol/L      CO2 24.0 mmol/L      Calcium 8.6 mg/dL      Total Protein 5.6 g/dL      Albumin 3.0 g/dL      ALT (SGPT) <5 U/L      AST (SGOT) 12 U/L      Alkaline Phosphatase 116 U/L      Total Bilirubin 0.2 mg/dL      Globulin 2.6 gm/dL      A/G Ratio 1.2 g/dL      BUN/Creatinine Ratio 26.2     Anion Gap 12.0 mmol/L      eGFR 49.1 mL/min/1.73     Narrative:      GFR Normal >60  Chronic Kidney Disease <60  Kidney Failure <15    The GFR formula is only valid for adults with stable renal function between ages 18 and 70.    Valproic Acid Level, Total [[id number]]  (Normal) Collected: 02/16/23 0607    Specimen: Blood Updated: 02/16/23 0631     Valproic Acid 54.6 mcg/mL     High Sensitivity Troponin T [[id number]]  (Abnormal) Collected: 02/16/23 0607    Specimen: Blood Updated: 02/16/23 0628     HS Troponin T 24 ng/L     Narrative:      High Sensitive Troponin T Reference Range:  <10.0 ng/L- Negative Female for AMI  <15.0 ng/L- Negative Male for AMI  >=10 - Abnormal Female indicating possible myocardial injury.  >=15 - Abnormal Male indicating possible myocardial injury.   Clinicians would have to utilize clinical acumen, EKG, Troponin, and serial changes to determine if it is an Acute Myocardial Infarction or myocardial injury due to an underlying chronic condition.         Magnesium [[id number]]  (Normal) Collected: 02/16/23 0607    Specimen: Blood Updated: 02/16/23 0626     Magnesium 2.0 mg/dL     Urinalysis With Microscopic If Indicated (No Culture) - Straight Cath [[id number]]  (Normal) Collected: 02/16/23 0607    Specimen: Urine from Straight Cath Updated: 02/16/23 0616     Color, UA Yellow     Appearance, UA Clear     pH, UA 7.5     Specific Gravity, UA 1.013     Glucose, UA Negative     Ketones, UA Negative     Bilirubin, UA Negative     Blood, UA Negative     Protein, UA Negative     Leuk Esterase, UA Negative     Nitrite, UA Negative     Urobilinogen,  UA 0.2 E.U./dL    Narrative:      Urine microscopic not indicated.    CBC & Differential [[id number]]  (Abnormal) Collected: 02/16/23 0607    Specimen: Blood Updated: 02/16/23 0614    Narrative:      The following orders were created for panel order CBC & Differential.  Procedure                               Abnormality         Status                     ---------                               -----------         ------                     CBC Auto Differential[[id number]]        Abnormal            Final result                 Please view results for these tests on the individual orders.    CBC Auto Differential [[id number]]  (Abnormal) Collected: 02/16/23 0607    Specimen: Blood Updated: 02/16/23 0614     WBC 5.63 10*3/mm3      RBC 3.41 10*6/mm3      Hemoglobin 10.8 g/dL      Hematocrit 32.7 %      MCV 95.9 fL      MCH 31.7 pg      MCHC 33.0 g/dL      RDW 13.1 %      RDW-SD 45.7 fl      MPV 10.8 fL      Platelets 157 10*3/mm3      Neutrophil % 60.4 %      Lymphocyte % 27.9 %      Monocyte % 8.7 %      Eosinophil % 1.6 %      Basophil % 0.7 %      Immature Grans % 0.7 %      Neutrophils, Absolute 3.40 10*3/mm3      Lymphocytes, Absolute 1.57 10*3/mm3      Monocytes, Absolute 0.49 10*3/mm3      Eosinophils, Absolute 0.09 10*3/mm3      Basophils, Absolute 0.04 10*3/mm3      Immature Grans, Absolute 0.04 10*3/mm3      nRBC 0.0 /100 WBC            Imaging Results (Last 24 Hours)     Procedure Component Value Units Date/Time    XR Chest 1 View [[id number]] Collected: 02/16/23 0522     Updated: 02/16/23 0607    Narrative:      EXAM: Single view chest    COMPARISON:  Chest Xray from  January 27, 2023    HISTORY: Altered mental status     FINDINGS: Lungs are clear with no lobar consolidation, failure,  large effusion or significant atelectasis.  Cardiac and  mediastinal silhouettes show no acute abnormality.  No acute  osseous or soft tissue abnormalities. There is old granulomatous  disease and mild  cardiomegaly.  -----------------------------------------------------    Impression:      1. No active disease.  -----------------------------------------------------    Electronically signed by:  Jose Govea MD  2/16/2023 6:04 AM  CST Workstation: [id number]    CT Head Without Contrast [[id number]] Collected: 02/16/23 0523     Updated: 02/16/23 0559    Narrative:      EXAM: Head CT without contrast.   CLINICAL INDICATION: Altered mental status, seizure    COMPARISON: Head CT 9/12/2022    TECHNIQUE:      5 mm slice thickness images (vertex to skull base)   No intravenous contrast administered.   Coronal/sagittal reformations were employed.     All CT scans at this facility use dose modulation, iterative  reconstruction, and/or weight based dosing when appropriate to  reduce radiation dose to as low as reasonably achievable.     FINDINGS:      The study is slightly limited due to the position of the  patient's head, slight streak artifact and also very slight  patient motion.    There is stable global volume loss redemonstrated.    There is no intraparenchymal or intraventricular hemorrhage.  There is no intra-axial mass or extra-axial fluid collection.   There is no obvious midline shift or mass effect.      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    There is stable confluent periventricular hypodensity.    The pituitary gland, globes and periorbital structures appear  unchanged. Paranasal sinuses and mastoid air cells clear.    There is no calvarial or scalp soft tissue abnormality.      Impression:      CONCLUSION:     1. Slightly limited evaluation as above.  2. No acute intracranial abnormality identified.  3. Age-related atrophy and microvascular changes.    Electronically signed by:  Srinivasa Dunlap DO  2/16/2023 5:56 AM  CST Workstation: [id number]          I reviewed the patient's new clinical results.    ASSESSMENT AND PLAN:  This is a 89 y.o. female with:      Atrial fibrillation with rapid ventricular response (HCC)    Iron deficiency anemia secondary to inadequate dietary iron intake    Chronic hyponatremia    CKD (chronic kidney disease) stage 3, GFR 30-59 ml/min    Benign essential HTN    Late onset Alzheimer's dementia without behavioral disturbance (HCC)    Seizure-like activity (HCC)        DVT prophylaxis:   Mechanical Order History:     None      Pharmalogical Order History:     None         Code status is   Code Status and Medical Interventions:   Ordered at: 02/16/23 0645     Medical Intervention Limits:    NO intubation (DNI)     Level Of Support Discussed With:    Health Care Surrogate     Code Status (Patient has no pulse and is not breathing):    No CPR (Do Not Attempt to Resuscitate)     Medical Interventions (Patient has pulse or is breathing):    Limited Support        Marlene Horne and I have discussed pain goals for this hospitalization after reviewing her current clinical condition, medical history and prior pain experiences.  The goal is to keep her  pain level low.  To help achieve this, I plan to continue PRN tylenol.    #Atrial fibrillation with rapid ventricular response  New onset.  Demonstrated on EKG in ED. Heart rate to 126 in ED.  ED calculated chads Vas score of 6.  Cardizem bolus and drip ordered by ED provider.    -Continuous cardiac monitoring  -Continue Cardizem  -Follow-up labs obtained in ED, including troponin  -Eliquis for anticoagulation pending KUB      #Seizure-like activity  Patient initially difficult to wake at nursing home.  Patient has history of multiple admissions for similar symptoms and is on Depakote in the outpatient setting.    -N.p.o. on fluids pending swallow study  -EEG  -Depakote level  -Fall and seizure precautions    #Abdominal tenderness  Patient has left lower quadrant tenderness on physical exam.    -Stat KUB  -Initiate PPI with Protonix while inpatient  -Follow labs,  including CBC and CMP obtained in ED    #Alzheimer's dementia  -Continue Aricept while inpatient    #Parkinson's disease  -Continue Sinemet while inpatient    #Iron deficiency anemia  -Continue home supplementation    #Essential hypertension  -Continue Norvasc  -Vital signs per floor policy  -Labetalol as needed for hypertensive urgency    #Anxiety and depression  Associated with fairly advanced Alzheimer's dementia.    -Continue home BuSpar and Remeron    #Hyperlipidemia  -Continue home Crestor while inpatient     #Chronic hyponatremia  -Continue salt tablets while inpatient  -Trend daily CMP    I discussed the patient's findings and my recommendations with daughter and power of , Maureen Parisi is the attending on record at time of admission, she is aware of the patient's status and agrees with the above history and physical.      This document has been electronically signed by Conor Gunn MD on February 16, 2023 07:00 CST

## 2023-02-16 NOTE — ED PROVIDER NOTES
Subjective   History of Present Illness  89yo female nursing home resident/DNR status, pmh significant htn/cva/ckd/dementia/seizure, presents ED via EMS reportedly decreased mental status with blood per oropharynx.  ROS unobtainable secondary to dementia.    History provided by:  Nursing home  Altered Mental Status  Presenting symptoms: lethargy        Review of Systems   Unable to perform ROS: Dementia       Past Medical History:   Diagnosis Date   • Benign essential HTN 11/22/2019   • CKD (chronic kidney disease) stage 3, GFR 30-59 ml/min (HCC)    • Constipation    • Dementia (HCC)    • Hyponatremia    • Hypovitaminosis D    • Iron deficiency anemia    • Major neurocognitive disorder (HCC)    • Stroke (HCC)        No Known Allergies    Past Surgical History:   Procedure Laterality Date   • CATARACT EXTRACTION     • CLOSED REDUCTION WRIST FRACTURE Right 12/6/2020    Procedure: CLOSED REDUCTION AND SPLINTING OF RIGHT WRIST WITH FLUOROSCOPIC ASSISTANCE;  Surgeon: Baudilio Brooks MD;  Location: [facility];  Service: Orthopedics;  Laterality: Right;   • HIP INTERTROCHANTERIC NAILING Right 12/6/2020    Procedure: INTERMEDULLARY NAILING OF RIGHT FEMUR USING GAMMA NAIL AND FLUOROSCOPIC ASSITANCE;  Surgeon: Baudilio Brooks MD;  Location: [facility];  Service: Orthopedics;  Laterality: Right;       Family History   Problem Relation Age of Onset   • No Known Problems Mother    • No Known Problems Father    • Cancer Sister    • Cancer Brother        Social History     Socioeconomic History   • Marital status:    • Number of children: 2   • Highest education level: Bachelor's degree (e.g., BA, AB, BS)   Tobacco Use   • Smoking status: Never   • Smokeless tobacco: Never   Substance and Sexual Activity   • Alcohol use: No   • Drug use: No   • Sexual activity: Not Currently           Objective   Physical Exam  Vitals and nursing note reviewed.   Constitutional:       Appearance: Normal appearance.   HENT:       Head: Normocephalic and atraumatic.      Right Ear: Tympanic membrane, ear canal and external ear normal.      Left Ear: Tympanic membrane, ear canal and external ear normal.      Mouth/Throat:      Mouth: Mucous membranes are moist.      Comments: Scant bright red blood per oropharnx  Eyes:      Pupils: Pupils are equal, round, and reactive to light.   Cardiovascular:      Rate and Rhythm: Normal rate and regular rhythm.      Pulses: Normal pulses.      Heart sounds: Normal heart sounds. No murmur heard.    No friction rub. No gallop.   Pulmonary:      Effort: Pulmonary effort is normal. No respiratory distress.      Breath sounds: Normal breath sounds. No wheezing, rhonchi or rales.   Abdominal:      General: Abdomen is flat. Bowel sounds are normal. There is no distension.      Palpations: Abdomen is soft.      Tenderness: There is no abdominal tenderness. There is no guarding or rebound.      Hernia: No hernia is present.   Musculoskeletal:      Cervical back: Normal range of motion and neck supple. No rigidity.      Right lower leg: Edema present.      Left lower leg: Edema present.   Lymphadenopathy:      Cervical: No cervical adenopathy.   Skin:     General: Skin is warm and dry.   Neurological:      General: No focal deficit present.      Mental Status: She is alert.      GCS: GCS eye subscore is 4. GCS verbal subscore is 2. GCS motor subscore is 5.      Sensory: Sensation is intact.      Motor: Motor function is intact.         ECG 12 Lead      Date/Time: 2/16/2023 5:23 AM  Performed by: Charlie Holloway MD  Authorized by: Charlie Holloway MD   Interpreted by physician  Rhythm: atrial fibrillation  Rate: tachycardic  BPM: 131  QRS axis: left  Conduction: conduction normal  ST Segments: ST segments normal  T Waves: T waves normal  Other findings: PRWP  Clinical impression: abnormal ECG and dysrhythmia - atrial                 ED Course      Labs Reviewed   CBC WITH AUTO DIFFERENTIAL - Abnormal; Notable for the  following components:       Result Value    RBC 3.41 (*)     Hemoglobin 10.8 (*)     Hematocrit 32.7 (*)     Immature Grans % 0.7 (*)     All other components within normal limits   URINALYSIS W/ MICROSCOPIC IF INDICATED (NO CULTURE) - Normal    Narrative:     Urine microscopic not indicated.   COMPREHENSIVE METABOLIC PANEL   PROTIME-INR   APTT   TROPONIN   VALPROIC ACID LEVEL, TOTAL   BNP (IN-HOUSE)   D-DIMER, QUANTITATIVE   MAGNESIUM   TSH+FREE T4   BLOOD GAS, ARTERIAL   POCT GLUCOSE FINGERSTICK   CBC AND DIFFERENTIAL    Narrative:     The following orders were created for panel order CBC & Differential.  Procedure                               Abnormality         Status                     ---------                               -----------         ------                     CBC Auto Differential[[id number]]        Abnormal            Final result                 Please view results for these tests on the individual orders.     CT Head Without Contrast    Result Date: 2/16/2023  Narrative: EXAM: Head CT without contrast. CLINICAL INDICATION: Altered mental status, seizure COMPARISON: Head CT 9/12/2022 TECHNIQUE:  5 mm slice thickness images (vertex to skull base) No intravenous contrast administered. Coronal/sagittal reformations were employed. All CT scans at this facility use dose modulation, iterative reconstruction, and/or weight based dosing when appropriate to reduce radiation dose to as low as reasonably achievable. FINDINGS:  The study is slightly limited due to the position of the patient's head, slight streak artifact and also very slight patient motion. There is stable global volume loss redemonstrated. There is no intraparenchymal or intraventricular hemorrhage. There is no intra-axial mass or extra-axial fluid collection. There is no obvious midline shift or mass effect.  There is stable area of hypoattenuation in the right paramedian occipital lobe, likely encephalomalacia from old infarct. There is  stable confluent periventricular hypodensity. The pituitary gland, globes and periorbital structures appear unchanged. Paranasal sinuses and mastoid air cells clear. There is no calvarial or scalp soft tissue abnormality.     Impression: CONCLUSION: 1. Slightly limited evaluation as above. 2. No acute intracranial abnormality identified. 3. Age-related atrophy and microvascular changes. Electronically signed by:  Srinivasa Dunlap DO  2/16/2023 5:56 AM CST Workstation: [id number]    XR Chest 1 View    Result Date: 2/16/2023  Narrative: EXAM: Single view chest COMPARISON:  Chest Xray from  January 27, 2023 HISTORY: Altered mental status FINDINGS: Lungs are clear with no lobar consolidation, failure, large effusion or significant atelectasis.  Cardiac and mediastinal silhouettes show no acute abnormality.  No acute osseous or soft tissue abnormalities. There is old granulomatous disease and mild cardiomegaly. -----------------------------------------------------    Impression: 1. No active disease. ----------------------------------------------------- Electronically signed by:  Jose Govea MD  2/16/2023 6:04 AM CST Workstation: [id number]    XR Chest 1 View    Result Date: 1/27/2023  Narrative: EXAM:   XR Chest, 1 View CLINICAL HISTORY:   The patient is 89 years old and is Female; Fever TECHNIQUE:   Frontal view of the chest. COMPARISON:   Chest radiograph from 9/12/2022 FINDINGS:   LUNGS:  There is mild interstitial prominence which is favored to be chronic. No consolidation.   PLEURAL SPACE:  No significant pleural effusion.  No obvious pneumothorax.   HEART:  Stable mild enlargement of the cardiac silhouette.   MEDIASTINUM:  Unremarkable.   BONES/JOINTS:  Degenerative changes of the spine.     Impression:   No obvious acute findings in the chest. Electronically signed by:  Michelle Sterling MD  1/27/2023 6:32 AM CST Workstation: [id number]         ALLI?DS?-VASc Score for Atrial Fibrillation Stroke Risk - MDCalc  Calculated on  Feb 16 2023 7:19 AM  6 points -> Stroke risk was 9.7% per year in >90,000 patients (the [place] Atrial Fibrillation Cohort Study) and 13.6% risk of stroke/TIA/systemic embolism. One recommendation suggests a 0 score for men or 1 score for women (no clinical risk factors) is “low” risk and may not require anticoagulation; a 1 score for men or 2 score for women is “low-moderate” risk and should consider antiplatelet or anticoagulation; and a score ?2 for men or ?3 for women is “moderate-high” risk and should otherwise be an anticoagulation candidate.                                MDM    Final diagnoses:   Atrial fibrillation with rapid ventricular response (HCC)   Alzheimer's dementia, unspecified dementia severity, unspecified timing of dementia onset, unspecified whether behavioral, psychotic, or mood disturbance or anxiety (HCC)       ED Disposition  ED Disposition     ED Disposition   Decision to Admit    Condition   --    Comment   Level of Care: Critical Care [6]   Admitting Physician: BLADIMIR LUEVANO [[id number]]   Attending Physician: BLADIMIR LUEVANO [[id number]]   Patient Class: Inpatient [101]               No follow-up provider specified.       Medication List      No changes were made to your prescriptions during this visit.          Charlie Holloway MD  02/16/23 0602

## 2023-02-16 NOTE — SIGNIFICANT NOTE
02/16/23 1711   OTHER   Discipline speech language pathologist   Rehab Time/Intention   Session Not Performed patient/family declined evaluation  (Pt would not take more than 3 sips of ice chips/water. SLP will return in am to assess swallow.)

## 2023-02-16 NOTE — SIGNIFICANT NOTE
02/16/23 1434   OTHER   Discipline physical therapist;occupational therapist   Rehab Time/Intention   Session Not Performed other (see comments)   Recommendation   PT - Next Appointment 02/17/23   Recommendation   OT - Next Appointment 02/17/23     Initial PT/OT evaluations attempted.  Patient follows commands poorly, becomes agitated easily, very limited by her dementia and currently will not remain awake/alert for evaluation.

## 2023-02-16 NOTE — ED NOTES
"Nursing report ED to floor  Marlene Horne  89 y.o.  female    HPI:   Chief Complaint   Patient presents with    Altered Mental Status       Admitting doctor:   Laurie Del Rosario MD    Consulting provider(s):  Consults       Date and Time Order Name Status Description    2/16/2023  6:23 AM Family Practice - Resident (on-call MD unless specified)               Admitting diagnosis:   The primary encounter diagnosis was Atrial fibrillation with rapid ventricular response (HCC). A diagnosis of Alzheimer's dementia, unspecified dementia severity, unspecified timing of dementia onset, unspecified whether behavioral, psychotic, or mood disturbance or anxiety (HCC) was also pertinent to this visit.    Code status:   Current Code Status       Date Active Code Status Order ID Comments User Context       2/16/2023 0645 No CPR (Do Not Attempt to Resuscitate) [id number]  Funmi Tran MD ED        Question Answer    Code Status (Patient has no pulse and is not breathing) No CPR (Do Not Attempt to Resuscitate)    Medical Interventions (Patient has pulse or is breathing) Limited Support    Medical Intervention Limits: NO intubation (DNI)    Level Of Support Discussed With Health Care Surrogate                    Allergies:   Patient has no known allergies.    Intake and Output  No intake or output data in the 24 hours ending 02/16/23 0732    Weight:       02/16/23  0516   Weight: 70.3 kg (155 lb)       Most recent vitals:   Vitals:    02/16/23 0508 02/16/23 0516 02/16/23 0617 02/16/23 0634   BP: 147/66  160/80 176/79   BP Location: Right arm  Left arm    Patient Position: Sitting  Sitting    Pulse: 116  109 104   Resp: 22  22    Temp:   98.2 °F (36.8 °C)    TempSrc:   Oral    SpO2: 96%  96%    Weight:  70.3 kg (155 lb)     Height:  167.6 cm (66\")       Oxygen Therapy: room air    Active LDAs/IV Access:   Lines, Drains & Airways       Active LDAs       Name Placement date Placement time Site Days    Peripheral IV 02/16/23 0608 " Right Antecubital 02/16/23  0608  Antecubital  less than 1                    Labs (abnormal labs have a star):   Labs Reviewed   COMPREHENSIVE METABOLIC PANEL - Abnormal; Notable for the following components:       Result Value    Glucose 101 (*)     BUN 28 (*)     Creatinine 1.07 (*)     Total Protein 5.6 (*)     Albumin 3.0 (*)     BUN/Creatinine Ratio 26.2 (*)     eGFR 49.1 (*)     All other components within normal limits    Narrative:     GFR Normal >60  Chronic Kidney Disease <60  Kidney Failure <15    The GFR formula is only valid for adults with stable renal function between ages 18 and 70.   TROPONIN - Abnormal; Notable for the following components:    HS Troponin T 24 (*)     All other components within normal limits    Narrative:     High Sensitive Troponin T Reference Range:  <10.0 ng/L- Negative Female for AMI  <15.0 ng/L- Negative Male for AMI  >=10 - Abnormal Female indicating possible myocardial injury.  >=15 - Abnormal Male indicating possible myocardial injury.   Clinicians would have to utilize clinical acumen, EKG, Troponin, and serial changes to determine if it is an Acute Myocardial Infarction or myocardial injury due to an underlying chronic condition.        CBC WITH AUTO DIFFERENTIAL - Abnormal; Notable for the following components:    RBC 3.41 (*)     Hemoglobin 10.8 (*)     Hematocrit 32.7 (*)     Immature Grans % 0.7 (*)     All other components within normal limits   TSH+FREE T4 - Abnormal; Notable for the following components:    TSH 7.430 (*)     All other components within normal limits   PROTIME-INR - Normal    Narrative:     Therapeutic range for most indications is 2.0-3.0 INR,  or 2.5-3.5 for mechanical heart valves.   APTT - Normal    Narrative:     The recommended Heparin therapeutic range is 68-97 seconds.   VALPROIC ACID LEVEL, TOTAL - Normal   URINALYSIS W/ MICROSCOPIC IF INDICATED (NO CULTURE) - Normal    Narrative:     Urine microscopic not indicated.   BNP (IN-HOUSE) -  "Normal    Narrative:     Among patients with dyspnea, NT-proBNP is highly sensitive for the detection of acute congestive heart failure. In addition NT-proBNP of <300 pg/ml effectively rules out acute congestive heart failure with 99% negative predictive value.    Results may be falsely decreased if patient taking Biotin.     D-DIMER, QUANTITATIVE - Normal    Narrative:     According to the assay 's published package insert, a normal (<500 ng/mL (FEU)) D-dimer result in conjunction with a non-high clinical probability assessment, excludes deep vein thrombosis (DVT) and pulmonary embolism (PE) with high sensitivity.    D-dimer values increase with age and this can make VTE exclusion of an older population difficult. To address this, the [employer], based on best available evidence and recent guidelines, recommends that clinicians use age-adjusted D-dimer thresholds in patients greater than 50 years of age with: a) a low probability of PE who do not meet all Pulmonary Embolism Rule Out Criteria, or b) in those with intermediate probability of PE.   The formula for an age-adjusted D-dimer cut-off is \"age*10\".  For example, a 60 year old patient would have an age-adjusted cut-off of 600 ng/mL (FEU) and an 80 year old 800 ng/mL (FEU).     MAGNESIUM - Normal   BLOOD GAS, ARTERIAL   HIGH SENSITIVITIY TROPONIN T 2HR   POCT GLUCOSE FINGERSTICK   CBC AND DIFFERENTIAL    Narrative:     The following orders were created for panel order CBC & Differential.  Procedure                               Abnormality         Status                     ---------                               -----------         ------                     CBC Auto Differential[[id number]]        Abnormal            Final result                 Please view results for these tests on the individual orders.       Meds given in ED:   Medications   sodium chloride 0.9 % flush 10 mL (has no administration in time range)   sodium " chloride 0.9 % infusion (75 mL/hr Intravenous Currently Infusing 2/16/23 0602)   dilTIAZem (CARDIZEM) 100 mg in 100 mL NS infusion (ADV) (5 mg/hr Intravenous New Bag 2/16/23 0682)   sodium chloride tablet 1 g (has no administration in time range)   rosuvastatin (CRESTOR) tablet 5 mg (has no administration in time range)   ferrous sulfate EC tablet 324 mg (has no administration in time range)   donepezil (ARICEPT) tablet 5 mg (has no administration in time range)   Divalproex Sodium (DEPAKOTE SPRINKLE) capsule 250 mg (has no administration in time range)   cholecalciferol (VITAMIN D3) tablet 1,000 Units (has no administration in time range)   busPIRone (BUSPAR) tablet 7.5 mg (has no administration in time range)   amLODIPine (NORVASC) tablet 2.5 mg (has no administration in time range)   sodium chloride 0.9 % flush 10 mL (has no administration in time range)   sodium chloride 0.9 % flush 10 mL (has no administration in time range)   sodium chloride 0.9 % infusion 40 mL (has no administration in time range)   acetaminophen (TYLENOL) tablet 650 mg (has no administration in time range)     Or   acetaminophen (TYLENOL) 160 MG/5ML solution 650 mg (has no administration in time range)     Or   acetaminophen (TYLENOL) suppository 650 mg (has no administration in time range)   melatonin tablet 6 mg (has no administration in time range)   sennosides-docusate (PERICOLACE) 8.6-50 MG per tablet 2 tablet (has no administration in time range)     And   polyethylene glycol (MIRALAX) packet 17 g (has no administration in time range)     And   bisacodyl (DULCOLAX) EC tablet 5 mg (has no administration in time range)     And   bisacodyl (DULCOLAX) suppository 10 mg (has no administration in time range)   pantoprazole (PROTONIX) injection 40 mg (has no administration in time range)   ondansetron (ZOFRAN) tablet 4 mg (has no administration in time range)     Or   ondansetron (ZOFRAN) injection 4 mg (has no administration in time range)    carbidopa-levodopa (SINEMET)  MG per tablet 1 tablet (has no administration in time range)   mirtazapine (REMERON) half tablet 7.5 mg (has no administration in time range)   labetalol (NORMODYNE,TRANDATE) injection 10 mg (has no administration in time range)   dilTIAZem (CARDIZEM) bolus from bag 1 mg/mL 10 mg (10 mg Intravenous Bolus from Bag 2/16/23 0636)     dilTIAZem, 5-15 mg/hr, Last Rate: 5 mg/hr (02/16/23 0634)  sodium chloride, 75 mL/hr, Last Rate: 75 mL/hr (02/16/23 0631)         NIH Stroke Scale:       Isolation/Infection(s):  No active isolations   No active infections     COVID Testing  Collected no  Resulted no    Nursing report ED to floor:  Mental status: nonverbal  Ambulatory status: full assist  Precautions: none    ED nurse phone extentsigc- [phone]

## 2023-02-16 NOTE — ED NOTES
Spoke with Dr. Tran regarding patients heart rate being within normal limits and Cardizem drip going to be put on hold. Dr. Tran instructed this RN to still transfer patient to unit at this time.

## 2023-02-17 ENCOUNTER — APPOINTMENT (OUTPATIENT)
Dept: CARDIOLOGY | Facility: HOSPITAL | Age: 88
DRG: 309 | End: 2023-02-17
Payer: MEDICARE

## 2023-02-17 VITALS
HEART RATE: 73 BPM | DIASTOLIC BLOOD PRESSURE: 77 MMHG | HEIGHT: 66 IN | SYSTOLIC BLOOD PRESSURE: 129 MMHG | OXYGEN SATURATION: 98 % | WEIGHT: 160.94 LBS | RESPIRATION RATE: 18 BRPM | TEMPERATURE: 96.8 F | BODY MASS INDEX: 25.86 KG/M2

## 2023-02-17 LAB
ALBUMIN SERPL-MCNC: 2.5 G/DL (ref 3.5–5.2)
ALBUMIN/GLOB SERPL: 1 G/DL
ALP SERPL-CCNC: 103 U/L (ref 39–117)
ALT SERPL W P-5'-P-CCNC: <5 U/L (ref 1–33)
ANION GAP SERPL CALCULATED.3IONS-SCNC: 8 MMOL/L (ref 5–15)
AST SERPL-CCNC: 12 U/L (ref 1–32)
BASOPHILS # BLD AUTO: 0.02 10*3/MM3 (ref 0–0.2)
BASOPHILS NFR BLD AUTO: 0.3 % (ref 0–1.5)
BH CV ECHO MEAS - AO ROOT DIAM: 2.7 CM
BH CV ECHO MEAS - EDV(CUBED): 97.4 ML
BH CV ECHO MEAS - ESV(CUBED): 29.7 ML
BH CV ECHO MEAS - FS: 32.7 %
BH CV ECHO MEAS - IVS/LVPW: 0.23 CM
BH CV ECHO MEAS - IVSD: 0.78 CM
BH CV ECHO MEAS - LV MASS(C)D: 485.5 GRAMS
BH CV ECHO MEAS - LVIDD: 4.6 CM
BH CV ECHO MEAS - LVIDS: 3.1 CM
BH CV ECHO MEAS - LVPWD: 3.4 CM
BH CV ECHO MEAS - PA V2 MAX: 71.2 CM/SEC
BH CV ECHO MEAS - RVDD: 2.06 CM
BILIRUB SERPL-MCNC: 0.3 MG/DL (ref 0–1.2)
BUN SERPL-MCNC: 20 MG/DL (ref 8–23)
BUN/CREAT SERPL: 20.2 (ref 7–25)
CALCIUM SPEC-SCNC: 8.2 MG/DL (ref 8.2–9.6)
CHLORIDE SERPL-SCNC: 106 MMOL/L (ref 98–107)
CO2 SERPL-SCNC: 24 MMOL/L (ref 22–29)
CREAT SERPL-MCNC: 0.99 MG/DL (ref 0.57–1)
DEPRECATED RDW RBC AUTO: 43.7 FL (ref 37–54)
EGFRCR SERPLBLD CKD-EPI 2021: 53.9 ML/MIN/1.73
EOSINOPHIL # BLD AUTO: 0.1 10*3/MM3 (ref 0–0.4)
EOSINOPHIL NFR BLD AUTO: 1.3 % (ref 0.3–6.2)
ERYTHROCYTE [DISTWIDTH] IN BLOOD BY AUTOMATED COUNT: 12.8 % (ref 12.3–15.4)
GLOBULIN UR ELPH-MCNC: 2.6 GM/DL
GLUCOSE SERPL-MCNC: 85 MG/DL (ref 65–99)
HCT VFR BLD AUTO: 30.1 % (ref 34–46.6)
HGB BLD-MCNC: 9.9 G/DL (ref 12–15.9)
IMM GRANULOCYTES # BLD AUTO: 0.04 10*3/MM3 (ref 0–0.05)
IMM GRANULOCYTES NFR BLD AUTO: 0.5 % (ref 0–0.5)
LYMPHOCYTES # BLD AUTO: 2.36 10*3/MM3 (ref 0.7–3.1)
LYMPHOCYTES NFR BLD AUTO: 30.8 % (ref 19.6–45.3)
MAXIMAL PREDICTED HEART RATE: 129 BPM
MCH RBC QN AUTO: 30.9 PG (ref 26.6–33)
MCHC RBC AUTO-ENTMCNC: 32.9 G/DL (ref 31.5–35.7)
MCV RBC AUTO: 94.1 FL (ref 79–97)
MONOCYTES # BLD AUTO: 0.64 10*3/MM3 (ref 0.1–0.9)
MONOCYTES NFR BLD AUTO: 8.4 % (ref 5–12)
NEUTROPHILS NFR BLD AUTO: 4.49 10*3/MM3 (ref 1.7–7)
NEUTROPHILS NFR BLD AUTO: 58.7 % (ref 42.7–76)
NRBC BLD AUTO-RTO: 0 /100 WBC (ref 0–0.2)
PLATELET # BLD AUTO: 135 10*3/MM3 (ref 140–450)
PMV BLD AUTO: 10.7 FL (ref 6–12)
POTASSIUM SERPL-SCNC: 4.2 MMOL/L (ref 3.5–5.2)
PROT SERPL-MCNC: 5.1 G/DL (ref 6–8.5)
QT INTERVAL: 382 MS
QTC INTERVAL: 415 MS
RBC # BLD AUTO: 3.2 10*6/MM3 (ref 3.77–5.28)
SARS-COV-2 RNA RESP QL NAA+PROBE: NOT DETECTED
SODIUM SERPL-SCNC: 138 MMOL/L (ref 136–145)
STRESS TARGET HR: 110 BPM
VALPROATE SERPL-MCNC: 57.3 MCG/ML (ref 50–125)
WBC NRBC COR # BLD: 7.65 10*3/MM3 (ref 3.4–10.8)

## 2023-02-17 PROCEDURE — 97166 OT EVAL MOD COMPLEX 45 MIN: CPT

## 2023-02-17 PROCEDURE — 93308 TTE F-UP OR LMTD: CPT

## 2023-02-17 PROCEDURE — 85025 COMPLETE CBC W/AUTO DIFF WBC: CPT | Performed by: STUDENT IN AN ORGANIZED HEALTH CARE EDUCATION/TRAINING PROGRAM

## 2023-02-17 PROCEDURE — 80053 COMPREHEN METABOLIC PANEL: CPT | Performed by: STUDENT IN AN ORGANIZED HEALTH CARE EDUCATION/TRAINING PROGRAM

## 2023-02-17 PROCEDURE — 99239 HOSP IP/OBS DSCHRG MGMT >30: CPT | Performed by: STUDENT IN AN ORGANIZED HEALTH CARE EDUCATION/TRAINING PROGRAM

## 2023-02-17 PROCEDURE — 97162 PT EVAL MOD COMPLEX 30 MIN: CPT

## 2023-02-17 PROCEDURE — 80164 ASSAY DIPROPYLACETIC ACD TOT: CPT | Performed by: STUDENT IN AN ORGANIZED HEALTH CARE EDUCATION/TRAINING PROGRAM

## 2023-02-17 PROCEDURE — 93325 DOPPLER ECHO COLOR FLOW MAPG: CPT

## 2023-02-17 PROCEDURE — 93010 ELECTROCARDIOGRAM REPORT: CPT | Performed by: INTERNAL MEDICINE

## 2023-02-17 PROCEDURE — 87635 SARS-COV-2 COVID-19 AMP PRB: CPT | Performed by: STUDENT IN AN ORGANIZED HEALTH CARE EDUCATION/TRAINING PROGRAM

## 2023-02-17 PROCEDURE — 92610 EVALUATE SWALLOWING FUNCTION: CPT | Performed by: SPEECH-LANGUAGE PATHOLOGIST

## 2023-02-17 PROCEDURE — 93321 DOPPLER ECHO F-UP/LMTD STD: CPT

## 2023-02-17 PROCEDURE — 93005 ELECTROCARDIOGRAM TRACING: CPT | Performed by: STUDENT IN AN ORGANIZED HEALTH CARE EDUCATION/TRAINING PROGRAM

## 2023-02-17 RX ORDER — METOPROLOL SUCCINATE 25 MG/1
12.5 TABLET, EXTENDED RELEASE ORAL NIGHTLY
Qty: 30 TABLET | Refills: 1 | Status: SHIPPED | OUTPATIENT
Start: 2023-02-17 | End: 2023-02-23

## 2023-02-17 RX ORDER — ASPIRIN 81 MG/1
81 TABLET, CHEWABLE ORAL DAILY
Status: DISCONTINUED | OUTPATIENT
Start: 2023-02-17 | End: 2023-02-17 | Stop reason: HOSPADM

## 2023-02-17 RX ORDER — LORAZEPAM 0.5 MG/1
0.5 TABLET ORAL ONCE
Status: COMPLETED | OUTPATIENT
Start: 2023-02-17 | End: 2023-02-17

## 2023-02-17 RX ORDER — NYSTATIN 100000 [USP'U]/G
POWDER TOPICAL EVERY 12 HOURS SCHEDULED
Status: DISCONTINUED | OUTPATIENT
Start: 2023-02-17 | End: 2023-02-17 | Stop reason: HOSPADM

## 2023-02-17 RX ADMIN — PANTOPRAZOLE SODIUM 40 MG: 40 INJECTION, POWDER, FOR SOLUTION INTRAVENOUS at 05:32

## 2023-02-17 RX ADMIN — ASPIRIN 81 MG: 81 TABLET, CHEWABLE ORAL at 09:02

## 2023-02-17 RX ADMIN — BUSPIRONE HYDROCHLORIDE 7.5 MG: 7.5 TABLET ORAL at 09:02

## 2023-02-17 RX ADMIN — AMLODIPINE BESYLATE 2.5 MG: 2.5 TABLET ORAL at 09:03

## 2023-02-17 RX ADMIN — FERROUS SULFATE TAB EC 324 MG (65 MG FE EQUIVALENT) 324 MG: 324 (65 FE) TABLET DELAYED RESPONSE at 09:03

## 2023-02-17 RX ADMIN — DOCUSATE SODIUM 50 MG AND SENNOSIDES 8.6 MG 2 TABLET: 8.6; 5 TABLET, FILM COATED ORAL at 09:02

## 2023-02-17 RX ADMIN — LORAZEPAM 0.5 MG: 0.5 TABLET ORAL at 09:03

## 2023-02-17 RX ADMIN — Medication 1000 UNITS: at 09:02

## 2023-02-17 RX ADMIN — Medication 1 G: at 12:58

## 2023-02-17 RX ADMIN — NYSTATIN: 100000 POWDER TOPICAL at 03:04

## 2023-02-17 RX ADMIN — Medication 10 ML: at 09:03

## 2023-02-17 RX ADMIN — DIVALPROEX SODIUM 250 MG: 125 CAPSULE, COATED PELLETS ORAL at 05:32

## 2023-02-17 RX ADMIN — Medication 1 G: at 09:02

## 2023-02-17 RX ADMIN — SODIUM CHLORIDE 75 ML/HR: 9 INJECTION, SOLUTION INTRAVENOUS at 12:58

## 2023-02-17 RX ADMIN — NYSTATIN: 100000 POWDER TOPICAL at 12:59

## 2023-02-17 RX ADMIN — CARBIDOPA AND LEVODOPA 1 TABLET: 25; 100 TABLET ORAL at 09:02

## 2023-02-17 NOTE — PLAN OF CARE
Problem: Adult Inpatient Plan of Care  Goal: Plan of Care Review  Outcome: Ongoing, Progressing   Goal Outcome Evaluation:               Afib RVR, Cardizem gtt just d/c. Hx dementia, CKD stg 3 . Remeron 7.5 in place and alb 2.5L. SLP just evaluated and placed on [facility] soft/ground meats r/t poor dentition. Noted wt in [id number] in the 150s. # w/ BMI 25.9 and noted 3+ edema to feet/ankles. Past RD note indicates pt receives magic cups at NH, will add L/D. RD to follow.

## 2023-02-17 NOTE — SIGNIFICANT NOTE
02/17/23 0908   OTHER   Discipline occupational therapist;physical therapist   Rehab Time/Intention   Session Not Performed unable to evaluate, medical status change  (OT/PT eval attempted. Pt HR irregular at this time, family reports that she is close to baseline at this time. OT/PT will f/u when able.)   Recommendation   PT - Next Appointment 02/18/23   Recommendation   OT - Next Appointment 02/18/23

## 2023-02-17 NOTE — THERAPY EVALUATION
Patient Name: Marlene Horne  : 1931    MRN: [id number]                              Today's Date: 2023       Admit Date: 2023    Visit Dx:     ICD-10-CM ICD-9-CM   1. Atrial fibrillation with rapid ventricular response (HCC)  I48.91 427.31   2. Alzheimer's dementia, unspecified dementia severity, unspecified timing of dementia onset, unspecified whether behavioral, psychotic, or mood disturbance or anxiety (HCC)  G30.9 331.0    F02.80 294.10   3. Impaired mobility and ADLs  Z74.09 V49.89    Z78.9      Patient Active Problem List   Diagnosis   • Major neurocognitive disorder (CMS/HCC)   • Iron deficiency anemia secondary to inadequate dietary iron intake   • Chronic hyponatremia   • CKD (chronic kidney disease) stage 3, GFR 30-59 ml/min   • Hypovitaminosis D   • Thyroid mass   • Hypokalemia   • Primary insomnia   • Constipation   • Hypoproteinemia (HCC)   • Dental disease   • Benign essential HTN   • Closed displaced intertrochanteric fracture of right femur (HCC)   • Closed displaced fracture of surgical neck of right humerus   • Closed fracture of right wrist   • DOUGLAS (acute kidney injury) (HCC)   • Leukocytosis   • Fall at nursing home   • Right shoulder pain   • Right hip pain   • Late onset Alzheimer's dementia without behavioral disturbance (HCC)   • Fever   • Atrial fibrillation (HCC)   • Parkinson disease (HCC)     Past Medical History:   Diagnosis Date   • Benign essential HTN 2019   • CKD (chronic kidney disease) stage 3, GFR 30-59 ml/min (HCC)    • Constipation    • Dementia (HCC)    • Hyponatremia    • Hypovitaminosis D    • Iron deficiency anemia    • Major neurocognitive disorder (HCC)    • Stroke (HCC)      Past Surgical History:   Procedure Laterality Date   • CATARACT EXTRACTION     • CLOSED REDUCTION WRIST FRACTURE Right 2020    Procedure: CLOSED REDUCTION AND SPLINTING OF RIGHT WRIST WITH FLUOROSCOPIC ASSISTANCE;  Surgeon: Baudilio Brooks MD;  Location:   [facility] OR;  Service: Orthopedics;  Laterality: Right;   • HIP INTERTROCHANTERIC NAILING Right 12/6/2020    Procedure: INTERMEDULLARY NAILING OF RIGHT FEMUR USING GAMMA NAIL AND FLUOROSCOPIC ASSITANCE;  Surgeon: Baudilio Brooks MD;  Location: [facility] OR;  Service: Orthopedics;  Laterality: Right;      General Information     Row Name 02/17/23 1105          OT Time and Intention    Document Type evaluation  -RW     Mode of Treatment physical therapy;occupational therapy  -RW     Row Name 02/17/23 1105          General Information    Patient Profile Reviewed yes  -RW     Prior Level of Function max assist:;dressing;bathing  -RW     Existing Precautions/Restrictions fall  -RW     Row Name 02/17/23 1105          Living Environment    People in Home facility resident  -RW     Row Name 02/17/23 1105          Cognition    Orientation Status (Cognition) oriented to;person  -RW           User Key  (r) = Recorded By, (t) = Taken By, (c) = Cosigned By    Initials Name Provider Type    RW Juanita Toledo OT Occupational Therapist                 Mobility/ADL's     Row Name 02/17/23 1105          Bed Mobility    Bed Mobility supine-sit;sit-supine;rolling left;rolling right;scooting/bridging  -RW     Rolling Left [place] (Bed Mobility) maximum assist (25% patient effort)  -RW     Scooting/Bridging [place] (Bed Mobility) maximum assist (25% patient effort)  -RW     Supine-Sit [place] (Bed Mobility) maximum assist (25% patient effort);2 person assist  -RW     Sit-Supine [place] (Bed Mobility) maximum assist (25% patient effort);2 person assist  -RW     Assistive Device (Bed Mobility) bed rails;draw sheet;head of bed elevated;leg   -RW     Row Name 02/17/23 1105          Activities of Daily Living    BADL Assessment/Intervention lower body dressing  -RW     Row Name 02/17/23 1105          Lower Body Dressing Assessment/Training    [place] Level (Lower Body Dressing) don;socks;dependent (less than  25% patient effort)  -RW     Position (Lower Body Dressing) supine  -RW           User Key  (r) = Recorded By, (t) = Taken By, (c) = Cosigned By    Initials Name Provider Type    RW Juanita Toledo OT Occupational Therapist               Obj/Interventions     Row Name 02/17/23 1105          Sensory Assessment (Somatosensory)    Sensory Assessment (Somatosensory) unable/difficult to assess  -     Row Name 02/17/23 1105          Range of Motion Comprehensive    General Range of Motion other (see comments)  -RW     Comment, General Range of Motion PROM B shld flex 90 degrees; PROM B elbow/wrist/hand WFL  -     Row Name 02/17/23 1105          Strength Comprehensive (MMT)    General Manual Muscle Testing (MMT) Assessment other (see comments)  -RW     Comment, General Manual Muscle Testing (MMT) Assessment BUE MMT not assessed  -RW           User Key  (r) = Recorded By, (t) = Taken By, (c) = Cosigned By    Initials Name Provider Type    RW Juanita Toledo OT Occupational Therapist               Goals/Plan     Row Name 02/17/23 1105          Bathing Goal 1 (OT)    Activity/Device (Bathing Goal 1, OT) upper body bathing  -RW     [place] Level/Cues Needed (Bathing Goal 1, OT) modified independence  -RW     Time Frame (Bathing Goal 1, OT) long term goal (LTG);by discharge  -RW     Progress/Outcomes (Bathing Goal 1, OT) goal not met  -RW     [facility] Name 02/17/23 1105          Dressing Goal 1 (OT)    Activity/Device (Dressing Goal 1, OT) upper body dressing  -RW     [place]/Cues Needed (Dressing Goal 1, OT) modified independence  -RW     Time Frame (Dressing Goal 1, OT) long term goal (LTG);by discharge  -RW     Progress/Outcome (Dressing Goal 1, OT) goal not met  -RW     [facility] Name 02/17/23 1105          Grooming Goal 1 (OT)    Activity/Device (Grooming Goal 1, OT) grooming skills, all  -RW     [place] (Grooming Goal 1, OT) independent  -RW     Time Frame (Grooming Goal 1, OT) long term goal (LTG);by  discharge  -RW     Progress/Outcome (Grooming Goal 1, OT) goal not met  -RW     Row Name 02/17/23 1105          Problem Specific Goal 1 (OT)    Problem Specific Goal 1 (OT) Pt will sit EOB for 15 minutes with supervision to increase functional endurance for ADLs.  -RW     Time Frame (Problem Specific Goal 1, OT) long term goal (LTG);by discharge  -RW     Progress/Outcome (Problem Specific Goal 1, OT) goal not met  -RW     Row Name 02/17/23 1105          Therapy Assessment/Plan (OT)    Planned Therapy Interventions (OT) activity tolerance training;manual therapy/joint mobilization;patient/caregiver education/training;adaptive equipment training;neuromuscular control/coordination retraining;BADL retraining;ROM/therapeutic exercise;cognitive/visual perception retraining;occupation/activity based interventions;strengthening exercise;edema control/reduction;functional balance retraining;IADL retraining;passive ROM/stretching;transfer/mobility retraining  -RW           User Key  (r) = Recorded By, (t) = Taken By, (c) = Cosigned By    Initials Name Provider Type    RW Juanita Toledo OT Occupational Therapist               Clinical Impression     Row Name 02/17/23 1105          Pain Assessment    Pretreatment Pain Rating 0/10 - no pain  -RW     Posttreatment Pain Rating 0/10 - no pain  -RW     Row Name 02/17/23 1105          Plan of Care Review    Plan of Care Reviewed With patient;daughter  -RW     Outcome Evaluation OT eval complete, co-eval with PT. Pt supine upon arrival and agreeable to therapy. Pts PROM B shld flex 90 degrees; PROM B elbow/wrist/hand WFL. Pts BUE MMT not assessed d/t cognition, however demonstrated resistance when performing PROM. Pt dependent to don socks. Pt perf sup<>sit with max Ax2. Pt perf EOB sitting for 5 minutes with CGA. Pt perf rolling L and R with max A. Pt dependent for perineal hygiene. Pt dependent x2 for scooting to HOB. Pt demonstrated decreased independence with ADLs. Cont  inpatient OT. Pt would benefit from return SNF upon d/c.  -RW     Row Name 02/17/23 1105          Therapy Assessment/Plan (OT)    Patient/Family Therapy Goal Statement (OT) to become more independent with ADLs  -RW     Rehab Potential (OT) good, to achieve stated therapy goals  -RW     Criteria for Skilled Therapeutic Interventions Met (OT) yes;skilled treatment is necessary  -RW     Therapy Frequency (OT) other (see comments)  5-7 days per week  -RW     Predicted Duration of Therapy Intervention (OT) until all goals met or d/c from hospital  -RW     Row Name 02/17/23 1105          Therapy Plan Review/Discharge Plan (OT)    Anticipated Discharge Disposition (OT) skilled nursing facility  -RW     Row Name 02/17/23 1105          Vital Signs    Pre Systolic BP Rehab 130  -RW     Pre Treatment Diastolic BP 64  -RW     Intra Systolic BP Rehab 133  -RW     Intra Treatment Diastolic BP 63  -RW     Post Systolic BP Rehab 143  -RW     Post Treatment Diastolic BP 66  -RW     Pretreatment Heart Rate (beats/min) 57  -RW     Intratreatment Heart Rate (beats/min) 82  -RW     Posttreatment Heart Rate (beats/min) 96  -RW     Pre SpO2 (%) 98  -RW     O2 Delivery Pre Treatment room air  -RW     Post SpO2 (%) 98  -RW     O2 Delivery Post Treatment room air  -RW     Pre Patient Position Supine  -RW     Intra Patient Position Sitting  -RW     Post Patient Position Supine  -RW     Row Name 02/17/23 1105          Positioning and Restraints    Pre-Treatment Position in bed  -RW     Post Treatment Position bed  -RW     In Bed notified nsg;supine;call light within reach;encouraged to call for assist;patient within staff view  -RW           User Key  (r) = Recorded By, (t) = Taken By, (c) = Cosigned By    Initials Name Provider Type    RW Juanita Toledo, OT Occupational Therapist               Outcome Measures     Row Name 02/17/23 1105          How much help from another is currently needed...    Putting on and taking off regular lower  body clothing? 1  -RW     Bathing (including washing, rinsing, and drying) 2  -RW     Toileting (which includes using toilet bed pan or urinal) 1  -RW     Putting on and taking off regular upper body clothing 2  -RW     Taking care of personal grooming (such as brushing teeth) 2  -RW     Eating meals 3  -RW     AM-PAC 6 Clicks Score (OT) 11  -RW     Row Name 02/17/23 1159 02/17/23 0800       How much help from another person do you currently need...    Turning from your back to your side while in flat bed without using bedrails? 2  -GB 3  -AM    Moving from lying on back to sitting on the side of a flat bed without bedrails? 1  -GB 3  -AM    Moving to and from a bed to a chair (including a wheelchair)? 1  -GB 2  -AM    Standing up from a chair using your arms (e.g., wheelchair, bedside chair)? 1  -GB 2  -AM    Climbing 3-5 steps with a railing? 1  -GB 2  -AM    To walk in hospital room? 1  -GB 2  -AM    AM-PAC 6 Clicks Score (PT) 7  -GB 14  -AM    Highest level of mobility 2 --> Bed activities/dependent transfer  -GB 4 --> Transferred to chair/commode  -AM    Row Name 02/17/23 1105          Functional Assessment    Outcome Measure Options AM-PAC 6 Clicks Daily Activity (OT)  -RW           User Key  (r) = Recorded By, (t) = Taken By, (c) = Cosigned By    Initials Name Provider Type    Elo Castrejon, PT Physical Therapist    Saman Guadalupe, RN Registered Nurse    Juanita Durand, OT Occupational Therapist                Occupational Therapy Education     Title: PT OT SLP Therapies (In Progress)     Topic: Occupational Therapy (In Progress)     Point: ADL training (In Progress)     Description:   Instruct learner(s) on proper safety adaptation and remediation techniques during self care or transfers.   Instruct in proper use of assistive devices.              Learning Progress Summary           Patient Acceptance, E,TB, NL,NR by RW at 2/17/2023 1217    Comment: POC, Role of OT, mobility training     Acceptance, E, NL by AM at 2/17/2023 0845                   Point: Home exercise program (In Progress)     Description:   Instruct learner(s) on appropriate technique for monitoring, assisting and/or progressing therapeutic exercises/activities.              Learning Progress Summary           Patient Acceptance, E, NL by AM at 2/17/2023 0845                   Point: Precautions (In Progress)     Description:   Instruct learner(s) on prescribed precautions during self-care and functional transfers.              Learning Progress Summary           Patient Acceptance, E,TB, NL,NR by RW at 2/17/2023 1217    Comment: POC, Role of OT, mobility training    Acceptance, E, NL by AM at 2/17/2023 0845                   Point: Body mechanics (In Progress)     Description:   Instruct learner(s) on proper positioning and spine alignment during self-care, functional mobility activities and/or exercises.              Learning Progress Summary           Patient Acceptance, E,TB, NL,NR by RW at 2/17/2023 1217    Comment: POC, Role of OT, mobility training    Acceptance, E, NL by AM at 2/17/2023 0845                               User Key     Initials Effective Dates Name Provider Type Discipline    AM 01/11/22 -  Saman Goldsmith, RN Registered Nurse Nurse     09/22/22 -  Juanita Toledo OT Occupational Therapist OT              OT Recommendation and Plan  Planned Therapy Interventions (OT): activity tolerance training, manual therapy/joint mobilization, patient/caregiver education/training, adaptive equipment training, neuromuscular control/coordination retraining, BADL retraining, ROM/therapeutic exercise, cognitive/visual perception retraining, occupation/activity based interventions, strengthening exercise, edema control/reduction, functional balance retraining, IADL retraining, passive ROM/stretching, transfer/mobility retraining  Therapy Frequency (OT): other (see comments) (5-7 days per week)  Plan of Care Review  Plan of  Care Reviewed With: patient, daughter  Outcome Evaluation: OT eval complete, co-eval with PT. Pt supine upon arrival and agreeable to therapy. Pts PROM B shld flex 90 degrees; PROM B elbow/wrist/hand WFL. Pts BUE MMT not assessed d/t cognition, however demonstrated resistance when performing PROM. Pt dependent to don socks. Pt perf sup<>sit with max Ax2. Pt perf EOB sitting for 5 minutes with CGA. Pt perf rolling L and R with max A. Pt dependent for perineal hygiene. Pt dependent x2 for scooting to HOB. Pt demonstrated decreased independence with ADLs. Cont inpatient OT. Pt would benefit from return SNF upon d/c.     Time Calculation:    Time Calculation- OT     Row Name 02/17/23 1221 02/17/23 0908          Time Calculation- OT    OT Start Time 1105  -RW --     OT Stop Time 1200  -RW --     OT Time Calculation (min) 55 min  -RW --     OT Received On 02/17/23  -RW --     OT - Next Appointment -- 02/18/23  -     OT Goal Re-Cert Due Date 03/02/23  -RW --        Untimed Charges    OT Eval/Re-eval Minutes 55  -RW --        Total Minutes    Untimed Charges Total Minutes 55  -RW --      Total Minutes 55  -RW --           User Key  (r) = Recorded By, (t) = Taken By, (c) = Cosigned By    Initials Name Provider Type    Ana Jerez OT Occupational Therapist    Juanita Durand OT Occupational Therapist              Therapy Charges for Today     Code Description Service Date Service Provider Modifiers Qty    84470698458 HC OT EVAL MOD COMPLEXITY 4 2/17/2023 Juanita Toledo OT GO 1               Juanita Toledo OT  2/17/2023

## 2023-02-17 NOTE — PLAN OF CARE
Patient will continue to improve and work towards all goals specific to current healthcare situation. Staff will encourage goals per POC and document with all updates.  Goal Outcome Evaluation:

## 2023-02-17 NOTE — PROGRESS NOTES
FAMILY MEDICINE RESIDENCY SERVICE  DAILY PROGRESS NOTE    NAME: Marlene Horne  : 1931  MRN: [id number]      LOS: 1 day     PROVIDER OF SERVICE: Funmi Tran MD    Chief Complaint: Atrial fibrillation with rapid ventricular response (HCC)    Subjective:     Interval History:  History taken from: patient chart    No acute events overnight. Patient much more awake and responsive this morning. Speech is not clear but pt is interactive. Pt remained in NSR overnight and this morning with rate control and has not required any beta-blockers. She failed her speech evaluation yesterday but RN was able to crush medicines and give to patient with sips/pudding. She is awaiting transfer to floor.     Review of Systems:   Review of Systems   Unable to perform ROS: Dementia   Constitutional: Negative.    Respiratory: Negative.    Cardiovascular: Negative.    Gastrointestinal: Negative.    Endocrine: Negative.    Skin: Negative.    Neurological: Negative.        Objective:     Vital Signs  Temp:  [96.9 °F (36.1 °C)-99.4 °F (37.4 °C)] 96.9 °F (36.1 °C)  Heart Rate:  [] 78  Resp:  [13-18] 18  BP: (118-168)/(56-96) 140/65   Body mass index is 25.98 kg/m².    Physical Exam  Physical Exam  Vitals reviewed.   Constitutional:       General: She is not in acute distress.     Appearance: Normal appearance. She is normal weight.   HENT:      Head: Normocephalic and atraumatic.      Nose: Nose normal.      Mouth/Throat:      Mouth: Mucous membranes are moist.   Cardiovascular:      Rate and Rhythm: Normal rate and regular rhythm.      Pulses: Normal pulses.      Heart sounds: Normal heart sounds.   Pulmonary:      Effort: Pulmonary effort is normal. No respiratory distress.      Breath sounds: Normal breath sounds.   Abdominal:      General: Abdomen is flat. Bowel sounds are normal. There is no distension.      Palpations: Abdomen is soft.      Tenderness: There is no abdominal tenderness.   Musculoskeletal:          General: Normal range of motion.      Cervical back: Normal range of motion.      Right lower leg: No edema.      Left lower leg: No edema.   Skin:     General: Skin is warm and dry.      Capillary Refill: Capillary refill takes less than 2 seconds.   Neurological:      General: No focal deficit present.      Mental Status: She is alert. Mental status is at baseline.      Motor: Weakness present.   Psychiatric:         Mood and Affect: Mood normal.         Behavior: Behavior normal.         Scheduled Meds   amLODIPine, 2.5 mg, Oral, Q24H  aspirin, 81 mg, Oral, Daily  busPIRone, 7.5 mg, Oral, BID  carbidopa-levodopa, 1 tablet, Oral, BID  cholecalciferol, 1,000 Units, Oral, Daily  Divalproex Sodium, 250 mg, Oral, Q8H  donepezil, 5 mg, Oral, Nightly  ferrous sulfate, 324 mg, Oral, Daily With Breakfast  LORazepam, 0.5 mg, Oral, Once  mirtazapine, 7.5 mg, Oral, Nightly  nystatin, , Topical, Q12H  pantoprazole, 40 mg, Intravenous, Q AM  rosuvastatin, 5 mg, Oral, Nightly  senna-docusate sodium, 2 tablet, Oral, BID  sodium chloride, 10 mL, Intravenous, Q12H  sodium chloride, 1 g, Oral, TID With Meals       PRN Meds   •  acetaminophen **OR** acetaminophen **OR** acetaminophen  •  senna-docusate sodium **AND** polyethylene glycol **AND** bisacodyl **AND** bisacodyl  •  labetalol  •  melatonin  •  ondansetron **OR** ondansetron  •  [COMPLETED] Insert Peripheral IV **AND** sodium chloride  •  sodium chloride  •  sodium chloride      Diagnostic Data    Lab Results (last 24 hours)     Procedure Component Value Units Date/Time    Comprehensive Metabolic Panel [[id number]]  (Abnormal) Collected: 02/17/23 0557    Specimen: Blood Updated: 02/17/23 0649     Glucose 85 mg/dL      BUN 20 mg/dL      Creatinine 0.99 mg/dL      Sodium 138 mmol/L      Potassium 4.2 mmol/L      Chloride 106 mmol/L      CO2 24.0 mmol/L      Calcium 8.2 mg/dL      Total Protein 5.1 g/dL      Albumin 2.5 g/dL      ALT (SGPT) <5 U/L      AST (SGOT) 12 U/L       Alkaline Phosphatase 103 U/L      Total Bilirubin 0.3 mg/dL      Globulin 2.6 gm/dL      A/G Ratio 1.0 g/dL      BUN/Creatinine Ratio 20.2     Anion Gap 8.0 mmol/L      eGFR 53.9 mL/min/1.73     Narrative:      GFR Normal >60  Chronic Kidney Disease <60  Kidney Failure <15    The GFR formula is only valid for adults with stable renal function between ages 18 and 70.    Valproic Acid Level, Total [[id number]]  (Normal) Collected: 02/17/23 0557    Specimen: Blood Updated: 02/17/23 0649     Valproic Acid 57.3 mcg/mL     CBC Auto Differential [[id number]]  (Abnormal) Collected: 02/17/23 0557    Specimen: Blood Updated: 02/17/23 0619     WBC 7.65 10*3/mm3      RBC 3.20 10*6/mm3      Hemoglobin 9.9 g/dL      Hematocrit 30.1 %      MCV 94.1 fL      MCH 30.9 pg      MCHC 32.9 g/dL      RDW 12.8 %      RDW-SD 43.7 fl      MPV 10.7 fL      Platelets 135 10*3/mm3      Neutrophil % 58.7 %      Lymphocyte % 30.8 %      Monocyte % 8.4 %      Eosinophil % 1.3 %      Basophil % 0.3 %      Immature Grans % 0.5 %      Neutrophils, Absolute 4.49 10*3/mm3      Lymphocytes, Absolute 2.36 10*3/mm3      Monocytes, Absolute 0.64 10*3/mm3      Eosinophils, Absolute 0.10 10*3/mm3      Basophils, Absolute 0.02 10*3/mm3      Immature Grans, Absolute 0.04 10*3/mm3      nRBC 0.0 /100 WBC     High Sensitivity Troponin T [[id number]]  (Abnormal) Collected: 02/16/23 1350    Specimen: Blood Updated: 02/16/23 1424     HS Troponin T 29 ng/L     Narrative:      High Sensitive Troponin T Reference Range:  <10.0 ng/L- Negative Female for AMI  <15.0 ng/L- Negative Male for AMI  >=10 - Abnormal Female indicating possible myocardial injury.  >=15 - Abnormal Male indicating possible myocardial injury.   Clinicians would have to utilize clinical acumen, EKG, Troponin, and serial changes to determine if it is an Acute Myocardial Infarction or myocardial injury due to an underlying chronic condition.         CRE Screen by PCR - Swab, Large Intestine, Rectum  [[id number]] Collected: 02/16/23 0856    Specimen: Swab from Large Intestine, Rectum Updated: 02/16/23 1001     CRE SCREEN Not Detected     Comment: Test performed by real-time polymerase chain reaction (qPCR).        OXA 48 Strain Not Detected     IMP STRAIN Not Detected     VIM STRAIN Not Detected     NDM Strain Not Detected     KPC Strain Not Detected    FLORA AURIS SCREEN - Swab, Axilla Right, Axilla Left and Groin [[id number]] Collected: 02/16/23 0856    Specimen: Swab from Axilla Right, Axilla Left and Groin Updated: 02/16/23 0902          EEG    Result Date: 2/16/2023  Diffuse cerebral dysfunction of moderate degree, nonspecific No ongoing seizures are seen This report is transcribed using the Dragon dictation system.      CT Head Without Contrast    Result Date: 2/16/2023  CONCLUSION: 1. Slightly limited evaluation as above. 2. No acute intracranial abnormality identified. 3. Age-related atrophy and microvascular changes. Electronically signed by:  Srinivasa Dunlap DO  2/16/2023 5:56 AM CST Workstation: [id number]    XR Chest 1 View    Result Date: 2/16/2023  1. No active disease. ----------------------------------------------------- Electronically signed by:  Jose Govea MD  2/16/2023 6:04 AM CST Workstation: [id number]    XR Abdomen KUB    Result Date: 2/16/2023  Normal abdominal bowel gas pattern. Large amount of fecal material in the rectum. Little elsewhere in the colon. [id number] Electronically signed by:  Jose Boyle MD  2/16/2023 7:31 AM CST Workstation: [id number]        I reviewed the patient's new clinical results.    Assessment/Plan:     Active and Resolved Problems  Active Hospital Problems    Diagnosis  POA   • **Atrial fibrillation with rapid ventricular response (HCC) [I48.91]  Yes     Priority: High   • Parkinson disease (HCC) [G20]  Yes   • Late onset Alzheimer's dementia without behavioral disturbance (HCC) [G30.1, F02.80]  Yes   • Benign essential HTN [I10]  Yes   • Iron deficiency anemia  secondary to inadequate dietary iron intake [D50.8]  Yes   • CKD (chronic kidney disease) stage 3, GFR 30-59 ml/min [N18.30]  Yes   • Chronic hyponatremia [E87.1]  Yes      Resolved Hospital Problems    Diagnosis Date Resolved POA   • Seizure-like activity (HCC) [R56.9] 02/16/2023 Yes     Priority: High       #Atrial fibrillation with rapid ventricular response  - Has remained in NSR with rate control since being off of Cardizem gtt  - CHADSVASc score of 6- likely not candidate for anticoagulation 2/2 to dementia/increased risk of fall   - Aspirin 81mg  - Echocardiogram pending  - Cardiology consulted - appreciate recommendations    #Essential hypertension  -Continue Norvasc  -Labetalol prn for SBP >180        DVT prophylaxis:  Mechanical DVT prophylaxis orders are present.     Code status is   Code Status and Medical Interventions:   Ordered at: 02/16/23 0645     Medical Intervention Limits:    NO intubation (DNI)     Level Of Support Discussed With:    Health Care Surrogate     Code Status (Patient has no pulse and is not breathing):    No CPR (Do Not Attempt to Resuscitate)     Medical Interventions (Patient has pulse or is breathing):    Limited Support       Plan for disposition: SNF in 1-2 days    Time: 30 minutes           Funmi Tran M.D. PGY 2  [facility]  [address] [place] [address]  Office: [phone]  This document has been electronically signed by Funmi Tran MD on February 17, 2023 08:51 CST

## 2023-02-17 NOTE — PLAN OF CARE
Goal Outcome Evaluation:  Plan of Care Reviewed With: patient, daughter           Outcome Evaluation: OT eval complete, co-eval with PT. Pt supine upon arrival and agreeable to therapy. Pts PROM B shld flex 90 degrees; PROM B elbow/wrist/hand WFL. Pts BUE MMT not assessed d/t cognition, however demonstrated resistance when performing PROM. Pt dependent to don socks. Pt perf sup<>sit with max Ax2. Pt perf EOB sitting for 5 minutes with CGA. Pt perf rolling L and R with max A. Pt dependent for perineal hygiene. Pt dependent x2 for scooting to HOB. Pt demonstrated decreased independence with ADLs. Cont inpatient OT. Pt would benefit from return SNF upon d/c.

## 2023-02-17 NOTE — DISCHARGE SUMMARY
DISCHARGE SUMMARY    PATIENT NAME: Marlene Horne      PHYSICIAN: Conor Gunn MD  : [id number]  MRN: [id number]    ADMITTED: 2023     DISCHARGED: 2023    ADMISSION DIAGNOSES:  Active Hospital Problems    Diagnosis  POA   • **Atrial fibrillation (HCC) [I48.91]  Yes   • Parkinson disease (HCC) [G20]  Yes   • Late onset Alzheimer's dementia without behavioral disturbance (HCC) [G30.1, F02.80]  Yes   • Benign essential HTN [I10]  Yes   • Iron deficiency anemia secondary to inadequate dietary iron intake [D50.8]  Yes   • CKD (chronic kidney disease) stage 3, GFR 30-59 ml/min [N18.30]  Yes   • Chronic hyponatremia [E87.1]  Yes      Resolved Hospital Problems    Diagnosis Date Resolved POA   • Seizure-like activity (HCC) [R56.9] 2023 Yes       DISCHARGE DIAGNOSES:   Active Hospital Problems    Diagnosis  POA   • **Atrial fibrillation (HCC) [I48.91]  Yes   • Parkinson disease (HCC) [G20]  Yes   • Late onset Alzheimer's dementia without behavioral disturbance (HCC) [G30.1, F02.80]  Yes   • Benign essential HTN [I10]  Yes   • Iron deficiency anemia secondary to inadequate dietary iron intake [D50.8]  Yes   • CKD (chronic kidney disease) stage 3, GFR 30-59 ml/min [N18.30]  Yes   • Chronic hyponatremia [E87.1]  Yes      Resolved Hospital Problems    Diagnosis Date Resolved POA   • Seizure-like activity (HCC) [R56.9] 2023 Yes       SERVICE: Family Medicine Residency  Attending: Dr. Cely Parisi  Resident: Conor Gunn MD    CONSULTS:   Consult Orders (all) (From admission, onward)     Start     Ordered    23 0703  Inpatient Case Management  Consult  Once        Comments: Pt from kiersten   Provider:  (Not yet assigned)    23 0703    23 1000  Inpatient Nutrition Consult  Once        Provider:  (Not yet assigned)    23 1000    23 0936  Inpatient Cardiology Consult  Once        Specialty:  Cardiology  Provider:  Bryant Oneal MD    23  0935    02/16/23 0624  Family Practice - Resident (on-call MD unless specified)  Once        Specialty:  Family Medicine  Provider:  (Not yet assigned)    02/16/23 0623                PROCEDURES:   None    HISTORY OF PRESENT ILLNESS:     Retrieved from History and Physical Exam by Conor Gunn on 2/16/2023:     Marlene Horne is a 89 y.o. female with a PMH of significantly advanced dementia, chronic hyponatremia, stage IIIa CKD, essential hypertension, and iron deficiency anemia who presents with new onset atrial fibrillation with rapid ventricular response after referral from [facility].      Patient is a resident of [facility]. Per Kasia at [facility], patient's issues all began this morning when the nurses had difficulty waking her.  Vital signs were normal at that point.  Patient had not recovered to mental baseline when EMS arrived.  Blood was noticed around her mouth, though nurse Kasia clarifies that patient has a history of grinding her teeth against her gums.     In ED, EKG was obtained that demonstrated atrial fibrillation with rapid ventricular response.  Cardizem bolus was ordered by ED physician, who also ordered a Cardizem drip and maintenance fluids.     Admission was requested for atrial fibrillation with rapid ventricular response. Labs were not back at time admission was requested.     Patient is not able to converse with us or participate in review of systems due to advanced dementia    DIAGNOSTIC DATA:   Lab Results (last 24 hours)     Procedure Component Value Units Date/Time    CANDIDA AURIS SCREEN - Swab, Axilla Right, Axilla Left and Groin [[id number]]  (Normal) Collected: 02/16/23 0856    Specimen: Swab from Axilla Right, Axilla Left and Groin Updated: 02/17/23 0915     Candida Auris Screen Culture No Candida auris isolated at 24 hours    Comprehensive Metabolic Panel [[id number]]  (Abnormal) Collected: 02/17/23 0557    Specimen: Blood Updated: 02/17/23 0649     Glucose 85  mg/dL      BUN 20 mg/dL      Creatinine 0.99 mg/dL      Sodium 138 mmol/L      Potassium 4.2 mmol/L      Chloride 106 mmol/L      CO2 24.0 mmol/L      Calcium 8.2 mg/dL      Total Protein 5.1 g/dL      Albumin 2.5 g/dL      ALT (SGPT) <5 U/L      AST (SGOT) 12 U/L      Alkaline Phosphatase 103 U/L      Total Bilirubin 0.3 mg/dL      Globulin 2.6 gm/dL      A/G Ratio 1.0 g/dL      BUN/Creatinine Ratio 20.2     Anion Gap 8.0 mmol/L      eGFR 53.9 mL/min/1.73     Narrative:      GFR Normal >60  Chronic Kidney Disease <60  Kidney Failure <15    The GFR formula is only valid for adults with stable renal function between ages 18 and 70.    Valproic Acid Level, Total [[id number]]  (Normal) Collected: 02/17/23 0557    Specimen: Blood Updated: 02/17/23 0649     Valproic Acid 57.3 mcg/mL     CBC Auto Differential [[id number]]  (Abnormal) Collected: 02/17/23 0557    Specimen: Blood Updated: 02/17/23 0619     WBC 7.65 10*3/mm3      RBC 3.20 10*6/mm3      Hemoglobin 9.9 g/dL      Hematocrit 30.1 %      MCV 94.1 fL      MCH 30.9 pg      MCHC 32.9 g/dL      RDW 12.8 %      RDW-SD 43.7 fl      MPV 10.7 fL      Platelets 135 10*3/mm3      Neutrophil % 58.7 %      Lymphocyte % 30.8 %      Monocyte % 8.4 %      Eosinophil % 1.3 %      Basophil % 0.3 %      Immature Grans % 0.5 %      Neutrophils, Absolute 4.49 10*3/mm3      Lymphocytes, Absolute 2.36 10*3/mm3      Monocytes, Absolute 0.64 10*3/mm3      Eosinophils, Absolute 0.10 10*3/mm3      Basophils, Absolute 0.02 10*3/mm3      Immature Grans, Absolute 0.04 10*3/mm3      nRBC 0.0 /100 WBC     High Sensitivity Troponin T [[id number]]  (Abnormal) Collected: 02/16/23 1350    Specimen: Blood Updated: 02/16/23 1424     HS Troponin T 29 ng/L     Narrative:      High Sensitive Troponin T Reference Range:  <10.0 ng/L- Negative Female for AMI  <15.0 ng/L- Negative Male for AMI  >=10 - Abnormal Female indicating possible myocardial injury.  >=15 - Abnormal Male indicating possible myocardial  injury.   Clinicians would have to utilize clinical acumen, EKG, Troponin, and serial changes to determine if it is an Acute Myocardial Infarction or myocardial injury due to an underlying chronic condition.             Imaging Results (Last 72 Hours)     Procedure Component Value Units Date/Time    XR Abdomen KUB [[id number]] Collected: 02/16/23 0645     Updated: 02/16/23 0733    Narrative:      PROCEDURE: AP supine/abdomen KUB with single view.    INDICATION: abdominal pain, I48.91 Unspecified atrial  fibrillation G30.9 Alzheimer's disease, unspecified F02.80  Dementia in other diseases classified elsewhere, unspecified  severity, without behavioral disturbance, psychotic disturbance,  mood disturbance, and anxiety    COMPARISON: None.    FINDINGS:    Normal abdominal bowel gas pattern with no abnormal gaseous  distention of the small bowel or colon.  There is a large amount of fecal material in the rectum with  little elsewhere in the colon.    No suspicious calcifications or soft tissue densities.    Partially visualized right hip nail with threaded portion within  femoral head.      Impression:      Normal abdominal bowel gas pattern.    Large amount of fecal material in the rectum. Little elsewhere in  the colon.      [id number]    Electronically signed by:  Jose Boyle MD  2/16/2023 7:31  AM CST Workstation: [id number]    XR Chest 1 View [[id number]] Collected: 02/16/23 0522     Updated: 02/16/23 0607    Narrative:      EXAM: Single view chest    COMPARISON:  Chest Xray from  January 27, 2023    HISTORY: Altered mental status     FINDINGS: Lungs are clear with no lobar consolidation, failure,  large effusion or significant atelectasis.  Cardiac and  mediastinal silhouettes show no acute abnormality.  No acute  osseous or soft tissue abnormalities. There is old granulomatous  disease and mild cardiomegaly.  -----------------------------------------------------    Impression:      1. No active  disease.  -----------------------------------------------------    Electronically signed by:  Jose Govea MD  2/16/2023 6:04 AM  CST Workstation: [id number]    CT Head Without Contrast [[id number]] Collected: 02/16/23 0523     Updated: 02/16/23 0559    Narrative:      EXAM: Head CT without contrast.   CLINICAL INDICATION: Altered mental status, seizure    COMPARISON: Head CT 9/12/2022    TECHNIQUE:      5 mm slice thickness images (vertex to skull base)   No intravenous contrast administered.   Coronal/sagittal reformations were employed.     All CT scans at this facility use dose modulation, iterative  reconstruction, and/or weight based dosing when appropriate to  reduce radiation dose to as low as reasonably achievable.     FINDINGS:      The study is slightly limited due to the position of the  patient's head, slight streak artifact and also very slight  patient motion.    There is stable global volume loss redemonstrated.    There is no intraparenchymal or intraventricular hemorrhage.  There is no intra-axial mass or extra-axial fluid collection.   There is no obvious midline shift or mass effect.      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    There is stable confluent periventricular hypodensity.    The pituitary gland, globes and periorbital structures appear  unchanged. Paranasal sinuses and mastoid air cells clear.    There is no calvarial or scalp soft tissue abnormality.      Impression:      CONCLUSION:     1. Slightly limited evaluation as above.  2. No acute intracranial abnormality identified.  3. Age-related atrophy and microvascular changes.    Electronically signed by:  Srinivasa Dunlap DO  2/16/2023 5:56 AM  CST Workstation: [id number]             HOSPITAL COURSE:    1.  New onset atrial fibrillation with rapid ventricular response (resolved)    Patient was referred to ED from nursing home after nursing staff had  difficulty waking her.  EKG in ED was read by ED physician as atrial fibrillation with rapid ventricular response. ED physician started patient on Cardizem bolus and Cardizem drip and requested admission.  Patient heart rate improved in ICU and patient no longer required Cardizem drip.  Cardiology was consulted and initially recommended amiodarone infusion, but this was not administered as patient's heart rate had improved by that point and repeat EKG showed normal sinus rhythm.  Echocardiogram reviewed by cardiologist demonstrated no evidence of left atrial thrombus or mass.      Cardiology recommended new medication of metoprolol 12.5 mg nightly at discharge.        2.  Seizure-like activity (resolved)  Patient was referred to ED by nursing home staff after nurses had difficulty waking patient.  She does have very advanced dementia and is not particularly conversational, even at baseline.  Nursing home staff relayed that this was something that had happened before with patient and in the past have been felt consistent with seizure given history of epilepsy.  Patient valproic acid level was obtained and was normal.  EEG was obtained and did not show any focal features or epileptiform activity.  Patient returned to baseline function on day after admission and passed bedside swallow study.    Patient was discharged back to nursing home on 2/17/2023 and scheduled to follow-up with outpatient provider within 1 week and Dr. Oneal of cardiology within 2 weeks.     DISCHARGE CONDITION:   Stable     DISPOSITION:  Skilled Nursing Facility     DISCHARGE MEDICATIONS     Discharge Medications      New Medications      Instructions Start Date   carbidopa-levodopa  MG per tablet  Commonly known as: SINEMET   1 tablet, Oral, 2 Times Daily      metoprolol succinate XL 25 MG 24 hr tablet  Commonly known as: Toprol XL   12.5 mg, Oral, Nightly         Continue These Medications      Instructions Start Date   acetaminophen 325  MG tablet  Commonly known as: TYLENOL   650 mg, Oral, Every 4 Hours PRN      albuterol (2.5 MG/3ML) 0.083% nebulizer solution  Commonly known as: PROVENTIL   2.5 mg, Nebulization, Every 4 Hours PRN      amLODIPine 5 MG tablet  Commonly known as: NORVASC   2.5 mg, Oral, Every 24 Hours Scheduled      aspirin 81 MG EC tablet   81 mg, Oral, Daily      busPIRone 5 MG tablet  Commonly known as: BUSPAR   7.5 mg, Oral, 2 Times Daily      cholecalciferol 25 MCG (1000 UT) tablet  Commonly known as: VITAMIN D3   1,000 Units, Oral, Daily      Divalproex Sodium 125 MG capsule  Commonly known as: DEPAKOTE SPRINKLE   250 mg, Oral, Every 8 Hours Scheduled      donepezil 5 MG tablet  Commonly known as: ARICEPT   5 mg, Oral, Nightly      ferrous sulfate 324 (65 Fe) MG tablet delayed-release EC tablet   324 mg, Oral, Daily With Breakfast      I-gudelia tablet tablet  Generic drug: multivitamin with minerals   1 tablet, Oral, Daily      melatonin 5 MG tablet tablet   5 mg, Oral, Nightly      mirtazapine 7.5 MG half tablet  Commonly known as: REMERON   Oral, Nightly      nystatin 824721 UNIT/GM powder  Commonly known as: MYCOSTATIN   1 application, Topical, 2 Times Daily PRN, Apply under breast topically as needed for redness      ondansetron 4 MG tablet  Commonly known as: ZOFRAN   4 mg, Oral, Every 6 Hours PRN      rosuvastatin 5 MG tablet  Commonly known as: CRESTOR   5 mg, Oral, Nightly      sennosides-docusate 8.6-50 MG per tablet  Commonly known as: PERICOLACE   2 tablets, Oral, Nightly      sodium chloride 1 g tablet   1 g, Oral, 3 Times Daily With Meals      vitamin D 1.25 MG (95996 UT) capsule capsule  Commonly known as: ERGOCALCIFEROL   50,000 Units, Oral, Weekly             INSTRUCTIONS:  Activity:   Activity Instructions     Up WIth Assist          Diet:   Diet Instructions     Diet: Cardiac      Discharge Diet: Cardiac          FOLLOW UP:   Additional Instructions for the Follow-ups that You Need to Schedule     Call MD With  Problems / Concerns   As directed      Instructions: Please take medications as prescribed.  Please follow with PCP in one week and cardiology in two weeks.   Please start taking metoprolol 12.5mg nightly and daily aspirin 81mg.   Call PCP or return to ED should you develop lightheadedness, confusion, chest pain, shortness of breath or any other concerning symptoms.    Order Comments: Instructions: Please take medications as prescribed. Please follow with PCP in one week and cardiology in two weeks. Please start taking metoprolol 12.5mg nightly and daily aspirin 81mg. Call PCP or return to ED should you develop lightheadedness, confusion, chest pain, shortness of breath or any other concerning symptoms.          Discharge Follow-up with PCP   As directed       Currently Documented PCP:    Yobani Lundberg MD    PCP Phone Number:    [phone]     Follow Up Details: one week         Discharge Follow-up with Specified Provider: Dr. Oneal; 2 Weeks   As directed      To: Dr. Oneal    Follow Up: 2 Weeks            Follow-up Information     Yobani Lundberg MD .    Specialties: Family Medicine, Emergency Medicine, Urgent Care  Why: one week  Contact information:  [address] [place] [facility] [address] [phone]                         PENDING TEST RESULTS AT DISCHARGE  Pending Labs     Order Current Status    CANDIDA AURIS SCREEN - Swab, Axilla Right, Axilla Left and Groin Preliminary result          Time: 35 minutes was spent in discharge planning, medication reconciliation and coordination of care for this patient.    Dr. Cely Parisi  is the attending at time of discharge, She is aware of the patient's status and agrees with the above discharge summary.      This document has been electronically signed by Conor Gunn MD on February 17, 2023 13:03 CST    Part of this note may be an electronic transcription or translation of spoken language to printed text using the Dragon Dictation System

## 2023-02-17 NOTE — CONSULTS
Cardiology Consultation Note.        Patient Name: Marlene Horne  Age/Sex: 89 y.o. female  : 1931  MRN: [id number]    Date of consultation: 2023  Consulting Physician: Bryant Oneal MD  Primary care Physician: Yobani Lundberg MD  Requesting Physician:   Funmi Tran MD     Reason for consultation: Atrial fibrillation      Subjective:       Chief Complaint: Altered mental status    History of Present Illness:  Marlene Horne is a 89 y.o. female     Body mass index is 25.02 kg/m².  With a past medical history significant for arterial hypertension, hypertensive heart disease, concentric left ventricular hypertrophy with diastolic dysfunction with an ejection fraction of 60 to 65%, history of hyponatremia, vitamin D deficiency, iron deficiency and, major neurocognitive disorder with Alzheimer dementia, chronic kidney disease and questionable history of cerebrovascular accident in the past.    Patient is a resident at the [facility].  Patient was evaluated by the nursing staff at the [facility] and had difficulty in arousing her earlier this morning.  Patient was subsequently sent to the emergency room.  Patient on initial evaluation in the emergency room was found to be in atrial fibrillation with rapid ventricular response.    Review of the record indicate patient does not have any previous history of documented atrial fibrillation.  Due to the patient Alzheimer dementia patient is unable to cooperate or answer any questions appropriately.    Patient after being hospitalized was started on intravenous Cardizem and had converted to normal sinus rhythm.    Patient has no previous history of documented atrial fibrillation or coronary artery disease.    Unable to perform the review of system or 10 point review of system.      Concurrent Medical History:  1.  Paroxysmal atrial fibrillation.  2.  Arterial hypertension.  3.  Hypertensive heart disease.  4.  Concentric left  ventricular hypertrophy with diastolic dysfunction with an ejection fraction of 55 to 60%.  5.  Iron deficiency anemia.  6.  History of hyponatremia.  7.  Vitamin D deficiency.  8.  Alzheimer dementia with major neurocognitive disorder.  9.  History of cerebrovascular accident.  10.  Chronic kidney disease  11.  Hyperlipidemia  12.  CODE STATUS DO NOT RESUSCITATE.          Past Surgical History:  1.  Cataract extraction;  2.  Hip Intertrochanteric Nailing (Right, 12/6/2020);   3.  Closed reduction wrist fracture (Right, 12/6/2020).      Family History:  family history includes Cancer in her brother and sister; No Known Problems in her father and mother      Social History:  reports that she has never smoked. She has never used smokeless tobacco. She reports that she does not drink alcohol and does not use drugs.       Cardiac Risk Factors:  1.  Postmenopausal.  2.  Arterial hypertension.  3.  Hyperlipidemia      Allergies:  No Known Allergies    Medication:  Medications Prior to Admission   Medication Sig Dispense Refill Last Dose   • acetaminophen (TYLENOL) 325 MG tablet Take 2 tablets by mouth Every 4 (Four) Hours As Needed for Mild Pain .      • albuterol (PROVENTIL) (2.5 MG/3ML) 0.083% nebulizer solution Take 2.5 mg by nebulization Every 4 (Four) Hours As Needed for Shortness of Air.  12    • amLODIPine (NORVASC) 5 MG tablet Take 0.5 tablets by mouth Daily.      • aspirin 81 MG EC tablet Take 81 mg by mouth Daily.      • busPIRone (BUSPAR) 5 MG tablet Take 7.5 mg by mouth 2 (Two) Times a Day.      • cholecalciferol (VITAMIN D3) 25 MCG (1000 UT) tablet Take 1,000 Units by mouth Daily.      • Divalproex Sodium (DEPAKOTE SPRINKLE) 125 MG capsule Take 2 capsules by mouth Every 8 (Eight) Hours. 90 capsule 2    • donepezil (ARICEPT) 5 MG tablet Take 5 mg by mouth Every Night.      • ferrous sulfate 324 (65 Fe) MG tablet delayed-release EC tablet Take 1 tablet by mouth Daily With Breakfast. 30 tablet     • melatonin 5  MG tablet tablet Take 5 mg by mouth Every Night.      • mirtazapine (REMERON) 7.5 MG half tablet Take  by mouth Every Night.      • Multiple Vitamins-Minerals (I-HERNESTO) tablet tablet Take 1 tablet by mouth Daily. 90 tablet 2    • nystatin (MYCOSTATIN) 662353 UNIT/GM powder Apply 1 application topically to the appropriate area as directed 2 (Two) Times a Day As Needed (redness/fungal infection). Apply under breast topically as needed for redness      • ondansetron (ZOFRAN) 4 MG tablet Take 4 mg by mouth Every 6 (Six) Hours As Needed for Nausea or Vomiting.      • rosuvastatin (CRESTOR) 5 MG tablet Take 5 mg by mouth Every Night.      • sennosides-docusate sodium (SENOKOT-S) 8.6-50 MG tablet Take 2 tablets by mouth Every Night. 30 tablet 0    • sodium chloride 1 g tablet Take 1 tablet by mouth 3 (Three) Times a Day With Meals. 180 tablet 0    • vitamin D (ERGOCALCIFEROL) 1.25 MG (69128 UT) capsule capsule Take 50,000 Units by mouth 1 (One) Time Per Week.              Review of Systems: Unable to be performed secondary to patient Alzheimer's dementia            Objective:     Objective:  Temp:  [98.2 °F (36.8 °C)-99.4 °F (37.4 °C)] 98.9 °F (37.2 °C)  Heart Rate:  [] 60  Resp:  [13-22] 18  BP: (133-176)/(63-80) 135/69      Body mass index is 25.02 kg/m².           Physical Exam:   General Appearance:    Alert, oriented, cooperative, in no acute distress.   Head:    Normocephalic, atraumatic, without obvious abnormality.   Eyes:           ARIELLE.  Lids and lashes normal, conjunctivae and sclerae normal, no icterus, no pallor.   Ears:    Ears appear intact with no abnormalities noted.   Throat:   Mucous membranes pink and moist.   Neck:   Supple, trachea midline, no carotid bruit, no organomegaly or JVD.   Lungs:     Clear to auscultation and percussion, respirations regular, even and unlabored. No wheezes, rales or rhonchi.    Heart:    Regular rhythm and normal rate, normal S1 and S2, no murmur, no gallop, no rub,  no click.   Abdomen:     Soft, nontender, nondistended, no guarding, no rebound tenderness, normal bowel sounds in all four quadrants, no masses, liver and spleen nonpalpable.   Genitalia:    Deferred.   Extremities:   Moves all extremities well, no edema, no cyanosis, no  redness, no clubbing.   Pulses:   Pulses palpable and equal bilaterally.   Skin:   Moist and warm. No bleeding, bruising or rash.   Neurologic/Psychiatric:   Alert and oriented to person, place, and time.  Motor, power and tone in upper and lower extremities are grossly intact. No focal neurological deficits. Normal cognitive function. No psychomotor reaction or tangential thought. No depression, homicidal ideations and suicidal ideations.       Medication Review:   Current Facility-Administered Medications   Medication Dose Route Frequency Provider Last Rate Last Admin   • acetaminophen (TYLENOL) tablet 650 mg  650 mg Oral Q4H PRN Funmi Tran MD        Or   • acetaminophen (TYLENOL) 160 MG/5ML solution 650 mg  650 mg Oral Q4H PRN Funmi Tran MD        Or   • acetaminophen (TYLENOL) suppository 650 mg  650 mg Rectal Q4H PRN Funmi Tran MD       • amLODIPine (NORVASC) tablet 2.5 mg  2.5 mg Oral Q24H Funmi Tran MD       • sennosides-docusate (PERICOLACE) 8.6-50 MG per tablet 2 tablet  2 tablet Oral BID Funmi Tran MD        And   • polyethylene glycol (MIRALAX) packet 17 g  17 g Oral Daily PRN Funmi Tran MD        And   • bisacodyl (DULCOLAX) EC tablet 5 mg  5 mg Oral Daily PRN Funmi Tran MD        And   • bisacodyl (DULCOLAX) suppository 10 mg  10 mg Rectal Daily PRN Funmi Tran MD       • busPIRone (BUSPAR) tablet 7.5 mg  7.5 mg Oral BID Funmi Tran MD       • carbidopa-levodopa (SINEMET)  MG per tablet 1 tablet  1 tablet Oral BID Funmi Tran MD       • cholecalciferol (VITAMIN D3) tablet 1,000 Units  1,000 Units Oral Daily Funmi Tran MD       • Divalproex Sodium (DEPAKOTE SPRINKLE) capsule 250 mg  250 mg  Oral Q8H Funmi Tran MD       • donepezil (ARICEPT) tablet 5 mg  5 mg Oral Nightly Funmi Tran MD       • ferrous sulfate EC tablet 324 mg  324 mg Oral Daily With Breakfast Funmi Tran MD       • labetalol (NORMODYNE,TRANDATE) injection 10 mg  10 mg Intravenous Q6H PRN Funmi Tran MD       • melatonin tablet 6 mg  6 mg Oral Nightly PRN Funmi Tran MD       • mirtazapine (REMERON) half tablet 7.5 mg  7.5 mg Oral Nightly Funmi Tran MD       • ondansetron (ZOFRAN) tablet 4 mg  4 mg Oral Q6H PRN Funmi Tran MD        Or   • ondansetron (ZOFRAN) injection 4 mg  4 mg Intravenous Q6H PRN Funmi Tran MD       • pantoprazole (PROTONIX) injection 40 mg  40 mg Intravenous Q AM Funmi Tran MD   40 mg at 02/16/23 0806   • rosuvastatin (CRESTOR) tablet 5 mg  5 mg Oral Nightly Funmi Tran MD       • sodium chloride 0.9 % flush 10 mL  10 mL Intravenous PRN Funmi Tran MD       • sodium chloride 0.9 % flush 10 mL  10 mL Intravenous Q12H Funmi Tran MD       • sodium chloride 0.9 % flush 10 mL  10 mL Intravenous PRN Funmi Tran MD       • sodium chloride 0.9 % infusion 40 mL  40 mL Intravenous PRN Funmi Tran MD       • sodium chloride 0.9 % infusion  75 mL/hr Intravenous Continuous Funmi Tran MD 75 mL/hr at 02/16/23 0831 75 mL/hr at 02/16/23 0831   • sodium chloride tablet 1 g  1 g Oral TID With Meals Funmi Tran MD           Lab Review:     Results from last 7 days   Lab Units 02/16/23  0607   SODIUM mmol/L 141   POTASSIUM mmol/L 4.0   CHLORIDE mmol/L 105   CO2 mmol/L 24.0   BUN mg/dL 28*   CREATININE mg/dL 1.07*   CALCIUM mg/dL 8.6   BILIRUBIN mg/dL 0.2   ALK PHOS U/L 116   ALT (SGPT) U/L <5   AST (SGOT) U/L 12   GLUCOSE mg/dL 101*     Results from last 7 days   Lab Units 02/16/23  1350 02/16/23  0805 02/16/23  0607   HSTROP T ng/L 29* 27* 24*         Results from last 7 days   Lab Units 02/16/23  0607   WBC 10*3/mm3 5.63   HEMOGLOBIN g/dL 10.8*   HEMATOCRIT % 32.7*   PLATELETS  10*3/mm3 157     Results from last 7 days   Lab Units 02/16/23  0607   INR  1.20   APTT seconds 31.3     Results from last 7 days   Lab Units 02/16/23 0607   MAGNESIUM mg/dL 2.0         Results from last 7 days   Lab Units 02/16/23 0607   TSH uIU/mL 7.430*   FREE T4 ng/dL 1.24           EKG:   ECG/EMG Results (last 24 hours)     Procedure Component Value Units Date/Time    ECG 12 Lead [[id number]]  (Abnormal) Resulted: 02/16/23 0624     Updated: 02/16/23 0624    ECG 12 Lead Altered Mental Status [[id number]] Collected: 02/16/23 0515     Updated: 02/16/23 1458     QT Interval 282 ms      QTC Interval 416 ms     Narrative:      Test Reason : Altered Mental Status  Blood Pressure :   */*   mmHG  Vent. Rate : 131 BPM     Atrial Rate : 133 BPM     P-R Int :   * ms          QRS Dur :  76 ms      QT Int : 282 ms       P-R-T Axes :   * -42  36 degrees     QTc Int : 416 ms    Difficult to determine rhythm   Motion artifact  Left axis deviation  Low voltage QRS  Possible Anterolateral infarct , age undetermined  Abnormal ECG  When compared with ECG of 27-JAN-2023 05:32,    Non-specific change in ST segment in Inferior leads  Nonspecific T wave abnormality, worse in Anterolateral leads    Referred By:            Confirmed By: DECLAN DELVALLE MD    ECG 12 Lead Drug Monitoring; Amiodarone [[id number]] Collected: 02/16/23 0946     Updated: 02/16/23 1500     QT Interval 372 ms      QTC Interval 415 ms     Narrative:      Test Reason : Drug Monitoring  Blood Pressure :   */*   mmHG  Vent. Rate :  75 BPM     Atrial Rate :  75 BPM     P-R Int : 218 ms          QRS Dur :  82 ms      QT Int : 372 ms       P-R-T Axes :  92 -30  -4 degrees     QTc Int : 415 ms    Sinus rhythm with 1st degree AV block  Left axis deviation  Pulmonary disease pattern  Abnormal ECG  When compared with ECG of 16-FEB-2023 05:15,  Significant changes have occurred    Referred By:            Confirmed By: DECLAN DELVALLE MD          ECHO:  Results for orders placed  during the hospital encounter of 05/30/19    Adult Transthoracic Echo Complete W/ Cont if Necessary Per Protocol    Interpretation Summary  · Left ventricular wall thickness is consistent with mild concentric hypertrophy.  · Left ventricular diastolic dysfunction (grade I) consistent with impaired relaxation.  · Estimated EF appears to be in the range of 61 - 65%. Normal left ventricular cavity size noted. All left ventricular wall segments contract normally. Left ventricular wall thickness is consistent with mild concentric hypertrophy. Left ventricular diastolic dysfunction is noted (grade I) consistent with impaired relaxation. There is no evidence of a left ventricular thrombus present.  · 2D Echocardiogram complete w/Doppler,Color. See note. The study is technically difficult for diagnosis. The quality of the study is limited due to poor acoustic windows related to an uncooperative patient and patient positioning.       Imaging:  Imaging Results (Last 24 Hours)     Procedure Component Value Units Date/Time    XR Abdomen KUB [[id number]] Collected: 02/16/23 0645     Updated: 02/16/23 0733    Narrative:      PROCEDURE: AP supine/abdomen KUB with single view.    INDICATION: abdominal pain, I48.91 Unspecified atrial  fibrillation G30.9 Alzheimer's disease, unspecified F02.80  Dementia in other diseases classified elsewhere, unspecified  severity, without behavioral disturbance, psychotic disturbance,  mood disturbance, and anxiety    COMPARISON: None.    FINDINGS:    Normal abdominal bowel gas pattern with no abnormal gaseous  distention of the small bowel or colon.  There is a large amount of fecal material in the rectum with  little elsewhere in the colon.    No suspicious calcifications or soft tissue densities.    Partially visualized right hip nail with threaded portion within  femoral head.      Impression:      Normal abdominal bowel gas pattern.    Large amount of fecal material in the rectum. Little  elsewhere in  the colon.      [id number]    Electronically signed by:  Jose Boyle MD  2/16/2023 7:31  AM CST Workstation: [id number]    XR Chest 1 View [[id number]] Collected: 02/16/23 0522     Updated: 02/16/23 0607    Narrative:      EXAM: Single view chest    COMPARISON:  Chest Xray from  January 27, 2023    HISTORY: Altered mental status     FINDINGS: Lungs are clear with no lobar consolidation, failure,  large effusion or significant atelectasis.  Cardiac and  mediastinal silhouettes show no acute abnormality.  No acute  osseous or soft tissue abnormalities. There is old granulomatous  disease and mild cardiomegaly.  -----------------------------------------------------    Impression:      1. No active disease.  -----------------------------------------------------    Electronically signed by:  Jose Govea MD  2/16/2023 6:04 AM  CST Workstation: [id number]    CT Head Without Contrast [[id number]] Collected: 02/16/23 0523     Updated: 02/16/23 0559    Narrative:      EXAM: Head CT without contrast.   CLINICAL INDICATION: Altered mental status, seizure    COMPARISON: Head CT 9/12/2022    TECHNIQUE:      5 mm slice thickness images (vertex to skull base)   No intravenous contrast administered.   Coronal/sagittal reformations were employed.     All CT scans at this facility use dose modulation, iterative  reconstruction, and/or weight based dosing when appropriate to  reduce radiation dose to as low as reasonably achievable.     FINDINGS:      The study is slightly limited due to the position of the  patient's head, slight streak artifact and also very slight  patient motion.    There is stable global volume loss redemonstrated.    There is no intraparenchymal or intraventricular hemorrhage.  There is no intra-axial mass or extra-axial fluid collection.   There is no obvious midline shift or mass effect.      There is stable area of hypoattenuation in the right paramedian  occipital lobe, likely encephalomalacia  from old infarct.    There is stable confluent periventricular hypodensity.    The pituitary gland, globes and periorbital structures appear  unchanged. Paranasal sinuses and mastoid air cells clear.    There is no calvarial or scalp soft tissue abnormality.      Impression:      CONCLUSION:     1. Slightly limited evaluation as above.  2. No acute intracranial abnormality identified.  3. Age-related atrophy and microvascular changes.    Electronically signed by:  Srinivasa Dunlap DO  2/16/2023 5:56 AM  CST Workstation: [id number]          I personally viewed and interpreted the patient's EKG/Telemetry data.    Assessment:   1.  Paroxysmal atrial fibrillation.  2.  Arterial hypertension.  3.  Hypertensive heart disease with diastolic dysfunction with an ejection fraction of 55%.  4.  Iron deficiency anemia.  5.  Alzheimer's dementia          Plan:   1.  Paroxysmal atrial fibrillation.  Patient had some altered mental status worse than her baseline at the [facility] and was subsequently sent to [facility].  Patient on initial evaluation was found to be in atrial fibrillation with rapid ventricular response.  Patient was started on intravenous Cardizem drip.  Patient was going to be initiated on amiodarone and patient had converted to normal sinus rhythm.  Patient currently is not sinus rhythm with episodes of sinus bradycardia.  Patient is unable to tolerate oral feeding and is being evaluated for dysphagia consult for swallowing.  Patient blood pressure has not been elevated and patient is currently on a low-dose of amlodipine but it is unclear if the patient can swallow.  Patient TSH was elevated but Patient free T4 was normal.  Patient would undergo a transthoracic echocardiogram to evaluate the left ventricular systolic function and to rule out regional segmental wall motion abnormality and intracardiac thrombus.    2.  Arterial hypertension.  Patient does have history of arterial  hypertension patient last echocardiogram in 2019 had revealed an ejection fraction of 55 to 60% with diastolic dysfunction.  Clinically at the present time patient is euvolemic and not in congestive heart failure.    3.  History of hyponatremia.  Patient is on sodium supplement and currently has a sodium of 141.    4.  Hyperlipidemia.  Patient is currently on Crestor 5 mg at bedtime.    5.  History of Alzheimer dementia with previous cerebrovascular accident.  Patient has been followed by the primary team.    6.  Anemia iron deficiency.  Patient would be started on baby aspirin.  Patient would not be an ideal candidate for anticoagulation secondary to her advanced dementia and the risk of fall.    7.  CODE STATUS is DO NOT RESUSCITATE.    Thank you for the consultation.    The above plan of management were discussed with the nursing staff      Time: Time spent in face-to-face evaluation of greater than 55 minutes interacting, formulating, examining and discussing the plan with the patient with 50% of greater time spent in face-to-face interaction.    Electronically signed by Bryant Oneal MD, 02/16/23, 7:02 PM CST.    Dictated utilizing Dragon dictation.

## 2023-02-17 NOTE — THERAPY EVALUATION
Acute Care - Speech Language Pathology   Swallow Initial Evaluation [facility]     Patient Name: Marlene Horne  : 1931  MRN: [id number]  Today's Date: 2023               Admit Date: 2023    Visit Dx:     ICD-10-CM ICD-9-CM   1. Atrial fibrillation with rapid ventricular response (HCC)  I48.91 427.31   2. Alzheimer's dementia, unspecified dementia severity, unspecified timing of dementia onset, unspecified whether behavioral, psychotic, or mood disturbance or anxiety (HCC)  G30.9 331.0    F02.80 294.10     Patient Active Problem List   Diagnosis   • Major neurocognitive disorder (CMS/HCC)   • Iron deficiency anemia secondary to inadequate dietary iron intake   • Chronic hyponatremia   • CKD (chronic kidney disease) stage 3, GFR 30-59 ml/min   • Hypovitaminosis D   • Thyroid mass   • Hypokalemia   • Primary insomnia   • Constipation   • Hypoproteinemia (HCC)   • Dental disease   • Benign essential HTN   • Closed displaced intertrochanteric fracture of right femur (HCC)   • Closed displaced fracture of surgical neck of right humerus   • Closed fracture of right wrist   • DOUGLAS (acute kidney injury) (HCC)   • Leukocytosis   • Fall at nursing home   • Right shoulder pain   • Right hip pain   • Late onset Alzheimer's dementia without behavioral disturbance (HCC)   • Fever   • Atrial fibrillation with rapid ventricular response (HCC)   • Parkinson disease (HCC)     Past Medical History:   Diagnosis Date   • Benign essential HTN 2019   • CKD (chronic kidney disease) stage 3, GFR 30-59 ml/min (HCC)    • Constipation    • Dementia (HCC)    • Hyponatremia    • Hypovitaminosis D    • Iron deficiency anemia    • Major neurocognitive disorder (HCC)    • Stroke (HCC)      Past Surgical History:   Procedure Laterality Date   • CATARACT EXTRACTION     • CLOSED REDUCTION WRIST FRACTURE Right 2020    Procedure: CLOSED REDUCTION AND SPLINTING OF RIGHT WRIST WITH FLUOROSCOPIC ASSISTANCE;  Surgeon:  Baudilio Brooks MD;  Location: [facility];  Service: Orthopedics;  Laterality: Right;   • HIP INTERTROCHANTERIC NAILING Right 12/6/2020    Procedure: INTERMEDULLARY NAILING OF RIGHT FEMUR USING GAMMA NAIL AND FLUOROSCOPIC ASSITANCE;  Surgeon: Baudilio Brooks MD;  Location: [facility];  Service: Orthopedics;  Laterality: Right;       SLP Recommendation and Plan  SLP Swallowing Diagnosis: swallow WFL/no suspected pharyngeal impairment, functional oral phase, functional pharyngeal phase (02/17/23 1000)  SLP Diet Recommendation: soft to chew textures, ground, mechanical ground textures, thin liquids (02/17/23 1000)  Recommended Precautions and Strategies: upright posture during/after eating, alternate between small bites of food and sips of liquid, assist with feeding (02/17/23 1000)  SLP Rec. for Method of Medication Administration: as tolerated (02/17/23 1000)     Monitor for Signs of Aspiration: yes, notify SLP if any concerns (02/17/23 1000)     Swallow Criteria for Skilled Therapeutic Interventions Met: current level of function same as previous level of function, no significant expected improvement in functional status (02/17/23 1000)  Anticipated Discharge Disposition (SLP): skilled nursing facility (02/17/23 1000)  Rehab Potential/Prognosis, Swallowing: good, to achieve stated therapy goals (02/17/23 1000)  Therapy Frequency (Swallow): evaluation only (02/17/23 1000)                                           Plan of Care Reviewed With: patient, daughter  Progress: improving  Outcome Evaluation: ST: bedside swallow evaluation completed this date. pt ate pudding, liquids via straw, and cheerios in milk.  there was increased oral prep time noted, with good oral clearance.  pt will benefit from soft foods with ground meat d/t poor dentition.  no f/u needed at this time as this is PLOF diet at [facility]      SWALLOW EVALUATION (last 72 hours)     SLP Adult Swallow Evaluation     Row Name 02/17/23 1000                    Rehab Evaluation    Document Type discharge evaluation/summary  -EC        Subjective Information no complaints  -EC        Patient Observations alert;cooperative;agree to therapy  -EC        Patient/Family/Caregiver Comments/Observations dtr present  -EC        Patient Effort adequate  -EC           General Information    Patient Profile Reviewed yes  -EC        Pertinent History Of Current Problem dementia barrier to evaluation yesterday.  -EC        Current Method of Nutrition NPO  -EC        Precautions/Limitations, Vision WFL;for purposes of eval  -EC        Precautions/Limitations, Hearing WFL;for purposes of eval  -EC        Prior Level of Function-Communication cognitive-linguistic impairment  dementia  -EC        Prior Level of Function-Swallowing mechanical ground textures;thin liquids  -EC        Plans/Goals Discussed with patient;family  -EC        Barriers to Rehab previous functional deficit  -EC        Patient's Goals for Discharge return to all previous roles/activities  -EC           Pain    Additional Documentation Pain Scale: Numbers Pre/Post-Treatment (Group)  -EC           Pain Scale: Numbers Pre/Post-Treatment    Pretreatment Pain Rating 0/10 - no pain  -EC        Posttreatment Pain Rating 0/10 - no pain  -EC           Oral Motor Structure and Function    Dentition Assessment missing teeth;teeth are in poor condition  -EC        Secretion Management WNL/WFL  -EC        Mucosal Quality moist, healthy  -EC        Gag Response absent or diminished  -EC        Volitional Swallow WFL  -EC        Volitional Cough unable to elicit  -EC           General Eating/Swallowing Observations    Respiratory Support Currently in Use room air  -EC        Eating/Swallowing Skills fed by SLP  -EC        Positioning During Eating upright 90 degree;upright in bed  -EC        Utensils Used spoon;straw  -EC        Consistencies Trialed thin liquids;pureed;mixed consistency  -EC        Pre SpO2 (%) 97  -EC            Clinical Swallow Eval    Oral Prep Phase impaired  -EC        Oral Transit WFL  -EC        Oral Residue WFL  -EC        Pharyngeal Phase WFL  -EC        Clinical Swallow Evaluation Summary pt demonstrates increased oral prep time with chewable solids d/t poor dentition.  pt will benefit from return to her PLOF diet ground meat/soft foods.  -EC           SLP Evaluation Clinical Impression    SLP Swallowing Diagnosis swallow WFL/no suspected pharyngeal impairment;functional oral phase;functional pharyngeal phase  -EC        Functional Impact no impact on function  -EC        Rehab Potential/Prognosis, Swallowing good, to achieve stated therapy goals  -EC        Swallow Criteria for Skilled Therapeutic Interventions Met current level of function same as previous level of function;no significant expected improvement in functional status  -EC           Recommendations    Therapy Frequency (Swallow) evaluation only  -EC        SLP Diet Recommendation soft to chew textures;ground;mechanical ground textures;thin liquids  -EC        Recommended Precautions and Strategies upright posture during/after eating;alternate between small bites of food and sips of liquid;assist with feeding  -EC        Oral Care Recommendations Oral Care BID/PRN  -EC        SLP Rec. for Method of Medication Administration as tolerated  -EC        Monitor for Signs of Aspiration yes;notify SLP if any concerns  -EC        Anticipated Discharge Disposition (SLP) [facility]  -EC              User Key  (r) = Recorded By, (t) = Taken By, (c) = Cosigned By    Initials Name Effective Dates    EC Giuliana Cortez CCC-SLP 06/16/21 -                 EDUCATION  The patient has been educated in the following areas:   Dysphagia (Swallowing Impairment) Modified Diet Instruction.              Time Calculation:    Time Calculation- SLP     Row Name 02/17/23 1024             Time Calculation- SLP    SLP Start Time 1000  -EC      SLP Stop Time 1024   -EC      SLP Time Calculation (min) 24 min  -EC      Total Timed Code Minutes- SLP 24 minute(s)  -EC      SLP Received On 02/17/23  -EC         Untimed Charges    SLP Eval/Re-eval  ST Eval Oral Pharyng Swallow - 40351  -EC      [id number] Eval Oral Pharyng Swallow Minutes 24  -EC         Total Minutes    Untimed Charges Total Minutes 24  -EC       Total Minutes 24  -EC            User Key  (r) = Recorded By, (t) = Taken By, (c) = Cosigned By    Initials Name Provider Type    EC Giuliana Cortez CCC-SLP Speech and Language Pathologist                Therapy Charges for Today     Code Description Service Date Service Provider Modifiers Qty    [id number]  ST EVAL ORAL PHARYNG SWALLOW 2 2/17/2023 Giuliana Cortez CCC-SLP GN 1               SAROJ Cooney  2/17/2023

## 2023-02-17 NOTE — PLAN OF CARE
Goal Outcome Evaluation:  Plan of Care Reviewed With: patient, daughter        Progress: improving  Outcome Evaluation: ST: bedside swallow evaluation completed this date. pt ate pudding, liquids via straw, and cheerios in milk.  there was increased oral prep time noted, with good oral clearance.  pt will benefit from soft foods with ground meat d/t poor dentition.  no f/u needed at this time as this is PLOF diet at SNF

## 2023-02-17 NOTE — PROGRESS NOTES
Cardiology Progress Note     LOS: 1 day   Patient Care Team:  Yobani Lundberg MD as PCP - General (Family Medicine)    Subjective:   Chart reviewed. Patient seen and examined. Patient   Remains in normal sinus sinus rhythm with a heart rate of 72 bpm.  Patient has not had any further recurrence of bradycardia.  Patient does not voice any complaints though has dementia.    Patient underwent transthoracic echocardiogram which had revealed preserved left ventricular systolic function.      Objective:  Temp:  [95.4 °F (35.2 °C)-98.9 °F (37.2 °C)] 96.8 °F (36 °C)  Heart Rate:  [] 72  Resp:  [17-18] 18  BP: (118-166)/(56-96) 153/70    Intake/Output Summary (Last 24 hours) at 2/17/2023 1358  Last data filed at 2/17/2023 1200  Gross per 24 hour   Intake 2359.5 ml   Output 775 ml   Net 1584.5 ml       Physical Exam:   General Appearance:    Alert, oriented, cooperative, in no acute distress.   Head:    Normocephalic, atraumatic, without obvious abnormality   Eyes:             ARIELLE. Lids and lashes normal, conjunctivae and sclerae normal, no icterus, no pallor.   Ears:    Ears appear intact with no abnormalities noted.   Throat:   Mucous membranes pink and moist.   Neck:  Supple, trachea midline, no carotid bruit, no organomegaly or JVD.   Lungs:    Clear to auscultation and percussion.  Respirations regular, even and unlabored. No wheezes, rales, or rhonchi.    Heart:    Regular rhythm and normal rate, normal S1 and S2, no      murmur, no gallop, no rub, no click.   Abdomen:    Soft, nontender, nondistended, no guarding, no rebound tenderness. Normal bowel sounds in all four quadrants, no masses, liver and spleen nonpalpable.    Genitalia:    Deferred.   Extremities:   Moves all extremities well, no edema, no cyanosis, no       redness, no clubbing.   Pulses:   Pulses palpable and equal bilaterally.   Skin:   Moist and warm. No bleeding, bruising or rash.   Neurologic/Psychiatric:   Alert and oriented to person, place,  and time.  Motor, power and tone in upper and lower extremities are grossly intact.  No focal neurological deficits. Normal cognitive function. No psychomotor reaction or tangential thought. No depression, homicidal ideations and suicidal ideations.          Results Review:    Results from last 7 days   Lab Units 02/17/23  0557   SODIUM mmol/L 138   POTASSIUM mmol/L 4.2   CHLORIDE mmol/L 106   CO2 mmol/L 24.0   BUN mg/dL 20   CREATININE mg/dL 0.99   CALCIUM mg/dL 8.2   BILIRUBIN mg/dL 0.3   ALK PHOS U/L 103   ALT (SGPT) U/L <5   AST (SGOT) U/L 12   GLUCOSE mg/dL 85     Results from last 7 days   Lab Units 02/16/23  1350 02/16/23  0805 02/16/23  0607   HSTROP T ng/L 29* 27* 24*         Results from last 7 days   Lab Units 02/17/23  0557   WBC 10*3/mm3 7.65   HEMOGLOBIN g/dL 9.9*   HEMATOCRIT % 30.1*   PLATELETS 10*3/mm3 135*     Results from last 7 days   Lab Units 02/16/23  0607   INR  1.20   APTT seconds 31.3     Results from last 7 days   Lab Units 02/16/23  0607   MAGNESIUM mg/dL 2.0         Results from last 7 days   Lab Units 02/16/23  0607   TSH uIU/mL 7.430*   FREE T4 ng/dL 1.24           ECHO:  Results for orders placed during the hospital encounter of 02/16/23    Adult Transthoracic Echo Limited W/ Cont if Necessary Per Protocol    Interpretation Summary  •  Left ventricular ejection fraction appears to be 56 - 60%.  •  Left ventricular wall thickness is consistent with mild concentric hypertrophy.  •  Left ventricular diastolic function was normal.  •  There is calcification of the aortic valve.  •  There is a pericardial effusion.      ECG 12 Lead Drug Monitoring; Amiodarone   Final Result   Test Reason : Drug Monitoring   Blood Pressure :   */*   mmHG   Vent. Rate :  71 BPM     Atrial Rate :  71 BPM      P-R Int : 202 ms          QRS Dur :  68 ms       QT Int : 382 ms       P-R-T Axes : 100 -40  -6 degrees      QTc Int : 415 ms      Normal sinus rhythm   Left axis deviation   Pulmonary disease pattern    Inferior infarct , age undetermined   Abnormal ECG   When compared with ECG of 16-FEB-2023 09:46,   No significant change was found      Referred By:            Confirmed By: DECLAN DELVALLE MD      ECG 12 Lead Drug Monitoring; Amiodarone   Final Result   Test Reason : Drug Monitoring   Blood Pressure :   */*   mmHG   Vent. Rate :  75 BPM     Atrial Rate :  75 BPM      P-R Int : 218 ms          QRS Dur :  82 ms       QT Int : 372 ms       P-R-T Axes :  92 -30  -4 degrees      QTc Int : 415 ms      Sinus rhythm with 1st degree AV block   Left axis deviation   Pulmonary disease pattern   Abnormal ECG   When compared with ECG of 16-FEB-2023 05:15,   Significant changes have occurred      Referred By:            Confirmed By: DECLAN DELVALLE MD      ECG 12 Lead   ED Interpretation   Abnormal   Charlie Holloway MD     2/16/2023  6:24 AM   ECG 12 Lead         Date/Time: 2/16/2023 5:23 AM   Performed by: Charlie Holloway MD   Authorized by: Charlie Holloway MD    Interpreted by physician   Rhythm: atrial fibrillation   Rate: tachycardic   BPM: 131   QRS axis: left   Conduction: conduction normal   ST Segments: ST segments normal   T Waves: T waves normal   Other findings: PRWP   Clinical impression: abnormal ECG and dysrhythmia - atrial         ECG 12 Lead Altered Mental Status   Final Result   Test Reason : Altered Mental Status   Blood Pressure :   */*   mmHG   Vent. Rate : 131 BPM     Atrial Rate : 133 BPM      P-R Int :   * ms          QRS Dur :  76 ms       QT Int : 282 ms       P-R-T Axes :   * -42  36 degrees      QTc Int : 416 ms      Difficult to determine rhythm    Motion artifact   Left axis deviation   Low voltage QRS   Possible Anterolateral infarct , age undetermined   Abnormal ECG   When compared with ECG of 27-JAN-2023 05:32,      Non-specific change in ST segment in Inferior leads   Nonspecific T wave abnormality, worse in Anterolateral leads      Referred By:            Confirmed By: DECLAN DELVALLE MD            Medication Review:   Current Facility-Administered Medications   Medication Dose Route Frequency Provider Last Rate Last Admin   • acetaminophen (TYLENOL) tablet 650 mg  650 mg Oral Q4H PRN Funmi Tran MD        Or   • acetaminophen (TYLENOL) 160 MG/5ML solution 650 mg  650 mg Oral Q4H PRN Funmi Tran MD        Or   • acetaminophen (TYLENOL) suppository 650 mg  650 mg Rectal Q4H PRN Funmi Tran MD       • amLODIPine (NORVASC) tablet 2.5 mg  2.5 mg Oral Q24H Funmi Tran MD   2.5 mg at 02/17/23 0903   • aspirin chewable tablet 81 mg  81 mg Oral Daily Funmi Tran MD   81 mg at 02/17/23 0902   • sennosides-docusate (PERICOLACE) 8.6-50 MG per tablet 2 tablet  2 tablet Oral BID Funmi Tran MD   2 tablet at 02/17/23 0902    And   • polyethylene glycol (MIRALAX) packet 17 g  17 g Oral Daily PRN Funmi Tran MD        And   • bisacodyl (DULCOLAX) EC tablet 5 mg  5 mg Oral Daily PRN Funmi Tran MD        And   • bisacodyl (DULCOLAX) suppository 10 mg  10 mg Rectal Daily PRN Funmi Tran MD       • busPIRone (BUSPAR) tablet 7.5 mg  7.5 mg Oral BID Funmi Tran MD   7.5 mg at 02/17/23 0902   • carbidopa-levodopa (SINEMET)  MG per tablet 1 tablet  1 tablet Oral BID Funmi Tran MD   1 tablet at 02/17/23 0902   • cholecalciferol (VITAMIN D3) tablet 1,000 Units  1,000 Units Oral Daily Funmi Tran MD   1,000 Units at 02/17/23 0902   • Divalproex Sodium (DEPAKOTE SPRINKLE) capsule 250 mg  250 mg Oral Q8H Funmi Tran MD   250 mg at 02/17/23 0532   • donepezil (ARICEPT) tablet 5 mg  5 mg Oral Nightly Funmi Tran MD   5 mg at 02/16/23 2043   • ferrous sulfate EC tablet 324 mg  324 mg Oral Daily With Breakfast Funmi Tran MD   324 mg at 02/17/23 0903   • labetalol (NORMODYNE,TRANDATE) injection 10 mg  10 mg Intravenous Q6H PRN Funmi Tran MD       • melatonin tablet 6 mg  6 mg Oral Nightly PRN Funmi Tran MD       • mirtazapine (REMERON) half tablet 7.5 mg  7.5 mg Oral Nightly  Funmi Tran MD   7.5 mg at 02/16/23 2112   • nystatin (MYCOSTATIN) powder   Topical Q12H Funmi Tran MD   Given at 02/17/23 1259   • ondansetron (ZOFRAN) tablet 4 mg  4 mg Oral Q6H PRN Funmi Tran MD        Or   • ondansetron (ZOFRAN) injection 4 mg  4 mg Intravenous Q6H PRN Funmi Tran MD       • pantoprazole (PROTONIX) injection 40 mg  40 mg Intravenous Q AM Funmi Tran MD   40 mg at 02/17/23 0532   • rosuvastatin (CRESTOR) tablet 5 mg  5 mg Oral Nightly Funmi Tran MD   5 mg at 02/16/23 2043   • sodium chloride 0.9 % flush 10 mL  10 mL Intravenous PRN Funmi Tran MD       • sodium chloride 0.9 % flush 10 mL  10 mL Intravenous Q12H Funmi Tran MD   10 mL at 02/17/23 0903   • sodium chloride 0.9 % flush 10 mL  10 mL Intravenous PRN Funmi Tran MD       • sodium chloride 0.9 % infusion 40 mL  40 mL Intravenous PRN Funmi Tran MD       • sodium chloride 0.9 % infusion  75 mL/hr Intravenous Continuous Funmi Tran MD 75 mL/hr at 02/17/23 1258 75 mL/hr at 02/17/23 1258   • sodium chloride tablet 1 g  1 g Oral TID With Meals Funmi Tran MD   1 g at 02/17/23 1258       Assessment and Plan:      Atrial fibrillation (HCC)    Iron deficiency anemia secondary to inadequate dietary iron intake    Chronic hyponatremia    CKD (chronic kidney disease) stage 3, GFR 30-59 ml/min    Benign essential HTN    Late onset Alzheimer's dementia without behavioral disturbance (HCC)    Parkinson disease (HCC)  1.  Paroxysmal atrial fibrillation.  Patient has converted to normal sinus rhythm.  Patient is not a candidate for anticoagulation secondary to underlying dementia.  Patient would be continued on amiodarone at a lower dose.    2.  Arterial hypertension.  Patient does have history of arterial hypertension patient last echocardiogram in 2019 had revealed an ejection fraction of 55 to 60% with diastolic dysfunction.  Clinically at the present time patient is euvolemic and not in congestive heart  failure.     3.  History of hyponatremia.  Patient is on sodium supplement and currently has a sodium of 141.     4.  Hyperlipidemia.  Patient is currently on Crestor 5 mg at bedtime.     5.  History of Alzheimer dementia with previous cerebrovascular accident.  Patient has been followed by the primary team.     6.  Anemia iron deficiency.  Patient would be started on baby aspirin.  Patient would not be an ideal candidate for anticoagulation secondary to her advanced dementia and the risk of fall.     7.  CODE STATUS is DO NOT RESUSCITATE.        Electronically signed by Bryant Oneal MD, 02/17/23, 1:58 PM CST.      Time: Time spent on face-to-face interaction 20 minutes    Dictated utilizing Dragon dictation.

## 2023-02-17 NOTE — PROGRESS NOTES
"Adult Nutrition  Assessment/PES    Patient Name:  Marlene Horne  YOB: 1931  MRN: [id number]  Admit Date:  2/16/2023    Assessment Date:  2/17/2023    Comments:  89yo female admit from NH w/ Afib RVR, Cardizem gtt just d/c. Hx dementia, CKD stg 3 . Remeron 7.5 in place and alb 2.5L. SLP just evaluated and placed on [facility] soft/ground meats r/t poor dentition. Noted wt in 7136-4908 in the 150s. # w/ BMI 25.9 and noted 3+ edema to feet/ankles. Past RD note indicates pt receives magic cups at NH, will add L/D. RD to follow.     Reason for Assessment     Row Name 02/17/23 1139          Reason for Assessment    Reason For Assessment identified at risk by screening criteria     Diagnosis cardiac disease     Identified At Risk by Screening Criteria MST SCORE 2+;difficulty chewing/swallowing                Nutrition/Diet History     Row Name 02/17/23 1139          Nutrition/Diet History    Typical Intake (Food/Fluid/EN/PN) Pt on way to get ECHO- RN notes npo til SLP eval later today                Labs/Tests/Procedures/Meds     Row Name 02/17/23 1139          Labs/Procedures/Meds    Lab Results Reviewed reviewed     Lab Results Comments Glu 85, alb 2.5L        Diagnostic Tests/Procedures    Diagnostic Test/Procedure Reviewed reviewed        Medications    Pertinent Medications Reviewed reviewed     Pertinent Medications Comments remeron 7.5, Na tab TID, Fe, IVF, cardizem gtt                  Estimated/Assessed Needs - Anthropometrics     Row Name 02/17/23 1140 02/17/23 1003       Anthropometrics    Height -- 167.6 cm (65.98\")    Weight -- 73 kg (160 lb 15 oz)    Weight for Calculation 72.6 kg (160 lb) --       Estimated/Assessed Needs    Additional Documentation Fluid Requirements (Group);Estimated Calorie Needs (Group);KCAL/KG (Group);Protein Requirements (Group) --       Estimated Calorie Needs    Estimated Calorie Requirement (kcal/day) 1850 --       KCAL/KG    KCAL/KG 25 Kcal/Kg (kcal) --    " 25 Kcal/Kg (kcal) 1814.4 --       Protein Requirements    Weight Used For Protein Calculations 72.6 kg (160 lb) --    Est Protein Requirement Amount (gms/kg) 1.0 gm protein --    Estimated Protein Requirements (gms/day) 72.58 --       Fluid Requirements    Fluid Requirements (mL/day) 1800 --    RDA Method (mL) 1800 --    Row Name 02/17/23 0500          Anthropometrics    Weight 73 kg (160 lb 15 oz)                Nutrition Prescription Ordered     Row Name 02/17/23 1140          Nutrition Prescription PO    Current PO Diet NPO                     Problem/Interventions:   Problem 1     Row Name 02/17/23 1141          Nutrition Diagnoses Problem 1    Problem 1 Biting/Chewing Difficulty     Etiology (related to) Factors Affecting Nutrition     Reported/Observed By RN     Signs/Symptoms (evidenced by) NPO;SLP/Swallow eval     Swallow eval status Pending     Type of SLP Evaluation Bedside                      Intervention Goal     Row Name 02/17/23 1141          Intervention Goal    General Maintain nutrition;Reduce/improve symptoms;Improved nutrition related lab(s)     PO Initiate feeding     Weight Maintain weight                Nutrition Intervention     Row Name 02/17/23 1141          Nutrition Intervention    RD/Tech Action Follow Tx progress;Care plan reviewd;Await begin PO                Nutrition Prescription     Row Name 02/17/23 1141          Nutrition Prescription PO    PO Prescription Begin/change supplement     Supplement Boost Plus     Supplement Frequency 3 times a day     New PO Prescription Ordered? No, recommended                Education/Evaluation     Row Name 02/17/23 1141          Education    Education Will Instruct as appropriate        Monitor/Evaluation    Monitor PO intake;Supplement intake;Pertinent labs;Weight;Symptoms     Education Follow-up Reinforce PRN                 Electronically signed by:  Peyton Brown RD  02/17/23 11:42 CST

## 2023-02-17 NOTE — PLAN OF CARE
Goal Outcome Evaluation:  Plan of Care Reviewed With: patient, daughter           Outcome Evaluation: PT/OT co neil completed. Pt alert and AO 1; Dtr present as historian. Pt needs max assist for rolling, sup<>sit mas A 2, and intiaily sitting balance but was able to regain balance after stabilizing at EOB w/ CGA/SBA till fatigued; She was able to luciano EOB and rolling but fatigued post activity;Pt did show jumps in HR at rest and stress or activity but was noted to see them return to lower levels. RN aware;  Pt to be followed to inc ex luciano and progressive upright actiivity w/ goal of transfer to w/chair for OOB. Rec return to SNF for cont care.

## 2023-02-17 NOTE — THERAPY EVALUATION
Patient Name: Marlene Horne  : 1931    MRN: [id number]                              Today's Date: 2023     PT Evaluation    Admit Date: 2023    Visit Dx:     ICD-10-CM ICD-9-CM   1. Atrial fibrillation with rapid ventricular response (HCC)  I48.91 427.31   2. Alzheimer's dementia, unspecified dementia severity, unspecified timing of dementia onset, unspecified whether behavioral, psychotic, or mood disturbance or anxiety (HCC)  G30.9 331.0    F02.80 294.10     Patient Active Problem List   Diagnosis   • Major neurocognitive disorder (CMS/HCC)   • Iron deficiency anemia secondary to inadequate dietary iron intake   • Chronic hyponatremia   • CKD (chronic kidney disease) stage 3, GFR 30-59 ml/min   • Hypovitaminosis D   • Thyroid mass   • Hypokalemia   • Primary insomnia   • Constipation   • Hypoproteinemia (HCC)   • Dental disease   • Benign essential HTN   • Closed displaced intertrochanteric fracture of right femur (HCC)   • Closed displaced fracture of surgical neck of right humerus   • Closed fracture of right wrist   • DOUGLAS (acute kidney injury) (HCC)   • Leukocytosis   • Fall at nursing home   • Right shoulder pain   • Right hip pain   • Late onset Alzheimer's dementia without behavioral disturbance (HCC)   • Fever   • Atrial fibrillation with rapid ventricular response (HCC)   • Parkinson disease (HCC)     Past Medical History:   Diagnosis Date   • Benign essential HTN 2019   • CKD (chronic kidney disease) stage 3, GFR 30-59 ml/min (HCC)    • Constipation    • Dementia (HCC)    • Hyponatremia    • Hypovitaminosis D    • Iron deficiency anemia    • Major neurocognitive disorder (HCC)    • Stroke (HCC)      Past Surgical History:   Procedure Laterality Date   • CATARACT EXTRACTION     • CLOSED REDUCTION WRIST FRACTURE Right 2020    Procedure: CLOSED REDUCTION AND SPLINTING OF RIGHT WRIST WITH FLUOROSCOPIC ASSISTANCE;  Surgeon: Baudilio Brooks MD;  Location: [facility] OR;   Service: Orthopedics;  Laterality: Right;   • HIP INTERTROCHANTERIC NAILING Right 12/6/2020    Procedure: INTERMEDULLARY NAILING OF RIGHT FEMUR USING GAMMA NAIL AND FLUOROSCOPIC ASSITANCE;  Surgeon: Baudilio Brooks MD;  Location: [facility];  Service: Orthopedics;  Laterality: Right;      General Information     Row Name 02/17/23 1100          Physical Therapy Time and Intention    Document Type evaluation  -GB     Mode of Treatment physical therapy  -GB     Row Name 02/17/23 1100          General Information    Patient Profile Reviewed yes  -GB     Prior Level of Function --  dtr states pt was transferred to w/chair at NH and up propelling self; states transfer w/ 1 person;  -GB     Existing Precautions/Restrictions fall   watch HR; sometimes irreg and high w/ activity or stress  -GB     Row Name 02/17/23 1100          Living Environment    People in Home facility resident  -GB     Row Name 02/17/23 1100          Home Main Entrance    Number of Stairs, Main Entrance none  -GB     Row Name 02/17/23 1100          Stairs Within Home, Primary    Number of Stairs, Within Home, Primary none  -GB     Row Name 02/17/23 1100          Cognition    Orientation Status (Cognition) oriented to;person  -GB     Row Name 02/17/23 1100          Safety Issues, Functional Mobility    Impairments Affecting Function (Mobility) cognition;endurance/activity tolerance;range of motion (ROM);strength;motor control;balance;sensation/sensory awareness  -GB     Cognitive Impairments, Mobility Safety/Performance problem-solving/reasoning;insight into deficits/self-awareness  -GB           User Key  (r) = Recorded By, (t) = Taken By, (c) = Cosigned By    Initials Name Provider Type    Elo Castrejon PT Physical Therapist               Mobility     Row Name 02/17/23 1153          Bed Mobility    Bed Mobility rolling left;scooting/bridging;supine-sit-supine  -GB     Rolling Left [place] (Bed Mobility) moderate assist (50%  patient effort);maximum assist (25% patient effort);1 person assist  -GB     Scooting/Bridging [place] (Bed Mobility) moderate assist (50% patient effort);maximum assist (25% patient effort);1 person assist  -GB     Supine-Sit-Supine [place] (Bed Mobility) moderate assist (50% patient effort);2 person assist  -GB     Comment, (Bed Mobility) after stabilized could sustian sitting balace w/ SBA-CGA 1  -GB     Row Name 02/17/23 1153          Transfers    Comment, (Transfers) deferred due to lethargy and poor following commands;  -GB     Row Name 02/17/23 1153          Bed-Chair Transfer    Bed-Chair [place] (Transfers) unable to assess;not tested  -     Row Name 02/17/23 1153          Sit-Stand Transfer    Sit-Stand [place] (Transfers) unable to assess  -[facility] Name 02/17/23 1153          Gait/Stairs (Locomotion)    [place] Level (Gait) unable to assess  -     [place] Level (Stairs) unable to assess  -           User Key  (r) = Recorded By, (t) = Taken By, (c) = Cosigned By    Initials Name Provider Type    Elo Castrejon, PT Physical Therapist               Obj/Interventions     Row Name 02/17/23 1118          Range of Motion Comprehensive    General Range of Motion bilateral lower extremity ROM WFL  -[facility] Name 02/17/23 1118          Strength Comprehensive (MMT)    Comment, General Manual Muscle Testing (MMT) Assessment resisted betsy LEs for flx in supine; ;active DF/PF thru  partial range  R in supine; sitting shows AAROM knee and ankle flx/ext betsy; unable to follow directions for MMT  -[facility] Name 02/17/23 1118          Sensory Assessment (Somatosensory)    Sensory Assessment (Somatosensory) LE sensation intact  -           User Key  (r) = Recorded By, (t) = Taken By, (c) = Cosigned By    Initials Name Provider Type    Elo Castrejon, PT Physical Therapist               Goals/Plan     Row Name 02/17/23 1158          Bed Mobility Goal 1  (PT)    Activity/Assistive Device (Bed Mobility Goal 1, PT) bed mobility activities, all  -GB     [place] Level/Cues Needed (Bed Mobility Goal 1, PT) minimum assist (75% or more patient effort)  -GB     Time Frame (Bed Mobility Goal 1, PT) 10 days  -GB     Progress/Outcomes (Bed Mobility Goal 1, PT) goal not met  -GB     Row Name 02/17/23 1158          Transfer Goal 1 (PT)    Activity/Assistive Device (Transfer Goal 1, PT) bed-to-chair/chair-to-bed  -GB     [place] Level/Cues Needed (Transfer Goal 1, PT) minimum assist (75% or more patient effort)  -GB     Time Frame (Transfer Goal 1, PT) 2 weeks  -GB     Progress/Outcome (Transfer Goal 1, PT) goal not met  -GB     Row Name 02/17/23 1158          Gait Training Goal 1 (PT)    Activity/Assistive Device (Gait Training Goal 1, PT) decrease fall risk  -GB     [place] Level (Gait Training Goal 1, PT) minimum assist (75% or more patient effort)  -GB     Distance (Gait Training Goal 1, PT) 3 ft for transfer  -GB     Time Frame (Gait Training Goal 1, PT) 2 weeks  -GB     Row Name 02/17/23 1158          Problem Specific Goal 1 (PT)    Problem Specific Goal 1 (PT) upright luciano for 25 min or more w/ VSS  -GB     Time Frame (Problem Specific Goal 1, PT) 1 week  -GB     Progress/Outcome (Problem Specific Goal 1, PT) goal not met  -GB     Row Name 02/17/23 1152          Therapy Assessment/Plan (PT)    Planned Therapy Interventions (PT) balance training;bed mobility training;gait training;neuromuscular re-education;postural re-education;strengthening;transfer training;patient/family education;wheelchair management/propulsion training  -GB           User Key  (r) = Recorded By, (t) = Taken By, (c) = Cosigned By    Initials Name Provider Type    Elo Castrejon, PT Physical Therapist               Clinical Impression     Row Name 02/17/23 1104          Pain    Pretreatment Pain Rating 0/10 - no pain  -GB     Posttreatment Pain Rating 0/10 - no pain  -GB      Row Name 02/17/23 1104          Plan of Care Review    Plan of Care Reviewed With patient;daughter  -GB     Outcome Evaluation PT/OT co reinaals completed. Pt alert and AO 1; Dtr present as historian. Pt needs max assist for rolling, sup<>sit mas A 2, and intiaily sitting balance but was able to regain balance after stabilizing at EOB w/ CGA/SBA till fatigued; She was able to luciano EOB and rolling but fatigued post activity;Pt did show jumps in HR at rest and stress or activity but was noted to see them return to lower levels. RN aware;  Pt to be followed to inc ex luciano and progressive upright actiivity w/ goal of transfer to w/chair for OOB. Rec return to SNF for cont care.  -GB     Row Name 02/17/23 1104          Therapy Assessment/Plan (PT)    Rehab Potential (PT) fair, will monitor progress closely  -GB     Criteria for Skilled Interventions Met (PT) yes  -GB     Therapy Frequency (PT) other (see comments)  5-6 as luciano  -GB     Row Name 02/17/23 1104          Vital Signs    Pre Systolic BP Rehab 130  -GB     Pre Treatment Diastolic BP 64  -GB     Intra Systolic BP Rehab 133  -GB     Intra Treatment Diastolic BP 63  -GB     Post Systolic BP Rehab 143  -GB     Post Treatment Diastolic BP 66  -GB     Pretreatment Heart Rate (beats/min) 57  -GB     Intratreatment Heart Rate (beats/min) 96  but irreg jumps to 160s or more  -GB     Posttreatment Heart Rate (beats/min) 58  -GB     Pre SpO2 (%) 98  -GB     O2 Delivery Pre Treatment room air  -GB     Pre Patient Position Supine  -GB     Intra Patient Position Sitting  -GB     Post Patient Position Supine  -GB     Row Name 02/17/23 1104          Positioning and Restraints    Pre-Treatment Position in bed  -GB     Post Treatment Position bed  -GB     In Bed notified nsg;supine;side rails up x2;heels elevated;patient within staff view  -GB           User Key  (r) = Recorded By, (t) = Taken By, (c) = Cosigned By    Initials Name Provider Type    Elo Castrejon, PT  Physical Therapist               Outcome Measures     Row Name 02/17/23 1159 02/17/23 0800       How much help from another person do you currently need...    Turning from your back to your side while in flat bed without using bedrails? 2  -GB 3  -AM    Moving from lying on back to sitting on the side of a flat bed without bedrails? 1  -GB 3  -AM    Moving to and from a bed to a chair (including a wheelchair)? 1  -GB 2  -AM    Standing up from a chair using your arms (e.g., wheelchair, bedside chair)? 1  -GB 2  -AM    Climbing 3-5 steps with a railing? 1  -GB 2  -AM    To walk in hospital room? 1  -GB 2  -AM    AM-PAC 6 Clicks Score (PT) 7  -GB 14  -AM    Highest level of mobility 2 --> Bed activities/dependent transfer  -GB 4 --> Transferred to chair/commode  -AM    Row Name 02/17/23 1105          Functional Assessment    Outcome Measure Options AM-PAC 6 Clicks Daily Activity (OT)  -RW           User Key  (r) = Recorded By, (t) = Taken By, (c) = Cosigned By    Initials Name Provider Type    Elo Castrejon, PT Physical Therapist    Saman Guadalupe, RN Registered Nurse    Juanita Durand, OT Occupational Therapist                             Physical Therapy Education     Title: PT OT SLP Therapies (In Progress)     Topic: Physical Therapy (In Progress)     Point: Mobility training (In Progress)     Learning Progress Summary           Patient Acceptance, E,D, NR by ANNALISE at 2/17/2023 1200    Comment: Pt not alert enough to determine understanding and has dementia but dtr voices understanding of activity and treatment    Acceptance, E, NL by AM at 2/17/2023 0845   Family Acceptance, E,D, NR by  at 2/17/2023 1200    Comment: Pt not alert enough to determine understanding and has dementia but dtr voices understanding of activity and treatment                   Point: Home exercise program (In Progress)     Learning Progress Summary           Patient Acceptance, E,D, NR by ANNALISE at 2/17/2023 1200     Comment: Pt not alert enough to determine understanding and has dementia but dtr voices understanding of activity and treatment    Acceptance, E, NL by AM at 2/17/2023 0845   Family Acceptance, E,D, NR by  at 2/17/2023 1200    Comment: Pt not alert enough to determine understanding and has dementia but dtr voices understanding of activity and treatment                   Point: Body mechanics (In Progress)     Learning Progress Summary           Patient Acceptance, E,D, NR by  at 2/17/2023 1200    Comment: Pt not alert enough to determine understanding and has dementia but dtr voices understanding of activity and treatment    Acceptance, E, NL by AM at 2/17/2023 0845   Family Acceptance, E,D, NR by  at 2/17/2023 1200    Comment: Pt not alert enough to determine understanding and has dementia but dtr voices understanding of activity and treatment                   Point: Precautions (In Progress)     Learning Progress Summary           Patient Acceptance, E,D, NR by  at 2/17/2023 1200    Comment: Pt not alert enough to determine understanding and has dementia but dtr voices understanding of activity and treatment    Acceptance, E, NL by AM at 2/17/2023 0845   Family Acceptance, E,D, NR by  at 2/17/2023 1200    Comment: Pt not alert enough to determine understanding and has dementia but dtr voices understanding of activity and treatment                               User Key     Initials Effective Dates Name Provider Type Discipline     06/16/21 -  Elo Maloney PT Physical Therapist PT    AM 01/11/22 -  Saman Goldsmith, RN Registered Nurse Nurse              PT Recommendation and Plan  Planned Therapy Interventions (PT): balance training, bed mobility training, gait training, neuromuscular re-education, postural re-education, strengthening, transfer training, patient/family education, wheelchair management/propulsion training  Plan of Care Reviewed With: patient, daughter  Outcome Evaluation:  PT/OT co evals completed. Pt alert and AO 1; Dtr present as historian. Pt needs max assist for rolling, sup<>sit mas A 2, and intiaily sitting balance but was able to regain balance after stabilizing at EOB w/ CGA/SBA till fatigued; She was able to luciano EOB and rolling but fatigued post activity;Pt did show jumps in HR at rest and stress or activity but was noted to see them return to lower levels. RN aware;  Pt to be followed to inc ex luciano and progressive upright actiivity w/ goal of transfer to w/chair for OOB. Rec return to SNF for cont care.     Time Calculation:    PT Charges     Row Name 02/17/23 1100 02/17/23 0908          Time Calculation    Start Time 1100  -GB --     Stop Time 1208  -GB --     Time Calculation (min) 68 min  -GB --     PT Received On 02/17/23 -GB --     PT - Next Appointment -- 02/18/23  -     PT Goal Re-Cert Due Date 03/02/23  -GB --           User Key  (r) = Recorded By, (t) = Taken By, (c) = Cosigned By    Initials Name Provider Type    Elo Castrejon, PT Physical Therapist    Ana Jerez, OT Occupational Therapist              Therapy Charges for Today     Code Description Service Date Service Provider Modifiers Qty    [id number] HC-PT EVAL MOD COMPLEXITY 5 2/17/2023 Elo Maloney, PT  1          PT G-Codes  Outcome Measure Options: AM-PAC 6 Clicks Daily Activity (OT)  AM-PAC 6 Clicks Score (PT): 7  AM-PAC 6 Clicks Score (OT): 11  PT Discharge Summary  Anticipated Discharge Disposition (PT): extended care facility    Elo Maloney, PT  2/17/2023

## 2023-02-20 ENCOUNTER — APPOINTMENT (OUTPATIENT)
Dept: CT IMAGING | Facility: HOSPITAL | Age: 88
End: 2023-02-20
Payer: MEDICARE

## 2023-02-20 ENCOUNTER — APPOINTMENT (OUTPATIENT)
Dept: GENERAL RADIOLOGY | Facility: HOSPITAL | Age: 88
End: 2023-02-20
Payer: MEDICARE

## 2023-02-20 ENCOUNTER — TELEPHONE (OUTPATIENT)
Dept: FAMILY MEDICINE CLINIC | Facility: CLINIC | Age: 88
End: 2023-02-20
Payer: COMMERCIAL

## 2023-02-20 ENCOUNTER — HOSPITAL ENCOUNTER (EMERGENCY)
Facility: HOSPITAL | Age: 88
Discharge: NURSING, REHAB OR LONG-TERM CARE | End: 2023-02-20
Attending: FAMILY MEDICINE | Admitting: FAMILY MEDICINE
Payer: MEDICARE

## 2023-02-20 VITALS
SYSTOLIC BLOOD PRESSURE: 178 MMHG | WEIGHT: 160.94 LBS | DIASTOLIC BLOOD PRESSURE: 76 MMHG | HEART RATE: 69 BPM | TEMPERATURE: 97.4 F | HEIGHT: 66 IN | RESPIRATION RATE: 16 BRPM | BODY MASS INDEX: 25.86 KG/M2 | OXYGEN SATURATION: 99 %

## 2023-02-20 DIAGNOSIS — S00.83XA FACIAL CONTUSION, INITIAL ENCOUNTER: Primary | ICD-10-CM

## 2023-02-20 PROCEDURE — 72125 CT NECK SPINE W/O DYE: CPT

## 2023-02-20 PROCEDURE — 99283 EMERGENCY DEPT VISIT LOW MDM: CPT

## 2023-02-20 PROCEDURE — 70450 CT HEAD/BRAIN W/O DYE: CPT

## 2023-02-20 PROCEDURE — 73630 X-RAY EXAM OF FOOT: CPT

## 2023-02-20 PROCEDURE — 73080 X-RAY EXAM OF ELBOW: CPT

## 2023-02-20 RX ADMIN — SODIUM CHLORIDE 1000 ML: 9 INJECTION, SOLUTION INTRAVENOUS at 13:56

## 2023-02-20 NOTE — DISCHARGE INSTRUCTIONS
Keep site of injury clean and dry. Monitor for lack of healing, new onset fevers or worsening redness of the injured site. Please follow up with your PCP as soon as possible.

## 2023-02-20 NOTE — TELEPHONE ENCOUNTER
Maribeth, a nurse at [facility], has called regarding this patient.  She stated Ms. Horne fell, last Friday night around 8:40 pm, hit her head and has a cut over her left eye.  The nurse steri-striped the wound but still has a few drips of blood this morning.  She would like Dr Lundberg to return her call to discuss further treatment.  The # to call is [phone].    Thanks  Hiral

## 2023-02-20 NOTE — ED PROVIDER NOTES
Subjective   History of Present Illness  89-year-old female here for head trauma.  She has a history of dementia. History was obtained from her daughter.  She fell this past Saturday after coming out of the shower.  She missed a step and hit the left side of her face.  She also sustained various other bruises.  Per daughter she was stable enough to stay at the nursing home.  Wounds were managed at the nursing home.  She cannot comment if the patient lost consciousness at the time of injury.  Per daughter she sees her 1-2 times a week, and states she seems more confused and sleepy than usual.  Patient's daughter felt that she should have been sent to the hospital after she sustained such injuries and brought her in today for further evaluation.    Upon examination today she has Steri-Strips placed at the site of injury on her left upper eyelid and right elbow.         Review of Systems   Reason unable to perform ROS: Limited due to history of dementia.   Constitutional: Negative.  Negative for fatigue and fever.   HENT: Negative.    Eyes: Negative.    Respiratory: Negative.    Cardiovascular: Negative.    Gastrointestinal: Negative.    Endocrine: Negative.    Genitourinary: Negative.    Musculoskeletal: Negative.    Skin: Positive for wound.   Allergic/Immunologic: Negative.    Neurological: Negative.    Hematological: Negative.    Psychiatric/Behavioral: Positive for confusion.       Past Medical History:   Diagnosis Date   • Benign essential HTN 11/22/2019   • CKD (chronic kidney disease) stage 3, GFR 30-59 ml/min ([facility])    • Constipation    • Dementia ([facility])    • Hyponatremia    • Hypovitaminosis D    • Iron deficiency anemia    • Major neurocognitive disorder (HCC)    • Stroke ([facility])        No Known Allergies    Past Surgical History:   Procedure Laterality Date   • CATARACT EXTRACTION     • CLOSED REDUCTION WRIST FRACTURE Right 12/6/2020    Procedure: CLOSED REDUCTION AND SPLINTING OF RIGHT WRIST WITH FLUOROSCOPIC  ASSISTANCE;  Surgeon: Baudilio Brooks MD;  Location: [facility] OR;  Service: Orthopedics;  Laterality: Right;   • HIP INTERTROCHANTERIC NAILING Right 12/6/2020    Procedure: INTERMEDULLARY NAILING OF RIGHT FEMUR USING GAMMA NAIL AND FLUOROSCOPIC ASSITANCE;  Surgeon: Baudilio Brooks MD;  Location: [facility] OR;  Service: Orthopedics;  Laterality: Right;       Family History   Problem Relation Age of Onset   • No Known Problems Mother    • No Known Problems Father    • Cancer Sister    • Cancer Brother        Social History     Socioeconomic History   • Marital status:    • Number of children: 2   • Highest education level: Bachelor's degree (e.g., BA, AB, BS)   Tobacco Use   • Smoking status: Never   • Smokeless tobacco: Never   Vaping Use   • Vaping Use: Never used   Substance and Sexual Activity   • Alcohol use: No   • Drug use: No   • Sexual activity: Not Currently           Objective    Vitals:    02/20/23 1244 02/20/23 1330 02/20/23 1345 02/20/23 1505   BP:  128/60 128/61 121/61   BP Location:    Left arm   Patient Position:       Pulse:  66 68    Resp:       Temp: 97.4 °F (36.3 °C)      TempSrc: Oral      SpO2:  97% 98% 98%   Weight:       Height:         Physical Exam  Constitutional:       Appearance: Normal appearance.   HENT:      Nose: Nose normal.   Eyes:      Extraocular Movements: Extraocular movements intact.      Conjunctiva/sclera: Conjunctivae normal.      Pupils: Pupils are equal, round, and reactive to light.      Comments: Steri-Strips placed over the left upper eyelid   Cardiovascular:      Rate and Rhythm: Normal rate and regular rhythm.      Pulses: Normal pulses.      Heart sounds: Normal heart sounds.   Pulmonary:      Effort: Pulmonary effort is normal. No respiratory distress.      Breath sounds: Normal breath sounds.   Abdominal:      General: Bowel sounds are normal.      Palpations: Abdomen is soft.      Tenderness: There is no guarding.   Musculoskeletal:         General:  Normal range of motion.      Cervical back: Normal range of motion.      Comments: Steri-Strips placed on the right elbow       Skin:     General: Skin is warm.      Findings: Bruising present.   Neurological:      General: No focal deficit present.      Mental Status: She is alert.      Comments: Able to lift her legs bilaterally and move her arms.  Able to follow commands during examination   Psychiatric:         Mood and Affect: Mood normal.         Behavior: Behavior normal.       EEG    Result Date: 2/16/2023  Narrative: Reason for referral: 89 y.o.female with seizure-like activity Technical Summary:  A 19 channel digital EEG was performed using the international 10-20 placement system, including eye leads and EKG leads. Duration: 33 minutes Findings: The patient is frequently moving during the study, and significant movement and EMG artifact are present.  Some electrode artifact is present over the left temporal lead with the remainder of the study being of reasonable technical quality.  The background shows diffuse medium amplitude 4 to 7 Hz intermixed delta and theta activity which appears symmetric over both hemispheres.  Sleep is not seen.  Photic stimulation does not change the background.  Hyperventilation is not performed.  No focal features or epileptiform activity are seen. Video: On Technical quality: Good SUMMARY: Moderate generalized slow No focal features or epileptiform activity are seen     Impression: Diffuse cerebral dysfunction of moderate degree, nonspecific No ongoing seizures are seen This report is transcribed using the Dragon dictation system.      XR Elbow 3+ View Right    Result Date: 2/20/2023  Narrative: Procedure: XR ELBOW 3 VIEWS. History: Injury, pain. Comparison: None Findings: The bones appear demineralized. There is no radiographic evidence of acute fracture, dislocation or suspicious bony abnormality.     Impression: Impression: No radiographic evidence of acute fracture.  Electronically signed by:  Srinivasa Claire MD  2/20/2023 3:52 PM CST Workstation: [id number]    XR Foot 3+ View Right    Result Date: 2/20/2023  Narrative: Procedure: XR FOOT 3 OR MORE VIEWS. History: Injury, pain. Comparison: None Findings: 3 x-rays of the right foot were obtained. The bones appear demineralized. Degenerative changes of the medial margin of the first metatarsophalangeal joint. Plantar and posterior calcaneal spurs. There is no radiographic evidence of acute fracture, dislocation or suspicious bony abnormality.     Impression: Impression: No radiographic evidence of acute fracture. Electronically signed by:  Srinivasa Claire MD  2/20/2023 3:55 PM CST Workstation: [id number]    CT Head Without Contrast    Result Date: 2/20/2023  Narrative: EXAM: CT HEAD WITHOUT IV CONTRAST ORDERING PROVIDER: CLINT MCELROY CLINICAL HISTORY: fall, head injury COMPARISON:  2/16/2023 TECHNIQUE: Nonenhanced CT of the head was performed and reformatted in the sagittal and coronal planes. This examination was performed according to our departmental dose optimization program which includes automated exposure control, adjustment of the MA and kV according to patient size, and/or use of iterative reconstruction technique. FINDINGS: CEREBRAL PARENCHYMA: Stable chronic ischemia of the right occipital lobe.  Chronic microangiopathic changes. No hemorrhage.  No intracranial mass or mass effect. Age-appropriate cerebral atrophy. POSTERIOR FOSSA:  Age-appropriate atrophy of cerebellum and brainstem.  No cerebellar tonsillar ectopia. EXTRA-AXIAL SPACES:  Normal size and configuration.  No mass, fluid collection or hemorrhage. ORBITS: No mass. Unremarkable extraocular muscles, globe and optic nerve. CALVARIA AND SOFT TISSUES: New mild left frontal scalp soft tissue swelling.  No mass or adenopathy, lytic or sclerotic lesion. TEMPORAL BONE AND SKULL BASE:  Unremarkable middle and inner ear , and mastoid air cells. PARANASAL  SINUSES AND FACIAL BONES: Unremarkable. VASCULAR STRUCTURES:  Unremarkable.     Impression: 1.  No acute intracranial process. 2.  New mild left frontal scalp soft tissue swelling.  3.  Stable chronic ischemia of the right occipital lobe.  4.  Scattered chronic microangiopathic changes. Electronically signed by:  Franklin Hutchins MD  2/20/2023 3:18 PM CST Workstation: [id number]    CT Head Without Contrast    Result Date: 2/16/2023  Narrative: EXAM: Head CT without contrast. CLINICAL INDICATION: Altered mental status, seizure COMPARISON: Head CT 9/12/2022 TECHNIQUE:  5 mm slice thickness images (vertex to skull base) No intravenous contrast administered. Coronal/sagittal reformations were employed. All CT scans at this facility use dose modulation, iterative reconstruction, and/or weight based dosing when appropriate to reduce radiation dose to as low as reasonably achievable. FINDINGS:  The study is slightly limited due to the position of the patient's head, slight streak artifact and also very slight patient motion. There is stable global volume loss redemonstrated. There is no intraparenchymal or intraventricular hemorrhage. There is no intra-axial mass or extra-axial fluid collection. There is no obvious midline shift or mass effect.  There is stable area of hypoattenuation in the right paramedian occipital lobe, likely encephalomalacia from old infarct. There is stable confluent periventricular hypodensity. The pituitary gland, globes and periorbital structures appear unchanged. Paranasal sinuses and mastoid air cells clear. There is no calvarial or scalp soft tissue abnormality.     Impression: CONCLUSION: 1. Slightly limited evaluation as above. 2. No acute intracranial abnormality identified. 3. Age-related atrophy and microvascular changes. Electronically signed by:  Srinivasa Dunlap DO  2/16/2023 5:56 AM CST Workstation: [id number]    CT Cervical Spine Without Contrast    Result Date: 2/20/2023  Narrative: EXAM: CT  CERVICAL SPINE TECHNIQUE: Multislice scan was obtained of the cervical spine in the axial plane. Reformatted images were generated in the sagittal and coronal planes.  This exam was performed according to our departmental dose-optimization program, which includes automated exposure control, adjustment of the mA and/or kV according to the patient's size and/or use of iterative reconstruction technique. CLINICAL HISTORY: Trauma due to fall. COMPARISON: FINDINGS: Normal alignment. Vertebral body height is maintained. Intervertebral disc height is narrowed with osteophyte at multiple levels. No fracture, lytic or sclerotic lesion. Perivertebral soft tissues are unremarkable. Lung apices are normal.     Impression: No acute displaced fracture, or traumatic subluxation based on current assessment. Electronically signed by:  Franklin Hutchins MD  2/20/2023 3:29 PM CST Workstation: [id number]    XR Chest 1 View    Result Date: 2/16/2023  Narrative: EXAM: Single view chest COMPARISON:  Chest Xray from  January 27, 2023 HISTORY: Altered mental status FINDINGS: Lungs are clear with no lobar consolidation, failure, large effusion or significant atelectasis.  Cardiac and mediastinal silhouettes show no acute abnormality.  No acute osseous or soft tissue abnormalities. There is old granulomatous disease and mild cardiomegaly. -----------------------------------------------------    Impression: 1. No active disease. ----------------------------------------------------- Electronically signed by:  Jose Govea MD  2/16/2023 6:04 AM CST Workstation: [id number]    XR Chest 1 View    Result Date: 1/27/2023  Narrative: EXAM:   XR Chest, 1 View CLINICAL HISTORY:   The patient is 89 years old and is Female; Fever TECHNIQUE:   Frontal view of the chest. COMPARISON:   Chest radiograph from 9/12/2022 FINDINGS:   LUNGS:  There is mild interstitial prominence which is favored to be chronic. No consolidation.   PLEURAL SPACE:  No significant pleural  effusion.  No obvious pneumothorax.   HEART:  Stable mild enlargement of the cardiac silhouette.   MEDIASTINUM:  Unremarkable.   BONES/JOINTS:  Degenerative changes of the spine.     Impression:   No obvious acute findings in the chest. Electronically signed by:  Michelle Sterling MD  1/27/2023 6:32 AM CST Workstation: [id number]    Adult Transthoracic Echo Limited W/ Cont if Necessary Per Protocol    Result Date: 2/17/2023  Narrative: •  Left ventricular ejection fraction appears to be 56 - 60%. •  Left ventricular wall thickness is consistent with mild concentric hypertrophy. •  Left ventricular diastolic function was normal. •  There is calcification of the aortic valve. •  There is a pericardial effusion.     XR Abdomen KUB    Result Date: 2/16/2023  Narrative: PROCEDURE: AP supine/abdomen KUB with single view. INDICATION: abdominal pain, I48.91 Unspecified atrial fibrillation G30.9 Alzheimer's disease, unspecified F02.80 Dementia in other diseases classified elsewhere, unspecified severity, without behavioral disturbance, psychotic disturbance, mood disturbance, and anxiety COMPARISON: None. FINDINGS: Normal abdominal bowel gas pattern with no abnormal gaseous distention of the small bowel or colon. There is a large amount of fecal material in the rectum with little elsewhere in the colon. No suspicious calcifications or soft tissue densities. Partially visualized right hip nail with threaded portion within femoral head.     Impression: Normal abdominal bowel gas pattern. Large amount of fecal material in the rectum. Little elsewhere in the colon. [id number] Electronically signed by:  Jose Boyle MD  2/16/2023 7:31 AM CST Workstation: [id number]      Procedures           ED Course      Results for orders placed or performed during the hospital encounter of 02/16/23   CRE Screen by PCR - Swab, Large Intestine, Rectum    Specimen: Large Intestine, Rectum; Swab   Result Value Ref Range    CRE SCREEN Not Detected Not  Detected, Invalid    OXA 48 Strain Not Detected     IMP STRAIN Not Detected     VIM STRAIN Not Detected     NDM Strain Not Detected     KPC Strain Not Detected    FLORA AURIS SCREEN - Swab, Axilla Right, Axilla Left and Groin    Specimen: Axilla Right, Axilla Left and Groin; Swab   Result Value Ref Range    Candida Auris Screen Culture No Candida auris isolated at 4 days    COVID-19,CEPHEID/LISANDRA,COR/RORY/PAD/LIBIA/MAD IN-HOUSE(OR EMERGENT/ADD-ON),NP SWAB IN TRANSPORT MEDIA 3-4 HR TAT, RT-PCR - Swab, Nasopharynx    Specimen: Nasopharynx; Swab   Result Value Ref Range    COVID19 Not Detected Not Detected - Ref. Range   Comprehensive Metabolic Panel    Specimen: Blood   Result Value Ref Range    Glucose 101 (H) 65 - 99 mg/dL    BUN 28 (H) 8 - 23 mg/dL    Creatinine 1.07 (H) 0.57 - 1.00 mg/dL    Sodium 141 136 - 145 mmol/L    Potassium 4.0 3.5 - 5.2 mmol/L    Chloride 105 98 - 107 mmol/L    CO2 24.0 22.0 - 29.0 mmol/L    Calcium 8.6 8.2 - 9.6 mg/dL    Total Protein 5.6 (L) 6.0 - 8.5 g/dL    Albumin 3.0 (L) 3.5 - 5.2 g/dL    ALT (SGPT) <5 1 - 33 U/L    AST (SGOT) 12 1 - 32 U/L    Alkaline Phosphatase 116 39 - 117 U/L    Total Bilirubin 0.2 0.0 - 1.2 mg/dL    Globulin 2.6 gm/dL    A/G Ratio 1.2 g/dL    BUN/Creatinine Ratio 26.2 (H) 7.0 - 25.0    Anion Gap 12.0 5.0 - 15.0 mmol/L    eGFR 49.1 (L) >60.0 mL/min/1.73   Protime-INR    Specimen: Blood   Result Value Ref Range    Protime 15.2 11.1 - 15.3 Seconds    INR 1.20 0.80 - 1.20   aPTT    Specimen: Blood   Result Value Ref Range    PTT 31.3 20.0 - 40.3 seconds   High Sensitivity Troponin T    Specimen: Blood   Result Value Ref Range    HS Troponin T 24 (H) <10 ng/L   Valproic Acid Level, Total    Specimen: Blood   Result Value Ref Range    Valproic Acid 54.6 50.0 - 125.0 mcg/mL   Urinalysis With Microscopic If Indicated (No Culture) - Straight Cath    Specimen: Straight Cath; Urine   Result Value Ref Range    Color, UA Yellow Yellow, Straw, Dark Yellow, [place]    Appearance,  UA Clear Clear    pH, UA 7.5 5.0 - 9.0    Specific Gravity, UA 1.013 1.003 - 1.030    Glucose, UA Negative Negative    Ketones, UA Negative Negative    Bilirubin, UA Negative Negative    Blood, UA Negative Negative    Protein, UA Negative Negative    Leuk Esterase, UA Negative Negative    Nitrite, UA Negative Negative    Urobilinogen, UA 0.2 E.U./dL 0.2 - 1.0 E.U./dL   CBC Auto Differential    Specimen: Blood   Result Value Ref Range    WBC 5.63 3.40 - 10.80 10*3/mm3    RBC 3.41 (L) 3.77 - 5.28 10*6/mm3    Hemoglobin 10.8 (L) 12.0 - 15.9 g/dL    Hematocrit 32.7 (L) 34.0 - 46.6 %    MCV 95.9 79.0 - 97.0 fL    MCH 31.7 26.6 - 33.0 pg    MCHC 33.0 31.5 - 35.7 g/dL    RDW 13.1 12.3 - 15.4 %    RDW-SD 45.7 37.0 - 54.0 fl    MPV 10.8 6.0 - 12.0 fL    Platelets 157 140 - 450 10*3/mm3    Neutrophil % 60.4 42.7 - 76.0 %    Lymphocyte % 27.9 19.6 - 45.3 %    Monocyte % 8.7 5.0 - 12.0 %    Eosinophil % 1.6 0.3 - 6.2 %    Basophil % 0.7 0.0 - 1.5 %    Immature Grans % 0.7 (H) 0.0 - 0.5 %    Neutrophils, Absolute 3.40 1.70 - 7.00 10*3/mm3    Lymphocytes, Absolute 1.57 0.70 - 3.10 10*3/mm3    Monocytes, Absolute 0.49 0.10 - 0.90 10*3/mm3    Eosinophils, Absolute 0.09 0.00 - 0.40 10*3/mm3    Basophils, Absolute 0.04 0.00 - 0.20 10*3/mm3    Immature Grans, Absolute 0.04 0.00 - 0.05 10*3/mm3    nRBC 0.0 0.0 - 0.2 /100 WBC   BNP    Specimen: Blood   Result Value Ref Range    proBNP 1,207.0 0.0 - 1,800.0 pg/mL   D-dimer, Quantitative    Specimen: Blood   Result Value Ref Range    D-Dimer, Quantitative 900 0 - 910 ng/mL (FEU)   Magnesium    Specimen: Blood   Result Value Ref Range    Magnesium 2.0 1.7 - 2.3 mg/dL   TSH+Free T4    Specimen: Blood   Result Value Ref Range    TSH 7.430 (H) 0.270 - 4.200 uIU/mL    Free T4 1.24 0.93 - 1.70 ng/dL   High Sensitivity Troponin T 2Hr    Specimen: Blood   Result Value Ref Range    HS Troponin T 27 (H) <10 ng/L    Troponin T Delta 3 >=-4 - <+4 ng/L   High Sensitivity Troponin T    Specimen: Blood    Result Value Ref Range    HS Troponin T 29 (H) <10 ng/L   Comprehensive Metabolic Panel    Specimen: Blood   Result Value Ref Range    Glucose 85 65 - 99 mg/dL    BUN 20 8 - 23 mg/dL    Creatinine 0.99 0.57 - 1.00 mg/dL    Sodium 138 136 - 145 mmol/L    Potassium 4.2 3.5 - 5.2 mmol/L    Chloride 106 98 - 107 mmol/L    CO2 24.0 22.0 - 29.0 mmol/L    Calcium 8.2 8.2 - 9.6 mg/dL    Total Protein 5.1 (L) 6.0 - 8.5 g/dL    Albumin 2.5 (L) 3.5 - 5.2 g/dL    ALT (SGPT) <5 1 - 33 U/L    AST (SGOT) 12 1 - 32 U/L    Alkaline Phosphatase 103 39 - 117 U/L    Total Bilirubin 0.3 0.0 - 1.2 mg/dL    Globulin 2.6 gm/dL    A/G Ratio 1.0 g/dL    BUN/Creatinine Ratio 20.2 7.0 - 25.0    Anion Gap 8.0 5.0 - 15.0 mmol/L    eGFR 53.9 (L) >60.0 mL/min/1.73   CBC Auto Differential    Specimen: Blood   Result Value Ref Range    WBC 7.65 3.40 - 10.80 10*3/mm3    RBC 3.20 (L) 3.77 - 5.28 10*6/mm3    Hemoglobin 9.9 (L) 12.0 - 15.9 g/dL    Hematocrit 30.1 (L) 34.0 - 46.6 %    MCV 94.1 79.0 - 97.0 fL    MCH 30.9 26.6 - 33.0 pg    MCHC 32.9 31.5 - 35.7 g/dL    RDW 12.8 12.3 - 15.4 %    RDW-SD 43.7 37.0 - 54.0 fl    MPV 10.7 6.0 - 12.0 fL    Platelets 135 (L) 140 - 450 10*3/mm3    Neutrophil % 58.7 42.7 - 76.0 %    Lymphocyte % 30.8 19.6 - 45.3 %    Monocyte % 8.4 5.0 - 12.0 %    Eosinophil % 1.3 0.3 - 6.2 %    Basophil % 0.3 0.0 - 1.5 %    Immature Grans % 0.5 0.0 - 0.5 %    Neutrophils, Absolute 4.49 1.70 - 7.00 10*3/mm3    Lymphocytes, Absolute 2.36 0.70 - 3.10 10*3/mm3    Monocytes, Absolute 0.64 0.10 - 0.90 10*3/mm3    Eosinophils, Absolute 0.10 0.00 - 0.40 10*3/mm3    Basophils, Absolute 0.02 0.00 - 0.20 10*3/mm3    Immature Grans, Absolute 0.04 0.00 - 0.05 10*3/mm3    nRBC 0.0 0.0 - 0.2 /100 WBC   Valproic Acid Level, Total    Specimen: Blood   Result Value Ref Range    Valproic Acid 57.3 50.0 - 125.0 mcg/mL   ECG 12 Lead Altered Mental Status   Result Value Ref Range    QT Interval 282 ms    QTC Interval 416 ms   ECG 12 Lead Drug  Monitoring; Amiodarone   Result Value Ref Range    QT Interval 372 ms    QTC Interval 415 ms   ECG 12 Lead Drug Monitoring; Amiodarone   Result Value Ref Range    QT Interval 382 ms    QTC Interval 415 ms   Adult Transthoracic Echo Limited W/ Cont if Necessary Per Protocol   Result Value Ref Range    Target HR (85%) 110 bpm    Max. Pred. HR (100%) 129 bpm    LVIDd 4.6 cm    LVIDs 3.1 cm    IVSd 0.78 cm    LVPWd 3.4 cm    FS 32.7 %    IVS/LVPW 0.23 cm    ESV(cubed) 29.7 ml    EDV(cubed) 97.4 ml    LV mass(C)d 485.5 grams    RVIDd 2.06 cm    PA V2 max 71.2 cm/sec    Ao root diam 2.7 cm                                Medical Decision Making  89-year-old female here for head trauma. CT head and neck showed 1.  No acute intracranial process, new mild left frontal scalp soft tissue swelling, stable chronic ischemia of the right occipital lobe and scattered chronic microangiopathic changes. Xray of the foot and right elbow also showed no signs of acute fractures or dislocations. She was given fluid and monitored in the ED. Discussed wound care with patients daughter and and advised her to continue to monitor while she is in the nursing home.  UA was negative 4 days ago, also had difficulty obtaining new urine sample today.  She is afebrile, vital signs were stable. So felt comfortable not straining patient for new urine sample today.  Discussed return precautions such as new fever, erythema or lack of healing of current wounds.  At time of discharge patient seemed more alert per patient's daughter.  Advised to follow-up with PCP to monitor wounds.     Amount and/or Complexity of Data Reviewed  Radiology: ordered.          Final diagnoses:   Facial contusion, initial encounter       ED Disposition  ED Disposition     ED Disposition   Discharge    Condition   Stable    Comment   --             Yobani Lundberg MD  [facility] [id number] [phone]    Schedule an appointment as soon as possible for a visit             Medication List      No changes were made to your prescriptions during this visit.       This document has been electronically signed by Giorgi Sherman MD on February 20, 2023 16:44 CST     Giorgi Sherman MD  Resident  02/20/23 [id number]

## 2023-02-20 NOTE — ED NOTES
Pt presents to the ED due to a fall on Saturday. EMS states that nursing home sent her to have scans of her head.

## 2023-02-21 ENCOUNTER — NURSING HOME (OUTPATIENT)
Dept: FAMILY MEDICINE CLINIC | Facility: CLINIC | Age: 88
End: 2023-02-21
Payer: COMMERCIAL

## 2023-02-21 DIAGNOSIS — G40.909 SEIZURE DISORDER: ICD-10-CM

## 2023-02-21 DIAGNOSIS — I10 BENIGN ESSENTIAL HTN: ICD-10-CM

## 2023-02-21 DIAGNOSIS — G20 PARKINSONISM, UNSPECIFIED PARKINSONISM TYPE: ICD-10-CM

## 2023-02-21 DIAGNOSIS — I25.10 CAD IN NATIVE ARTERY: ICD-10-CM

## 2023-02-21 DIAGNOSIS — G30.1 LATE ONSET ALZHEIMER'S DEMENTIA WITHOUT BEHAVIORAL DISTURBANCE: ICD-10-CM

## 2023-02-21 DIAGNOSIS — F02.80 LATE ONSET ALZHEIMER'S DEMENTIA WITHOUT BEHAVIORAL DISTURBANCE: ICD-10-CM

## 2023-02-21 DIAGNOSIS — G47.00 INSOMNIA, UNSPECIFIED TYPE: ICD-10-CM

## 2023-02-21 DIAGNOSIS — I48.91 ATRIAL FIBRILLATION, UNSPECIFIED TYPE: ICD-10-CM
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
## 2023-02-21 LAB — BACTERIA ISLT: NORMAL

## 2023-02-23 VITALS
HEART RATE: 78 BPM | TEMPERATURE: 98.2 F | SYSTOLIC BLOOD PRESSURE: 126 MMHG | DIASTOLIC BLOOD PRESSURE: 73 MMHG | OXYGEN SATURATION: 98 % | RESPIRATION RATE: 18 BRPM

## 2023-02-23 RX ORDER — DONEPEZIL HYDROCHLORIDE 5 MG/1
10 TABLET, FILM COATED ORAL NIGHTLY
Qty: 30 TABLET | Refills: 2
Start: 2023-02-23

## 2023-02-23 RX ORDER — ROSUVASTATIN CALCIUM 5 MG/1
5 TABLET, COATED ORAL NIGHTLY
Qty: 90 TABLET | Refills: 0
Start: 2023-02-23

## 2023-02-23 RX ORDER — METOPROLOL SUCCINATE 25 MG/1
12.5 TABLET, EXTENDED RELEASE ORAL NIGHTLY
Qty: 30 TABLET | Refills: 1
Start: 2023-02-23

## 2023-02-23 RX ORDER — DIVALPROEX SODIUM 125 MG/1
250 CAPSULE, COATED PELLETS ORAL EVERY 8 HOURS SCHEDULED
Qty: 90 CAPSULE | Refills: 2
Start: 2023-02-23

## 2023-02-23 RX ORDER — CHOLECALCIFEROL (VITAMIN D3) 125 MCG
5 CAPSULE ORAL NIGHTLY
Qty: 30 EACH | Refills: 0
Start: 2023-02-23

## 2023-02-23 RX ORDER — MIRTAZAPINE 7.5 MG/1
7.5 TABLET, FILM COATED ORAL NIGHTLY
Qty: 30 TABLET | Refills: 0
Start: 2023-02-23

## 2023-02-23 NOTE — PROGRESS NOTES
Family Medicine Residency  Yobani Lundberg MD    MONTHLY NURSING HOME VISIT    NAME: Marlene Horne  : 1931  MRN: [id number]    DATE & TIME SEEN: 23 @1800    PCP: Yobani Lundberg MD    NURSING HOME: [facility]    Monthly Nursing Home Visit for Post-Hospital Discharge    Chief Complaint: Afib, fall    Subjective:     Marlene Horne is a 89 y.o. female who is being evaluated for a fall and her monthly visit.    On Friday prior patient missed a step and had a mechanical fall.  She impacted her right lower and upper extremities and also the left side of her forehead.  She was taken to the ED Monday for a CT Scan of the head and subsequent evaluation as she would not stop bleeding.  It is unclear if this was witnessed or not.  Aside from this, nursing is not reporting any other acute concerns since her return from the ED.  Patient is insisting that she can help me at the nursing station.    Current Meds:    Current Outpatient Medications:   •  acetaminophen (TYLENOL) 325 MG tablet, Take 2 tablets by mouth Every 4 (Four) Hours As Needed for Mild Pain ., Disp: , Rfl:   •  albuterol (PROVENTIL) (2.5 MG/3ML) 0.083% nebulizer solution, Take 2.5 mg by nebulization Every 4 (Four) Hours As Needed for Shortness of Air., Disp: , Rfl: 12  •  busPIRone (BUSPAR) 5 MG tablet, Take 7.5 mg by mouth 2 (Two) Times a Day., Disp: , Rfl:   •  carbidopa-levodopa (SINEMET)  MG per tablet, Take 1 tablet by mouth 3 (Three) Times a Day., Disp: 30 tablet, Rfl: 1  •  cholecalciferol (VITAMIN D3) 25 MCG (1000 UT) tablet, Take 1,000 Units by mouth Daily., Disp: , Rfl:   •  Divalproex Sodium (DEPAKOTE SPRINKLE) 125 MG capsule, Take 2 capsules by mouth Every 8 (Eight) Hours., Disp: 90 capsule, Rfl: 2  •  donepezil (ARICEPT) 5 MG tablet, Take 2 tablets by mouth Every Night., Disp: 30 tablet, Rfl: 2  •  ferrous sulfate 324 (65 Fe) MG tablet delayed-release EC tablet, Take 1 tablet by mouth Daily With Breakfast., Disp: 30  tablet, Rfl:   •  melatonin 5 MG tablet tablet, Take 1 tablet by mouth Every Night., Disp: 30 each, Rfl: 0  •  metoprolol succinate XL (Toprol XL) 25 MG 24 hr tablet, Take 0.5 tablets by mouth Every Night., Disp: 30 tablet, Rfl: 1  •  mirtazapine (REMERON) 7.5 MG tablet, Take 1 tablet by mouth Every Night., Disp: 30 tablet, Rfl: 0  •  Multiple Vitamins-Minerals (I-HERNESTO) tablet tablet, Take 1 tablet by mouth Daily., Disp: 90 tablet, Rfl: 2  •  nystatin (MYCOSTATIN) 663695 UNIT/GM powder, Apply 1 application topically to the appropriate area as directed 2 (Two) Times a Day As Needed (redness/fungal infection). Apply under breast topically as needed for redness, Disp: , Rfl:   •  ondansetron (ZOFRAN) 4 MG tablet, Take 4 mg by mouth Every 6 (Six) Hours As Needed for Nausea or Vomiting., Disp: , Rfl:   •  rosuvastatin (CRESTOR) 5 MG tablet, Take 1 tablet by mouth Every Night., Disp: 90 tablet, Rfl: 0  •  sennosides-docusate sodium (SENOKOT-S) 8.6-50 MG tablet, Take 2 tablets by mouth Every Night., Disp: 30 tablet, Rfl: 0  •  sodium chloride 1 g tablet, Take 1 tablet by mouth 3 (Three) Times a Day With Meals., Disp: 180 tablet, Rfl: 0  •  vitamin D (ERGOCALCIFEROL) 1.25 MG (34885 UT) capsule capsule, Take 50,000 Units by mouth 1 (One) Time Per Week., Disp: , Rfl:     Allergies:  Patient has no known allergies.     Review of Systems:  Review of Systems   Unable to perform ROS: Dementia   Musculoskeletal: Positive for gait problem.   Skin: Positive for wound.   Neurological: Positive for tremors.       Objective:     /73   Pulse 78   Temp 98.2 °F (36.8 °C)   Resp 18   SpO2 98% Comment: RA  Physical Exam  Constitutional:       Appearance: Normal appearance.   HENT:      Head: Normocephalic. Left periorbital erythema and laceration present.        Nose: Nose normal. No congestion or rhinorrhea.      Mouth/Throat:      Mouth: Mucous membranes are dry.   Eyes:      Extraocular Movements: Extraocular movements intact.       Conjunctiva/sclera:      Left eye: Left conjunctiva is injected. Hemorrhage present.      Pupils: Pupils are equal, round, and reactive to light.   Cardiovascular:      Rate and Rhythm: Normal rate.      Heart sounds: Normal heart sounds. No murmur heard.  Pulmonary:      Effort: Pulmonary effort is normal. No respiratory distress.      Breath sounds: Normal breath sounds.   Abdominal:      General: Bowel sounds are normal.      Tenderness: There is no abdominal tenderness.   Musculoskeletal:         General: Signs of injury present.      Cervical back: Neck supple.      Right lower leg: No edema.      Left lower leg: No edema.   Skin:     General: Skin is warm and dry.      Capillary Refill: Capillary refill takes less than 2 seconds.      Findings: Bruising present.      Comments: Right arm skin tear, left knee skin tear.  Both dressed   Neurological:      General: No focal deficit present.      Mental Status: She is alert. Mental status is at baseline.      Comments: Tremor with right hand.  Lessens with intention.   Psychiatric:         Mood and Affect: Mood normal.         Behavior: Behavior normal.         Diagnostic Data:     Lab Results (last 7 days)     ** No results found for the last 168 hours. **            Assessment/Plan:      89 y.o. female with:  Diagnoses and all orders for this visit:    1. Fall, sequela (Primary)  Since last visit, patient having worsening tremors that reduce with intention.  Given age, patient is now likely developing parkinsons.  This tremor is what likely led to the fall.  Sz disorder is also possible, but last few labs show patient has been therapeutic on her depakote.  Sinemet titration is still a work in progress.    2. Parkinsonism, unspecified Parkinsonism type (HCC)  This appears uncontrolled on BID dosing as patient tremor still looks worse than last month and had a fall.  Will go from BID to TID dosing.  -     carbidopa-levodopa (SINEMET)  MG per tablet; Take  1 tablet by mouth 3 (Three) Times a Day.  Dispense: 30 tablet; Refill: 1    3. Benign essential HTN  Given fall, will d/c amlodipine as patient's blood pressures are very controlled on a minimal dose.  Amlodipine risks now outweigh benefits.    4. Late onset Alzheimer's dementia without behavioral disturbance (HCC)  Given fall, will increase Aricept from 5 to 10 as this has some data in helping prevent falls.  -     donepezil (ARICEPT) 10 MG tablet; Take 1 tablets by mouth Every Night.  Dispense: 30 tablet; Refill: 2    5. Seizure disorder (HCC)  Last VPA level therapeutic.  Will continue to monitor.  -     Divalproex Sodium (DEPAKOTE SPRINKLE) 125 MG capsule; Take 2 capsules by mouth Every 8 (Eight) Hours.  Dispense: 90 capsule; Refill: 2    6. Insomnia, unspecified type  Appears to be stable.  I do not feel these medications contribute too much to postural instability as she takes them before bed and likely needs them to prevent sundowning  -     melatonin 5 MG tablet tablet; Take 1 tablet by mouth Every Night.  Dispense: 30 each; Refill: 0  -     mirtazapine (REMERON) 7.5 MG tablet; Take 1 tablet by mouth Every Night.  Dispense: 30 tablet; Refill: 0    7. CAD in native artery  Given difficulties clotting, I do not feel this patient needs aspirin anymore.  The risk of bleed outweighs the benefits of MI prophylaxis at this age.  -     rosuvastatin (CRESTOR) 5 MG tablet; Take 1 tablet by mouth Every Night.  Dispense: 90 tablet; Refill: 0    8. Atrial fibrillation, unspecified type (HCC)  HR wnL on 12.5 metop XL.  Unsure if this medication contributed to fall, but risk of afib outweigh the benefits of stopping the medication.  -     metoprolol succinate XL (Toprol XL) 25 MG 24 hr tablet; Take 0.5 tablets by mouth Every Night.  Dispense: 30 tablet; Refill: 1            CODE STATUS: DNR and DNI    Dr. Anderson Mandujano MD is the attending on record for this patient, He is aware of the patient's status and agrees with the  above.      Yobani Lundberg M.D. PGY-3  Chief Resident  [facility] Family [facility]idency  [address] [place] [address]  Office: [phone]    This document has been electronically signed by Yobani Lundberg MD on February 23, 2023 18:15 CST

## 2023-03-02 ENCOUNTER — TELEPHONE (OUTPATIENT)
Dept: FAMILY MEDICINE CLINIC | Facility: CLINIC | Age: 88
End: 2023-03-02
Payer: COMMERCIAL

## 2023-03-02 NOTE — TELEPHONE ENCOUNTER
"Ilene at [facility] has called stating this patient has started vomitting after meals quite regularly. Her therapists have reported she has vomitted during therapy a few times but mostly seems to have a \"flat effect\", that is very concerning.  Please call [phone] with further instructions on how to proceed.    Willie Blackman  "

## 2023-03-03 ENCOUNTER — OFFICE VISIT (OUTPATIENT)
Dept: FAMILY MEDICINE CLINIC | Facility: CLINIC | Age: 88
End: 2023-03-03
Payer: MEDICARE

## 2023-03-03 ENCOUNTER — TELEPHONE (OUTPATIENT)
Dept: FAMILY MEDICINE CLINIC | Facility: CLINIC | Age: 88
End: 2023-03-03
Payer: COMMERCIAL

## 2023-03-03 DIAGNOSIS — I48.91 ATRIAL FIBRILLATION, UNSPECIFIED TYPE: ICD-10-CM

## 2023-03-03 DIAGNOSIS — R11.2 NAUSEA AND VOMITING, UNSPECIFIED VOMITING TYPE: ICD-10-CM

## 2023-03-03 DIAGNOSIS — G20 PARKINSONISM, UNSPECIFIED PARKINSONISM TYPE: Primary | ICD-10-CM

## 2023-03-03 NOTE — TELEPHONE ENCOUNTER
DANIELLE FROM [facility] CALLED ASKING HOW SHE CAN SEND AN EKG REPORT DR TAPIA CALLED AND ORDERED THIS MORNING. CALL BACK NUMBER [phone]

## 2023-03-03 NOTE — PROGRESS NOTES
Family Medicine Residency  Yobani Lundberg MD    Subjective:     Marlene Horne is a 89 y.o. female who presents for flat affect and dietary issues.    Received a call from the patient's nursing home saying they had concerns.  I called them back and it was discussed with me that PT/OT had found the patient was having a very flat affect and that her nurses have been reporting that the patient was having nausea and vomiting.    Given the nature of the situation, I then reached out to the patient's POA Maureen Hernandez, and asked her how she would like to proceed.  I explained we now have two subacute issues in afib and now parkinson's.  I explained we can handle the afib and the symptoms here in [place], but if we wanted to pursue this rapidly progressing Parkinson's, that we would likely have to transfer this patient out.    At this time POA, did not feel that anything that would cause excessive work up including transfer to a facility with neurology would be appropriate.  We have agreed to symptomatic care at this time.  POA would like to avoid any trips to the hospital if possible given their last experience with her fall.    The following portions of the patient's history were reviewed and updated as appropriate: allergies, current medications, past family history, past medical history, past social history, past surgical history and problem list.    Past Medical Hx:  Past Medical History:   Diagnosis Date   • Benign essential HTN 11/22/2019   • CKD (chronic kidney disease) stage 3, GFR 30-59 ml/min (HCC)    • Constipation    • Dementia (HCC)    • Hyponatremia    • Hypovitaminosis D    • Iron deficiency anemia    • Major neurocognitive disorder (HCC)    • Stroke (HCC)        Past Surgical Hx:  Past Surgical History:   Procedure Laterality Date   • CATARACT EXTRACTION     • CLOSED REDUCTION WRIST FRACTURE Right 12/6/2020    Procedure: CLOSED REDUCTION AND SPLINTING OF RIGHT WRIST WITH FLUOROSCOPIC ASSISTANCE;   Surgeon: Baudilio Brooks MD;  Location: [facility];  Service: Orthopedics;  Laterality: Right;   • HIP INTERTROCHANTERIC NAILING Right 12/6/2020    Procedure: INTERMEDULLARY NAILING OF RIGHT FEMUR USING GAMMA NAIL AND FLUOROSCOPIC ASSITANCE;  Surgeon: Baudilio Brooks MD;  Location: [facility];  Service: Orthopedics;  Laterality: Right;       Current Meds:    Current Outpatient Medications:   •  carbidopa-levodopa (SINEMET)  MG per tablet, Take 1 tablet by mouth 2 (Two) Times a Day., Disp: 30 tablet, Rfl: 1  •  acetaminophen (TYLENOL) 325 MG tablet, Take 2 tablets by mouth Every 4 (Four) Hours As Needed for Mild Pain ., Disp: , Rfl:   •  albuterol (PROVENTIL) (2.5 MG/3ML) 0.083% nebulizer solution, Take 2.5 mg by nebulization Every 4 (Four) Hours As Needed for Shortness of Air., Disp: , Rfl: 12  •  busPIRone (BUSPAR) 5 MG tablet, Take 7.5 mg by mouth 2 (Two) Times a Day., Disp: , Rfl:   •  cholecalciferol (VITAMIN D3) 25 MCG (1000 UT) tablet, Take 1,000 Units by mouth Daily., Disp: , Rfl:   •  Divalproex Sodium (DEPAKOTE SPRINKLE) 125 MG capsule, Take 2 capsules by mouth Every 8 (Eight) Hours., Disp: 90 capsule, Rfl: 2  •  donepezil (ARICEPT) 5 MG tablet, Take 2 tablets by mouth Every Night., Disp: 30 tablet, Rfl: 2  •  ferrous sulfate 324 (65 Fe) MG tablet delayed-release EC tablet, Take 1 tablet by mouth Daily With Breakfast., Disp: 30 tablet, Rfl:   •  melatonin 5 MG tablet tablet, Take 1 tablet by mouth Every Night., Disp: 30 each, Rfl: 0  •  metoprolol succinate XL (Toprol XL) 25 MG 24 hr tablet, Take 0.5 tablets by mouth Every Night., Disp: 30 tablet, Rfl: 1  •  mirtazapine (REMERON) 7.5 MG tablet, Take 1 tablet by mouth Every Night., Disp: 30 tablet, Rfl: 0  •  Multiple Vitamins-Minerals (I-HERNESTO) tablet tablet, Take 1 tablet by mouth Daily., Disp: 90 tablet, Rfl: 2  •  nystatin (MYCOSTATIN) 858389 UNIT/GM powder, Apply 1 application topically to the appropriate area as directed 2 (Two) Times a  Day As Needed (redness/fungal infection). Apply under breast topically as needed for redness, Disp: , Rfl:   •  ondansetron (ZOFRAN) 4 MG tablet, Take 4 mg by mouth Every 6 (Six) Hours As Needed for Nausea or Vomiting., Disp: , Rfl:   •  rosuvastatin (CRESTOR) 5 MG tablet, Take 1 tablet by mouth Every Night., Disp: 90 tablet, Rfl: 0  •  sennosides-docusate sodium (SENOKOT-S) 8.6-50 MG tablet, Take 2 tablets by mouth Every Night., Disp: 30 tablet, Rfl: 0  •  sodium chloride 1 g tablet, Take 1 tablet by mouth 3 (Three) Times a Day With Meals., Disp: 180 tablet, Rfl: 0  •  trimethobenzamide (Tigan) 300 MG capsule, Take 1 capsule by mouth 2 (Two) Times a Day., Disp: 60 capsule, Rfl: 0  •  vitamin D (ERGOCALCIFEROL) 1.25 MG (20393 UT) capsule capsule, Take 50,000 Units by mouth 1 (One) Time Per Week., Disp: , Rfl:     Allergies:  No Known Allergies    Family Hx:  Family History   Problem Relation Age of Onset   • No Known Problems Mother    • No Known Problems Father    • Cancer Sister    • Cancer Brother         Social History:  Social History     Socioeconomic History   • Marital status:    • Number of children: 2   • Highest education level: Bachelor's degree (e.g., BA, AB, BS)   Tobacco Use   • Smoking status: Never   • Smokeless tobacco: Never   Vaping Use   • Vaping Use: Never used   Substance and Sexual Activity   • Alcohol use: No   • Drug use: No   • Sexual activity: Not Currently       Review of Systems  Review of Systems   Gastrointestinal: Positive for nausea and vomiting.   Neurological: Positive for tremors.   Psychiatric/Behavioral: Positive for behavioral problems.       Objective:     Physical Exam could not be performed as patient was at the nursing home and was not available to speak on phone due to AMS.     Assessment/Plan:     Diagnoses and all orders for this visit:    1. Parkinsonism, unspecified Parkinsonism type (HCC) (Primary)  It is unusual for Parkinson's to progress this quickly.   However, I do not think further work up would change our management.  At the age of 89 and attempting to respect this patient's wishes when she was cognizant, we will not be pursuing further work up.  The mainstay of Parkinson's would be symptomatic treatment and attempting to stave off progression, both of which we will be doing.  At this time it appears TID dosing is too much, but BID dosing is too low.  Will likely add a dopamine agonist at next visit.    -     carbidopa-levodopa (SINEMET)  MG per tablet; Take 1 tablet by mouth 2 (Two) Times a Day.  Dispense: 30 tablet; Refill: 1  -     trimethobenzamide (Tigan) 300 MG capsule; Take 1 capsule by mouth 2 (Two) Times a Day.  Dispense: 60 capsule; Refill: 0    2. Atrial fibrillation, unspecified type (HCC)  At last visit due to falls, I stopped her Ca channel blocker and left her BBlocker.  Her heart rate was reported to be in the 70's.  It is unlikely that this is the reason for her issues at this time.       -      Recommended Kranthi obtain an EKG.    3. Nausea and vomiting, unspecified vomiting type  Due to most anti-emetics using Dopamine as their method of function, I will be using Tigan to help control her NV.  I will be starting at BID dosing rather than TID given this patient's age.  -     trimethobenzamide (Tigan) 300 MG capsule; Take 1 capsule by mouth 2 (Two) Times a Day.  Dispense: 60 capsule; Refill: 0    We will be trying to limit any trip to the ED and hospital per POA's wishes.       Follow-up:     As scheduled at the nursing home    Preventative:  Health Maintenance   Topic Date Due   • ANNUAL WELLNESS VISIT  Never done   • HEMOGLOBIN A1C  12/17/2021   • INFLUENZA VACCINE  08/01/2022   • COVID-19 Vaccine  Completed   • Pneumococcal Vaccine 65+  Discontinued   • DIABETIC EYE EXAM  Discontinued   • URINE MICROALBUMIN  Discontinued   • DXA SCAN  Discontinued   • TDAP/TD VACCINES  Discontinued   • ZOSTER VACCINE  Discontinued       Alcohol use:   reports no history of alcohol use.  Nicotine status  reports that she has never smoked. She has never used smokeless tobacco.    This was a telephone visit that lasted 30 minutes that the POA consented to.  POA was contacted at an undisclosed location from our clinic.    RISK SCORE: 1      This document has been electronically signed by Yobani Lundberg MD on March 3, 2023 11:31 CST

## 2023-03-06 ENCOUNTER — LAB REQUISITION (OUTPATIENT)
Dept: LAB | Facility: HOSPITAL | Age: 88
End: 2023-03-06
Payer: MEDICARE

## 2023-03-06 ENCOUNTER — APPOINTMENT (OUTPATIENT)
Dept: GENERAL RADIOLOGY | Facility: HOSPITAL | Age: 88
End: 2023-03-06
Payer: MEDICARE

## 2023-03-06 ENCOUNTER — HOSPITAL ENCOUNTER (EMERGENCY)
Facility: HOSPITAL | Age: 88
Discharge: NURSING, REHAB OR LONG-TERM CARE | End: 2023-03-06
Attending: STUDENT IN AN ORGANIZED HEALTH CARE EDUCATION/TRAINING PROGRAM | Admitting: STUDENT IN AN ORGANIZED HEALTH CARE EDUCATION/TRAINING PROGRAM
Payer: MEDICARE

## 2023-03-06 ENCOUNTER — TELEPHONE (OUTPATIENT)
Dept: FAMILY MEDICINE CLINIC | Facility: CLINIC | Age: 88
End: 2023-03-06
Payer: COMMERCIAL

## 2023-03-06 VITALS
HEIGHT: 66 IN | WEIGHT: 160 LBS | HEART RATE: 66 BPM | SYSTOLIC BLOOD PRESSURE: 154 MMHG | OXYGEN SATURATION: 96 % | TEMPERATURE: 98.4 F | RESPIRATION RATE: 18 BRPM | BODY MASS INDEX: 25.71 KG/M2 | DIASTOLIC BLOOD PRESSURE: 77 MMHG

## 2023-03-06 DIAGNOSIS — Z79.01 LONG TERM (CURRENT) USE OF ANTICOAGULANTS: ICD-10-CM

## 2023-03-06 DIAGNOSIS — R68.89 OTHER GENERAL SYMPTOMS AND SIGNS: ICD-10-CM

## 2023-03-06 DIAGNOSIS — R11.10 VOMITING, UNSPECIFIED VOMITING TYPE, UNSPECIFIED WHETHER NAUSEA PRESENT: Primary | ICD-10-CM

## 2023-03-06 LAB
ALBUMIN SERPL-MCNC: 2.6 G/DL (ref 3.5–5.2)
ALBUMIN/GLOB SERPL: 0.8 G/DL
ALP SERPL-CCNC: 106 U/L (ref 39–117)
ALT SERPL W P-5'-P-CCNC: <5 U/L (ref 1–33)
ANION GAP SERPL CALCULATED.3IONS-SCNC: 9 MMOL/L (ref 5–15)
APTT PPP: 31.7 SECONDS (ref 20–40.3)
AST SERPL-CCNC: 11 U/L (ref 1–32)
BASOPHILS # BLD AUTO: 0.04 10*3/MM3 (ref 0–0.2)
BASOPHILS # BLD AUTO: 0.05 10*3/MM3 (ref 0–0.2)
BASOPHILS NFR BLD AUTO: 0.7 % (ref 0–1.5)
BASOPHILS NFR BLD AUTO: 0.7 % (ref 0–1.5)
BILIRUB SERPL-MCNC: 0.3 MG/DL (ref 0–1.2)
BUN SERPL-MCNC: 24 MG/DL (ref 8–23)
BUN/CREAT SERPL: 23.1 (ref 7–25)
CALCIUM SPEC-SCNC: 8.8 MG/DL (ref 8.2–9.6)
CHLORIDE SERPL-SCNC: 110 MMOL/L (ref 98–107)
CO2 SERPL-SCNC: 26 MMOL/L (ref 22–29)
CREAT SERPL-MCNC: 1.04 MG/DL (ref 0.57–1)
DEPRECATED RDW RBC AUTO: 46.6 FL (ref 37–54)
DEPRECATED RDW RBC AUTO: 46.9 FL (ref 37–54)
EGFRCR SERPLBLD CKD-EPI 2021: 50.8 ML/MIN/1.73
EOSINOPHIL # BLD AUTO: 0.09 10*3/MM3 (ref 0–0.4)
EOSINOPHIL # BLD AUTO: 0.11 10*3/MM3 (ref 0–0.4)
EOSINOPHIL NFR BLD AUTO: 1.3 % (ref 0.3–6.2)
EOSINOPHIL NFR BLD AUTO: 1.8 % (ref 0.3–6.2)
ERYTHROCYTE [DISTWIDTH] IN BLOOD BY AUTOMATED COUNT: 13.2 % (ref 12.3–15.4)
ERYTHROCYTE [DISTWIDTH] IN BLOOD BY AUTOMATED COUNT: 13.2 % (ref 12.3–15.4)
GLOBULIN UR ELPH-MCNC: 3.2 GM/DL
GLUCOSE SERPL-MCNC: 102 MG/DL (ref 65–99)
HCT VFR BLD AUTO: 30.5 % (ref 34–46.6)
HCT VFR BLD AUTO: 33.2 % (ref 34–46.6)
HGB BLD-MCNC: 10.4 G/DL (ref 12–15.9)
HGB BLD-MCNC: 9.6 G/DL (ref 12–15.9)
IMM GRANULOCYTES # BLD AUTO: 0.08 10*3/MM3 (ref 0–0.05)
IMM GRANULOCYTES # BLD AUTO: 0.1 10*3/MM3 (ref 0–0.05)
IMM GRANULOCYTES NFR BLD AUTO: 1.3 % (ref 0–0.5)
IMM GRANULOCYTES NFR BLD AUTO: 1.4 % (ref 0–0.5)
INR PPP: 1.17 (ref 0.8–1.2)
LYMPHOCYTES # BLD AUTO: 1.19 10*3/MM3 (ref 0.7–3.1)
LYMPHOCYTES # BLD AUTO: 1.77 10*3/MM3 (ref 0.7–3.1)
LYMPHOCYTES NFR BLD AUTO: 16.9 % (ref 19.6–45.3)
LYMPHOCYTES NFR BLD AUTO: 28.9 % (ref 19.6–45.3)
MAGNESIUM SERPL-MCNC: 2.3 MG/DL (ref 1.7–2.3)
MCH RBC QN AUTO: 30.9 PG (ref 26.6–33)
MCH RBC QN AUTO: 31 PG (ref 26.6–33)
MCHC RBC AUTO-ENTMCNC: 31.3 G/DL (ref 31.5–35.7)
MCHC RBC AUTO-ENTMCNC: 31.5 G/DL (ref 31.5–35.7)
MCV RBC AUTO: 98.4 FL (ref 79–97)
MCV RBC AUTO: 98.5 FL (ref 79–97)
MONOCYTES # BLD AUTO: 0.51 10*3/MM3 (ref 0.1–0.9)
MONOCYTES # BLD AUTO: 0.56 10*3/MM3 (ref 0.1–0.9)
MONOCYTES NFR BLD AUTO: 7.2 % (ref 5–12)
MONOCYTES NFR BLD AUTO: 9.1 % (ref 5–12)
NEUTROPHILS NFR BLD AUTO: 3.57 10*3/MM3 (ref 1.7–7)
NEUTROPHILS NFR BLD AUTO: 5.11 10*3/MM3 (ref 1.7–7)
NEUTROPHILS NFR BLD AUTO: 58.2 % (ref 42.7–76)
NEUTROPHILS NFR BLD AUTO: 72.5 % (ref 42.7–76)
NRBC BLD AUTO-RTO: 0 /100 WBC (ref 0–0.2)
NRBC BLD AUTO-RTO: 0 /100 WBC (ref 0–0.2)
PLATELET # BLD AUTO: 204 10*3/MM3 (ref 140–450)
PLATELET # BLD AUTO: 221 10*3/MM3 (ref 140–450)
PMV BLD AUTO: 10.1 FL (ref 6–12)
PMV BLD AUTO: 9.9 FL (ref 6–12)
POTASSIUM SERPL-SCNC: 4.1 MMOL/L (ref 3.5–5.2)
PROT SERPL-MCNC: 5.8 G/DL (ref 6–8.5)
PROTHROMBIN TIME: 14.8 SECONDS (ref 11.1–15.3)
RBC # BLD AUTO: 3.1 10*6/MM3 (ref 3.77–5.28)
RBC # BLD AUTO: 3.37 10*6/MM3 (ref 3.77–5.28)
SODIUM SERPL-SCNC: 145 MMOL/L (ref 136–145)
VALPROATE SERPL-MCNC: 32.8 MCG/ML (ref 50–125)
WBC NRBC COR # BLD: 6.13 10*3/MM3 (ref 3.4–10.8)
WBC NRBC COR # BLD: 7.05 10*3/MM3 (ref 3.4–10.8)

## 2023-03-06 PROCEDURE — 85025 COMPLETE CBC W/AUTO DIFF WBC: CPT | Performed by: STUDENT IN AN ORGANIZED HEALTH CARE EDUCATION/TRAINING PROGRAM

## 2023-03-06 PROCEDURE — 80164 ASSAY DIPROPYLACETIC ACD TOT: CPT | Performed by: STUDENT IN AN ORGANIZED HEALTH CARE EDUCATION/TRAINING PROGRAM

## 2023-03-06 PROCEDURE — 83735 ASSAY OF MAGNESIUM: CPT | Performed by: STUDENT IN AN ORGANIZED HEALTH CARE EDUCATION/TRAINING PROGRAM

## 2023-03-06 PROCEDURE — 99284 EMERGENCY DEPT VISIT MOD MDM: CPT

## 2023-03-06 PROCEDURE — 96374 THER/PROPH/DIAG INJ IV PUSH: CPT

## 2023-03-06 PROCEDURE — 85025 COMPLETE CBC W/AUTO DIFF WBC: CPT | Performed by: FAMILY MEDICINE

## 2023-03-06 PROCEDURE — 99283 EMERGENCY DEPT VISIT LOW MDM: CPT

## 2023-03-06 PROCEDURE — 85610 PROTHROMBIN TIME: CPT | Performed by: STUDENT IN AN ORGANIZED HEALTH CARE EDUCATION/TRAINING PROGRAM

## 2023-03-06 PROCEDURE — 74022 RADEX COMPL AQT ABD SERIES: CPT

## 2023-03-06 PROCEDURE — 85730 THROMBOPLASTIN TIME PARTIAL: CPT | Performed by: STUDENT IN AN ORGANIZED HEALTH CARE EDUCATION/TRAINING PROGRAM

## 2023-03-06 PROCEDURE — 80053 COMPREHEN METABOLIC PANEL: CPT | Performed by: STUDENT IN AN ORGANIZED HEALTH CARE EDUCATION/TRAINING PROGRAM

## 2023-03-06 PROCEDURE — 25010000002 ONDANSETRON PER 1 MG: Performed by: STUDENT IN AN ORGANIZED HEALTH CARE EDUCATION/TRAINING PROGRAM

## 2023-03-06 RX ORDER — ONDANSETRON 2 MG/ML
4 INJECTION INTRAMUSCULAR; INTRAVENOUS ONCE
Status: COMPLETED | OUTPATIENT
Start: 2023-03-06 | End: 2023-03-06

## 2023-03-06 RX ORDER — ONDANSETRON 4 MG/1
4 TABLET, ORALLY DISINTEGRATING ORAL 4 TIMES DAILY PRN
Qty: 12 TABLET | Refills: 0 | Status: SHIPPED | OUTPATIENT
Start: 2023-03-06 | End: 2023-03-09

## 2023-03-06 RX ADMIN — ONDANSETRON 4 MG: 2 INJECTION INTRAMUSCULAR; INTRAVENOUS at 09:39

## 2023-03-06 NOTE — ED NOTES
PT arrives via EMS from [place] with c/o black stuff around her mouth. No black stuff noted around mouth upon arrival. Pt vomited once as EMS transferring to ED bed.

## 2023-03-06 NOTE — ED PROVIDER NOTES
Subjective   History of Present Illness  89-year-old female with history of stroke and dementia comes to the ER from the nursing home chief complaint of vomiting up black stuff.  Patient threw up for EMS and it was not black.  There was no dried black material in the patient's oral cavity.    History provided by:  EMS personnel and nursing home  History limited by:  Dementia   used: No        Review of Systems   Unable to perform ROS: Dementia       Past Medical History:   Diagnosis Date   • Benign essential HTN 11/22/2019   • CKD (chronic kidney disease) stage 3, GFR 30-59 ml/min (HCC)    • Constipation    • Dementia (HCC)    • Hyponatremia    • Hypovitaminosis D    • Iron deficiency anemia    • Major neurocognitive disorder (HCC)    • Stroke (HCC)        No Known Allergies    Past Surgical History:   Procedure Laterality Date   • CATARACT EXTRACTION     • CLOSED REDUCTION WRIST FRACTURE Right 12/6/2020    Procedure: CLOSED REDUCTION AND SPLINTING OF RIGHT WRIST WITH FLUOROSCOPIC ASSISTANCE;  Surgeon: Baudilio Brooks MD;  Location: [facility];  Service: Orthopedics;  Laterality: Right;   • HIP INTERTROCHANTERIC NAILING Right 12/6/2020    Procedure: INTERMEDULLARY NAILING OF RIGHT FEMUR USING GAMMA NAIL AND FLUOROSCOPIC ASSITANCE;  Surgeon: Baudilio Brooks MD;  Location: [facility];  Service: Orthopedics;  Laterality: Right;       Family History   Problem Relation Age of Onset   • No Known Problems Mother    • No Known Problems Father    • Cancer Sister    • Cancer Brother        Social History     Socioeconomic History   • Marital status:    • Number of children: 2   • Highest education level: Bachelor's degree (e.g., BA, AB, BS)   Tobacco Use   • Smoking status: Never   • Smokeless tobacco: Never   Vaping Use   • Vaping Use: Never used   Substance and Sexual Activity   • Alcohol use: No   • Drug use: No   • Sexual activity: Not Currently           Objective    Vitals:     "03/06/23 0841 03/06/23 0937 03/06/23 1115 03/06/23 1229   BP: 158/76 167/75  167/79   BP Location: Left arm      Patient Position: Sitting      Pulse: 86 68 64 66   Resp: 18      Temp: 98.4 °F (36.9 °C)      TempSrc: Oral      SpO2: 94% 93% 96% 96%   Weight: 72.6 kg (160 lb)      Height: 167.6 cm (65.98\")          Physical Exam  Vitals and nursing note reviewed.   Constitutional:       General: She is sleeping. She is not in acute distress.     Appearance: She is well-developed. She is not ill-appearing, toxic-appearing or diaphoretic.   HENT:      Head:      Comments: Dried bilious colored vomit around the mouth  Eyes:      Conjunctiva/sclera: Conjunctivae normal.   Pulmonary:      Effort: Pulmonary effort is normal. No accessory muscle usage or respiratory distress.      Breath sounds: Normal breath sounds.   Chest:      Chest wall: No tenderness.   Abdominal:      General: Bowel sounds are normal.      Palpations: Abdomen is soft.      Tenderness: There is no abdominal tenderness (deep palpation). There is no guarding or rebound.   Skin:     General: Skin is warm and dry.   Neurological:      Mental Status: She is easily aroused.   Psychiatric:         Speech: She is noncommunicative.         Behavior: Behavior is uncooperative.         Procedures           ED Course      Results for orders placed or performed during the hospital encounter of 03/06/23   Comprehensive Metabolic Panel    Specimen: Blood   Result Value Ref Range    Glucose 102 (H) 65 - 99 mg/dL    BUN 24 (H) 8 - 23 mg/dL    Creatinine 1.04 (H) 0.57 - 1.00 mg/dL    Sodium 145 136 - 145 mmol/L    Potassium 4.1 3.5 - 5.2 mmol/L    Chloride 110 (H) 98 - 107 mmol/L    CO2 26.0 22.0 - 29.0 mmol/L    Calcium 8.8 8.2 - 9.6 mg/dL    Total Protein 5.8 (L) 6.0 - 8.5 g/dL    Albumin 2.6 (L) 3.5 - 5.2 g/dL    ALT (SGPT) <5 1 - 33 U/L    AST (SGOT) 11 1 - 32 U/L    Alkaline Phosphatase 106 39 - 117 U/L    Total Bilirubin 0.3 0.0 - 1.2 mg/dL    Globulin 3.2 gm/dL "    A/G Ratio 0.8 g/dL    BUN/Creatinine Ratio 23.1 7.0 - 25.0    Anion Gap 9.0 5.0 - 15.0 mmol/L    eGFR 50.8 (L) >60.0 mL/min/1.73   aPTT    Specimen: Blood   Result Value Ref Range    PTT 31.7 20.0 - 40.3 seconds   Protime-INR    Specimen: Blood   Result Value Ref Range    Protime 14.8 11.1 - 15.3 Seconds    INR 1.17 0.80 - 1.20   Valproic Acid Level, Total    Specimen: Blood   Result Value Ref Range    Valproic Acid 32.8 (L) 50.0 - 125.0 mcg/mL   CBC Auto Differential    Specimen: Blood   Result Value Ref Range    WBC 7.05 3.40 - 10.80 10*3/mm3    RBC 3.37 (L) 3.77 - 5.28 10*6/mm3    Hemoglobin 10.4 (L) 12.0 - 15.9 g/dL    Hematocrit 33.2 (L) 34.0 - 46.6 %    MCV 98.5 (H) 79.0 - 97.0 fL    MCH 30.9 26.6 - 33.0 pg    MCHC 31.3 (L) 31.5 - 35.7 g/dL    RDW 13.2 12.3 - 15.4 %    RDW-SD 46.9 37.0 - 54.0 fl    MPV 9.9 6.0 - 12.0 fL    Platelets 221 140 - 450 10*3/mm3    Neutrophil % 72.5 42.7 - 76.0 %    Lymphocyte % 16.9 (L) 19.6 - 45.3 %    Monocyte % 7.2 5.0 - 12.0 %    Eosinophil % 1.3 0.3 - 6.2 %    Basophil % 0.7 0.0 - 1.5 %    Immature Grans % 1.4 (H) 0.0 - 0.5 %    Neutrophils, Absolute 5.11 1.70 - 7.00 10*3/mm3    Lymphocytes, Absolute 1.19 0.70 - 3.10 10*3/mm3    Monocytes, Absolute 0.51 0.10 - 0.90 10*3/mm3    Eosinophils, Absolute 0.09 0.00 - 0.40 10*3/mm3    Basophils, Absolute 0.05 0.00 - 0.20 10*3/mm3    Immature Grans, Absolute 0.10 (H) 0.00 - 0.05 10*3/mm3    nRBC 0.0 0.0 - 0.2 /100 WBC   Magnesium    Specimen: Blood   Result Value Ref Range    Magnesium 2.3 1.7 - 2.3 mg/dL     XR Abdomen 2+ VW with Chest 1 VW   Final Result   No definite acute intrathoracic or intra-abdominal pathology   appreciated.      Electronically signed by:  Nando Ambrocio MD  3/6/2023 12:57 PM   CST Workstation: [id number]                Medical Decision Making  Vital signs are stable, afebrile.  Labs are unremarkable.  Hemoglobin is 10.4 which is around her baseline.  Abdominal and chest x-ray negative for acute findings.   Patient did receive Zofran.  No vomiting in the ER.  No evidence the patient had coffee-ground or bloody emesis.  Recommend follow-up with her PCP.    Vomiting, unspecified vomiting type, unspecified whether nausea present: acute illness or injury  Amount and/or Complexity of Data Reviewed  Labs: ordered.  Radiology: ordered.      Risk  Prescription drug management.          Final diagnoses:   Vomiting, unspecified vomiting type, unspecified whether nausea present       ED Disposition  ED Disposition     ED Disposition   Discharge    Condition   Stable    Comment   --             Yobani Lundberg MD  [address] [id number] [phone]    Schedule an appointment as soon as possible for a visit in 2 days  ER follow up         Medication List      New Prescriptions    ondansetron ODT 4 MG disintegrating tablet  Commonly known as: ZOFRAN-ODT  Place 1 tablet on the tongue 4 (Four) Times a Day As Needed for Nausea or Vomiting.           Where to Get Your Medications      These medications were sent to [facility] - [place], [place] - [facility] - [phone]  - [phone] [address] [id number]    Phone: [phone]   · ondansetron ODT 4 MG disintegrating tablet          Mike Newby MD  03/06/23 3306

## 2023-03-06 NOTE — TELEPHONE ENCOUNTER
Maribeth from [facility] called about this patient. She stated the patient has a black coating in her mouth and every time she eats or drinks she vomits. She needs someone to call her back as soon as possible.    Her#[phone]

## 2023-03-08 ENCOUNTER — TELEPHONE (OUTPATIENT)
Dept: FAMILY MEDICINE CLINIC | Facility: CLINIC | Age: 88
End: 2023-03-08
Payer: COMMERCIAL

## 2023-03-08 ENCOUNTER — NURSING HOME (OUTPATIENT)
Dept: FAMILY MEDICINE CLINIC | Facility: CLINIC | Age: 88
End: 2023-03-08
Payer: COMMERCIAL

## 2023-03-08 DIAGNOSIS — R11.2 NAUSEA AND VOMITING, UNSPECIFIED VOMITING TYPE: Primary | ICD-10-CM

## 2023-03-08 NOTE — TELEPHONE ENCOUNTER
CHU FROM [facility] LEFT VMAIL THAT PATIENT STILL HAS NAUSEA AND VOMITING. IT SEEMS TO BE AFTER SHE TAKES MEDICATIONS. DAUGHTER IS CONCERNED AND SHE REQUESTED A CALL BACK. DID NOT LEAVE A NUMBER

## 2023-03-08 NOTE — TELEPHONE ENCOUNTER
Kranthi Rodríguez called to let Dr Lundberg know that the patient went to the emergency room on the 6th and that she needs to be seen.

## 2023-03-09 NOTE — PROGRESS NOTES
Family Medicine Residency  Yobani Lundberg MD    MONTHLY NURSING HOME VISIT    NAME: Marlene Horne  : 1931  MRN: [id number]    DATE & TIME SEEN: 23 @ 1720    PCP: Yoabni Lundberg MD    NURSING HOME: [facility]    Monthly Nursing Home Visit for post ED visit    Chief Complaint: N/V    Subjective:     Marlene Horne is a 89 y.o. female who is being evaluated for NV    Was sent to ED and given Zofran.  NH still reporting that the patient is having post-prandial emesis.    Current Meds:    Current Outpatient Medications:   •  acetaminophen (TYLENOL) 325 MG tablet, Take 2 tablets by mouth Every 4 (Four) Hours As Needed for Mild Pain ., Disp: , Rfl:   •  albuterol (PROVENTIL) (2.5 MG/3ML) 0.083% nebulizer solution, Take 2.5 mg by nebulization Every 4 (Four) Hours As Needed for Shortness of Air., Disp: , Rfl: 12  •  busPIRone (BUSPAR) 5 MG tablet, Take 7.5 mg by mouth 2 (Two) Times a Day., Disp: , Rfl:   •  carbidopa-levodopa (SINEMET)  MG per tablet, Take 1 tablet by mouth 2 (Two) Times a Day., Disp: 30 tablet, Rfl: 1  •  cholecalciferol (VITAMIN D3) 25 MCG (1000 UT) tablet, Take 1,000 Units by mouth Daily., Disp: , Rfl:   •  Divalproex Sodium (DEPAKOTE SPRINKLE) 125 MG capsule, Take 2 capsules by mouth Every 8 (Eight) Hours., Disp: 90 capsule, Rfl: 2  •  donepezil (ARICEPT) 5 MG tablet, Take 2 tablets by mouth Every Night., Disp: 30 tablet, Rfl: 2  •  ferrous sulfate 324 (65 Fe) MG tablet delayed-release EC tablet, Take 1 tablet by mouth Daily With Breakfast., Disp: 30 tablet, Rfl:   •  melatonin 5 MG tablet tablet, Take 1 tablet by mouth Every Night., Disp: 30 each, Rfl: 0  •  metoprolol succinate XL (Toprol XL) 25 MG 24 hr tablet, Take 0.5 tablets by mouth Every Night., Disp: 30 tablet, Rfl: 1  •  mirtazapine (REMERON) 7.5 MG tablet, Take 1 tablet by mouth Every Night., Disp: 30 tablet, Rfl: 0  •  Multiple Vitamins-Minerals (I-HERNESTO) tablet tablet, Take 1 tablet by mouth Daily., Disp: 90  tablet, Rfl: 2  •  nystatin (MYCOSTATIN) 189431 UNIT/GM powder, Apply 1 application topically to the appropriate area as directed 2 (Two) Times a Day As Needed (redness/fungal infection). Apply under breast topically as needed for redness, Disp: , Rfl:   •  ondansetron (ZOFRAN) 4 MG tablet, Take 4 mg by mouth Every 6 (Six) Hours As Needed for Nausea or Vomiting., Disp: , Rfl:   •  ondansetron ODT (ZOFRAN-ODT) 4 MG disintegrating tablet, Place 1 tablet on the tongue 4 (Four) Times a Day As Needed for Nausea or Vomiting., Disp: 12 tablet, Rfl: 0  •  rosuvastatin (CRESTOR) 5 MG tablet, Take 1 tablet by mouth Every Night., Disp: 90 tablet, Rfl: 0  •  sennosides-docusate sodium (SENOKOT-S) 8.6-50 MG tablet, Take 2 tablets by mouth Every Night., Disp: 30 tablet, Rfl: 0  •  sodium chloride 1 g tablet, Take 1 tablet by mouth 3 (Three) Times a Day With Meals., Disp: 180 tablet, Rfl: 0  •  trimethobenzamide (Tigan) 300 MG capsule, Take 1 capsule by mouth 2 (Two) Times a Day., Disp: 60 capsule, Rfl: 0  •  vitamin D (ERGOCALCIFEROL) 1.25 MG (07625 UT) capsule capsule, Take 50,000 Units by mouth 1 (One) Time Per Week., Disp: , Rfl:     Allergies:  Patient has no known allergies.    Review of Systems:  Review of Systems   Unable to perform ROS: Dementia       Objective:     T 97.32F, P 80, /66, RR 20, Sat 98% RA  Physical Exam  Constitutional:       General: She is not in acute distress.  HENT:      Head: Normocephalic and atraumatic.      Right Ear: External ear normal.      Left Ear: External ear normal.   Cardiovascular:      Rate and Rhythm: Normal rate and regular rhythm.      Heart sounds: Normal heart sounds.   Pulmonary:      Effort: Pulmonary effort is normal. No respiratory distress.      Breath sounds: Normal breath sounds.   Abdominal:      General: Bowel sounds are normal.      Palpations: Abdomen is soft.      Tenderness: There is abdominal tenderness.      Comments: RUQ tenderness   Musculoskeletal:       Right lower leg: No edema.      Left lower leg: No edema.   Skin:     General: Skin is warm and dry.      Comments: Some resolving bruising   Neurological:      General: No focal deficit present.      Mental Status: She is alert.   Psychiatric:         Mood and Affect: Mood normal.         Behavior: Behavior normal.         Diagnostic Data:     Lab Results (last 7 days)     ** No results found for the last 168 hours. **            Assessment/Plan:      89 y.o. female with:  Diagnoses and all orders for this visit:    1. Nausea and vomiting, unspecified vomiting type (Primary)    This seems to have all started with trying to resolve her new onset parkinson's.  Between stopping her tremors and falls, and having constant N/V after meals, it is likely in this patient's best interest to back off on the Parkinson's treatment as she is wheelchair bound.  Given post-prandial NV and RUQ I am also suspicious of gallbladder pathology.  - D/C Sinemet  - D/C Zofran as this acts on Dopa  - Start Compazine 5mg TID PRN N/V  - US Gallbladder with NPO starting midnight before  - Fall precautions  - D/C Tigan          CODE STATUS: DNR and DNI    Dr. Anderson Mandujano MD is the attending on record for this patient, He is aware of the patient's status and agrees with the above.      Yobani Lundberg M.D. PGY-3  Chief Resident  [facility]  [address] [place] [address]  Office: [phone]    This document has been electronically signed by Yobani Lundberg MD on March 9, 2023 01:27 CST

## 2023-03-13 ENCOUNTER — TELEPHONE (OUTPATIENT)
Dept: FAMILY MEDICINE CLINIC | Facility: CLINIC | Age: 88
End: 2023-03-13
Payer: COMMERCIAL

## 2023-03-13 DIAGNOSIS — Z51.5 END OF LIFE CARE: Primary | ICD-10-CM

## 2023-03-13 RX ORDER — MORPHINE SULFATE 100 MG/5ML
10 SOLUTION ORAL EVERY 6 HOURS PRN
Qty: 30 ML | Refills: 0 | Status: SHIPPED | OUTPATIENT
Start: 2023-03-13

## 2023-03-13 RX ORDER — SCOLOPAMINE TRANSDERMAL SYSTEM 1 MG/1
1 PATCH, EXTENDED RELEASE TRANSDERMAL
Qty: 4 EACH | Refills: 2 | Status: SHIPPED | OUTPATIENT
Start: 2023-03-13

## 2023-03-13 NOTE — TELEPHONE ENCOUNTER
Spoke with Maribeth from [facility], family has decided to make patient comfort care due to decline in health. Discussed with Dr. Mandujano who is on board with comfort care. Gave verbal orders for morphine and ativan and scopalamine.         James Meyers MD PGY-3  [facility]   This document has been electronically signed by James Meyers MD on March 13, 2023 15:10 CDT

## 2023-03-13 NOTE — TELEPHONE ENCOUNTER
Maribeth at [facility] has left a message asking for Dr. Lundberg to return her call regarding this patient.  The # to call is [phone].    Willie Blackman

## 2023-03-26 NOTE — PLAN OF CARE
Problem: Patient Care Overview  Goal: Plan of Care Review  Outcome: Ongoing (interventions implemented as appropriate)   01/01/19 0810   Coping/Psychosocial   Plan of Care Reviewed With patient   Plan of Care Review   Progress no change   OTHER   Outcome Summary Pt has been drowzy most of shift, but patient has gotten out of bed mx times without using call light. Pt remains high fall risk, vss, will continue to monitor.      Goal: Individualization and Mutuality  Outcome: Ongoing (interventions implemented as appropriate)    Goal: Discharge Needs Assessment  Outcome: Ongoing (interventions implemented as appropriate)    Goal: Interprofessional Rounds/Family Conf  Outcome: Ongoing (interventions implemented as appropriate)      Problem: Fall Risk (Adult)  Goal: Absence of Fall  Outcome: Ongoing (interventions implemented as appropriate)      Problem: Skin Injury Risk (Adult)  Goal: Skin Health and Integrity  Outcome: Ongoing (interventions implemented as appropriate)      Problem: Confusion, Chronic (Adult)  Goal: Cognitive/Functional Impairments Minimized  Outcome: Ongoing (interventions implemented as appropriate)    Goal: Free from Harm/Injuries  Outcome: Ongoing (interventions implemented as appropriate)         S/p lap carlos alberto    No c/o     Incisions healing well.      F/U prn.

## 2023-03-27 NOTE — PROGRESS NOTES
I have reviewed the notes, assessments, and/or procedures performed by Dr. Lundberg, I concur with her/his documentation and assessment and plan for Marlene Horne.       This document has been electronically signed by Anderson Mandujano MD on March 27, 2023 10:45 CDT

## (undated) DEVICE — SOL IRR NACL 0.9PCT BT 1000ML

## (undated) DEVICE — SUT VIC 2/0 CT1 CR8 18IN J839D

## (undated) DEVICE — GLV SURG ORTHO BIOGEL OPTIFIT PF SZ9

## (undated) DEVICE — COVER,MAYO STAND,STERILE: Brand: MEDLINE

## (undated) DEVICE — PAD GRND REM POLYHESIVE A/ DISP

## (undated) DEVICE — GLV SURG SENSICARE POLYISPRN W/ALOE PF LF 6.5 GRN STRL

## (undated) DEVICE — BNDG ELAS ELITE V/CLOSE 4IN 5YD LF STRL

## (undated) DEVICE — K-WIRE
Type: IMPLANTABLE DEVICE | Site: HIP | Status: NON-FUNCTIONAL
Removed: 2020-12-06

## (undated) DEVICE — DRSNG GZ CURAD XEROFORM NONADHS 5X9IN STRL

## (undated) DEVICE — STERILE POLYISOPRENE POWDER-FREE SURGICAL GLOVES WITH EMOLLIENT COATING: Brand: PROTEXIS

## (undated) DEVICE — DRILL, AO, STERILE

## (undated) DEVICE — ELECTRD BLD EZ CLN MOD 2.5IN

## (undated) DEVICE — SUT VIC 0 CT1 CR8 27IN JJ41G

## (undated) DEVICE — DRSNG WND BORDR/ADHS NONADHR/GZ LF 4X10IN STRL

## (undated) DEVICE — WEBRIL COTTON UNDERCAST PADDING: Brand: WEBRIL

## (undated) DEVICE — TP CAST SCOTCHCAST PLS 4IN 4YD WHT

## (undated) DEVICE — 6619 2 PTNT ISO SYS INCISE AREA&LT;(&GT;&&LT;)&GT;P: Brand: STERI-DRAPE™ IOBAN™ 2

## (undated) DEVICE — PROXIMATE PLUS MD MULTI-DIRECTIONAL RELEASE SKIN STAPLERS CONTAINS 35 STAINLESS STEEL STAPLES APPROXIMATE CLOSED DIMENSIONS: 6.9MM X 3.9MM WIDE: Brand: PROXIMATE

## (undated) DEVICE — GW FOR TROCH NAIL 3.2X400MM

## (undated) DEVICE — PK HIP LF 60